# Patient Record
Sex: MALE | Race: WHITE | NOT HISPANIC OR LATINO | Employment: OTHER | URBAN - METROPOLITAN AREA
[De-identification: names, ages, dates, MRNs, and addresses within clinical notes are randomized per-mention and may not be internally consistent; named-entity substitution may affect disease eponyms.]

---

## 2023-09-19 LAB
ALANINE AMINOTRANSFERASE (SGPT) (U/L) IN SER/PLAS: 26 U/L (ref 10–52)
ALBUMIN (G/DL) IN SER/PLAS: 3.7 G/DL (ref 3.4–5)
ALKALINE PHOSPHATASE (U/L) IN SER/PLAS: 72 U/L (ref 33–136)
ANION GAP IN SER/PLAS: 16 MMOL/L (ref 10–20)
ASPARTATE AMINOTRANSFERASE (SGOT) (U/L) IN SER/PLAS: 23 U/L (ref 9–39)
BASOPHILS (10*3/UL) IN BLOOD BY AUTOMATED COUNT: 0.04 X10E9/L (ref 0–0.1)
BASOPHILS/100 LEUKOCYTES IN BLOOD BY AUTOMATED COUNT: 0.3 % (ref 0–2)
BILIRUBIN TOTAL (MG/DL) IN SER/PLAS: 0.5 MG/DL (ref 0–1.2)
CALCIUM (MG/DL) IN SER/PLAS: 8.9 MG/DL (ref 8.6–10.3)
CARBON DIOXIDE, TOTAL (MMOL/L) IN SER/PLAS: 26 MMOL/L (ref 21–32)
CHLORIDE (MMOL/L) IN SER/PLAS: 96 MMOL/L (ref 98–107)
CREATININE (MG/DL) IN SER/PLAS: 4.68 MG/DL (ref 0.5–1.3)
D-DIMER, QUANTITATIVE VTE EXCLUSION: 3640 NG/ML FEU
EOSINOPHILS (10*3/UL) IN BLOOD BY AUTOMATED COUNT: 0.04 X10E9/L (ref 0–0.4)
EOSINOPHILS/100 LEUKOCYTES IN BLOOD BY AUTOMATED COUNT: 0.3 % (ref 0–6)
ERYTHROCYTE DISTRIBUTION WIDTH (RATIO) BY AUTOMATED COUNT: 12.8 % (ref 11.5–14.5)
ERYTHROCYTE MEAN CORPUSCULAR HEMOGLOBIN CONCENTRATION (G/DL) BY AUTOMATED: 31.1 G/DL (ref 32–36)
ERYTHROCYTE MEAN CORPUSCULAR VOLUME (FL) BY AUTOMATED COUNT: 105 FL (ref 80–100)
ERYTHROCYTES (10*6/UL) IN BLOOD BY AUTOMATED COUNT: 2.61 X10E12/L (ref 4.5–5.9)
GFR MALE: 12 ML/MIN/1.73M2
GLUCOSE (MG/DL) IN SER/PLAS: 271 MG/DL (ref 74–99)
HEMATOCRIT (%) IN BLOOD BY AUTOMATED COUNT: 27.3 % (ref 41–52)
HEMOGLOBIN (G/DL) IN BLOOD: 8.5 G/DL (ref 13.5–17.5)
HEPARIN UNFRACTIONATED IN PPP BY CHROMOGENIC MET: 0.8 IU/ML
IMMATURE GRANULOCYTES/100 LEUKOCYTES IN BLOOD BY AUTOMATED COUNT: 0.7 % (ref 0–0.9)
INR IN PPP BY COAGULATION ASSAY: 1.2 (ref 0.9–1.1)
LEUKOCYTES (10*3/UL) IN BLOOD BY AUTOMATED COUNT: 11.4 X10E9/L (ref 4.4–11.3)
LYMPHOCYTES (10*3/UL) IN BLOOD BY AUTOMATED COUNT: 0.87 X10E9/L (ref 0.8–3)
LYMPHOCYTES/100 LEUKOCYTES IN BLOOD BY AUTOMATED COUNT: 7.6 % (ref 13–44)
MAGNESIUM (MG/DL) IN SER/PLAS: 1.72 MG/DL (ref 1.6–2.4)
MAGNESIUM (MG/DL) IN SER/PLAS: NORMAL
MONOCYTES (10*3/UL) IN BLOOD BY AUTOMATED COUNT: 0.87 X10E9/L (ref 0.05–0.8)
MONOCYTES/100 LEUKOCYTES IN BLOOD BY AUTOMATED COUNT: 7.6 % (ref 2–10)
NATRIURETIC PEPTIDE B (PG/ML) IN SER/PLAS: 103 PG/ML (ref 0–99)
NEUTROPHILS (10*3/UL) IN BLOOD BY AUTOMATED COUNT: 9.54 X10E9/L (ref 1.6–5.5)
NEUTROPHILS/100 LEUKOCYTES IN BLOOD BY AUTOMATED COUNT: 83.5 % (ref 40–80)
NRBC (PER 100 WBCS) BY AUTOMATED COUNT: 0 /100 WBC (ref 0–0)
PLATELETS (10*3/UL) IN BLOOD AUTOMATED COUNT: 239 X10E9/L (ref 150–450)
POTASSIUM (MMOL/L) IN SER/PLAS: 3.9 MMOL/L (ref 3.5–5.3)
PROTEIN TOTAL: 7.1 G/DL (ref 6.4–8.2)
PROTHROMBIN TIME (PT) IN PPP BY COAGULATION ASSAY: 13.1 SEC (ref 9.8–12.8)
SODIUM (MMOL/L) IN SER/PLAS: 134 MMOL/L (ref 136–145)
TROPONIN I, HIGH SENSITIVITY: 122 NG/L (ref 0–20)
TROPONIN I, HIGH SENSITIVITY: 1661 NG/L (ref 0–20)
TROPONIN I, HIGH SENSITIVITY: 608 NG/L (ref 0–20)
UREA NITROGEN (MG/DL) IN SER/PLAS: 64 MG/DL (ref 6–23)

## 2023-09-19 PROCEDURE — 99291 CRITICAL CARE FIRST HOUR: CPT

## 2023-09-19 PROCEDURE — 96375 TX/PRO/DX INJ NEW DRUG ADDON: CPT

## 2023-09-19 PROCEDURE — 83880 ASSAY OF NATRIURETIC PEPTIDE: CPT

## 2023-09-19 PROCEDURE — 85520 HEPARIN ASSAY: CPT

## 2023-09-19 PROCEDURE — 85379 FIBRIN DEGRADATION QUANT: CPT

## 2023-09-19 PROCEDURE — 80053 COMPREHEN METABOLIC PANEL: CPT

## 2023-09-19 PROCEDURE — 82465 ASSAY BLD/SERUM CHOLESTEROL: CPT

## 2023-09-19 PROCEDURE — 71275 CT ANGIOGRAPHY CHEST: CPT

## 2023-09-19 PROCEDURE — 9990 CHARGE CONVERSION: Mod: 91

## 2023-09-19 PROCEDURE — 85610 PROTHROMBIN TIME: CPT

## 2023-09-19 PROCEDURE — 83735 ASSAY OF MAGNESIUM: CPT

## 2023-09-19 PROCEDURE — 85025 COMPLETE CBC W/AUTO DIFF WBC: CPT

## 2023-09-19 PROCEDURE — 83718 ASSAY OF LIPOPROTEIN: CPT

## 2023-09-19 PROCEDURE — 93005 ELECTROCARDIOGRAM TRACING: CPT

## 2023-09-19 PROCEDURE — 96374 THER/PROPH/DIAG INJ IV PUSH: CPT

## 2023-09-19 PROCEDURE — 84484 ASSAY OF TROPONIN QUANT: CPT | Mod: 91

## 2023-09-19 PROCEDURE — 71046 X-RAY EXAM CHEST 2 VIEWS: CPT

## 2023-09-20 ENCOUNTER — HOSPITAL ENCOUNTER (INPATIENT)
Dept: DATA CONVERSION | Facility: HOSPITAL | Age: 83
LOS: 10 days | Discharge: ANOTHER HOSPITAL | DRG: 280 | End: 2023-09-30
Attending: STUDENT IN AN ORGANIZED HEALTH CARE EDUCATION/TRAINING PROGRAM | Admitting: STUDENT IN AN ORGANIZED HEALTH CARE EDUCATION/TRAINING PROGRAM
Payer: MEDICARE

## 2023-09-20 DIAGNOSIS — I31.39 OTHER PERICARDIAL EFFUSION (NONINFLAMMATORY) (HHS-HCC): ICD-10-CM

## 2023-09-20 LAB
ALBUMIN (G/DL) IN SER/PLAS: 3.3 G/DL (ref 3.4–5)
ANION GAP IN SER/PLAS: 18 MMOL/L (ref 10–20)
CALCIUM (MG/DL) IN SER/PLAS: 8.3 MG/DL (ref 8.6–10.3)
CARBON DIOXIDE, TOTAL (MMOL/L) IN SER/PLAS: 22 MMOL/L (ref 21–32)
CHLORIDE (MMOL/L) IN SER/PLAS: 97 MMOL/L (ref 98–107)
CHOLESTEROL (MG/DL) IN SER/PLAS: 89 MG/DL (ref 0–199)
CHOLESTEROL IN HDL (MG/DL) IN SER/PLAS: 31.8 MG/DL
CHOLESTEROL/HDL RATIO: 2.8
CREATININE (MG/DL) IN SER/PLAS: 4.67 MG/DL (ref 0.5–1.3)
ERYTHROCYTE DISTRIBUTION WIDTH (RATIO) BY AUTOMATED COUNT: 12.8 % (ref 11.5–14.5)
ERYTHROCYTE DISTRIBUTION WIDTH (RATIO) BY AUTOMATED COUNT: 12.9 % (ref 11.5–14.5)
ERYTHROCYTE MEAN CORPUSCULAR HEMOGLOBIN CONCENTRATION (G/DL) BY AUTOMATED: 30.9 G/DL (ref 32–36)
ERYTHROCYTE MEAN CORPUSCULAR HEMOGLOBIN CONCENTRATION (G/DL) BY AUTOMATED: 31.8 G/DL (ref 32–36)
ERYTHROCYTE MEAN CORPUSCULAR VOLUME (FL) BY AUTOMATED COUNT: 103 FL (ref 80–100)
ERYTHROCYTE MEAN CORPUSCULAR VOLUME (FL) BY AUTOMATED COUNT: 105 FL (ref 80–100)
ERYTHROCYTES (10*6/UL) IN BLOOD BY AUTOMATED COUNT: 2.31 X10E12/L (ref 4.5–5.9)
ERYTHROCYTES (10*6/UL) IN BLOOD BY AUTOMATED COUNT: 2.48 X10E12/L (ref 4.5–5.9)
ESTIMATED AVERAGE GLUCOSE FOR HBA1C: 151 MG/DL
GFR MALE: 12 ML/MIN/1.73M2
GLUCOSE (MG/DL) IN SER/PLAS: 236 MG/DL (ref 74–99)
HEMATOCRIT (%) IN BLOOD BY AUTOMATED COUNT: 24.2 % (ref 41–52)
HEMATOCRIT (%) IN BLOOD BY AUTOMATED COUNT: 25.6 % (ref 41–52)
HEMOGLOBIN (G/DL) IN BLOOD: 7.7 G/DL (ref 13.5–17.5)
HEMOGLOBIN (G/DL) IN BLOOD: 7.9 G/DL (ref 13.5–17.5)
HEMOGLOBIN A1C/HEMOGLOBIN TOTAL IN BLOOD: 6.9 %
HEPARIN UNFRACTIONATED IN PPP BY CHROMOGENIC MET: 0.3 IU/ML
HEPARIN UNFRACTIONATED IN PPP BY CHROMOGENIC MET: 0.3 IU/ML
LEUKOCYTES (10*3/UL) IN BLOOD BY AUTOMATED COUNT: 8.4 X10E9/L (ref 4.4–11.3)
LEUKOCYTES (10*3/UL) IN BLOOD BY AUTOMATED COUNT: 9.1 X10E9/L (ref 4.4–11.3)
MAGNESIUM (MG/DL) IN SER/PLAS: 1.82 MG/DL (ref 1.6–2.4)
NON-HDL CHOLESTEROL: 57 MG/DL
NRBC (PER 100 WBCS) BY AUTOMATED COUNT: 0 /100 WBC (ref 0–0)
NRBC (PER 100 WBCS) BY AUTOMATED COUNT: 0 /100 WBC (ref 0–0)
PHOSPHATE (MG/DL) IN SER/PLAS: 5.2 MG/DL (ref 2.5–4.9)
PLATELETS (10*3/UL) IN BLOOD AUTOMATED COUNT: 242 X10E9/L (ref 150–450)
PLATELETS (10*3/UL) IN BLOOD AUTOMATED COUNT: 248 X10E9/L (ref 150–450)
POCT GLUCOSE: 134 MG/DL (ref 74–99)
POCT GLUCOSE: 174 MG/DL (ref 74–99)
POCT GLUCOSE: 240 MG/DL (ref 74–99)
POCT GLUCOSE: 251 MG/DL (ref 74–99)
POCT GLUCOSE: 256 MG/DL (ref 74–99)
POTASSIUM (MMOL/L) IN SER/PLAS: 3.8 MMOL/L (ref 3.5–5.3)
SODIUM (MMOL/L) IN SER/PLAS: 133 MMOL/L (ref 136–145)
THYROTROPIN (MIU/L) IN SER/PLAS BY DETECTION LIMIT <= 0.05 MIU/L: 2.34 MIU/L (ref 0.44–3.98)
TROPONIN I, HIGH SENSITIVITY: 2226 NG/L (ref 0–20)
UREA NITROGEN (MG/DL) IN SER/PLAS: 68 MG/DL (ref 6–23)

## 2023-09-20 PROCEDURE — 84484 ASSAY OF TROPONIN QUANT: CPT | Mod: 91

## 2023-09-20 PROCEDURE — 83735 ASSAY OF MAGNESIUM: CPT

## 2023-09-20 PROCEDURE — 99291 CRITICAL CARE FIRST HOUR: CPT

## 2023-09-20 PROCEDURE — 96374 THER/PROPH/DIAG INJ IV PUSH: CPT

## 2023-09-20 PROCEDURE — 93005 ELECTROCARDIOGRAM TRACING: CPT

## 2023-09-20 PROCEDURE — C1894 INTRO/SHEATH, NON-LASER: HCPCS

## 2023-09-20 PROCEDURE — 96375 TX/PRO/DX INJ NEW DRUG ADDON: CPT

## 2023-09-20 PROCEDURE — 83036 HEMOGLOBIN GLYCOSYLATED A1C: CPT

## 2023-09-20 PROCEDURE — 82465 ASSAY BLD/SERUM CHOLESTEROL: CPT

## 2023-09-20 PROCEDURE — 85520 HEPARIN ASSAY: CPT

## 2023-09-20 PROCEDURE — 71046 X-RAY EXAM CHEST 2 VIEWS: CPT

## 2023-09-20 PROCEDURE — 84443 ASSAY THYROID STIM HORMONE: CPT

## 2023-09-20 PROCEDURE — 85025 COMPLETE CBC W/AUTO DIFF WBC: CPT

## 2023-09-20 PROCEDURE — 82947 ASSAY GLUCOSE BLOOD QUANT: CPT | Mod: 91

## 2023-09-20 PROCEDURE — B2111ZZ FLUOROSCOPY OF MULTIPLE CORONARY ARTERIES USING LOW OSMOLAR CONTRAST: ICD-10-PCS | Performed by: SPECIALIST

## 2023-09-20 PROCEDURE — 71275 CT ANGIOGRAPHY CHEST: CPT

## 2023-09-20 PROCEDURE — 80053 COMPREHEN METABOLIC PANEL: CPT

## 2023-09-20 PROCEDURE — 3E1M39Z IRRIGATION OF PERITONEAL CAVITY USING DIALYSATE, PERCUTANEOUS APPROACH: ICD-10-PCS | Performed by: INTERNAL MEDICINE

## 2023-09-20 PROCEDURE — 85379 FIBRIN DEGRADATION QUANT: CPT

## 2023-09-20 PROCEDURE — 4A023N7 MEASUREMENT OF CARDIAC SAMPLING AND PRESSURE, LEFT HEART, PERCUTANEOUS APPROACH: ICD-10-PCS | Performed by: SPECIALIST

## 2023-09-20 PROCEDURE — 83880 ASSAY OF NATRIURETIC PEPTIDE: CPT

## 2023-09-20 PROCEDURE — 83718 ASSAY OF LIPOPROTEIN: CPT

## 2023-09-20 PROCEDURE — 80069 RENAL FUNCTION PANEL: CPT

## 2023-09-20 PROCEDURE — 93306 TTE W/DOPPLER COMPLETE: CPT

## 2023-09-20 PROCEDURE — 93458 L HRT ARTERY/VENTRICLE ANGIO: CPT

## 2023-09-20 PROCEDURE — 85610 PROTHROMBIN TIME: CPT

## 2023-09-20 PROCEDURE — 9990 CHARGE CONVERSION: Mod: 91

## 2023-09-20 PROCEDURE — 85027 COMPLETE CBC AUTOMATED: CPT

## 2023-09-21 LAB
ALBUMIN (G/DL) IN SER/PLAS: 3.1 G/DL (ref 3.4–5)
ANION GAP IN SER/PLAS: 17 MMOL/L (ref 10–20)
CALCIUM (MG/DL) IN SER/PLAS: 8.3 MG/DL (ref 8.6–10.3)
CARBON DIOXIDE, TOTAL (MMOL/L) IN SER/PLAS: 23 MMOL/L (ref 21–32)
CHLORIDE (MMOL/L) IN SER/PLAS: 98 MMOL/L (ref 98–107)
CREATININE (MG/DL) IN SER/PLAS: 4.83 MG/DL (ref 0.5–1.3)
ERYTHROCYTE DISTRIBUTION WIDTH (RATIO) BY AUTOMATED COUNT: 13.1 % (ref 11.5–14.5)
ERYTHROCYTE DISTRIBUTION WIDTH (RATIO) BY AUTOMATED COUNT: 13.2 % (ref 11.5–14.5)
ERYTHROCYTE MEAN CORPUSCULAR HEMOGLOBIN CONCENTRATION (G/DL) BY AUTOMATED: 30.5 G/DL (ref 32–36)
ERYTHROCYTE MEAN CORPUSCULAR HEMOGLOBIN CONCENTRATION (G/DL) BY AUTOMATED: 30.9 G/DL (ref 32–36)
ERYTHROCYTE MEAN CORPUSCULAR VOLUME (FL) BY AUTOMATED COUNT: 105 FL (ref 80–100)
ERYTHROCYTE MEAN CORPUSCULAR VOLUME (FL) BY AUTOMATED COUNT: 107 FL (ref 80–100)
ERYTHROCYTES (10*6/UL) IN BLOOD BY AUTOMATED COUNT: 2.24 X10E12/L (ref 4.5–5.9)
ERYTHROCYTES (10*6/UL) IN BLOOD BY AUTOMATED COUNT: 2.32 X10E12/L (ref 4.5–5.9)
GFR MALE: 11 ML/MIN/1.73M2
GLUCOSE (MG/DL) IN SER/PLAS: 277 MG/DL (ref 74–99)
HEMATOCRIT (%) IN BLOOD BY AUTOMATED COUNT: 23.6 % (ref 41–52)
HEMATOCRIT (%) IN BLOOD BY AUTOMATED COUNT: 24.9 % (ref 41–52)
HEMOGLOBIN (G/DL) IN BLOOD: 7.2 G/DL (ref 13.5–17.5)
HEMOGLOBIN (G/DL) IN BLOOD: 7.7 G/DL (ref 13.5–17.5)
HEPARIN UNFRACTIONATED IN PPP BY CHROMOGENIC MET: 0.1 IU/ML
HEPARIN UNFRACTIONATED IN PPP BY CHROMOGENIC MET: 0.2 IU/ML
LEUKOCYTES (10*3/UL) IN BLOOD BY AUTOMATED COUNT: 8 X10E9/L (ref 4.4–11.3)
LEUKOCYTES (10*3/UL) IN BLOOD BY AUTOMATED COUNT: 9.1 X10E9/L (ref 4.4–11.3)
NRBC (PER 100 WBCS) BY AUTOMATED COUNT: 0 /100 WBC (ref 0–0)
NRBC (PER 100 WBCS) BY AUTOMATED COUNT: 0 /100 WBC (ref 0–0)
PHOSPHATE (MG/DL) IN SER/PLAS: 4.6 MG/DL (ref 2.5–4.9)
PLATELETS (10*3/UL) IN BLOOD AUTOMATED COUNT: 231 X10E9/L (ref 150–450)
PLATELETS (10*3/UL) IN BLOOD AUTOMATED COUNT: 232 X10E9/L (ref 150–450)
POCT GLUCOSE: 149 MG/DL (ref 74–99)
POCT GLUCOSE: 238 MG/DL (ref 74–99)
POCT GLUCOSE: 251 MG/DL (ref 74–99)
POTASSIUM (MMOL/L) IN SER/PLAS: 3.9 MMOL/L (ref 3.5–5.3)
SODIUM (MMOL/L) IN SER/PLAS: 134 MMOL/L (ref 136–145)
UREA NITROGEN (MG/DL) IN SER/PLAS: 65 MG/DL (ref 6–23)

## 2023-09-21 PROCEDURE — 84484 ASSAY OF TROPONIN QUANT: CPT

## 2023-09-21 PROCEDURE — 93308 TTE F-UP OR LMTD: CPT

## 2023-09-21 PROCEDURE — 83735 ASSAY OF MAGNESIUM: CPT

## 2023-09-21 PROCEDURE — 85027 COMPLETE CBC AUTOMATED: CPT | Mod: 91

## 2023-09-21 PROCEDURE — 93321 DOPPLER ECHO F-UP/LMTD STD: CPT

## 2023-09-21 PROCEDURE — 93325 DOPPLER ECHO COLOR FLOW MAPG: CPT

## 2023-09-21 PROCEDURE — 9990 CHARGE CONVERSION: Mod: 91

## 2023-09-21 PROCEDURE — 84443 ASSAY THYROID STIM HORMONE: CPT

## 2023-09-21 PROCEDURE — 36415 COLL VENOUS BLD VENIPUNCTURE: CPT

## 2023-09-21 PROCEDURE — 83036 HEMOGLOBIN GLYCOSYLATED A1C: CPT

## 2023-09-21 PROCEDURE — 82947 ASSAY GLUCOSE BLOOD QUANT: CPT | Mod: 91

## 2023-09-21 PROCEDURE — 80069 RENAL FUNCTION PANEL: CPT

## 2023-09-21 PROCEDURE — 85520 HEPARIN ASSAY: CPT | Mod: 91

## 2023-09-21 PROCEDURE — 93458 L HRT ARTERY/VENTRICLE ANGIO: CPT

## 2023-09-22 LAB
ALBUMIN (G/DL) IN SER/PLAS: 3.1 G/DL (ref 3.4–5)
ANION GAP IN SER/PLAS: 17 MMOL/L (ref 10–20)
CALCIUM (MG/DL) IN SER/PLAS: 8.2 MG/DL (ref 8.6–10.3)
CARBON DIOXIDE, TOTAL (MMOL/L) IN SER/PLAS: 23 MMOL/L (ref 21–32)
CHLORIDE (MMOL/L) IN SER/PLAS: 97 MMOL/L (ref 98–107)
CREATININE (MG/DL) IN SER/PLAS: 5.35 MG/DL (ref 0.5–1.3)
ERYTHROCYTE DISTRIBUTION WIDTH (RATIO) BY AUTOMATED COUNT: 13.1 % (ref 11.5–14.5)
ERYTHROCYTE MEAN CORPUSCULAR HEMOGLOBIN CONCENTRATION (G/DL) BY AUTOMATED: 32 G/DL (ref 32–36)
ERYTHROCYTE MEAN CORPUSCULAR VOLUME (FL) BY AUTOMATED COUNT: 103 FL (ref 80–100)
ERYTHROCYTES (10*6/UL) IN BLOOD BY AUTOMATED COUNT: 2.33 X10E12/L (ref 4.5–5.9)
GFR MALE: 10 ML/MIN/1.73M2
GLUCOSE (MG/DL) IN SER/PLAS: 320 MG/DL (ref 74–99)
HEMATOCRIT (%) IN BLOOD BY AUTOMATED COUNT: 24.1 % (ref 41–52)
HEMOGLOBIN (G/DL) IN BLOOD: 7.7 G/DL (ref 13.5–17.5)
HEPARIN UNFRACTIONATED IN PPP BY CHROMOGENIC MET: 0.3 IU/ML
HEPARIN UNFRACTIONATED IN PPP BY CHROMOGENIC MET: 0.4 IU/ML
LEUKOCYTES (10*3/UL) IN BLOOD BY AUTOMATED COUNT: 9.3 X10E9/L (ref 4.4–11.3)
NRBC (PER 100 WBCS) BY AUTOMATED COUNT: 0 /100 WBC (ref 0–0)
PHOSPHATE (MG/DL) IN SER/PLAS: 4.6 MG/DL (ref 2.5–4.9)
PLATELETS (10*3/UL) IN BLOOD AUTOMATED COUNT: 247 X10E9/L (ref 150–450)
POCT GLUCOSE: 174 MG/DL (ref 74–99)
POCT GLUCOSE: 268 MG/DL (ref 74–99)
POCT GLUCOSE: 268 MG/DL (ref 74–99)
POCT GLUCOSE: 289 MG/DL (ref 74–99)
POCT GLUCOSE: 313 MG/DL (ref 74–99)
POTASSIUM (MMOL/L) IN SER/PLAS: 3.9 MMOL/L (ref 3.5–5.3)
SODIUM (MMOL/L) IN SER/PLAS: 133 MMOL/L (ref 136–145)
UREA NITROGEN (MG/DL) IN SER/PLAS: 68 MG/DL (ref 6–23)

## 2023-09-22 PROCEDURE — 80069 RENAL FUNCTION PANEL: CPT

## 2023-09-22 PROCEDURE — 85027 COMPLETE CBC AUTOMATED: CPT

## 2023-09-22 PROCEDURE — 82947 ASSAY GLUCOSE BLOOD QUANT: CPT | Mod: 91

## 2023-09-22 PROCEDURE — 9990 CHARGE CONVERSION

## 2023-09-22 PROCEDURE — 85520 HEPARIN ASSAY: CPT | Mod: 91

## 2023-09-22 PROCEDURE — 36415 COLL VENOUS BLD VENIPUNCTURE: CPT

## 2023-09-22 PROCEDURE — 93005 ELECTROCARDIOGRAM TRACING: CPT

## 2023-09-23 LAB
ALBUMIN (G/DL) IN SER/PLAS: 3 G/DL (ref 3.4–5)
ANION GAP IN SER/PLAS: 17 MMOL/L (ref 10–20)
ATRIAL RATE: 101 BPM
ATRIAL RATE: 71 BPM
ATRIAL RATE: 71 BPM
ATRIAL RATE: 88 BPM
CALCIUM (MG/DL) IN SER/PLAS: 8.1 MG/DL (ref 8.6–10.3)
CARBON DIOXIDE, TOTAL (MMOL/L) IN SER/PLAS: 23 MMOL/L (ref 21–32)
CHLORIDE (MMOL/L) IN SER/PLAS: 97 MMOL/L (ref 98–107)
CREATININE (MG/DL) IN SER/PLAS: 5.27 MG/DL (ref 0.5–1.3)
GFR MALE: 10 ML/MIN/1.73M2
GLUCOSE (MG/DL) IN SER/PLAS: 263 MG/DL (ref 74–99)
HEPARIN UNFRACTIONATED IN PPP BY CHROMOGENIC MET: 0.3 IU/ML
P AXIS: 32 DEGREES
P AXIS: 33 DEGREES
P AXIS: 36 DEGREES
P OFFSET: 179 MS
P OFFSET: 186 MS
P OFFSET: 187 MS
P ONSET: 125 MS
P ONSET: 127 MS
P ONSET: 130 MS
PHOSPHATE (MG/DL) IN SER/PLAS: 4.6 MG/DL (ref 2.5–4.9)
POCT GLUCOSE: 164 MG/DL (ref 74–99)
POCT GLUCOSE: 208 MG/DL (ref 74–99)
POCT GLUCOSE: 266 MG/DL (ref 74–99)
POCT GLUCOSE: 267 MG/DL (ref 74–99)
POTASSIUM (MMOL/L) IN SER/PLAS: 3.8 MMOL/L (ref 3.5–5.3)
PR INTERVAL: 172 MS
PR INTERVAL: 174 MS
PR INTERVAL: 180 MS
Q ONSET: 214 MS
Q ONSET: 215 MS
Q ONSET: 216 MS
Q ONSET: 217 MS
QRS COUNT: 12 BEATS
QRS COUNT: 12 BEATS
QRS COUNT: 14 BEATS
QRS COUNT: 19 BEATS
QRS DURATION: 82 MS
QRS DURATION: 86 MS
QRS DURATION: 86 MS
QRS DURATION: 88 MS
QT INTERVAL: 306 MS
QT INTERVAL: 398 MS
QT INTERVAL: 420 MS
QT INTERVAL: 420 MS
QTC CALCULATION(BAZETT): 432 MS
QTC CALCULATION(BAZETT): 456 MS
QTC CALCULATION(BAZETT): 456 MS
QTC CALCULATION(BAZETT): 481 MS
QTC FREDERICIA: 385 MS
QTC FREDERICIA: 444 MS
QTC FREDERICIA: 444 MS
QTC FREDERICIA: 452 MS
R AXIS: -16 DEGREES
R AXIS: -17 DEGREES
R AXIS: -19 DEGREES
R AXIS: -25 DEGREES
SODIUM (MMOL/L) IN SER/PLAS: 133 MMOL/L (ref 136–145)
T AXIS: 104 DEGREES
T AXIS: 69 DEGREES
T AXIS: 78 DEGREES
T AXIS: 90 DEGREES
T OFFSET: 370 MS
T OFFSET: 413 MS
T OFFSET: 425 MS
T OFFSET: 426 MS
UREA NITROGEN (MG/DL) IN SER/PLAS: 65 MG/DL (ref 6–23)
VENTRICULAR RATE: 120 BPM
VENTRICULAR RATE: 71 BPM
VENTRICULAR RATE: 71 BPM
VENTRICULAR RATE: 88 BPM

## 2023-09-23 PROCEDURE — 82947 ASSAY GLUCOSE BLOOD QUANT: CPT | Mod: 91

## 2023-09-23 PROCEDURE — 85027 COMPLETE CBC AUTOMATED: CPT

## 2023-09-23 PROCEDURE — 80069 RENAL FUNCTION PANEL: CPT

## 2023-09-23 PROCEDURE — 93005 ELECTROCARDIOGRAM TRACING: CPT

## 2023-09-23 PROCEDURE — 36415 COLL VENOUS BLD VENIPUNCTURE: CPT | Mod: 91

## 2023-09-23 PROCEDURE — 85520 HEPARIN ASSAY: CPT | Mod: 91

## 2023-09-23 PROCEDURE — 9990 CHARGE CONVERSION

## 2023-09-24 LAB
ERYTHROCYTE DISTRIBUTION WIDTH (RATIO) BY AUTOMATED COUNT: 13.2 % (ref 11.5–14.5)
ERYTHROCYTE MEAN CORPUSCULAR HEMOGLOBIN CONCENTRATION (G/DL) BY AUTOMATED: 31.4 G/DL (ref 32–36)
ERYTHROCYTE MEAN CORPUSCULAR VOLUME (FL) BY AUTOMATED COUNT: 104 FL (ref 80–100)
ERYTHROCYTES (10*6/UL) IN BLOOD BY AUTOMATED COUNT: 2.35 X10E12/L (ref 4.5–5.9)
HEMATOCRIT (%) IN BLOOD BY AUTOMATED COUNT: 24.5 % (ref 41–52)
HEMOGLOBIN (G/DL) IN BLOOD: 7.7 G/DL (ref 13.5–17.5)
HEPARIN UNFRACTIONATED IN PPP BY CHROMOGENIC MET: 0.2 IU/ML
HEPARIN UNFRACTIONATED IN PPP BY CHROMOGENIC MET: 0.4 IU/ML
HEPARIN UNFRACTIONATED IN PPP BY CHROMOGENIC MET: 0.5 IU/ML
LEUKOCYTES (10*3/UL) IN BLOOD BY AUTOMATED COUNT: 10 X10E9/L (ref 4.4–11.3)
NRBC (PER 100 WBCS) BY AUTOMATED COUNT: 0.2 /100 WBC (ref 0–0)
PLATELETS (10*3/UL) IN BLOOD AUTOMATED COUNT: 278 X10E9/L (ref 150–450)
POCT GLUCOSE: 141 MG/DL (ref 74–99)
POCT GLUCOSE: 166 MG/DL (ref 74–99)
POCT GLUCOSE: 190 MG/DL (ref 74–99)
POCT GLUCOSE: 272 MG/DL (ref 74–99)

## 2023-09-24 PROCEDURE — 9990 CHARGE CONVERSION: Mod: 91

## 2023-09-24 PROCEDURE — 80069 RENAL FUNCTION PANEL: CPT

## 2023-09-24 PROCEDURE — 36415 COLL VENOUS BLD VENIPUNCTURE: CPT

## 2023-09-24 PROCEDURE — 85520 HEPARIN ASSAY: CPT

## 2023-09-24 PROCEDURE — 82947 ASSAY GLUCOSE BLOOD QUANT: CPT | Mod: 91

## 2023-09-24 PROCEDURE — 85027 COMPLETE CBC AUTOMATED: CPT

## 2023-09-25 LAB
ALBUMIN (G/DL) IN SER/PLAS: 2.9 G/DL (ref 3.4–5)
ANION GAP IN SER/PLAS: 17 MMOL/L (ref 10–20)
CALCIUM (MG/DL) IN SER/PLAS: 8.1 MG/DL (ref 8.6–10.3)
CARBON DIOXIDE, TOTAL (MMOL/L) IN SER/PLAS: 24 MMOL/L (ref 21–32)
CHLORIDE (MMOL/L) IN SER/PLAS: 99 MMOL/L (ref 98–107)
CREATININE (MG/DL) IN SER/PLAS: 4.58 MG/DL (ref 0.5–1.3)
ERYTHROCYTE DISTRIBUTION WIDTH (RATIO) BY AUTOMATED COUNT: 13.4 % (ref 11.5–14.5)
ERYTHROCYTE MEAN CORPUSCULAR HEMOGLOBIN CONCENTRATION (G/DL) BY AUTOMATED: 30.8 G/DL (ref 32–36)
ERYTHROCYTE MEAN CORPUSCULAR VOLUME (FL) BY AUTOMATED COUNT: 106 FL (ref 80–100)
ERYTHROCYTES (10*6/UL) IN BLOOD BY AUTOMATED COUNT: 2.35 X10E12/L (ref 4.5–5.9)
GFR MALE: 12 ML/MIN/1.73M2
GLUCOSE (MG/DL) IN SER/PLAS: 184 MG/DL (ref 74–99)
HEMATOCRIT (%) IN BLOOD BY AUTOMATED COUNT: 25 % (ref 41–52)
HEMOGLOBIN (G/DL) IN BLOOD: 7.7 G/DL (ref 13.5–17.5)
HEPARIN UNFRACTIONATED IN PPP BY CHROMOGENIC MET: 0.3 IU/ML
LEUKOCYTES (10*3/UL) IN BLOOD BY AUTOMATED COUNT: 9 X10E9/L (ref 4.4–11.3)
NRBC (PER 100 WBCS) BY AUTOMATED COUNT: 0.3 /100 WBC (ref 0–0)
PHOSPHATE (MG/DL) IN SER/PLAS: 4.5 MG/DL (ref 2.5–4.9)
PLATELETS (10*3/UL) IN BLOOD AUTOMATED COUNT: 306 X10E9/L (ref 150–450)
POCT GLUCOSE: 137 MG/DL (ref 74–99)
POCT GLUCOSE: 175 MG/DL (ref 74–99)
POCT GLUCOSE: 223 MG/DL (ref 74–99)
POCT GLUCOSE: 84 MG/DL (ref 74–99)
POTASSIUM (MMOL/L) IN SER/PLAS: 3.7 MMOL/L (ref 3.5–5.3)
SODIUM (MMOL/L) IN SER/PLAS: 136 MMOL/L (ref 136–145)
UREA NITROGEN (MG/DL) IN SER/PLAS: 58 MG/DL (ref 6–23)

## 2023-09-25 PROCEDURE — 80069 RENAL FUNCTION PANEL: CPT

## 2023-09-25 PROCEDURE — C1894 INTRO/SHEATH, NON-LASER: HCPCS

## 2023-09-25 PROCEDURE — 93308 TTE F-UP OR LMTD: CPT

## 2023-09-25 PROCEDURE — 85520 HEPARIN ASSAY: CPT | Mod: 91

## 2023-09-25 PROCEDURE — 93321 DOPPLER ECHO F-UP/LMTD STD: CPT

## 2023-09-25 PROCEDURE — 82947 ASSAY GLUCOSE BLOOD QUANT: CPT

## 2023-09-25 PROCEDURE — 93325 DOPPLER ECHO COLOR FLOW MAPG: CPT

## 2023-09-25 PROCEDURE — 85027 COMPLETE CBC AUTOMATED: CPT

## 2023-09-25 PROCEDURE — 9990 CHARGE CONVERSION: Mod: 91

## 2023-09-25 PROCEDURE — 36415 COLL VENOUS BLD VENIPUNCTURE: CPT

## 2023-09-26 LAB
ALBUMIN (G/DL) IN SER/PLAS: 3.1 G/DL (ref 3.4–5)
ANION GAP IN SER/PLAS: 15 MMOL/L (ref 10–20)
CALCIUM (MG/DL) IN SER/PLAS: 8.6 MG/DL (ref 8.6–10.3)
CARBON DIOXIDE, TOTAL (MMOL/L) IN SER/PLAS: 26 MMOL/L (ref 21–32)
CHLORIDE (MMOL/L) IN SER/PLAS: 98 MMOL/L (ref 98–107)
CREATININE (MG/DL) IN SER/PLAS: 4.34 MG/DL (ref 0.5–1.3)
ERYTHROCYTE DISTRIBUTION WIDTH (RATIO) BY AUTOMATED COUNT: 13.4 % (ref 11.5–14.5)
ERYTHROCYTE MEAN CORPUSCULAR HEMOGLOBIN CONCENTRATION (G/DL) BY AUTOMATED: 30 G/DL (ref 32–36)
ERYTHROCYTE MEAN CORPUSCULAR VOLUME (FL) BY AUTOMATED COUNT: 107 FL (ref 80–100)
ERYTHROCYTES (10*6/UL) IN BLOOD BY AUTOMATED COUNT: 2.52 X10E12/L (ref 4.5–5.9)
GFR MALE: 13 ML/MIN/1.73M2
GLUCOSE (MG/DL) IN SER/PLAS: 163 MG/DL (ref 74–99)
HEMATOCRIT (%) IN BLOOD BY AUTOMATED COUNT: 27 % (ref 41–52)
HEMOGLOBIN (G/DL) IN BLOOD: 8.1 G/DL (ref 13.5–17.5)
HEPARIN UNFRACTIONATED IN PPP BY CHROMOGENIC MET: 0.4 IU/ML
LEUKOCYTES (10*3/UL) IN BLOOD BY AUTOMATED COUNT: 11.5 X10E9/L (ref 4.4–11.3)
NRBC (PER 100 WBCS) BY AUTOMATED COUNT: 0 /100 WBC (ref 0–0)
PHOSPHATE (MG/DL) IN SER/PLAS: 4.6 MG/DL (ref 2.5–4.9)
PLATELETS (10*3/UL) IN BLOOD AUTOMATED COUNT: 324 X10E9/L (ref 150–450)
POCT GLUCOSE: 142 MG/DL (ref 74–99)
POCT GLUCOSE: 200 MG/DL (ref 74–99)
POCT GLUCOSE: 222 MG/DL (ref 74–99)
POCT GLUCOSE: 250 MG/DL (ref 74–99)
POTASSIUM (MMOL/L) IN SER/PLAS: 3.9 MMOL/L (ref 3.5–5.3)
SODIUM (MMOL/L) IN SER/PLAS: 135 MMOL/L (ref 136–145)
UREA NITROGEN (MG/DL) IN SER/PLAS: 52 MG/DL (ref 6–23)

## 2023-09-26 PROCEDURE — 80069 RENAL FUNCTION PANEL: CPT

## 2023-09-26 PROCEDURE — 85520 HEPARIN ASSAY: CPT

## 2023-09-26 PROCEDURE — 9990 CHARGE CONVERSION

## 2023-09-26 PROCEDURE — 82947 ASSAY GLUCOSE BLOOD QUANT: CPT | Mod: 91

## 2023-09-26 PROCEDURE — 85027 COMPLETE CBC AUTOMATED: CPT

## 2023-09-27 LAB
ALBUMIN (G/DL) IN SER/PLAS: 2.8 G/DL (ref 3.4–5)
ANION GAP IN SER/PLAS: 16 MMOL/L (ref 10–20)
CALCIUM (MG/DL) IN SER/PLAS: 8.2 MG/DL (ref 8.6–10.3)
CARBON DIOXIDE, TOTAL (MMOL/L) IN SER/PLAS: 24 MMOL/L (ref 21–32)
CHLORIDE (MMOL/L) IN SER/PLAS: 100 MMOL/L (ref 98–107)
CREATININE (MG/DL) IN SER/PLAS: 4.28 MG/DL (ref 0.5–1.3)
ERYTHROCYTE DISTRIBUTION WIDTH (RATIO) BY AUTOMATED COUNT: 13.8 % (ref 11.5–14.5)
ERYTHROCYTE MEAN CORPUSCULAR HEMOGLOBIN CONCENTRATION (G/DL) BY AUTOMATED: 29.6 G/DL (ref 32–36)
ERYTHROCYTE MEAN CORPUSCULAR VOLUME (FL) BY AUTOMATED COUNT: 108 FL (ref 80–100)
ERYTHROCYTES (10*6/UL) IN BLOOD BY AUTOMATED COUNT: 2.34 X10E12/L (ref 4.5–5.9)
GFR MALE: 13 ML/MIN/1.73M2
GLUCOSE (MG/DL) IN SER/PLAS: 170 MG/DL (ref 74–99)
HEMATOCRIT (%) IN BLOOD BY AUTOMATED COUNT: 25.3 % (ref 41–52)
HEMOGLOBIN (G/DL) IN BLOOD: 7.5 G/DL (ref 13.5–17.5)
HEPARIN UNFRACTIONATED IN PPP BY CHROMOGENIC MET: 0.4 IU/ML
LEUKOCYTES (10*3/UL) IN BLOOD BY AUTOMATED COUNT: 9.4 X10E9/L (ref 4.4–11.3)
MAGNESIUM (MG/DL) IN SER/PLAS: 1.58 MG/DL (ref 1.6–2.4)
NRBC (PER 100 WBCS) BY AUTOMATED COUNT: 0 /100 WBC (ref 0–0)
PHOSPHATE (MG/DL) IN SER/PLAS: 4.1 MG/DL (ref 2.5–4.9)
PLATELETS (10*3/UL) IN BLOOD AUTOMATED COUNT: 287 X10E9/L (ref 150–450)
POCT GLUCOSE: 134 MG/DL (ref 74–99)
POCT GLUCOSE: 134 MG/DL (ref 74–99)
POCT GLUCOSE: 207 MG/DL (ref 74–99)
POCT GLUCOSE: 224 MG/DL (ref 74–99)
POTASSIUM (MMOL/L) IN SER/PLAS: 3.8 MMOL/L (ref 3.5–5.3)
SODIUM (MMOL/L) IN SER/PLAS: 136 MMOL/L (ref 136–145)
UREA NITROGEN (MG/DL) IN SER/PLAS: 52 MG/DL (ref 6–23)

## 2023-09-27 PROCEDURE — 85027 COMPLETE CBC AUTOMATED: CPT

## 2023-09-27 PROCEDURE — 85520 HEPARIN ASSAY: CPT

## 2023-09-27 PROCEDURE — 80069 RENAL FUNCTION PANEL: CPT

## 2023-09-27 PROCEDURE — 9990 CHARGE CONVERSION

## 2023-09-27 PROCEDURE — 82947 ASSAY GLUCOSE BLOOD QUANT: CPT | Mod: 91

## 2023-09-27 PROCEDURE — 83735 ASSAY OF MAGNESIUM: CPT

## 2023-09-28 VITALS — HEIGHT: 69 IN | BODY MASS INDEX: 27.76 KG/M2 | WEIGHT: 187.39 LBS

## 2023-09-28 LAB
ANION GAP IN SER/PLAS: 13 MMOL/L (ref 10–20)
CALCIUM (MG/DL) IN SER/PLAS: 8.3 MG/DL (ref 8.6–10.3)
CARBON DIOXIDE, TOTAL (MMOL/L) IN SER/PLAS: 27 MMOL/L (ref 21–32)
CHLORIDE (MMOL/L) IN SER/PLAS: 100 MMOL/L (ref 98–107)
CREATININE (MG/DL) IN SER/PLAS: 4.1 MG/DL (ref 0.5–1.3)
ERYTHROCYTE DISTRIBUTION WIDTH (RATIO) BY AUTOMATED COUNT: 13.7 % (ref 11.5–14.5)
ERYTHROCYTE MEAN CORPUSCULAR HEMOGLOBIN CONCENTRATION (G/DL) BY AUTOMATED: 29.8 G/DL (ref 32–36)
ERYTHROCYTE MEAN CORPUSCULAR VOLUME (FL) BY AUTOMATED COUNT: 107 FL (ref 80–100)
ERYTHROCYTES (10*6/UL) IN BLOOD BY AUTOMATED COUNT: 2.31 X10E12/L (ref 4.5–5.9)
GFR MALE: 14 ML/MIN/1.73M2
GLUCOSE (MG/DL) IN SER/PLAS: 121 MG/DL (ref 74–99)
HEMATOCRIT (%) IN BLOOD BY AUTOMATED COUNT: 24.8 % (ref 41–52)
HEMOGLOBIN (G/DL) IN BLOOD: 7.4 G/DL (ref 13.5–17.5)
HEPARIN UNFRACTIONATED IN PPP BY CHROMOGENIC MET: 0.3 IU/ML
LEUKOCYTES (10*3/UL) IN BLOOD BY AUTOMATED COUNT: 9.5 X10E9/L (ref 4.4–11.3)
NRBC (PER 100 WBCS) BY AUTOMATED COUNT: 0 /100 WBC (ref 0–0)
PLATELETS (10*3/UL) IN BLOOD AUTOMATED COUNT: 295 X10E9/L (ref 150–450)
POCT GLUCOSE: 103 MG/DL (ref 74–99)
POCT GLUCOSE: 168 MG/DL (ref 74–99)
POCT GLUCOSE: 206 MG/DL (ref 74–99)
POCT GLUCOSE: 246 MG/DL (ref 74–99)
POTASSIUM (MMOL/L) IN SER/PLAS: 3.8 MMOL/L (ref 3.5–5.3)
SODIUM (MMOL/L) IN SER/PLAS: 136 MMOL/L (ref 136–145)
UREA NITROGEN (MG/DL) IN SER/PLAS: 50 MG/DL (ref 6–23)

## 2023-09-28 PROCEDURE — 85520 HEPARIN ASSAY: CPT

## 2023-09-28 PROCEDURE — 80069 RENAL FUNCTION PANEL: CPT

## 2023-09-28 PROCEDURE — 85027 COMPLETE CBC AUTOMATED: CPT

## 2023-09-28 PROCEDURE — 82947 ASSAY GLUCOSE BLOOD QUANT: CPT | Mod: 91

## 2023-09-28 PROCEDURE — 93005 ELECTROCARDIOGRAM TRACING: CPT

## 2023-09-28 PROCEDURE — 83735 ASSAY OF MAGNESIUM: CPT

## 2023-09-28 PROCEDURE — 80048 BASIC METABOLIC PNL TOTAL CA: CPT

## 2023-09-28 PROCEDURE — 9990 CHARGE CONVERSION

## 2023-09-28 RX ORDER — ATORVASTATIN CALCIUM 80 MG/1
80 TABLET, FILM COATED ORAL DAILY
Status: DISCONTINUED | OUTPATIENT
Start: 2023-09-30 | End: 2023-09-30 | Stop reason: HOSPADM

## 2023-09-28 RX ORDER — INSULIN LISPRO 100 [IU]/ML
0-12 INJECTION, SOLUTION INTRAVENOUS; SUBCUTANEOUS
Status: DISCONTINUED | OUTPATIENT
Start: 2023-09-30 | End: 2023-09-30 | Stop reason: HOSPADM

## 2023-09-28 RX ORDER — METOPROLOL TARTRATE 25 MG/1
25 TABLET, FILM COATED ORAL 2 TIMES DAILY
Status: DISCONTINUED | OUTPATIENT
Start: 2023-09-30 | End: 2023-09-30 | Stop reason: HOSPADM

## 2023-09-28 RX ORDER — DOCUSATE SODIUM 100 MG/1
100 CAPSULE, LIQUID FILLED ORAL 2 TIMES DAILY
Status: DISCONTINUED | OUTPATIENT
Start: 2023-09-30 | End: 2023-09-30 | Stop reason: HOSPADM

## 2023-09-28 RX ORDER — SENNOSIDES 8.8 MG/5ML
5 LIQUID ORAL NIGHTLY PRN
Status: DISCONTINUED | OUTPATIENT
Start: 2023-09-30 | End: 2023-09-30 | Stop reason: HOSPADM

## 2023-09-28 RX ORDER — GENTAMICIN SULFATE 1 MG/G
CREAM TOPICAL DAILY
Status: DISCONTINUED | OUTPATIENT
Start: 2023-09-30 | End: 2023-09-30 | Stop reason: HOSPADM

## 2023-09-28 RX ORDER — HEPARIN SODIUM 10000 [USP'U]/100ML
0-4000 INJECTION, SOLUTION INTRAVENOUS CONTINUOUS
Status: DISCONTINUED | OUTPATIENT
Start: 2023-09-30 | End: 2023-09-30 | Stop reason: HOSPADM

## 2023-09-28 RX ORDER — HEPARIN SODIUM 5000 [USP'U]/ML
2000-4000 INJECTION, SOLUTION INTRAVENOUS; SUBCUTANEOUS EVERY 4 HOURS PRN
Status: DISCONTINUED | OUTPATIENT
Start: 2023-09-30 | End: 2023-09-30 | Stop reason: HOSPADM

## 2023-09-28 RX ORDER — VITS A,C,E/LUTEIN/MINERALS 300MCG-200
400 TABLET ORAL DAILY
Status: DISCONTINUED | OUTPATIENT
Start: 2023-09-30 | End: 2023-09-30 | Stop reason: HOSPADM

## 2023-09-28 RX ORDER — TORSEMIDE 20 MG/1
50 TABLET ORAL DAILY
Status: DISCONTINUED | OUTPATIENT
Start: 2023-09-30 | End: 2023-09-30 | Stop reason: HOSPADM

## 2023-09-28 RX ORDER — ASPIRIN 81 MG/1
81 TABLET ORAL DAILY
Status: DISCONTINUED | OUTPATIENT
Start: 2023-09-30 | End: 2023-09-30 | Stop reason: HOSPADM

## 2023-09-28 RX ORDER — SODIUM BICARBONATE 650 MG/1
650 TABLET ORAL EVERY 12 HOURS
Status: DISCONTINUED | OUTPATIENT
Start: 2023-09-30 | End: 2023-09-30 | Stop reason: HOSPADM

## 2023-09-28 RX ORDER — ZINC OXIDE 20 G/100G
1 OINTMENT TOPICAL 3 TIMES DAILY
Status: DISCONTINUED | OUTPATIENT
Start: 2023-09-30 | End: 2023-09-30 | Stop reason: HOSPADM

## 2023-09-28 RX ORDER — FLUTICASONE PROPIONATE 50 MCG
1 SPRAY, SUSPENSION (ML) NASAL NIGHTLY
Status: DISCONTINUED | OUTPATIENT
Start: 2023-09-30 | End: 2023-09-30 | Stop reason: HOSPADM

## 2023-09-28 RX ORDER — DULOXETIN HYDROCHLORIDE 30 MG/1
30 CAPSULE, DELAYED RELEASE ORAL DAILY
Status: DISCONTINUED | OUTPATIENT
Start: 2023-09-30 | End: 2023-09-30 | Stop reason: HOSPADM

## 2023-09-28 RX ORDER — LEVOTHYROXINE SODIUM 25 UG/1
25 TABLET ORAL
Status: DISCONTINUED | OUTPATIENT
Start: 2023-09-30 | End: 2023-09-30 | Stop reason: HOSPADM

## 2023-09-28 RX ORDER — INSULIN GLARGINE 100 [IU]/ML
25 INJECTION, SOLUTION SUBCUTANEOUS EVERY 24 HOURS
Status: DISCONTINUED | OUTPATIENT
Start: 2023-09-30 | End: 2023-09-30 | Stop reason: HOSPADM

## 2023-09-28 RX ORDER — DEXTROSE 50 % IN WATER (D50W) INTRAVENOUS SYRINGE
25
Status: DISCONTINUED | OUTPATIENT
Start: 2023-09-30 | End: 2023-09-30 | Stop reason: HOSPADM

## 2023-09-28 RX ORDER — ACETAMINOPHEN 325 MG/1
975 TABLET ORAL EVERY 6 HOURS PRN
Status: DISCONTINUED | OUTPATIENT
Start: 2023-09-30 | End: 2023-09-30 | Stop reason: HOSPADM

## 2023-09-28 RX ORDER — AMLODIPINE BESYLATE 10 MG/1
10 TABLET ORAL DAILY
Status: DISCONTINUED | OUTPATIENT
Start: 2023-09-30 | End: 2023-09-30 | Stop reason: HOSPADM

## 2023-09-29 LAB
HEPARIN UNFRACTIONATED IN PPP BY CHROMOGENIC MET: 0.4 IU/ML
POCT GLUCOSE: 106 MG/DL (ref 74–99)
POCT GLUCOSE: 147 MG/DL (ref 74–99)
POCT GLUCOSE: 213 MG/DL (ref 74–99)
POCT GLUCOSE: 222 MG/DL (ref 74–99)
POCT GLUCOSE: 66 MG/DL (ref 74–99)

## 2023-09-29 PROCEDURE — 85027 COMPLETE CBC AUTOMATED: CPT

## 2023-09-29 PROCEDURE — 85520 HEPARIN ASSAY: CPT

## 2023-09-29 PROCEDURE — 82947 ASSAY GLUCOSE BLOOD QUANT: CPT | Mod: 91

## 2023-09-29 PROCEDURE — 80048 BASIC METABOLIC PNL TOTAL CA: CPT

## 2023-09-29 PROCEDURE — 9990 CHARGE CONVERSION

## 2023-09-29 RX ORDER — LEVOTHYROXINE SODIUM 50 UG/1
50 CAPSULE ORAL
COMMUNITY

## 2023-09-29 RX ORDER — DULOXETIN HYDROCHLORIDE 30 MG/1
30 CAPSULE, DELAYED RELEASE ORAL DAILY
COMMUNITY

## 2023-09-29 RX ORDER — HYDRALAZINE HYDROCHLORIDE 25 MG/1
25 TABLET, FILM COATED ORAL 3 TIMES DAILY
COMMUNITY
End: 2023-10-17 | Stop reason: HOSPADM

## 2023-09-29 RX ORDER — DULAGLUTIDE 1.5 MG/.5ML
1.5 INJECTION, SOLUTION SUBCUTANEOUS
Status: ON HOLD | COMMUNITY
End: 2023-10-17 | Stop reason: SDUPTHER

## 2023-09-29 RX ORDER — SODIUM BICARBONATE 650 MG/1
650 TABLET ORAL 2 TIMES DAILY
COMMUNITY

## 2023-09-29 RX ORDER — GLUCOSAMINE/CHONDR SU A SOD 750-600 MG
1 TABLET ORAL 2 TIMES DAILY
COMMUNITY
End: 2023-10-17 | Stop reason: HOSPADM

## 2023-09-29 RX ORDER — VIT B/MIN/SAW PALMETTO/PYGEUM 160-12.5MG
1 CAPSULE ORAL DAILY
COMMUNITY
End: 2023-10-17 | Stop reason: HOSPADM

## 2023-09-29 RX ORDER — CHOLECALCIFEROL (VITAMIN D3) 25 MCG
25 TABLET ORAL DAILY
COMMUNITY

## 2023-09-29 RX ORDER — ATORVASTATIN CALCIUM 10 MG/1
10 TABLET, FILM COATED ORAL DAILY
Status: ON HOLD | COMMUNITY
End: 2023-10-17 | Stop reason: SDUPTHER

## 2023-09-29 RX ORDER — VITAMIN B COMPLEX
1 CAPSULE ORAL DAILY
COMMUNITY
End: 2023-11-29

## 2023-09-29 RX ORDER — TORSEMIDE 100 MG/1
50 TABLET ORAL DAILY
COMMUNITY

## 2023-09-29 RX ORDER — AMOXICILLIN 250 MG
2 CAPSULE ORAL 2 TIMES DAILY
COMMUNITY
End: 2023-11-29

## 2023-09-29 RX ORDER — AMLODIPINE BESYLATE 10 MG/1
10 TABLET ORAL DAILY
COMMUNITY
End: 2023-10-17 | Stop reason: HOSPADM

## 2023-09-29 RX ORDER — MULTIVITAMIN
1 TABLET ORAL DAILY
COMMUNITY
End: 2023-11-29

## 2023-09-29 RX ORDER — ACETAMINOPHEN 500 MG
1000 TABLET ORAL EVERY 6 HOURS PRN
COMMUNITY
End: 2023-10-17 | Stop reason: HOSPADM

## 2023-09-29 RX ORDER — INSULIN DETEMIR 100 [IU]/ML
25 INJECTION, SOLUTION SUBCUTANEOUS DAILY
COMMUNITY

## 2023-09-30 ENCOUNTER — DOCUMENTATION (OUTPATIENT)
Dept: CARDIOLOGY | Facility: HOSPITAL | Age: 83
End: 2023-09-30

## 2023-09-30 ENCOUNTER — HOSPITAL ENCOUNTER (INPATIENT)
Facility: HOSPITAL | Age: 83
LOS: 17 days | Discharge: HOME | DRG: 235 | End: 2023-10-17
Attending: STUDENT IN AN ORGANIZED HEALTH CARE EDUCATION/TRAINING PROGRAM | Admitting: NURSE PRACTITIONER
Payer: MEDICARE

## 2023-09-30 DIAGNOSIS — Z01.818 ENCOUNTER FOR OTHER PREPROCEDURAL EXAMINATION: ICD-10-CM

## 2023-09-30 DIAGNOSIS — I21.4 NON-ST ELEVATION (NSTEMI) MYOCARDIAL INFARCTION (MULTI): ICD-10-CM

## 2023-09-30 DIAGNOSIS — I25.719 ATHEROSCLEROSIS OF AUTOLOGOUS VEIN CORONARY ARTERY BYPASS GRAFT(S) WITH UNSPECIFIED ANGINA PECTORIS (CMS-HCC): ICD-10-CM

## 2023-09-30 DIAGNOSIS — R09.89 OTHER SPECIFIED SYMPTOMS AND SIGNS INVOLVING THE CIRCULATORY AND RESPIRATORY SYSTEMS: ICD-10-CM

## 2023-09-30 DIAGNOSIS — I73.9 PERIPHERAL VASCULAR DISEASE, UNSPECIFIED (CMS-HCC): ICD-10-CM

## 2023-09-30 DIAGNOSIS — I25.10 2-VESSEL CORONARY ARTERY DISEASE: ICD-10-CM

## 2023-09-30 DIAGNOSIS — Z09 F/U FOR ACUTE CORONARY SYNDROME: ICD-10-CM

## 2023-09-30 DIAGNOSIS — Z95.1 STATUS POST AORTO-CORONARY ARTERY BYPASS GRAFT: Primary | ICD-10-CM

## 2023-09-30 DIAGNOSIS — I73.89 OTHER SPECIFIED PERIPHERAL VASCULAR DISEASES (CMS-HCC): ICD-10-CM

## 2023-09-30 DIAGNOSIS — D50.0 IRON DEFICIENCY ANEMIA DUE TO CHRONIC BLOOD LOSS: ICD-10-CM

## 2023-09-30 DIAGNOSIS — E11.59 TYPE 2 DIABETES MELLITUS WITH OTHER CIRCULATORY COMPLICATION, WITH LONG-TERM CURRENT USE OF INSULIN (MULTI): ICD-10-CM

## 2023-09-30 DIAGNOSIS — Z79.4 TYPE 2 DIABETES MELLITUS WITH OTHER CIRCULATORY COMPLICATION, WITH LONG-TERM CURRENT USE OF INSULIN (MULTI): ICD-10-CM

## 2023-09-30 DIAGNOSIS — T85.611S: ICD-10-CM

## 2023-09-30 DIAGNOSIS — I25.110 CORONARY ARTERY DISEASE INVOLVING NATIVE CORONARY ARTERY OF NATIVE HEART WITH UNSTABLE ANGINA PECTORIS (MULTI): ICD-10-CM

## 2023-09-30 DIAGNOSIS — R07.9 MODERATE CORONARY ARTERY RISK CHEST PAIN: ICD-10-CM

## 2023-09-30 LAB
EST. AVERAGE GLUCOSE BLD GHB EST-MCNC: 143 MG/DL
GLUCOSE BLD MANUAL STRIP-MCNC: 277 MG/DL (ref 74–99)
HBA1C MFR BLD: 6.6 %
UFH PPP CHRO-ACNC: 0.5 IU/ML
UFH PPP CHRO-ACNC: 0.9 IU/ML

## 2023-09-30 PROCEDURE — 83036 HEMOGLOBIN GLYCOSYLATED A1C: CPT | Performed by: NURSE PRACTITIONER

## 2023-09-30 PROCEDURE — 2500000004 HC RX 250 GENERAL PHARMACY W/ HCPCS (ALT 636 FOR OP/ED): Performed by: NURSE PRACTITIONER

## 2023-09-30 PROCEDURE — 2500000004 HC RX 250 GENERAL PHARMACY W/ HCPCS (ALT 636 FOR OP/ED): Performed by: STUDENT IN AN ORGANIZED HEALTH CARE EDUCATION/TRAINING PROGRAM

## 2023-09-30 PROCEDURE — 82947 ASSAY GLUCOSE BLOOD QUANT: CPT

## 2023-09-30 PROCEDURE — 36415 COLL VENOUS BLD VENIPUNCTURE: CPT | Performed by: NURSE PRACTITIONER

## 2023-09-30 PROCEDURE — 2500000001 HC RX 250 WO HCPCS SELF ADMINISTERED DRUGS (ALT 637 FOR MEDICARE OP): Performed by: NURSE PRACTITIONER

## 2023-09-30 PROCEDURE — 2500000002 HC RX 250 W HCPCS SELF ADMINISTERED DRUGS (ALT 637 FOR MEDICARE OP, ALT 636 FOR OP/ED): Performed by: NURSE PRACTITIONER

## 2023-09-30 PROCEDURE — 82947 ASSAY GLUCOSE BLOOD QUANT: CPT | Mod: 91

## 2023-09-30 PROCEDURE — 85520 HEPARIN ASSAY: CPT | Performed by: INTERNAL MEDICINE

## 2023-09-30 PROCEDURE — 85520 HEPARIN ASSAY: CPT

## 2023-09-30 PROCEDURE — 2500000004 HC RX 250 GENERAL PHARMACY W/ HCPCS (ALT 636 FOR OP/ED): Performed by: INTERNAL MEDICINE

## 2023-09-30 PROCEDURE — 1200000002 HC GENERAL ROOM WITH TELEMETRY DAILY

## 2023-09-30 PROCEDURE — 99254 IP/OBS CNSLTJ NEW/EST MOD 60: CPT | Performed by: NURSE PRACTITIONER

## 2023-09-30 PROCEDURE — 85520 HEPARIN ASSAY: CPT | Performed by: NURSE PRACTITIONER

## 2023-09-30 PROCEDURE — 9990 CHARGE CONVERSION

## 2023-09-30 PROCEDURE — 99223 1ST HOSP IP/OBS HIGH 75: CPT | Performed by: STUDENT IN AN ORGANIZED HEALTH CARE EDUCATION/TRAINING PROGRAM

## 2023-09-30 RX ORDER — CHOLECALCIFEROL (VITAMIN D3) 25 MCG
25 TABLET ORAL DAILY
Status: DISCONTINUED | OUTPATIENT
Start: 2023-09-30 | End: 2023-10-17 | Stop reason: HOSPADM

## 2023-09-30 RX ORDER — HEPARIN SODIUM 10000 [USP'U]/100ML
1500 INJECTION, SOLUTION INTRAVENOUS CONTINUOUS
Status: DISCONTINUED | OUTPATIENT
Start: 2023-09-30 | End: 2023-10-08

## 2023-09-30 RX ORDER — HEPARIN SODIUM 5000 [USP'U]/ML
2000-4000 INJECTION, SOLUTION INTRAVENOUS; SUBCUTANEOUS EVERY 4 HOURS PRN
Status: DISCONTINUED | OUTPATIENT
Start: 2023-09-30 | End: 2023-09-30

## 2023-09-30 RX ORDER — HEPARIN SODIUM 10000 [USP'U]/100ML
0-4000 INJECTION, SOLUTION INTRAVENOUS CONTINUOUS
Status: DISCONTINUED | OUTPATIENT
Start: 2023-09-30 | End: 2023-09-30

## 2023-09-30 RX ORDER — TORSEMIDE 100 MG/1
50 TABLET ORAL DAILY
Status: DISCONTINUED | OUTPATIENT
Start: 2023-10-01 | End: 2023-10-11

## 2023-09-30 RX ORDER — HEPARIN SODIUM 5000 [USP'U]/ML
3000-4000 INJECTION, SOLUTION INTRAVENOUS; SUBCUTANEOUS EVERY 4 HOURS PRN
Status: DISCONTINUED | OUTPATIENT
Start: 2023-09-30 | End: 2023-09-30

## 2023-09-30 RX ORDER — HEPARIN SODIUM 5000 [USP'U]/ML
2000-4000 INJECTION, SOLUTION INTRAVENOUS; SUBCUTANEOUS EVERY 4 HOURS PRN
Status: DISCONTINUED | OUTPATIENT
Start: 2023-09-30 | End: 2023-10-08

## 2023-09-30 RX ORDER — ACETAMINOPHEN 325 MG/1
973 TABLET ORAL EVERY 6 HOURS PRN
Status: DISCONTINUED | OUTPATIENT
Start: 2023-09-30 | End: 2023-10-05

## 2023-09-30 RX ORDER — AMLODIPINE BESYLATE 10 MG/1
10 TABLET ORAL DAILY
Status: DISCONTINUED | OUTPATIENT
Start: 2023-09-30 | End: 2023-10-11

## 2023-09-30 RX ORDER — ATORVASTATIN CALCIUM 80 MG/1
80 TABLET, FILM COATED ORAL DAILY
Status: DISCONTINUED | OUTPATIENT
Start: 2023-09-30 | End: 2023-10-11

## 2023-09-30 RX ORDER — LEVOTHYROXINE SODIUM 25 UG/1
TABLET ORAL
Status: DISCONTINUED
Start: 2023-09-30 | End: 2023-09-30 | Stop reason: HOSPADM

## 2023-09-30 RX ORDER — SODIUM BICARBONATE 650 MG/1
650 TABLET ORAL 2 TIMES DAILY
Status: DISCONTINUED | OUTPATIENT
Start: 2023-09-30 | End: 2023-10-05

## 2023-09-30 RX ORDER — DEXTROSE MONOHYDRATE 100 MG/ML
0.3 INJECTION, SOLUTION INTRAVENOUS ONCE AS NEEDED
Status: DISCONTINUED | OUTPATIENT
Start: 2023-09-30 | End: 2023-09-30

## 2023-09-30 RX ORDER — HEPARIN SODIUM 5000 [USP'U]/ML
4000 INJECTION, SOLUTION INTRAVENOUS; SUBCUTANEOUS ONCE
Status: DISCONTINUED | OUTPATIENT
Start: 2023-09-30 | End: 2023-09-30

## 2023-09-30 RX ORDER — DEXTROSE 50 % IN WATER (D50W) INTRAVENOUS SYRINGE
25
Status: DISCONTINUED | OUTPATIENT
Start: 2023-09-30 | End: 2023-09-30

## 2023-09-30 RX ORDER — SENNOSIDES 8.6 MG/1
2 TABLET ORAL 2 TIMES DAILY
Status: DISCONTINUED | OUTPATIENT
Start: 2023-09-30 | End: 2023-10-17 | Stop reason: HOSPADM

## 2023-09-30 RX ORDER — HYDRALAZINE HYDROCHLORIDE 25 MG/1
25 TABLET, FILM COATED ORAL 3 TIMES DAILY
Status: DISCONTINUED | OUTPATIENT
Start: 2023-09-30 | End: 2023-09-30

## 2023-09-30 RX ORDER — DEXTROSE 50 % IN WATER (D50W) INTRAVENOUS SYRINGE
25
Status: DISCONTINUED | OUTPATIENT
Start: 2023-09-30 | End: 2023-10-11

## 2023-09-30 RX ORDER — HEPARIN SODIUM 5000 [USP'U]/ML
INJECTION, SOLUTION INTRAVENOUS; SUBCUTANEOUS EVERY 4 HOURS PRN
Status: DISCONTINUED | OUTPATIENT
Start: 2023-09-30 | End: 2023-09-30

## 2023-09-30 RX ORDER — INSULIN LISPRO 100 [IU]/ML
0-10 INJECTION, SOLUTION INTRAVENOUS; SUBCUTANEOUS
Status: DISCONTINUED | OUTPATIENT
Start: 2023-10-01 | End: 2023-10-08

## 2023-09-30 RX ORDER — DULOXETIN HYDROCHLORIDE 30 MG/1
30 CAPSULE, DELAYED RELEASE ORAL DAILY
Status: DISCONTINUED | OUTPATIENT
Start: 2023-09-30 | End: 2023-10-17 | Stop reason: HOSPADM

## 2023-09-30 RX ORDER — DEXTROSE MONOHYDRATE 100 MG/ML
0.3 INJECTION, SOLUTION INTRAVENOUS ONCE AS NEEDED
Status: DISCONTINUED | OUTPATIENT
Start: 2023-09-30 | End: 2023-10-17 | Stop reason: HOSPADM

## 2023-09-30 RX ORDER — MULTIVIT-MIN/IRON FUM/FOLIC AC 7.5 MG-4
1 TABLET ORAL DAILY
Status: DISCONTINUED | OUTPATIENT
Start: 2023-09-30 | End: 2023-10-05

## 2023-09-30 RX ADMIN — ATORVASTATIN CALCIUM 80 MG: 80 TABLET ORAL at 21:55

## 2023-09-30 RX ADMIN — SODIUM CHLORIDE, SODIUM LACTATE, CALCIUM CHLORIDE, MAGNESIUM CHLORIDE AND DEXTROSE: 1.5; 538; 448; 18.3; 5.08 INJECTION, SOLUTION INTRAPERITONEAL at 13:45

## 2023-09-30 RX ADMIN — AMLODIPINE BESYLATE 10 MG: 2.5 TABLET ORAL at 12:30

## 2023-09-30 RX ADMIN — HEPARIN SODIUM 1500 UNITS/HR: 10000 INJECTION, SOLUTION INTRAVENOUS at 14:00

## 2023-09-30 RX ADMIN — Medication 25 MCG: at 12:30

## 2023-09-30 RX ADMIN — SODIUM BICARBONATE 650 MG: 650 TABLET ORAL at 21:55

## 2023-09-30 RX ADMIN — ACETAMINOPHEN 975 MG: 325 TABLET, FILM COATED ORAL at 12:56

## 2023-09-30 RX ADMIN — INSULIN DETEMIR 20 UNITS: 100 INJECTION, SOLUTION SUBCUTANEOUS at 21:55

## 2023-09-30 RX ADMIN — HYDRALAZINE HYDROCHLORIDE 25 MG: 25 TABLET ORAL at 15:18

## 2023-09-30 RX ADMIN — DULOXETINE HYDROCHLORIDE 30 MG: 30 CAPSULE, DELAYED RELEASE ORAL at 15:10

## 2023-09-30 RX ADMIN — SENNOSIDES 17.2 MG: 8.6 TABLET, FILM COATED ORAL at 21:55

## 2023-09-30 RX ADMIN — Medication 1 TABLET: at 21:55

## 2023-09-30 ASSESSMENT — PAIN SCALES - GENERAL: PAINLEVEL_OUTOF10: 3

## 2023-09-30 ASSESSMENT — PAIN - FUNCTIONAL ASSESSMENT: PAIN_FUNCTIONAL_ASSESSMENT: 0-10

## 2023-09-30 NOTE — PROGRESS NOTES
Service: Cardiology     Subjective Data:   MAGGIE KIRAN is a 83 year old Male who is Hospital Day # 8.     No acute events. Midsternal chest pain with coughing only. Walked a bit yesterday, unsteady and some shortness of breath. Denies palpitations, lightheadedness, fever, chills. Cough, postnasal drainage.    Objective Data:     Objective Information:      T   P  R  BP   MAP  SpO2   Value  36.6  68  20  107/60   79  96%  Date/Time 9/26 12:00 9/26 12:00 9/26 12:00 9/26 12:00 9/26 12:00 9/26 12:00  Range  (36.3C - 36.8C )  (68 - 99 )  (16 - 20 )  (93 - 149 )/ (47 - 83 )  (75 - 656 )  (92% - 97% )      Pain reported at 9/26 12:00: 0 = None    Physical Exam by System:    Constitutional: No acute distress, resting comfortably  in bed   Eyes: EOMI, conjunctiva clear   Head/Neck: Atraumatic, normocephalic, neck supple   Respiratory/Thorax: CTAB, no wheezes, rhonchi, rales   Cardiovascular: Regular rate and rhythm, no murmur,  gallop, rub, normal S1S2   Gastrointestinal: Abdomen soft, nondistended   Extremities: No cyanosis, no edema   Psychological: Appropriate mood and affect, A&Ox3   Skin: Warm, dry, no rash     Medication:    Medications:          Continuous Medications       --------------------------------    1. Heparin 25,000 units/ D5W 250 mL Infusion:  900  units/hr  IntraVenous  <Continuous>         Scheduled Medications       --------------------------------    1. amLODIPine (NORVASC):  10  mg  Oral  Daily    2. Aspirin Enteric Coated:  81  mg  Oral  Daily    3. Atorvastatin:  80  mg  Oral  Daily    4. Docusate:  100  mg  Oral  2 Times a Day    5. DULoxetine:  30  mg  Oral  Daily    6. Epoetin Chris (Non-ESRD) Injectable:  68634  unit(s)  SubCutaneous  Weekly    7. Gentamicin 0.1% Topical:  1  application(s)  Topical  Daily    8. Heparin (Repeat Bolus) Injectable:  4000  unit(s)  IntraVenous Push  Every 4 Hours    9. Insulin Glargine (Lantus) Injectable:  25  unit(s)  SubCutaneous  Every 24 Hours     10. Insulin Lispro Moderate Corrective Scale:  unit(s)  SubCutaneous  3 Times a Day Before Meals    11. Levothyroxine:  25  microgram(s)  Oral  Daily    12. Metoprolol Tartrate:  25  mg  Oral  2 Times a Day    13. Multivitamin Renal:  1  capsule(s)  Oral  Daily    14. Sodium Bicarbonate:  650  mg  Oral  Every 12 Hours    15. Torsemide:  50  mg  Oral  Daily         PRN Medications       --------------------------------    1. Acetaminophen:  975  mg  Oral  Every 6 Hours    2. Dextrose 50% in Water Injectable:  25  gram(s)  IntraVenous Push  Every 15 Minutes    3. Glucagon Injectable:  1  mg  IntraMuscular  Every 15 Minutes    4. Sennosides Oral Liquid:  5  mL  Oral  Daily    5. Sodium Chloride 0.9% Injectable Flush:  10  mL  IntraVenous Flush  Every 8 Hours and as Needed         Conditional Medication Orders       --------------------------------    1. Perflutren Lipid Microsphere (Activated) 1.3 mL / NaCL 0.9% T.V. 10 mL Injectable:  0.5  mL  IntraVenous Push  Once    2. Perflutren Lipid Microsphere (Activated) 1.3 mL / NaCL 0.9% T.V. 10 mL Injectable:  0.5  mL  IntraVenous Push  Once    3. Perflutren Lipid Microsphere (Activated) 1.3 mL / NaCL 0.9% T.V. 10 mL Injectable:  0.5  mL  IntraVenous Push  Once        Recent Lab Results:    Results:    CBC: 9/26/2023 04:48              \     Hgb     /                              \     8.1 L    /  WBC  ----------------  Plt               11.5 H    ----------------    324              /     Hct     \                              /     27.0 L    \            RBC: 2.52 L    MCV: 107 H          RFP: 9/26/2023 04:48  NA+        Cl-     BUN  /                         135 L   98    52 H  /  --------------------------------  Glucose                ---------------------------  163 H    K+     HCO3-   Creat \                         3.9    26    4.34 H \  Calcium : 8.6Anion Gap : 15          Albumin : 3.1 L    Phos : 4.6      Coagulation: 9/26/2023 04:48  PT  /                               /  -------<    INR          ----------<                PTT\                              \            Heparin Assay: 0.4           Radiology Results:    Results:        Conclusion:  CONCLUSIONS:  1. Left ventricular systolic function is low normal with a 50-55% estimated ejection fraction.  2. Spectral Doppler shows an impaired relaxation pattern of left ventricular diastolic filling.  3. Small to moderate pericardial effusion.  4. There is no evidence of cardiac tamponade.    QUANTITATIVE DATA SUMMARY:  2D MEASUREMENTS:  Normal Ranges:  IVSd:          1.24 cm   (0.6-1.1cm)  LVPWd:         1.15 cm   (0.6-1.1cm)  LVIDd:         4.30 cm   (3.9-5.9cm)  LVIDs:         2.82 cm  LV Mass Index: 92.4 g/m2  LV % FS        34.4 %    LV SYSTOLIC FUNCTION BY 2D PLANIMETRY (MOD):  Normal Ranges:  EF-A4C View: 54.1 % (>=55%)  EF-A2C View: 53.2 %  EF-Biplane:  49.1 %      [id number] Jose Coreas MD  Electronically signed on 9/25/2023 at 8:14:04 PM        *** Final ***     Echocardiogram [Sep 25 2023  8:14PM]        Assessment and Plan:   Code Status:  ·  Code Status Full Code     Assessment:    ASSESSMENT:     ·  NSTEMI HST 2,226 no chest pain since admission, on IV heparin  ·  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  Awaiting transfer to [facility] for rotablation/PCI to proximal and mid LAD, distal LCX; spoke with transfer center today  2.  Continue IV heparin  3.  Continue ASA 81mg daily, high intensity statin  4.  Continue metoprolol       Jose Coreas MD, [facility]  Cardiovascular Disease  [place] [facility], [facility].   [address] [place] [address], [address]  [place] [address]  Phone: [phone]  Fax: [phone]          Electronic Signatures:  Jose Coreas)  (Signed 26-Sep-2023 21:39)   Authored: Service, Subjective Data, Objective Data, Assessment  and Plan, Note Completion      Last Updated: 26-Sep-2023 21:39 by Jose Coreas)

## 2023-09-30 NOTE — Clinical Note
Angioplasty of the middle circumflex lesion. Inflation 1: Pressure = 4 josiah; Duration = 10 sec. Inflation 2: Pressure = 2 josiah; Duration = 5 sec.

## 2023-09-30 NOTE — PROGRESS NOTES
Service: Cardiology     Subjective Data:   MAGGIE KIRAN is a 83 year old Male who is Hospital Day # 5.    Objective Data:     Objective Information:      T   P  R  BP   MAP  SpO2   Value  35.8  78  25  104/62   84  94%  Date/Time 9/23 12:10 9/23 12:10 9/23 12:10 9/23 12:10  9/23 12:10 9/23 12:10  Range  (35.8C - 37.5C )  (72 - 82 )  (16 - 25 )  (104 - 122 )/ (56 - 68 )  (72 - 88 )  (92% - 98% )  Highest temp of 37.5 C was recorded at 9/22 0:40      Pain reported at 9/23 8:00: 0 = None    Medication:    Medications:      BIOLOGICALS:    1. Epoetin Chris (Non-ESRD) Injectable:  51778  unit(s)  SubCutaneous  Weekly      CARDIOVASCULAR AGENTS:    1. Metoprolol Tartrate:  25  mg  Oral  2 Times a Day    2. amLODIPine (NORVASC):  10  mg  Oral  Daily    3. Torsemide:  50  mg  Oral  Daily      CENTRAL NERVOUS SYSTEM AGENTS:    1. Acetaminophen:  975  mg  Oral  Every 6 Hours   PRN       2. Aspirin Enteric Coated:  81  mg  Oral  Daily      COAGULATION MODIFIERS:    1. Heparin (Repeat Bolus) Injectable:  4000  unit(s)  IntraVenous Push  Every 4 Hours    2. Heparin 25,000 units/ D5W 250 mL Infusion:  900  units/hr  IntraVenous  <Continuous>      GASTROINTESTINAL AGENTS:    1. Sodium Bicarbonate:  650  mg  Oral  Every 12 Hours    2. Docusate:  100  mg  Oral  2 Times a Day    3. Sennosides Oral Liquid:  5  mL  Oral  Daily   PRN         HORMONES/HORMONE MODIFIERS:    1. Levothyroxine:  25  microgram(s)  Oral  Daily      METABOLIC AGENTS:    1. Insulin Glargine (Lantus) Injectable:  20  unit(s)  SubCutaneous  Every 24 Hours    2. Insulin Lispro Moderate Corrective Scale:  unit(s)  SubCutaneous  3 Times a Day Before Meals    3. Atorvastatin:  80  mg  Oral  Daily    4. Dextrose 50% in Water Injectable:  25  gram(s)  IntraVenous Push  Every 15 Minutes   PRN       5. Glucagon Injectable:  1  mg  IntraMuscular  Every 15 Minutes   PRN         NUTRITIONAL PRODUCTS:    1. Sodium Chloride 0.9% Injectable Flush:  10  mL  IntraVenous  Flush  Every 8 Hours and as Needed   PRN       2. Multivitamin Renal:  1  capsule(s)  Oral  Daily      PSYCHOTHERAPEUTIC AGENTS:    1. DULoxetine:  30  mg  Oral  Daily      TOPICAL AGENTS:    1. Gentamicin 0.1% Topical:  1  application(s)  Topical  Daily          Conditional Medication Orders       --------------------------------    1. Perflutren Lipid Microsphere (Activated) 1.3 mL / NaCL 0.9% T.V. 10 mL Injectable:  0.5  mL  IntraVenous Push  Once    2. Perflutren Lipid Microsphere (Activated) 1.3 mL / NaCL 0.9% T.V. 10 mL Injectable:  0.5  mL  IntraVenous Push  Once      Recent Lab Results:    Results:        RFP: 9/23/2023 04:23  NA+        Cl-     BUN  /                         133 L   97 L    65 H /  --------------------------------  Glucose                ---------------------------  263 H    K+     HCO3-   Creat \                         3.8    23    5.27 H \  Calcium : 8.1 LAnion Gap : 17          Albumin : 3.0 L     Phos : 4.6      Coagulation: 9/23/2023 04:23  PT  /                              /  -------<    INR          ----------<                PTT\                              \            Heparin Assay: 0.3           Radiology Results:    Results:        Conclusion:  ** * Pediatric ECG analysis * **  Sinus bradycardia  Left axis deviation  Low voltage QRS  Borderline Prolonged QT  PEDIATRIC ANALYSIS - MANUAL COMPARISON REQUIRED  When compared with ECG of 19-SEP-2023 22:03,  PREVIOUS ECG IS PRESENT  Confirmed by Dilcia Santos ([id number]) on 9/23/2023 1:37:07 PM     Electrocardiogram 12 Lead [Sep 23 2023  1:37PM]      Assessment and Plan:   Code Status:  ·  Code Status Full Code     Assessment:    ·  NSTEMI HST 2,226 no chest pain since admission, on IV heparin  ·  CAD 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  Awaiting transfer to [facility]  2.  Echocardiogram and CBC Monday  3.  Another consideration would be to do PCI of the circumflex and defer revascularization of LAD to a later time  4.  Was discussed with the patient and his wife at the bedside      Electronic Signatures:  Dilcia Santos)  (Signed 23-Sep-2023 14:12)   Authored: Service, Subjective Data, Objective Data, Assessment  and Plan, Note Completion      Last Updated: 23-Sep-2023 14:12 by Dilcia Santos)

## 2023-09-30 NOTE — PROGRESS NOTES
Service: Cardiology     Subjective Data:   MAGGIE KIRAN is a 83 year old Male who is Hospital Day # 9.     No acute events. Still awaiting a bed at [place]. Still only has chest pain when he coughs; got nasal spray tonight. Denies shortness of breath, palpitations, lightheadedness, fever, chills, wheezes.    Objective Data:     Objective Information:      T   P  R  BP   MAP  SpO2   Value  36.3  70  16  110/58   74  94%  Date/Time 9/27 20:00 9/27 20:00 9/27 20:00 9/27 20:00  9/27 20:00 9/27 20:00  Range  (36C - 37.2C )  (68 - 99 )  (16 - 20 )  (93 - 149 )/ (47 - 83 )  (74 - 656 )  (93% - 97% )  Highest temp of 37.2 C was recorded at 9/27 1:00      Pain reported at 9/27 20:00: 0 = None    Physical Exam by System:    Constitutional: No acute distress, resting comfortably  in bed   Eyes: EOMI, conjunctiva clear   Head/Neck: Atraumatic, normocephalic, neck supple   Respiratory/Thorax: CTAB, no wheezes, rhonchi, rales   Cardiovascular: Regular rate and rhythm, no murmur,  gallop, rub, normal S1S2   Gastrointestinal: Abdomen soft, nondistended   Extremities: No cyanosis, no edema   Psychological: Appropriate mood and affect, A&Ox3   Skin: Warm, dry, no rash     Recent Lab Results:    Results:    CBC: 9/27/2023 05:51              \     Hgb     /                              \     7.5 L    /  WBC  ----------------  Plt               9.4       ----------------    287              /     Hct     \                              /     25.3 L    \            RBC: 2.34 L    MCV: 108 H          RFP: 9/27/2023 05:51  NA+        Cl-     BUN  /                         136    100    52 H /  --------------------------------  Glucose                ---------------------------  170 H    K+     HCO3-   Creat \                         3.8    24    4.28 H \  Calcium : 8.2 LAnion Gap : 16          Albumin : 2.8 L     Phos : 4.1      Coagulation: 9/27/2023 05:51  PT  /                              /  -------<    INR           ----------<                PTT\                              \            Heparin Assay: 0.4           Radiology Results:    Results:        Conclusion:  CONCLUSIONS:  1. Left ventricular systolic function is low normal with a 50-55% estimated ejection fraction.  2. Spectral Doppler shows an impaired relaxation pattern of left ventricular diastolic filling.  3. Small to moderate pericardial effusion.  4. There is no evidence of cardiac tamponade.    QUANTITATIVE DATA SUMMARY:  2D MEASUREMENTS:  Normal Ranges:  IVSd:          1.24 cm   (0.6-1.1cm)  LVPWd:         1.15 cm   (0.6-1.1cm)  LVIDd:         4.30 cm   (3.9-5.9cm)  LVIDs:         2.82 cm  LV Mass Index: 92.4 g/m2  LV % FS        34.4 %    LV SYSTOLIC FUNCTION BY 2D PLANIMETRY (MOD):  Normal Ranges:  EF-A4C View: 54.1 % (>=55%)  EF-A2C View: 53.2 %  EF-Biplane:  49.1 %      [id number] Jose Coreas MD  Electronically signed on 9/25/2023 at 8:14:04 PM        *** Final ***     Echocardiogram [Sep 25 2023  8:14PM]      Conclusion:  CONCLUSIONS:  1. Left ventricular systolic function is low normal.  2. Spectral Doppler shows an impaired relaxation pattern of left ventricular diastolic filling.  3. Small to moderate pericardial effusion.  4. Small to moderate pericardial effusion may have improved a little compared to prior echo of 9/20/2023.  5. No evidence of tamponade.    QUANTITATIVE DATA SUMMARY:  LV SYSTOLIC FUNCTION BY 2D PLANIMETRY (MOD):  Normal Ranges:  EF-A4C View: 50.9 % (>=55%)  EF-A2C View: 50.8 %  EF-Biplane:  52.1 %      [id number] Dilcia Santos MD  Electronically signed on 9/22/2023 at 3:47:46 PM        *** Final ***     Echocardiogram [Sep 22 2023  3:47PM]      Conclusion:  CONCLUSIONS:  1. Presented with non-STEMI A-fib RVR and moderate pericardial effusion.  2. Catheterization reveals moderately severe coronary calcification.  3. Left main is almost an existing.  4. 3 lesions in the LAD in the range of 75 to 80% in the proximal, early mid and late mid  segments.  5. Proximal OM1 90%.  6. Distal circumflex 95% right before the takeoff of the left PDA.  7. RCA is nondominant.  8. Anteroapical hypokinesis is mild to moderate.  9. Can be considered for PCI/possible rotablation after verifying stability of pericardial effusion.    ____________________________________________________________________________________  CPT Codes:  Left Heart Cath (visualization of coronaries) and LV-21173; Moderate Sedation Services initial 15 minutes patient >5 years-08316    ICD 10 Codes:  I21.4-Non ST elevation (NSTEMI) myocardial infarction    [id number] Ervin Pearl MD  Performing Physician  Electronically signed by [id number] Ervin Pearl MD on 9/20/2023 at 5:37:29 PM      cc Report to: ERVIN PEARL          *** Final ***     Cardiac Catheterization Lab Procedures [Sep 20 2023  5:37PM]      Conclusion:  CONCLUSIONS:  1. Left ventricular systolic function is normal.  2. Spectral Doppler shows an impaired relaxation pattern of left ventricular diastolic filling.  3. Moderate pericardial effusion.  4. There is no evidence of cardiac tamponade.    QUANTITATIVE DATA SUMMARY:  2D MEASUREMENTS:  Normal Ranges:  LAs:           2.90 cm   (2.7-4.0cm)  IVSd:          1.14 cm   (0.6-1.1cm)  LVPWd:         1.08 cm   (0.6-1.1cm)  LVIDd:         3.56 cm   (3.9-5.9cm)  LVIDs:         2.50 cm  LV Mass Index: 63.9 g/m2  LV % FS        29.8 %    LA VOLUME:  Normal Ranges:  LA Area A4C:     15.5 cm2  LA Area A2C:     12.0 cm2  LA Volume Index: 18.6 ml/m2  LA Vol A4C:      40.0 ml  LA Vol A2C:      28.0 ml  LA Vol BP:       36.0 ml    M-MODE MEASUREMENTS:  Normal Ranges:  Ao Root: 4.10 cm (2.0-3.7cm)  AoV Exc: 2.00 cm (1.5-2.5cm)    AORTA MEASUREMENTS:  Normal Ranges:  AoV Exc:   2.00 cm (1.5-2.5cm)  Asc Ao, d: 3.60 cm (2.1-3.4cm)    LV SYSTOLIC FUNCTION BY 2D PLANIMETRY (MOD):  Normal Ranges:  EF-A4C View: 59.1 % (>=55%)  EF-A2C View: 52.8 %  EF-Biplane:  55.9 %    LV DIASTOLIC FUNCTION:  Normal Ranges:  MV Peak  E:    0.53 m/s (0.7-1.2 m/s)  MV Peak A:    1.15 m/s (0.42-0.7 m/s)  E/A Ratio:    0.46     (1.0-2.2)  MV lateral e' 0.06 m/s  MV medial e'  0.06 m/s  E/e' Ratio:   8.30     (<8.0)    MITRAL VALVE:  Normal Ranges:  MV DT: 190 msec (150-240msec)    AORTIC VALVE:  Normal Ranges:  AoV Vmax:      1.22 m/s (<=1.7m/s)  AoV Peak P.0 mmHg (<20mmHg)  LVOT Max [place]:  0.80 m/s (<=1.1m/s)  LVOT Diameter: 2.00 cm  (1.8-2.4cm)  AoV Area,Vmax: 2.07 cm2 (2.5-4.5cm2)      RIGHT VENTRICLE:  RV Basal 3.10 cm  RV Mid   2.29 cm  RV Major 6.4 cm  TAPSE:   16.9 mm    TRICUSPID VALVE/RVSP:  Normal Ranges:  Peak TR Velocity: 2.28 m/s  RV Syst Pressure: 23.8 mmHg (< 30mmHg)      [id number] Dilcia Santos MD  Electronically signed on 2023 at 4:48:26 PM        *** Final ***     Echocardiogram [Sep 20 2023  4:48PM]        Assessment and Plan:   Code Status:  ·  Code Status Full Code     Assessment:    ASSESSMENT:     ·  NSTEMI HST 2,226 no chest pain since admission, on IV heparin  ·  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  Awaiting transfer to [facility] for rotablation/PCI  "to proximal and mid LAD, distal LCX; spoke with transfer center today and spoke with  NOM at [facility], \"top of list,\" hoping for tomorrow.   2.  Continue IV heparin  3.  Continue ASA 81mg daily, high intensity statin  4.  Continue metoprolol       Jose Coreas MD, [facility]  Cardiovascular Disease  [place] [facility], [facility].   [address] [place] [address], [address]  [place] [address]  Phone: [phone]  Fax: [phone]          Electronic Signatures:  Jose Coreas)  (Signed 27-Sep-2023 22:33)   Authored: Service, Subjective Data, Objective Data, Assessment  and Plan, Note Completion      Last Updated: 27-Sep-2023 22:33 by Jose Coreas)   "

## 2023-09-30 NOTE — Clinical Note
Vessel: circumflex. Balloon exchanged with CATHETER, BALLOON DILATION, EUPHORA SEMICOMPLIANT 2.50  X 10 MM X 142CM.

## 2023-09-30 NOTE — Clinical Note
Patient Clipped and Prepped: right groin and right radial. Prepped with ChloraPrep, a minimum of 3 minute dry time, longer if needed, no pooling noted, patient draped in sterile fashion.

## 2023-09-30 NOTE — PROGRESS NOTES
Service: Medicine     Subjective Data:   MAGGIE KIRAN is a 83 year old Male who is Hospital Day # 11.     Patient seen and examined at bedside! No new complaints this AM, patient and wife a little frustrated that transfer has not been initiated yet. Will hope to transfer today.    Objective Data:     Objective Information:      T   P  R  BP   MAP  SpO2   Value  36.3  76  16  132/70   82  95%  Date/Time 9/29 8:00 9/29 8:00 9/29 8:00 9/29 8:00  9/29 8:00 9/29 8:00  Range  (36.2C - 37.3C )  (60 - 78 )  (14 - 18 )  (100 - 132 )/ (54 - 73 )  (73 - 107 )  (93% - 95% )  Highest temp of 37.3 C was recorded at 9/29 0:00      Pain reported at 9/29 8:00: 0 = None    Physical Exam Narrative:  ·  Physical Exam:    General: well appearing, no acute distress  HEENT:  moist mucous membranes  CV: regular rate and rhythm, no murmurs, 2+ pulses in all extremities  RESP: CTA bilaterally, normal chest expansion, no resp distress  Abd: soft, nontender, nondistended  Neuro: alert and oriented x3, speech appropriate  Vascular: no peripheral edema appreciated  Skin: PD site intact  MSK: no joint swelling      Medication:    Medications:          Continuous Medications       --------------------------------    1. Heparin 25,000 units/ D5W 250 mL Infusion:  900  units/hr  IntraVenous  <Continuous>         Scheduled Medications       --------------------------------    1. amLODIPine (NORVASC):  10  mg  Oral  Daily    2. Aspirin Enteric Coated:  81  mg  Oral  Daily    3. Atorvastatin:  80  mg  Oral  Daily    4. Docusate:  100  mg  Oral  2 Times a Day    5. DULoxetine:  30  mg  Oral  Daily    6. Epoetin Chris (Non-ESRD) Injectable:  84564  unit(s)  SubCutaneous  Weekly    7. Fluticasone 50 microgram/ Nasal Inhalation:  1  spray(s)  Each Nostril  At Bedtime    8. Gentamicin 0.1% Topical:  1  application(s)  Topical  Daily    9. Heparin (Repeat Bolus) Injectable:  4000  unit(s)  IntraVenous Push  Every 4 Hours    10. Insulin Glargine  (Lantus) Injectable:  25  unit(s)  SubCutaneous  Every 24 Hours    11. Insulin Lispro Moderate Corrective Scale:  unit(s)  SubCutaneous  3 Times a Day Before Meals    12. Levothyroxine:  25  microgram(s)  Oral  Daily    13. Magnesium Oxide:  400  mg  Oral  Daily    14. Metoprolol Tartrate:  25  mg  Oral  2 Times a Day    15. Multivitamin Renal:  1  capsule(s)  Oral  Daily    16. Sodium Bicarbonate:  650  mg  Oral  Every 12 Hours    17. Torsemide:  50  mg  Oral  Daily    18. Zinc Oxide 20% Topical:  1  application(s)  Topical  3 Times a Day         PRN Medications       --------------------------------    1. Acetaminophen:  975  mg  Oral  Every 6 Hours    2. Dextrose 50% in Water Injectable:  25  gram(s)  IntraVenous Push  Every 15 Minutes    3. Glucagon Injectable:  1  mg  IntraMuscular  Every 15 Minutes    4. Sennosides Oral Liquid:  5  mL  Oral  Daily    5. Sodium Chloride 0.9% Injectable Flush:  10  mL  IntraVenous Flush  Every 8 Hours and as Needed        Recent Lab Results:    Results:    Coagulation: 9/29/2023 05:04  PT  /                              /  -------<    INR          ----------<                PTT\                              \            Heparin Assay: 0.4           Radiology Results:    Results:        Conclusion:  CONCLUSIONS:  1. Left ventricular systolic function is low normal with a 50-55% estimated ejection fraction.  2. Spectral Doppler shows an impaired relaxation pattern of left ventricular diastolic filling.  3. Small to moderate pericardial effusion.  4. There is no evidence of cardiac tamponade.    QUANTITATIVE DATA SUMMARY:  2D MEASUREMENTS:  Normal Ranges:  IVSd:          1.24 cm   (0.6-1.1cm)  LVPWd:         1.15 cm   (0.6-1.1cm)  LVIDd:         4.30 cm   (3.9-5.9cm)  LVIDs:         2.82 cm  LV Mass Index: 92.4 g/m2  LV % FS        34.4 %    LV SYSTOLIC FUNCTION BY 2D PLANIMETRY (MOD):  Normal Ranges:  EF-A4C View: 54.1 % (>=55%)  EF-A2C View: 53.2 %  EF-Biplane:  49.1 %      [id number]  Jose Coreas MD  Electronically signed on 9/25/2023 at 8:14:04 PM        *** Final ***     Echocardiogram [Sep 25 2023  8:14PM]      Conclusion:  ** * Pediatric ECG analysis * **  Sinus bradycardia  Left axis deviation  Low voltage QRS  Borderline Prolonged QT  PEDIATRIC ANALYSIS - MANUAL COMPARISON REQUIRED  When compared with ECG of 19-SEP-2023 22:03,  PREVIOUS ECG IS PRESENT  Confirmed by Dilcia Santos ([id number]) on 9/23/2023 1:37:07 PM     Electrocardiogram 12 Lead [Sep 23 2023  1:37PM]      Assessment and Plan:   Comorbidities:  ·  Comorbidity Other     Code Status:  ·  Code Status Full Code     Assessment:    84 y/o M with IDDM2, end-stage CKD with peritoneal dialysis admitted for chest pain. Initial troponin at 122, up trended to 608, then 1661.  EKG showed A-fib RVR (new onset) with ST segment elevations in leads V2, V3 with subsequent changes to T wave inversions in leads V2 and V3. CT angio chest remarkable for moderate-sized pericardial effusion    #NSTEMI  #New-Onset Afib w/ RVR  -Catheterization on 09/20 showed moderately severe calcification in the L main, LAD, proximal OM1, and distal LCx  -Continue heparin GGT for anticoagulation  -Lopressor 25mg BID for rate control of Afib  -Aspirin 81 mg daily, atorvastatin 80 mg daily  -Will require chronic anticoagulation for Afib, but given low Hgb and likely needing DAPT s/p PCI might defer anticoagulation for a few weeks post procedure  -Telemetry  -Repeat echo performed 9/25/23, EF 50-55%  -Pending transfer to  depending on bed availability for PCI    #End-Stage CKD  -Continue peritoneal dialysis per nephrology recommendations  -Continue to monitor renal function and electrolytes    #Insulin-dependent type 2 diabetes  -Patient takes Levemir 25 units at bedtime at home  -Increased basal lantus due to high nighttime sugars, insulin lispro added  -SSI    #Hypertension  #Hyperlipidemia  -Continue home hydralazine, torsemide, atorvastatin      #Hypothyroidism  -Continue home levothyroxine      Diet: Diabetic   DVT Prophylaxis: Therapeutic heparin ggt   Code: Full code       Dispo: Transfer to [facility] accepted, pending bed availability for PCI. Will call transfer  center today     Staffed with Alexander Jang MD  Internal Medicine, PGY-3              Plan of Care Reviewed With:  Plan of Care Reviewed With: patient     Attestation:   Note Completion:  I am a:  Resident/Fellow   Attending Attestation I saw and evaluated the patient.  I personally obtained the key and critical portions of the history and physical exam or was physically present for key and  critical portions performed by the resident/fellow. I reviewed the resident/fellow?s documentation and discussed the patient with the resident/fellow.  I agree with the resident/fellow?s medical decision making as documented in their note  with the exception/addition of the following:    I personally evaluated the patient on 29-Sep-2023   Comments/ Additional Findings    Patient seen, examined, and discussed with Dr Robbins. Mr Chowdary is an 82yo man with DMII, CAD, ESRD on PD, Afib admitted with chest pain and Afib  wiht RVR. Diagnostic cath with severe disease in proximal and mid LAD, distal circ preLPDA. Patient also with moderate pericardial effusion. Patient on hep gtt and awaits transfer to [place] for PCI/rotablation. Patient denies chest pain, main complaint  is difficulty sleeping d/t interruptions and PD. Discussed with transfer center at [place] - still awaiting bed, while patient is at top of the list, they are currently limited by staffing. Discussed with Dr Coreas, she is currently pursuing txfr to  [place]. Patient accepted by interventionalist Dr Ramsay at [facility]. Await transfer to either [facility] or [facility], whomever has bed availability. Cont current management in the meantim          Electronic Signatures:  Alexander Robbins (Resident))  (Signed 29-Sep-2023 11:53)   Authored: Service,  Subjective Data, Objective Data, Assessment  and Plan, Note Completion  Nohemi Neville)  (Signed 29-Sep-2023 14:44)   Authored: Note Completion   Co-Signer: Service, Subjective Data, Objective Data, Assessment and Plan, Note Completion      Last Updated: 29-Sep-2023 14:44 by Nohemi Neville)

## 2023-09-30 NOTE — PROGRESS NOTES
Service: Cardiology     Subjective Data:   MAGGIE KIRAN is a 83 year old Male who is Hospital Day # 4.    Objective Data:     Objective Information:      T   P  R  BP   MAP  SpO2   Value  36.4  82  18  111/57   77  94%  Date/Time 9/22 8:05 9/22 8:05 9/22 8:05 9/22 8:05  9/22 8:05 9/22 8:05  Range  (36.3C - 37.7C )  (75 - 87 )  (18 - 31 )  (95 - 120 )/ (57 - 68 )  (70 - 86 )  (92% - 98% )  Highest temp of 37.7 C was recorded at 9/21 7:53      Pain reported at 9/22 8:05: 0 = None    Recent Lab Results:    Results:    CBC: 9/22/2023 03:54              \     Hgb     /                              \     7.7 L    /  WBC  ----------------  Plt               9.3       ----------------    247              /     Hct     \                              /     24.1 L    \            RBC: 2.33 L    MCV: 103 H          RFP: 9/22/2023 03:54  NA+        Cl-     BUN  /                         133 L   97 L    68 H /  --------------------------------  Glucose                ---------------------------  320 H    K+     HCO3-   Creat \                         3.9    23    5.35 H \  Calcium : 8.2 LAnion Gap : 17          Albumin : 3.1 L     Phos : 4.6      Coagulation: 9/22/2023 03:54  PT  /                              /  -------<    INR          ----------<                PTT\                              \            Heparin Assay: 0.3           Assessment and Plan:   Code Status:  ·  Code Status Full Code     Assessment:    ·  NSTEMI HST 2,226 no chest pain since admission, on IV heparin  ·  CAD distal circumflex pre-LPDA 95% (likely the culprit), heavy calcification multiple severe lesions of the proximal and mid LAD, apparently the patient requested transfer to [facility] as all his primary and other specialists are at [facility]  ·  Moderate pericardial effusion by echo  ·  Paroxysmal A-fib RVR new diagnosis, spontaneously converted to normal sinus rhythm   ·  We will require chronic anticoagulation.  Currently on IV heparin we  will assess if hemoglobin stable I do not mind holding anticoagulation for a couple of weeks until we know how his hemoglobin is doing, the status of pericardial effusion and the fact  that he is likely to require dual antiplatelet therapy  ·  Hypertension, managed by nephrology service  ·  Hyperlipidemia  ·  DMThyroid disease  ·  ESRF-PD, 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  IV heparin  2.  Monitor hemoglobin  3.  Patient requested transfer to [facility].  Awaiting a bed.  4.  Okay to hold chronic anticoagulation for a couple of weeks as far as I am concerned, discussed with the patient and his wife      Electronic Signatures:  Dilcia Santos)  (Signed 22-Sep-2023 15:28)   Authored: Service, Assessment/Plan Review, Subjective  Data, Objective Data, Assessment and Plan, Note Completion      Last Updated: 22-Sep-2023 15:28 by Dilcia Satnos)

## 2023-09-30 NOTE — PROGRESS NOTES
Service: Medicine     Subjective Data:   MAGGIE KIRAN is a 83 year old Male who is Hospital Day # 10.     Patient seen and examined at bedside! No new complaints this AM, awaiting transfer to [facility].    Objective Data:     Objective Information:      T   P  R  BP   MAP  SpO2   Value  36.2  76  16  125/73   107  94%  Date/Time 9/28 8:00 9/28 8:00 9/28 8:00 9/28 8:00  9/28 8:00 9/28 8:00  Range  (36C - 37.2C )  (68 - 78 )  (16 - 18 )  (109 - 126 )/ (55 - 73 )  (74 - 107 )  (93% - 97% )  Highest temp of 37.2 C was recorded at 9/27 1:00      Pain reported at 9/28 8:00: 0 = None    Physical Exam Narrative:  ·  Physical Exam:    General: well appearing, no acute distress  HEENT:  moist mucous membranes  CV: regular rate and rhythm, no murmurs, 2+ pulses in all extremities  RESP: CTA bilaterally, normal chest expansion, no resp distress  Abd: soft, nontender, nondistended  Neuro: alert and oriented x3, speech appropriate  Vascular: no peripheral edema appreciated  Skin: PD site intact  MSK: no joint swelling      Medication:    Medications:          Continuous Medications       --------------------------------    1. Heparin 25,000 units/ D5W 250 mL Infusion:  900  units/hr  IntraVenous  <Continuous>         Scheduled Medications       --------------------------------    1. amLODIPine (NORVASC):  10  mg  Oral  Daily    2. Aspirin Enteric Coated:  81  mg  Oral  Daily    3. Atorvastatin:  80  mg  Oral  Daily    4. Docusate:  100  mg  Oral  2 Times a Day    5. DULoxetine:  30  mg  Oral  Daily    6. Epoetin Chris (Non-ESRD) Injectable:  04475  unit(s)  SubCutaneous  Weekly    7. Fluticasone 50 microgram/ Nasal Inhalation:  1  spray(s)  Each Nostril  At Bedtime    8. Gentamicin 0.1% Topical:  1  application(s)  Topical  Daily    9. Heparin (Repeat Bolus) Injectable:  4000  unit(s)  IntraVenous Push  Every 4 Hours    10. Insulin Glargine (Lantus) Injectable:  25  unit(s)  SubCutaneous  Every 24 Hours    11. Insulin Lispro  Moderate Corrective Scale:  unit(s)  SubCutaneous  3 Times a Day Before Meals    12. Levothyroxine:  25  microgram(s)  Oral  Daily    13. Magnesium Oxide:  400  mg  Oral  Daily    14. Metoprolol Tartrate:  25  mg  Oral  2 Times a Day    15. Multivitamin Renal:  1  capsule(s)  Oral  Daily    16. Sodium Bicarbonate:  650  mg  Oral  Every 12 Hours    17. Torsemide:  50  mg  Oral  Daily    18. Zinc Oxide 20% Topical:  1  application(s)  Topical  3 Times a Day         PRN Medications       --------------------------------    1. Acetaminophen:  975  mg  Oral  Every 6 Hours    2. Dextrose 50% in Water Injectable:  25  gram(s)  IntraVenous Push  Every 15 Minutes    3. Glucagon Injectable:  1  mg  IntraMuscular  Every 15 Minutes    4. Sennosides Oral Liquid:  5  mL  Oral  Daily    5. Sodium Chloride 0.9% Injectable Flush:  10  mL  IntraVenous Flush  Every 8 Hours and as Needed        Recent Lab Results:    Results:    CBC: 9/28/2023 06:19              \     Hgb     /                              \     7.4 L    /  WBC  ----------------  Plt               9.5       ----------------    295              /     Hct     \                              /     24.8 L    \            RBC: 2.31 L    MCV: 107 H          BMP: 9/28/2023 06:19  NA+        Cl-     BUN  /                         136    100    50 H /  --------------------------------  Glucose                ---------------------------  121 H    K+     HCO3-   Creat \                         3.8  27    4.10 H \  Calcium : 8.3 L    Anion Gap : 13      Coagulation: 9/28/2023 06:19  PT  /                              /  -------<    INR          ----------<                PTT\                              \            Heparin Assay: 0.3           Radiology Results:    Results:        Conclusion:  CONCLUSIONS:  1. Left ventricular systolic function is low normal with a 50-55% estimated ejection fraction.  2. Spectral Doppler shows an impaired relaxation pattern of left  ventricular diastolic filling.  3. Small to moderate pericardial effusion.  4. There is no evidence of cardiac tamponade.    QUANTITATIVE DATA SUMMARY:  2D MEASUREMENTS:  Normal Ranges:  IVSd:          1.24 cm   (0.6-1.1cm)  LVPWd:         1.15 cm   (0.6-1.1cm)  LVIDd:         4.30 cm   (3.9-5.9cm)  LVIDs:         2.82 cm  LV Mass Index: 92.4 g/m2  LV % FS        34.4 %    LV SYSTOLIC FUNCTION BY 2D PLANIMETRY (MOD):  Normal Ranges:  EF-A4C View: 54.1 % (>=55%)  EF-A2C View: 53.2 %  EF-Biplane:  49.1 %      [id number] Jose Coreas MD  Electronically signed on 9/25/2023 at 8:14:04 PM        *** Final ***     Echocardiogram [Sep 25 2023  8:14PM]      Conclusion:  ** * Pediatric ECG analysis * **  Sinus bradycardia  Left axis deviation  Low voltage QRS  Borderline Prolonged QT  PEDIATRIC ANALYSIS - MANUAL COMPARISON REQUIRED  When compared with ECG of 19-SEP-2023 22:03,  PREVIOUS ECG IS PRESENT  Confirmed by Dilcia Santos ([id number]) on 9/23/2023 1:37:07 PM     Electrocardiogram 12 Lead [Sep 23 2023  1:37PM]      Assessment and Plan:   Comorbidities:  ·  Comorbidity Other     Code Status:  ·  Code Status Full Code     Assessment:    82 y/o M with IDDM2, end-stage CKD with peritoneal dialysis admitted for chest pain. Initial troponin at 122, up trended to 608, then 1661.  EKG showed A-fib RVR (new onset) with ST segment elevations in leads V2, V3 with subsequent changes to T wave inversions in leads V2 and V3. CT angio chest remarkable for moderate-sized pericardial effusion    #NSTEMI  #New-Onset Afib w/ RVR  -Catheterization on 09/20 showed moderately severe calcification in the L main, LAD, proximal OM1, and distal LCx  -Continue heparin GGT for anticoagulation  -Lopressor 25mg BID for rate control of Afib  -Aspirin 81 mg daily, atorvastatin 80 mg daily  -Will require chronic anticoagulation for Afib, but given low Hgb and likely needing DAPT s/p PCI might defer anticoagulation for a few weeks post  procedure  -Telemetry  -Repeat echo performed 9/25/23, EF 50-55%  -Pending transfer to  depending on bed availability for PCI    #End-Stage CKD  -Continue peritoneal dialysis per nephrology recommendations  -Continue to monitor renal function and electrolytes    #Insulin-dependent type 2 diabetes  -Patient takes Levemir 25 units at bedtime at home  -Increased basal lantus due to high nighttime sugars, insulin lispro added  -SSI    #Hypertension  #Hyperlipidemia  -Continue home hydralazine, torsemide, atorvastatin     #Hypothyroidism  -Continue home levothyroxine      Diet: Diabetic   DVT Prophylaxis: Therapeutic heparin ggt   Code: Full code       Dispo: Transfer to [facility] accepted, pending bed availability for PCI. Will call transfer  center today     Staffed with Dr. Hoff,  Alexander Robbins MD  Internal Medicine, PGY-3              Plan of Care Reviewed With:  Plan of Care Reviewed With: patient     Attestation:   Note Completion:  I am a:  Resident/Fellow   Attending Attestation I saw and evaluated the patient.  I personally obtained the key and critical portions of the history and physical exam or was physically present for key and  critical portions performed by the resident/fellow. I reviewed the resident/fellow?s documentation and discussed the patient with the resident/fellow.  I agree with the resident/fellow?s medical decision making as documented in the note.     I personally evaluated the patient on 28-Sep-2023   Comments/ Additional Findings    patient seen and examined, no acute events over night. Still awaiting transfer to [facility]. I personally called transfer center and patient is  on top of the list and hoping to get a bed today. Appreciate Cardio Dr Ho help in coordination transfer also. He is still on heparin drip. He denies CP or SOB. He is receiving PD every night, nephrology is following. Discussed with patient, wife,  daughter, RN and resident physicians.           Electronic  Signatures:  Alexander Robbins (Resident))  (Signed 28-Sep-2023 11:48)   Authored: Service, Subjective Data, Objective Data, Assessment  and Plan, Note Completion  Marichuy Hoff ()  (Signed 28-Sep-2023 16:51)   Authored: Note Completion   Co-Signer: Service, Subjective Data, Objective Data, Assessment and Plan, Note Completion      Last Updated: 28-Sep-2023 16:51 by Marichuy Hoff ()

## 2023-09-30 NOTE — CONSULTS
Reason For Consult  CABG EVALUATION     History Of Present Illness  Salomon Chowdary is a 83 y.o. male. PMH of hypertension, hyperlipidemia, insulin-dependent DM II, ESRD on peritoneal dialysis, arthritis and hypothyroidism  who presented to the ED with chest pain. His symptoms began as left arm pain that radiated from the shoulder to the hand initially, which he rated as 10/10. He tried taking acetaminophen for the pain which did not help. He also tried heating pads, which  did not provide any comfort either. On the way to the hospital, he developed chest pain that he rated 5/10. He received aspirin 325 mg per EMS on the way. Nothing similar has happened before. He reported that the chest pain is not pleuritic in nature  nor does it get worse on exertion. He did not endorse any new symptoms of fever, chills, dizziness or abdominal pain. He has almost daily episodes of dry heaves/vomiting, as well as baseline SOB. His home medications include levothyroxine, atorvastatin,  and amlodipine.      ED Course:   - Vitals: T 36.5, , /71, RR 20, SpO2 99%  - EKG: initial EKG showed ST segment elevations in lead V2 & V3 which then converted to T wave inversions   - CT angio remarkable for moderate pericardial effusion   - Labs: mild leukocytosis 11.4, Hg 8.5 (8.9 in 10/2022), Hct 31.1, glucose 271, BUN 64, Cr 4.68 (4.01 in 10/2022), GFR 12, , PT 13.1   - Troponin 122, 608, 1661   - D-dimer 3640  - Interventions: heparin infusion      He developed Afib with RVR in the ED, for which he was given metoprolol 5 mg IV push once and converted back to normal rhythm. Dr. Santos was consulted (cardiology) from the ED and is recommended to start patient on therapeutic heparin and admit the patient  to the hospital for NSTEMI with cardiac catheterization planned for the morning.        Past Medical History  Hypertension, hyperlipidemia, insulin-dependent type 2 diabetes, CKD end-stage on peritoneal dialysis, arthritis,  hypothyroidism    Surgical History  He has a past surgical history that includes US guided percutaneous biopsy renal left (Left, 11/30/2022).     Social History  He has no history on file for tobacco use, alcohol use, and drug use.    Family History  father had a quadruple bypass      Allergies  Opioids - morphine analogues      Meds:   amLODIPine, 10 mg, Daily  atorvastatin, 80 mg, Daily  cholecalciferol, 25 mcg, Daily  DULoxetine, 30 mg, Daily  hydrALAZINE, 25 mg, TID  insulin detemir, 20 Units, q24h        acetaminophen, 975 mg, q6h PRN  dextrose, 0.3 g/kg/hr, Once PRN  dextrose, 25 g, q15 min PRN  glucagon, 1 mg, q15 min PRN    Review of Systems  Constitutional: NEGATIVE: Fever, Chills, Anorexia, Weight Loss, Malaise     Eyes: NEGATIVE: Blurry Vision, Drainage, Diploplia, Redness, Vision Loss/ Change     ENMT: NEGATIVE: Nasal Discharge, Nasal Congestion, Ear Pain, Mouth Pain, Throat Pain     Respiratory: NEGATIVE: Dry Cough, Productive Cough, Hemoptysis, Wheezing, Shortness of Breath     Cardiac: NEGATIVE: Chest Pain, Dyspnea on Exertion, Orthopnea, Palpitations, Syncope     Gastrointestinal: NEGATIVE: Nausea, Vomiting, Diarrhea, Constipation, Abdominal Pain     Genitourinary: NEGATIVE: Discharge, Dysuria, Flank Pain, Frequency, Hematuria     Musculoskeletal: NEGATIVE: Decreased ROM, Pain, Swelling, Stiffness, Weakness     Endocrine: NEGATIVE: Heat Intolerance, Cold Intolerance, Sweat, Polyuria, Thirst     Hematologic/Lymph: NEGATIVE: Anemia, Bruising, Easy Bleeding, Night Sweats, Petechiae     Allergic/Immunologic: NEGATIVE: Anaphylaxis, Itchy/ Teary Eyes, Itching, Sneezing, Swelling     Breast: NEGATIVE: Pain, Mass, Discharge, Nipple Itching, Gynecomastia        Physical Exam  Constitutional: Awake/alert, no distress, alert and cooperative   Eyes: clear sclera.    ENMT: mucous membranes moist, no apparent injury, no lesions seen   Head/Neck:  No JVD, trachea midline    Respiratory/Thorax: Patent airways,  coarse breath sounds rhonchi bases   Cardiovascular: Irregular rate, SR on telemetry, +systolic murmur,  2+ equal pulses of the extremities, normal S 1and S 2   Gastrointestinal: Non- distended, soft, non-tender, no rebound tenderness or guarding, no masses palpable, no organomegaly, +BS, no bruits, peritoneal catheter dressing dry and intact   Genitourinary: Voids independently   Musculoskeletal: ROM intact, no joint swelling, normal strength   Extremities: normal extremities, no cyanosis, +1-2 edema, contusions or wounds, no clubbing, no calf tenderness.   Neurological: alert and oriented x3, no gross focal deficits   Psychological: Appropriate mood and behavior   SKIN Dry , warm and intact      Last Recorded Vitals  Blood pressure 141/66, pulse 80, temperature 35.8 °C (96.5 °F), temperature source Temporal, resp. rate 18, SpO2 96 %.    Relevant Results     Results for orders placed or performed during the hospital encounter of 09/30/23 (from the past 24 hour(s))   Hemoglobin A1C   Result Value Ref Range    Hemoglobin A1C 6.6 (H) see below %    Estimated Average Glucose 143 Not Established mg/dL         Assessment/Plan   -Dr. Severino aware of the patient.   - Surgical intervention will be determine after evaluation of films and images.   - continue ASA/ statin/ BB  - Please chart the contraindication for BB if not given   - hold all antiplatelet medication except ASA  - Hold all P2Y12 inhibitors, oral anticoagulation, NSAIDs and alternative/herbal supplements.  - SGLT2 inhibitors (Farxiga, Jardiance) for at least 3 days prior to cardiac surgery to prevent euglycemic DKA  - Hold ace/ arb 72 hours prior to surgery and in the preop period  - Order in for all vascular studies/ PFTs/ UA & culture/ labs/ CXR  - Echocardiogram / SIMIN  - CT Chest if hx of radiation or heavy smoking. Evaluation of aorta   - Ensure the films are uploaded into [employer]  - Blood and scrubs will be ordered when surgical date determined    I spent  60 minutes in the professional and overall care of this patient.      Mell Jones, APRN-CNP

## 2023-09-30 NOTE — PROGRESS NOTES
Service: Nephrology     Subjective Data:   MAGGIE KIRAN is a 83 year old Male who is Hospital Day # 4.    Overnight Events: Patient had an uneventful night.   Additional Information:    await transfer to [facility]    Objective Data:     Objective Information:      T   P  R  BP   MAP  SpO2   Value  36.4  82  18  111/57   77  94%  Date/Time 9/22 8:05 9/22 8:05 9/22 8:05 9/22 8:05  9/22 8:05 9/22 8:05  Range  (36.3C - 37.7C )  (75 - 87 )  (18 - 31 )  (95 - 120 )/ (57 - 68 )  (70 - 86 )  (92% - 98% )  Highest temp of 37.7 C was recorded at 9/21 7:53      Pain reported at 9/22 8:05: 0 = None    Physical Exam by System:    Constitutional: NAD   Head/Neck: supple   Respiratory/Thorax: no wheeze   Cardiovascular: [id number]   Extremities: no c/c     Medication:    Medications:          Continuous Medications       --------------------------------    1. Heparin 25,000 units/ D5W 250 mL Infusion:  900  units/hr  IntraVenous  <Continuous>         Scheduled Medications       --------------------------------    1. amLODIPine (NORVASC):  10  mg  Oral  Daily    2. Aspirin Enteric Coated:  81  mg  Oral  Daily    3. Atorvastatin:  80  mg  Oral  Daily    4. DULoxetine:  30  mg  Oral  Daily    5. Epoetin Chris (Non-ESRD) Injectable:  26375  unit(s)  SubCutaneous  Weekly    6. Heparin (Repeat Bolus) Injectable:  4000  unit(s)  IntraVenous Push  Every 4 Hours    7. Insulin Lispro Moderate Corrective Scale:  unit(s)  SubCutaneous  3 Times a Day Before Meals    8. Levothyroxine:  25  microgram(s)  Oral  Daily    9. Metoprolol Tartrate:  25  mg  Oral  2 Times a Day    10. Multivitamin Renal:  1  capsule(s)  Oral  Daily    11. Sodium Bicarbonate:  650  mg  Oral  Every 12 Hours    12. Torsemide:  50  mg  Oral  Daily         PRN Medications       --------------------------------    1. Acetaminophen:  975  mg  Oral  Every 6 Hours    2. Dextrose 50% in Water Injectable:  25  gram(s)  IntraVenous Push  Every 15 Minutes    3. Glucagon Injectable:   1  mg  IntraMuscular  Every 15 Minutes    4. Sodium Chloride 0.9% Injectable Flush:  10  mL  IntraVenous Flush  Every 8 Hours and as Needed         Conditional Medication Orders       --------------------------------    1. Perflutren Lipid Microsphere (Activated) 1.3 mL / NaCL 0.9% T.V. 10 mL Injectable:  0.5  mL  IntraVenous Push  Once    2. Perflutren Lipid Microsphere (Activated) 1.3 mL / NaCL 0.9% T.V. 10 mL Injectable:  0.5  mL  IntraVenous Push  Once      Recent Lab Results:    Results:    CBC: 9/22/2023 03:54              \     Hgb     /                              \     7.7 L    /  WBC  ----------------  Plt               9.3       ----------------    247              /     Hct     \                              /     24.1 L    \            RBC: 2.33 L    MCV: 103 H          RFP: 9/22/2023 03:54  NA+        Cl-     BUN  /                         133 L   97 L    68 H /  --------------------------------  Glucose                ---------------------------  320 H    K+     HCO3-   Creat \                         3.9    23    5.35 H \  Calcium : 8.2 LAnion Gap : 17          Albumin : 3.1 L     Phos : 4.6      Coagulation: 9/22/2023 03:54  PT  /                              /  -------<    INR          ----------<                PTT\                              \            Heparin Assay: 0.3           Assessment and Plan:   Code Status:  ·  Code Status Full Code     Assessment:    Assessment  1. 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 NSTEMI s/p cardiac cath  3. anemia of ckd  4. HTN  5. hyperlipidemia    plan  PD as ordered        Electronic Signatures:  Ronnie Archuleta)  (Signed 22-Sep-2023 14:25)   Authored: Service, Subjective Data, Objective Data, Assessment  and Plan, Note Completion      Last Updated: 22-Sep-2023 14:25 by Ronnie Archuleta)

## 2023-09-30 NOTE — PROGRESS NOTES
Service: Medicine     Subjective Data:   MAGGIE KIRAN is a 83 year old Male who is Hospital Day # 8.     Patient seen and examined at bedside! Still awaiting transfer to [facility] for procedure. Sitting comfortably this AM, no current complaints.    Objective Data:     Objective Information:      T   P  R  BP   MAP  SpO2   Value  36.6  68  20  107/60   79  96%  Date/Time 9/26 12:00 9/26 12:00 9/26 12:00 9/26 12:00  9/26 12:00 9/26 12:00  Range  (36.3C - 36.8C )  (68 - 99 )  (16 - 20 )  (93 - 149 )/ (47 - 83 )  (75 - 656 )  (92% - 97% )      Pain reported at 9/26 12:00: 0 = None    Physical Exam by System     Constitutional: Well developed, awake/alert/oriented  x3, no distress, alert and cooperative   ENMT: mucous membranes moist, no apparent injury,  no lesions seen   Respiratory/Thorax: Patent airways, CTAB, normal  breath sounds with good chest expansion, thorax symmetric   Cardiovascular: Regular, rate and rhythm, no murmurs,  normal S 1and S 2   Gastrointestinal: Nondistended, soft, non-tender,  no rebound tenderness or guarding, no masses palpable, no organomegaly, +BS, no bruits   Extremities: normal extremities, no cyanosis edema,  contusions or wounds, no clubbing   Psychological: Appropriate mood and behavior     Medication     Medications:          Continuous Medications       --------------------------------    1. Heparin 25,000 units/ D5W 250 mL Infusion:  900  units/hr  IntraVenous  <Continuous>         Scheduled Medications       --------------------------------    1. amLODIPine (NORVASC):  10  mg  Oral  Daily    2. Aspirin Enteric Coated:  81  mg  Oral  Daily    3. Atorvastatin:  80  mg  Oral  Daily    4. Docusate:  100  mg  Oral  2 Times a Day    5. DULoxetine:  30  mg  Oral  Daily    6. Epoetin Chris (Non-ESRD) Injectable:  91588  unit(s)  SubCutaneous  Weekly    7. Gentamicin 0.1% Topical:  1  application(s)  Topical  Daily    8. Heparin (Repeat Bolus) Injectable:  4000  unit(s)  IntraVenous  Push  Every 4 Hours    9. Insulin Glargine (Lantus) Injectable:  25  unit(s)  SubCutaneous  Every 24 Hours    10. Insulin Lispro Moderate Corrective Scale:  unit(s)  SubCutaneous  3 Times a Day Before Meals    11. Levothyroxine:  25  microgram(s)  Oral  Daily    12. Metoprolol Tartrate:  25  mg  Oral  2 Times a Day    13. Multivitamin Renal:  1  capsule(s)  Oral  Daily    14. Sodium Bicarbonate:  650  mg  Oral  Every 12 Hours    15. Torsemide:  50  mg  Oral  Daily         PRN Medications       --------------------------------    1. Acetaminophen:  975  mg  Oral  Every 6 Hours    2. Dextrose 50% in Water Injectable:  25  gram(s)  IntraVenous Push  Every 15 Minutes    3. Glucagon Injectable:  1  mg  IntraMuscular  Every 15 Minutes    4. Sennosides Oral Liquid:  5  mL  Oral  Daily    5. Sodium Chloride 0.9% Injectable Flush:  10  mL  IntraVenous Flush  Every 8 Hours and as Needed         Conditional Medication Orders       --------------------------------    1. Perflutren Lipid Microsphere (Activated) 1.3 mL / NaCL 0.9% T.V. 10 mL Injectable:  0.5  mL  IntraVenous Push  Once    2. Perflutren Lipid Microsphere (Activated) 1.3 mL / NaCL 0.9% T.V. 10 mL Injectable:  0.5  mL  IntraVenous Push  Once    3. Perflutren Lipid Microsphere (Activated) 1.3 mL / NaCL 0.9% T.V. 10 mL Injectable:  0.5  mL  IntraVenous Push  Once      Recent Lab Results     Results:    CBC: 9/26/2023 04:48              \     Hgb     /                              \     8.1 L    /  WBC  ----------------  Plt               11.5 H    ----------------    324              /     Hct     \                              /     27.0 L    \            RBC: 2.52 L    MCV: 107 H          RFP: 9/26/2023 04:48  NA+        Cl-     BUN  /                         135 L   98    52 H  /  --------------------------------  Glucose                ---------------------------  163 H    K+     HCO3-   Creat \                         3.9    26    4.34 H \  Calcium :  8.6Anion Gap : 15          Albumin : 3.1 L    Phos : 4.6      Coagulation: 9/26/2023 04:48  PT  /                              /  -------<    INR          ----------<                PTT\                              \            Heparin Assay: 0.4           Radiology Results     Results:        Conclusion:  CONCLUSIONS:  1. Left ventricular systolic function is low normal with a 50-55% estimated ejection fraction.  2. Spectral Doppler shows an impaired relaxation pattern of left ventricular diastolic filling.  3. Small to moderate pericardial effusion.  4. There is no evidence of cardiac tamponade.    QUANTITATIVE DATA SUMMARY:  2D MEASUREMENTS:  Normal Ranges:  IVSd:          1.24 cm   (0.6-1.1cm)  LVPWd:         1.15 cm   (0.6-1.1cm)  LVIDd:         4.30 cm   (3.9-5.9cm)  LVIDs:         2.82 cm  LV Mass Index: 92.4 g/m2  LV % FS        34.4 %    LV SYSTOLIC FUNCTION BY 2D PLANIMETRY (MOD):  Normal Ranges:  EF-A4C View: 54.1 % (>=55%)  EF-A2C View: 53.2 %  EF-Biplane:  49.1 %      [id number] Jose Coreas MD  Electronically signed on 9/25/2023 at 8:14:04 PM        *** Final ***     Echocardiogram [Sep 25 2023  8:14PM]      Assessment and Plan:   Comorbidities   ·  Comorbidity Other     Code Status   ·  Code Status Full Code     Assessment:    84 y/o M with IDDM2, end-stage CKD with peritoneal dialysis admitted for chest pain. Initial troponin at 122, up trended to 608, then 1661.  EKG showed A-fib RVR (new onset) with ST segment elevations in leads V2, V3 with subsequent changes to T wave inversions in leads V2 and V3. CT angio chest remarkable for moderate-sized pericardial effusion      #NSTEMI  #New-Onset Afib w/ RVR  -Catheterization on 09/20 showed moderately severe calcification in the L main, LAD, proximal OM1, and distal LCx  -Continue heparin GGT for anticoagulation  -Lopressor 25mg BID for rate control of Afib  -Aspirin 81 mg daily, atorvastatin 80 mg daily  -Will require chronic anticoagulation for Afib,  but given low Hgb and likely needing DAPT s/p PCI might defer anticoagulation for a few weeks post procedure  -Telemetry  -Repeat echo performed 9/25/23, EF 50-55%  -Pending transfer to  depending on bed availability for PCI   #End-Stage CKD  -Continue peritoneal dialysis per nephrology recommendations  -Continue to monitor renal function and electrolytes  #Insulin-dependent type 2 diabetes  -Patient takes Levemir 25 units at bedtime at home  -Increased basal lantus due to high nighttime sugars, insulin lispro added  -SSI      #Hypertension  #Hyperlipidemia  -Continue home hydralazine, torsemide, atorvastatin       #Hypothyroidism  -Continue home levothyroxine        Diet: Diabetic   DVT Prophylaxis: Therapeutic heparin ggt   Code: Full code       Dispo: Transfer to [facility] accepted, pending bed availability       Staffed with Dr. Hoff,  Alexander Robbins MD  Internal Medicine, PGY-3          Attestation:   Note Completion   I am a:  Resident/Fellow   Attending Attestation I saw and evaluated the patient.  I personally obtained the key and critical portions of the history and physical exam or was physically present for key and  critical portions performed by the resident/fellow. I reviewed the resident/fellow?s documentation and discussed the patient with the resident/fellow.  I agree with the resident/fellow?s medical decision making as documented in the note.   I personally evaluated the patient on 26-Sep-2023   Comments/ Additional Findings    patient seen and examined, I agree with above  Mr Chowdary is doing okay, wife and daughter at bedside  he denies CP or SOB  waiting on bed/transfer to [facility] for cardiac procedure  he is on heparin drip  receiving PD at night for ESRD  I personally called transfer center, still waiting on a bed, supposedly 14 patients waiting on transfer to [facility] Dr Joss mcelroy also called and said he could be on any tele bed  Hoping for transfer within 24 hours to [facility]  Updated RN and  family      Electronic Signatures for Addendum Section:   Marichuy Hoff) (Signed Addendum 26-Sep-2023 15:33)   hgb 8.1  Echo repeat showed small to moderate pericardial effusion  updated patient/wife     Electronic Signatures:  Alexander Robbins (Resident))  (Signed 26-Sep-2023 13:57)   Authored: Service, Subjective Data, Objective Data, Assessment  and Plan, Note Completion  Marichuy Hoff ()  (Signed 26-Sep-2023 15:32)   Authored: Note Completion   Co-Signer: Service, Subjective Data, Objective Data, Assessment and Plan, Note Completion      Last Updated: 26-Sep-2023 15:33 by Marichuy Hoff ()

## 2023-09-30 NOTE — PROGRESS NOTES
Talked to pt spouse and confirmed the med list. Pt moved from [facility].   Yes for M2B on [facility]

## 2023-09-30 NOTE — PROGRESS NOTES
Service: Cardiology     Subjective Data:   MAGGEI KIRAN is a 83 year old Male who is Hospital Day # 3.    Objective Data:     Objective Information:      T   P  R  BP   MAP  SpO2   Value  37.7  87  22  113/65   85  92%  Date/Time 9/21 7:53 9/21 7:53 9/21 7:53 9/21 7:53  9/21 7:53 9/21 7:53  Range  (36.2C - 37.7C )  (78 - 94 )  (17 - 28 )  (103 - 152 )/ (55 - 97 )  (66 - 121 )  (92% - 98% )  Highest temp of 37.7 C was recorded at 9/21 7:53      Pain reported at 9/21 7:53: 0 = None    Medication:    Medications:      CARDIOVASCULAR AGENTS:    1. Metoprolol Tartrate:  25  mg  Oral  2 Times a Day    2. amLODIPine (NORVASC):  10  mg  Oral  Daily    3. Torsemide:  50  mg  Oral  Daily    4. hydrALAZINE (APRESOLINE):  25  mg  Oral  Every 8 Hours      CENTRAL NERVOUS SYSTEM AGENTS:    1. Acetaminophen:  975  mg  Oral  Every 6 Hours   PRN       2. Aspirin Enteric Coated:  81  mg  Oral  Daily      COAGULATION MODIFIERS:    1. Heparin (Repeat Bolus) Injectable:  4000  unit(s)  IntraVenous Push  Every 4 Hours    2. Heparin 25,000 units/ D5W 250 mL Infusion:  900  units/hr  IntraVenous  <Continuous>      GASTROINTESTINAL AGENTS:    1. Sodium Bicarbonate:  650  mg  Oral  Every 12 Hours      HORMONES/HORMONE MODIFIERS:    1. Levothyroxine:  25  microgram(s)  Oral  Daily      METABOLIC AGENTS:    1. Insulin Lispro Mild Corrective Scale:  unit(s)  SubCutaneous  3 Times a Day Before Meals    2. Atorvastatin:  80  mg  Oral  Daily    3. Dextrose 50% in Water Injectable:  25  gram(s)  IntraVenous Push  Every 15 Minutes   PRN       4. Glucagon Injectable:  1  mg  IntraMuscular  Every 15 Minutes   PRN         NUTRITIONAL PRODUCTS:    1. Sodium Chloride 0.9% Injectable Flush:  10  mL  IntraVenous Flush  Every 8 Hours and as Needed   PRN         PSYCHOTHERAPEUTIC AGENTS:    1. DULoxetine:  30  mg  Oral  Daily          Conditional Medication Orders       --------------------------------    1. Perflutren Lipid Microsphere  (Activated) 1.3 mL / NaCL 0.9% T.V. 10 mL Injectable:  0.5  mL  IntraVenous Push  Once      Recent Lab Results:    Results:    CBC: 9/21/2023 05:39              \     Hgb     /                              \     7.2 L    /  WBC  ----------------  Plt               8.0       ----------------    232              /     Hct     \                              /     23.6 L    \            RBC: 2.24 L    MCV: 105 H          RFP: 9/21/2023 05:39  NA+        Cl-     BUN  /                         134 L   98    65 H  /  --------------------------------  Glucose                ---------------------------  277 H    K+     HCO3-   Creat \                         3.9    23    4.83 H \  Calcium : 8.3 LAnion Gap : 17          Albumin : 3.1 L     Phos : 4.6      Coagulation: 9/20/2023 09:29  PT  /                              /  -------<    INR          ----------<                PTT\                              \            Heparin Assay: 0.3           Radiology Results:    Results:    Conclusion:  CONCLUSIONS:  1. Presented with non-STEMI A-fib RVR and moderate pericardial effusion.  2. Catheterization reveals moderately severe coronary calcification.  3. Left main is almost an existing.  4. 3 lesions in the LAD in the range of 75 to 80% in the proximal, early mid and late mid segments.  5. Proximal OM1 90%.  6. Distal circumflex 95% right before the takeoff of the left PDA.  7. RCA is nondominant.  8. Anteroapical hypokinesis is mild to moderate.  9. Can be considered for PCI/possible rotablation after verifying stability of pericardial effusion.    __[id number] Dilcia Santos MD  Performing Physician  Electronically signed by [id number] Dilcia Santos MD on 9/20/2023 at 5:37:29 PM  Cardiac Catheterization Lab Procedures [Sep 20 2023  5:37PM]      Conclusion:  CONCLUSIONS:  1. Left ventricular systolic function is normal.  2. Spectral Doppler shows an impaired relaxation pattern of left ventricular diastolic filling.  3. Moderate  pericardial effusion.  4. There is no evidence of cardiac tamponade.    [id number] Dilcia Santos MD Electronically signed on 9/20/2023 at 4:48:26 PM  *** Final *** Echocardiogram [Sep 20 2023  4:48PM]      Assessment and Plan:   Code Status:  ·  Code Status Full Code     Advance Care Planning:  Advance Care Planning: Patient didn't wish to or  was unable to provide advance care plan     Assessment:    ·  NSTEMI HST 2,226 no chest pain since admission, on IV heparin  ·  CAD with heavy calcification multiple severe lesions of the proximal and mid LAD, distal circumflex pre-LPDA 95%  ·  Moderate pericardial effusion by echo  ·  Paroxysmal A-fib RVR new diagnosis, spontaneously converted to normal sinus rhythm  ·  Hypertension  ·  Hyperlipidemia  ·  DMThyroid disease  ·  ESRF-PD, 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  Aspirin and IV heparin  2.  Hemoglobin tomorrow  3.  Limited echocardiogram tomorrow to assess progression of pericardial effusion.  4.  If above is a stable or no worse patient to be considered for PCI tomorrow, calcification may require rotablation ready set up  5.  Statin already started  6.  Chronic anticoagulation for A-fib in a patient who will be taking dual antiplatelet therapy for MI and possible PCI with a background of hemoglobin < 8 is an added challenge  7.  Discussed with the patient at length yesterday and with wife            Electronic Signatures:  Dilcia Santos)  (Signed 21-Sep-2023 19:50)   Authored: Service, Subjective Data, Objective Data, Assessment  and Plan, Note Completion      Last Updated: 21-Sep-2023 19:50 by Dilcia Santos)

## 2023-09-30 NOTE — PROGRESS NOTES
Service: Cardiology     Subjective Data:   MAGGIE KIRAN is a 83 year old Male who is Hospital Day # 7.     No acute events. [place] distance [place], was unsteady and short of breath. Denies chest pain or discomfort, palpitations, lightheadedness, fever, chills.    Objective Data:     Objective Information:      T   P  R  BP   MAP  SpO2   Value  36.6  68  20  93/48   75  97%  Date/Time 9/25 11:30 9/25 11:30 9/25 11:30 9/25 11:30 9/25 11:30 9/25 11:30  Range  (36C - 36.7C )  (62 - 80 )  (17 - 20 )  (93 - 150 )/ (48 - 76 )  (67 - 100 )  (92% - 97% )      Pain reported at 9/25 11:30: 0 = None    Physical Exam by System:    Constitutional: No acute distress, resting comfortably  in bed   Eyes: EOMI, conjunctiva clear   Head/Neck: Atraumatic, normocephalic, neck supple   Respiratory/Thorax: CTAB, no wheezes, rhonchi, rales   Cardiovascular: Regular rate and rhythm, no murmur,  gallop, rub, normal S1S2   Gastrointestinal: Abdomen soft, nondistended   Extremities: No cyanosis, no edema   Psychological: Appropriate mood and affect, A&Ox3   Skin: Warm, dry, no rash     Recent Lab Results:    Results:    CBC: 9/25/2023 04:05              \     Hgb     /                              \     7.7 L    /  WBC  ----------------  Plt               9.0       ----------------    306              /     Hct     \                              /     25.0 L    \            RBC: 2.35 L    MCV: 106 H          RFP: 9/25/2023 04:05  NA+        Cl-     BUN  /                         136    99    58 H /  --------------------------------  Glucose                ---------------------------  184 H    K+     HCO3-   Creat \                         3.7    24    4.58 H \  Calcium : 8.1 LAnion Gap : 17          Albumin : 2.9 L     Phos : 4.5      Coagulation: 9/25/2023 04:05  PT  /                              /  -------<    INR          ----------<                PTT\                              \             Heparin Assay: 0.3           Radiology Results:    Results:        Conclusion:  ** * Pediatric ECG analysis * **  Sinus bradycardia  Left axis deviation  Low voltage QRS  Borderline Prolonged QT  PEDIATRIC ANALYSIS - MANUAL COMPARISON REQUIRED  When compared with ECG of 19-SEP-2023 22:03,  PREVIOUS ECG IS PRESENT  Confirmed by Dilcia Santos ([id number]) on 9/23/2023 1:37:07 PM     Electrocardiogram 12 Lead [Sep 23 2023  1:37PM]        Assessment and Plan:   Code Status:  ·  Code Status Full Code     Assessment:    ASSESSMENT:     ·  NSTEMI HST 2,226 no chest pain since admission, on IV heparin  ·  CAD distal circumflex pre-LPDA 95% (likely the culprit), heavy calcification multiple severe lesions of the proximal and mid LAD, apparently the patient requested transfer to [facility] as all his primary and other specialists are at [facility]  ·  Small to moderate pericardial effusion by echo  ·  Paroxysmal A-fib RVR new diagnosis, spontaneously converted to normal sinus rhythm   ·  We will require chronic anticoagulation.  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  Awaiting transfer to [facility] for rotablation/PCI to proximal and mid LAD, distal LCX  2.  Echocardiogram today with stable pericardial effusion   3.  Continue IV heparin  4.  Continue ASA 81mg daily, high intensity statin  5.  Continue metoprolol       Jose Coreas MD, [facility]  Cardiovascular Disease  [facility]   [address] [address] [facility]  [address], [address]  [place] [address]  Phone:  [phone]  Fax: [phone]          Electronic Signatures:  Jose Coreas)  (Signed 25-Sep-2023 20:22)   Authored: Service, Subjective Data, Objective Data, Assessment  and Plan, Note Completion      Last Updated: 25-Sep-2023 20:22 by Jose Coreas)

## 2023-09-30 NOTE — PROGRESS NOTES
Service: Cardiology     Subjective Data:   MAGGIE KIRAN is a 83 year old Male who is Hospital Day # 11.     No acute events. Still awaiting transfer. Denies chest pain, shortness of breath. Denies fever, chills, cough. Denies lightheadedness, dizziness, palpitations. Hasn't been out of bed much.    Objective Data:     Objective Information:      T   P  R  BP   MAP  SpO2   Value  36.6  70  18  105/59   71  96%  Date/Time 9/29 11:30 9/29 12:30 9/29 11:30 9/29 11:30 9/29 11:30 9/29 11:30  Range  (36.2C - 37.3C )  (60 - 78 )  (14 - 18 )  (100 - 132 )/ (54 - 73 )  (71 - 107 )  (93% - 96% )  Highest temp of 37.3 C was recorded at 9/29 0:00      Pain reported at 9/29 11:30: 0 = None    Physical Exam by System:    Constitutional: No acute distress, resting comfortably  in bed   Eyes: EOMI, conjunctiva clear   Head/Neck: Atraumatic, normocephalic, neck supple   Respiratory/Thorax: CTAB, no wheezes, rhonchi, rales   Cardiovascular: Regular rate and rhythm, no murmur,  gallop, rub, normal S1S2   Gastrointestinal: Abdomen soft, nondistended   Extremities: No cyanosis, no edema   Psychological: Appropriate mood and affect, A&Ox3   Skin: Warm, dry, no rash     Medication:    Medications:          Continuous Medications       --------------------------------    1. Heparin 25,000 units/ D5W 250 mL Infusion:  900  units/hr  IntraVenous  <Continuous>         Scheduled Medications       --------------------------------    1. amLODIPine (NORVASC):  10  mg  Oral  Daily    2. Aspirin Enteric Coated:  81  mg  Oral  Daily    3. Atorvastatin:  80  mg  Oral  Daily    4. Docusate:  100  mg  Oral  2 Times a Day    5. DULoxetine:  30  mg  Oral  Daily    6. Epoetin Chris (Non-ESRD) Injectable:  22800  unit(s)  SubCutaneous  Weekly    7. Fluticasone 50 microgram/ Nasal Inhalation:  1  spray(s)  Each Nostril  At Bedtime    8. Gentamicin 0.1% Topical:  1  application(s)  Topical  Daily    9. Heparin (Repeat Bolus) Injectable:  4000   unit(s)  IntraVenous Push  Every 4 Hours    10. Insulin Glargine (Lantus) Injectable:  25  unit(s)  SubCutaneous  Every 24 Hours    11. Insulin Lispro Moderate Corrective Scale:  unit(s)  SubCutaneous  3 Times a Day Before Meals    12. Levothyroxine:  25  microgram(s)  Oral  Daily    13. Magnesium Oxide:  400  mg  Oral  Daily    14. Metoprolol Tartrate:  25  mg  Oral  2 Times a Day    15. Multivitamin Renal:  1  capsule(s)  Oral  Daily    16. Sodium Bicarbonate:  650  mg  Oral  Every 12 Hours    17. Torsemide:  50  mg  Oral  Daily    18. Zinc Oxide 20% Topical:  1  application(s)  Topical  3 Times a Day         PRN Medications       --------------------------------    1. Acetaminophen:  975  mg  Oral  Every 6 Hours    2. Dextrose 50% in Water Injectable:  25  gram(s)  IntraVenous Push  Every 15 Minutes    3. Glucagon Injectable:  1  mg  IntraMuscular  Every 15 Minutes    4. Sennosides Oral Liquid:  5  mL  Oral  Daily    5. Sodium Chloride 0.9% Injectable Flush:  10  mL  IntraVenous Flush  Every 8 Hours and as Needed        Recent Lab Results:    Results:    Coagulation: 9/29/2023 05:04  PT  /                              /  -------<    INR          ----------<                PTT\                              \            Heparin Assay: 0.4           Radiology Results:    Results:        Conclusion:  CONCLUSIONS:  1. Left ventricular systolic function is low normal with a 50-55% estimated ejection fraction.  2. Spectral Doppler shows an impaired relaxation pattern of left ventricular diastolic filling.  3. Small to moderate pericardial effusion.  4. There is no evidence of cardiac tamponade.    QUANTITATIVE DATA SUMMARY:  2D MEASUREMENTS:  Normal Ranges:  IVSd:          1.24 cm   (0.6-1.1cm)  LVPWd:         1.15 cm   (0.6-1.1cm)  LVIDd:         4.30 cm   (3.9-5.9cm)  LVIDs:         2.82 cm  LV Mass Index: 92.4 g/m2  LV % FS        34.4 %    LV SYSTOLIC FUNCTION BY 2D PLANIMETRY (MOD):  Normal Ranges:  EF-A4C View:  54.1 % (>=55%)  EF-A2C View: 53.2 %  EF-Biplane:  49.1 %      [id number] Jose Coreas MD  Electronically signed on 9/25/2023 at 8:14:04 PM        *** Final ***     Echocardiogram [Sep 25 2023  8:14PM]      Conclusion:  CONCLUSIONS:  1. Left ventricular systolic function is low normal.  2. Spectral Doppler shows an impaired relaxation pattern of left ventricular diastolic filling.  3. Small to moderate pericardial effusion.  4. Small to moderate pericardial effusion may have improved a little compared to prior echo of 9/20/2023.  5. No evidence of tamponade.    QUANTITATIVE DATA SUMMARY:  LV SYSTOLIC FUNCTION BY 2D PLANIMETRY (MOD):  Normal Ranges:  EF-A4C View: 50.9 % (>=55%)  EF-A2C View: 50.8 %  EF-Biplane:  52.1 %      [id number] Ervin Pearl MD  Electronically signed on 9/22/2023 at 3:47:46 PM        *** Final ***     Echocardiogram [Sep 22 2023  3:47PM]      Conclusion:  CONCLUSIONS:  1. Presented with non-STEMI A-fib RVR and moderate pericardial effusion.  2. Catheterization reveals moderately severe coronary calcification.  3. Left main is almost an existing.  4. 3 lesions in the LAD in the range of 75 to 80% in the proximal, early mid and late mid segments.  5. Proximal OM1 90%.  6. Distal circumflex 95% right before the takeoff of the left PDA.  7. RCA is nondominant.  8. Anteroapical hypokinesis is mild to moderate.  9. Can be considered for PCI/possible rotablation after verifying stability of pericardial effusion.    ____________________________________________________________________________________  CPT Codes:  Left Heart Cath (visualization of coronaries) and LV-42277; Moderate Sedation Services initial 15 minutes patient >5 years-[id number]    ICD 10 Codes:  I21.4-Non ST elevation (NSTEMI) myocardial infarction    [id number] Ervin Pearl MD  Performing Physician  Electronically signed by [id number] Ervin Pearl MD on 9/20/2023 at 5:37:29 PM      cc Report to: ERVIN PEARL          *** Final ***     Cardiac Catheterization  Lab Procedures [Sep 20 2023  5:37PM]      Conclusion:  CONCLUSIONS:  1. Left ventricular systolic function is normal.  2. Spectral Doppler shows an impaired relaxation pattern of left ventricular diastolic filling.  3. Moderate pericardial effusion.  4. There is no evidence of cardiac tamponade.    QUANTITATIVE DATA SUMMARY:  2D MEASUREMENTS:  Normal Ranges:  LAs:           2.90 cm   (2.7-4.0cm)  IVSd:          1.14 cm   (0.6-1.1cm)  LVPWd:         1.08 cm   (0.6-1.1cm)  LVIDd:         3.56 cm   (3.9-5.9cm)  LVIDs:         2.50 cm  LV Mass Index: 63.9 g/m2  LV % FS        29.8 %    LA VOLUME:  Normal Ranges:  LA Area A4C:     15.5 cm2  LA Area A2C:     12.0 cm2  LA Volume Index: 18.6 ml/m2  LA Vol A4C:      40.0 ml  LA Vol A2C:      28.0 ml  LA Vol BP:       36.0 ml    M-MODE MEASUREMENTS:  Normal Ranges:  Ao Root: 4.10 cm (2.0-3.7cm)  AoV Exc: 2.00 cm (1.5-2.5cm)    AORTA MEASUREMENTS:  Normal Ranges:  AoV Exc:   2.00 cm (1.5-2.5cm)  Asc Ao, d: 3.60 cm (2.1-3.4cm)    LV SYSTOLIC FUNCTION BY 2D PLANIMETRY (MOD):  Normal Ranges:  EF-A4C View: 59.1 % (>=55%)  EF-A2C View: 52.8 %  EF-Biplane:  55.9 %    LV DIASTOLIC FUNCTION:  Normal Ranges:  MV Peak E:    0.53 m/s (0.7-1.2 m/s)  MV Peak A:    1.15 m/s (0.42-0.7 m/s)  E/A Ratio:    0.46     (1.0-2.2)  MV lateral e' 0.06 m/s  MV medial e'  0.06 m/s  E/e' Ratio:   8.30     (<8.0)    MITRAL VALVE:  Normal Ranges:  MV DT: 190 msec (150-240msec)    AORTIC VALVE:  Normal Ranges:  AoV Vmax:      1.22 m/s (<=1.7m/s)  AoV Peak P.0 mmHg (<20mmHg)  LVOT Max Quincy:  0.80 m/s (<=1.1m/s)  LVOT Diameter: 2.00 cm  (1.8-2.4cm)  AoV Area,Vmax: 2.07 cm2 (2.5-4.5cm2)      RIGHT VENTRICLE:  RV Basal 3.10 cm  RV Mid   2.29 cm  RV Major 6.4 cm  TAPSE:   16.9 mm    TRICUSPID VALVE/RVSP:  Normal Ranges:  Peak TR Velocity: 2.28 m/s  RV Syst Pressure: 23.8 mmHg (< 30mmHg)      [id number] Dilcia Santos MD  Electronically signed on 2023 at 4:48:26 PM        *** Final ***     Echocardiogram [Sep  20 2023  4:48PM]      Assessment and Plan:   Code Status:  ·  Code Status Full Code     Assessment:    ASSESSMENT:     ·  NSTEMI HST 2,226 no chest pain since admission, on IV heparin  ·  CAD distal circumflex pre-LPDA 95% (likely the culprit), heavy calcification multiple severe lesions of the proximal and mid LAD, apparently the patient requested transfer to [facility] as all his primary and other specialists are at [facility]  ·  Small to moderate pericardial effusion by echo  ·  Paroxysmal A-fib RVR new diagnosis, spontaneously converted to normal sinus rhythm   ·  We will require chronic anticoagulation.  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  Spoke with patient and family at length; discussed case with Cardiology at [facility] this morning, accepted for transfer there as well. Still  no news from [facility]. Went through his case together in detail and offered reassurance, emotional support.   2.  Continue IV heparin  3.  Continue ASA 81mg daily, high intensity statin  4.  Continue metoprolol   5.  Encouraged OOB, up to chair as often as possible.       Jose Coreas MD, [facility]  Cardiovascular Disease  [facility]   [address] [place] [address] [address], [address]  [place] [address]  Phone: [phone]  Fax: [phone]        Electronic Signatures:  Jose Coreas)  (Signed 29-Sep-2023 17:43)   Authored: Service, Subjective Data, Objective Data, Assessment  and Plan, Note Completion      Last Updated: 29-Sep-2023 17:43 by Joss  Jose OLIVIA)

## 2023-09-30 NOTE — H&P
History of Present Illness:   HPI:    MAGGIE KIRAN is a 83 year old Male with PMH of hypertension, hyperlipidemia, insulin-dependent DM II, ESRD on peritoneal dialysis, arthritis and hypothyroidism  who presented to the ED with chest pain. His symptoms began as left arm pain that radiated from the shoulder to the hand initially, which he rated as 10/10. He tried taking acetaminophen for the pain which did not help. He also tried heating pads, which  did not provide any comfort either. On the way to the hospital, he developed chest pain that he rated 5/10. He received aspirin 325 mg per EMS on the way. Nothing similar has happened before. He reported that the chest pain is not pleuritic in nature  nor does it get worse on exertion. He did not endorse any new symptoms of fever, chills, dizziness or abdominal pain. He has almost daily episodes of dry heaves/vomiting, as well as baseline SOB. His home medications include levothyroxine, atorvastatin,  and amlodipine.     ED Course:   - Vitals: T 36.5, , /71, RR 20, SpO2 99%  - EKG: initial EKG showed ST segment elevations in lead V2 & V3 which then converted to T wave inversions   - CT angio remarkable for moderate pericardial effusion   - Labs: mild leukocytosis 11.4, Hg 8.5 (8.9 in 10/2022), Hct 31.1, glucose 271, BUN 64, Cr 4.68 (4.01 in 10/2022), GFR 12, , PT 13.1   - Troponin 122, 608, 1661   - D-dimer 3640  - Interventions: heparin infusion     He developed Afib with RVR in the ED, for which he was given metoprolol 5 mg IV push once and converted back to normal rhythm. Dr. Santos was consulted (cardiology) from the ED and is recommended to start patient on therapeutic heparin and admit the patient  to the hospital for NSTEMI with cardiac catheterization planned for the morning.    PMH: Hypertension, hyperlipidemia, insulin-dependent type 2 diabetes, CKD end-stage on peritoneal dialysis, arthritis, hypothyroidism  Surgical Hx:  noncontributory   Family Hx: father had a quadruple bypass   Social Hx: no smoking, or alcohol/drug use     CODE STATUS: Full    Social History:   Social History:  Smoking Status never smoker (1)   Alcohol Use denies(1)   Drug Use denies (1)              Allergies:  ·  opioid-like analgesics : Other    Medications Prior to Admission:   Admission Medication Reconciliation has not been completed for this patient.    Review of Systems:   Constitutional: NEGATIVE: Fever, Chills, Anorexia,  Weight Loss, Malaise     Eyes: NEGATIVE: Blurry Vision, Drainage, Diploplia,  Redness, Vision Loss/ Change     ENMT: NEGATIVE: Nasal Discharge, Nasal Congestion,  Ear Pain, Mouth Pain, Throat Pain     Respiratory: POSITIVE: Shortness of Breath; NEGATIVE:  Dry Cough, Productive Cough, Hemoptysis, Wheezing     Cardiac: POSITIVE: Chest Pain; NEGATIVE: Dyspnea  on Exertion, Orthopnea, Palpitations, Syncope     Gastrointestinal: POSITIVE: Nausea, Vomiting; NEGATIVE:  Diarrhea, Constipation, Abdominal Pain     Genitourinary: NEGATIVE: Discharge, Dysuria, Flank  Pain, Frequency, Hematuria     Musculoskeletal: NEGATIVE: Decreased ROM, Pain, Swelling,  Stiffness, Weakness     Neurological: NEGATIVE: Dizziness, Confusion, Headache,  Seizures, Syncope     Psychiatric: NEGATIVE: Mood Changes, Anxiety, Hallucinations,  Sleep Changes, Suicidal Ideas     Skin: NEGATIVE: Mass, Pain, Pruritus, Rash, Ulcer     Endocrine: NEGATIVE: Heat Intolerance, Cold Intolerance,  Sweat, Polyuria, Thirst     Hematologic/Lymph: NEGATIVE: Anemia, Bruising, Easy  Bleeding, Night Sweats, Petechiae     Allergic/Immunologic: NEGATIVE: Anaphylaxis, Itchy/  Teary Eyes, Itching, Sneezing, Swelling       Objective:     Objective Information:      T   P  R  BP   MAP  SpO2   Value  36.5  78  21  149/97   110  98%  Date/Time 9/19 16:28 9/20 0:03 9/20 0:03 9/20 0:03  9/20 0:03 9/20 0:03  Range  (36.5C - 36.5C )  (68 - 120 )  (18 - 24 )  (97 - 149 )/ (57 - 97 )  (79 - 110 )  (93%  - 99% )      Pain reported at 9/19 16:29: 4 = Moderate    Physical Exam by System:    Constitutional: Well developed, awake/alert/oriented  x3, no distress, alert and cooperative   Eyes: Clear sclera   ENMT: Mucous membranes moist   Respiratory/Thorax: Normal breath sounds with good  chest expansion, thorax symmetric   Cardiovascular: Tachycardic rate, no murmurs, normal  S1 and S2   Gastrointestinal: Nondistended, soft, non-tender,  no rebound tenderness or guarding, no masses palpable, no organomegaly, normal bowel sounds in all quadrants   Musculoskeletal: ROM intact, normal strength   Extremities: Normal extremities, no cyanosis edema,  contusions or wounds, no clubbing   Neurological: Alert and oriented x3, intact senses,  motor, response and reflexes, normal strength   Psychological: Appropriate mood and behavior   Skin: Warm and dry, no lesions, no rashes, peritoneal  catheter access on abdomen     Medications:    Medications:          Continuous Medications       --------------------------------    1. Heparin 25,000 units/ D5W 250 mL Infusion..:  1400  units/hr  IntraVenous  <Continuous>         Scheduled Medications       --------------------------------    1. Heparin (Repeat Bolus) Injectable:  6000  unit(s)  IntraVenous Push  Every 4 Hours         PRN Medications       --------------------------------  No PRN medications are active        Recent Lab Results:    Results:    CBC: 9/19/2023 16:51              \     Hgb     /                              \     8.5 L    /  WBC  ----------------  Plt               11.4 H    ----------------    239              /     Hct     \                              /     27.3 L    \            RBC: 2.61 L    MCV: 105 H    Neutrophil  %: 83.5      CMP: 9/19/2023 16:51  NA+        Cl-     BUN  /                         134 L   96 L    64 H /  --------------------------------  Glucose                ---------------------------  271 H    K+     HCO3-   Creat \                          3.9    26    4.68 H \           \  T Bili  /                    \  0.5  /  AST  x ---- x ALT        23 x ---- x 26         /  Alk P   \               /  72  \  Calcium : 8.9     Anion Gap : 16     Albumin : 3.7     T Protein : 7.1           Coagulation: 9/19/2023 16:45  PT  /                    13.1 H /  -------<    INR          ----------<      1.2 H  PTT\                              \            Heparin Assay: 0.8             I have reviewed these laboratory results:    Heparin assay, UFH  19-Sep-2023 22:48:00      Result Value    Heparin assay, UFH  0.8      Troponin I, High Sensitivity  Trending View      Result 19-Sep-2023 20:57:00  19-Sep-2023 18:15:00  19-Sep-2023 16:51:00    Troponin I, High Sensitivity 1661   H   608   H   122   HH    Lab Comment:     [facility] CALLED RB TO PLACIDO SCHNEIDER, 09/19/2023 18:11        Magnesium, Serum  19-Sep-2023 16:51:00      Result Value    Magnesium, Serum  1.72      Brain Natriuretic Peptide  19-Sep-2023 16:51:00      Result Value    Brain Natriuretic Peptide  103   H     PT + INR, Plasma  19-Sep-2023 16:45:00      Result Value    Prothrombin Time, Plasma  13.1   H   International Normalized Ratio, Plasma  1.2   H     D-Dimer, VTE Exclusion  19-Sep-2023 16:45:00      Result Value    D-Dimer, VTE Exclusion  3640   A       Radiology Results:    Results:        Impression:    1. There is no evidence of pulmonary embolus.     2. Moderate-sized pericardial effusion. Pericarditis not excluded.  Cardiac tamponade considered unlikely but not excluded. Small  bilateral pleural effusions noted.     3. Emphysematous changes are present. Mild subpleural fibrotic changes  also noted.           Signed by Jorje Sanon II, MD      CT Angio Chest for PE [Sep 19 2023  9:30PM]      Impression:    Increased interstitial markings in the left lower lobe and periphery  right upper lobe question underlying pneumonia and/or interstitial  lung disease.        Signed by Yossi  MD Madhav     Xray Chest 2 View PA + Lateral [Sep 19 2023  7:13PM]      Assessment and Plan:   Assessment:    Assessment:    This is an 83-year-old male with a past medical history of insulin-dependent diabetes, hypertension, hyperlipidemia, CKD end-stage on peritoneal dialysis who is presenting to the hospital with chest pain.  No prior history of MI, initial troponin 122,  up trended to 608, followed by 1661.  Heart rate 120 in the emergency department, EKG showed A-fib RVR (new onset) with ST segment elevations in leads V2, V3 with subsequent changes to T wave inversions in leads V2 and V3.  Cardiology consulted from the  ED, patient started on heparin GGT and plan for cardiac catheterization in the morning.  Patient had already received aspirin 324 mg in ambulance.  CT chest angio negative for PE, remarkable for moderate-sized pleural effusion.    NSTEMI  Moderate pericardial effusion  -ARMAND score 4, grade score 213  -Nonexertional chest pain, no prior history.  Several risk factors including history of diabetes, hypertension, hyperlipidemia  -Received 324 mg aspirin in ambulance  -EKG: ST segment elevations in V2, V3 with subsequent conversion to T wave inversions in these leads  -CT chest angio shows moderate-sized pericardial effusion  -Cardiology on consult, appreciate any further recommendations  -Continue heparin GGT  -Aspirin 81 mg daily, atorvastatin 80 mg daily  -Hemoglobin A1c, lipid panel pending  -We will continue monitor patient on telemetry  -N.p.o., plan for cardiac catheterization tomorrow morning  -Echocardiogram for pericardial effusion    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  -Receives peritoneal dialysis daily  -We will continue to monitor kidney function and electrolytes on RFP    Insulin-dependent type 2 diabetes  -Patient takes Levemir 25 units at bedtime  -We will keep patient on moderate insulin sliding scale, hold home Levemir given that he is n.p.o.  -We will continue to monitor  glucose every 4 hours and make adjustments as needed    Hypertension  Hyperlipidemia  -Continue home hydralazine, torsemide      Hypothyroidism  -Continue home levothyroxine      Diet: NPO   DVT Prophylaxis: Therapeutic heparin ggt   Consults: Cardio   Code: Full code     Dispo: anticipate 1-2 night stay, cardiology following and will likely cath in the morning.     SEUN CombsII     Resident addendum:    Patient seen and examined independently from medical student.  The note as shown above has been reviewed and edited to reflect my input.    Ken Boss,   Internal Medicine, PGY-3     Dayteam Addendum:    Patient was seen and examined this morning by the day shift team, he was lying comfortably in bed with his wife at bedside. He reported he is no longer experiencing any chest pain nor radiating arm pain. He continues to endorse SOB with exertion and some  lightheadedness. He denies palpitations and N/V overnight or this morning. Vitals show HR in 80s-90s, RR in 20s, and systolic BP of 140s-150s. Repeat troponin this morning was 2226. Lipid panel showed HDL 32 and was unremarkable otherwise. Tracking electrolytes,  today Na 133, K 3.8, bicarb 22, Ca 8.3, Phos 5.2. Patient will receive peritoneal dialysis today. Consulted nephrology to help with dialysis management. Patient continues to go in and out of rate-controlled Afib (CHADSVASC = 4) and he is asymptomatic  when this occurs.  Will continue telemetry and consider a DOAC once Heparin drip is discontinued. Plan is for heart catheterization today to evaluate for CAD and for possible intervention, also will get a SIMIN to evaluate heart/valvular function. Will  follow cardiology recs. Expect patient to be discharged tomorrow.         PE  General: Patient resting in bed comfortably, wife at bedside  Head: normocephalic, atraumatic  CV: grade 2 holosystolic murmur, RRR  Pulm: CTA b/l, no wheezes or crackles, symmetric chest expansion  GI: soft, nondistended,  nontender, active BS  Extremities: no clubbing, cyanosis, or edema  Skin: warm, dry, no rashes or lesions  Neuro: alert and awake, no dysarthria or facial droop  Psych: pleasant and cooperative      AMIRA Gallagher IV    Senior Addendum  I saw and evaluated patient independently of the intern & med student, oversaw all aspects of the work-up and discussed with attending physician. The above documentation reflects my edits and input.    Taj Prescott, DO  Internal Medicine, PGY-2      Attestation:   Note Completion:  I am a:  Resident/Fellow   Attending Attestation I saw and evaluated the patient.  I personally obtained the key and critical portions of the history and physical exam or was physically present for key and  critical portions performed by the resident/fellow. I reviewed the resident/fellow?s documentation and discussed the patient with the resident/fellow.  I agree with the resident/fellow?s medical decision making as documented in the note.   I personally evaluated the patient on 20-Sep-2023   Comments/ Additional Findings    Patient is an 83-year-old male with hypertension, type 2 diabetes, and end-stage renal disease on peritoneal dialysis who presented with chief complaint  of chest pain.  The pain started in his arm, radiated up into the shoulder and hand.  Tylenol did not help nor did heating pads.  He contacted EMS.  He was given 325 of aspirin by EMS.  Has never had this kind of chest pain before.  His ROS is otherwise  negative other than his daily dry heaving and exertional dyspnea.  On ED presentation, his initial EKG did show ST elevations in V2 and V3 that did not meet criteria for STEMI, these transition to T wave inversions.  His initial troponin was 122, and  has since up trended to 1600.  Cardiology was contacted, recommended therapeutic heparin drip and plan for cor angio this morning.  He was admitted to medicine for continued monitoring.    No acute events overnight.  This morning he has  no further chest pain.  The pain has not recurred since he is arrived in the hospital.  He understands the plan for catheterization.  On exam he is a well-developed elderly male in no acute distress.  Cardiac  exam with regular rhythm, normal S1 and S2, no murmurs, rubs, gallops.  Lungs are clear to auscultation bilaterally.  Abdomen with peritoneal dialysis catheter in place, nontender, nondistended.  Alert and oriented x3.    Labs without leukocytosis, RFP consistent with end-stage renal disease with mild hyponatremia, no acidosis or hyperkalemia.    Impression is of an 83-year-old multi morbid male with cardiac risk factors who presented with NSTEMI.  He is currently on aspirin and a heparin drip, with plan for Link angiography today.  Anticipate surface echocardiogram post catheterization, high  intensity statin, continue aspirin.  Nephrology has been consulted for his peritoneal dialysis.  Anticipate that if cor angio is unremarkable, echo without acute drop in EF, and no further chest pain patient could likely be discharged home this afternoon  versus tomorrow morning pending cardiology recs.  Rest of plan per resident documentation.        Electronic Signatures:  Becky Kelly (MED STUD)  (Signed 20-Sep-2023 00:43)   Authored: History of Present Illness, Social History,  Allergies, Medications Prior to Admission, Review of Systems, Objective, Assessment and Plan, Note Completion  Ken Boss (DO (Resident))  (Signed 20-Sep-2023 01:45)   Entered: History of Present Illness, Comorbidities, Assessment  and Plan, Note Completion   Authored: History of Present Illness, Comorbidities, Social History, Allergies, Medications Prior to Admission, Review of Systems, Objective,  Assessment and Plan, Note Completion  Taj Prescott (DO (Resident))  (Signed 20-Sep-2023 11:59)   Authored: Assessment and Plan  Daksha Salguero)  (Signed 20-Sep-2023 13:35)   Authored: Note Completion   Co-Signer: History of Present  "Illness, Comorbidities, Social History, Allergies, Medications Prior to Admission, Review of Systems, Objective,  Assessment and Plan, Note Completion      Last Updated: 20-Sep-2023 13:35 by Daksha Salguero)    References:  1.  Data Referenced From \"Risk Screen - Adult Emergency\" 19-Sep-2023 16:32   "

## 2023-09-30 NOTE — PROGRESS NOTES
Service: Nephrology     Subjective Data:   MAGGIE KIRAN is a 83 year old Male who is Hospital Day # 7.    Additional Information:    He awaits transfer to [place].  PD is functioning well    Objective Data:     Objective Information:      T   P  R  BP   MAP  SpO2   Value  36.6  68  20  93/48   75  97%  Date/Time 9/25 11:30 9/25 11:30 9/25 11:30 9/25 11:30  9/25 11:30 9/25 11:30  Range  (36C - 36.7C )  (62 - 80 )  (17 - 20 )  (93 - 150 )/ (48 - 76 )  (67 - 100 )  (92% - 97% )      Pain reported at 9/25 11:30: 0 = None    Physical Exam by System:    Constitutional: awake/alert/oriented x3, no distress,  alert and cooperative   ENMT: mucous membranes moist   Respiratory/Thorax: CTAB, normal breath sounds   Cardiovascular: Regular, rate and rhythm, no murmurs,  2+ equal pulses of the extremities, normal S 1and S 2   Extremities: normal extremities, no cyanosis edema,  contusions or wounds, no clubbing     Medication:    Medications:          Continuous Medications       --------------------------------    1. Heparin 25,000 units/ D5W 250 mL Infusion:  900  units/hr  IntraVenous  <Continuous>         Scheduled Medications       --------------------------------    1. amLODIPine (NORVASC):  10  mg  Oral  Daily    2. Aspirin Enteric Coated:  81  mg  Oral  Daily    3. Atorvastatin:  80  mg  Oral  Daily    4. Docusate:  100  mg  Oral  2 Times a Day    5. DULoxetine:  30  mg  Oral  Daily    6. Epoetin Chris (Non-ESRD) Injectable:  55431  unit(s)  SubCutaneous  Weekly    7. Gentamicin 0.1% Topical:  1  application(s)  Topical  Daily    8. Heparin (Repeat Bolus) Injectable:  4000  unit(s)  IntraVenous Push  Every 4 Hours    9. Insulin Glargine (Lantus) Injectable:  25  unit(s)  SubCutaneous  Every 24 Hours    10. Insulin Lispro Moderate Corrective Scale:  unit(s)  SubCutaneous  3 Times a Day Before Meals    11. Levothyroxine:  25  microgram(s)  Oral  Daily    12. Metoprolol Tartrate:  25  mg  Oral  2 Times a Day    13.  Multivitamin Renal:  1  capsule(s)  Oral  Daily    14. Sodium Bicarbonate:  650  mg  Oral  Every 12 Hours    15. Torsemide:  50  mg  Oral  Daily         PRN Medications       --------------------------------    1. Acetaminophen:  975  mg  Oral  Every 6 Hours    2. Dextrose 50% in Water Injectable:  25  gram(s)  IntraVenous Push  Every 15 Minutes    3. Glucagon Injectable:  1  mg  IntraMuscular  Every 15 Minutes    4. Sennosides Oral Liquid:  5  mL  Oral  Daily    5. Sodium Chloride 0.9% Injectable Flush:  10  mL  IntraVenous Flush  Every 8 Hours and as Needed         Conditional Medication Orders       --------------------------------    1. Perflutren Lipid Microsphere (Activated) 1.3 mL / NaCL 0.9% T.V. 10 mL Injectable:  0.5  mL  IntraVenous Push  Once    2. Perflutren Lipid Microsphere (Activated) 1.3 mL / NaCL 0.9% T.V. 10 mL Injectable:  0.5  mL  IntraVenous Push  Once    3. Perflutren Lipid Microsphere (Activated) 1.3 mL / NaCL 0.9% T.V. 10 mL Injectable:  0.5  mL  IntraVenous Push  Once      Recent Lab Results:    Results:    CBC: 9/25/2023 04:05              \     Hgb     /                              \     7.7 L    /  WBC  ----------------  Plt               9.0       ----------------    306              /     Hct     \                              /     25.0 L    \            RBC: 2.35 L    MCV: 106 H          RFP: 9/25/2023 04:05  NA+        Cl-     BUN  /                         136    99    58 H /  --------------------------------  Glucose                ---------------------------  184 H    K+     HCO3-   Creat \                         3.7    24    4.58 H \  Calcium : 8.1 LAnion Gap : 17          Albumin : 2.9 L     Phos : 4.5      Coagulation: 9/25/2023 04:05  PT  /                              /  -------<    INR          ----------<                PTT\                              \            Heparin Assay: 0.3           Assessment and Plan:   Code Status:  ·  Code Status Full Code      Assessment:    1. 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 NSTEMI s/p cardiac cath  3. anemia of ckd  4. HTN  5. hyperlipidemia  6. pericardial effusion  7. pafib    plan  PD as ordered.  No changes in his orders  daily colace  gent cream to PD exit site daily  replace K+ as needed  PO4 is at goal<5.5      Electronic Signatures:  Eddie Jason)  (Signed 25-Sep-2023 14:06)   Authored: Service, Subjective Data, Objective Data, Assessment  and Plan, Note Completion      Last Updated: 25-Sep-2023 14:06 by Eddie Jason)

## 2023-09-30 NOTE — PROGRESS NOTES
Service: Nephrology     Subjective Data:   MAGGIE KIRAN is a 83 year old Male who is Hospital Day # 11.    Overnight Events: Patient had an uneventful night.     Objective Data:     Objective Information:      T   P  R  BP   MAP  SpO2   Value  36.3  76  16  132/70   82  95%  Date/Time 9/29 8:00 9/29 8:00 9/29 8:00 9/29 8:00  9/29 8:00 9/29 8:00  Range  (36.2C - 37.3C )  (60 - 78 )  (14 - 18 )  (100 - 132 )/ (54 - 73 )  (73 - 107 )  (93% - 95% )  Highest temp of 37.3 C was recorded at 9/29 0:00      Pain reported at 9/29 8:00: 0 = None    Physical Exam by System:    Constitutional: awake/alert/oriented x3, no distress,  alert and cooperative   ENMT: mucous membranes moist   Respiratory/Thorax: CTAB, normal breath sounds   Cardiovascular: Regular, rate and rhythm, no murmurs,  2+ equal pulses of the extremities, normal S 1and S 2   Extremities: normal extremities, no cyanosis edema,  contusions or wounds, no clubbing     Medication:    Medications:          Continuous Medications       --------------------------------    1. Heparin 25,000 units/ D5W 250 mL Infusion:  900  units/hr  IntraVenous  <Continuous>         Scheduled Medications       --------------------------------    1. amLODIPine (NORVASC):  10  mg  Oral  Daily    2. Aspirin Enteric Coated:  81  mg  Oral  Daily    3. Atorvastatin:  80  mg  Oral  Daily    4. Docusate:  100  mg  Oral  2 Times a Day    5. DULoxetine:  30  mg  Oral  Daily    6. Epoetin Chris (Non-ESRD) Injectable:  57883  unit(s)  SubCutaneous  Weekly    7. Fluticasone 50 microgram/ Nasal Inhalation:  1  spray(s)  Each Nostril  At Bedtime    8. Gentamicin 0.1% Topical:  1  application(s)  Topical  Daily    9. Heparin (Repeat Bolus) Injectable:  4000  unit(s)  IntraVenous Push  Every 4 Hours    10. Insulin Glargine (Lantus) Injectable:  25  unit(s)  SubCutaneous  Every 24 Hours    11. Insulin Lispro Moderate Corrective Scale:  unit(s)  SubCutaneous  3 Times a Day Before Meals    12.  Levothyroxine:  25  microgram(s)  Oral  Daily    13. Magnesium Oxide:  400  mg  Oral  Daily    14. Metoprolol Tartrate:  25  mg  Oral  2 Times a Day    15. Multivitamin Renal:  1  capsule(s)  Oral  Daily    16. Sodium Bicarbonate:  650  mg  Oral  Every 12 Hours    17. Torsemide:  50  mg  Oral  Daily    18. Zinc Oxide 20% Topical:  1  application(s)  Topical  3 Times a Day         PRN Medications       --------------------------------    1. Acetaminophen:  975  mg  Oral  Every 6 Hours    2. Dextrose 50% in Water Injectable:  25  gram(s)  IntraVenous Push  Every 15 Minutes    3. Glucagon Injectable:  1  mg  IntraMuscular  Every 15 Minutes    4. Sennosides Oral Liquid:  5  mL  Oral  Daily    5. Sodium Chloride 0.9% Injectable Flush:  10  mL  IntraVenous Flush  Every 8 Hours and as Needed        Recent Lab Results:    Results:    Coagulation: 9/29/2023 05:04  PT  /                              /  -------<    INR          ----------<                PTT\                              \            Heparin Assay: 0.4             I have reviewed these laboratory results:    Complete Blood Count  28-Sep-2023 06:19:00      Result Value    White Blood Cell Count  9.5    Nucleated Erythrocyte Count  0.0    Red Blood Cell Count  2.31   L   HGB  7.4   L   HCT  24.8   L   MCV  107   H   MCHC  29.8   L   PLT  295    RDW-CV  13.7      Basic Metabolic Panel  28-Sep-2023 06:19:00      Result Value    Glucose, Serum  121   H   NA  136    K  3.8    CL  100    Bicarbonate, Serum  27    Anion Gap, Serum  13    BUN  50   H   CREAT  4.10   H   GFR Male  14   A   Calcium, Serum  8.3   L       Assessment and Plan:   Code Status:  ·  Code Status Full Code     Assessment:    1. 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 NSTEMI s/p cardiac cath  3. anemia of ckd  4. HTN  5. hyperlipidemia  6. pericardial effusion  7. pafib    plan  Cont his current PD orders  daily colace  gent cream to PD exit site daily  replace K+ as needed    He awaits transfer to [facility]      Electronic  Signatures:  Eddie Jason)  (Signed 29-Sep-2023 13:34)   Authored: Service, Subjective Data, Objective Data, Assessment  and Plan, Note Completion      Last Updated: 29-Sep-2023 13:34 by Eddie Jason)

## 2023-09-30 NOTE — PROGRESS NOTES
Service: Medicine     Subjective Data:   MAGGIE KIRAN is a 83 year old Male who is Hospital Day # 7.     Patient seen at bedside. No acute events overnight; patient resting comfortably and in no acute distress.    Overnight Events: Patient had an uneventful night.     Objective Data:     Objective Information:      T   P  R  BP   MAP  SpO2   Value  36.6  68  20  113/60   84  92%  Date/Time 9/25 7:30 9/25 7:30 9/25 7:30 9/25 7:30  9/25 7:30 9/25 7:30  Range  (36C - 36.7C )  (62 - 80 )  (17 - 20 )  (103 - 150 )/ (56 - 76 )  (67 - 100 )  (92% - 97% )      Pain reported at 9/25 8:00: 0 = None      T   P  R  BP   MAP  SpO2   Value  36.6  68  20  113/60   84  92%  Date/Time 9/25 7:30 9/25 7:30 9/25 7:30 9/25 7:30  9/25 7:30 9/25 7:30  Range  (36C - 36.7C )  (62 - 80 )  (17 - 20 )  (103 - 150 )/ (56 - 76 )  (67 - 100 )  (92% - 97% )    Physical Exam by System:    Constitutional: Well developed, awake/alert/oriented  x3, no distress, alert and cooperative   ENMT: mucous membranes moist, no apparent injury,  no lesions seen   Respiratory/Thorax: Patent airways, CTAB, normal  breath sounds with good chest expansion, thorax symmetric   Cardiovascular: Regular, rate and rhythm, no murmurs,  normal S 1and S 2   Gastrointestinal: Nondistended, soft, non-tender,  no rebound tenderness or guarding, no masses palpable, no organomegaly, +BS, no bruits   Extremities: normal extremities, no cyanosis edema,  contusions or wounds, no clubbing   Psychological: Appropriate mood and behavior     Medication:    Medications:          Continuous Medications       --------------------------------    1. Heparin 25,000 units/ D5W 250 mL Infusion:  900  units/hr  IntraVenous  <Continuous>         Scheduled Medications       --------------------------------    1. amLODIPine (NORVASC):  10  mg  Oral  Daily    2. Aspirin Enteric Coated:  81  mg  Oral  Daily    3. Atorvastatin:  80  mg  Oral  Daily    4. Docusate:  100  mg  Oral  2 Times a  Day    5. DULoxetine:  30  mg  Oral  Daily    6. Epoetin Chris (Non-ESRD) Injectable:  41228  unit(s)  SubCutaneous  Weekly    7. Gentamicin 0.1% Topical:  1  application(s)  Topical  Daily    8. Heparin (Repeat Bolus) Injectable:  4000  unit(s)  IntraVenous Push  Every 4 Hours    9. Insulin Glargine (Lantus) Injectable:  25  unit(s)  SubCutaneous  Every 24 Hours    10. Insulin Lispro Moderate Corrective Scale:  unit(s)  SubCutaneous  3 Times a Day Before Meals    11. Levothyroxine:  25  microgram(s)  Oral  Daily    12. Metoprolol Tartrate:  25  mg  Oral  2 Times a Day    13. Multivitamin Renal:  1  capsule(s)  Oral  Daily    14. Sodium Bicarbonate:  650  mg  Oral  Every 12 Hours    15. Torsemide:  50  mg  Oral  Daily         PRN Medications       --------------------------------    1. Acetaminophen:  975  mg  Oral  Every 6 Hours    2. Dextrose 50% in Water Injectable:  25  gram(s)  IntraVenous Push  Every 15 Minutes    3. Glucagon Injectable:  1  mg  IntraMuscular  Every 15 Minutes    4. Sennosides Oral Liquid:  5  mL  Oral  Daily    5. Sodium Chloride 0.9% Injectable Flush:  10  mL  IntraVenous Flush  Every 8 Hours and as Needed         Conditional Medication Orders       --------------------------------    1. Perflutren Lipid Microsphere (Activated) 1.3 mL / NaCL 0.9% T.V. 10 mL Injectable:  0.5  mL  IntraVenous Push  Once    2. Perflutren Lipid Microsphere (Activated) 1.3 mL / NaCL 0.9% T.V. 10 mL Injectable:  0.5  mL  IntraVenous Push  Once    3. Perflutren Lipid Microsphere (Activated) 1.3 mL / NaCL 0.9% T.V. 10 mL Injectable:  0.5  mL  IntraVenous Push  Once      Recent Lab Results:    Results:    CBC: 9/25/2023 04:05              \     Hgb     /                              \     7.7 L    /  WBC  ----------------  Plt               9.0       ----------------    306              /     Hct     \                              /     25.0 L    \            RBC: 2.35 L    MCV: 106 H          RFP: 9/25/2023  04:05  NA+        Cl-     BUN  /                         136    99    58 H /  --------------------------------  Glucose                ---------------------------  184 H    K+     HCO3-   Creat \                         3.7    24    4.58 H \  Calcium : 8.1 LAnion Gap : 17          Albumin : 2.9 L     Phos : 4.5      Coagulation: 9/25/2023 04:05  PT  /                              /  -------<    INR          ----------<                PTT\                              \            Heparin Assay: 0.3             I have reviewed these laboratory results: Glucose_POCT [Drawn 25-Sep-2023 07:46:00].      I have reviewed these laboratory results:    Glucose_POCT  25-Sep-2023 07:46:00      Result Value    Glucose-POCT  137   H       Assessment and Plan:   Comorbidities:  ·  Comorbidity Other     Code Status:  ·  Code Status Full Code     Assessment:    84 y/o M with IDDM2, end-stage CKD with peritoneal dialysis admitted for chest pain. Initial troponin at 122, up trended to 608, then 1661.  EKG showed A-fib RVR (new onset) with ST segment elevations in leads V2, V3 with subsequent changes to T wave inversions in leads V2 and V3. CT angio chest remarkable for moderate-sized pericardial effusion.      #NSTEMI  #New-Onset Afib w/ RVR  -Catheterization on 09/20 showed moderately severe calcification in the L main, LAD, proximal OM1, and distal LCx  -Continue heparin GGT for anticoagulation  -Lopressor 25mg BID for rate control of Afib  -Aspirin 81 mg daily, atorvastatin 80 mg daily  -Will require chronic anticoagulation for Afib, but given low Hgb and likely needing DAPT s/p PCI might defer anticoagulation for a few weeks post procedure  -As patient is still at [facility] as of 9/25, interventional cards may do PCI of LCx and delay revascularization of LAD until a later date  -Telemetry  -Repeat echo performed 9/25/23, results pending  -Pending transfer to  depending on bed availability for PCI   #End-Stage CKD  -Continue  peritoneal dialysis per nephrology recommendations  -Continue to monitor renal function and electrolytes  #Insulin-dependent type 2 diabetes  -Patient takes Levemir 25 units at bedtime at home  -Increased basal lantus due to high nighttime sugars, insulin lispro added  -SSI      #Hypertension  #Hyperlipidemia  -Continue home hydralazine, torsemide, atorvastatin       #Hypothyroidism  -Continue home levothyroxine        Diet: Diabetic   DVT Prophylaxis: Therapeutic heparin ggt   Code: Full code       Dispo: Transfer to [facility] accepted, pending bed availability       Bladimir Escamilla, MS3    Patient seen independent of medical student. The changes have been made directly to the progress note    Staffed with Dr. Alexander Robbins MD  Internal Medicine, PGY-3          Attestation:   Note Completion:  I am a:  Medical Student/Acting Intern   Medical Student Attestation I, or a resident under my supervision, was present with the medical student who participated in the documentation of this note.  I have personally seen and examined the patient and performed the medical decision-making components. I have reviewed the medical student documentation and/or resident documentation and verified the findings in the note as written with additions or exceptions  as stated in the body of this note.    I personally evaluated the patient on 25-Sep-2023   Comments/ Additional Findings    patient seen and examined, I agree with above  Awaiting transfer to [facility] for cardiac intervention  he is on heparin drip   denies CP or SOB  tolerating PD  nephro/cardio following  repeat Echo today to evaluate pericardial effusion  labs/vitals stable  discussed plan of care with patient/wife, will follow patient closely  plan to call transfer center again today to ask for update  I reached out to cardio dr ambrosio last night to also ask to expedite transfer, he was going to call nursing supervisor  will follow patient closely          Electronic  Signatures:  Alexander Robbins (Resident))  (Signed 25-Sep-2023 11:56)   Entered: Service, Assessment and Plan, Note Completion   Authored: Service, Subjective Data, Objective Data, Assessment and Plan, Note Completion  Marichuy Hoff ()  (Signed 25-Sep-2023 15:23)   Authored: Note Completion   Co-Signer: Service, Subjective Data, Objective Data, Assessment and Plan, Note Completion  Bladimir Escamilla (MED STUD)  (Signed 25-Sep-2023 11:36)   Authored: Subjective Data, Objective Data, Assessment  and Plan, Note Completion      Last Updated: 25-Sep-2023 15:23 by Marichuy Hoff ()

## 2023-09-30 NOTE — DISCHARGE SUMMARY
Send Summary:   Discharge Summary Providers:  Provider Role Provider Name   · Referring Daksha Salguero   · Attending Marichuy Hoff   · Consulting Ronnie Archuleta   · Consulting Dilcia Santos   · Primary Unknown, Pcp       Note Recipients: Unknown, Pcp, MD       Discharge:    Summary:   Admission Date: .19-Sep-2023 16:23:00   Discharge Date: 30 -Sep-2023   Attending Physician at Discharge: Marichuy Hoff   Admission Reason: Chest pain   Final Discharge Diagnoses: NSTEMI, CAD   Procedures: Heart Catheterization   Condition at Discharge: Fair   Disposition at Discharge: Other not defined elsewhere   Physical Exam:    General: Patient resting in bed, wife at bedside  Head: normocephalic, atraumatic  CV: RRR, normal S1 and S2  Pulm: CTA b/l, no wheezes or crackles, symmetric chest expansion  GI: soft, nondistended, nontender, active BS  Extremities: no clubbing, cyanosis, or edema  Skin: warm, dry, no rashes or lesions  Neuro: Alert and awake  Hospital Course:    Salomon Chowdary is an 83-year-old male with a past medical history of IDDM, HTN, HLD, ESRD on peritoneal dialysis who presented to the ED  on 9/19 with acute chest pain and L shoulder pain radiating down his arm and chronic SOB. Patient had no prior history of MI. On arrival HR was 120, /71, RR 20, O2 sat 99% on RA.  EKG showed A-fib RVR (new onset) with ST segment elevations in leads V2, V3 with subsequent changes to T wave inversions in leads V2 and V3. D dimer was 3640, Troponin trended 122>608>1661>2226. CTA chest showed a moderate pericardial effusion and was  negative for PE. Afib RVR responded to 5 mg IV Lopressor in the ED, patient remained rate controlled after this. Cardiology was consulted  and recommended starting therapeutic Heparin and planned for TTE and heart catheterization the following morning. TTE on 9/20 showed a moderately sized circumferential pericardial effusion without tamponade and a normal LVEF. Heart cath on 9/20  showed  moderately severe coronary calcification in the L main, LAD, proximal OM1, and distal circumflex. He was started on Lopressor 25mg BID  on 9/20 for NSTEMI and Afib. CHADSVASC is  4 with new onset Afib, however  DOAC held while patient still on Heparin. It was discussed with the patient that he would be transferred for cardiac intervention. Patient  and his wife prefer to go to [facility] as all of their prior care has been through [facility] prior to this admission.      To do:  - patient will likely need to be started on DOAC and DAPT after PCI, need to assess risk given his Hgb <8  - discontinue HCTZ indefinitely  - New meds: Lopressor 25 BID          Discharge Information:    and Continuing Care:   Lab Results - Pending:    None  Radiology Results - Pending: None   Discharge Instructions:    Transfer to [facility]  Discharge Medications: Heparin gtt  Lopressor 25mg BID     Attestation:   Note Completion:  I am a:  Resident/Fellow   Attending Attestation I saw and evaluated the patient.  I personally obtained the key and critical portions of the history and physical exam or was physically present for key and  critical portions performed by the resident/fellow. I reviewed the resident/fellow’s documentation and discussed the patient with the resident/fellow.  I agree with the resident/fellow’s medical decision making as documented in the note.     I personally evaluated the patient on 21-Sep-2023   Comments/ Additional Findings    Patient seen and examined, I agree with above documentation from resident physician regarding discharge summary.  Patient is an 83-year-old male  with history of ESRD on peritoneal dialysis, hypertension, diabetes who presented with acute chest pain and left shoulder pain, he was found to have an NSTEMI, new onset A-fib with RVR and moderate pericardial effusion.  Cardiology was consulted.  He  was started on a heparin drip.  He went for cardiac cath which showed severe coronary calcification  in the left main, LAD, OM and distal circumflex.  Plan is for repeat echocardiogram to monitor for the pericardial effusion an patient will need to go  back for cardiac cath for intervention and likely rotablation due to the severe calcification found.   Nephrology has been consulted for PD management. He is chest pain free this morning. Discussed plan with cardio, patient and wife. Patient needs to be transferred for rotablation since we cannot do the procedure here at [facility]. After extensive discussion  with patient and wife, they preferred transfer to [facility] since all of his doctors are there including his nephrologist. I called transfer center, patient admitted to hospitalist service with nephro and cardio on consult there. Discussed plan with patient,  wife, cardio, RN and . Anticipate transfer/ DC within next 24 hours. Plan to continue heparin drip, monitor hgb 7.2, repeat this afternoon.  will follow patient closely      Addendum:   Patient ultimately discharged 9/30 due to delay in bed availability. This writer (Nohemi Neville) attending of record at discharge. Please see progress notes for updates in clinical status.       Electronic Signatures:  Ayesha Britton (MED STUD)  (Signed 21-Sep-2023 14:29)   Authored: Send Summary, Summary Content, Ongoing Care  Marichuy Hoff (DO)  (Signed 21-Sep-2023 16:07)   Authored: Ongoing Care, Note Completion   Co-Signer: Send Summary, Summary Content, Ongoing Care, Note Completion  aTj Prescott (DO (Resident))  (Signed 21-Sep-2023 14:38)   Authored: Send Summary, Summary Content, Ongoing Care,  Note Completion      Last Updated: 21-Sep-2023 16:07 by Marichuy Hoff ()

## 2023-09-30 NOTE — PROGRESS NOTES
Service: Cardiology     Subjective Data:   MAGGIE KIRAN is a 83 year old Male who is Hospital Day # 11.     No acute events. Still awaiting transfer. Chest pain only with coughing. Denies shortness of breath, palpitations, lightheadedness, fever, chills.    Objective Data:     Objective Information:      T   P  R  BP   MAP  SpO2   Value  36.9  72  14  114/61   83  93%  Date/Time 9/29 5:00 9/29 5:00 9/29 5:00 9/29 5:00  9/29 5:00 9/29 5:00  Range  (36.2C - 37.3C )  (60 - 78 )  (14 - 18 )  (100 - 125 )/ (54 - 73 )  (73 - 107 )  (93% - 95% )  Highest temp of 37.3 C was recorded at 9/29 0:00      Pain reported at 9/29 5:00: 0 = None    Physical Exam by System:    Constitutional: No acute distress, resting comfortably  in bed   Eyes: EOMI, conjunctiva clear   Head/Neck: Atraumatic, normocephalic, neck supple   Respiratory/Thorax: CTAB, no wheezes, rhonchi, rales   Cardiovascular: Regular rate and rhythm, no murmur,  gallop, rub, normal S1S2   Gastrointestinal: Abdomen soft, nondistended   Extremities: No cyanosis, no edema   Psychological: Appropriate mood and affect, A&Ox3   Skin: Warm, dry, no rash     Recent Lab Results:    Results:    Coagulation: 9/29/2023 05:04  PT  /                              /  -------<    INR          ----------<                PTT\                              \            Heparin Assay: 0.4           Radiology Results:    Results:        Conclusion:  CONCLUSIONS:  1. Left ventricular systolic function is low normal with a 50-55% estimated ejection fraction.  2. Spectral Doppler shows an impaired relaxation pattern of left ventricular diastolic filling.  3. Small to moderate pericardial effusion.  4. There is no evidence of cardiac tamponade.    QUANTITATIVE DATA SUMMARY:  2D MEASUREMENTS:  Normal Ranges:  IVSd:          1.24 cm   (0.6-1.1cm)  LVPWd:         1.15 cm   (0.6-1.1cm)  LVIDd:         4.30 cm   (3.9-5.9cm)  LVIDs:         2.82 cm  LV Mass Index: 92.4 g/m2  LV % FS         34.4 %    LV SYSTOLIC FUNCTION BY 2D PLANIMETRY (MOD):  Normal Ranges:  EF-A4C View: 54.1 % (>=55%)  EF-A2C View: 53.2 %  EF-Biplane:  49.1 %      [id number] Jose Coreas MD  Electronically signed on 9/25/2023 at 8:14:04 PM        *** Final ***     Echocardiogram [Sep 25 2023  8:14PM]      Assessment and Plan:   Code Status:  ·  Code Status Full Code     Assessment:    ASSESSMENT:     ·  NSTEMI HST 2,226 no chest pain since admission, on IV heparin  ·  CAD distal circumflex pre-LPDA 95% (likely the culprit), heavy calcification multiple severe lesions of the proximal and mid LAD, apparently the patient requested transfer to [facility] as all his primary and other specialists are at [facility]  ·  Small to moderate pericardial effusion by echo  ·  Paroxysmal A-fib RVR new diagnosis, spontaneously converted to normal sinus rhythm   ·  We will require chronic anticoagulation.  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  Awaiting transfer to [facility] [place] for rotablation/PCI to proximal and mid LAD, distal LCX; if no transfer by end of today, open up to [facility]  or [facility].   2.  Continue IV heparin  3.  Continue ASA 81mg daily, high intensity statin  4.  Continue metoprolol       Jose Coreas MD, [facility]  Cardiovascular Disease  [facility]   [address] [place] [address]  [address]  [place] [address]  Phone: [phone]  Fax: [phone]        Electronic Signatures:  Jose Coreas)  (Signed 29-Sep-2023 08:44)   Authored: Service,  Subjective Data, Objective Data, Assessment  and Plan, Note Completion      Last Updated: 29-Sep-2023 08:44 by Jose Coreas)

## 2023-09-30 NOTE — PROGRESS NOTES
Service: Medicine     Subjective Data:   MAGGIE KIRAN is a 83 year old Male who is Hospital Day # 9.     This morning patient is laying in bed, had no medical complaints. Denied chest pain or arm pain.    Overnight Events: Patient had an uneventful night.     Objective Data:     Objective Information:      T   P  R  BP   MAP  SpO2   Value  36  74  18  120/61   95  94%  Date/Time 9/27 8:00 9/27 8:00 9/27 8:00 9/27 8:00  9/27 8:00 9/27 8:00  Range  (36C - 37.2C )  (68 - 99 )  (16 - 20 )  (93 - 149 )/ (47 - 83 )  (77 - 656 )  (93% - 96% )  Highest temp of 37.2 C was recorded at 9/27 1:00      Pain reported at 9/27 8:00: 2 = Mild    Physical Exam Narrative:  ·  Physical Exam:    General: well appearing, no acute distress  HEENT:  moist mucous membranes  CV: regular rate and rhythm, no murmurs, 2+ pulses in all extremities  RESP: CTA bilaterally, normal chest expansion, no resp distress  Abd: soft, nontender, nondistended  Neuro: alert and oriented x3, speech appropriate  Vascular: no peripheral edema appreciated  Skin: PD site intact  MSK: no joint swelling      Medication:    Medications:          Continuous Medications       --------------------------------    1. Heparin 25,000 units/ D5W 250 mL Infusion:  900  units/hr  IntraVenous  <Continuous>         Scheduled Medications       --------------------------------    1. amLODIPine (NORVASC):  10  mg  Oral  Daily    2. Aspirin Enteric Coated:  81  mg  Oral  Daily    3. Atorvastatin:  80  mg  Oral  Daily    4. Docusate:  100  mg  Oral  2 Times a Day    5. DULoxetine:  30  mg  Oral  Daily    6. Epoetin Chris (Non-ESRD) Injectable:  33784  unit(s)  SubCutaneous  Weekly    7. Fluticasone 50 microgram/ Nasal Inhalation:  1  spray(s)  Each Nostril  At Bedtime    8. Gentamicin 0.1% Topical:  1  application(s)  Topical  Daily    9. Heparin (Repeat Bolus) Injectable:  4000  unit(s)  IntraVenous Push  Every 4 Hours    10. Insulin Glargine (Lantus) Injectable:  25   unit(s)  SubCutaneous  Every 24 Hours    11. Insulin Lispro Moderate Corrective Scale:  unit(s)  SubCutaneous  3 Times a Day Before Meals    12. Levothyroxine:  25  microgram(s)  Oral  Daily    13. Metoprolol Tartrate:  25  mg  Oral  2 Times a Day    14. Multivitamin Renal:  1  capsule(s)  Oral  Daily    15. Sodium Bicarbonate:  650  mg  Oral  Every 12 Hours    16. Torsemide:  50  mg  Oral  Daily    17. Zinc Oxide 20% Topical:  1  application(s)  Topical  3 Times a Day         PRN Medications       --------------------------------    1. Acetaminophen:  975  mg  Oral  Every 6 Hours    2. Dextrose 50% in Water Injectable:  25  gram(s)  IntraVenous Push  Every 15 Minutes    3. Glucagon Injectable:  1  mg  IntraMuscular  Every 15 Minutes    4. Sennosides Oral Liquid:  5  mL  Oral  Daily    5. Sodium Chloride 0.9% Injectable Flush:  10  mL  IntraVenous Flush  Every 8 Hours and as Needed         Conditional Medication Orders       --------------------------------    1. Perflutren Lipid Microsphere (Activated) 1.3 mL / NaCL 0.9% T.V. 10 mL Injectable:  0.5  mL  IntraVenous Push  Once    2. Perflutren Lipid Microsphere (Activated) 1.3 mL / NaCL 0.9% T.V. 10 mL Injectable:  0.5  mL  IntraVenous Push  Once    3. Perflutren Lipid Microsphere (Activated) 1.3 mL / NaCL 0.9% T.V. 10 mL Injectable:  0.5  mL  IntraVenous Push  Once      Recent Lab Results:    Results:    CBC: 9/27/2023 05:51              \     Hgb     /                              \     7.5 L    /  WBC  ----------------  Plt               9.4       ----------------    287              /     Hct     \                              /     25.3 L    \            RBC: 2.34 L    MCV: 108 H          RFP: 9/27/2023 05:51  NA+        Cl-     BUN  /                         136    100    52 H /  --------------------------------  Glucose                ---------------------------  170 H    K+     HCO3-   Creat \                         3.8    24    4.28 H \  Calcium :  8.2 LAnion Gap : 16          Albumin : 2.8 L     Phos : 4.1      Coagulation: 9/27/2023 05:51  PT  /                              /  -------<    INR          ----------<                PTT\                              \            Heparin Assay: 0.4           Radiology Results:    Results:        Conclusion:  CONCLUSIONS:  1. Left ventricular systolic function is low normal with a 50-55% estimated ejection fraction.  2. Spectral Doppler shows an impaired relaxation pattern of left ventricular diastolic filling.  3. Small to moderate pericardial effusion.  4. There is no evidence of cardiac tamponade.    QUANTITATIVE DATA SUMMARY:  2D MEASUREMENTS:  Normal Ranges:  IVSd:          1.24 cm   (0.6-1.1cm)  LVPWd:         1.15 cm   (0.6-1.1cm)  LVIDd:         4.30 cm   (3.9-5.9cm)  LVIDs:         2.82 cm  LV Mass Index: 92.4 g/m2  LV % FS        34.4 %    LV SYSTOLIC FUNCTION BY 2D PLANIMETRY (MOD):  Normal Ranges:  EF-A4C View: 54.1 % (>=55%)  EF-A2C View: 53.2 %  EF-Biplane:  49.1 %      [id number] Jose Coreas MD  Electronically signed on 9/25/2023 at 8:14:04 PM        *** Final ***     Echocardiogram [Sep 25 2023  8:14PM]      Conclusion:  ** * Pediatric ECG analysis * **  Sinus bradycardia  Left axis deviation  Low voltage QRS  Borderline Prolonged QT  PEDIATRIC ANALYSIS - MANUAL COMPARISON REQUIRED  When compared with ECG of 19-SEP-2023 22:03,  PREVIOUS ECG IS PRESENT  Confirmed by Dilcia Santos ([id number]) on 9/23/2023 1:37:07 PM     Electrocardiogram 12 Lead [Sep 23 2023  1:37PM]      Assessment and Plan:   Comorbidities:  ·  Comorbidity Other     Code Status:  ·  Code Status Full Code     Assessment:    84 y/o M with IDDM2, end-stage CKD with peritoneal dialysis admitted for chest pain. Initial troponin at 122, up trended to 608, then 1661.  EKG showed A-fib RVR (new onset) with ST segment elevations in leads V2, V3 with subsequent changes to T wave inversions in leads V2 and V3. CT angio chest remarkable for  moderate-sized pericardial effusion    #NSTEMI  #New-Onset Afib w/ RVR  -Catheterization on 09/20 showed moderately severe calcification in the L main, LAD, proximal OM1, and distal LCx  -Continue heparin GGT for anticoagulation  -Lopressor 25mg BID for rate control of Afib  -Aspirin 81 mg daily, atorvastatin 80 mg daily  -Will require chronic anticoagulation for Afib, but given low Hgb and likely needing DAPT s/p PCI might defer anticoagulation for a few weeks post procedure  -Telemetry  -Repeat echo performed 9/25/23, EF 50-55%  -Pending transfer to  depending on bed availability for PCI    #End-Stage CKD  -Continue peritoneal dialysis per nephrology recommendations  -Continue to monitor renal function and electrolytes    #Insulin-dependent type 2 diabetes  -Patient takes Levemir 25 units at bedtime at home  -Increased basal lantus due to high nighttime sugars, insulin lispro added  -SSI    #Hypertension  #Hyperlipidemia  -Continue home hydralazine, torsemide, atorvastatin     #Hypothyroidism  -Continue home levothyroxine      Diet: Diabetic   DVT Prophylaxis: Therapeutic heparin ggt   Code: Full code       Dispo: Transfer to F accepted, pending bed availability for PCI. Will call transfer  center today     Doug Churchill DO  Internal Medicine, PGY-3  Case and plan discussed with attending             Plan of Care Reviewed With:  Plan of Care Reviewed With: patient     Attestation:   Note Completion:  I am a:  Resident/Fellow   Attending Attestation I saw and evaluated the patient.  I personally obtained the key and critical portions of the history and physical exam or was physically present for key and  critical portions performed by the resident/fellow. I reviewed the resident/fellow?s documentation and discussed the patient with the resident/fellow.  I agree with the resident/fellow?s medical decision making as documented in the note.     I personally evaluated the patient on 27-Sep-2023   Comments/  Additional Findings    patient seen and examined, no acute events over night. wife at bedside. Still awaiting bed at [facility]. I reached out to cardio Dr Coreas to  try and expedite process if possible. Patient is requesting rectal cream for irritation. He denies CP or SOB. Discussed with wife and patient, will continue to reach out to [facility] to try and expedite transfer process.          Electronic Signatures:  Marichuy Hoff ()  (Signed 27-Sep-2023 17:04)   Authored: Note Completion  Zhao Camacho ( (Resident))  (Signed 27-Sep-2023 13:55)   Authored: Service, Subjective Data, Objective Data, Assessment  and Plan      Last Updated: 27-Sep-2023 17:04 by Marichuy Hoff ()

## 2023-09-30 NOTE — H&P
History Of Present Illness:    Salomon Chowdary is a 83 y.o. male presenting with 2 vessel disease from OSH.  He transferred to day from [facility] ICU for consideration of CABG vs PCI/rotablation for NSTEMI after C showed 2 vessel disease.   He presented on 9/20/2023 with chest pain thar began as left arm pain that radiated from the shoulder to shoulder to the hand initially which he rated a s10/10.  He tried taking Tylenol for the pain which did not help.  He also tried heating pad which did not improve either then started having chest pain.  He received asa 325.  In ED CT angio was unremarkable for moderate pericardial effusion.  Troponin 122>608>1661.  He was started on heparin gtt.  He also developed new a fib with RVR and was given metoprolol 5 mg IV then he converted to NSR.      Last Recorded Vitals:  Vitals:    09/30/23 0915   BP: 125/68   Pulse: 76   Resp: 18   Temp: 36.3 °C (97.3 °F)   SpO2: 92%       Last Labs:  CBC - 9/28/2023:  6:19 AM  9.5 7.4 295    24.8      CMP - 9/28/2023:  6:19 AM  8.3 7.1 23 --- 0.5   4.1 2.8 26 72      PTT - No results in last year.  1.2   13.1 _     Troponin I   Date/Time Value Ref Range Status   09/20/2023 02:18 AM 2,226 (H) 0 - 20 ng/L Final     Comment:     .  Less than 99th percentile of normal range cutoff-  Female and children under 18 years old <14 ng/L; Male <21 ng/L: Negative  Repeat testing should be performed if clinically indicated.   .  Female and children under 18 years old 14-50 ng/L; Male 21-50 ng/L:  Consistent with possible cardiac damage and possible increased clinical   risk. Serial measurements may help to assess extent of myocardial damage.   .  >50 ng/L: Consistent with cardiac damage, increased clinical risk and  myocardial infarction. Serial measurements may help assess extent of   myocardial damage.   .   NOTE: Children less than 1 year old may have higher baseline troponin   levels and results should be interpreted in conjunction with the  overall   clinical context.   .  NOTE: Troponin I testing is performed using a different   testing methodology at [facility] than at other   system hospitals. Direct result comparisons should only   be made within the same method.  This is a critical result.    Per Laboratory policy, critical results for this test   only qualify to the call list once per 24 hours.      09/19/2023 08:57 PM 1,661 (H) 0 - 20 ng/L Final     Comment:     .  Less than 99th percentile of normal range cutoff-  Female and children under 18 years old <14 ng/L; Male <21 ng/L: Negative  Repeat testing should be performed if clinically indicated.   .  Female and children under 18 years old 14-50 ng/L; Male 21-50 ng/L:  Consistent with possible cardiac damage and possible increased clinical   risk. Serial measurements may help to assess extent of myocardial damage.   .  >50 ng/L: Consistent with cardiac damage, increased clinical risk and  myocardial infarction. Serial measurements may help assess extent of   myocardial damage.   .   NOTE: Children less than 1 year old may have higher baseline troponin   levels and results should be interpreted in conjunction with the overall   clinical context.   .  NOTE: Troponin I testing is performed using a different   testing methodology at [facility] than at other   [facility]. Direct result comparisons should only   be made within the same method.  This is a critical result.    Per Laboratory policy, critical results for this test   only qualify to the call list once per 24 hours.      09/19/2023 06:15  (H) 0 - 20 ng/L Final     Comment:     .  Less than 99th percentile of normal range cutoff-  Female and children under 18 years old <14 ng/L; Male <21 ng/L: Negative  Repeat testing should be performed if clinically indicated.   .  Female and children under 18 years old 14-50 ng/L; Male 21-50 ng/L:  Consistent with possible cardiac damage and possible increased  clinical   risk. Serial measurements may help to assess extent of myocardial damage.   .  >50 ng/L: Consistent with cardiac damage, increased clinical risk and  myocardial infarction. Serial measurements may help assess extent of   myocardial damage.   .   NOTE: Children less than 1 year old may have higher baseline troponin   levels and results should be interpreted in conjunction with the overall   clinical context.   .  NOTE: Troponin I testing is performed using a different   testing methodology at [facility] than at other   system Hospitals in [place]. Direct result comparisons should only   be made within the same method.  This is a critical result.    Per Laboratory policy, critical results for this test   only qualify to the call list once per 24 hours.        BNP   Date/Time Value Ref Range Status   09/19/2023 04:51  (H) 0 - 99 pg/mL Final     Comment:     .  <100 pg/mL - Heart failure unlikely  100-299 pg/mL - Intermediate probability of acute heart  .               failure exacerbation. Correlate with clinical  .               context and patient history.    >=300 pg/mL - Heart Failure likely. Correlate with clinical  .               context and patient history.  BNP testing is performed using different testing   methodology at [facility] than at other   [facility] hospitals. Direct result comparisons should   only be made within the same method.       Hemoglobin A1C   Date/Time Value Ref Range Status   09/20/2023 02:18 AM 6.9 (A) % Final     Comment:          Diagnosis of Diabetes-Adults   Non-Diabetic: < or = 5.6%   Increased risk for developing diabetes: 5.7-6.4%   Diagnostic of diabetes: > or = 6.5%  .       Monitoring of Diabetes                Age (y)     Therapeutic Goal (%)   Adults:          >18           <7.0   Pediatrics:    13-18           <7.5                   7-12           <8.0                   0- 6            7.5-8.5   American Diabetes Association. Diabetes Care  33(S1), Jan 2010.     02/10/2023 11:46 AM 6.9 (H) <5.7 % Final     Comment:     Normal less than 5.7%  Prediabetes 5.7% to 6.4%  Diabetes 6.5% or higher      --HgbA1C levels may not be accurate in patients who have renal disease, received recent blood transfusions, are anemic, or who have dyshemoglobinemia.   09/28/2022 08:51 AM 7.6 (A) % Final     Comment:     Normal less than 5.7%  Prediabetes 5.7% to 6.4%  Diabetes 6.5% or higher      --HgbA1C levels may not be accurate in patients who have  renal disease, received recent blood transfusions, are anemic,  or who have dyshemoglobinemia.   12/29/2021 08:52 AM 6.8 (A) % Final     Comment:     Normal less than 5.7%  Prediabetes 5.7% to 6.4%  Diabetes 6.5% or higher      --HgbA1C levels may not be accurate in patients who have  renal disease, received recent blood transfusions, are anemic,  or who have dyshemoglobinemia.      Last I/O:  No intake/output data recorded.        Past Medical History:  Type 2 IDDM  ERSD on PD  Chronic pain  Hypothyroid  Neuropathy  HTN  Basal cell skin (removed)      Past Surgical History:  PD catheter placement  Spinal surgury - 7 fused vertebrae  Carpal tunnel surgery      Social History:  No smoking, no alcohol, no drug use    Family History:  Father with 4 vessel bypass, black lung  Mother with dementia  Brothers x2  with diabetes     Allergies:  Opioids-  severe vomiting      ROS:  Constitutional: neg for fever, weight loss,  Eyes: denies visual issues  ENMT: denies ISSUES  RESP:  No current SOB, + on OSH admit  Cardiac: + chest pain on admit to OSH,  no RAHMAN  GI: + N/V on admit, denies diarrhea, constipation  : denies issues  MSK: + arthritis, denies decreased ROM, stiffness swelling  Neuro: denies seizures, dizziness, headache  Psych: no current issues  Skin: no masses, pain ,  itching  Endocrine: denies heat/cold sensitivity, + diabetes  Heme: denies anemia, bruising, night sweats      Physical Exam:  Neuro:  pt is A&O x3, speech  clear  Skin:  pale color, warm and dry  HEENT: sclera non icteric  ENT: tongue moist, no oral lesions  Lungs: clear to aursiultation, respirations even and unlabored  Cardiac: RRR S1S2   Abd: soft, NT, ND + BS.  PD catheter intact with dressing over site  Ext: warm extremities with 2+ palpable radial and pedal pulses    TTE (9/25)  LV systolic function is low normal with 50-55% est EF  DD  Small to mod pericardial effusion  There is no evidence of cardiac tamponade    9/20 [facility]  LAD: 3 lesions in the range of 75-80% in the prox, early mid and late mid segments  Lcx: ON1 90%, distal cx 95% right before takeoff of left PDA       Assessment/Plan     Salomon Chowdary is an 82 yo male with a PMHx of insulin dep (type2) diabetes, ESRD on PD, chronic pain, HLD, HTN, hypothyroid and arthritis who initially presented to OSH with chest pain and was found to have 2 vessel disease and was transferred to [facility] for CABG vs PCI evaluation.      CAD/ 2 vessel disease  NSTEMI  - admitted with CP  - troponin 122>608>1661  - [facility] on 9/20 showing disease  LAD of 75-80% in the proximal, early mid and late mid segments.   Lcx: distal cx 95% right before takeoff of left PDA  - consult IC for evaluation for PCI  - cot heparin gtt  - cont asa, bb, statin  - cont cardiac monitor    A fib with RVR  - episode at OSH  - converted to NSR  with metoprolol  - cont cardiac monitor  -cont heparin gtt    HTN:  - cont to monitor    ESRD:  - pt on nightly PD  - nephology consulted  - cont daily electrolytes    T2IDDM  - pt on levemir at home  - cont to monitor    DVT Ppx:  Pt is on hepatin gtt    Full code            Peripheral IV 20 G Left Antecubital (Active)   Site Assessment Clean;Dry 09/30/23 0900   Dressing Type Transparent 09/30/23 0900   Line Status Flushed 09/30/23 0900   Dressing Status Clean;Dry 09/30/23 0900   Number of days:        Code Status  Full code  I spent 45 minutes in the professional and overall care of this patient.        Clarissa KUMAR  Ko, APRN-CNP

## 2023-09-30 NOTE — PROGRESS NOTES
Service: Nephrology     Subjective Data:   MAGGIE KIRAN is a 83 year old Male who is Hospital Day # 5.    Overnight Events: Patient had an uneventful night.     Objective Data:     Objective Information:      T   P  R  BP   MAP  SpO2   Value  36.2  78  18  117/68   88  92%  Date/Time 9/23 8:00 9/23 8:00 9/23 8:00 9/23 8:00  9/23 8:00 9/23 8:00  Range  (36C - 37.5C )  (72 - 82 )  (16 - 25 )  (108 - 122 )/ (56 - 68 )  (72 - 88 )  (92% - 98% )  Highest temp of 37.5 C was recorded at 9/22 0:40      Pain reported at 9/23 3:34: 0 = None    Physical Exam by System:    Constitutional: NAD   Head/Neck: supple   Respiratory/Thorax: no wheeze   Cardiovascular: [id number]   Gastrointestinal: soft, NT   Extremities: no c/c     Medication:    Medications:          Continuous Medications       --------------------------------    1. Heparin 25,000 units/ D5W 250 mL Infusion:  900  units/hr  IntraVenous  <Continuous>         Scheduled Medications       --------------------------------    1. amLODIPine (NORVASC):  10  mg  Oral  Daily    2. Aspirin Enteric Coated:  81  mg  Oral  Daily    3. Atorvastatin:  80  mg  Oral  Daily    4. Docusate:  100  mg  Oral  2 Times a Day    5. DULoxetine:  30  mg  Oral  Daily    6. Epoetin Chris (Non-ESRD) Injectable:  25205  unit(s)  SubCutaneous  Weekly    7. Gentamicin 0.1% Topical:  1  application(s)  Topical  Daily    8. Heparin (Repeat Bolus) Injectable:  4000  unit(s)  IntraVenous Push  Every 4 Hours    9. Insulin Glargine (Lantus) Injectable:  20  unit(s)  SubCutaneous  Every 24 Hours    10. Insulin Lispro Moderate Corrective Scale:  unit(s)  SubCutaneous  3 Times a Day Before Meals    11. Levothyroxine:  25  microgram(s)  Oral  Daily    12. Metoprolol Tartrate:  25  mg  Oral  2 Times a Day    13. Multivitamin Renal:  1  capsule(s)  Oral  Daily    14. Sodium Bicarbonate:  650  mg  Oral  Every 12 Hours    15. Torsemide:  50  mg  Oral  Daily         PRN Medications        --------------------------------    1. Acetaminophen:  975  mg  Oral  Every 6 Hours    2. Dextrose 50% in Water Injectable:  25  gram(s)  IntraVenous Push  Every 15 Minutes    3. Glucagon Injectable:  1  mg  IntraMuscular  Every 15 Minutes    4. Sodium Chloride 0.9% Injectable Flush:  10  mL  IntraVenous Flush  Every 8 Hours and as Needed         Conditional Medication Orders       --------------------------------    1. Perflutren Lipid Microsphere (Activated) 1.3 mL / NaCL 0.9% T.V. 10 mL Injectable:  0.5  mL  IntraVenous Push  Once    2. Perflutren Lipid Microsphere (Activated) 1.3 mL / NaCL 0.9% T.V. 10 mL Injectable:  0.5  mL  IntraVenous Push  Once      Recent Lab Results:    Results:        RFP: 9/23/2023 04:23  NA+        Cl-     BUN  /                         133 L   97 L    65 H /  --------------------------------  Glucose                ---------------------------  263 H    K+     HCO3-   Creat \                         3.8    23    5.27 H \  Calcium : 8.1 LAnion Gap : 17          Albumin : 3.0 L     Phos : 4.6      Coagulation: 9/23/2023 04:23  PT  /                              /  -------<    INR          ----------<                PTT\                              \            Heparin Assay: 0.3           Assessment and Plan:   Code Status:  ·  Code Status Full Code     Assessment:    1. 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 NSTEMI s/p cardiac cath  3. anemia of ckd  4. HTN  5. hyperlipidemia  6. pericardial effusion  7. pafib    plan  PD as ordered  daily colace  gent cream to PD exit site daily  replace K+ as needed      Electronic Signatures:  Ronnie Archuleta)  (Signed 23-Sep-2023 10:33)   Authored: Service, Subjective Data, Objective Data, Assessment  and Plan, Note Completion      Last Updated: 23-Sep-2023 10:33 by Ronnie Archuleta)

## 2023-09-30 NOTE — LETTER
October 10, 2023     Patient: Salomon Chowdary   YOB: 1940   Date of Visit: 9/30/2023       To Whom It May Concern:    Salomon Chowdary was seen in my clinic on 9/30/2023 at . Please excuse Salomon for his absence from work on this day to make the appointment.    If you have any questions or concerns, please don't hesitate to call.         Sincerely,         No name on file        CC:   No Recipients

## 2023-09-30 NOTE — PROGRESS NOTES
Service: Medicine     Subjective Data:   MAGGIE KIRAN is a 83 year old Male who is Hospital Day # 5.     No acute events overnight. He has no new complaints this AM. He is still being transferred to [facility] for definitive care.    Objective Data:     Objective Information:      T   P  R  BP   MAP  SpO2   Value  36.2  78  18  117/68   88  92%  Date/Time 9/23 8:00 9/23 8:00 9/23 8:00 9/23 8:00  9/23 8:00 9/23 8:00  Range  (36C - 37.5C )  (72 - 82 )  (16 - 25 )  (108 - 122 )/ (56 - 68 )  (72 - 88 )  (92% - 98% )  Highest temp of 37.5 C was recorded at 9/22 0:40      Pain reported at 9/23 8:00: 0 = None    Physical Exam by System:    Constitutional: Well developed, awake/alert/oriented  x3, no distress, alert and cooperative   Eyes: Clear sclera   ENMT: Mucous membranes moist   Respiratory/Thorax: Normal breath sounds with good  chest expansion, thorax symmetric   Cardiovascular: Tachycardic rate, no murmurs, normal  S1 and S2   Gastrointestinal: Nondistended, soft, non-tender,  no rebound tenderness or guarding, no masses palpable, no organomegaly, normal bowel sounds in all quadrants   Musculoskeletal: ROM intact, normal strength   Extremities: Normal extremities, no cyanosis edema,  contusions or wounds, no clubbing   Neurological: Alert and oriented x3, intact senses,  motor, response and reflexes, normal strength   Psychological: Appropriate mood and behavior   Skin: Warm and dry, no lesions, no rashes, peritoneal  catheter access on abdomen     Recent Lab Results:    Results:        RFP: 9/23/2023 04:23  NA+        Cl-     BUN  /                         133 L   97 L    65 H /  --------------------------------  Glucose                ---------------------------  263 H    K+     HCO3-   Creat \                         3.8    23    5.27 H \  Calcium : 8.1 LAnion Gap : 17          Albumin : 3.0 L     Phos : 4.6      Coagulation: 9/23/2023 04:23  PT  /                              /  -------<    INR           ----------<                PTT\                              \            Heparin Assay: 0.3           Radiology Results:    Results:        Impression:    1. There is no evidence of pulmonary embolus.     2. Moderate-sized pericardial effusion. Pericarditis not excluded.  Cardiac tamponade considered unlikely but not excluded. Small  bilateral pleural effusions noted.     3. Emphysematous changes are present. Mild subpleural fibrotic changes  also noted.           Signed by Jorje Sanon II, MD      CT Angio Chest for PE [Sep 19 2023  9:30PM]      Impression:    Increased interstitial markings in the left lower lobe and periphery  right upper lobe question underlying pneumonia and/or interstitial  lung disease.        Signed by Yossi Corey MD     Xray Chest 2 View PA + Lateral [Sep 19 2023  7:13PM]      Assessment and Plan:   Comorbidities:  ·  Comorbidity Other     Code Status:  ·  Code Status Full Code     Assessment:    Assessment:    This is an 83-year-old male with a past medical history of insulin-dependent diabetes, hypertension, hyperlipidemia, CKD end-stage on peritoneal dialysis who is presenting to the hospital with chest pain.  No prior history of MI, initial troponin 122,  up trended to 608, followed by 1661.  Heart rate 120 in the emergency department, EKG showed A-fib RVR (new onset) with ST segment elevations in leads V2, V3 with subsequent changes to T wave inversions in leads V2 and V3.  Cardiology consulted from the  ED, patient started on heparin GGT and plan for cardiac catheterization in the morning.  Patient had already received aspirin 324 mg in ambulance.  CT chest angio negative for PE, remarkable for moderate-sized pleural effusion.    NSTEMI  Moderate pericardial effusion  New Onset Paroxysmal Afib  -ARMAND score 4, grade score 213  -Nonexertional chest pain, no prior history.  Several risk factors including history of diabetes, hypertension, hyperlipidemia  -Received 324 mg  aspirin in ambulance  -EKG: ST segment elevations in V2, V3 with subsequent conversion to T wave inversions in these leads  -CT chest angio shows moderate-sized pericardial effusion  -CHADSVASC Score 4  -Echocardiogram for pericardial effusion  -Cardiology consulted, recommends:  -Catheterization on 09/20 showed moderately severe calcification in the L main, LAD, proximal OM1, and distal LCx  -Will require PCI or rotablation, however not done at this facility and will require transfer; Will be transferred to [facility] for continuity of care  -Limited echo to assess progression of effusion; 09/21 - no interval changes noted  -Will require chronic anticoagulation for Afib, but given low Hgb and likely needing DAPT s/p PCI might defer anticoagulation for a few weeks post procedure  -Continue heparin GGT for anticoagulation for now  -Lopressor 25mg BID for rate control of Afib  -Aspirin 81 mg daily, atorvastatin 80 mg daily  -We will continue to monitor patient on telemetry      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  -Continue daily PD per nephrology recommendations  -We will continue to monitor kidney function and electrolytes on RFP    Insulin-dependent type 2 diabetes  -Patient takes Levemir 25 units at bedtime at home  -Increased basal lantus due to high nighttime sugars, will consider adding mealtime lispro  -SSI  -Accuchecks achs  -Hypoglycemia protocol    Hypertension  Hyperlipidemia  -Continue home hydralazine, torsemide      Hypothyroidism  -Continue home levothyroxine      Diet: Diabetic   DVT Prophylaxis: Therapeutic heparin ggt   Consults: Cardio   Code: Full code     Dispo: Transfer to [facility] accepted, pending bed availability     Case seen and discussed with attending  Taj Prescott DO  Internal Medicine, PGY-2        Attestation:   Note Completion:  I am a:  Resident/Fellow   Attending Attestation I saw and evaluated the patient.  I personally obtained the key and critical portions of the history and physical exam  or was physically present for key and  critical portions performed by the resident/fellow. I reviewed the resident/fellow?s documentation and discussed the patient with the resident/fellow.  I agree with the resident/fellow?s medical decision making as documented in the note.     I personally evaluated the patient on 23-Sep-2023   Comments/ Additional Findings    Patient seen and examined, I agree with above documentation from resident physician. Mr. Chowdary is doing well today. His wife and daughter are at  bedside. No acute events overnight.  Awaiting transfer to [facility].  Nursing staff did reach out to transfer center this morning.  We are still awaiting a bed.  Patient is on a heparin drip. He is requesting a stool softener. He denies chest pain  or shortness of breath. We will continue supportive care, follow patient closely and await transfer to [facility] for further cardiac intervention.          Electronic Signatures:  Marichuy Hoff ()  (Signed 23-Sep-2023 13:29)   Authored: Note Completion   Co-Signer: Service, Subjective Data, Objective Data, Assessment and Plan, Note Completion  Taj Prescott ( (Resident))  (Signed 23-Sep-2023 11:45)   Authored: Service, Subjective Data, Objective Data, Assessment  and Plan, Note Completion      Last Updated: 23-Sep-2023 13:29 by Marichuy Hoff ()

## 2023-09-30 NOTE — SIGNIFICANT EVENT
"Patient started on peritoneal dialysis per orders. Patient states exit site care and dressing change already performed by spouse. Patient disconnect instructions left on cycler, disconnect caps on cycler. Floor nurse educated on disconnect, may disconnect when screen reads \"End of Treatment\" or leave for PD nurse to disconnect tomorrow. Page with issues   "

## 2023-09-30 NOTE — Clinical Note
Angioplasty of the middle circumflex lesion. Inflation 1: Pressure = 18 josiah; Duration = 15 sec. Inflation 2: Pressure = 18 josiah; Duration = 20 sec. Called and spoke with pt, confirmed arrival time 6am. Denies questions.

## 2023-09-30 NOTE — CONSULTS
NEPHROLOGY NEW CONSULT NOTE   Salomon Chowdary   83 y.o.    @WT@  N/Room: 98091203/5060/5060-A    Reason for consult: ESRD on PD  Requesting physician: Dr. Allen    HPI:  Salomon Chowdary is a 83 y.o. male   - With past medical Hx of CAD, HTN and ESRD on PD since 03/23  - Primary nephrologist Dr. Khan   - Admitted for evaluation of multivessel cardiac disease by cardiac surgery. Had STEMI on 09/19 s/p PCI, however, needs further eval due to multiple vessels involved.   - Nephrology was consulted for ESRD management     In The ER: /66   Pulse 80   Temp 35.8 °C (96.5 °F) (Temporal)   Resp 18   SpO2 96%      No past medical history on file.   Past Surgical History:   Procedure Laterality Date    US GUIDED PERCUTANEOUS BIOPSY RENAL LEFT Left 11/30/2022    US GUIDED PERCUTANEOUS BIOPSY RENAL LEFT 11/30/2022      No family history on file.  Social History     Socioeconomic History    Marital status:      Spouse name: Not on file    Number of children: Not on file    Years of education: Not on file    Highest education level: Not on file   Occupational History    Not on file   Tobacco Use    Smoking status: Not on file    Smokeless tobacco: Not on file   Substance and Sexual Activity    Alcohol use: Not on file    Drug use: Not on file    Sexual activity: Not on file   Other Topics Concern    Not on file   Social History Narrative    Not on file     Social Determinants of Health     Financial Resource Strain: Not on file   Food Insecurity: Not on file   Transportation Needs: Not on file   Physical Activity: Not on file   Stress: Not on file   Social Connections: Not on file   Intimate Partner Violence: Not on file   Housing Stability: Not on file     Allergies   Allergen Reactions    Opioids - Morphine Analogues Unknown          Meds:   amLODIPine, 10 mg, Daily  atorvastatin, 80 mg, Daily  cholecalciferol, 25 mcg, Daily  DULoxetine, 30 mg, Daily  hydrALAZINE, 25 mg, TID  insulin detemir, 20 Units,  q24h         acetaminophen, 975 mg, q6h PRN  dextrose, 0.3 g/kg/hr, Once PRN  dextrose, 25 g, q15 min PRN  glucagon, 1 mg, q15 min PRN      Vitals:    09/30/23 1158   BP: 141/66   Pulse: 80   Resp: 18   Temp: 35.8 °C (96.5 °F)   SpO2: 96%        No intake/output data recorded.       General appearance: Awake and alert, oriented, . No distress  HEENT: supple,  oral mucosa, no mouth ulcers  Neck: no lymphadenopathy, no JVP  Skin: no apparent rash  Heart: heart sounds 1 & 2 present and normal, no murmurs heard or friction rub  Lungs: Adequate air entry, breath sounds no.  no wheezing/crackles  Abdomen: soft, non tender, no masses palpated, no flank tenderness  Extremities: No  edema, no joint swelling,  : no Ewing  Neuro: No FND, cranial nerves 2-12 grossly intact,  no asterixis   ACCESS: PD catheter c/d/i    Blood Labs:  Results for orders placed or performed during the hospital encounter of 09/20/23 (from the past 24 hour(s))   POCT GLUCOSE   Result Value Ref Range    POCT Glucose 213 (H) 74 - 99 mg/dL   POCT GLUCOSE   Result Value Ref Range    POCT Glucose 222 (H) 74 - 99 mg/dL   Heparin Assay   Result Value Ref Range    Heparin Unfractionated 0.5 See Comment Below for Therapeutic Ranges IU/mL          ASSESSMENT:  ESRD on PD since 03/23  Admitted for multivessel cardiac disease pending eval by CT surgery     RECOMMENDATIONS:  - plan for PD as per submitted orders - total volume 4800 ml, each fill - 2400 ml, 2 fills, 8 hour tx, no last bag  - bowel regimen  - regular diet   - continue home torsemide   - will follow for dialysis needs       Su Vega DO  Nephrology Fellow   Daytime / Weekend Renal Pager [phone]  After 7 pm Emergencies [phone] Pager [phone]

## 2023-09-30 NOTE — Clinical Note
Angioplasty of the middle circumflex lesion. Inflation 1: Pressure = 8 josiah; Duration = 10 sec. Inflation 2: Pressure = 6 josiah; Duration = 8 sec. Inflation 3: Pressure = 4 josiah; Duration = 8 sec.

## 2023-09-30 NOTE — PROGRESS NOTES
Service: Medicine     Subjective Data:   MAGGIE KIRAN is a 83 year old Male who is Hospital Day # 4.     Patient seen and examined at bedside. No acute events overnight. He had a session of PD yesterday. He is scheduled to be transferred to [facility] for interventional cath.    Objective Data:     Objective Information:      T   P  R  BP   MAP  SpO2   Value  36.4  82  18  111/57   77  94%  Date/Time 9/22 8:05 9/22 8:05 9/22 8:05 9/22 8:05  9/22 8:05 9/22 8:05  Range  (36.3C - 37.7C )  (75 - 87 )  (18 - 31 )  (95 - 120 )/ (57 - 68 )  (70 - 86 )  (92% - 98% )  Highest temp of 37.7 C was recorded at 9/21 7:53      Pain reported at 9/22 8:05: 0 = None    Physical Exam by System:    Constitutional: Well developed, awake/alert/oriented  x3, no distress, alert and cooperative   Eyes: Clear sclera   ENMT: Mucous membranes moist   Respiratory/Thorax: Normal breath sounds with good  chest expansion, thorax symmetric   Cardiovascular: Tachycardic rate, no murmurs, normal  S1 and S2   Gastrointestinal: Nondistended, soft, non-tender,  no rebound tenderness or guarding, no masses palpable, no organomegaly, normal bowel sounds in all quadrants   Musculoskeletal: ROM intact, normal strength   Extremities: Normal extremities, no cyanosis edema,  contusions or wounds, no clubbing   Neurological: Alert and oriented x3, intact senses,  motor, response and reflexes, normal strength   Psychological: Appropriate mood and behavior   Skin: Warm and dry, no lesions, no rashes, peritoneal  catheter access on abdomen     Medication:    Medications:          Continuous Medications       --------------------------------    1. Heparin 25,000 units/ D5W 250 mL Infusion:  900  units/hr  IntraVenous  <Continuous>         Scheduled Medications       --------------------------------    1. amLODIPine (NORVASC):  10  mg  Oral  Daily    2. Aspirin Enteric Coated:  81  mg  Oral  Daily    3. Atorvastatin:  80  mg  Oral  Daily    4. DULoxetine:  30  mg   Oral  Daily    5. Epoetin Chris (Non-ESRD) Injectable:  08597  unit(s)  SubCutaneous  Weekly    6. Heparin (Repeat Bolus) Injectable:  4000  unit(s)  IntraVenous Push  Every 4 Hours    7. Insulin Lispro Moderate Corrective Scale:  unit(s)  SubCutaneous  3 Times a Day Before Meals    8. Levothyroxine:  25  microgram(s)  Oral  Daily    9. Metoprolol Tartrate:  25  mg  Oral  2 Times a Day    10. Multivitamin Renal:  1  capsule(s)  Oral  Daily    11. Sodium Bicarbonate:  650  mg  Oral  Every 12 Hours    12. Torsemide:  50  mg  Oral  Daily         PRN Medications       --------------------------------    1. Acetaminophen:  975  mg  Oral  Every 6 Hours    2. Dextrose 50% in Water Injectable:  25  gram(s)  IntraVenous Push  Every 15 Minutes    3. Glucagon Injectable:  1  mg  IntraMuscular  Every 15 Minutes    4. Sodium Chloride 0.9% Injectable Flush:  10  mL  IntraVenous Flush  Every 8 Hours and as Needed         Conditional Medication Orders       --------------------------------    1. Perflutren Lipid Microsphere (Activated) 1.3 mL / NaCL 0.9% T.V. 10 mL Injectable:  0.5  mL  IntraVenous Push  Once    2. Perflutren Lipid Microsphere (Activated) 1.3 mL / NaCL 0.9% T.V. 10 mL Injectable:  0.5  mL  IntraVenous Push  Once      Recent Lab Results:    Results:    CBC: 9/22/2023 03:54              \     Hgb     /                              \     7.7 L    /  WBC  ----------------  Plt               9.3       ----------------    247              /     Hct     \                              /     24.1 L    \            RBC: 2.33 L    MCV: 103 H          RFP: 9/22/2023 03:54  NA+        Cl-     BUN  /                         133 L   97 L    68 H /  --------------------------------  Glucose                ---------------------------  320 H    K+     HCO3-   Creat \                         3.9    23    5.35 H \  Calcium : 8.2 LAnion Gap : 17          Albumin : 3.1 L     Phos : 4.6      Coagulation: 9/22/2023 03:54  PT  /                               /  -------<    INR          ----------<                PTT\                              \            Heparin Assay: 0.3           Radiology Results:    Results:        Impression:    1. There is no evidence of pulmonary embolus.     2. Moderate-sized pericardial effusion. Pericarditis not excluded.  Cardiac tamponade considered unlikely but not excluded. Small  bilateral pleural effusions noted.     3. Emphysematous changes are present. Mild subpleural fibrotic changes  also noted.           Signed by Jorje Sanon II, MD      CT Angio Chest for PE [Sep 19 2023  9:30PM]      Impression:    Increased interstitial markings in the left lower lobe and periphery  right upper lobe question underlying pneumonia and/or interstitial  lung disease.        Signed by Yossi Corey MD     Xray Chest 2 View PA + Lateral [Sep 19 2023  7:13PM]      Assessment and Plan:   Comorbidities:  ·  Comorbidity Other     Code Status:  ·  Code Status Full Code     Assessment:    Assessment:    This is an 83-year-old male with a past medical history of insulin-dependent diabetes, hypertension, hyperlipidemia, CKD end-stage on peritoneal dialysis who is presenting to the hospital with chest pain.  No prior history of MI, initial troponin 122,  up trended to 608, followed by 1661.  Heart rate 120 in the emergency department, EKG showed A-fib RVR (new onset) with ST segment elevations in leads V2, V3 with subsequent changes to T wave inversions in leads V2 and V3.  Cardiology consulted from the  ED, patient started on heparin GGT and plan for cardiac catheterization in the morning.  Patient had already received aspirin 324 mg in ambulance.  CT chest angio negative for PE, remarkable for moderate-sized pleural effusion.    NSTEMI  Moderate pericardial effusion  New Onset Paroxysmal Afib  -ARMAND score 4, grade score 213  -Nonexertional chest pain, no prior history.  Several risk factors including history of diabetes,  hypertension, hyperlipidemia  -Received 324 mg aspirin in ambulance  -EKG: ST segment elevations in V2, V3 with subsequent conversion to T wave inversions in these leads  -CT chest angio shows moderate-sized pericardial effusion  -CHADSVASC Score 4  -Echocardiogram for pericardial effusion  -Cardiology consulted, recommends:  -Catheterization on 09/20 showed moderately severe calcification in the L main, LAD, proximal OM1, and distal LCx  -Will require PCI or rotablation, however not done at this facility and will require transfer; Will be transferred to [facility] for continuity of care  -Limited echo to assess progression of effusion; 09/21 - no interval changes noted  -Will require chronic anticoagulation for Afib, but given low Hgb and likely needing DAPT s/p PCI might defer anticoagulation for a few weeks post procedure  -Continue heparin GGT for anticoagulation for now  -Lopressor 25mg BID for rate control of Afib  -Aspirin 81 mg daily, atorvastatin 80 mg daily  -We will continue monitor patient on telemetry      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  -Continue daily PD per nephrology recommendations  -We will continue to monitor kidney function and electrolytes on RFP    Insulin-dependent type 2 diabetes  -Patient takes Levemir 25 units at bedtime    -Continue lantus, add SSI  -Accuchecks achs  -Hypoglycemia protocol    Hypertension  Hyperlipidemia  -Continue home hydralazine, torsemide      Hypothyroidism  -Continue home levothyroxine      Diet: Diabetic   DVT Prophylaxis: Therapeutic heparin ggt   Consults: Cardio   Code: Full code     Dispo: Transfer to [facility] accepted, pending bed availability     Case seen and discussed with attending  Taj Prescott, DO  Internal Medicine, PGY-2        Attestation:   Note Completion:  I am a:  Resident/Fellow   Attending Attestation I saw and evaluated the patient.  I personally obtained the key and critical portions of the history and physical exam or was physically present for key  and  critical portions performed by the resident/fellow. I reviewed the resident/fellow?s documentation and discussed the patient with the resident/fellow.  I agree with the resident/fellow?s medical decision making as documented in the note.     I personally evaluated the patient on 22-Sep-2023   Comments/ Additional Findings    Patient seen and examined, no acute events overnight. I agree with above documentation from resident physician.  Awaiting transfer to [facility]  for further cardiac intervention. I called the transfer center, anticipate transfer soon, awaiting a bed to be available. His labs and vitals are stable.  He is tolerating peritoneal dialysis at night. Hgb is 7.7.  He denies chest pain or shortness of  breath. His wife is at bedside. Discussed plan of care, appreciate cardio and nephro input, awaiting transfer to [facility], hopefully tonight.          Electronic Signatures:  Marichuy Hoff ()  (Signed 23-Sep-2023 11:12)   Authored: Note Completion   Co-Signer: Service, Subjective Data, Objective Data, Assessment and Plan, Note Completion  Taj Prescott ( (Resident))  (Signed 22-Sep-2023 16:20)   Authored: Service, Subjective Data, Objective Data, Assessment  and Plan, Note Completion      Last Updated: 23-Sep-2023 11:12 by Marichuy Hoff ()

## 2023-09-30 NOTE — Clinical Note
Addended by: NEVIN GARCIA on: 12/17/2018 02:39 PM     Modules accepted: Orders     Vessel: circumflex (mid). Balloon inserted.

## 2023-09-30 NOTE — PROGRESS NOTES
Service: Medicine     Subjective Data:   MAGGIE KIRAN is a 83 year old Male who is Hospital Day # 6.     Patient seen and examined at bedside. No acute events overnight. His transfer to [facility] is still pending bed availability. He continues daily PD.    Objective Data:     Objective Information:      T   P  R  BP   MAP  SpO2   Value  36.3  80  18  132/65   100  94%  Date/Time 9/24 8:23 9/24 8:23 9/24 8:23 9/24 8:23  9/24 8:23 9/24 8:23  Range  (35.8C - 36.8C )  (68 - 80 )  (12 - 25 )  (103 - 132 )/ (57 - 68 )  (71 - 100 )  (82% - 98% )      Pain reported at 9/24 8:23: 6 = Moderate    Physical Exam by System:    Constitutional: Well developed, awake/alert/oriented  x3, no distress, alert and cooperative   Eyes: Clear sclera   ENMT: Mucous membranes moist   Respiratory/Thorax: Normal breath sounds with good  chest expansion, thorax symmetric   Cardiovascular: Tachycardic rate, no murmurs, normal  S1 and S2   Gastrointestinal: Nondistended, soft, non-tender,  no rebound tenderness or guarding, no masses palpable, no organomegaly, normal bowel sounds in all quadrants   Musculoskeletal: ROM intact, normal strength   Extremities: Normal extremities, no cyanosis edema,  contusions or wounds, no clubbing   Neurological: Alert and oriented x3, intact senses,  motor, response and reflexes, normal strength   Psychological: Appropriate mood and behavior   Skin: Warm and dry, no lesions, no rashes, peritoneal  catheter access on abdomen     Recent Lab Results:    Results:    Coagulation: 9/24/2023 06:26  PT  /                              /  -------<    INR          ----------<                PTT\                              \            Heparin Assay: 0.2           Radiology Results:    Results:        Impression:    1. There is no evidence of pulmonary embolus.     2. Moderate-sized pericardial effusion. Pericarditis not excluded.  Cardiac tamponade considered unlikely but not excluded. Small  bilateral pleural  effusions noted.     3. Emphysematous changes are present. Mild subpleural fibrotic changes  also noted.           Signed by Jorje Sanon II, MD      CT Angio Chest for PE [Sep 19 2023  9:30PM]      Impression:    Increased interstitial markings in the left lower lobe and periphery  right upper lobe question underlying pneumonia and/or interstitial  lung disease.        Signed by Yossi Corey MD     Xray Chest 2 View PA + Lateral [Sep 19 2023  7:13PM]      Assessment and Plan:   Comorbidities:  ·  Comorbidity Other     Code Status:  ·  Code Status Full Code     Assessment:    Assessment:    This is an 83-year-old male with a past medical history of insulin-dependent diabetes, hypertension, hyperlipidemia, CKD end-stage on peritoneal dialysis who is presenting to the hospital with chest pain.  No prior history of MI, initial troponin 122,  up trended to 608, followed by 1661.  Heart rate 120 in the emergency department, EKG showed A-fib RVR (new onset) with ST segment elevations in leads V2, V3 with subsequent changes to T wave inversions in leads V2 and V3.  Cardiology consulted from the  ED, patient started on heparin GGT and plan for cardiac catheterization in the morning.  Patient had already received aspirin 324 mg in ambulance.  CT chest angio negative for PE, remarkable for moderate-sized pleural effusion.    NSTEMI  Moderate pericardial effusion  New Onset Paroxysmal Afib  -ARMAND score 4, grade score 213  -Nonexertional chest pain, no prior history.  Several risk factors including history of diabetes, hypertension, hyperlipidemia  -Received 324 mg aspirin in ambulance  -EKG: ST segment elevations in V2, V3 with subsequent conversion to T wave inversions in these leads  -CT chest angio shows moderate-sized pericardial effusion  -CHADSVASC Score 4  -Echocardiogram for pericardial effusion  -Cardiology consulted, recommends:  -Catheterization on 09/20 showed moderately severe calcification in the L main,  LAD, proximal OM1, and distal LCx  -Will require PCI or rotablation, however not done at this facility and will require transfer; Will be transferred to [facility] for continuity of care  -Limited echo to assess progression of effusion; 09/21 - no interval changes noted  -Will require chronic anticoagulation for Afib, but given low Hgb and likely needing DAPT s/p PCI might defer anticoagulation for a few weeks post procedure  -Update: Given patient still at [facility], Interventional cards considering PCI of LCx and deferring revascularization of the LAD at a future date  -Continue heparin GGT for anticoagulation for now  -Lopressor 25mg BID for rate control of Afib  -Aspirin 81 mg daily, atorvastatin 80 mg daily  -We will continue to monitor patient on telemetry  -Repeat CBC and Echo on Monday      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  -Continue daily PD per nephrology recommendations  -We will continue to monitor kidney function and electrolytes on RFP    Insulin-dependent type 2 diabetes  -Patient takes Levemir 25 units at bedtime at home  -Increased basal lantus due to high nighttime sugars, will consider adding mealtime lispro  -SSI  -Accuchecks achs  -Hypoglycemia protocol    Hypertension    Hyperlipidemia  -Continue home hydralazine, torsemide      Hypothyroidism  -Continue home levothyroxine      Diet: Diabetic   DVT Prophylaxis: Therapeutic heparin ggt   Consults: Cardio   Code: Full code     Dispo: Transfer to [facility] accepted, pending bed availability     Case seen and discussed with attending  Taj Prescott DO  Internal Medicine, PGY-2        Attestation:   Note Completion:  I am a:  Resident/Fellow   Attending Attestation I saw and evaluated the patient.  I personally obtained the key and critical portions of the history and physical exam or was physically present for key and  critical portions performed by the resident/fellow. I reviewed the resident/fellow?s documentation and discussed the patient with the  resident/fellow.  I agree with the resident/fellow?s medical decision making as documented in the note.     I personally evaluated the patient on 24-Sep-2023   Comments/ Additional Findings    Patient seen and examined, no acute events overnight. Mr. Chowdary is doing well. We are still awaiting a bed at [facility].  He denies any chest  pain or shortness of breath. He is on a heparin drip. Nephrology is following for his peritoneal dialysis and cardiology Dr. Santos is following regarding his NSTEMI and pericardial effusion. Plan for repeat echocardiogram tomorrow, patient will be n.p.o.  after midnight and hopefully will be going back to the Cath Lab tomorrow, I hope that he can be transferred to [facility] this afternoon/evening for further care and management.  Discussed with patient's wife at bedside regarding plan.          Electronic Signatures:  Marichuy Hoff ()  (Signed 24-Sep-2023 14:22)   Authored: Note Completion   Co-Signer: Service, Subjective Data, Objective Data, Assessment and Plan, Note Completion  Taj Prescott ( (Resident))  (Signed 24-Sep-2023 11:39)   Authored: Service, Subjective Data, Objective Data, Assessment  and Plan, Note Completion      Last Updated: 24-Sep-2023 14:22 by Marichuy Hoff ()

## 2023-10-01 ENCOUNTER — APPOINTMENT (OUTPATIENT)
Dept: RADIOLOGY | Facility: HOSPITAL | Age: 83
End: 2023-10-01
Payer: MEDICARE

## 2023-10-01 LAB
ALBUMIN SERPL BCP-MCNC: 2.7 G/DL (ref 3.4–5)
ANION GAP SERPL CALC-SCNC: 17 MMOL/L (ref 10–20)
APPEARANCE UR: CLEAR
APTT PPP: >200 SECONDS (ref 27–38)
BACTERIA #/AREA URNS AUTO: ABNORMAL /HPF
BILIRUB UR STRIP.AUTO-MCNC: NEGATIVE MG/DL
BUN SERPL-MCNC: 48 MG/DL (ref 6–23)
CALCIUM SERPL-MCNC: 8.5 MG/DL (ref 8.6–10.6)
CHLORIDE SERPL-SCNC: 101 MMOL/L (ref 98–107)
CO2 SERPL-SCNC: 24 MMOL/L (ref 21–32)
COLOR UR: YELLOW
CREAT SERPL-MCNC: 3.96 MG/DL (ref 0.5–1.3)
ERYTHROCYTE [DISTWIDTH] IN BLOOD BY AUTOMATED COUNT: 13.6 % (ref 11.5–14.5)
GFR SERPL CREATININE-BSD FRML MDRD: 14 ML/MIN/1.73M*2
GLUCOSE BLD MANUAL STRIP-MCNC: 102 MG/DL (ref 74–99)
GLUCOSE BLD MANUAL STRIP-MCNC: 113 MG/DL (ref 74–99)
GLUCOSE BLD MANUAL STRIP-MCNC: 147 MG/DL (ref 74–99)
GLUCOSE BLD MANUAL STRIP-MCNC: 96 MG/DL (ref 74–99)
GLUCOSE SERPL-MCNC: 106 MG/DL (ref 74–99)
GLUCOSE UR STRIP.AUTO-MCNC: ABNORMAL MG/DL
HCT VFR BLD AUTO: 24.4 % (ref 41–52)
HGB BLD-MCNC: 7.8 G/DL (ref 13.5–17.5)
KETONES UR STRIP.AUTO-MCNC: NEGATIVE MG/DL
LEUKOCYTE ESTERASE UR QL STRIP.AUTO: NEGATIVE
MCH RBC QN AUTO: 33.2 PG (ref 26–34)
MCHC RBC AUTO-ENTMCNC: 32 G/DL (ref 32–36)
MCV RBC AUTO: 104 FL (ref 80–100)
NITRITE UR QL STRIP.AUTO: NEGATIVE
NRBC BLD-RTO: 0.3 /100 WBCS (ref 0–0)
PH UR STRIP.AUTO: 8 [PH]
PHOSPHATE SERPL-MCNC: 3.6 MG/DL (ref 2.5–4.9)
PLATELET # BLD AUTO: 305 X10*3/UL (ref 150–450)
PLATELET # BLD AUTO: 314 X10*3/UL (ref 150–450)
PLATELET # BLD AUTO: 329 X10*3/UL (ref 150–450)
PMV BLD AUTO: 10 FL (ref 7.5–11.5)
POTASSIUM SERPL-SCNC: 4.5 MMOL/L (ref 3.5–5.3)
PROT UR STRIP.AUTO-MCNC: ABNORMAL MG/DL
RBC # BLD AUTO: 2.35 X10*6/UL (ref 4.5–5.9)
RBC # UR STRIP.AUTO: ABNORMAL /UL
RBC #/AREA URNS AUTO: ABNORMAL /HPF
SARS-COV-2 RNA RESP QL NAA+PROBE: NOT DETECTED
SODIUM SERPL-SCNC: 137 MMOL/L (ref 136–145)
SP GR UR STRIP.AUTO: 1.01
UFH PPP CHRO-ACNC: 0.6 IU/ML
UFH PPP CHRO-ACNC: 0.6 IU/ML
UROBILINOGEN UR STRIP.AUTO-MCNC: <2 MG/DL
WBC # BLD AUTO: 9 X10*3/UL (ref 4.4–11.3)
WBC #/AREA URNS AUTO: ABNORMAL /HPF

## 2023-10-01 PROCEDURE — 93010 ELECTROCARDIOGRAM REPORT: CPT | Performed by: INTERNAL MEDICINE

## 2023-10-01 PROCEDURE — 71046 X-RAY EXAM CHEST 2 VIEWS: CPT

## 2023-10-01 PROCEDURE — 71046 X-RAY EXAM CHEST 2 VIEWS: CPT | Performed by: RADIOLOGY

## 2023-10-01 PROCEDURE — 99233 SBSQ HOSP IP/OBS HIGH 50: CPT | Performed by: STUDENT IN AN ORGANIZED HEALTH CARE EDUCATION/TRAINING PROGRAM

## 2023-10-01 PROCEDURE — 81001 URINALYSIS AUTO W/SCOPE: CPT | Performed by: NURSE PRACTITIONER

## 2023-10-01 PROCEDURE — 71250 CT THORAX DX C-: CPT | Performed by: RADIOLOGY

## 2023-10-01 PROCEDURE — 1200000002 HC GENERAL ROOM WITH TELEMETRY DAILY

## 2023-10-01 PROCEDURE — 85730 THROMBOPLASTIN TIME PARTIAL: CPT | Performed by: NURSE PRACTITIONER

## 2023-10-01 PROCEDURE — 36415 COLL VENOUS BLD VENIPUNCTURE: CPT | Performed by: NURSE PRACTITIONER

## 2023-10-01 PROCEDURE — 85049 AUTOMATED PLATELET COUNT: CPT | Performed by: NURSE PRACTITIONER

## 2023-10-01 PROCEDURE — 87081 CULTURE SCREEN ONLY: CPT | Performed by: NURSE PRACTITIONER

## 2023-10-01 PROCEDURE — 2500000001 HC RX 250 WO HCPCS SELF ADMINISTERED DRUGS (ALT 637 FOR MEDICARE OP): Performed by: NURSE PRACTITIONER

## 2023-10-01 PROCEDURE — 85520 HEPARIN ASSAY: CPT | Performed by: NURSE PRACTITIONER

## 2023-10-01 PROCEDURE — G1004 CDSM NDSC: HCPCS

## 2023-10-01 PROCEDURE — 85027 COMPLETE CBC AUTOMATED: CPT | Performed by: NURSE PRACTITIONER

## 2023-10-01 PROCEDURE — 2500000004 HC RX 250 GENERAL PHARMACY W/ HCPCS (ALT 636 FOR OP/ED): Performed by: NURSE PRACTITIONER

## 2023-10-01 PROCEDURE — 87635 SARS-COV-2 COVID-19 AMP PRB: CPT | Performed by: NURSE PRACTITIONER

## 2023-10-01 PROCEDURE — 82947 ASSAY GLUCOSE BLOOD QUANT: CPT

## 2023-10-01 PROCEDURE — 80069 RENAL FUNCTION PANEL: CPT | Performed by: STUDENT IN AN ORGANIZED HEALTH CARE EDUCATION/TRAINING PROGRAM

## 2023-10-01 RX ORDER — CHLORHEXIDINE GLUCONATE ORAL RINSE 1.2 MG/ML
15 SOLUTION DENTAL 2 TIMES DAILY
Status: CANCELLED | OUTPATIENT
Start: 2023-10-01 | End: 2023-10-02

## 2023-10-01 RX ORDER — NAPROXEN SODIUM 220 MG/1
81 TABLET, FILM COATED ORAL DAILY
Status: DISCONTINUED | OUTPATIENT
Start: 2023-10-01 | End: 2023-10-11

## 2023-10-01 RX ADMIN — TORSEMIDE 50 MG: 20 TABLET ORAL at 09:41

## 2023-10-01 RX ADMIN — AMLODIPINE BESYLATE 10 MG: 2.5 TABLET ORAL at 09:41

## 2023-10-01 RX ADMIN — ASPIRIN 81 MG CHEWABLE TABLET 81 MG: 81 TABLET CHEWABLE at 14:11

## 2023-10-01 RX ADMIN — SODIUM BICARBONATE 650 MG: 650 TABLET ORAL at 21:02

## 2023-10-01 RX ADMIN — ACETAMINOPHEN 975 MG: 325 TABLET, FILM COATED ORAL at 17:49

## 2023-10-01 RX ADMIN — SODIUM BICARBONATE 650 MG: 650 TABLET ORAL at 09:41

## 2023-10-01 RX ADMIN — SENNOSIDES 17.2 MG: 8.6 TABLET, FILM COATED ORAL at 09:41

## 2023-10-01 RX ADMIN — HEPARIN SODIUM 1500 UNITS/HR: 10000 INJECTION, SOLUTION INTRAVENOUS at 22:52

## 2023-10-01 RX ADMIN — DULOXETINE HYDROCHLORIDE 30 MG: 30 CAPSULE, DELAYED RELEASE ORAL at 09:45

## 2023-10-01 RX ADMIN — Medication 1 TABLET: at 09:41

## 2023-10-01 RX ADMIN — HEPARIN SODIUM 1300 UNITS/HR: 10000 INJECTION, SOLUTION INTRAVENOUS at 06:24

## 2023-10-01 RX ADMIN — Medication 25 MCG: at 09:41

## 2023-10-01 RX ADMIN — ATORVASTATIN CALCIUM 80 MG: 80 TABLET ORAL at 21:02

## 2023-10-01 ASSESSMENT — COGNITIVE AND FUNCTIONAL STATUS - GENERAL
DRESSING REGULAR UPPER BODY CLOTHING: A LITTLE
MOBILITY SCORE: 19
MOVING TO AND FROM BED TO CHAIR: A LITTLE
HELP NEEDED FOR BATHING: A LITTLE
STANDING UP FROM CHAIR USING ARMS: A LITTLE
CLIMB 3 TO 5 STEPS WITH RAILING: A LITTLE
TURNING FROM BACK TO SIDE WHILE IN FLAT BAD: A LITTLE
DAILY ACTIVITIY SCORE: 21
WALKING IN HOSPITAL ROOM: A LITTLE
DRESSING REGULAR LOWER BODY CLOTHING: A LITTLE

## 2023-10-01 ASSESSMENT — PAIN SCALES - GENERAL: PAINLEVEL_OUTOF10: 3

## 2023-10-01 NOTE — PROGRESS NOTES
Salomon Chowdary is a 83 y.o. male on day 1 of admission presenting with 2-vessel coronary artery disease.    Last Recorded Vitals  Blood pressure 126/66, pulse 77, temperature 36.4 °C (97.6 °F), temperature source Temporal, resp. rate 18, weight 82.2 kg (181 lb 3.5 oz), SpO2 97 %.  Intake/Output last 3 Shifts:  I/O last 3 completed shifts:  In: 1100 (13.4 mL/kg) [P.O.:1100]  Out: 850 (10.3 mL/kg) [Urine:850 (0.3 mL/kg/hr)]  Weight: 82.2 kg       Assessment/Plan   Principal Problem:    2-vessel coronary artery disease    -Dr. Severino reviewed cath films and assessed patient,  best approach may be to PCI circ given large caliber of vessel and bypass LAD with LIMA via MidCAB.  - Interventional cardiology consulted for possible staged PCI   - CT chest shows : Multiple foci of intraperitoneal free air may correlate to history of peritoneal dialysis though recommend correlation with clinical findings. D/W primary team to be evaluated/worked up.    Lucita Lehman, ANNIE-CNS

## 2023-10-01 NOTE — PROGRESS NOTES
Subjective Data  No complaints    Overnight Events:    Uneventful night     Objective Data:  Last Recorded Vitals:  Vitals:    09/30/23 2351 10/01/23 0420 10/01/23 0857 10/01/23 1102   BP: 126/69 128/64 132/75 138/68   BP Location:  Left arm Left arm Left arm   Patient Position:  Lying Sitting Sitting   Pulse: 78 79 90 80   Resp: 18 18 18 18   Temp: 36.7 °C (98.1 °F) 36.8 °C (98.3 °F) 36.5 °C (97.7 °F) 36.7 °C (98.1 °F)   TempSrc: Temporal Temporal Temporal Temporal   SpO2: 93% 93% 96% 94%   Weight:  82.2 kg (181 lb 3.5 oz)         Last Labs:  CBC - 10/1/2023:  9:28 AM  9.0 7.8 305    24.4      CMP - 10/1/2023:  9:28 AM  8.5 7.1 23 --- 0.5   3.6 2.7 26 72      PTT - 10/1/2023:  5:22 AM  1.2   13.1 >200       RFP:    TROPHS   Date/Time Value Ref Range Status   09/20/2023 02:18 AM 2,226 0 - 20 ng/L Final     Comment:     .  Less than 99th percentile of normal range cutoff-  Female and children under 18 years old <14 ng/L; Male <21 ng/L: Negative  Repeat testing should be performed if clinically indicated.   .  Female and children under 18 years old 14-50 ng/L; Male 21-50 ng/L:  Consistent with possible cardiac damage and possible increased clinical   risk. Serial measurements may help to assess extent of myocardial damage.   .  >50 ng/L: Consistent with cardiac damage, increased clinical risk and  myocardial infarction. Serial measurements may help assess extent of   myocardial damage.   .   NOTE: Children less than 1 year old may have higher baseline troponin   levels and results should be interpreted in conjunction with the overall   clinical context.   .  NOTE: Troponin I testing is performed using a different   testing methodology at [facility] than at other   [facility] hospitals. Direct result comparisons should only   be made within the same method.  This is a critical result.    Per Laboratory policy, critical results for this test   only qualify to the call list once per 24 hours.      09/19/2023  08:57 PM 1,661 0 - 20 ng/L Final     Comment:     .  Less than 99th percentile of normal range cutoff-  Female and children under 18 years old <14 ng/L; Male <21 ng/L: Negative  Repeat testing should be performed if clinically indicated.   .  Female and children under 18 years old 14-50 ng/L; Male 21-50 ng/L:  Consistent with possible cardiac damage and possible increased clinical   risk. Serial measurements may help to assess extent of myocardial damage.   .  >50 ng/L: Consistent with cardiac damage, increased clinical risk and  myocardial infarction. Serial measurements may help assess extent of   myocardial damage.   .   NOTE: Children less than 1 year old may have higher baseline troponin   levels and results should be interpreted in conjunction with the overall   clinical context.   .  NOTE: Troponin I testing is performed using a different   testing methodology at [facility] than at other   [facility]. Direct result comparisons should only   be made within the same method.  This is a critical result.    Per Laboratory policy, critical results for this test   only qualify to the call list once per 24 hours.      09/19/2023 06:15  0 - 20 ng/L Final     Comment:     .  Less than 99th percentile of normal range cutoff-  Female and children under 18 years old <14 ng/L; Male <21 ng/L: Negative  Repeat testing should be performed if clinically indicated.   .  Female and children under 18 years old 14-50 ng/L; Male 21-50 ng/L:  Consistent with possible cardiac damage and possible increased clinical   risk. Serial measurements may help to assess extent of myocardial damage.   .  >50 ng/L: Consistent with cardiac damage, increased clinical risk and  myocardial infarction. Serial measurements may help assess extent of   myocardial damage.   .   NOTE: Children less than 1 year old may have higher baseline troponin   levels and results should be interpreted in conjunction with the overall    clinical context.   .  NOTE: Troponin I testing is performed using a different   testing methodology at [facility] than at other   system hospitals. Direct result comparisons should only   be made within the same method.  This is a critical result.    Per Laboratory policy, critical results for this test   only qualify to the call list once per 24 hours.        BNP   Date/Time Value Ref Range Status   09/19/2023 04:51  0 - 99 pg/mL Final     Comment:     .  <100 pg/mL - Heart failure unlikely  100-299 pg/mL - Intermediate probability of acute heart  .               failure exacerbation. Correlate with clinical  .               context and patient history.    >=300 pg/mL - Heart Failure likely. Correlate with clinical  .               context and patient history.  BNP testing is performed using different testing   methodology at [facility] than at other   [facility] hospitals. Direct result comparisons should   only be made within the same method.       HGBA1C   Date/Time Value Ref Range Status   09/30/2023 01:44 PM 6.6 see below % Final   09/20/2023 02:18 AM 6.9 % Final     Comment:          Diagnosis of Diabetes-Adults   Non-Diabetic: < or = 5.6%   Increased risk for developing diabetes: 5.7-6.4%   Diagnostic of diabetes: > or = 6.5%  .       Monitoring of Diabetes                Age (y)     Therapeutic Goal (%)   Adults:          >18           <7.0   Pediatrics:    13-18           <7.5                   7-12           <8.0                   0- 6            7.5-8.5   American Diabetes Association. Diabetes Care 33(S1), Jan 2010.     02/10/2023 11:46 AM 6.9 <5.7 % Final     Comment:     Normal less than 5.7%  Prediabetes 5.7% to 6.4%  Diabetes 6.5% or higher      --HgbA1C levels may not be accurate in patients who have renal disease, received recent blood transfusions, are anemic, or who have dyshemoglobinemia.   09/28/2022 08:51 AM 7.6 % Final     Comment:     Normal less than  5.7%  Prediabetes 5.7% to 6.4%  Diabetes 6.5% or higher      --HgbA1C levels may not be accurate in patients who have  renal disease, received recent blood transfusions, are anemic,  or who have dyshemoglobinemia.   12/29/2021 08:52 AM 6.8 % Final     Comment:     Normal less than 5.7%  Prediabetes 5.7% to 6.4%  Diabetes 6.5% or higher      --HgbA1C levels may not be accurate in patients who have  renal disease, received recent blood transfusions, are anemic,  or who have dyshemoglobinemia.      Last I/O:  I/O last 3 completed shifts:  In: 1100 (13.4 mL/kg) [P.O.:1100]  Out: 850 (10.3 mL/kg) [Urine:850 (0.3 mL/kg/hr)]  Weight: 82.2 kg         Inpatient Medications:  Scheduled medications   Medication Dose Route Frequency    amLODIPine  10 mg oral Daily    aspirin  81 mg oral Daily    atorvastatin  80 mg oral Daily    cholecalciferol  25 mcg oral Daily    dextrose 1.5% - LOW calcium 2.5mEq/L (Dianeal, Delflex-LC) in 2,400 mL peritoneal dialysate   intraperitoneal Once    DULoxetine  30 mg oral Daily    insulin detemir  20 Units subcutaneous q24h    insulin lispro  0-10 Units subcutaneous TID with meals    multivitamin with minerals  1 tablet oral Daily    sennosides  2 tablet oral BID    sodium bicarbonate  650 mg oral BID    torsemide  50 mg oral Daily     PRN medications   Medication    acetaminophen    dextrose    dextrose    glucagon    heparin     Continuous Medications   Medication Dose Last Rate    heparin  1,500 Units/hr 1,300 Units/hr (10/01/23 0624)       Physical Exam:  Gen: no apparent distress  Skin: warm and dry  ENMT: oral membranes moist  Lungs: respirations even and unlabored, CTA per auscultation  Heart: RRR S1S2 no murmur or gallop  Abd: soft, NT, ND + BS  Ext: warm with palpable radial and DP pulses  Neuro: A & O x3, speech clear, LE bilaterally weak     Assessment/Plan     Salomon Chowdary is an 82 yo male with a PMHx of insulin dep (type2) diabetes, ESRD on PD, chronic pain, HLD, HTN, hypothyroid  and arthritis who initially presented to OSH with chest pain and was found to have 2 vessel disease and was transferred to [facility] for CABG vs PCI evaluation.       CAD/ 2 vessel disease  NSTEMI  - admitted with CP  - troponin 122>608>1661  - [facility] on 9/20 showing disease  LAD of 75-80% in the proximal, early mid and late mid segments.   Lcx: distal cx 95% right before takeoff of left PDA  - consult IC for evaluation for PCI  - cot heparin gtt  - cont asa, bb, statin  - cont cardiac monitor     A fib with RVR  - episode at OSH  - converted to NSR  with metoprolol  - cont cardiac monitor  -cont heparin gtt     HTN  - cont to monitor     ESRD  - pt on nightly PD  - nephology consulted  - cont daily electrolytes     T2IDDM  - pt on levemir at home  - cont to monitor    Anemia  - check iron studies  - pt previously getting epo with HD    Free intraperitoneal air seen on CT chest  - abd soft without tenderness  - likely from PD catheter  -cont to monitor closely     DVT Ppx:  Pt is on heparin gtt     Full code    Pt seen and examined and plan of care discussed with Dr. Allen  Peripheral IV 20 G Left Antecubital (Active)   Site Assessment Clean;Dry;Intact 10/01/23 0941   Dressing Type Transparent 10/01/23 0941   Line Status Flushed 10/01/23 0941   Dressing Status Clean;Dry 10/01/23 0941   Number of days:        Code Status:  Full Code        ANNIE Escobar-CNP

## 2023-10-01 NOTE — CONSULTS
Consults  History Of Present Illness:    Salomon Chowdary is a 83 y.o. male [place] [facility] [place]iv[place] artery [place]. Patient has past medical history of hypertension, hyperlipidemia, ESRD on peritoneal dialysis, T2DM, hypothyroidism and arthritis. He had presented to [facility] with new onset chest pain which started as a left shoulder/arm pain and progressed to chest pain. His troponin was elevated and peaked around 2200.   He had a coronary angiogram which showed severe multivessel disease including diffuse and heavily calcified proximal/mid LAD extending into distal LAD with most severe disease ~80% in the mid LAD area and severe disease of dominant, large sized circumflex in the distal portion at proximal left PDA.  At baseline, he mentions he is fairly active and goes for grocery shopping and performs his ADLs without symptoms.        Last Recorded Vitals:  Vitals:    09/30/23 2351 10/01/23 0420 10/01/23 0857 10/01/23 1102   BP: 126/69 128/64 132/75 138/68   BP Location:  Left arm Left arm Left arm   Patient Position:  Lying Sitting Sitting   Pulse: 78 79 90 80   Resp: 18 18 18 18   Temp: 36.7 °C (98.1 °F) 36.8 °C (98.3 °F) 36.5 °C (97.7 °F) 36.7 °C (98.1 °F)   TempSrc: Temporal Temporal Temporal Temporal   SpO2: 93% 93% 96% 94%   Weight:  82.2 kg (181 lb 3.5 oz)           Last Labs:  CBC - 10/1/2023:  9:28 AM  9.0 7.8 305    24.4      CMP - 10/1/2023:  9:28 AM  8.5 7.1 23 --- 0.5   3.6 2.7 26 72      PTT - 10/1/2023:  5:22 AM  1.2   13.1 >200     Troponin I   Date/Time Value Ref Range Status   09/20/2023 02:18 AM 2,226 (H) 0 - 20 ng/L Final     Comment:     .  Less than 99th percentile of normal range cutoff-  Female and children under 18 years old <14 ng/L; Male <21 ng/L: Negative  Repeat testing should be performed if clinically indicated.   .  Female and children under 18 years old 14-50 ng/L; Male 21-50 ng/L:  Consistent with  possible cardiac damage and possible increased clinical   risk. Serial measurements may help to assess extent of myocardial damage.   .  >50 ng/L: Consistent with cardiac damage, increased clinical risk and  myocardial infarction. Serial measurements may help assess extent of   myocardial damage.   .   NOTE: Children less than 1 year old may have higher baseline troponin   levels and results should be interpreted in conjunction with the overall   clinical context.   .  NOTE: Troponin I testing is performed using a different   testing methodology at [facility] than at other   system hospitals. Direct result comparisons should only   be made within the same method.  This is a critical result.    Per Laboratory policy, critical results for this test   only qualify to the call list once per 24 hours.      09/19/2023 08:57 PM 1,661 (H) 0 - 20 ng/L Final     Comment:     .  Less than 99th percentile of normal range cutoff-  Female and children under 18 years old <14 ng/L; Male <21 ng/L: Negative  Repeat testing should be performed if clinically indicated.   .  Female and children under 18 years old 14-50 ng/L; Male 21-50 ng/L:  Consistent with possible cardiac damage and possible increased clinical   risk. Serial measurements may help to assess extent of myocardial damage.   .  >50 ng/L: Consistent with cardiac damage, increased clinical risk and  myocardial infarction. Serial measurements may help assess extent of   myocardial damage.   .   NOTE: Children less than 1 year old may have higher baseline troponin   levels and results should be interpreted in conjunction with the overall   clinical context.   .  NOTE: Troponin I testing is performed using a different   testing methodology at [facility] than at other   [facility] hospitals. Direct result comparisons should only   be made within the same method.  This is a critical result.    Per Laboratory policy, critical results for this test   only  qualify to the call list once per 24 hours.      09/19/2023 06:15  (H) 0 - 20 ng/L Final     Comment:     .  Less than 99th percentile of normal range cutoff-  Female and children under 18 years old <14 ng/L; Male <21 ng/L: Negative  Repeat testing should be performed if clinically indicated.   .  Female and children under 18 years old 14-50 ng/L; Male 21-50 ng/L:  Consistent with possible cardiac damage and possible increased clinical   risk. Serial measurements may help to assess extent of myocardial damage.   .  >50 ng/L: Consistent with cardiac damage, increased clinical risk and  myocardial infarction. Serial measurements may help assess extent of   myocardial damage.   .   NOTE: Children less than 1 year old may have higher baseline troponin   levels and results should be interpreted in conjunction with the overall   clinical context.   .  NOTE: Troponin I testing is performed using a different   testing methodology at [facility] than at other   [facility]. Direct result comparisons should only   be made within the same method.  This is a critical result.    Per Laboratory policy, critical results for this test   only qualify to the call list once per 24 hours.        BNP   Date/Time Value Ref Range Status   09/19/2023 04:51  (H) 0 - 99 pg/mL Final     Comment:     .  <100 pg/mL - Heart failure unlikely  100-299 pg/mL - Intermediate probability of acute heart  .               failure exacerbation. Correlate with clinical  .               context and patient history.    >=300 pg/mL - Heart Failure likely. Correlate with clinical  .               context and patient history.  BNP testing is performed using different testing   methodology at [facility] than at other   [facility]. Direct result comparisons should   only be made within the same method.       Hemoglobin A1C   Date/Time Value Ref Range Status   09/30/2023 01:44 PM 6.6 (H) see below % Final    09/20/2023 02:18 AM 6.9 (A) % Final     Comment:          Diagnosis of Diabetes-Adults   Non-Diabetic: < or = 5.6%   Increased risk for developing diabetes: 5.7-6.4%   Diagnostic of diabetes: > or = 6.5%  .       Monitoring of Diabetes                Age (y)     Therapeutic Goal (%)   Adults:          >18           <7.0   Pediatrics:    13-18           <7.5                   7-12           <8.0                   0- 6            7.5-8.5   American Diabetes Association. Diabetes Care 33(S1), Jan 2010.     02/10/2023 11:46 AM 6.9 (H) <5.7 % Final     Comment:     Normal less than 5.7%  Prediabetes 5.7% to 6.4%  Diabetes 6.5% or higher      --HgbA1C levels may not be accurate in patients who have renal disease, received recent blood transfusions, are anemic, or who have dyshemoglobinemia.   09/28/2022 08:51 AM 7.6 (A) % Final     Comment:     Normal less than 5.7%  Prediabetes 5.7% to 6.4%  Diabetes 6.5% or higher      --HgbA1C levels may not be accurate in patients who have  renal disease, received recent blood transfusions, are anemic,  or who have dyshemoglobinemia.   12/29/2021 08:52 AM 6.8 (A) % Final     Comment:     Normal less than 5.7%  Prediabetes 5.7% to 6.4%  Diabetes 6.5% or higher      --HgbA1C levels may not be accurate in patients who have  renal disease, received recent blood transfusions, are anemic,  or who have dyshemoglobinemia.      Last I/O:  I/O last 3 completed shifts:  In: 1100 (13.4 mL/kg) [P.O.:1100]  Out: 850 (10.3 mL/kg) [Urine:850 (0.3 mL/kg/hr)]  Weight: 82.2 kg     Past Cardiology Tests (Last 3 Years):  EKG:  No results found for this or any previous visit from the past 1095 days.    TTE (9/25)  LV systolic function is low normal with 50-55% est EF  DD  Small to mod pericardial effusion  There is no evidence of cardiac tamponade     9/20 [facility]  LAD: 3 lesions in the range of 75-80% in the prox, early mid and late mid segments  Lcx: ON1 90%, distal cx 95% right before takeoff of left  PDA    Stress Test:  No results found for this or any previous visit from the past 1095 days.    Cardiac Imaging:  No results found for this or any previous visit from the past 1095 days.             Review of Systems  Constitutional: NEGATIVE: Fever, Chills, Anorexia, Weight Loss, Malaise      Eyes: NEGATIVE: Blurry Vision, Drainage, Diploplia, Redness, Vision Loss/ Change      ENMT: NEGATIVE: Nasal Discharge, Nasal Congestion, Ear Pain, Mouth Pain, Throat Pain      Respiratory: NEGATIVE: Dry Cough, Productive Cough, Hemoptysis, Wheezing, Shortness of Breath      Cardiac:  Chest Pain + , Dyspnea on Exertion, No Orthopnea, No Palpitations, No Syncope      Gastrointestinal: NEGATIVE: Nausea, Vomiting, Diarrhea, Constipation, Abdominal Pain      Genitourinary: NEGATIVE: Discharge, Dysuria, Flank Pain, Frequency, Hematuria      Musculoskeletal: NEGATIVE: Decreased ROM, Pain, Swelling, Stiffness, Weakness      Endocrine: NEGATIVE: Heat Intolerance, Cold Intolerance, Sweat, Polyuria, Thirst      Hematologic/Lymph: NEGATIVE: Anemia, Bruising, Easy Bleeding, Night Sweats, Petechiae      Allergic/Immunologic: NEGATIVE: Anaphylaxis, Itchy/ Teary Eyes, Itching, Sneezing, Swelling      Breast: NEGATIVE: Pain, Mass, Discharge, Nipple Itching, Gynecomastia     Past Medical History:  T2DM  Htn  HLD  ESRD on PD  Hypothyroidism  CAD    Past Surgical History:  He has a past surgical history that includes US guided percutaneous biopsy renal left (Left, 11/30/2022); Tonsillectomy (1945); Carpal tunnel release (2018); and Spinal fusion (09/2015).      Social History:  He reports that he has never smoked. He has never used smokeless tobacco. He reports that he does not drink alcohol. No history on file for drug use.    Family History:  Family History   Problem Relation Name Age of Onset   • Cancer Brother     • Diabetes type I Brother     CABG in Father(age in 60s) and brother. PCI for mother(age in 90s)     Allergies:  Opioids -  morphine analogues    Inpatient Medications:  Scheduled medications   Medication Dose Route Frequency   • amLODIPine  10 mg oral Daily   • aspirin  81 mg oral Daily   • atorvastatin  80 mg oral Daily   • cholecalciferol  25 mcg oral Daily   • dextrose 1.5% - LOW calcium 2.5mEq/L (Dianeal, Delflex-LC) in 2,400 mL peritoneal dialysate   intraperitoneal Once   • DULoxetine  30 mg oral Daily   • insulin detemir  20 Units subcutaneous q24h   • insulin lispro  0-10 Units subcutaneous TID with meals   • multivitamin with minerals  1 tablet oral Daily   • sennosides  2 tablet oral BID   • sodium bicarbonate  650 mg oral BID   • torsemide  50 mg oral Daily     PRN medications   Medication   • acetaminophen   • dextrose   • dextrose   • glucagon   • heparin     Continuous Medications   Medication Dose Last Rate   • heparin  1,500 Units/hr 1,300 Units/hr (10/01/23 0624)     Outpatient Medications:  Current Outpatient Medications   Medication Instructions   • acetaminophen (TYLENOL) 1,000 mg, oral, Every 6 hours PRN   • amLODIPine (NORVASC) 10 mg, oral, Daily   • atorvastatin (LIPITOR) 10 mg, oral, Daily   • b complex vitamins capsule 1 capsule, oral, Daily   • cholecalciferol (VITAMIN D-3) 25 mcg, oral, Daily   • DULoxetine (CYMBALTA) 30 mg, oral, Daily, Do not crush or chew.   • glucosamine HCl 1,500 mg tablet 1 tablet, oral, 2 times daily   • hydrALAZINE (APRESOLINE) 25 mg, oral, 3 times daily, Pt reported Not taking anymore   • Levemir U-100 Insulin 25 Units, subcutaneous, Daily, Take as directed per insulin instructions.   • levothyroxine (SYNTHROID, LEVOXYL) 25 mcg, oral, Daily before breakfast   • loratadine-pseudoephedrine (Claritin-D 24-hour)  mg 24 hr tablet 1 tablet, oral, Daily PRN, Do not crush, chew, or split.   • multivitamin tablet 1 tablet, oral, Daily   • sennosides-docusate sodium (Diana-Colace) 8.6-50 mg tablet 2 tablets, oral, 2 times daily, Now not taking    • sodium bicarbonate 650 mg, oral, 2 times  daily   • torsemide (DEMADEX) 50 mg, oral, Daily   • Trulicity 1.5 mg, subcutaneous, Weekly, Tuesdays   • vit B-min-saw palmetto-[place] (Urinozinc Prostate Classic) 320-25 mg capsule 1 tablet, oral, Daily       Physical Exam:  GEN      No acute distress, dry mucous membranes   HENT     NCAT,   Eyes      PERRL. EOMI. Anicteric  NECK     Supple, No JVD, No thyromegaly  CHEST   No resp distress, normal diaphragmatic movement.  HEART   RRR, S1S2+, No M/R/G  ABD       Soft, NT, ND.  EXT       No clubbing, cyanosis or edema.  NEURO  Alert, oriented, and appropriately interactive. Moving all extremities  Skin       Warm and dry, No rashes     Assessment/Plan   83 y.o. male with past medical history of hypertension, hyperlipidemia, ESRD on peritoneal dialysis, T2DM; Presenting with NSTEMI and multivessel CAD including heavily calcified proximal/mid LAD extending into distal LAD with most severe disease ~80% in the mid LAD area and severe disease of dominant, large sized circumflex in the distal portion at proximal left PDA, medium siuzed OM disease.  At baseline, he mentions he is fairly active and goes for grocery shopping and performs his ADLs without symptoms.    Given multivessel disease, we recommend CT surgery evaluation given fair distal targets in LAD and Circumflex/OM vessels . Syntax score was 30 and Syntax II score reveals PCI vs CABG 4 yr mortality of 37.8% versus 23.8%. If patient is not a surgical candidate, we can consider a heart team approach.   Continue with conservative therapy for now. ASA. Blood pressure control. Continue statin.   On heparin for atrial fibrillation.               Peripheral IV 20 G Left Antecubital (Active)   Site Assessment Clean;Dry;Intact 10/01/23 0941   Dressing Type Transparent 10/01/23 0941   Line Status Flushed 10/01/23 0941   Dressing Status Clean;Dry 10/01/23 0941   Number of days:        Code Status:  Full Code    I spent 45 minutes in the professional and overall care of  this patient.        Abner Pereyra MD

## 2023-10-02 ENCOUNTER — APPOINTMENT (OUTPATIENT)
Dept: VASCULAR MEDICINE | Facility: HOSPITAL | Age: 83
DRG: 235 | End: 2023-10-02
Payer: MEDICARE

## 2023-10-02 ENCOUNTER — APPOINTMENT (OUTPATIENT)
Dept: CARDIOLOGY | Facility: HOSPITAL | Age: 83
DRG: 235 | End: 2023-10-02
Payer: MEDICARE

## 2023-10-02 LAB
ALBUMIN SERPL BCP-MCNC: 2.7 G/DL (ref 3.4–5)
ALP SERPL-CCNC: 70 U/L (ref 33–136)
ALT SERPL W P-5'-P-CCNC: 19 U/L (ref 10–52)
AST SERPL W P-5'-P-CCNC: 16 U/L (ref 9–39)
BILIRUB DIRECT SERPL-MCNC: 0.1 MG/DL (ref 0–0.3)
BILIRUB SERPL-MCNC: 0.4 MG/DL (ref 0–1.2)
CARDIAC TROPONIN I PNL SERPL HS: 24 NG/L (ref 0–53)
CHOLEST SERPL-MCNC: 78 MG/DL (ref 0–199)
CHOLESTEROL/HDL RATIO: 2.2
EJECTION FRACTION APICAL 4 CHAMBER: 60.4
ERYTHROCYTE [DISTWIDTH] IN BLOOD BY AUTOMATED COUNT: 14 % (ref 11.5–14.5)
GLUCOSE BLD MANUAL STRIP-MCNC: 122 MG/DL (ref 74–99)
GLUCOSE BLD MANUAL STRIP-MCNC: 201 MG/DL (ref 74–99)
GLUCOSE BLD MANUAL STRIP-MCNC: 235 MG/DL (ref 74–99)
GLUCOSE BLD MANUAL STRIP-MCNC: 255 MG/DL (ref 74–99)
GLUCOSE BLD MANUAL STRIP-MCNC: 65 MG/DL (ref 74–99)
GLUCOSE BLD MANUAL STRIP-MCNC: 70 MG/DL (ref 74–99)
HCT VFR BLD AUTO: 27.5 % (ref 41–52)
HDLC SERPL-MCNC: 35.8 MG/DL
HGB BLD-MCNC: 8.4 G/DL (ref 13.5–17.5)
LDLC SERPL CALC-MCNC: 23 MG/DL (ref 140–190)
MCH RBC QN AUTO: 33.1 PG (ref 26–34)
MCHC RBC AUTO-ENTMCNC: 30.5 G/DL (ref 32–36)
MCV RBC AUTO: 108 FL (ref 80–100)
NON HDL CHOLESTEROL: 42 MG/DL (ref 0–149)
NRBC BLD-RTO: 0.3 /100 WBCS (ref 0–0)
PLATELET # BLD AUTO: 330 X10*3/UL (ref 150–450)
PMV BLD AUTO: 9.7 FL (ref 7.5–11.5)
PROT SERPL-MCNC: 5.1 G/DL (ref 6.4–8.2)
RBC # BLD AUTO: 2.54 X10*6/UL (ref 4.5–5.9)
T4 FREE SERPL-MCNC: 1.12 NG/DL (ref 0.78–1.48)
TRIGL SERPL-MCNC: 94 MG/DL (ref 0–149)
TSH SERPL-ACNC: 6.26 MIU/L (ref 0.44–3.98)
UFH PPP CHRO-ACNC: 0.3 IU/ML
VLDL: 19 MG/DL (ref 0–40)
WBC # BLD AUTO: 8.8 X10*3/UL (ref 4.4–11.3)

## 2023-10-02 PROCEDURE — 85027 COMPLETE CBC AUTOMATED: CPT | Performed by: NURSE PRACTITIONER

## 2023-10-02 PROCEDURE — 93970 EXTREMITY STUDY: CPT

## 2023-10-02 PROCEDURE — 84484 ASSAY OF TROPONIN QUANT: CPT | Performed by: NURSE PRACTITIONER

## 2023-10-02 PROCEDURE — 2500000001 HC RX 250 WO HCPCS SELF ADMINISTERED DRUGS (ALT 637 FOR MEDICARE OP): Performed by: NURSE PRACTITIONER

## 2023-10-02 PROCEDURE — 99233 SBSQ HOSP IP/OBS HIGH 50: CPT | Performed by: STUDENT IN AN ORGANIZED HEALTH CARE EDUCATION/TRAINING PROGRAM

## 2023-10-02 PROCEDURE — 84075 ASSAY ALKALINE PHOSPHATASE: CPT | Performed by: NURSE PRACTITIONER

## 2023-10-02 PROCEDURE — 83718 ASSAY OF LIPOPROTEIN: CPT | Performed by: NURSE PRACTITIONER

## 2023-10-02 PROCEDURE — 93880 EXTRACRANIAL BILAT STUDY: CPT

## 2023-10-02 PROCEDURE — 93306 TTE W/DOPPLER COMPLETE: CPT | Performed by: INTERNAL MEDICINE

## 2023-10-02 PROCEDURE — 93970 EXTREMITY STUDY: CPT | Performed by: SURGERY

## 2023-10-02 PROCEDURE — 84443 ASSAY THYROID STIM HORMONE: CPT | Performed by: NURSE PRACTITIONER

## 2023-10-02 PROCEDURE — 2500000004 HC RX 250 GENERAL PHARMACY W/ HCPCS (ALT 636 FOR OP/ED)

## 2023-10-02 PROCEDURE — 90947 DIALYSIS REPEATED EVAL: CPT

## 2023-10-02 PROCEDURE — 36415 COLL VENOUS BLD VENIPUNCTURE: CPT | Performed by: NURSE PRACTITIONER

## 2023-10-02 PROCEDURE — 96372 THER/PROPH/DIAG INJ SC/IM: CPT | Performed by: NURSE PRACTITIONER

## 2023-10-02 PROCEDURE — 1200000002 HC GENERAL ROOM WITH TELEMETRY DAILY

## 2023-10-02 PROCEDURE — 80061 LIPID PANEL: CPT | Performed by: NURSE PRACTITIONER

## 2023-10-02 PROCEDURE — 85520 HEPARIN ASSAY: CPT | Performed by: INTERNAL MEDICINE

## 2023-10-02 PROCEDURE — 2500000002 HC RX 250 W HCPCS SELF ADMINISTERED DRUGS (ALT 637 FOR MEDICARE OP, ALT 636 FOR OP/ED): Performed by: NURSE PRACTITIONER

## 2023-10-02 PROCEDURE — 93306 TTE W/DOPPLER COMPLETE: CPT

## 2023-10-02 PROCEDURE — 82947 ASSAY GLUCOSE BLOOD QUANT: CPT

## 2023-10-02 PROCEDURE — 93880 EXTRACRANIAL BILAT STUDY: CPT | Performed by: SURGERY

## 2023-10-02 PROCEDURE — 2500000004 HC RX 250 GENERAL PHARMACY W/ HCPCS (ALT 636 FOR OP/ED): Performed by: NURSE PRACTITIONER

## 2023-10-02 PROCEDURE — 36415 COLL VENOUS BLD VENIPUNCTURE: CPT | Performed by: INTERNAL MEDICINE

## 2023-10-02 PROCEDURE — 84439 ASSAY OF FREE THYROXINE: CPT | Performed by: NURSE PRACTITIONER

## 2023-10-02 RX ORDER — GENTAMICIN SULFATE 1 MG/G
CREAM TOPICAL DAILY
Status: DISPENSED | OUTPATIENT
Start: 2023-10-02 | End: 2023-10-09

## 2023-10-02 RX ORDER — LEVOTHYROXINE SODIUM 25 UG/1
25 TABLET ORAL
Status: DISCONTINUED | OUTPATIENT
Start: 2023-10-03 | End: 2023-10-17 | Stop reason: HOSPADM

## 2023-10-02 RX ADMIN — TORSEMIDE 50 MG: 20 TABLET ORAL at 08:07

## 2023-10-02 RX ADMIN — SODIUM CHLORIDE, SODIUM LACTATE, CALCIUM CHLORIDE, MAGNESIUM CHLORIDE AND DEXTROSE: 1.5; 538; 448; 18.3; 5.08 INJECTION, SOLUTION INTRAPERITONEAL at 14:00

## 2023-10-02 RX ADMIN — AMLODIPINE BESYLATE 10 MG: 2.5 TABLET ORAL at 08:08

## 2023-10-02 RX ADMIN — INSULIN LISPRO 4 UNITS: 100 INJECTION, SOLUTION INTRAVENOUS; SUBCUTANEOUS at 17:30

## 2023-10-02 RX ADMIN — ASPIRIN 81 MG CHEWABLE TABLET 81 MG: 81 TABLET CHEWABLE at 08:08

## 2023-10-02 RX ADMIN — INSULIN DETEMIR 20 UNITS: 100 INJECTION, SOLUTION SUBCUTANEOUS at 21:00

## 2023-10-02 RX ADMIN — SODIUM BICARBONATE 650 MG: 650 TABLET ORAL at 08:08

## 2023-10-02 RX ADMIN — Medication 1 TABLET: at 08:08

## 2023-10-02 RX ADMIN — ATORVASTATIN CALCIUM 80 MG: 80 TABLET ORAL at 21:00

## 2023-10-02 RX ADMIN — SODIUM BICARBONATE 650 MG: 650 TABLET ORAL at 21:00

## 2023-10-02 RX ADMIN — HEPARIN SODIUM 1300 UNITS/HR: 10000 INJECTION, SOLUTION INTRAVENOUS at 17:06

## 2023-10-02 NOTE — CONSULTS
Wound Care Consult     Visit Date: 10/2/2023      Patient Name: Salomon Chowdary         MRN: [id number]           YOB: 1940     Reason for Consult: sacral wound        Wound History: pt and wife state that wound first developed at [facility], endorses ~40lb weight loss     Wound Assessment:  Wound 09/30/23 Pressure Injury Sacrum (Active)   Site Assessment Fragile;Reserve 10/02/23 1605   Diana-Wound Assessment Fragile;Denuded 10/02/23 1605   Non-staged Wound Description Partial thickness 10/02/23 1605   Pressure Injury Stage 2 10/02/23 1605   Wound Length (cm) 0.3 cm 10/02/23 1605   Wound Width (cm) 0.3 cm 10/02/23 1605   Wound Surface Area (cm^2) 0.09 cm^2 10/02/23 1605   Wound Depth (cm) 0.05 cm 10/02/23 1605   Wound Volume (cm^3) 0.0045 cm^3 10/02/23 1605   Drainage Amount None 10/02/23 1605   Dressing Foam 10/02/23 1605   Dressing Status Clean;Dry 10/02/23 1605     Wound Team Summary Assessment: Continent, A&O pt with supportive spouse at bedside. Pt stood to reveal tiny, partial thickness wound at midline sacrum/coccyx. See photo in EMR. No odor or drainage noted. Loose, periwound skin with significant loss of muscle. Site cleaned, prepped and redressed with Mepilex foam for protection. D/w pt and wife the etiology of this skin breakdown and the importance of offloading. Encouraged pt to walk in hallway, sit EOB and turn side-to-side while in bed. Both expressed understanding.      Wound Team Plan: Offload sacrum/coccyx whenever possible (see recs above). Keep sacrum dressed with Mepilex foam to protect from friction. Change PRN when soiled but at least weekly. Optimize nutrition.      Denise Christiansen RN, CWON  10/2/2023  4:06 PM

## 2023-10-02 NOTE — CONSULTS
"Nutrition Assessment Note  Assessment    Assessment:  Reason for Assessment  Reason for Assessment: Provider consult order (Patient will likely have CABG vs PCI procedure, alb 2.8, on PD. Nutritional/diet rec)    History Of Present Illness:    Salomon Chowdary is a 83 y.o. male presenting with as a transfer from [facility] for evaluation and management of multivessel coronary artery disease. Patient has past medical history of hypertension, hyperlipidemia, ESRD on peritoneal dialysis, T2DM, hypothyroidism and arthritis.     Past Surgical History:  He has a past surgical history that includes US guided percutaneous biopsy renal left (Left, 11/30/2022); Tonsillectomy (1945); Carpal tunnel release (2018); and Spinal fusion (09/2015).    History:  Food and Nutrient History  Energy Intake: Good > 75 % (Decline in intake over past year; improved appetite at this time however reduced intake due to multiple diet restrictions)  Food and Nutrient History: Usually eats 3 meals per day, variety of foods and drinks 1 premier protein shake (ursula).  Does not follow specific diet for PD as labs have been acceptable.  Watches added sugar intake in regards to diabetes and has adequate blood sugar control  Vitamin/Herbal Supplement Use: home vitamin/mineral supplements reviewed                             Anthropometrics:  Height: 175.2 cm (5' 8.98\")  Weight: 82.2 kg (181 lb 3.5 oz)  BMI (Calculated): 26.78    Weight Change: 2.37    Weight Change  Weight History / % Weight Change: 20% over 1 year  Significant Weight Loss: Yes  Interpretation of Weight Loss: 20% in 1 year         IBW/kg (Dietitian Calculated): 73 kg  Percent of IBW: 113 %                           Amputation Calculations:       BMI/Z-Score:  Facility age limit for growth %moses is 20 years.    Energy Needs:  Calculated Energy Needs Using Equations  Height: 175.2 cm (5' 8.98\")  Temp: 36.8 °C (98.3 °F)    Estimated Energy Needs  Total Energy Estimated Needs " (kCal): 2200 kCal (4730-8659)    Estimated Protein Needs  Total Protein Estimated Needs (g): 100 g  Total Protein Estimated Needs (g/kg): 1.3 g/kg              Nutrition Focused Physical Findings:  Subcutaneous Fat Loss  Orbital Fat Pads: Well nourshed (slightly bulging fat pads)  Buccal Fat Pads: Well nourished (full, rounded cheeks)  Triceps: Mild-moderate (less than ample fat tissue)    Muscle Wasting  Temporalis: Well nourished (well-defined muscle)  Pectoralis (Clavicular Region): Mild-Moderate (some protrusion of clavicle)  Deltoid/Trapezius: Mild-Moderate (slight protrusion of acromion process)  Interosseous: Mild-Moderate (slightly depressed area between thumb and forefinger)  Trapezius/Infraspinatus/Supraspinatus (Scapular Region): Mild-Moderate (slight protrusion of scapula)  Quadriceps: Mild-moderate (mild depression on inner and outer thigh)    Edema  Edema: +1 trace  Edema Location: generalized         Physical Findings (Nutrition Deficiency/Toxicity)  Hair: Negative  Eyes: Negative  Mouth: Negative  Nails: Negative  Skin: Positive (stage 2 sacrum)    Diagnosis   Diagnosis:  Malnutrition Diagnosis  Patient has Malnutrition Diagnosis: Yes  Diagnosis Status: New  Malnutrition Diagnosis: Moderate malnutrition related to chronic disease or condition  As Evidenced by: weight loss and decline in physical function  Additional Assessment Information: Liberalize diet to promote adequate intake.  Labs acceptable for Regular diet.                                                          Interventions/Recommendations   Interventions/Recommendations:  Nutrition Prescription  Individualized Nutrition Prescription Provided for : diet    Food and/or Nutrient Delivery Interventions  Interventions: Medical food supplement (Glucerna BID)                                                                   Additional Interventions: 1) Renal diet (60k+ 2-3gNa 1g phos) modified to regular 2) Encouraged meal intake and high  protien sources to promote weight maintenance, healing/recovery              Education Documentation  No documentation found.        Monitoring and Evaluation   Monitoring/Evaluation:  Food and Nutrient Related History                                          Anthropometrics: Body Composition/Growth/Weight History                      Biochemical Data, Medical Tests and Procedures                           Nutrition Focused Physical Findings                                     Follow Up  Time Spent (min): 60 minutes  Last Date of Nutrition Visit: 10/02/23  Nutrition Follow-Up Needed?: Dietitian to reassess per policy

## 2023-10-02 NOTE — PROGRESS NOTES
Subjective Data:  Patient has neck brace on for neck support due to history of spinal stenosis. Wife at bedside. Does not like to walk long distances due to occasional dry heaves, nausea and vomiting. He was able to walk some yesterday and had no issues. Would like wound on buttocks addressed. Weight trending down. 82.2-->80.3kg     Overnight Events:    None      Objective Data:  Last Recorded Vitals:  Vitals:    10/01/23 1935 10/01/23 2314 10/02/23 0535 10/02/23 1314   BP: 122/70 136/67 148/75 130/63   BP Location: Left arm Left arm  Right arm   Patient Position:  Lying Lying Lying   Pulse: 81 80 85 96   Resp: 20 20 20 18   Temp: 36.8 °C (98.2 °F) 36.7 °C (98 °F) 36.8 °C (98.2 °F) 36.7 °C (98.1 °F)   TempSrc: Temporal Temporal Temporal Temporal   SpO2: 94% 97% 93% 96%   Weight:   80.3 kg (177 lb 0.5 oz)        Last Labs:  CBC - 10/2/2023:  8:00 AM  8.8 8.4 330    27.5      CMP - 10/1/2023:  9:28 AM  8.5 5.1 16 --- 0.4   3.6 2.7 19 70      PTT - 10/1/2023:  5:22 AM  1.2   13.1 >200     TROPHS   Date/Time Value Ref Range Status   10/02/2023 08:00 AM 24 0 - 53 ng/L Final     Comment:     result   09/20/2023 02:18 AM 2,226 0 - 20 ng/L Final     Comment:     .  Less than 99th percentile of normal range cutoff-  Female and children under 18 years old <14 ng/L; Male <21 ng/L: Negative  Repeat testing should be performed if clinically indicated.   .  Female and children under 18 years old 14-50 ng/L; Male 21-50 ng/L:  Consistent with possible cardiac damage and possible increased clinical   risk. Serial measurements may help to assess extent of myocardial damage.   .  >50 ng/L: Consistent with cardiac damage, increased clinical risk and  myocardial infarction. Serial measurements may help assess extent of   myocardial damage.   .   NOTE: Children less than 1 year old may have higher baseline troponin   levels and results should be interpreted in conjunction with the overall   clinical context.   .  NOTE: Troponin I testing  is performed using a different   testing methodology at [facility] than at other   system [facility]. Direct result comparisons should only   be made within the same method.  This is a critical result.    Per Laboratory policy, critical results for this test   only qualify to the call list once per 24 hours.      09/19/2023 08:57 PM 1,661 0 - 20 ng/L Final     Comment:     .  Less than 99th percentile of normal range cutoff-  Female and children under 18 years old <14 ng/L; Male <21 ng/L: Negative  Repeat testing should be performed if clinically indicated.   .  Female and children under 18 years old 14-50 ng/L; Male 21-50 ng/L:  Consistent with possible cardiac damage and possible increased clinical   risk. Serial measurements may help to assess extent of myocardial damage.   .  >50 ng/L: Consistent with cardiac damage, increased clinical risk and  myocardial infarction. Serial measurements may help assess extent of   myocardial damage.   .   NOTE: Children less than 1 year old may have higher baseline troponin   levels and results should be interpreted in conjunction with the overall   clinical context.   .  NOTE: Troponin I testing is performed using a different   testing methodology at [facility] than at other   [facility]. Direct result comparisons should only   be made within the same method.  This is a critical result.    Per Laboratory policy, critical results for this test   only qualify to the call list once per 24 hours.      09/19/2023 06:15  0 - 20 ng/L Final     Comment:     .  Less than 99th percentile of normal range cutoff-  Female and children under 18 years old <14 ng/L; Male <21 ng/L: Negative  Repeat testing should be performed if clinically indicated.   .  Female and children under 18 years old 14-50 ng/L; Male 21-50 ng/L:  Consistent with possible cardiac damage and possible increased clinical   risk. Serial measurements may help to assess extent of  myocardial damage.   .  >50 ng/L: Consistent with cardiac damage, increased clinical risk and  myocardial infarction. Serial measurements may help assess extent of   myocardial damage.   .   NOTE: Children less than 1 year old may have higher baseline troponin   levels and results should be interpreted in conjunction with the overall   clinical context.   .  NOTE: Troponin I testing is performed using a different   testing methodology at [facility] than at other   system hospitals. Direct result comparisons should only   be made within the same method.  This is a critical result.    Per Laboratory policy, critical results for this test   only qualify to the call list once per 24 hours.        BNP   Date/Time Value Ref Range Status   09/19/2023 04:51  0 - 99 pg/mL Final     Comment:     .  <100 pg/mL - Heart failure unlikely  100-299 pg/mL - Intermediate probability of acute heart  .               failure exacerbation. Correlate with clinical  .               context and patient history.    >=300 pg/mL - Heart Failure likely. Correlate with clinical  .               context and patient history.  BNP testing is performed using different testing   methodology at [facility] than at other   [facility] hospitals. Direct result comparisons should   only be made within the same method.       HGBA1C   Date/Time Value Ref Range Status   09/30/2023 01:44 PM 6.6 see below % Final   09/20/2023 02:18 AM 6.9 % Final     Comment:          Diagnosis of Diabetes-Adults   Non-Diabetic: < or = 5.6%   Increased risk for developing diabetes: 5.7-6.4%   Diagnostic of diabetes: > or = 6.5%  .       Monitoring of Diabetes                Age (y)     Therapeutic Goal (%)   Adults:          >18           <7.0   Pediatrics:    13-18           <7.5                   7-12           <8.0                   0- 6            7.5-8.5   American Diabetes Association. Diabetes Care 33(S1), Jan 2010.     02/10/2023 11:46 AM  "6.9 <5.7 % Final     Comment:     Normal less than 5.7%  Prediabetes 5.7% to 6.4%  Diabetes 6.5% or higher      --HgbA1C levels may not be accurate in patients who have renal disease, received recent blood transfusions, are anemic, or who have dyshemoglobinemia.   09/28/2022 08:51 AM 7.6 % Final     Comment:     Normal less than 5.7%  Prediabetes 5.7% to 6.4%  Diabetes 6.5% or higher      --HgbA1C levels may not be accurate in patients who have  renal disease, received recent blood transfusions, are anemic,  or who have dyshemoglobinemia.   12/29/2021 08:52 AM 6.8 % Final     Comment:     Normal less than 5.7%  Prediabetes 5.7% to 6.4%  Diabetes 6.5% or higher      --HgbA1C levels may not be accurate in patients who have  renal disease, received recent blood transfusions, are anemic,  or who have dyshemoglobinemia.     LDLCALC   Date/Time Value Ref Range Status   10/02/2023 07:59 AM 23 140 - 190 mg/dL Final     Comment:                                 Near   Borderline      AGE      Desirable  Optimal    High     High     Very High     0-19 Y     0 - 109     ---    110-129   >/= 130     ----    20-24 Y     0 - 119     ---    120-159   >/= 160     ----      >24 Y     0 -  99   100-129  130-159   160-189     >/=190       VLDL   Date/Time Value Ref Range Status   10/02/2023 07:59 AM 19 0 - 40 mg/dL Final      Last I/O:  I/O last 3 completed shifts:  In: 240 (3 mL/kg) [P.O.:240]  Out: 2451 (30.5 mL/kg) [Urine:2450 (0.8 mL/kg/hr); Stool:1]  Weight: 80.3 kg     Past Cardiology Tests (Last 3 Years):  EKG:  No results found for this or any previous visit from the past 1095 days.    Echo:  Transthoracic Echo (TTE) Complete 10/2/2023    Ejection Fractions:  No results found for: \"EF\"  Cath:  No results found for this or any previous visit from the past 1095 days.    Stress Test:  No results found for this or any previous visit from the past 1095 days.    Cardiac Imaging:  No results found for this or any previous visit from " the past 1095 days.      Inpatient Medications:  Scheduled medications   Medication Dose Route Frequency    amLODIPine  10 mg oral Daily    aspirin  81 mg oral Daily    atorvastatin  80 mg oral Daily    cholecalciferol  25 mcg oral Daily    dextrose 1.5% - LOW calcium 2.5mEq/L (Dianeal, Delflex-LC) in 3,000 mL peritoneal dialysate   intraperitoneal Once    Followed by    dextrose 1.5% - LOW calcium 2.5mEq/L (Dianeal, Delflex-LC) in 3,000 mL peritoneal dialysate   intraperitoneal Once    DULoxetine  30 mg oral Daily    gentamicin   Topical Daily    insulin detemir  20 Units subcutaneous q24h    insulin lispro  0-10 Units subcutaneous TID with meals    multivitamin with minerals  1 tablet oral Daily    sennosides  2 tablet oral BID    sodium bicarbonate  650 mg oral BID    torsemide  50 mg oral Daily     PRN medications   Medication    acetaminophen    dextrose    dextrose    glucagon    heparin     Continuous Medications   Medication Dose Last Rate    heparin  1,500 Units/hr 1,500 Units/hr (10/01/23 8464)       Physical Exam:  Gen: no apparent distress  Skin: warm and dry, dressing on buttocks  ENMT: oral membranes moist  Lungs: respirations even and unlabored, CTA per auscultation  Heart: RRR S1S2 no murmur or gallop  Abd: soft, NT, ND + BS  Ext: warm with palpable radial and DP pulses  Neuro: A & O x3, speech clear,         Assessment/Plan   Salomon Chowdary is an 84 yo male with a PMHx of insulin dep (type2) diabetes, ESRD on PD, chronic pain, HLD, HTN, hypothyroid and arthritis who initially presented to OSH with chest pain and was found to have 2 vessel disease and was transferred to [facility] for CABG vs PCI evaluation.       CAD/ 2 vessel disease  NSTEMI  - admitted with CP, symptomatic with SOB and dry heaves with exertion at times recently  - troponin 122>608>1661 om 9/20, troponin today 24  - Echo 9/20 EF 50-55%, impaired relaxation of LV diastolic filling, small to moderate pericardial effusion  - [facility] on 9/20  showing disease  LAD of 75-80% in the proximal, early mid and late mid segments.   Lcx: distal cx 95% right before takeoff of left PDA  - consult IC for evaluation for PCI-to be presented at [facility] meeting tomorrow evening  - consult CTS for CABG eval-to be presented at [facility] meeting tomorrow evening  - cot heparin gtt  - cont asa, bb, statin  - cont cardiac monitor      New onset A fib with RVR  - episode at OSH  - converted to NSR  with metoprolol 5mg IV X1  - cont cardiac monitor  - cont heparin gtt  - TSH 6.26, free T3 1.12, restart home levothyroxine 25mg and endocrinology consult      HTN  - -148  - continue home amlodipine 10mg daily, tosemide 50mg daily      ESRD  - primary nephrologist Dr. Khan  - pt on nightly PD since 3/2023  - nephology consulted: continue torsemide 50mg daily, continue nightly PD  - cont daily electrolytes     T2IDDM  - 9/30 Hgb A1C 6.6  - pt on levemir 25u daily and trulicity 1.5mg/0.5ml once weekly on Tuesdays at home  - inpatient on detemir 20u daily and lispro SS  - cont to monitor     Anemia of chronic disease   - no need to check iron studies since patient has known ESRD  - pt previously getting epo with PD, renal will order  - Hgb 8.4 (7.8)     Free intraperitoneal air seen on CT chest  - abd soft without tenderness  - likely from PD catheter  -cont to monitor closely    Wound on buttocks  - wife changed dressing 9/30, patient complains of pain to site  - consult wound care RN    Spinal Stenosis  - wears neck brace for neck support  - baseline can do ADL's, walking long distances needs scooter due to chronic pain issues, neuropathy in feet, exertional SOB   - Declined PT/OT for now, also has exertional SOB and nausea with activity therefore recommend limited activity until evaluated by CTS     DVT Ppx:  Pt is on heparin gtt     Full code     Pt seen and examined and plan of care discussed with Dr. Allen  Peripheral IV 20 G Left Antecubital (Active)   Site Assessment  Clean;Dry;Intact 10/02/23 0318   Dressing Status Clean;Dry 10/02/23 0318   Number of days:        Code Status:  Full Code    I spent 45 minutes in the professional and overall care of this patient.        ANNIE Aguilar-CNP

## 2023-10-03 LAB
ABO GROUP (TYPE) IN BLOOD: NORMAL
ALBUMIN (G/DL) IN SER/PLAS: NORMAL
ALBUMIN SERPL BCP-MCNC: 3.1 G/DL (ref 3.4–5)
ANION GAP IN SER/PLAS: NORMAL
ANION GAP SERPL CALC-SCNC: 13 MMOL/L (ref 10–20)
ANTIBODY SCREEN: NORMAL
BUN SERPL-MCNC: 39 MG/DL (ref 6–23)
CALCIUM (MG/DL) IN SER/PLAS: NORMAL
CALCIUM SERPL-MCNC: 9.3 MG/DL (ref 8.6–10.6)
CARBON DIOXIDE, TOTAL (MMOL/L) IN SER/PLAS: NORMAL
CHLORIDE (MMOL/L) IN SER/PLAS: NORMAL
CHLORIDE SERPL-SCNC: 102 MMOL/L (ref 98–107)
CO2 SERPL-SCNC: 28 MMOL/L (ref 21–32)
CREAT SERPL-MCNC: 3.81 MG/DL (ref 0.5–1.3)
CREATININE (MG/DL) IN SER/PLAS: NORMAL
ERYTHROCYTE [DISTWIDTH] IN BLOOD BY AUTOMATED COUNT: 14.1 % (ref 11.5–14.5)
GFR FEMALE: NORMAL
GFR MALE: NORMAL
GFR SERPL CREATININE-BSD FRML MDRD: 15 ML/MIN/1.73M*2
GLOMERULAR FILTRATION RATE-AFRICAN AMERICAN: NORMAL ML/MIN/1.73M2
GLOMERULAR FILTRATION RATE-NON AFRICAN AMERICAN: NORMAL ML/MIN/1.73M2
GLUCOSE (MG/DL) IN SER/PLAS: NORMAL
GLUCOSE BLD MANUAL STRIP-MCNC: 128 MG/DL (ref 74–99)
GLUCOSE BLD MANUAL STRIP-MCNC: 134 MG/DL (ref 74–99)
GLUCOSE BLD MANUAL STRIP-MCNC: 172 MG/DL (ref 74–99)
GLUCOSE BLD MANUAL STRIP-MCNC: 225 MG/DL (ref 74–99)
GLUCOSE BLD MANUAL STRIP-MCNC: 232 MG/DL (ref 74–99)
GLUCOSE BLD MANUAL STRIP-MCNC: 48 MG/DL (ref 74–99)
GLUCOSE SERPL-MCNC: 87 MG/DL (ref 74–99)
HCT VFR BLD AUTO: 30.4 % (ref 41–52)
HGB BLD-MCNC: 9.1 G/DL (ref 13.5–17.5)
MCH RBC QN AUTO: 33.1 PG (ref 26–34)
MCHC RBC AUTO-ENTMCNC: 29.9 G/DL (ref 32–36)
MCV RBC AUTO: 111 FL (ref 80–100)
NRBC BLD-RTO: 0 /100 WBCS (ref 0–0)
PHOSPHATE (MG/DL) IN SER/PLAS: NORMAL
PHOSPHATE SERPL-MCNC: 3.7 MG/DL (ref 2.5–4.9)
PLATELET # BLD AUTO: 346 X10*3/UL (ref 150–450)
PLATELET # BLD AUTO: 346 X10*3/UL (ref 150–450)
PMV BLD AUTO: 9.6 FL (ref 7.5–11.5)
POC FINGERSTICK BLOOD GLUCOSE: 205 MG/DL (ref 70–100)
POTASSIUM (MMOL/L) IN SER/PLAS: NORMAL
POTASSIUM SERPL-SCNC: 4.4 MMOL/L (ref 3.5–5.3)
RBC # BLD AUTO: 2.75 X10*6/UL (ref 4.5–5.9)
RH FACTOR (ANTIGEN D): NORMAL
SODIUM (MMOL/L) IN SER/PLAS: NORMAL
SODIUM SERPL-SCNC: 139 MMOL/L (ref 136–145)
STAPHYLOCOCCUS SPEC CULT: NORMAL
UREA NITROGEN (MG/DL) IN SER/PLAS: NORMAL
WBC # BLD AUTO: 9.2 X10*3/UL (ref 4.4–11.3)

## 2023-10-03 PROCEDURE — 36415 COLL VENOUS BLD VENIPUNCTURE: CPT | Performed by: NURSE PRACTITIONER

## 2023-10-03 PROCEDURE — 90945 DIALYSIS ONE EVALUATION: CPT

## 2023-10-03 PROCEDURE — 82947 ASSAY GLUCOSE BLOOD QUANT: CPT

## 2023-10-03 PROCEDURE — 1200000002 HC GENERAL ROOM WITH TELEMETRY DAILY

## 2023-10-03 PROCEDURE — 2500000001 HC RX 250 WO HCPCS SELF ADMINISTERED DRUGS (ALT 637 FOR MEDICARE OP): Performed by: NURSE PRACTITIONER

## 2023-10-03 PROCEDURE — 99254 IP/OBS CNSLTJ NEW/EST MOD 60: CPT | Performed by: STUDENT IN AN ORGANIZED HEALTH CARE EDUCATION/TRAINING PROGRAM

## 2023-10-03 PROCEDURE — 90945 DIALYSIS ONE EVALUATION: CPT | Performed by: INTERNAL MEDICINE

## 2023-10-03 PROCEDURE — 86901 BLOOD TYPING SEROLOGIC RH(D): CPT | Performed by: STUDENT IN AN ORGANIZED HEALTH CARE EDUCATION/TRAINING PROGRAM

## 2023-10-03 PROCEDURE — 2500000005 HC RX 250 GENERAL PHARMACY W/O HCPCS: Performed by: NURSE PRACTITIONER

## 2023-10-03 PROCEDURE — 2500000002 HC RX 250 W HCPCS SELF ADMINISTERED DRUGS (ALT 637 FOR MEDICARE OP, ALT 636 FOR OP/ED): Performed by: NURSE PRACTITIONER

## 2023-10-03 PROCEDURE — 96372 THER/PROPH/DIAG INJ SC/IM: CPT | Performed by: NURSE PRACTITIONER

## 2023-10-03 PROCEDURE — 86920 COMPATIBILITY TEST SPIN: CPT

## 2023-10-03 PROCEDURE — 85027 COMPLETE CBC AUTOMATED: CPT | Performed by: NURSE PRACTITIONER

## 2023-10-03 PROCEDURE — 85049 AUTOMATED PLATELET COUNT: CPT | Performed by: NURSE PRACTITIONER

## 2023-10-03 PROCEDURE — 2500000001 HC RX 250 WO HCPCS SELF ADMINISTERED DRUGS (ALT 637 FOR MEDICARE OP)

## 2023-10-03 PROCEDURE — 80069 RENAL FUNCTION PANEL: CPT | Performed by: NURSE PRACTITIONER

## 2023-10-03 PROCEDURE — 2500000004 HC RX 250 GENERAL PHARMACY W/ HCPCS (ALT 636 FOR OP/ED): Performed by: NURSE PRACTITIONER

## 2023-10-03 PROCEDURE — 99233 SBSQ HOSP IP/OBS HIGH 50: CPT | Performed by: STUDENT IN AN ORGANIZED HEALTH CARE EDUCATION/TRAINING PROGRAM

## 2023-10-03 RX ADMIN — GENTAMICIN SULFATE: 1 CREAM TOPICAL at 09:08

## 2023-10-03 RX ADMIN — DEXTROSE MONOHYDRATE 25 G: 500 INJECTION PARENTERAL at 05:56

## 2023-10-03 RX ADMIN — Medication 25 MCG: at 09:09

## 2023-10-03 RX ADMIN — DULOXETINE HYDROCHLORIDE 30 MG: 30 CAPSULE, DELAYED RELEASE ORAL at 09:09

## 2023-10-03 RX ADMIN — SODIUM BICARBONATE 650 MG: 650 TABLET ORAL at 20:52

## 2023-10-03 RX ADMIN — Medication 1 TABLET: at 09:00

## 2023-10-03 RX ADMIN — TORSEMIDE 50 MG: 20 TABLET ORAL at 09:09

## 2023-10-03 RX ADMIN — INSULIN LISPRO 4 UNITS: 100 INJECTION, SOLUTION INTRAVENOUS; SUBCUTANEOUS at 12:40

## 2023-10-03 RX ADMIN — LEVOTHYROXINE SODIUM 25 MCG: 25 TABLET ORAL at 06:38

## 2023-10-03 RX ADMIN — ATORVASTATIN CALCIUM 80 MG: 80 TABLET ORAL at 20:52

## 2023-10-03 RX ADMIN — SODIUM BICARBONATE 650 MG: 650 TABLET ORAL at 09:10

## 2023-10-03 RX ADMIN — ACETAMINOPHEN 975 MG: 325 TABLET, FILM COATED ORAL at 11:37

## 2023-10-03 RX ADMIN — SENNOSIDES 17.2 MG: 8.6 TABLET, FILM COATED ORAL at 20:52

## 2023-10-03 RX ADMIN — HEPARIN SODIUM 1500 UNITS/HR: 10000 INJECTION, SOLUTION INTRAVENOUS at 11:26

## 2023-10-03 RX ADMIN — ASPIRIN 81 MG CHEWABLE TABLET 81 MG: 81 TABLET CHEWABLE at 09:09

## 2023-10-03 RX ADMIN — SENNOSIDES 17.2 MG: 8.6 TABLET, FILM COATED ORAL at 09:09

## 2023-10-03 RX ADMIN — AMLODIPINE BESYLATE 10 MG: 2.5 TABLET ORAL at 09:08

## 2023-10-03 RX ADMIN — INSULIN DETEMIR 15 UNITS: 100 INJECTION, SOLUTION SUBCUTANEOUS at 20:53

## 2023-10-03 RX ADMIN — INSULIN LISPRO 4 UNITS: 100 INJECTION, SOLUTION INTRAVENOUS; SUBCUTANEOUS at 09:10

## 2023-10-03 ASSESSMENT — PAIN SCALES - GENERAL: PAINLEVEL_OUTOF10: 3

## 2023-10-03 NOTE — HOSPITAL COURSE
MAGGIE KIRAN is a 83 year old Male with PMH of hypertension, hyperlipidemia, insulin-dependent DM II, ESRD on peritoneal dialysis, arthritis and hypothyroidism  who presented to  [facility] ED 9/19/23 with chest pain. His symptoms began as left arm pain that radiated from the shoulder to the hand initially, which he rated as 10/10. He tried taking acetaminophen for the pain which did not help. He also tried heating pads, which  did not provide any comfort either. On the way to the hospital, he developed chest pain that he rated 5/10. He received aspirin 325 mg per EMS on the way. Nothing similar has happened before. He reported that the chest pain is not pleuritic in nature  nor does it get worse on exertion. He did not endorse any new symptoms of fever, chills, dizziness or abdominal pain. He has almost daily episodes of dry heaves/vomiting, as well as baseline SOB. His home medications include levothyroxine, atorvastatin,  and amlodipine.      ED Course:   - Vitals: T 36.5, , /71, RR 20, SpO2 99%  - EKG: initial EKG showed ST segment elevations in lead V2 & V3 which then converted to T wave inversions   - CT angio remarkable for moderate pericardial effusion   - Labs: mild leukocytosis 11.4, Hg 8.5 (8.9 in 10/2022), Hct 31.1, glucose 271, BUN 64, Cr 4.68 (4.01 in 10/2022), GFR 12, , PT 13.1   - Troponin 122, 608, 1661   - D-dimer 3640  - Interventions: heparin infusion      He developed Afib with RVR in the ED, for which he was given metoprolol 5 mg IV push once and converted back to normal rhythm.  Echo 9/20 showed EF 50-55%, impaired relaxation of LV diastolic filling and small to moderate pericardial effusion. He transferred 9/30 from [facility] ICU for consideration of CABG vs PCI/rotablation for NSTEMI after [facility] 9/20/23 showed 2 vessel disease.     Underwent Mid CAB LIMA to LAD with plan for Cx PCI post.  Attempted PCI 10/16 but will require complex intervention.  Therefore plan  was to dc patient and follow up with Dr Hudson    Floor course:   10/2 Attempted to start low-dose BB pre-surgery, but jasiel down to 40's (asymptomatic), therefore was held. Both IC and CTS consulted. Patient was presented at [facility] meeting 10/3/23. Decision was made to pursue a MID-CABG with Dr. Eileen Neal on 10/10.     10/3 developed episode of hypoglycemia (glucose of 40). Detemir dose decreased to 12u daily. Endocrinology also recommended repeat TFT in one week for TSH of 6.26, Free T4 of 1.12. Patient to continue levothyroxine 25mg daily.  Repeat TSH showed 5.76, with T4 of 1.09.     Sacral wound, wound care RN visited with patient, recs appreciated/ordered.  Discharged with additional mepilex, to apply weekly.    Nephrology consulted for PD management. Weekly IV darbepoietin added for anemia, stopped sodium bicarb. Hgb of 7.9, s/p 1UPRBC on 10/5 (for optimization prior to surgery) with appropriate response.  Follow up for outpatient administration of darbedpoietin by nephrology    PT/OT rec RN/PT/OT    Discharge weight: 80.7 kg    Post op course significant for attempted PCI to Lcx.  He had diarrhea at discharge with wife and patient report as normal for him.  Instructions were given on medications, wound care and signs and symptoms to report - loss of appetite, malaise, edema or shortness of breath or chest pain.    After all labs and VS were reviewed the decision was made that the patient was medically stable for discharge.  The patient was discharged in satisfactory condition.    More than 40 minutes were spent in coordinating patient discharge.

## 2023-10-03 NOTE — CARE PLAN
Will continue with PD tonight as per submitted orders     8 hours   2400 fill volume   No last fill   Dextrose 1.5 %     Gentamicin cream ordered for exit site     Ko Soto MD   Nephrology fellow PGY4

## 2023-10-03 NOTE — PROGRESS NOTES
Coronary angiography was reviewed by interventional cardiology and cardiac surgery teams at the high risk coronary intervention meeting on  10/3/2023  . Decision was made to pursue  MID-CAB as best revascularization strategy for this patient.

## 2023-10-03 NOTE — PROGRESS NOTES
Subjective Data:  Patient did not feel well after low blood sugar, requesting to sleep it off. No other complaints.     Overnight Events:    Low blood sugar of 40, symptomatic. Required D50. Repeat 172.      Objective Data:  Last Recorded Vitals:  Vitals:    10/02/23 1633 10/02/23 1958 10/03/23 0721 10/03/23 1136   BP: 134/70 133/67 128/68 119/56   BP Location:  Left arm Right arm    Patient Position:  Lying     Pulse: 70 85 79 84   Resp: 18 17 16 16   Temp: 36.8 °C (98.3 °F) 36.8 °C (98.2 °F) 36.8 °C (98.3 °F) 36.8 °C (98.3 °F)   TempSrc:  Tympanic     SpO2: 97% 94%     Weight:       Height:           Last Labs:  CBC - 10/3/2023: 10:23 AM  9.2 9.1 346; 346    30.4      CMP - 10/3/2023: 10:23 AM  9.3 5.1 16 --- 0.4   3.7 3.1 19 70      PTT - 10/1/2023:  5:22 AM  1.2   13.1 >200     TROPHS   Date/Time Value Ref Range Status   10/02/2023 08:00 AM 24 0 - 53 ng/L Final     Comment:     result   09/20/2023 02:18 AM 2,226 0 - 20 ng/L Final     Comment:     .  Less than 99th percentile of normal range cutoff-  Female and children under 18 years old <14 ng/L; Male <21 ng/L: Negative  Repeat testing should be performed if clinically indicated.   .  Female and children under 18 years old 14-50 ng/L; Male 21-50 ng/L:  Consistent with possible cardiac damage and possible increased clinical   risk. Serial measurements may help to assess extent of myocardial damage.   .  >50 ng/L: Consistent with cardiac damage, increased clinical risk and  myocardial infarction. Serial measurements may help assess extent of   myocardial damage.   .   NOTE: Children less than 1 year old may have higher baseline troponin   levels and results should be interpreted in conjunction with the overall   clinical context.   .  NOTE: Troponin I testing is performed using a different   testing methodology at [facility] than at other   [facility] hospitals. Direct result comparisons should only   be made within the same method.  This is a critical  result.    Per Laboratory policy, critical results for this test   only qualify to the call list once per 24 hours.      09/19/2023 08:57 PM 1,661 0 - 20 ng/L Final     Comment:     .  Less than 99th percentile of normal range cutoff-  Female and children under 18 years old <14 ng/L; Male <21 ng/L: Negative  Repeat testing should be performed if clinically indicated.   .  Female and children under 18 years old 14-50 ng/L; Male 21-50 ng/L:  Consistent with possible cardiac damage and possible increased clinical   risk. Serial measurements may help to assess extent of myocardial damage.   .  >50 ng/L: Consistent with cardiac damage, increased clinical risk and  myocardial infarction. Serial measurements may help assess extent of   myocardial damage.   .   NOTE: Children less than 1 year old may have higher baseline troponin   levels and results should be interpreted in conjunction with the overall   clinical context.   .  NOTE: Troponin I testing is performed using a different   testing methodology at [facility] than at other   [facility]. Direct result comparisons should only   be made within the same method.  This is a critical result.    Per Laboratory policy, critical results for this test   only qualify to the call list once per 24 hours.      09/19/2023 06:15  0 - 20 ng/L Final     Comment:     .  Less than 99th percentile of normal range cutoff-  Female and children under 18 years old <14 ng/L; Male <21 ng/L: Negative  Repeat testing should be performed if clinically indicated.   .  Female and children under 18 years old 14-50 ng/L; Male 21-50 ng/L:  Consistent with possible cardiac damage and possible increased clinical   risk. Serial measurements may help to assess extent of myocardial damage.   .  >50 ng/L: Consistent with cardiac damage, increased clinical risk and  myocardial infarction. Serial measurements may help assess extent of   myocardial damage.   .   NOTE: Children less  than 1 year old may have higher baseline troponin   levels and results should be interpreted in conjunction with the overall   clinical context.   .  NOTE: Troponin I testing is performed using a different   testing methodology at [facility] than at other   system hospitals. Direct result comparisons should only   be made within the same method.  This is a critical result.    Per Laboratory policy, critical results for this test   only qualify to the call list once per 24 hours.        BNP   Date/Time Value Ref Range Status   09/19/2023 04:51  0 - 99 pg/mL Final     Comment:     .  <100 pg/mL - Heart failure unlikely  100-299 pg/mL - Intermediate probability of acute heart  .               failure exacerbation. Correlate with clinical  .               context and patient history.    >=300 pg/mL - Heart Failure likely. Correlate with clinical  .               context and patient history.  BNP testing is performed using different testing   methodology at [facility] than at other   [facility] hospitals. Direct result comparisons should   only be made within the same method.       HGBA1C   Date/Time Value Ref Range Status   09/30/2023 01:44 PM 6.6 see below % Final   09/20/2023 02:18 AM 6.9 % Final     Comment:          Diagnosis of Diabetes-Adults   Non-Diabetic: < or = 5.6%   Increased risk for developing diabetes: 5.7-6.4%   Diagnostic of diabetes: > or = 6.5%  .       Monitoring of Diabetes                Age (y)     Therapeutic Goal (%)   Adults:          >18           <7.0   Pediatrics:    13-18           <7.5                   7-12           <8.0                   0- 6            7.5-8.5   American Diabetes Association. Diabetes Care 33(S1), Jan 2010.     02/10/2023 11:46 AM 6.9 <5.7 % Final     Comment:     Normal less than 5.7%  Prediabetes 5.7% to 6.4%  Diabetes 6.5% or higher      --HgbA1C levels may not be accurate in patients who have renal disease, received recent blood  "transfusions, are anemic, or who have dyshemoglobinemia.   09/28/2022 08:51 AM 7.6 % Final     Comment:     Normal less than 5.7%  Prediabetes 5.7% to 6.4%  Diabetes 6.5% or higher      --HgbA1C levels may not be accurate in patients who have  renal disease, received recent blood transfusions, are anemic,  or who have dyshemoglobinemia.   12/29/2021 08:52 AM 6.8 % Final     Comment:     Normal less than 5.7%  Prediabetes 5.7% to 6.4%  Diabetes 6.5% or higher      --HgbA1C levels may not be accurate in patients who have  renal disease, received recent blood transfusions, are anemic,  or who have dyshemoglobinemia.     LDLCALC   Date/Time Value Ref Range Status   10/02/2023 07:59 AM 23 140 - 190 mg/dL Final     Comment:                                 Near   Borderline      AGE      Desirable  Optimal    High     High     Very High     0-19 Y     0 - 109     ---    110-129   >/= 130     ----    20-24 Y     0 - 119     ---    120-159   >/= 160     ----      >24 Y     0 -  99   100-129  130-159   160-189     >/=190       VLDL   Date/Time Value Ref Range Status   10/02/2023 07:59 AM 19 0 - 40 mg/dL Final      Last I/O:  I/O last 3 completed shifts:  In: 240 (2.9 mL/kg) [P.O.:240]  Out: 3676 (44.7 mL/kg) [Urine:3675 (1.2 mL/kg/hr); Stool:1]  Weight: 82.2 kg     Past Cardiology Tests (Last 3 Years):  EKG:  No results found for this or any previous visit from the past 1095 days.    Echo:  Transthoracic Echo (TTE) Complete 10/2/2023    Ejection Fractions:  No results found for: \"EF\"  Cath:  No results found for this or any previous visit from the past 1095 days.    Stress Test:  No results found for this or any previous visit from the past 1095 days.    Cardiac Imaging:  No results found for this or any previous visit from the past 1095 days.      Inpatient Medications:  Scheduled medications   Medication Dose Route Frequency    amLODIPine  10 mg oral Daily    aspirin  81 mg oral Daily    atorvastatin  80 mg oral Daily    " cholecalciferol  25 mcg oral Daily    DULoxetine  30 mg oral Daily    gentamicin   Topical Daily    insulin detemir  20 Units subcutaneous q24h    insulin lispro  0-10 Units subcutaneous TID with meals    levothyroxine  25 mcg oral Daily    multivitamin with minerals  1 tablet oral Daily    sennosides  2 tablet oral BID    sodium bicarbonate  650 mg oral BID    torsemide  50 mg oral Daily     PRN medications   Medication    acetaminophen    dextrose    dextrose    glucagon    heparin     Continuous Medications   Medication Dose Last Rate    heparin  1,500 Units/hr 1,500 Units/hr (10/03/23 1126)       Physical Exam:  Gen: no apparent distress  Skin: warm and dry, dressing on buttocks  ENMT: oral membranes moist  Lungs: respirations even and unlabored, CTA per auscultation  Heart: RRR S1S2 I/IV systolic murmur right sternal border, no  gallop  Abd: soft, NT, ND + BS  Ext: warm with palpable radial and DP pulses  Neuro: A & O x3, speech clear         Assessment/Plan   Salomon Chowdary is an 82 yo male with a PMHx of insulin dep (type2) diabetes, ESRD on PD, chronic pain, HLD, HTN, hypothyroid and arthritis who initially presented to OSH with chest pain and was found to have 2 vessel disease and was transferred to [facility] for CABG vs PCI evaluation.       CAD/ 2 vessel disease  NSTEMI  - admitted with CP, symptomatic with SOB and dry heaves with exertion at times recently  - troponin 122>608>1661 om 9/20, troponin 10/2: 24  - Echo 9/20 EF 50-55%, impaired relaxation of LV diastolic filling, small to moderate pericardial effusion  - LHC on 9/20 showing disease  LAD of 75-80% in the proximal, early mid and late mid segments.   Lcx: distal cx 95% right before takeoff of left PDA  - consult IC for evaluation for PCI-to be presented at [facility] meeting tonight   - consult CTS for CABG eval-to be presented at [facility] meeting tonight   - cot heparin gtt  - cont asa, bb, statin  - cont cardiac monitor      New onset A fib with RVR  -  episode at OSH  - converted to NSR  with metoprolol 5mg IV X1  - cont cardiac monitor  - cont heparin gtt  - 10/2 TSH 6.26, free T3 1.12, restart home levothyroxine 25mg and endocrinology consult      HTN  - -148  - continue home amlodipine 10mg daily, tosemide 50mg daily      ESRD  - primary nephrologist Dr. Khan  - pt on nightly PD since 3/2023  - nephology consulted: continue torsemide 50mg daily, continue nightly PD  - cont daily electrolytes     T2IDDM  - 9/30 Hgb A1C 6.6  - pt on levemir 25u daily and trulicity 1.5mg/0.5ml once weekly on Tuesdays at home  - inpatient on detemir 20u daily and lispro SS  - hypogylcemia 40 overnight. Endocrine consult. Hold detemir, await recs     Anemia of chronic disease   - no need to check iron studies since patient has known ESRD  - pt previously getting epo with PD, renal will order  - Hgb 8.4 (7.8)     Free intraperitoneal air seen on CT chest  - abd soft without tenderness  - likely from PD catheter  -cont to monitor closely    Wound on buttocks  - wife changed dressing 9/30, patient complains of pain to site  - 10/2 consult wound care RN appreciate recs    Spinal Stenosis  - wears neck brace for neck support  - baseline can do ADL's, walking long distances needs scooter due to chronic pain issues, neuropathy in feet, exertional SOB   - Declined PT/OT for now, also has exertional SOB and nausea with activity therefore recommend limited activity until evaluated by CTS     DVT Ppx:  Pt is on heparin gtt     Full code     Pt seen and examined and plan of care discussed with Dr. Allen  Peripheral IV 20 G Left Antecubital (Active)   Site Assessment Clean;Dry;Intact 10/02/23 0318   Dressing Status Clean;Dry 10/02/23 0318   Number of days:        Code Status:  Full Code    I spent 45 minutes in the professional and overall care of this patient.        Lona Willard, APRN-CNP

## 2023-10-03 NOTE — PROGRESS NOTES
Salomon Chowdary   83 y.o.  M   MRN/Room: [id number]    Subjective:   Stable   Tolerating PD overnight   No CP, says he has chronic SOB  No nausea, vomiting, fever    Objective:     Meds:   amLODIPine, 10 mg, Daily  aspirin, 81 mg, Daily  atorvastatin, 80 mg, Daily  cholecalciferol, 25 mcg, Daily  DULoxetine, 30 mg, Daily  gentamicin, , Daily  insulin detemir, 10 Units, q24h  insulin lispro, 0-10 Units, TID with meals  levothyroxine, 25 mcg, Daily  multivitamin with minerals, 1 tablet, Daily  sennosides, 2 tablet, BID  sodium bicarbonate, 650 mg, BID  torsemide, 50 mg, Daily      heparin, Last Rate: 1,500 Units/hr (10/03/23 1126)      acetaminophen, 975 mg, q6h PRN  dextrose, 0.3 g/kg/hr, Once PRN  dextrose, 25 g, q15 min PRN  glucagon, 1 mg, q15 min PRN  heparin, 2,000-4,000 Units, q4h PRN        Vitals:    10/03/23 1136   BP: 119/56   Pulse: 84   Resp: 16   Temp: 36.8 °C (98.3 °F)   SpO2:           Intake/Output Summary (Last 24 hours) at 10/3/2023 1409  Last data filed at 10/3/2023 1030  Gross per 24 hour   Intake 240 ml   Output 2300 ml   Net -2060 ml       General appearance: no distress  Eyes: non-icteric  Skin: no apparent rash  Heart: S1 S2 regular  Lungs: CTA bilat No wheezing/crackles  Abdomen: soft, nt/nd  Extremities: No edema bilat  Neuro: No FND,asterixis    Blood Labs:  Results for orders placed or performed during the hospital encounter of 09/30/23 (from the past 24 hour(s))   POCT GLUCOSE   Result Value Ref Range    POCT Glucose 235 (H) 74 - 99 mg/dL   POCT GLUCOSE   Result Value Ref Range    POCT Glucose 201 (H) 74 - 99 mg/dL   POCT GLUCOSE   Result Value Ref Range    POCT Glucose 48 (L) 74 - 99 mg/dL   POCT GLUCOSE   Result Value Ref Range    POCT Glucose 172 (H) 74 - 99 mg/dL   POCT GLUCOSE   Result Value Ref Range    POCT Glucose 128 (H) 74 - 99 mg/dL   Platelet count - HIT surveillance   Result Value Ref Range    Platelets 346 150 - 450 x10*3/uL   CBC   Result Value Ref Range    WBC 9.2  4.4 - 11.3 x10*3/uL    nRBC 0.0 0.0 - 0.0 /100 WBCs    RBC 2.75 (L) 4.50 - 5.90 x10*6/uL    Hemoglobin 9.1 (L) 13.5 - 17.5 g/dL    Hematocrit 30.4 (L) 41.0 - 52.0 %     (H) 80 - 100 fL    MCH 33.1 26.0 - 34.0 pg    MCHC 29.9 (L) 32.0 - 36.0 g/dL    RDW 14.1 11.5 - 14.5 %    Platelets 346 150 - 450 x10*3/uL    MPV 9.6 7.5 - 11.5 fL   Renal Function Panel   Result Value Ref Range    Glucose 87 74 - 99 mg/dL    Sodium 139 136 - 145 mmol/L    Potassium 4.4 3.5 - 5.3 mmol/L    Chloride 102 98 - 107 mmol/L    Bicarbonate 28 21 - 32 mmol/L    Anion Gap 13 10 - 20 mmol/L    Urea Nitrogen 39 (H) 6 - 23 mg/dL    Creatinine 3.81 (H) 0.50 - 1.30 mg/dL    eGFR 15 (L) >60 mL/min/1.73m*2    Calcium 9.3 8.6 - 10.6 mg/dL    Phosphorus 3.7 2.5 - 4.9 mg/dL    Albumin 3.1 (L) 3.4 - 5.0 g/dL   POCT GLUCOSE   Result Value Ref Range    POCT Glucose 225 (H) 74 - 99 mg/dL              ASSESSMENT:  Salomon Chowdary is a  83 y.o.  Year old , with PMHx of CAD, HTN, ESRD on PD since March 2023, who is currently admitted under the cardiology service for CABG evaluation. Nephrology following to facilitate PD while inpatient.     RECOMMENDATIONS:  Patient is euvolemic, hemodynamics stable   Metabolic parameters acceptable   Plan to continue PD as per submitted orders- Total fill volume 5000c, 8 hours, 2400 fill volume, 1.5% bags     DW Dr Rony Soto MD  Nephrology Fellow PGY 4  Available via Epic chat

## 2023-10-03 NOTE — CONSULTS
"Inpatient consult to Endocrinology  Consult performed by: Ivette Marie MD  Consult ordered by: Antonina Allen MD PhD      Reason For Consult  Abnormal TFT and Diabetes management    History Of Present Illness  Salomon Chowdary is a 83 y.o. male with IDDM, HTN, HLD, ESRD on PD,  CAD, A-Fib and hypothyroidism presented to an OSH on 9/20 with chest pain. He was found to have NSTEMI with LHC showing 2 vessel disease in the LAD and left circumflex. He was transferred to [facility] for evaluation of revascularization plan. Endocrinology is consulted iso abnormal TFT with TSH of 6.26 and FT4 1.12 as well as diabetes management.    Patient reports that he used to follow-up with an endocrinologist with [facility] in [place] who used to manage both his hypothyroidism and his diabetes.     Regarding his hypothyroidism he reports that he has been on 25 mcg of levothyroxine for the past 5 years now.  He recalls that his provider told him that he did not have an \"significant\" but had him on this low dose. TSH on 9/20 was 2.34. He reports compliance with his medication.     Of note, patient received his levothyroxine while he was at [facility] but was held for the first 2 days during his hospitalization here at [facility].  He is denying any symptoms of fatigue currently but reports that he had fatigue prior to hospital admission, is reporting weight loss over the past year, typically has 1 bowel movement a day, denies any muscle cramps, but does endorse cold intolerance.    Regarding his diabetes:  Diabetes duration: Since 1995, has been on insulin since 2012   Home blood glucose checks: Daily  Trends: 80s to 130s  Hypoglycemia (y/n, threshold and symptoms): Yes, rarely, BG in the 60s, shakes/sweating/halos in his vision  Home diabetes regimen: Detemir 18 units bedtime and Trulicity 1.5 mg q. Weekly (has not received it for the past 2 weeks)  Home diet (how many meals a day): 2-3 meals a day  Outpatient physician managing " diabetes: Endocrinologist with [facility].  Not seen in the past 3 months would like to establish with a new endocrinologist.  Currently follows with PCP.  Macrovascular complications: CVD [no diagnosed CAD to major vessel disease]  CVA [ denies]     PVD [denies]  Microvascular complications: Retinopathy [ denies]  Nephropathy [ESRD on PD]   Neuropathy [endorses]  Last A1c: 6.6 9/2023    Inpatient diabetes regimen: 20 units of detemir and a low dose SS  Inpatient diet: Regular diet      Per chart review:  Past Medical History:  Type 2 IDDM  ERSD on PD  Chronic pain from the senior  Hypothyroid  Neuropathy  HTN  Basal cell skin (removed)     Past Surgical History:  PD catheter placement  Spinal surgury - 7 fused vertebrae  Carpal tunnel surgery      Social History:  No smoking, no alcohol, no drug use     Family History:  Father with 4 vessel bypass, black lung  Mother with dementia  Brothers x2  with diabetes     Allergies  Opioids - morphine analogues    Medications  No current facility-administered medications on file prior to encounter.     Current Outpatient Medications on File Prior to Encounter   Medication Sig Dispense Refill    acetaminophen (Tylenol) 500 mg tablet Take 2 tablets (1,000 mg) by mouth every 6 hours if needed for mild pain (1 - 3).      amLODIPine (Norvasc) 10 mg tablet Take 1 tablet (10 mg) by mouth once daily.      atorvastatin (Lipitor) 10 mg tablet Take 1 tablet (10 mg) by mouth once daily.      b complex vitamins capsule Take 1 capsule by mouth once daily.      cholecalciferol (Vitamin D-3) 25 MCG (1000 UT) tablet Take 1 tablet (25 mcg) by mouth once daily.      dulaglutide (Trulicity) 1.5 mg/0.5 mL pen injector injection Inject 1.5 mg under the skin 1 (one) time per week. Tuesdays      DULoxetine (Cymbalta) 30 mg DR capsule Take 1 capsule (30 mg) by mouth once daily. Do not crush or chew.      glucosamine HCl 1,500 mg tablet Take 1 tablet by mouth 2 times a day.      hydrALAZINE  "(Apresoline) 25 mg tablet Take 1 tablet (25 mg) by mouth 3 times a day. Pt reported Not taking anymore      insulin detemir (Levemir U-100 Insulin) 100 unit/mL injection Inject 25 Units under the skin once daily. Take as directed per insulin instructions.      levothyroxine (Synthroid, Levoxyl) 25 mcg tablet Take 1 tablet (25 mcg) by mouth once daily in the morning. Take before meals.      loratadine-pseudoephedrine (Claritin-D 24-hour)  mg 24 hr tablet Take 1 tablet by mouth once daily as needed for allergies. Do not crush, chew, or split.      multivitamin tablet Take 1 tablet by mouth once daily.      sennosides-docusate sodium (Diana-Colace) 8.6-50 mg tablet Take 2 tablets by mouth 2 times a day. Now not taking      sodium bicarbonate 650 mg tablet Take 1 tablet (650 mg) by mouth 2 times a day.      torsemide (Demadex) 100 mg tablet Take 0.5 tablets (50 mg) by mouth once daily.      vit B-min-saw palmetto-[place] (Urinozinc Prostate Classic) 320-25 mg capsule Take 1 tablet by mouth once daily.          Physical Exam     ROS, PMH, FH/SH, surgical history and allergies have been reviewed.    Constitutional: NAD, well groomed. AOx3. Cooperative  Skin/Hair: Warm, dry skin.  HEENT: EOMI, Anicteric scleras, No lid lag or lid retraction  Neck: Soft, supple.   Cardiovascular: RRR, systolic murmur was appreciated   Respiratory: no accessory muscle use.  Abdomen: Soft, nontender to palpation,PD drain in place.  Extremities: No peripheral edema.  Neuro: Moving all extremities spontaneously. CN's grossly intact.   DTRs: no delay in relaxation phase.    Last Recorded Vitals  Blood pressure 133/67, pulse 85, temperature 36.8 °C (98.2 °F), temperature source Tympanic, resp. rate 17, height 1.752 m (5' 8.98\"), weight 82.2 kg (181 lb 3.5 oz), SpO2 94 %.    Relevant Results  Lab Results   Component Value Date    TSH 6.26 (H) 10/02/2023    TSH 2.34 09/20/2023    FREET4 1.12 10/02/2023      Lab Results   Component Value Date    " HGBA1C 6.6 (H) 09/30/2023    HGBA1C 6.9 (A) 09/20/2023    HGBA1C 6.9 (H) 02/10/2023    HGBA1C 7.6 (A) 09/28/2022    HGBA1C 6.8 (A) 12/29/2021         Assessment/Plan   Salomon Chowdary is a 83 y.o. male with IDDM, HTN, HLD, ESRD on PD,  CAD, A-Fib and hypothyroidism presented to an OSH ON 9/20 with chest pain. He was found to have NSTEMI with LHC showing 2 vessel disease in the LAD and left circumflex. He was transferred to [facility] for evaluation of revascularization plan. Endocrinology is consulted iso abnormal TFT with TSH of 6.26 and FT4 1.12.     #Hypothyroidism   Patient is on 25 mcg of LT4. TSH was 2.34 on the day of presentation to the OSH. Presented with NSTEMI as above with Trop peaked to >2000 was initially managed in the ICU. No FT4 was obtained on 9/20. Now TSH is elevated with FT4 1.12 (WNL). This is likely to nonthyroidal illness (previously known as euthyroid sick syndrome) iso acute illness as above   He appears clinically euthyroid.     Recommendations  -Continue current dose of LT4 25 mcg   -Repeat TFT in 1 week 10/10    #DM2   Background Hx:  Diabetes duration: Since 1995, has been on insulin since 2012   Home blood glucose checks: Daily  Trends: 80s to 130s  Hypoglycemia (y/n, threshold and symptoms): Yes, rarely, BG in the 60s, shakes/sweating/halos in his vision  Home diabetes regimen: Detemir 18 units bedtime and Trulicity 1.5 mg q. Weekly (has not received it for the past 2 weeks)  Home diet (how many meals a day): 2-3 meals a day  Outpatient physician managing diabetes: Endocrinologist with [facility].  Not seen in the past 3 months would like to establish with a new endocrinologist.  Currently follows with PCP.  Macrovascular complications: CVD [no diagnosed CAD to major vessel disease]  CVA [ denies]     PVD [denies]  Microvascular complications: Retinopathy [ denies]  Nephropathy [ESRD on PD]   Neuropathy [endorses]  Last A1c: 6.6 9/2023    Inpatient diabetes regimen: 20 units of detemir  and a low dose SS  Inpatient diet: Regular diet      Recommendations:  -Goal -180 while inpatient  -Mild SSI insulin AC   -Decrease detemir to 15 units   -Accuchecks ACHS  -Endocrinology team will continue to follow.    Plan was communicated to the primary team     Patient was seen, examined and plan was discussed with Dr. Antonio Marie MD

## 2023-10-04 LAB
ALBUMIN SERPL BCP-MCNC: 2.8 G/DL (ref 3.4–5)
ALBUMIN SERPL BCP-MCNC: 2.9 G/DL (ref 3.4–5)
ANION GAP SERPL CALC-SCNC: 16 MMOL/L (ref 10–20)
ANION GAP SERPL CALC-SCNC: 16 MMOL/L (ref 10–20)
BASE EXCESS BLDA CALC-SCNC: 1.5 MMOL/L (ref -2–3)
BODY TEMPERATURE: 37 DEGREES CELSIUS
BUN SERPL-MCNC: 38 MG/DL (ref 6–23)
BUN SERPL-MCNC: 46 MG/DL (ref 6–23)
CALCIUM SERPL-MCNC: 8.7 MG/DL (ref 8.6–10.6)
CALCIUM SERPL-MCNC: 8.9 MG/DL (ref 8.6–10.6)
CHLORIDE SERPL-SCNC: 101 MMOL/L (ref 98–107)
CHLORIDE SERPL-SCNC: 101 MMOL/L (ref 98–107)
CO2 SERPL-SCNC: 25 MMOL/L (ref 21–32)
CO2 SERPL-SCNC: 26 MMOL/L (ref 21–32)
COHGB MFR BLDA: 1.4 %
CREAT SERPL-MCNC: 4.1 MG/DL (ref 0.5–1.3)
CREAT SERPL-MCNC: 4.48 MG/DL (ref 0.5–1.3)
DO-HGB MFR BLDA: 1.3 % (ref 0–5)
GFR SERPL CREATININE-BSD FRML MDRD: 12 ML/MIN/1.73M*2
GFR SERPL CREATININE-BSD FRML MDRD: 14 ML/MIN/1.73M*2
GLUCOSE BLD MANUAL STRIP-MCNC: 106 MG/DL (ref 74–99)
GLUCOSE BLD MANUAL STRIP-MCNC: 147 MG/DL (ref 74–99)
GLUCOSE BLD MANUAL STRIP-MCNC: 160 MG/DL (ref 74–99)
GLUCOSE BLD MANUAL STRIP-MCNC: 65 MG/DL (ref 74–99)
GLUCOSE SERPL-MCNC: 220 MG/DL (ref 74–99)
GLUCOSE SERPL-MCNC: 85 MG/DL (ref 74–99)
HCO3 BLDA-SCNC: 25.9 MMOL/L (ref 22–26)
HEPARIN UNFRACTIONATED IN PPP BY CHROMOGENIC MET: NORMAL
HGB BLDA-MCNC: 9.9 G/DL (ref 13.5–17.5)
METHGB MFR BLDA: 0.9 % (ref 0–1.5)
OXYHGB MFR BLDA: 96.4 % (ref 94–98)
PCO2 BLDA: 39 MM HG (ref 38–42)
PH BLDA: 7.43 PH (ref 7.38–7.42)
PHOSPHATE SERPL-MCNC: 3.3 MG/DL (ref 2.5–4.9)
PHOSPHATE SERPL-MCNC: 3.5 MG/DL (ref 2.5–4.9)
PO2 BLDA: 104 MM HG (ref 85–95)
POTASSIUM SERPL-SCNC: 4.8 MMOL/L (ref 3.5–5.3)
POTASSIUM SERPL-SCNC: 5.1 MMOL/L (ref 3.5–5.3)
SAO2 % BLDA: 99 % (ref 94–100)
SODIUM SERPL-SCNC: 137 MMOL/L (ref 136–145)
SODIUM SERPL-SCNC: 138 MMOL/L (ref 136–145)
UFH PPP CHRO-ACNC: 0.4 IU/ML

## 2023-10-04 PROCEDURE — 85520 HEPARIN ASSAY: CPT | Performed by: NURSE PRACTITIONER

## 2023-10-04 PROCEDURE — 2500000001 HC RX 250 WO HCPCS SELF ADMINISTERED DRUGS (ALT 637 FOR MEDICARE OP): Performed by: NURSE PRACTITIONER

## 2023-10-04 PROCEDURE — 2500000002 HC RX 250 W HCPCS SELF ADMINISTERED DRUGS (ALT 637 FOR MEDICARE OP, ALT 636 FOR OP/ED)

## 2023-10-04 PROCEDURE — 1200000002 HC GENERAL ROOM WITH TELEMETRY DAILY

## 2023-10-04 PROCEDURE — 90947 DIALYSIS REPEATED EVAL: CPT

## 2023-10-04 PROCEDURE — 99233 SBSQ HOSP IP/OBS HIGH 50: CPT | Performed by: STUDENT IN AN ORGANIZED HEALTH CARE EDUCATION/TRAINING PROGRAM

## 2023-10-04 PROCEDURE — 82947 ASSAY GLUCOSE BLOOD QUANT: CPT

## 2023-10-04 PROCEDURE — 80069 RENAL FUNCTION PANEL: CPT

## 2023-10-04 PROCEDURE — 36415 COLL VENOUS BLD VENIPUNCTURE: CPT | Performed by: NURSE PRACTITIONER

## 2023-10-04 PROCEDURE — 85018 HEMOGLOBIN: CPT

## 2023-10-04 PROCEDURE — 2500000004 HC RX 250 GENERAL PHARMACY W/ HCPCS (ALT 636 FOR OP/ED): Performed by: NURSE PRACTITIONER

## 2023-10-04 PROCEDURE — 80069 RENAL FUNCTION PANEL: CPT | Performed by: NURSE PRACTITIONER

## 2023-10-04 PROCEDURE — 94010 BREATHING CAPACITY TEST: CPT

## 2023-10-04 PROCEDURE — 99232 SBSQ HOSP IP/OBS MODERATE 35: CPT | Performed by: STUDENT IN AN ORGANIZED HEALTH CARE EDUCATION/TRAINING PROGRAM

## 2023-10-04 PROCEDURE — 90945 DIALYSIS ONE EVALUATION: CPT | Performed by: INTERNAL MEDICINE

## 2023-10-04 PROCEDURE — 2500000004 HC RX 250 GENERAL PHARMACY W/ HCPCS (ALT 636 FOR OP/ED)

## 2023-10-04 PROCEDURE — 2500000002 HC RX 250 W HCPCS SELF ADMINISTERED DRUGS (ALT 637 FOR MEDICARE OP, ALT 636 FOR OP/ED): Performed by: NURSE PRACTITIONER

## 2023-10-04 PROCEDURE — 96372 THER/PROPH/DIAG INJ SC/IM: CPT | Performed by: NURSE PRACTITIONER

## 2023-10-04 PROCEDURE — 96372 THER/PROPH/DIAG INJ SC/IM: CPT

## 2023-10-04 RX ORDER — INSULIN LISPRO 100 [IU]/ML
3 INJECTION, SOLUTION INTRAVENOUS; SUBCUTANEOUS
Status: DISCONTINUED | OUTPATIENT
Start: 2023-10-04 | End: 2023-10-07

## 2023-10-04 RX ADMIN — INSULIN LISPRO 3 UNITS: 100 INJECTION, SOLUTION INTRAVENOUS; SUBCUTANEOUS at 17:08

## 2023-10-04 RX ADMIN — SODIUM CHLORIDE, SODIUM LACTATE, CALCIUM CHLORIDE, MAGNESIUM CHLORIDE AND DEXTROSE: 1.5; 538; 448; 18.3; 5.08 INJECTION, SOLUTION INTRAPERITONEAL at 17:00

## 2023-10-04 RX ADMIN — Medication 25 MCG: at 09:51

## 2023-10-04 RX ADMIN — HEPARIN SODIUM 1300 UNITS/HR: 10000 INJECTION, SOLUTION INTRAVENOUS at 04:28

## 2023-10-04 RX ADMIN — DULOXETINE HYDROCHLORIDE 30 MG: 30 CAPSULE, DELAYED RELEASE ORAL at 09:50

## 2023-10-04 RX ADMIN — ASPIRIN 81 MG CHEWABLE TABLET 81 MG: 81 TABLET CHEWABLE at 09:50

## 2023-10-04 RX ADMIN — GENTAMICIN SULFATE: 1 CREAM TOPICAL at 16:43

## 2023-10-04 RX ADMIN — SODIUM CHLORIDE, SODIUM LACTATE, CALCIUM CHLORIDE, MAGNESIUM CHLORIDE AND DEXTROSE: 1.5; 538; 448; 18.3; 5.08 INJECTION, SOLUTION INTRAPERITONEAL at 14:15

## 2023-10-04 RX ADMIN — TORSEMIDE 50 MG: 20 TABLET ORAL at 09:50

## 2023-10-04 RX ADMIN — SODIUM BICARBONATE 650 MG: 650 TABLET ORAL at 21:23

## 2023-10-04 RX ADMIN — ACETAMINOPHEN 975 MG: 325 TABLET, FILM COATED ORAL at 06:29

## 2023-10-04 RX ADMIN — ATORVASTATIN CALCIUM 80 MG: 80 TABLET ORAL at 21:24

## 2023-10-04 RX ADMIN — INSULIN DETEMIR 12 UNITS: 100 INJECTION, SOLUTION SUBCUTANEOUS at 21:25

## 2023-10-04 RX ADMIN — AMLODIPINE BESYLATE 10 MG: 2.5 TABLET ORAL at 09:51

## 2023-10-04 RX ADMIN — ACETAMINOPHEN 975 MG: 325 TABLET, FILM COATED ORAL at 21:23

## 2023-10-04 RX ADMIN — GENTAMICIN SULFATE: 1 CREAM TOPICAL at 09:00

## 2023-10-04 RX ADMIN — Medication 1 TABLET: at 09:50

## 2023-10-04 RX ADMIN — LEVOTHYROXINE SODIUM 25 MCG: 25 TABLET ORAL at 05:44

## 2023-10-04 RX ADMIN — INSULIN LISPRO 2 UNITS: 100 INJECTION, SOLUTION INTRAVENOUS; SUBCUTANEOUS at 13:25

## 2023-10-04 RX ADMIN — SODIUM BICARBONATE 650 MG: 650 TABLET ORAL at 09:50

## 2023-10-04 ASSESSMENT — PAIN SCALES - GENERAL
PAINLEVEL_OUTOF10: 3
PAINLEVEL_OUTOF10: 0 - NO PAIN

## 2023-10-04 ASSESSMENT — PAIN - FUNCTIONAL ASSESSMENT: PAIN_FUNCTIONAL_ASSESSMENT: 0-10

## 2023-10-04 NOTE — PROGRESS NOTES
PD nurse at bedside set up cycler and dressing change complete.  RN given written, verbal and visual instruction in how to connect /disconnect pt from PD catheter.  Pager [phone]

## 2023-10-04 NOTE — PROGRESS NOTES
Subjective Data:  Patient reports feeling well this morning, without CP or SOB. Endorses walking with RN yesterday and was very successful walking labs around the floor without limitation.    - CHIP meeting decision to pursue midCAB pending Dr. Sultana Neal's evaluation today  - encouraged continued ambulation     Overnight Events:    Low blood sugar of 65 @0627, asymptomatic. No intervention warranted. Repeat @0702 106.     Objective Data:  Last Recorded Vitals:  Vitals:    10/04/23 0057 10/04/23 0424 10/04/23 0759 10/04/23 1109   BP: 142/63 122/68 118/62 131/66   BP Location: Left arm Right arm     Patient Position: Lying Lying     Pulse: 60 80 79 83   Resp: 18 18 18 18   Temp: 36.6 °C (97.8 °F) 36.3 °C (97.4 °F) 36.6 °C (97.8 °F) 36.4 °C (97.5 °F)   TempSrc: Temporal Temporal Temporal Temporal   SpO2: 94% 95% 96% 95%   Weight:   79.9 kg (176 lb 2.4 oz)    Height:           Last Labs:  CBC - 10/3/2023: 10:23 AM  9.2 9.1 346; 346    30.4      CMP - 10/4/2023:  7:57 AM  8.7 5.1 16 --- 0.4   3.5 2.8 19 70      PTT - 10/1/2023:  5:22 AM  1.2   13.1 >200        Last I/O:  I/O last 3 completed shifts:  In: 240 (2.9 mL/kg) [P.O.:240]  Out: 2625 (31.9 mL/kg) [Urine:2625 (0.9 mL/kg/hr)]  Weight: 82.2 kg       Echo:  Transthoracic Echo (TTE) Complete 10/2/2023        Inpatient Medications:  Scheduled medications   Medication Dose Route Frequency    amLODIPine  10 mg oral Daily    aspirin  81 mg oral Daily    atorvastatin  80 mg oral Daily    cholecalciferol  25 mcg oral Daily    DULoxetine  30 mg oral Daily    gentamicin   Topical Daily    insulin detemir  15 Units subcutaneous q24h    insulin lispro  0-10 Units subcutaneous TID with meals    levothyroxine  25 mcg oral Daily    multivitamin with minerals  1 tablet oral Daily    sennosides  2 tablet oral BID    sodium bicarbonate  650 mg oral BID    torsemide  50 mg oral Daily     PRN medications   Medication    acetaminophen    dextrose 10 % in water (D10W)    dextrose     glucagon    heparin     Continuous Medications   Medication Dose Last Rate    dextrose 1.5% - LOW calcium 2.5mEq/L (Dianeal, Delflex-LC) in 2,500 mL peritoneal dialysate        dextrose 1.5% - LOW calcium 2.5mEq/L (Dianeal, Delflex-LC) in 2,500 mL peritoneal dialysate        heparin  1,500 Units/hr 1,300 Units/hr (10/04/23 0428)       Physical Exam:  Gen: no apparent distress  Skin: warm and dry, dressing on buttocks  ENMT: oral membranes moist  Lungs: respirations even and unlabored, CTA per auscultation  Heart: RRR S1S2 I/IV systolic murmur right sternal border, no  gallop  Abd: soft, NT, ND + BS  Ext: warm with palpable radial and DP pulses  Neuro: A & O x3, speech clear         Assessment/Plan   Salomon Chowdary is an 82 yo male with a PMHx of insulin dep (type2) diabetes, ESRD on PD, chronic pain, HLD, HTN, hypothyroid and arthritis who initially presented to OSH with chest pain and was found to have 2 vessel disease and was transferred to [facility] for CABG vs PCI evaluation.       CAD/ 2 vessel disease  NSTEMI  - admitted with CP, symptomatic with SOB and dry heaves with exertion at times recently  - troponin 122>608>1661 om 9/20, troponin 10/2: 24  - Echo 9/20 EF 50-55%, impaired relaxation of LV diastolic filling, small to moderate pericardial effusion  - LHC on 9/20 showing disease  LAD of 75-80% in the proximal, early mid and late mid segments.   Lcx: distal cx 95% right before takeoff of left PDA  - consult IC for evaluation for PCI & consult CTS for CABG eval  - CHIP meeting decision to pursue midCAB pending Dr. Sultana Neal's evaluation today  - cot heparin gtt  - cont asa, bb, statin  - cont cardiac monitor    New onset A fib with RVR  - episode at OSH  - converted to NSR  with metoprolol 5mg IV X1  - cont cardiac monitor  - cont heparin gtt     HTN  - -130s  - continue home amlodipine 10mg daily, tosemide 50mg daily      ESRD  - primary nephrologist Dr. Khan  - pt on nightly PD since 3/2023  -  nephology consulted: continue torsemide 50mg daily, continue nightly PD  - cont daily electrolytes     T2IDDM  - 9/30 Hgb A1C 6.6  - pt on levemir 25u daily and trulicity 1.5mg/0.5ml once weekly on Tuesdays at home  - inpatient on detemir 20u daily and lispro SS  - episode of nocturnal hypogylcemia 40  - endocrine consulted: Goal -180 while inpatient, mild SSI insulin AC, decrease detemir to 15 units, accuchecks ACHS    Hypothyroidism  - 10/2 TSH 6.26, free T3 1.12  - endocrine consulted: c/w home LT4 25mcg  - repeat TFT in 1 week (10/10)     Anemia of chronic disease   - no need to check iron studies since patient has known ESRD  - pt previously getting epo with PD, renal will order  - Hgb 9.1 (8.4, 7.8)     Free intraperitoneal air seen on CT chest  - abd soft without tenderness  - likely from PD catheter  - cont to monitor closely    Wound on buttocks  - wife changed dressing 9/30, patient complains of pain to site  - 10/2 consult wound care RN   - wound care recs ordered    Spinal Stenosis  - wears neck brace for neck support  - baseline can do ADL's, walking long distances needs scooter due to chronic pain issues, neuropathy in feet, exertional SOB   - Declined PT/OT for now, also has exertional SOB and nausea with activity therefore recommend limited activity until evaluated by CTS     DVT Ppx:  Pt is on heparin gtt     Full code     Pt seen and examined and plan of care discussed with Dr. Allen      Code Status:  Full Code    I spent 45 minutes in the professional and overall care of this patient.          Romario Monterroso PA-C

## 2023-10-04 NOTE — PROGRESS NOTES
Salomon Chowdary   83 y.o.  M   MRN/Room: [id number]    Subjective:   Stable   Tolerating PD overnight   Plan for coronary bypass surgery     Objective:     Meds:   amLODIPine, 10 mg, Daily  aspirin, 81 mg, Daily  atorvastatin, 80 mg, Daily  cholecalciferol, 25 mcg, Daily  DULoxetine, 30 mg, Daily  gentamicin, , Daily  insulin detemir, 15 Units, q24h  insulin lispro, 0-10 Units, TID with meals  levothyroxine, 25 mcg, Daily  multivitamin with minerals, 1 tablet, Daily  sennosides, 2 tablet, BID  sodium bicarbonate, 650 mg, BID  torsemide, 50 mg, Daily      dextrose 1.5% - LOW calcium 2.5mEq/L (Dianeal, Delflex-LC) in 3,000 mL peritoneal dialysate  dextrose 1.5% - LOW calcium 2.5mEq/L (Dianeal, Delflex-LC) in 3,000 mL peritoneal dialysate  heparin, Last Rate: 1,300 Units/hr (10/04/23 0428)      acetaminophen, 975 mg, q6h PRN  dextrose 10 % in water (D10W), 0.3 g/kg/hr, Once PRN  dextrose, 25 g, q15 min PRN  glucagon, 1 mg, q15 min PRN  heparin, 2,000-4,000 Units, q4h PRN        Vitals:    10/04/23 1556   BP: 112/71   Pulse: 72   Resp: 16   Temp: 36.2 °C (97.1 °F)   SpO2: 96%          Intake/Output Summary (Last 24 hours) at 10/4/2023 1601  Last data filed at 10/4/2023 0424  Gross per 24 hour   Intake --   Output 875 ml   Net -875 ml         General appearance: no distress  Eyes: non-icteric  Skin: no apparent rash  Heart: S1 S2 regular  Lungs: CTA bilat No wheezing/crackles  Abdomen: soft, nt/nd  Extremities: No edema bilat  Neuro: No FND,asterixis    Blood Labs:  Results for orders placed or performed during the hospital encounter of 09/30/23 (from the past 24 hour(s))   POCT GLUCOSE   Result Value Ref Range    POCT Glucose 134 (H) 74 - 99 mg/dL   POCT GLUCOSE   Result Value Ref Range    POCT Glucose 232 (H) 74 - 99 mg/dL   POCT GLUCOSE   Result Value Ref Range    POCT Glucose 65 (L) 74 - 99 mg/dL   POCT GLUCOSE   Result Value Ref Range    POCT Glucose 106 (H) 74 - 99 mg/dL   Renal Function Panel   Result  Value Ref Range    Glucose 85 74 - 99 mg/dL    Sodium 138 136 - 145 mmol/L    Potassium 4.8 3.5 - 5.3 mmol/L    Chloride 101 98 - 107 mmol/L    Bicarbonate 26 21 - 32 mmol/L    Anion Gap 16 10 - 20 mmol/L    Urea Nitrogen 38 (H) 6 - 23 mg/dL    Creatinine 4.10 (H) 0.50 - 1.30 mg/dL    eGFR 14 (L) >60 mL/min/1.73m*2    Calcium 8.7 8.6 - 10.6 mg/dL    Phosphorus 3.5 2.5 - 4.9 mg/dL    Albumin 2.8 (L) 3.4 - 5.0 g/dL   Heparin Assay, UFH   Result Value Ref Range    Heparin Unfractionated 0.4 See Comment Below for Therapeutic Ranges IU/mL   POCT GLUCOSE   Result Value Ref Range    POCT Glucose 160 (H) 74 - 99 mg/dL   Blood Gas Arterial, COOX Unsolicited   Result Value Ref Range    POCT pH, Arterial 7.43 (H) 7.38 - 7.42 pH    POCT pCO2, Arterial 39 38 - 42 mm Hg    POCT pO2, Arterial 104 (H) 85 - 95 mm Hg    POCT SO2, Arterial 99 94 - 100 %    POCT Oxy Hemoglobin, Arterial 96.4 94.0 - 98.0 %    POCT Base Excess, Arterial 1.5 -2.0 - 3.0 mmol/L    POCT HCO3 Calculated, Arterial 25.9 22.0 - 26.0 mmol/L    POCT Hemoglobin, Arterial 9.9 (L) 13.5 - 17.5 g/dL    POCT Carboxyhemoglobin, Arterial 1.4 %    POCT Methemoglobin, Arterial 0.9 0.0 - 1.5 %    POCT Deoxy Hemoglobin, Arterial 1.3 0.0 - 5.0 %    Patient Temperature 37.0 degrees Celsius              ASSESSMENT:  Salomon Chowdary is a  83 y.o.  Year old , with PMHx of CAD, HTN, ESRD on PD since March 2023, who is currently admitted under the cardiology service for CABG evaluation. Nephrology following to facilitate PD while inpatient.     RECOMMENDATIONS:  Patient is euvolemic, hemodynamics stable   Metabolic parameters acceptable   Plan to continue PD as per submitted orders- Total fill volume 5000c, 8 hours, 2400 fill volume, 1.5% bags   Gentamicin cream to exit site      DW Dr Rony Soto MD  Nephrology Fellow PGY 4  Available via Epic chat

## 2023-10-04 NOTE — PROGRESS NOTES
"Salomon Chowdary is a 83 y.o. male on day 4 of admission presenting with Salomon Chowdary is a 83 y.o. male with IDDM, HTN, HLD, ESRD on PD,  CAD, A-Fib and hypothyroidism presented to an OSH on 9/20 with chest pain. He was found to have NSTEMI with LHC showing 2 vessel disease in the LAD and left circumflex. He was transferred to [facility] for evaluation of revascularization plan. Endocrinology is consulted iso abnormal TFT with TSH of 6.26 and FT4 1.12 as well as diabetes management.    Subjective   Patient again with low BG in the a.m. 65.  Reports no symptoms at the time.  Denying any acute complaints.  Per patient, final surgical plans are still pending.      Objective   PE:  Constitutional: NAD, well groomed. AOx3. Cooperative  Skin/Hair: Warm, dry skin.  HEENT: EOMI, Anicteric scleras   Neck: Soft, supple   Cardiovascular: normal HR  Respiratory: no increased wob,  or accessory muscle use.  Abdomen: soft  , nondistended   Psych : appropriate affect       Last Recorded Vitals  Blood pressure 118/62, pulse 79, temperature 36.6 °C (97.8 °F), temperature source Temporal, resp. rate 18, height 1.752 m (5' 8.98\"), weight 79.9 kg (176 lb 2.4 oz), SpO2 96 %.  Intake/Output last 3 Shifts:  I/O last 3 completed shifts:  In: 240 (2.9 mL/kg) [P.O.:240]  Out: 2625 (31.9 mL/kg) [Urine:2625 (0.9 mL/kg/hr)]  Weight: 82.2 kg     Relevant Results    Lab Results   Component Value Date    TSH 6.26 (H) 10/02/2023    TSH 2.34 09/20/2023    FREET4 1.12 10/02/2023       Results from last 7 days   Lab Units 10/04/23  0702 10/04/23  0627 10/03/23  2042 10/03/23  1605 10/03/23  1126 10/03/23  1023 10/03/23  0555 10/03/23  0354 10/01/23  1246 10/01/23  0928 09/28/23  0736 09/28/23  0619   POCT GLUCOSE mg/dL 106* 65* 232* 134* 225*  --    < >  --    < >  --    < >  --    POC FINGERSTICK BLOOD GLUCOSE ME   --   --   --   --   --   --    < >  --   --   --   --   --    GLUCOSE mg/dL  --   --   --   --   --  87  --  CANCELED  --  106*  --  121*    < " > = values in this interval not displayed.   Scheduled medications  amLODIPine, 10 mg, oral, Daily  aspirin, 81 mg, oral, Daily  atorvastatin, 80 mg, oral, Daily  cholecalciferol, 25 mcg, oral, Daily  DULoxetine, 30 mg, oral, Daily  gentamicin, , Topical, Daily  insulin detemir, 15 Units, subcutaneous, q24h  insulin lispro, 0-10 Units, subcutaneous, TID with meals  levothyroxine, 25 mcg, oral, Daily  multivitamin with minerals, 1 tablet, oral, Daily  sennosides, 2 tablet, oral, BID  sodium bicarbonate, 650 mg, oral, BID  torsemide, 50 mg, oral, Daily      Continuous medications  dextrose 1.5% - LOW calcium 2.5mEq/L (Dianeal, Delflex-LC) in 2,500 mL peritoneal dialysate,   dextrose 1.5% - LOW calcium 2.5mEq/L (Dianeal, Delflex-LC) in 2,500 mL peritoneal dialysate,   heparin, 1,500 Units/hr, Last Rate: 1,300 Units/hr (10/04/23 0428)      PRN medications  PRN medications: acetaminophen, dextrose 10 % in water (D10W), dextrose, glucagon, heparin        Assessment/Plan   Salomon Chowdary is a 83 y.o. male with IDDM, HTN, HLD, ESRD on PD,  CAD, A-Fib and hypothyroidism presented to an OSH ON 9/20 with chest pain. He was found to have NSTEMI with LHC showing 2 vessel disease in the LAD and left circumflex. He was transferred to [facility] for evaluation of revascularization plan. Endocrinology is consulted iso abnormal TFT with TSH of 6.26 and FT4 1.12.      #Hypothyroidism   Patient is on 25 mcg of LT4. TSH was 2.34 on the day of presentation to the OSH. Presented with NSTEMI as above with Trop peaked to >2000 was initially managed in the ICU. No FT4 was obtained on 9/20. Now TSH is elevated with FT4 1.12 (WNL). This is likely to nonthyroidal illness (previously known as euthyroid sick syndrome) iso acute illness as above   He appears clinically euthyroid.      Recommendations  -Continue current dose of LT4 25 mcg   -Repeat TFT in 1 week on 10/10     #DM2   Background Hx:  Diabetes duration: Since 1995, has been on insulin since  2012           Home blood glucose checks: Daily  Trends: 80s to 130s  Hypoglycemia (y/n, threshold and symptoms): Yes, rarely, BG in the 60s, shakes/sweating/halos in his vision  Home diabetes regimen: Detemir 18 units bedtime and Trulicity 1.5 mg q. Weekly (has not received it for the past 2 weeks)  Home diet (how many meals a day): 2-3 meals a day  Outpatient physician managing diabetes: Endocrinologist with [facility].  Not seen in the past 3 months would like to establish with a new endocrinologist.  Currently follows with PCP.  Macrovascular complications: CVD [no diagnosed CAD to major vessel disease]     CVA [ denies]     PVD [denies]  Microvascular complications: Retinopathy [ denies]   Nephropathy [ESRD on PD]   Neuropathy [endorses]  Last A1c: 6.6 9/2023                  Inpatient diabetes regimen: 20 units of detemir and a low dose SS  Inpatient diet: Regular diet         Recommendations:  -Goal -180 while inpatient  -Mild SSI insulin AC   -Decrease detemir to 12units   -Add lispro 3 units TIDAC. Hold if patient is NPO  -Once patient is discharged please ensure that home Trulicity get refilled   -Accuchecks ACHS  -Endocrinology team will continue to follow.     Plan was communicated to the primary team      Discussed with Dr. Anotnio Marie MD   Endocrinology, PGY 4

## 2023-10-04 NOTE — CONSULTS
This is to link consult order (order entered twice, original one which the note is linked to was canceled by primary team). Please refer to consult note from 10/3.

## 2023-10-05 LAB
ABO GROUP (TYPE) IN BLOOD: NORMAL
ALBUMIN SERPL BCP-MCNC: 2.7 G/DL (ref 3.4–5)
ALBUMIN SERPL BCP-MCNC: 2.9 G/DL (ref 3.4–5)
ANION GAP SERPL CALC-SCNC: 15 MMOL/L (ref 10–20)
ANION GAP SERPL CALC-SCNC: 16 MMOL/L (ref 10–20)
ATRIAL RATE: 76 BPM
BUN SERPL-MCNC: 43 MG/DL (ref 6–23)
BUN SERPL-MCNC: 44 MG/DL (ref 6–23)
CALCIUM SERPL-MCNC: 8.5 MG/DL (ref 8.6–10.6)
CALCIUM SERPL-MCNC: 8.7 MG/DL (ref 8.6–10.6)
CHLORIDE SERPL-SCNC: 101 MMOL/L (ref 98–107)
CHLORIDE SERPL-SCNC: 99 MMOL/L (ref 98–107)
CO2 SERPL-SCNC: 27 MMOL/L (ref 21–32)
CO2 SERPL-SCNC: 28 MMOL/L (ref 21–32)
CREAT SERPL-MCNC: 4.02 MG/DL (ref 0.5–1.3)
CREAT SERPL-MCNC: 4.2 MG/DL (ref 0.5–1.3)
ERYTHROCYTE [DISTWIDTH] IN BLOOD BY AUTOMATED COUNT: 14.1 % (ref 11.5–14.5)
ERYTHROCYTE [DISTWIDTH] IN BLOOD BY AUTOMATED COUNT: 16.4 % (ref 11.5–14.5)
GFR SERPL CREATININE-BSD FRML MDRD: 13 ML/MIN/1.73M*2
GFR SERPL CREATININE-BSD FRML MDRD: 14 ML/MIN/1.73M*2
GLUCOSE BLD MANUAL STRIP-MCNC: 119 MG/DL (ref 74–99)
GLUCOSE BLD MANUAL STRIP-MCNC: 157 MG/DL (ref 74–99)
GLUCOSE BLD MANUAL STRIP-MCNC: 195 MG/DL (ref 74–99)
GLUCOSE SERPL-MCNC: 107 MG/DL (ref 74–99)
GLUCOSE SERPL-MCNC: 178 MG/DL (ref 74–99)
HCT VFR BLD AUTO: 26.7 % (ref 41–52)
HCT VFR BLD AUTO: 28.7 % (ref 41–52)
HGB BLD-MCNC: 7.9 G/DL (ref 13.5–17.5)
HGB BLD-MCNC: 8.8 G/DL (ref 13.5–17.5)
MCH RBC QN AUTO: 32.2 PG (ref 26–34)
MCH RBC QN AUTO: 32.7 PG (ref 26–34)
MCHC RBC AUTO-ENTMCNC: 29.6 G/DL (ref 32–36)
MCHC RBC AUTO-ENTMCNC: 30.7 G/DL (ref 32–36)
MCV RBC AUTO: 107 FL (ref 80–100)
MCV RBC AUTO: 109 FL (ref 80–100)
NRBC BLD-RTO: 0 /100 WBCS (ref 0–0)
NRBC BLD-RTO: 0 /100 WBCS (ref 0–0)
P AXIS: 16 DEGREES
P OFFSET: 185 MS
P ONSET: 127 MS
PHOSPHATE SERPL-MCNC: 3.8 MG/DL (ref 2.5–4.9)
PHOSPHATE SERPL-MCNC: 3.8 MG/DL (ref 2.5–4.9)
PLATELET # BLD AUTO: 300 X10*3/UL (ref 150–450)
PLATELET # BLD AUTO: 305 X10*3/UL (ref 150–450)
PLATELET # BLD AUTO: 305 X10*3/UL (ref 150–450)
PMV BLD AUTO: 10.7 FL (ref 7.5–11.5)
PMV BLD AUTO: 9.7 FL (ref 7.5–11.5)
POTASSIUM SERPL-SCNC: 4.7 MMOL/L (ref 3.5–5.3)
POTASSIUM SERPL-SCNC: 5.5 MMOL/L (ref 3.5–5.3)
PR INTERVAL: 172 MS
Q ONSET: 213 MS
QRS COUNT: 13 BEATS
QRS DURATION: 88 MS
QT INTERVAL: 350 MS
QTC CALCULATION(BAZETT): 393 MS
QTC FREDERICIA: 378 MS
R AXIS: -19 DEGREES
RBC # BLD AUTO: 2.45 X10*6/UL (ref 4.5–5.9)
RBC # BLD AUTO: 2.69 X10*6/UL (ref 4.5–5.9)
RH FACTOR (ANTIGEN D): NORMAL
SODIUM SERPL-SCNC: 136 MMOL/L (ref 136–145)
SODIUM SERPL-SCNC: 139 MMOL/L (ref 136–145)
T AXIS: 82 DEGREES
T OFFSET: 388 MS
UFH PPP CHRO-ACNC: 0.4 IU/ML
VENTRICULAR RATE: 76 BPM
WBC # BLD AUTO: 7.5 X10*3/UL (ref 4.4–11.3)
WBC # BLD AUTO: 9.2 X10*3/UL (ref 4.4–11.3)

## 2023-10-05 PROCEDURE — 90947 DIALYSIS REPEATED EVAL: CPT

## 2023-10-05 PROCEDURE — 85049 AUTOMATED PLATELET COUNT: CPT | Performed by: NURSE PRACTITIONER

## 2023-10-05 PROCEDURE — 80069 RENAL FUNCTION PANEL: CPT | Performed by: NURSE PRACTITIONER

## 2023-10-05 PROCEDURE — 36415 COLL VENOUS BLD VENIPUNCTURE: CPT | Performed by: STUDENT IN AN ORGANIZED HEALTH CARE EDUCATION/TRAINING PROGRAM

## 2023-10-05 PROCEDURE — 2500000001 HC RX 250 WO HCPCS SELF ADMINISTERED DRUGS (ALT 637 FOR MEDICARE OP): Performed by: NURSE PRACTITIONER

## 2023-10-05 PROCEDURE — 85027 COMPLETE CBC AUTOMATED: CPT | Performed by: NURSE PRACTITIONER

## 2023-10-05 PROCEDURE — 96372 THER/PROPH/DIAG INJ SC/IM: CPT | Performed by: NURSE PRACTITIONER

## 2023-10-05 PROCEDURE — 36430 TRANSFUSION BLD/BLD COMPNT: CPT

## 2023-10-05 PROCEDURE — P9016 RBC LEUKOCYTES REDUCED: HCPCS

## 2023-10-05 PROCEDURE — 99233 SBSQ HOSP IP/OBS HIGH 50: CPT | Performed by: STUDENT IN AN ORGANIZED HEALTH CARE EDUCATION/TRAINING PROGRAM

## 2023-10-05 PROCEDURE — 96372 THER/PROPH/DIAG INJ SC/IM: CPT

## 2023-10-05 PROCEDURE — 1200000002 HC GENERAL ROOM WITH TELEMETRY DAILY

## 2023-10-05 PROCEDURE — 85027 COMPLETE CBC AUTOMATED: CPT | Performed by: STUDENT IN AN ORGANIZED HEALTH CARE EDUCATION/TRAINING PROGRAM

## 2023-10-05 PROCEDURE — 2500000004 HC RX 250 GENERAL PHARMACY W/ HCPCS (ALT 636 FOR OP/ED): Mod: JZ | Performed by: NURSE PRACTITIONER

## 2023-10-05 PROCEDURE — 80069 RENAL FUNCTION PANEL: CPT | Performed by: STUDENT IN AN ORGANIZED HEALTH CARE EDUCATION/TRAINING PROGRAM

## 2023-10-05 PROCEDURE — 2500000004 HC RX 250 GENERAL PHARMACY W/ HCPCS (ALT 636 FOR OP/ED): Performed by: NURSE PRACTITIONER

## 2023-10-05 PROCEDURE — 85520 HEPARIN ASSAY: CPT | Performed by: STUDENT IN AN ORGANIZED HEALTH CARE EDUCATION/TRAINING PROGRAM

## 2023-10-05 PROCEDURE — 2500000002 HC RX 250 W HCPCS SELF ADMINISTERED DRUGS (ALT 637 FOR MEDICARE OP, ALT 636 FOR OP/ED): Performed by: NURSE PRACTITIONER

## 2023-10-05 PROCEDURE — 2500000002 HC RX 250 W HCPCS SELF ADMINISTERED DRUGS (ALT 637 FOR MEDICARE OP, ALT 636 FOR OP/ED)

## 2023-10-05 PROCEDURE — 99233 SBSQ HOSP IP/OBS HIGH 50: CPT | Performed by: INTERNAL MEDICINE

## 2023-10-05 PROCEDURE — 2500000004 HC RX 250 GENERAL PHARMACY W/ HCPCS (ALT 636 FOR OP/ED): Performed by: INTERNAL MEDICINE

## 2023-10-05 PROCEDURE — 82947 ASSAY GLUCOSE BLOOD QUANT: CPT

## 2023-10-05 RX ORDER — METOPROLOL TARTRATE 25 MG/1
12.5 TABLET, FILM COATED ORAL 2 TIMES DAILY
Status: DISCONTINUED | OUTPATIENT
Start: 2023-10-05 | End: 2023-10-06

## 2023-10-05 RX ORDER — ACETAMINOPHEN 325 MG/1
650 TABLET ORAL EVERY 6 HOURS PRN
Status: DISCONTINUED | OUTPATIENT
Start: 2023-10-05 | End: 2023-10-13

## 2023-10-05 RX ADMIN — NEPHROCAP 1 CAPSULE: 1 CAP ORAL at 13:32

## 2023-10-05 RX ADMIN — DARBEPOETIN ALFA 40 MCG: 40 INJECTION, SOLUTION INTRAVENOUS; SUBCUTANEOUS at 16:00

## 2023-10-05 RX ADMIN — METOPROLOL TARTRATE 12.5 MG: 25 TABLET, FILM COATED ORAL at 13:32

## 2023-10-05 RX ADMIN — SODIUM BICARBONATE 650 MG: 650 TABLET ORAL at 08:50

## 2023-10-05 RX ADMIN — ASPIRIN 81 MG CHEWABLE TABLET 81 MG: 81 TABLET CHEWABLE at 08:50

## 2023-10-05 RX ADMIN — INSULIN LISPRO 3 UNITS: 100 INJECTION, SOLUTION INTRAVENOUS; SUBCUTANEOUS at 17:00

## 2023-10-05 RX ADMIN — Medication 1 TABLET: at 08:49

## 2023-10-05 RX ADMIN — TORSEMIDE 50 MG: 20 TABLET ORAL at 08:50

## 2023-10-05 RX ADMIN — INSULIN LISPRO 3 UNITS: 100 INJECTION, SOLUTION INTRAVENOUS; SUBCUTANEOUS at 13:32

## 2023-10-05 RX ADMIN — Medication 25 MCG: at 08:49

## 2023-10-05 RX ADMIN — DULOXETINE HYDROCHLORIDE 30 MG: 30 CAPSULE, DELAYED RELEASE ORAL at 08:49

## 2023-10-05 RX ADMIN — ATORVASTATIN CALCIUM 80 MG: 80 TABLET ORAL at 20:47

## 2023-10-05 RX ADMIN — INSULIN LISPRO 2 UNITS: 100 INJECTION, SOLUTION INTRAVENOUS; SUBCUTANEOUS at 13:33

## 2023-10-05 RX ADMIN — ACETAMINOPHEN 650 MG: 325 TABLET, FILM COATED ORAL at 20:46

## 2023-10-05 RX ADMIN — LEVOTHYROXINE SODIUM 25 MCG: 25 TABLET ORAL at 06:02

## 2023-10-05 RX ADMIN — INSULIN DETEMIR 12 UNITS: 100 INJECTION, SOLUTION SUBCUTANEOUS at 21:28

## 2023-10-05 RX ADMIN — GENTAMICIN SULFATE: 1 CREAM TOPICAL at 09:00

## 2023-10-05 RX ADMIN — SODIUM CHLORIDE, SODIUM LACTATE, CALCIUM CHLORIDE, MAGNESIUM CHLORIDE AND DEXTROSE: 1.5; 538; 448; 18.3; 5.08 INJECTION, SOLUTION INTRAPERITONEAL at 16:20

## 2023-10-05 RX ADMIN — AMLODIPINE BESYLATE 10 MG: 2.5 TABLET ORAL at 08:49

## 2023-10-05 RX ADMIN — ACETAMINOPHEN 975 MG: 325 TABLET, FILM COATED ORAL at 06:01

## 2023-10-05 RX ADMIN — INSULIN LISPRO 3 UNITS: 100 INJECTION, SOLUTION INTRAVENOUS; SUBCUTANEOUS at 08:50

## 2023-10-05 RX ADMIN — HEPARIN SODIUM 1300 UNITS/HR: 10000 INJECTION, SOLUTION INTRAVENOUS at 14:35

## 2023-10-05 ASSESSMENT — PAIN SCALES - GENERAL
PAINLEVEL_OUTOF10: 3
PAINLEVEL_OUTOF10: 3
PAINLEVEL_OUTOF10: 0 - NO PAIN
PAINLEVEL_OUTOF10: 3
PAINLEVEL_OUTOF10: 0 - NO PAIN
PAINLEVEL_OUTOF10: 3

## 2023-10-05 ASSESSMENT — PAIN - FUNCTIONAL ASSESSMENT
PAIN_FUNCTIONAL_ASSESSMENT: 0-10
PAIN_FUNCTIONAL_ASSESSMENT: 0-10

## 2023-10-05 NOTE — PROGRESS NOTES
Subjective   Patient ID: Salomon Chowdary is a 83 y.o. male.     Subjective:   Feels OK, no c/o CP/SOB/F/C/Abd pain  Up in chair - no alarmes from PH : I-franci 0 , UF 43 ml  He is nonoliguric     [unfilled]   Scheduled medications  amLODIPine, 10 mg, oral, Daily  aspirin, 81 mg, oral, Daily  atorvastatin, 80 mg, oral, Daily  cholecalciferol, 25 mcg, oral, Daily  DULoxetine, 30 mg, oral, Daily  gentamicin, , Topical, Daily  insulin detemir, 12 Units, subcutaneous, q24h  insulin lispro, 0-10 Units, subcutaneous, TID with meals  insulin lispro, 3 Units, subcutaneous, TID with meals  levothyroxine, 25 mcg, oral, Daily  multivitamin with minerals, 1 tablet, oral, Daily  sennosides, 2 tablet, oral, BID  sodium bicarbonate, 650 mg, oral, BID  torsemide, 50 mg, oral, Daily      Continuous medications  dextrose 1.5% - LOW calcium 2.5mEq/L (Dianeal, Delflex-LC) in 3,000 mL peritoneal dialysate,   dextrose 1.5% - LOW calcium 2.5mEq/L (Dianeal, Delflex-LC) in 3,000 mL peritoneal dialysate,   heparin, 1,500 Units/hr, Last Rate: 1,300 Units/hr (10/04/23 0428)      PRN medications  PRN medications: acetaminophen, dextrose 10 % in water (D10W), dextrose, glucagon, heparin     Heart Rate:  [71-86]   Temp:  [36.1 °C (97 °F)-36.8 °C (98.2 °F)]   Resp:  [16-18]   BP: (102-129)/(58-71)   Weight:  [80.7 kg (177 lb 14.6 oz)]   SpO2:  [92 %-97 %]    Weight: 82.2 kg (181 lb 3.5 oz)   Gen: alert, NAD  HEENT: NC/AT  Neck: supple, no JVD   Pulm: clear ant b/l   CVS: RRR, no rub  Abd: S/NT/ND, PD cath with clear exit site   LE: no edema , no cyanosis   Neuro: no asterixis         Results from last 7 days   Lab Units 10/05/23  0722 10/05/23  0721   SODIUM mmol/L 139  --    POTASSIUM mmol/L 4.7  --    PHOSPHORUS mg/dL 3.8  --    CALCIUM mg/dL 8.5*  --    CO2 mmol/L 27  --    HEMOGLOBIN g/dL  --  7.9*        [unfilled]     Results from last 72 hours   Lab Units 10/05/23  0722 10/05/23  0721   CO2 mmol/L 27  --    BUN mg/dL 43*  --    ALBUMIN g/dL 2.7*   --    GLUCOSE mg/dL 107*  --    WBC AUTO x10*3/uL  --  7.5        A/P  Salomon Chowdary is a  83 y.o.  Year old , with PMHx of CAD, HTN, ESRD on PD since March 2023, who is currently admitted under the cardiology service for CABG evaluation. Nephrology following to facilitate PD while inpatient.     Patient is euvolemic, hemodynamics stable   Metabolic parameters acceptable   Plan to continue PD as per submitted orders- Total fill volume 5000c, 8 hours, 2400 fill volume, 1.5% bags      MBD - Phosphorus binder: not needed  Anemia of CKD: please add darbepoietin IV 40 mcg  weekly   PD Access: no issues ; clear exit site , c/w gent cream topical care   BP: acceptable during treatment   Low sodium diet; add protein boost supplement if not already done to improve nutritional status    Add Daily renal MVI   Stop sodium bicarbonate po as his HCO2 is 27 and he is treated with PD   C/w same PD tonight

## 2023-10-05 NOTE — NURSING NOTE
PD nurse at bedside set up cycler and dressing change complete. Dialysis initiated. RN given written, verbal and visual instruction in how to connect /disconnect pt from PD catheter.  Pager [phone]

## 2023-10-05 NOTE — PROGRESS NOTES
Subjective Data:  Patient states he was walking laps yesterday in the hallways without issues. Denies CP/SOB at rest.    - give 1uPRBC for hgb 7.9  - planning for mid-cabg sometime next week, Dr. Eileen Neal to meet with patient and wife tomorrow 830AM  - start low-dose BB  - per renal: give darbepoietin 40mcg once weekly (OK to give today with PRBC), stop sodium bicarb, start renal MVI, boost TID    Overnight Events:    No overnight events.     Objective Data:  Last Recorded Vitals:  Vitals:    10/05/23 0450 10/05/23 0857 10/05/23 1014 10/05/23 1100   BP: 102/58 117/71  112/57   BP Location:       Patient Position: Lying      Pulse: 71 75  79   Resp: 18 18  18   Temp: 36.7 °C (98.1 °F) 36.6 °C (97.9 °F)  35.9 °C (96.6 °F)   TempSrc:  Temporal  Temporal   SpO2: 95% 95%  100%   Weight:   80.7 kg (177 lb 14.6 oz)    Height:           Last Labs:  CBC - 10/5/2023:  7:21 AM  7.5 7.9 305; 305    26.7      CMP - 10/5/2023:  7:22 AM  8.5 5.1 16 --- 0.4   3.8 2.7 19 70      PTT - 10/1/2023:  5:22 AM  1.2   13.1 >200        Last I/O:  I/O last 3 completed shifts:  In: 440 (5.5 mL/kg) [P.O.:440]  Out: 2200 (27.5 mL/kg) [Urine:2200 (0.8 mL/kg/hr)]  Weight: 79.9 kg       Echo:  Transthoracic Echo (TTE) Complete 10/2/2023        Inpatient Medications:  Scheduled medications   Medication Dose Route Frequency    amLODIPine  10 mg oral Daily    aspirin  81 mg oral Daily    atorvastatin  80 mg oral Daily    cholecalciferol  25 mcg oral Daily    DULoxetine  30 mg oral Daily    gentamicin   Topical Daily    insulin detemir  12 Units subcutaneous q24h    insulin lispro  0-10 Units subcutaneous TID with meals    insulin lispro  3 Units subcutaneous TID with meals    levothyroxine  25 mcg oral Daily    multivitamin with minerals  1 tablet oral Daily    sennosides  2 tablet oral BID    sodium bicarbonate  650 mg oral BID    torsemide  50 mg oral Daily     PRN medications   Medication    acetaminophen    dextrose 10 % in water (D10W)     dextrose    glucagon    heparin     Continuous Medications   Medication Dose Last Rate    dextrose 1.5% - LOW calcium 2.5mEq/L (Dianeal, Delflex-LC) in 3,000 mL peritoneal dialysate        dextrose 1.5% - LOW calcium 2.5mEq/L (Dianeal, Delflex-LC) in 3,000 mL peritoneal dialysate        heparin  1,500 Units/hr 1,300 Units/hr (10/04/23 0428)       Physical Exam:  Gen: no apparent distress  Skin: warm and dry, dressing on buttocks  ENMT: oral membranes moist  Lungs: respirations even and unlabored, CTA per auscultation  Heart: RRR S1S2 I/IV systolic murmur right sternal border, no  gallop  Abd: soft, NT, ND + BS  Ext: warm with palpable radial and DP pulses  Neuro: A & O x3, speech clear         Assessment/Plan   Salomon Chowdary is an 84 yo male with a PMHx of insulin dep (type2) diabetes, ESRD on PD, chronic pain, HLD, HTN, hypothyroidism and arthritis who initially presented to OSH with chest pain and was found to have 2 vessel disease and was transferred to [facility] for CABG vs PCI evaluation.       CAD/ 2 vessel disease  NSTEMI  - admitted with CP, symptomatic with SOB and dry heaves with exertion at times recently  - troponin 122>608>1661 om 9/20, troponin 10/2: 24  - Echo 9/20 EF 50-55%, impaired relaxation of LV diastolic filling, small to moderate pericardial effusion  - repeat TTE 10/2: EF 55-60%, no WMA's, small pericardial effusion   - LHC on 9/20 showing disease  LAD of 75-80% in the proximal, early mid and late mid segments.   Lcx: distal cx 95% right before takeoff of left PDA  - consult IC for evaluation for PCI & consult CTS for CABG eval  - presented at [facility] meeting 10/3  - pre-op studies completed: vascular (vein mapping & carotids), CT Chest, CXR, PFT's (pre-hill report in front of patient's chart; FEV1/FVC 87)  - COVID negative on 10/1  - Planning for mid-CABG sometime next week, Dr. Eileen Neal to see patient and wife tomorrow at 830AM  - start metop tart 12.5mg BID  - cot heparin gtt, asa,  statin    Anemia of chronic disease   - no need to check iron studies since patient has known ESRD  - pt previously getting epo with PD  - Hgb: 7.9 ( 9.1,8.4, 7.8)  - no overt signs of bleeding, denies increased fatigue  - give 1UPRBC  - per renal, start darbepoietin IV 40mcg weekly; OK to start today, even with PRBC    ESRD  - primary nephrologist Dr. Khan  - pt on nightly PD since 3/2023  - nephology following: add darbepoietin (as above), add protein supplement (adding BOOST TID), daily renal MVI, stop sodium bicarb (27 today), cont. Nightly PD   - Cr today: 4.20    New onset A fib with RVR, improving  - episode at OSH  - converted to NSR  with metoprolol 5mg IV X1  - currently NSR on tele with HR in the 70's  - start metop tart 12.5mg BID  - cont heparin gtt     HTN, controlled  - SBP past 24hrs: 110-120's  - continue home amlodipine 10mg daily, tosemide 50mg daily      T2IDDM  - 9/30 Hgb A1C: 6.6%  - pt on levemir 25u daily and trulicity 1.5mg/0.5ml once weekly on Tuesdays at home  - inpatient on detemir 12u daily and lispro SS  - 10/4 episode of nocturnal hypogylcemia 40 prompting endo consult--->detemir decreased from 20U>12U  - no hypoglycemia overnight  - goal -180's, cont. Daily detemir 12 units, Mild SSI, ACHS accuchecks, hypoglycemia protocol     Hypothyroidism  - 10/2 TSH 6.26, free T3 1.12  - endocrine following: cont. home LT4 25mcg  - repeat TFT in 1 week (10/10)     Free intraperitoneal air seen on CT chest  - abd soft without tenderness  - likely from PD catheter  - cont to monitor closely    Wound on buttocks  - wife changed dressing 9/30, patient complains of pain to site  - 10/2 consult wound care RN   - wound care recs appreciated/ordered    Spinal Stenosis  - wears neck brace for neck support  - baseline can do ADL's, walking long distances needs scooter due to chronic pain issues, neuropathy in feet, exertional SOB   - Declined PT/OT for now, also has exertional SOB and nausea with  activity therefore recommend limited activity until evaluated by CTS       DVT Ppx: heparin gtt  DISPO: pending revascularization plan (sometime next week with Dr. Eileen Neal)     Pt seen and examined and plan of care discussed with Dr. Allen      Code Status:  Full Code    I spent 45 minutes in the professional and overall care of this patient.    Martha Schumacher, APRN-CNP

## 2023-10-06 ENCOUNTER — PREP FOR PROCEDURE (OUTPATIENT)
Dept: CARDIOLOGY | Facility: HOSPITAL | Age: 83
End: 2023-10-06
Payer: MEDICARE

## 2023-10-06 DIAGNOSIS — I25.110 CORONARY ARTERY DISEASE INVOLVING NATIVE CORONARY ARTERY OF NATIVE HEART WITH UNSTABLE ANGINA PECTORIS (MULTI): Primary | ICD-10-CM

## 2023-10-06 LAB
ABO GROUP (TYPE) IN BLOOD: NORMAL
ALBUMIN SERPL BCP-MCNC: 2.9 G/DL (ref 3.4–5)
ANION GAP SERPL CALC-SCNC: 16 MMOL/L (ref 10–20)
ANTIBODY SCREEN: NORMAL
BASOPHILS # BLD AUTO: 0.07 X10*3/UL (ref 0–0.1)
BASOPHILS NFR BLD AUTO: 0.7 %
BUN SERPL-MCNC: 48 MG/DL (ref 6–23)
CALCIUM SERPL-MCNC: 8.5 MG/DL (ref 8.6–10.6)
CHLORIDE SERPL-SCNC: 102 MMOL/L (ref 98–107)
CO2 SERPL-SCNC: 25 MMOL/L (ref 21–32)
CREAT SERPL-MCNC: 4.59 MG/DL (ref 0.5–1.3)
EOSINOPHIL # BLD AUTO: 0.31 X10*3/UL (ref 0–0.4)
EOSINOPHIL NFR BLD AUTO: 3.3 %
ERYTHROCYTE [DISTWIDTH] IN BLOOD BY AUTOMATED COUNT: 16.1 % (ref 11.5–14.5)
ERYTHROCYTE [DISTWIDTH] IN BLOOD BY AUTOMATED COUNT: 16.1 % (ref 11.5–14.5)
GFR SERPL CREATININE-BSD FRML MDRD: 12 ML/MIN/1.73M*2
GLUCOSE BLD MANUAL STRIP-MCNC: 141 MG/DL (ref 74–99)
GLUCOSE BLD MANUAL STRIP-MCNC: 162 MG/DL (ref 74–99)
GLUCOSE BLD MANUAL STRIP-MCNC: 211 MG/DL (ref 74–99)
GLUCOSE BLD MANUAL STRIP-MCNC: 261 MG/DL (ref 74–99)
GLUCOSE SERPL-MCNC: 126 MG/DL (ref 74–99)
HCT VFR BLD AUTO: 29.2 % (ref 41–52)
HCT VFR BLD AUTO: 32.1 % (ref 41–52)
HGB BLD-MCNC: 8.8 G/DL (ref 13.5–17.5)
HGB BLD-MCNC: 9.5 G/DL (ref 13.5–17.5)
IMM GRANULOCYTES # BLD AUTO: 0.12 X10*3/UL (ref 0–0.5)
IMM GRANULOCYTES NFR BLD AUTO: 1.3 % (ref 0–0.9)
LYMPHOCYTES # BLD AUTO: 2.37 X10*3/UL (ref 0.8–3)
LYMPHOCYTES NFR BLD AUTO: 25 %
MCH RBC QN AUTO: 31.5 PG (ref 26–34)
MCH RBC QN AUTO: 31.5 PG (ref 26–34)
MCHC RBC AUTO-ENTMCNC: 29.6 G/DL (ref 32–36)
MCHC RBC AUTO-ENTMCNC: 30.1 G/DL (ref 32–36)
MCV RBC AUTO: 105 FL (ref 80–100)
MCV RBC AUTO: 106 FL (ref 80–100)
MONOCYTES # BLD AUTO: 0.88 X10*3/UL (ref 0.05–0.8)
MONOCYTES NFR BLD AUTO: 9.3 %
NEUTROPHILS # BLD AUTO: 5.74 X10*3/UL (ref 1.6–5.5)
NEUTROPHILS NFR BLD AUTO: 60.4 %
NRBC BLD-RTO: 0 /100 WBCS (ref 0–0)
NRBC BLD-RTO: 0 /100 WBCS (ref 0–0)
PHOSPHATE SERPL-MCNC: 3.5 MG/DL (ref 2.5–4.9)
PLATELET # BLD AUTO: 292 X10*3/UL (ref 150–450)
PLATELET # BLD AUTO: 318 X10*3/UL (ref 150–450)
PMV BLD AUTO: 9.5 FL (ref 7.5–11.5)
PMV BLD AUTO: 9.7 FL (ref 7.5–11.5)
POTASSIUM SERPL-SCNC: 5.2 MMOL/L (ref 3.5–5.3)
RBC # BLD AUTO: 2.79 X10*6/UL (ref 4.5–5.9)
RBC # BLD AUTO: 3.02 X10*6/UL (ref 4.5–5.9)
RH FACTOR (ANTIGEN D): NORMAL
SODIUM SERPL-SCNC: 138 MMOL/L (ref 136–145)
UFH PPP CHRO-ACNC: 0.4 IU/ML
WBC # BLD AUTO: 8.3 X10*3/UL (ref 4.4–11.3)
WBC # BLD AUTO: 9.5 X10*3/UL (ref 4.4–11.3)

## 2023-10-06 PROCEDURE — 2500000002 HC RX 250 W HCPCS SELF ADMINISTERED DRUGS (ALT 637 FOR MEDICARE OP, ALT 636 FOR OP/ED): Performed by: NURSE PRACTITIONER

## 2023-10-06 PROCEDURE — 82374 ASSAY BLOOD CARBON DIOXIDE: CPT | Performed by: STUDENT IN AN ORGANIZED HEALTH CARE EDUCATION/TRAINING PROGRAM

## 2023-10-06 PROCEDURE — 2500000002 HC RX 250 W HCPCS SELF ADMINISTERED DRUGS (ALT 637 FOR MEDICARE OP, ALT 636 FOR OP/ED)

## 2023-10-06 PROCEDURE — 2500000001 HC RX 250 WO HCPCS SELF ADMINISTERED DRUGS (ALT 637 FOR MEDICARE OP): Performed by: NURSE PRACTITIONER

## 2023-10-06 PROCEDURE — 96372 THER/PROPH/DIAG INJ SC/IM: CPT | Performed by: NURSE PRACTITIONER

## 2023-10-06 PROCEDURE — 90947 DIALYSIS REPEATED EVAL: CPT

## 2023-10-06 PROCEDURE — 36415 COLL VENOUS BLD VENIPUNCTURE: CPT | Performed by: STUDENT IN AN ORGANIZED HEALTH CARE EDUCATION/TRAINING PROGRAM

## 2023-10-06 PROCEDURE — 96372 THER/PROPH/DIAG INJ SC/IM: CPT

## 2023-10-06 PROCEDURE — 86900 BLOOD TYPING SEROLOGIC ABO: CPT | Performed by: THORACIC SURGERY (CARDIOTHORACIC VASCULAR SURGERY)

## 2023-10-06 PROCEDURE — 2500000004 HC RX 250 GENERAL PHARMACY W/ HCPCS (ALT 636 FOR OP/ED)

## 2023-10-06 PROCEDURE — 82947 ASSAY GLUCOSE BLOOD QUANT: CPT

## 2023-10-06 PROCEDURE — 85025 COMPLETE CBC W/AUTO DIFF WBC: CPT | Performed by: THORACIC SURGERY (CARDIOTHORACIC VASCULAR SURGERY)

## 2023-10-06 PROCEDURE — 90945 DIALYSIS ONE EVALUATION: CPT | Performed by: INTERNAL MEDICINE

## 2023-10-06 PROCEDURE — 99232 SBSQ HOSP IP/OBS MODERATE 35: CPT | Performed by: STUDENT IN AN ORGANIZED HEALTH CARE EDUCATION/TRAINING PROGRAM

## 2023-10-06 PROCEDURE — 85027 COMPLETE CBC AUTOMATED: CPT | Performed by: STUDENT IN AN ORGANIZED HEALTH CARE EDUCATION/TRAINING PROGRAM

## 2023-10-06 PROCEDURE — 1200000002 HC GENERAL ROOM WITH TELEMETRY DAILY

## 2023-10-06 PROCEDURE — 85520 HEPARIN ASSAY: CPT | Performed by: STUDENT IN AN ORGANIZED HEALTH CARE EDUCATION/TRAINING PROGRAM

## 2023-10-06 PROCEDURE — 36415 COLL VENOUS BLD VENIPUNCTURE: CPT | Performed by: THORACIC SURGERY (CARDIOTHORACIC VASCULAR SURGERY)

## 2023-10-06 PROCEDURE — 2500000004 HC RX 250 GENERAL PHARMACY W/ HCPCS (ALT 636 FOR OP/ED): Performed by: NURSE PRACTITIONER

## 2023-10-06 RX ORDER — MUPIROCIN 20 MG/G
0.5 OINTMENT TOPICAL 2 TIMES DAILY
Status: CANCELLED | OUTPATIENT
Start: 2023-10-06 | End: 2023-10-11

## 2023-10-06 RX ORDER — CHLORHEXIDINE GLUCONATE ORAL RINSE 1.2 MG/ML
15 SOLUTION DENTAL 2 TIMES DAILY
Status: CANCELLED | OUTPATIENT
Start: 2023-10-06 | End: 2023-10-07

## 2023-10-06 RX ADMIN — SODIUM CHLORIDE, SODIUM LACTATE, CALCIUM CHLORIDE, MAGNESIUM CHLORIDE AND DEXTROSE: 1.5; 538; 448; 18.3; 5.08 INJECTION, SOLUTION INTRAPERITONEAL at 17:28

## 2023-10-06 RX ADMIN — NEPHROCAP 1 CAPSULE: 1 CAP ORAL at 08:41

## 2023-10-06 RX ADMIN — TORSEMIDE 50 MG: 20 TABLET ORAL at 08:41

## 2023-10-06 RX ADMIN — INSULIN LISPRO 3 UNITS: 100 INJECTION, SOLUTION INTRAVENOUS; SUBCUTANEOUS at 08:43

## 2023-10-06 RX ADMIN — INSULIN LISPRO 3 UNITS: 100 INJECTION, SOLUTION INTRAVENOUS; SUBCUTANEOUS at 12:19

## 2023-10-06 RX ADMIN — ASPIRIN 81 MG CHEWABLE TABLET 81 MG: 81 TABLET CHEWABLE at 08:41

## 2023-10-06 RX ADMIN — INSULIN LISPRO 2 UNITS: 100 INJECTION, SOLUTION INTRAVENOUS; SUBCUTANEOUS at 12:23

## 2023-10-06 RX ADMIN — INSULIN DETEMIR 12 UNITS: 100 INJECTION, SOLUTION SUBCUTANEOUS at 20:06

## 2023-10-06 RX ADMIN — INSULIN LISPRO 6 UNITS: 100 INJECTION, SOLUTION INTRAVENOUS; SUBCUTANEOUS at 17:00

## 2023-10-06 RX ADMIN — HEPARIN SODIUM 1500 UNITS/HR: 10000 INJECTION, SOLUTION INTRAVENOUS at 09:56

## 2023-10-06 RX ADMIN — ATORVASTATIN CALCIUM 80 MG: 80 TABLET ORAL at 20:06

## 2023-10-06 RX ADMIN — INSULIN LISPRO 3 UNITS: 100 INJECTION, SOLUTION INTRAVENOUS; SUBCUTANEOUS at 16:59

## 2023-10-06 RX ADMIN — AMLODIPINE BESYLATE 10 MG: 2.5 TABLET ORAL at 08:43

## 2023-10-06 RX ADMIN — LEVOTHYROXINE SODIUM 25 MCG: 25 TABLET ORAL at 05:39

## 2023-10-06 RX ADMIN — DULOXETINE HYDROCHLORIDE 30 MG: 30 CAPSULE, DELAYED RELEASE ORAL at 08:42

## 2023-10-06 RX ADMIN — Medication 25 MCG: at 08:42

## 2023-10-06 RX ADMIN — SODIUM CHLORIDE, SODIUM LACTATE, CALCIUM CHLORIDE, MAGNESIUM CHLORIDE AND DEXTROSE: 1.5; 538; 448; 18.3; 5.08 INJECTION, SOLUTION INTRAPERITONEAL at 17:29

## 2023-10-06 ASSESSMENT — COGNITIVE AND FUNCTIONAL STATUS - GENERAL
WALKING IN HOSPITAL ROOM: A LITTLE
HELP NEEDED FOR BATHING: A LITTLE
MOBILITY SCORE: 19
CLIMB 3 TO 5 STEPS WITH RAILING: A LITTLE
MOBILITY SCORE: 19
TOILETING: A LITTLE
HELP NEEDED FOR BATHING: A LITTLE
DAILY ACTIVITIY SCORE: 20
DRESSING REGULAR UPPER BODY CLOTHING: A LITTLE
DRESSING REGULAR UPPER BODY CLOTHING: A LITTLE
TOILETING: A LITTLE
DRESSING REGULAR LOWER BODY CLOTHING: A LITTLE
DAILY ACTIVITIY SCORE: 20
MOVING TO AND FROM BED TO CHAIR: A LITTLE
TURNING FROM BACK TO SIDE WHILE IN FLAT BAD: A LITTLE
DRESSING REGULAR LOWER BODY CLOTHING: A LITTLE
STANDING UP FROM CHAIR USING ARMS: A LITTLE
STANDING UP FROM CHAIR USING ARMS: A LITTLE
WALKING IN HOSPITAL ROOM: A LITTLE
TURNING FROM BACK TO SIDE WHILE IN FLAT BAD: A LITTLE
CLIMB 3 TO 5 STEPS WITH RAILING: A LITTLE
MOVING TO AND FROM BED TO CHAIR: A LITTLE

## 2023-10-06 ASSESSMENT — ENCOUNTER SYMPTOMS
PSYCHIATRIC NEGATIVE: 1
CONSTITUTIONAL NEGATIVE: 1
RESPIRATORY NEGATIVE: 1
NEUROLOGICAL NEGATIVE: 1

## 2023-10-06 ASSESSMENT — PAIN SCALES - GENERAL
PAINLEVEL_OUTOF10: 0 - NO PAIN
PAINLEVEL_OUTOF10: 0 - NO PAIN

## 2023-10-06 NOTE — PROGRESS NOTES
Subjective   Patient ID: Salomon Chowdary is a 83 y.o. male.        HPI  Seen and examined during hemodialysis. Labs and events reviewed.   Awaiting minim invasive CABG  on Wed      Subjective:   Feels OK, no c/o CP/SOB/F/C/Abd pain  No c/o related to PD   He self disconnected from [facility] this morning ; no UF recorded ; I drain 0; he is non-oliguric     Patient Active Problem List   Diagnosis    2-vessel coronary artery disease    Coronary artery disease involving native coronary artery of native heart with unstable angina pectoris (CMS/[facility])       [unfilled]   Scheduled medications  amLODIPine, 10 mg, oral, Daily  aspirin, 81 mg, oral, Daily  atorvastatin, 80 mg, oral, Daily  B complex-vitamin C-folic acid, 1 capsule, oral, Daily  cholecalciferol, 25 mcg, oral, Daily  darbepoetin alyssa, 40 mcg, intravenous, Weekly  DULoxetine, 30 mg, oral, Daily  gentamicin, , Topical, Daily  insulin detemir, 12 Units, subcutaneous, q24h  insulin lispro, 0-10 Units, subcutaneous, TID with meals  insulin lispro, 3 Units, subcutaneous, TID with meals  levothyroxine, 25 mcg, oral, Daily  sennosides, 2 tablet, oral, BID  torsemide, 50 mg, oral, Daily      Continuous medications  dextrose 1.5% - LOW calcium 2.5mEq/L (Dianeal, Delflex-LC) in 3,000 mL peritoneal dialysate,   dextrose 1.5% - LOW calcium 2.5mEq/L (Dianeal, Delflex-LC) in 3,000 mL peritoneal dialysate,   heparin, 1,500 Units/hr, Last Rate: 1,500 Units/hr (10/06/23 0956)      PRN medications  PRN medications: acetaminophen, dextrose 10 % in water (D10W), dextrose, glucagon, heparin     Heart Rate:  [65-81]   Temp:  [36.4 °C (97.5 °F)-37.2 °C (98.9 °F)]   Resp:  [18-20]   BP: (105-132)/(59-70)   Weight:  [80.1 kg (176 lb 9.4 oz)]   SpO2:  [91 %-96 %]    Weight: 82.2 kg (181 lb 3.5 oz)   Gen: alert, NAD  HEENT: NC/AT  Neck: supple, no JVD   Pulm: clear ant b/l   CVS: RRR, no rub  Abd: S/NT/ND  LE: no edema , no cyanosis   Neuro: no asterixis        Results from last 7 days   Lab  Units 10/06/23  1055 10/06/23  0704   SODIUM mmol/L  --  138   POTASSIUM mmol/L  --  5.2   PHOSPHORUS mg/dL  --  3.5   CALCIUM mg/dL  --  8.5*   CO2 mmol/L  --  25   HEMOGLOBIN g/dL 9.5* 8.8*        [unfilled]     Results from last 72 hours   Lab Units 10/06/23  1055 10/06/23  0704   ALBUMIN g/dL  --  2.9*   GLUCOSE mg/dL  --  126*   HEMOGLOBIN g/dL 9.5* 8.8*   WBC AUTO x10*3/uL 9.5 8.3          Results from last 72 hours   Lab Units 10/06/23  0704   SODIUM mmol/L 138   POTASSIUM mmol/L 5.2   CO2 mmol/L 25   BUN mg/dL 48*   CREATININE mg/dL 4.59*   PHOSPHORUS mg/dL 3.5   CALCIUM mg/dL 8.5*        A/P  ESRD-PD admitted with CAD awaiting miniCABG next week     Plan PD per submitted orders  C/w exit site care and BM regimen - had BM every day   MBD - Phosphorus binder: not needed   Anemia of CKD: s/p PRBC Tx; continue Aranesp weekly   PD Access: no issues   BP: acceptable  Low salt Diet   Daily renal MVI

## 2023-10-06 NOTE — PROGRESS NOTES
Subjective Data:  HR went down to 40's with low dose BB, hgb incremented appropriately to 8.8 s/p 1UPRBC. Tolerated PD overnight well.    - tentative midCABG Weds. 10/11 per dr. Eileen Neal    Overnight Events:    No overnight events.     Objective Data:  Last Recorded Vitals:  Vitals:    10/05/23 2351 10/06/23 0607 10/06/23 0741 10/06/23 1144   BP: 129/68 121/70 123/66 121/67   BP Location:  Right arm     Patient Position:  Lying     Pulse: 81 74 78 74   Resp: 20 20 18 18   Temp: 37.1 °C (98.8 °F) 36.9 °C (98.4 °F) 37.2 °C (98.9 °F) 36.6 °C (97.8 °F)   TempSrc: Temporal Temporal Temporal Temporal   SpO2: 93% 93% 95% 95%   Weight:  80.1 kg (176 lb 9.4 oz)     Height:           Last Labs:  CBC - 10/6/2023:  7:04 AM  8.3 8.8 292    29.2      CMP - 10/6/2023:  7:04 AM  8.5 5.1 16 --- 0.4   3.5 2.9 19 70      PTT - 10/1/2023:  5:22 AM  1.2   13.1 >200        Last I/O:  I/O last 3 completed shifts:  In: 2867.5 (35.8 mL/kg) [P.O.:240; I.V.:1757.5 (21.9 mL/kg); Blood:870]  Out: 3010 (37.6 mL/kg) [Urine:2966 (1 mL/kg/hr); Other:43; Stool:1]  Weight: 80.1 kg       Echo:  Transthoracic Echo (TTE) Complete 10/2/2023        Inpatient Medications:  Scheduled medications   Medication Dose Route Frequency    amLODIPine  10 mg oral Daily    aspirin  81 mg oral Daily    atorvastatin  80 mg oral Daily    B complex-vitamin C-folic acid  1 capsule oral Daily    cholecalciferol  25 mcg oral Daily    darbepoetin alyssa  40 mcg intravenous Weekly    DULoxetine  30 mg oral Daily    gentamicin   Topical Daily    insulin detemir  12 Units subcutaneous q24h    insulin lispro  0-10 Units subcutaneous TID with meals    insulin lispro  3 Units subcutaneous TID with meals    levothyroxine  25 mcg oral Daily    sennosides  2 tablet oral BID    torsemide  50 mg oral Daily     PRN medications   Medication    acetaminophen    dextrose 10 % in water (D10W)    dextrose    glucagon    heparin     Continuous Medications   Medication Dose Last Rate     dextrose 1.5% - LOW calcium 2.5mEq/L (Dianeal, Delflex-LC) in 3,000 mL peritoneal dialysate        dextrose 1.5% - LOW calcium 2.5mEq/L (Dianeal, Delflex-LC) in 3,000 mL peritoneal dialysate        heparin  1,500 Units/hr 1,500 Units/hr (10/06/23 0956)       Physical Exam:  Gen: no apparent distress  Skin: warm and dry, dressing on buttocks  ENMT: oral membranes moist  Lungs: respirations even and unlabored, CTA per auscultation  Heart: RRR S1S2 I/IV systolic murmur right sternal border, no  gallop  Abd: soft, NT, ND + BS  Ext: warm with palpable radial and DP pulses  Neuro: A & O x3, speech clear         Assessment/Plan   Salomon Chowdary is an 84 yo male with a PMHx of insulin dep (type2) diabetes, ESRD on PD, chronic pain, HLD, HTN, hypothyroidism and arthritis who initially presented to OSH with chest pain and was found to have 2 vessel disease and was transferred to [facility] for CABG vs PCI evaluation.       CAD/ 2 vessel disease  NSTEMI  - admitted with CP, symptomatic with SOB and dry heaves with exertion at times recently  - troponin 122>608>1661 om 9/20, troponin 10/2: 24  - Echo 9/20 EF 50-55%, impaired relaxation of LV diastolic filling, small to moderate pericardial effusion  - repeat TTE 10/2: EF 55-60%, no WMA's, small pericardial effusion   - LHC on 9/20 showing disease  LAD of 75-80% in the proximal, early mid and late mid segments.   Lcx: distal cx 95% right before takeoff of left PDA  - consult IC for evaluation for PCI & consult CTS for CABG eval  - presented at [facility] meeting 10/3  - pre-op studies completed: vascular (vein mapping & carotids), CT Chest, CXR, PFT's (pre-hill report in front of patient's chart; FEV1/FVC 87)  - COVID negative on 10/1, repeat COVID and T&S ordered for 10/10  - mid-CABG tentatively Wednesday 10/11 per Dr. Eileen Neal (met & spoke with patient and wife today)  - HR dropped into the 40's on metop tart 12.5mg BID (asymptomatic, cont. Holding)  - cot heparin gtt, asa,  statin    Anemia of chronic disease, stable  - no need to check iron studies since patient has known ESRD  - pt previously getting epo with PD  - Hgb: 8.8 ( 7.9,9.1,8.4, 7.8)  - s/p 1UPRBC on 10/5 for optimization prior to surgery  - no overt signs of bleeding, denies increased fatigue  - cont. darbepoietin IV 40mcg weekly per renal (every Friday)    ESRD  - primary nephrologist Dr. Khan  - pt on nightly PD since 3/2023  - nephology following: darbepoietin (as above), cont. protein supplement (adding BOOST TID), daily renal MVI, stopped sodium bicarb on 10/5,cont. Nightly PD   - Cr today: 4.59    New onset A fib with RVR, improving  - episode at OSH  - converted to NSR  with metoprolol 5mg IV X1  - currently NSR on tele with HR in the 70's  - hold metop tart (as above)  - cont heparin gtt     HTN, controlled  - SBP past 24hrs: 110-120's  - continue home amlodipine 10mg daily, torsemide 50mg daily      T2IDDM  - 9/30 Hgb A1C: 6.6%  - pt on levemir 25u daily and trulicity 1.5mg/0.5ml once weekly on Tuesdays at home  - inpatient on detemir 12u daily and lispro SS  - 10/4 episode of nocturnal hypogylcemia 40 prompting endo consult--->detemir decreased from 20U>12U  - no hypoglycemia since adjustment was made  - goal -180's, cont. Daily detemir 12 units, Mild SSI, ACHS accuchecks, hypoglycemia protocol     Hypothyroidism  - 10/2 TSH 6.26, free T3 1.12  - endocrine following: cont. home LT4 25mcg  - repeat TFT in 1 week (10/10)     Free intraperitoneal air seen on CT chest  - abd soft without tenderness  - likely from PD catheter  - cont to monitor closely    Wound on buttocks  - wife changed dressing 9/30, patient complains of pain to site  - 10/2 consult wound care RN   - wound care recs appreciated/ordered    Spinal Stenosis  - wears neck brace for neck support  - baseline can do ADL's, walking long distances needs scooter due to chronic pain issues, neuropathy in feet, exertional SOB   - Declined PT/OT for  now, also has exertional SOB and nausea with activity therefore recommend limited activity until evaluated by CTS       DVT Ppx: heparin gtt  DISPO: tentative MIDCABG Wednesday 10/11 with Dr. Eileen Neal     Pt seen and examined and plan of care discussed with Dr. Allen      Code Status:  Full Code    I spent 45 minutes in the professional and overall care of this patient.    Martha Schumacher, APRN-CNP

## 2023-10-06 NOTE — H&P
History Of Present Illness  Salomon Chowdary is a 83 y.o. male presenting with unstable angina and advanced coronary disease.  We discussed this patient with high risk coronary meeting and after discussion we agreed that the best way to treat the patient was with a mini invasive CABG..     Past Medical History  He has a past medical history of Chronic kidney disease, Diabetes mellitus (CMS/HCC), and Hypertension.    Surgical History  He has a past surgical history that includes US guided percutaneous biopsy renal left (Left, 11/30/2022); Tonsillectomy (1945); Carpal tunnel release (2018); and Spinal fusion (09/2015).     Social History  He reports that he has never smoked. He has never used smokeless tobacco. He reports that he does not drink alcohol. No history on file for drug use.    Family History  Family History   Problem Relation Name Age of Onset    Cancer Brother      Diabetes type I Brother          Allergies  Opioids - morphine analogues    Review of Systems   Constitutional: Negative.    Respiratory: Negative.     Cardiovascular:  Positive for chest pain.   Skin: Negative.    Neurological: Negative.    Psychiatric/Behavioral: Negative.          Physical Exam  Constitutional:       Appearance: Normal appearance. He is normal weight.   Cardiovascular:      Rate and Rhythm: Normal rate and regular rhythm.   Pulmonary:      Effort: Pulmonary effort is normal.      Breath sounds: Normal breath sounds.   Neurological:      General: No focal deficit present.      Mental Status: He is oriented to person, place, and time.   Psychiatric:         Mood and Affect: Mood normal.         Behavior: Behavior normal.         Thought Content: Thought content normal.          Last Recorded Vitals  There were no vitals taken for this visit.    Relevant Results        Severe coronary disease especially on the proximal LAD that was quite calcified.     Assessment/Plan       We will perform mini invasive CABG on October 11.        I spent 30 minutes in the professional and overall care of this patient.      Jorje Neal MD

## 2023-10-06 NOTE — PROGRESS NOTES
Peritoneal Dialysis- This nurse Started PD treatment, Exit site care completed per protocol. This nurse demonstrated to Floor nurse Lela how to disconnect pt from PD cycler. Caps and written instructions left on cycler cart.   Please page [phone] with any concerns

## 2023-10-07 LAB
ALBUMIN SERPL BCP-MCNC: 2.9 G/DL (ref 3.4–5)
ALBUMIN SERPL BCP-MCNC: 3 G/DL (ref 3.4–5)
ANION GAP SERPL CALC-SCNC: 14 MMOL/L (ref 10–20)
ANION GAP SERPL CALC-SCNC: 16 MMOL/L (ref 10–20)
BUN SERPL-MCNC: 52 MG/DL (ref 6–23)
BUN SERPL-MCNC: 60 MG/DL (ref 6–23)
CALCIUM SERPL-MCNC: 8.2 MG/DL (ref 8.6–10.6)
CALCIUM SERPL-MCNC: 8.8 MG/DL (ref 8.6–10.6)
CHLORIDE SERPL-SCNC: 102 MMOL/L (ref 98–107)
CHLORIDE SERPL-SCNC: 103 MMOL/L (ref 98–107)
CO2 SERPL-SCNC: 25 MMOL/L (ref 21–32)
CO2 SERPL-SCNC: 26 MMOL/L (ref 21–32)
CREAT SERPL-MCNC: 4.46 MG/DL (ref 0.5–1.3)
CREAT SERPL-MCNC: 4.93 MG/DL (ref 0.5–1.3)
ERYTHROCYTE [DISTWIDTH] IN BLOOD BY AUTOMATED COUNT: 15.3 % (ref 11.5–14.5)
ERYTHROCYTE [DISTWIDTH] IN BLOOD BY AUTOMATED COUNT: 15.4 % (ref 11.5–14.5)
GFR SERPL CREATININE-BSD FRML MDRD: 11 ML/MIN/1.73M*2
GFR SERPL CREATININE-BSD FRML MDRD: 12 ML/MIN/1.73M*2
GLUCOSE BLD MANUAL STRIP-MCNC: 118 MG/DL (ref 74–99)
GLUCOSE BLD MANUAL STRIP-MCNC: 132 MG/DL (ref 74–99)
GLUCOSE BLD MANUAL STRIP-MCNC: 258 MG/DL (ref 74–99)
GLUCOSE BLD MANUAL STRIP-MCNC: 279 MG/DL (ref 74–99)
GLUCOSE SERPL-MCNC: 235 MG/DL (ref 74–99)
GLUCOSE SERPL-MCNC: 95 MG/DL (ref 74–99)
HCT VFR BLD AUTO: 29.8 % (ref 41–52)
HCT VFR BLD AUTO: 30.6 % (ref 41–52)
HGB BLD-MCNC: 8.9 G/DL (ref 13.5–17.5)
HGB BLD-MCNC: 9.3 G/DL (ref 13.5–17.5)
MAGNESIUM SERPL-MCNC: 1.77 MG/DL (ref 1.6–2.4)
MAGNESIUM SERPL-MCNC: 1.81 MG/DL (ref 1.6–2.4)
MCH RBC QN AUTO: 31.8 PG (ref 26–34)
MCH RBC QN AUTO: 32.2 PG (ref 26–34)
MCHC RBC AUTO-ENTMCNC: 29.9 G/DL (ref 32–36)
MCHC RBC AUTO-ENTMCNC: 30.4 G/DL (ref 32–36)
MCV RBC AUTO: 105 FL (ref 80–100)
MCV RBC AUTO: 108 FL (ref 80–100)
NRBC BLD-RTO: 0 /100 WBCS (ref 0–0)
NRBC BLD-RTO: 0 /100 WBCS (ref 0–0)
PHOSPHATE SERPL-MCNC: 2.6 MG/DL (ref 2.5–4.9)
PHOSPHATE SERPL-MCNC: 3.4 MG/DL (ref 2.5–4.9)
PLATELET # BLD AUTO: 267 X10*3/UL (ref 150–450)
PLATELET # BLD AUTO: 304 X10*3/UL (ref 150–450)
PLATELET # BLD AUTO: 304 X10*3/UL (ref 150–450)
PMV BLD AUTO: 9.9 FL (ref 7.5–11.5)
PMV BLD AUTO: 9.9 FL (ref 7.5–11.5)
POTASSIUM SERPL-SCNC: 5 MMOL/L (ref 3.5–5.3)
POTASSIUM SERPL-SCNC: 5 MMOL/L (ref 3.5–5.3)
RBC # BLD AUTO: 2.76 X10*6/UL (ref 4.5–5.9)
RBC # BLD AUTO: 2.92 X10*6/UL (ref 4.5–5.9)
SODIUM SERPL-SCNC: 136 MMOL/L (ref 136–145)
SODIUM SERPL-SCNC: 140 MMOL/L (ref 136–145)
UFH PPP CHRO-ACNC: 0.3 IU/ML
UFH PPP CHRO-ACNC: 0.3 IU/ML
UFH PPP CHRO-ACNC: 0.7 IU/ML
WBC # BLD AUTO: 8.2 X10*3/UL (ref 4.4–11.3)
WBC # BLD AUTO: 8.5 X10*3/UL (ref 4.4–11.3)

## 2023-10-07 PROCEDURE — 85520 HEPARIN ASSAY: CPT

## 2023-10-07 PROCEDURE — 83735 ASSAY OF MAGNESIUM: CPT | Performed by: NURSE PRACTITIONER

## 2023-10-07 PROCEDURE — 85049 AUTOMATED PLATELET COUNT: CPT | Performed by: NURSE PRACTITIONER

## 2023-10-07 PROCEDURE — 99233 SBSQ HOSP IP/OBS HIGH 50: CPT | Performed by: INTERNAL MEDICINE

## 2023-10-07 PROCEDURE — 90947 DIALYSIS REPEATED EVAL: CPT

## 2023-10-07 PROCEDURE — 2500000002 HC RX 250 W HCPCS SELF ADMINISTERED DRUGS (ALT 637 FOR MEDICARE OP, ALT 636 FOR OP/ED)

## 2023-10-07 PROCEDURE — 85520 HEPARIN ASSAY: CPT | Performed by: INTERNAL MEDICINE

## 2023-10-07 PROCEDURE — 2500000001 HC RX 250 WO HCPCS SELF ADMINISTERED DRUGS (ALT 637 FOR MEDICARE OP): Performed by: NURSE PRACTITIONER

## 2023-10-07 PROCEDURE — 2500000004 HC RX 250 GENERAL PHARMACY W/ HCPCS (ALT 636 FOR OP/ED): Performed by: NURSE PRACTITIONER

## 2023-10-07 PROCEDURE — 36415 COLL VENOUS BLD VENIPUNCTURE: CPT | Performed by: NURSE PRACTITIONER

## 2023-10-07 PROCEDURE — 80069 RENAL FUNCTION PANEL: CPT | Performed by: NURSE PRACTITIONER

## 2023-10-07 PROCEDURE — 85027 COMPLETE CBC AUTOMATED: CPT | Performed by: NURSE PRACTITIONER

## 2023-10-07 PROCEDURE — 2500000001 HC RX 250 WO HCPCS SELF ADMINISTERED DRUGS (ALT 637 FOR MEDICARE OP)

## 2023-10-07 PROCEDURE — 2500000004 HC RX 250 GENERAL PHARMACY W/ HCPCS (ALT 636 FOR OP/ED)

## 2023-10-07 PROCEDURE — 96372 THER/PROPH/DIAG INJ SC/IM: CPT

## 2023-10-07 PROCEDURE — 2500000002 HC RX 250 W HCPCS SELF ADMINISTERED DRUGS (ALT 637 FOR MEDICARE OP, ALT 636 FOR OP/ED): Performed by: NURSE PRACTITIONER

## 2023-10-07 PROCEDURE — 36415 COLL VENOUS BLD VENIPUNCTURE: CPT | Performed by: INTERNAL MEDICINE

## 2023-10-07 PROCEDURE — 82947 ASSAY GLUCOSE BLOOD QUANT: CPT

## 2023-10-07 PROCEDURE — 1200000002 HC GENERAL ROOM WITH TELEMETRY DAILY

## 2023-10-07 PROCEDURE — 96372 THER/PROPH/DIAG INJ SC/IM: CPT | Performed by: NURSE PRACTITIONER

## 2023-10-07 RX ORDER — INSULIN LISPRO 100 [IU]/ML
5 INJECTION, SOLUTION INTRAVENOUS; SUBCUTANEOUS
Status: DISCONTINUED | OUTPATIENT
Start: 2023-10-07 | End: 2023-10-11

## 2023-10-07 RX ORDER — INSULIN LISPRO 100 [IU]/ML
5 INJECTION, SOLUTION INTRAVENOUS; SUBCUTANEOUS
Status: DISCONTINUED | OUTPATIENT
Start: 2023-10-08 | End: 2023-10-11

## 2023-10-07 RX ORDER — INSULIN LISPRO 100 [IU]/ML
3 INJECTION, SOLUTION INTRAVENOUS; SUBCUTANEOUS
Status: DISCONTINUED | OUTPATIENT
Start: 2023-10-08 | End: 2023-10-11

## 2023-10-07 RX ADMIN — LEVOTHYROXINE SODIUM 25 MCG: 25 TABLET ORAL at 05:49

## 2023-10-07 RX ADMIN — AMLODIPINE BESYLATE 10 MG: 2.5 TABLET ORAL at 08:28

## 2023-10-07 RX ADMIN — NEPHROCAP 1 CAPSULE: 1 CAP ORAL at 08:28

## 2023-10-07 RX ADMIN — SODIUM CHLORIDE, SODIUM LACTATE, CALCIUM CHLORIDE, MAGNESIUM CHLORIDE AND DEXTROSE: 1.5; 538; 448; 18.3; 5.08 INJECTION, SOLUTION INTRAPERITONEAL at 18:43

## 2023-10-07 RX ADMIN — ASPIRIN 81 MG CHEWABLE TABLET 81 MG: 81 TABLET CHEWABLE at 08:28

## 2023-10-07 RX ADMIN — INSULIN LISPRO 3 UNITS: 100 INJECTION, SOLUTION INTRAVENOUS; SUBCUTANEOUS at 12:32

## 2023-10-07 RX ADMIN — INSULIN DETEMIR 12 UNITS: 100 INJECTION, SOLUTION SUBCUTANEOUS at 21:43

## 2023-10-07 RX ADMIN — ACETAMINOPHEN 650 MG: 325 TABLET, FILM COATED ORAL at 21:49

## 2023-10-07 RX ADMIN — INSULIN LISPRO 6 UNITS: 100 INJECTION, SOLUTION INTRAVENOUS; SUBCUTANEOUS at 18:13

## 2023-10-07 RX ADMIN — GENTAMICIN SULFATE: 1 CREAM TOPICAL at 16:00

## 2023-10-07 RX ADMIN — SENNOSIDES 17.2 MG: 8.6 TABLET, FILM COATED ORAL at 08:28

## 2023-10-07 RX ADMIN — TORSEMIDE 50 MG: 20 TABLET ORAL at 08:28

## 2023-10-07 RX ADMIN — ATORVASTATIN CALCIUM 80 MG: 80 TABLET ORAL at 21:42

## 2023-10-07 RX ADMIN — HEPARIN SODIUM 1300 UNITS/HR: 10000 INJECTION, SOLUTION INTRAVENOUS at 06:01

## 2023-10-07 RX ADMIN — Medication 25 MCG: at 08:28

## 2023-10-07 RX ADMIN — DULOXETINE HYDROCHLORIDE 30 MG: 30 CAPSULE, DELAYED RELEASE ORAL at 11:16

## 2023-10-07 RX ADMIN — INSULIN LISPRO 3 UNITS: 100 INJECTION, SOLUTION INTRAVENOUS; SUBCUTANEOUS at 08:40

## 2023-10-07 RX ADMIN — INSULIN LISPRO 5 UNITS: 100 INJECTION, SOLUTION INTRAVENOUS; SUBCUTANEOUS at 17:00

## 2023-10-07 ASSESSMENT — COGNITIVE AND FUNCTIONAL STATUS - GENERAL
STANDING UP FROM CHAIR USING ARMS: A LITTLE
TOILETING: A LITTLE
MOBILITY SCORE: 19
DAILY ACTIVITIY SCORE: 20
CLIMB 3 TO 5 STEPS WITH RAILING: A LITTLE
WALKING IN HOSPITAL ROOM: A LITTLE
HELP NEEDED FOR BATHING: A LITTLE
DRESSING REGULAR UPPER BODY CLOTHING: A LITTLE
TURNING FROM BACK TO SIDE WHILE IN FLAT BAD: A LITTLE
MOVING TO AND FROM BED TO CHAIR: A LITTLE
DRESSING REGULAR LOWER BODY CLOTHING: A LITTLE

## 2023-10-07 ASSESSMENT — PAIN SCALES - GENERAL
PAINLEVEL_OUTOF10: 6
PAINLEVEL_OUTOF10: 3
PAINLEVEL_OUTOF10: 0 - NO PAIN

## 2023-10-07 ASSESSMENT — PAIN - FUNCTIONAL ASSESSMENT: PAIN_FUNCTIONAL_ASSESSMENT: 0-10

## 2023-10-07 NOTE — CARE PLAN
Patient safe and stable. Therapeutic on heparin. Insulin standing dose increased. Plan for CABG Wednesday.

## 2023-10-07 NOTE — PROGRESS NOTES
Peritoneal Dialysis- This nurse Started PD treatment, Exit site care completed per protocol. This nurse demonstrated to Floor nurse Mara how to disconnect pt from PD cycler. Caps and written instructions left on cycler cart.   Please page [phone] with any concerns

## 2023-10-07 NOTE — PROGRESS NOTES
Subjective Data:  Patient reports feeling well today, anxiously awaiting midCABG Wednesday.     - HR remaining stable 70-80s since holding BB  - pending midCABG Wednesday 10/11 with Dr. Eileen Neal    Overnight Events:    No overnight events.     Objective Data:  Last Recorded Vitals:  Vitals:    10/06/23 2043 10/06/23 2300 10/07/23 0530 10/07/23 0748   BP: 129/75 102/59 120/71 125/71   BP Location: Right arm Left arm Right arm    Patient Position: Sitting Lying Lying    Pulse: 81 90 85 84   Resp: 20 20 20 20   Temp: 36.7 °C (98.1 °F) 36.3 °C (97.4 °F) 36.8 °C (98.2 °F) 36.9 °C (98.4 °F)   TempSrc: Temporal Temporal Temporal Temporal   SpO2: 93% 92% 94% 95%   Weight:   79.8 kg (175 lb 14.8 oz)    Height:           Last Labs:  CBC - 10/7/2023:  6:04 AM  8.2 9.3 304; 304    30.6      CMP - 10/7/2023:  6:04 AM  8.8 5.1 16 --- 0.4   3.4 2.9 19 70      PTT - 10/1/2023:  5:22 AM  1.2   13.1 >200        Last I/O:  I/O last 3 completed shifts:  In: 3567.5 (44.7 mL/kg) [P.O.:940; I.V.:1757.5 (22 mL/kg); Blood:870]  Out: 3447 (43.2 mL/kg) [Urine:3365 (1.2 mL/kg/hr); Other:82]  Weight: 79.8 kg       Echo:  Transthoracic Echo (TTE) Complete 10/2/2023        Inpatient Medications:  Scheduled medications   Medication Dose Route Frequency    amLODIPine  10 mg oral Daily    aspirin  81 mg oral Daily    atorvastatin  80 mg oral Daily    B complex-vitamin C-folic acid  1 capsule oral Daily    cholecalciferol  25 mcg oral Daily    darbepoetin alyssa  40 mcg intravenous Weekly    DULoxetine  30 mg oral Daily    gentamicin   Topical Daily    insulin detemir  12 Units subcutaneous q24h    insulin lispro  0-10 Units subcutaneous TID with meals    insulin lispro  3 Units subcutaneous TID with meals    levothyroxine  25 mcg oral Daily    sennosides  2 tablet oral BID    torsemide  50 mg oral Daily     PRN medications   Medication    acetaminophen    dextrose 10 % in water (D10W)    dextrose    glucagon    heparin     Continuous Medications    Medication Dose Last Rate    dextrose 1.5% - LOW calcium 2.5mEq/L (Dianeal, Delflex-LC) in 3,000 mL peritoneal dialysate        heparin  1,500 Units/hr 1,100 Units/hr (10/07/23 0900)       Physical Exam:  Gen: no apparent distress  Skin: warm and dry, dressing on buttocks  ENMT: oral membranes moist  Lungs: respirations even and unlabored, CTA per auscultation  Heart: RRR S1S2 I/IV systolic murmur right sternal border, no  gallop  Abd: soft, NT, ND + BS  Ext: warm with palpable radial and DP pulses  Neuro: A & O x3, speech clear         Assessment/Plan   Salomon Chowdary is an 82 yo male with a PMHx of insulin dep (type2) diabetes, ESRD on PD, chronic pain, HLD, HTN, hypothyroidism and arthritis who initially presented to OSH with chest pain and was found to have 2 vessel disease and was transferred to [facility] for CABG vs PCI evaluation.       CAD/ 2 vessel disease  NSTEMI  - admitted with CP, symptomatic with SOB and dry heaves with exertion at times recently  - troponin 122>608>1661 om 9/20, troponin 10/2: 24  - Echo 9/20 EF 50-55%, impaired relaxation of LV diastolic filling, small to moderate pericardial effusion  - repeat TTE 10/2: EF 55-60%, no WMA's, small pericardial effusion   - LHC on 9/20 showing disease  LAD of 75-80% in the proximal, early mid and late mid segments.   Lcx: distal cx 95% right before takeoff of left PDA  - consult IC for evaluation for PCI & consult CTS for CABG eval  - presented at [facility] meeting 10/3  - pre-op studies completed: vascular (vein mapping & carotids), CT Chest, CXR, PFT's (pre-hill report in front of patient's chart; FEV1/FVC 87)  - COVID negative on 10/1, repeat COVID and T&S ordered for 10/10  - mid-CABG tentatively Wednesday 10/11 per Dr. Eileen Neal   - 10/6 HR dropped into the 40's on metop tart 12.5mg BID  - HR now stable 70-80s since holding BB, continue holding   - cot heparin gtt, asa, statin    Anemia of chronic disease, stable  - no need to check iron studies since  patient has known ESRD  - pt previously getting epo with PD  - Hgb: 9.3 (8.8, 7.9, 9.1, 8.4, 7.8)  - s/p 1UPRBC on 10/5 for optimization prior to surgery  - no overt signs of bleeding, denies increased fatigue  - cont. darbepoietin IV 40mcg weekly per renal (every Friday)    ESRD  - primary nephrologist Dr. Khan  - pt on nightly PD since 3/2023  - nephology following: darbepoietin (as above), cont. protein supplement (adding BOOST TID), daily renal MVI, stopped sodium bicarb on 10/5,cont. Nightly PD   - Cr today: 4.46    New onset A fib with RVR, improving  - episode at OSH  - converted to NSR  with metoprolol 5mg IV X1  - currently NSR on tele with HR in the 70's  - hold metop tart (as above)  - cont heparin gtt     HTN, controlled  - SBP past 24hrs: 100-120's  - continue home amlodipine 10mg daily, torsemide 50mg daily      T2IDDM  - 9/30 Hgb A1C: 6.6%  - pt on levemir 25u daily and trulicity 1.5mg/0.5ml once weekly on Tuesdays at home  - inpatient on detemir 12u daily and lispro SS  - 10/4 episode of nocturnal hypogylcemia 40 prompting endo consult--->detemir decreased from 20U>12U  - no hypoglycemia since adjustment was made  - goal -180's, cont. Daily detemir 12 units, Mild SSI, ACHS accuchecks, hypoglycemia protocol     Hypothyroidism  - 10/2 TSH 6.26, free T3 1.12  - endocrine following: cont. home LT4 25mcg  - repeat TFT in 1 week (10/10)     Free intraperitoneal air seen on CT chest  - abd soft without tenderness  - likely from PD catheter  - cont to monitor closely    Wound on buttocks  - wife changed dressing 9/30, patient complains of pain to site  - 10/2 consult wound care RN   - wound care recs appreciated/ordered    Spinal Stenosis  - wears neck brace for neck support  - baseline can do ADL's, walking long distances needs scooter due to chronic pain issues, neuropathy in feet, exertional SOB   - Declined PT/OT for now, also has exertional SOB and nausea with activity therefore recommend  limited activity until evaluated by CTS       DVT Ppx: heparin gtt  DISPO: tentative MIDCABG Wednesday 10/11 with Dr. Eileen Neal     Pt seen and examined and plan of care discussed with Dr. Bearden      Code Status:  Full Code    I spent 45 minutes in the professional and overall care of this patient.    Romario Monterroso PA-C    I personally saw and physically examined the patient on 10/8 with  IZABELLA. I agree with the housestaff's  assessment and plan of care.    Yola Bearden MD  HF/Transplant Cardiology  Pager [phone]  Cell [phone]

## 2023-10-08 LAB
GLUCOSE BLD MANUAL STRIP-MCNC: 121 MG/DL (ref 74–99)
GLUCOSE BLD MANUAL STRIP-MCNC: 131 MG/DL (ref 74–99)
GLUCOSE BLD MANUAL STRIP-MCNC: 167 MG/DL (ref 74–99)
GLUCOSE BLD MANUAL STRIP-MCNC: 76 MG/DL (ref 74–99)
GLUCOSE BLD MANUAL STRIP-MCNC: 93 MG/DL (ref 74–99)

## 2023-10-08 PROCEDURE — 2500000001 HC RX 250 WO HCPCS SELF ADMINISTERED DRUGS (ALT 637 FOR MEDICARE OP): Performed by: NURSE PRACTITIONER

## 2023-10-08 PROCEDURE — 1200000002 HC GENERAL ROOM WITH TELEMETRY DAILY

## 2023-10-08 PROCEDURE — 2500000004 HC RX 250 GENERAL PHARMACY W/ HCPCS (ALT 636 FOR OP/ED)

## 2023-10-08 PROCEDURE — 96372 THER/PROPH/DIAG INJ SC/IM: CPT

## 2023-10-08 PROCEDURE — 2500000004 HC RX 250 GENERAL PHARMACY W/ HCPCS (ALT 636 FOR OP/ED): Performed by: NURSE PRACTITIONER

## 2023-10-08 PROCEDURE — 2500000002 HC RX 250 W HCPCS SELF ADMINISTERED DRUGS (ALT 637 FOR MEDICARE OP, ALT 636 FOR OP/ED)

## 2023-10-08 PROCEDURE — 99232 SBSQ HOSP IP/OBS MODERATE 35: CPT | Performed by: STUDENT IN AN ORGANIZED HEALTH CARE EDUCATION/TRAINING PROGRAM

## 2023-10-08 PROCEDURE — 90947 DIALYSIS REPEATED EVAL: CPT

## 2023-10-08 PROCEDURE — 82947 ASSAY GLUCOSE BLOOD QUANT: CPT

## 2023-10-08 PROCEDURE — 99233 SBSQ HOSP IP/OBS HIGH 50: CPT | Performed by: INTERNAL MEDICINE

## 2023-10-08 RX ORDER — MAGNESIUM SULFATE 1 G/100ML
1 INJECTION INTRAVENOUS ONCE
Status: COMPLETED | OUTPATIENT
Start: 2023-10-08 | End: 2023-10-08

## 2023-10-08 RX ORDER — INSULIN LISPRO 100 [IU]/ML
0-5 INJECTION, SOLUTION INTRAVENOUS; SUBCUTANEOUS
Status: DISCONTINUED | OUTPATIENT
Start: 2023-10-08 | End: 2023-10-10

## 2023-10-08 RX ORDER — HEPARIN SODIUM 5000 [USP'U]/ML
5000 INJECTION, SOLUTION INTRAVENOUS; SUBCUTANEOUS EVERY 8 HOURS
Status: DISCONTINUED | OUTPATIENT
Start: 2023-10-08 | End: 2023-10-09

## 2023-10-08 RX ORDER — FLUTICASONE PROPIONATE 50 MCG
2 SPRAY, SUSPENSION (ML) NASAL DAILY
Status: DISCONTINUED | OUTPATIENT
Start: 2023-10-08 | End: 2023-10-17 | Stop reason: HOSPADM

## 2023-10-08 RX ADMIN — HEPARIN SODIUM 5000 UNITS: 5000 INJECTION INTRAVENOUS; SUBCUTANEOUS at 21:05

## 2023-10-08 RX ADMIN — LEVOTHYROXINE SODIUM 25 MCG: 25 TABLET ORAL at 06:52

## 2023-10-08 RX ADMIN — AMLODIPINE BESYLATE 10 MG: 2.5 TABLET ORAL at 08:52

## 2023-10-08 RX ADMIN — INSULIN LISPRO 3 UNITS: 100 INJECTION, SOLUTION INTRAVENOUS; SUBCUTANEOUS at 08:56

## 2023-10-08 RX ADMIN — HEPARIN SODIUM 1100 UNITS/HR: 10000 INJECTION, SOLUTION INTRAVENOUS at 03:01

## 2023-10-08 RX ADMIN — SENNOSIDES 17.2 MG: 8.6 TABLET, FILM COATED ORAL at 21:05

## 2023-10-08 RX ADMIN — NEPHROCAP 1 CAPSULE: 1 CAP ORAL at 08:52

## 2023-10-08 RX ADMIN — DULOXETINE HYDROCHLORIDE 30 MG: 30 CAPSULE, DELAYED RELEASE ORAL at 08:53

## 2023-10-08 RX ADMIN — SODIUM CHLORIDE, SODIUM LACTATE, CALCIUM CHLORIDE, MAGNESIUM CHLORIDE AND DEXTROSE: 1.5; 538; 448; 18.3; 5.08 INJECTION, SOLUTION INTRAPERITONEAL at 19:25

## 2023-10-08 RX ADMIN — MAGNESIUM SULFATE HEPTAHYDRATE 1 G: 1 INJECTION, SOLUTION INTRAVENOUS at 09:44

## 2023-10-08 RX ADMIN — INSULIN DETEMIR 11 UNITS: 100 INJECTION, SOLUTION SUBCUTANEOUS at 21:40

## 2023-10-08 RX ADMIN — ATORVASTATIN CALCIUM 80 MG: 80 TABLET ORAL at 21:05

## 2023-10-08 RX ADMIN — INSULIN LISPRO 5 UNITS: 100 INJECTION, SOLUTION INTRAVENOUS; SUBCUTANEOUS at 12:24

## 2023-10-08 RX ADMIN — ASPIRIN 81 MG CHEWABLE TABLET 81 MG: 81 TABLET CHEWABLE at 08:52

## 2023-10-08 RX ADMIN — HEPARIN SODIUM 5000 UNITS: 5000 INJECTION INTRAVENOUS; SUBCUTANEOUS at 14:11

## 2023-10-08 RX ADMIN — INSULIN LISPRO 5 UNITS: 100 INJECTION, SOLUTION INTRAVENOUS; SUBCUTANEOUS at 19:17

## 2023-10-08 RX ADMIN — TORSEMIDE 50 MG: 20 TABLET ORAL at 08:52

## 2023-10-08 RX ADMIN — Medication 25 MCG: at 08:53

## 2023-10-08 RX ADMIN — GENTAMICIN SULFATE: 1 CREAM TOPICAL at 20:00

## 2023-10-08 ASSESSMENT — COGNITIVE AND FUNCTIONAL STATUS - GENERAL
WALKING IN HOSPITAL ROOM: A LITTLE
HELP NEEDED FOR BATHING: A LITTLE
DRESSING REGULAR LOWER BODY CLOTHING: A LITTLE
DAILY ACTIVITIY SCORE: 20
TOILETING: A LITTLE
TOILETING: A LITTLE
DAILY ACTIVITIY SCORE: 20
DRESSING REGULAR LOWER BODY CLOTHING: A LITTLE
MOVING TO AND FROM BED TO CHAIR: A LITTLE
DRESSING REGULAR UPPER BODY CLOTHING: A LITTLE
STANDING UP FROM CHAIR USING ARMS: A LITTLE
MOBILITY SCORE: 20
CLIMB 3 TO 5 STEPS WITH RAILING: A LITTLE
STANDING UP FROM CHAIR USING ARMS: A LITTLE
MOBILITY SCORE: 20
WALKING IN HOSPITAL ROOM: A LITTLE
DRESSING REGULAR UPPER BODY CLOTHING: A LITTLE
MOVING TO AND FROM BED TO CHAIR: A LITTLE
HELP NEEDED FOR BATHING: A LITTLE
CLIMB 3 TO 5 STEPS WITH RAILING: A LITTLE

## 2023-10-08 ASSESSMENT — PAIN - FUNCTIONAL ASSESSMENT: PAIN_FUNCTIONAL_ASSESSMENT: 0-10

## 2023-10-08 ASSESSMENT — PAIN SCALES - GENERAL: PAINLEVEL_OUTOF10: 0 - NO PAIN

## 2023-10-08 NOTE — PROGRESS NOTES
Salomon Chowdary is a 83 y.o. male on day 10 of with IDDM, HTN, HLD, ESRD on PD,  CAD, A-Fib and hypothyroidism presented to an OSH on 9/20 with chest pain. He was found to have NSTEMI with LHC showing 2 vessel disease in the LAD and left circumflex. He was transferred to [facility] for evaluation of revascularization plan. Endocrinology is consulted iso abnormal TFT with TSH of 6.26 and FT4 1.12 as well as diabetes management.        Subjective   Patient was seen this AM. Mini CAB on Wednesday.      Objective   PE:  Constitutional: NAD, well groomed. AOx3. Cooperative  Skin/Hair: Warm, dry skin.  HEENT: EOMI, Anicteric scleras   Cardiovascular: normal HR  Respiratory: no increased wob,  or accessory muscle use.  Psych : appropriate affect        FREET4 1.12 10/02/2023                               Assessment/Plan   Salomon Chowdary is a 83 y.o. male with IDDM, HTN, HLD, ESRD on PD,  CAD, A-Fib and hypothyroidism presented to an OSH ON 9/20 with chest pain. He was found to have NSTEMI with LHC showing 2 vessel disease in the LAD and left circumflex. He was transferred to [facility] for evaluation of revascularization plan. Endocrinology is consulted iso abnormal TFT     10/2: TSH 6.26 and FT4 1.12.     #Hypothyroidism   # Non-thyroidal Illness   Patient is on 25 mcg of LT4. TSH was 2.34 on the day of presentation to the OSH. Presented with NSTEMI as above with Trop peaked to >2000 was initially managed in the ICU. No FT4 was obtained on 9/20.   Recommendations  -Continue current dose of LT4 25 mcg      #DM2   Background Hx:  Diabetes duration: Since 1995, has been on insulin since 2012     Home diet (how many meals a day): 2-3 meals a day  Outpatient physician managing diabetes: Endocrinologist with [facility].  Not seen in the past 3 months would like to establish with a new endocrinologist.  Currently follows with PCP.  Macrovascular complications: CVD [no diagnosed CAD to major vessel disease]     CVA [ denies]     PVD  [denies]  Microvascular complications: Retinopathy [ denies]   Nephropathy [ESRD on PD]   Neuropathy [endorses]  Last A1c: 6.6 9/2023                  Plan:10/8/23:   - Please decrease Detemir to 11 units at bedtime   - Adjustments to Lispro >>>3-5-5  - ISS Lispro of 1 unit for every 50 more than 150     Mid CAB with Dr. Eileen Neal on Wednesday (10/11/23)    Plan communicated to primary team  Patient is discussed, seen, and examined by Dr. Antonio Jon Pager- [phone]

## 2023-10-08 NOTE — PROGRESS NOTES
Subjective Data:  Patient reports feeling well today, anxiously awaiting midCABG Wednesday.   Requesting Flonase.     - HR remaining stable 70-80s since holding BB  - pending midCABG Wednesday 10/11 with Dr. Eileen Neal  - remains chest pain free, heparin gtt discontinued per request of Dr. Bearden  - subcutaneous heparin ordered    Overnight Events:    No overnight events.     Objective Data:  Last Recorded Vitals:  Vitals:    10/08/23 0354 10/08/23 0615 10/08/23 0809 10/08/23 1155   BP: 116/65  136/70 109/62   BP Location:       Patient Position:       Pulse: 75  88 74   Resp: 18  18 18   Temp: 36.3 °C (97.3 °F)  36.2 °C (97.2 °F) 36 °C (96.8 °F)   TempSrc: Temporal  Temporal Temporal   SpO2: 94%  96% 97%   Weight:  78.8 kg (173 lb 11.6 oz)     Height:           Last Labs:  CBC - 10/7/2023: 10:01 PM  8.5 8.9 267    29.8      CMP - 10/7/2023: 10:01 PM  8.2 5.1 16 --- 0.4   2.6 3.0 19 70      PTT - 10/1/2023:  5:22 AM  1.2   13.1 >200        Last I/O:  I/O last 3 completed shifts:  In: 1428.2 (18.1 mL/kg) [P.O.:1180; I.V.:248.2 (3.1 mL/kg)]  Out: 2342 (29.7 mL/kg) [Urine:2225 (0.8 mL/kg/hr); Other:117]  Weight: 78.8 kg       Echo:  Transthoracic Echo (TTE) Complete 10/2/2023        Inpatient Medications:  Scheduled medications   Medication Dose Route Frequency    amLODIPine  10 mg oral Daily    aspirin  81 mg oral Daily    atorvastatin  80 mg oral Daily    B complex-vitamin C-folic acid  1 capsule oral Daily    cholecalciferol  25 mcg oral Daily    darbepoetin alyssa  40 mcg intravenous Weekly    DULoxetine  30 mg oral Daily    gentamicin   Topical Daily    heparin (porcine)  5,000 Units subcutaneous q8h    insulin detemir  12 Units subcutaneous q24h    insulin lispro  0-10 Units subcutaneous TID with meals    insulin lispro  3 Units subcutaneous Daily with breakfast    insulin lispro  5 Units subcutaneous Daily with lunch    insulin lispro  5 Units subcutaneous Daily with evening meal    levothyroxine  25 mcg oral  Daily    sennosides  2 tablet oral BID    torsemide  50 mg oral Daily     PRN medications   Medication    acetaminophen    dextrose 10 % in water (D10W)    dextrose    glucagon     Continuous Medications   Medication Dose Last Rate    dextrose 1.5% - LOW calcium 2.5mEq/L (Dianeal, Delflex-LC) in 3,000 mL peritoneal dialysate           Physical Exam:  Gen: no apparent distress  Skin: warm and dry, dressing on buttocks  ENMT: oral membranes moist  Lungs: respirations even and unlabored, CTA per auscultation  Heart: RRR S1S2 I/IV systolic murmur right sternal border, no  gallop  Abd: soft, NT, ND + BS  Ext: warm with palpable radial and DP pulses  Neuro: A & O x3, speech clear         Assessment/Plan   Salomon Chowdary is an 84 yo male with a PMHx of insulin dep (type2) diabetes, ESRD on PD, chronic pain, HLD, HTN, hypothyroidism and arthritis who initially presented to OSH with chest pain and was found to have 2 vessel disease and was transferred to [facility] for CABG vs PCI evaluation.       CAD/ 2 vessel disease  NSTEMI  - admitted with CP, symptomatic with SOB and dry heaves with exertion at times recently  - troponin 122>608>1661 om 9/20, troponin 10/2: 24  - Echo 9/20 EF 50-55%, impaired relaxation of LV diastolic filling, small to moderate pericardial effusion  - repeat TTE 10/2: EF 55-60%, no WMA's, small pericardial effusion   - LHC on 9/20 showing disease  LAD of 75-80% in the proximal, early mid and late mid segments.   Lcx: distal cx 95% right before takeoff of left PDA  - consult IC for evaluation for PCI & consult CTS for CABG eval  - presented at [facility] meeting 10/3  - pre-op studies completed: vascular (vein mapping & carotids), CT Chest, CXR, PFT's (pre-hill report in front of patient's chart; FEV1/FVC 87)  - COVID negative on 10/1, repeat COVID and T&S ordered for 10/10  - mid-CABG tentatively Wednesday 10/11 per Dr. Eileen Neal   - 10/6 HR dropped into the 40's on metop tart 12.5mg BID  - HR now stable 70-80s  since holding BB, continue holding   - cont ASA & statin  - remains chest pain free, heparin gtt discontinued per request of Dr. Bearden  - subcutaneous heparin ordered    Anemia of chronic disease, stable  - no need to check iron studies since patient has known ESRD  - pt previously getting epo with PD  - Hgb: 8.9 (9.3, 8.8, 7.9, 9.1, 8.4, 7.8)  - s/p 1UPRBC on 10/5 for optimization prior to surgery  - no overt signs of bleeding, denies increased fatigue  - cont. darbepoietin IV 40mcg weekly per renal (every Friday)    ESRD  - primary nephrologist Dr. Khan  - pt on nightly PD since 3/2023  - nephology following: darbepoietin (as above), cont. protein supplement (adding BOOST TID), daily renal MVI, stopped sodium bicarb on 10/5,cont. Nightly PD   - Cr today: 4.93    New onset A fib with RVR, improving  - episode at OSH  - converted to NSR  with metoprolol 5mg IV X1  - currently NSR on tele with HR in the 70's, no further episodes of A fib  - hold metop tart (as above)  - AC as above     HTN, controlled  - SBP past 24hrs: 100-120's  - continue home amlodipine 10mg daily, torsemide 50mg daily      T2IDDM  - 9/30 Hgb A1C: 6.6%  - pt on levemir 25u daily and trulicity 1.5mg/0.5ml once weekly on Tuesdays at home  - inpatient on detemir 12u daily and lispro SS  - 10/4 episode of nocturnal hypogylcemia 40 prompting endo consult--->detemir decreased from 20U>12U  - no hypoglycemia since adjustment was made  - goal -180's, cont. Daily detemir 12 units, lispro 3U at breakfast, lispro 5U at lunch & dinner + Mild SSI, ACHS accuchecks, hypoglycemia protocol     Hypothyroidism  - 10/2 TSH 6.26, free T3 1.12  - endocrine following: cont. home LT4 25mcg  - repeat TFT in 1 week (10/10, ordered)     Free intraperitoneal air seen on CT chest  - abd soft without tenderness  - likely from PD catheter  - cont to monitor closely    Wound on buttocks  - wife changed dressing 9/30, patient complains of pain to site  - 10/2  consult wound care RN   - wound care recs appreciated/ordered    Spinal Stenosis  - wears neck brace for neck support  - baseline can do ADL's, walking long distances needs scooter due to chronic pain issues, neuropathy in feet, exertional SOB   - Declined PT/OT for now, also has exertional SOB and nausea with activity therefore recommend limited activity until evaluated by CTS       DVT Ppx: heparin gtt  DISPO: tentative MIDCABG Wednesday 10/11 with Dr. Eileen Neal     Pt seen and examined and plan of care discussed with Dr. Bearden      Code Status:  Full Code    I spent 30 minutes in the professional and overall care of this patient.    Romario Monterroso PA-C    I personally saw and physically examined the patient on 10/8 with  IZABELLA. I agree with the housestaff's  assessment and plan of care.    Yola Bearden MD  HF/Transplant Cardiology  Pager [phone]  Cell [phone]

## 2023-10-08 NOTE — CARE PLAN
Problem: Pain  Goal: My pain/discomfort is manageable  Outcome: Progressing     Problem: Safety  Goal: Patient will be injury free during hospitalization  Outcome: Progressing  Goal: I will remain free of falls  Outcome: Progressing     Problem: Daily Care  Goal: Daily care needs are met  Outcome: Progressing     Problem: Psychosocial Needs  Goal: Demonstrates ability to cope with hospitalization/illness  Outcome: Progressing  Goal: Collaborate with me, my family, and caregiver to identify my specific goals  Outcome: Progressing     Problem: Discharge Barriers  Goal: My discharge needs are met  Outcome: Progressing     Problem: Fall/Injury  Goal: Not fall by end of shift  Outcome: Progressing  Goal: Be free from injury by end of the shift  Outcome: Progressing  Goal: Verbalize understanding of personal risk factors for fall in the hospital  Outcome: Progressing  Goal: Verbalize understanding of risk factor reduction measures to prevent injury from fall in the home  Outcome: Progressing     Problem: Pain  Goal: Takes deep breaths with improved pain control throughout the shift  Outcome: Progressing  Goal: Turns in bed with improved pain control throughout the shift  Outcome: Progressing  Goal: Walks with improved pain control throughout the shift  Outcome: Progressing  Goal: Performs ADL's with improved pain control throughout shift  Outcome: Progressing   The patient's goals for the shift include patien will use the call light through out the shift.    The clinical goals for the shift include Patient will be free of chest pain through shift.    Pt remained safe and free from injury through the shift. Patient heparin gtt was discontinued and subcutaneous anticoagulation initiated in its place.

## 2023-10-08 NOTE — CARE PLAN
The patient's goals for the shift include      The clinical goals for the shift include      Over the shift, the patient did not make progress toward the following goals. Barriers to progression include. Recommendations to address these barriers include .

## 2023-10-08 NOTE — CARE PLAN
The patient's goals for the shift include patien will use the call light through out the shift.    The clinical goals for the shift include patient will have his peritoneal dialysis as it is sceduled.    Over the shift, the patient did not make progress toward the following goals. Barriers to progression include . Recommendations to address these barriers include .      Problem: Fall/Injury  Goal: Not fall by end of shift  10/8/2023 0731 by Diana Delacruz RN  Outcome: Met  10/8/2023 0045 by Diana Delacruz RN  Outcome: Progressing  Goal: Be free from injury by end of the shift  10/8/2023 0731 by Diana Delacruz RN  Outcome: Met  10/8/2023 0045 by Diana Delacruz RN  Outcome: Progressing  Goal: Verbalize understanding of personal risk factors for fall in the hospital  10/8/2023 0731 by Diana Delacruz RN  Outcome: Met  10/8/2023 0045 by Diana Delacruz RN  Outcome: Progressing  Goal: Verbalize understanding of risk factor reduction measures to prevent injury from fall in the home  10/8/2023 0731 by Diana Delacruz RN  Outcome: Met  10/8/2023 0045 by Diana Delacruz RN  Outcome: Progressing  Goal: Use assistive devices by end of the shift  10/8/2023 0731 by Diana Delacruz RN  Outcome: Met  10/8/2023 0045 by Diana Delacruz RN  Outcome: Progressing  Goal: Pace activities to prevent fatigue by end of the shift  10/8/2023 0731 by Diana Delacruz RN  Outcome: Met  10/8/2023 0045 by Diana Delacruz RN  Outcome: Progressing     Problem: Pain  Goal: My pain/discomfort is manageable  10/8/2023 0731 by Diana Delacruz RN  Outcome: Met  10/8/2023 0045 by Diana Delacruz RN  Outcome: Progressing     Problem: Safety  Goal: Patient will be injury free during hospitalization  10/8/2023 0731 by Diana Delacruz RN  Outcome: Met  10/8/2023 0045 by Diana Delacruz RN  Outcome: Progressing  Goal: I will remain free of falls  10/8/2023 0731 by Diana Delacruz RN  Outcome: Met  10/8/2023 0045 by Diana Delacruz RN  Outcome: Progressing     Problem: Daily Care  Goal: Daily  care needs are met  10/8/2023 0731 by Diana Delacruz RN  Outcome: Met  10/8/2023 0045 by Diana Delacruz RN  Outcome: Progressing     Problem: Psychosocial Needs  Goal: Demonstrates ability to cope with hospitalization/illness  10/8/2023 0731 by Diana Delacruz RN  Outcome: Met  10/8/2023 0045 by Diana Delacruz RN  Outcome: Progressing  Goal: Collaborate with me, my family, and caregiver to identify my specific goals  10/8/2023 0731 by Diana Delacruz RN  Outcome: Met  10/8/2023 0045 by Diana Delacruz RN  Outcome: Progressing     Problem: Discharge Barriers  Goal: My discharge needs are met  10/8/2023 0731 by Diana Delacruz RN  Outcome: Met  10/8/2023 0045 by Diana Delacruz RN  Outcome: Progressing

## 2023-10-08 NOTE — PROGRESS NOTES
eritoneal Dialysis- This nurse Started PD treatment, Exit site care completed per protocol. This nurse demonstrated to Floor nurse how to disconnect pt from PD cycler. Caps and written instructions left on cycler cart.   Please page [phone] with any concerns

## 2023-10-09 LAB
ABO GROUP (TYPE) IN BLOOD: NORMAL
ALBUMIN SERPL BCP-MCNC: 3.1 G/DL (ref 3.4–5)
ALBUMIN SERPL BCP-MCNC: 3.3 G/DL (ref 3.4–5)
ANION GAP SERPL CALC-SCNC: 16 MMOL/L (ref 10–20)
ANION GAP SERPL CALC-SCNC: 18 MMOL/L (ref 10–20)
ANTIBODY SCREEN: NORMAL
APTT PPP: 65 SECONDS (ref 27–38)
BUN SERPL-MCNC: 55 MG/DL (ref 6–23)
BUN SERPL-MCNC: 56 MG/DL (ref 6–23)
CALCIUM SERPL-MCNC: 8.6 MG/DL (ref 8.6–10.6)
CALCIUM SERPL-MCNC: 9.2 MG/DL (ref 8.6–10.6)
CHLORIDE SERPL-SCNC: 98 MMOL/L (ref 98–107)
CHLORIDE SERPL-SCNC: 99 MMOL/L (ref 98–107)
CO2 SERPL-SCNC: 23 MMOL/L (ref 21–32)
CO2 SERPL-SCNC: 25 MMOL/L (ref 21–32)
CREAT SERPL-MCNC: 3.97 MG/DL (ref 0.5–1.3)
CREAT SERPL-MCNC: 4.06 MG/DL (ref 0.5–1.3)
ERYTHROCYTE [DISTWIDTH] IN BLOOD BY AUTOMATED COUNT: 14.7 % (ref 11.5–14.5)
GFR SERPL CREATININE-BSD FRML MDRD: 14 ML/MIN/1.73M*2
GFR SERPL CREATININE-BSD FRML MDRD: 14 ML/MIN/1.73M*2
GLUCOSE BLD MANUAL STRIP-MCNC: 104 MG/DL (ref 74–99)
GLUCOSE BLD MANUAL STRIP-MCNC: 118 MG/DL (ref 74–99)
GLUCOSE BLD MANUAL STRIP-MCNC: 180 MG/DL (ref 74–99)
GLUCOSE BLD MANUAL STRIP-MCNC: 226 MG/DL (ref 74–99)
GLUCOSE SERPL-MCNC: 133 MG/DL (ref 74–99)
GLUCOSE SERPL-MCNC: 287 MG/DL (ref 74–99)
HCT VFR BLD AUTO: 30.8 % (ref 41–52)
HGB BLD-MCNC: 9.3 G/DL (ref 13.5–17.5)
MAGNESIUM SERPL-MCNC: 1.91 MG/DL (ref 1.6–2.4)
MAGNESIUM SERPL-MCNC: 1.95 MG/DL (ref 1.6–2.4)
MCH RBC QN AUTO: 32.4 PG (ref 26–34)
MCHC RBC AUTO-ENTMCNC: 30.2 G/DL (ref 32–36)
MCV RBC AUTO: 107 FL (ref 80–100)
NRBC BLD-RTO: 0 /100 WBCS (ref 0–0)
PHOSPHATE SERPL-MCNC: 3.1 MG/DL (ref 2.5–4.9)
PHOSPHATE SERPL-MCNC: 3.3 MG/DL (ref 2.5–4.9)
PLATELET # BLD AUTO: 272 X10*3/UL (ref 150–450)
PLATELET # BLD AUTO: 301 X10*3/UL (ref 150–450)
PMV BLD AUTO: 9.8 FL (ref 7.5–11.5)
POTASSIUM SERPL-SCNC: 4.8 MMOL/L (ref 3.5–5.3)
POTASSIUM SERPL-SCNC: 4.8 MMOL/L (ref 3.5–5.3)
RBC # BLD AUTO: 2.87 X10*6/UL (ref 4.5–5.9)
RH FACTOR (ANTIGEN D): NORMAL
SODIUM SERPL-SCNC: 134 MMOL/L (ref 136–145)
SODIUM SERPL-SCNC: 135 MMOL/L (ref 136–145)
UFH PPP CHRO-ACNC: 0.4 IU/ML
UFH PPP CHRO-ACNC: 0.4 IU/ML
WBC # BLD AUTO: 9.7 X10*3/UL (ref 4.4–11.3)

## 2023-10-09 PROCEDURE — 96372 THER/PROPH/DIAG INJ SC/IM: CPT

## 2023-10-09 PROCEDURE — 86901 BLOOD TYPING SEROLOGIC RH(D): CPT

## 2023-10-09 PROCEDURE — 2500000001 HC RX 250 WO HCPCS SELF ADMINISTERED DRUGS (ALT 637 FOR MEDICARE OP)

## 2023-10-09 PROCEDURE — 85730 THROMBOPLASTIN TIME PARTIAL: CPT

## 2023-10-09 PROCEDURE — 90947 DIALYSIS REPEATED EVAL: CPT

## 2023-10-09 PROCEDURE — 1200000002 HC GENERAL ROOM WITH TELEMETRY DAILY

## 2023-10-09 PROCEDURE — 82947 ASSAY GLUCOSE BLOOD QUANT: CPT

## 2023-10-09 PROCEDURE — 2500000002 HC RX 250 W HCPCS SELF ADMINISTERED DRUGS (ALT 637 FOR MEDICARE OP, ALT 636 FOR OP/ED)

## 2023-10-09 PROCEDURE — 83735 ASSAY OF MAGNESIUM: CPT | Performed by: NURSE PRACTITIONER

## 2023-10-09 PROCEDURE — 36415 COLL VENOUS BLD VENIPUNCTURE: CPT | Performed by: NURSE PRACTITIONER

## 2023-10-09 PROCEDURE — 85049 AUTOMATED PLATELET COUNT: CPT | Performed by: NURSE PRACTITIONER

## 2023-10-09 PROCEDURE — 2500000004 HC RX 250 GENERAL PHARMACY W/ HCPCS (ALT 636 FOR OP/ED)

## 2023-10-09 PROCEDURE — 2500000001 HC RX 250 WO HCPCS SELF ADMINISTERED DRUGS (ALT 637 FOR MEDICARE OP): Performed by: NURSE PRACTITIONER

## 2023-10-09 PROCEDURE — 86850 RBC ANTIBODY SCREEN: CPT

## 2023-10-09 PROCEDURE — 90945 DIALYSIS ONE EVALUATION: CPT | Performed by: INTERNAL MEDICINE

## 2023-10-09 PROCEDURE — 99232 SBSQ HOSP IP/OBS MODERATE 35: CPT | Performed by: INTERNAL MEDICINE

## 2023-10-09 PROCEDURE — 85520 HEPARIN ASSAY: CPT

## 2023-10-09 PROCEDURE — 84443 ASSAY THYROID STIM HORMONE: CPT | Performed by: NURSE PRACTITIONER

## 2023-10-09 PROCEDURE — 84439 ASSAY OF FREE THYROXINE: CPT | Performed by: NURSE PRACTITIONER

## 2023-10-09 PROCEDURE — 85027 COMPLETE CBC AUTOMATED: CPT | Performed by: NURSE PRACTITIONER

## 2023-10-09 PROCEDURE — 36415 COLL VENOUS BLD VENIPUNCTURE: CPT

## 2023-10-09 PROCEDURE — 80069 RENAL FUNCTION PANEL: CPT | Performed by: NURSE PRACTITIONER

## 2023-10-09 PROCEDURE — 86900 BLOOD TYPING SEROLOGIC ABO: CPT

## 2023-10-09 PROCEDURE — 86920 COMPATIBILITY TEST SPIN: CPT

## 2023-10-09 RX ORDER — HEPARIN SODIUM 10000 [USP'U]/100ML
0-4000 INJECTION, SOLUTION INTRAVENOUS CONTINUOUS
Status: DISCONTINUED | OUTPATIENT
Start: 2023-10-09 | End: 2023-10-11

## 2023-10-09 RX ORDER — GENTAMICIN SULFATE 1 MG/G
CREAM TOPICAL DAILY
Status: DISPENSED | OUTPATIENT
Start: 2023-10-09 | End: 2023-10-16

## 2023-10-09 RX ORDER — HEPARIN SODIUM 5000 [USP'U]/ML
4000 INJECTION, SOLUTION INTRAVENOUS; SUBCUTANEOUS ONCE
Status: DISCONTINUED | OUTPATIENT
Start: 2023-10-09 | End: 2023-10-09

## 2023-10-09 RX ORDER — HEPARIN SODIUM 5000 [USP'U]/ML
2000-4000 INJECTION, SOLUTION INTRAVENOUS; SUBCUTANEOUS EVERY 4 HOURS PRN
Status: DISCONTINUED | OUTPATIENT
Start: 2023-10-09 | End: 2023-10-11

## 2023-10-09 RX ORDER — MUPIROCIN 20 MG/G
0.5 OINTMENT TOPICAL 2 TIMES DAILY
Status: DISCONTINUED | OUTPATIENT
Start: 2023-10-09 | End: 2023-10-11 | Stop reason: HOSPADM

## 2023-10-09 RX ADMIN — SODIUM CHLORIDE, SODIUM LACTATE, CALCIUM CHLORIDE, MAGNESIUM CHLORIDE AND DEXTROSE: 1.5; 538; 448; 18.3; 5.08 INJECTION, SOLUTION INTRAPERITONEAL at 16:45

## 2023-10-09 RX ADMIN — INSULIN LISPRO 5 UNITS: 100 INJECTION, SOLUTION INTRAVENOUS; SUBCUTANEOUS at 12:51

## 2023-10-09 RX ADMIN — NEPHROCAP 1 CAPSULE: 1 CAP ORAL at 10:04

## 2023-10-09 RX ADMIN — HEPARIN SODIUM 1200 UNITS/HR: 10000 INJECTION, SOLUTION INTRAVENOUS at 11:44

## 2023-10-09 RX ADMIN — MUPIROCIN 0.5 APPLICATION: 20 OINTMENT TOPICAL at 13:18

## 2023-10-09 RX ADMIN — Medication 25 MCG: at 10:04

## 2023-10-09 RX ADMIN — MUPIROCIN 0.5 APPLICATION: 20 OINTMENT TOPICAL at 21:13

## 2023-10-09 RX ADMIN — INSULIN LISPRO 3 UNITS: 100 INJECTION, SOLUTION INTRAVENOUS; SUBCUTANEOUS at 10:02

## 2023-10-09 RX ADMIN — LEVOTHYROXINE SODIUM 25 MCG: 25 TABLET ORAL at 05:53

## 2023-10-09 RX ADMIN — HEPARIN SODIUM 5000 UNITS: 5000 INJECTION INTRAVENOUS; SUBCUTANEOUS at 05:53

## 2023-10-09 RX ADMIN — GENTAMICIN SULFATE: 1 CREAM TOPICAL at 16:00

## 2023-10-09 RX ADMIN — ACETAMINOPHEN 650 MG: 325 TABLET, FILM COATED ORAL at 21:09

## 2023-10-09 RX ADMIN — INSULIN LISPRO 5 UNITS: 100 INJECTION, SOLUTION INTRAVENOUS; SUBCUTANEOUS at 17:33

## 2023-10-09 RX ADMIN — INSULIN LISPRO 1 UNITS: 100 INJECTION, SOLUTION INTRAVENOUS; SUBCUTANEOUS at 12:52

## 2023-10-09 RX ADMIN — ASPIRIN 81 MG CHEWABLE TABLET 81 MG: 81 TABLET CHEWABLE at 10:04

## 2023-10-09 RX ADMIN — AMLODIPINE BESYLATE 10 MG: 2.5 TABLET ORAL at 10:04

## 2023-10-09 RX ADMIN — INSULIN DETEMIR 11 UNITS: 100 INJECTION, SOLUTION SUBCUTANEOUS at 21:09

## 2023-10-09 RX ADMIN — DULOXETINE HYDROCHLORIDE 30 MG: 30 CAPSULE, DELAYED RELEASE ORAL at 10:03

## 2023-10-09 RX ADMIN — TORSEMIDE 50 MG: 20 TABLET ORAL at 10:04

## 2023-10-09 RX ADMIN — SENNOSIDES 17.2 MG: 8.6 TABLET, FILM COATED ORAL at 12:55

## 2023-10-09 RX ADMIN — FLUTICASONE PROPIONATE 2 SPRAY: 50 SPRAY, METERED NASAL at 10:04

## 2023-10-09 RX ADMIN — ATORVASTATIN CALCIUM 80 MG: 80 TABLET ORAL at 21:09

## 2023-10-09 ASSESSMENT — COGNITIVE AND FUNCTIONAL STATUS - GENERAL
MOBILITY SCORE: 20
DRESSING REGULAR LOWER BODY CLOTHING: A LITTLE
TOILETING: A LITTLE
MOBILITY SCORE: 20
WALKING IN HOSPITAL ROOM: A LITTLE
HELP NEEDED FOR BATHING: A LITTLE
DAILY ACTIVITIY SCORE: 20
MOVING TO AND FROM BED TO CHAIR: A LITTLE
STANDING UP FROM CHAIR USING ARMS: A LITTLE
CLIMB 3 TO 5 STEPS WITH RAILING: A LITTLE
STANDING UP FROM CHAIR USING ARMS: A LITTLE
CLIMB 3 TO 5 STEPS WITH RAILING: A LITTLE
DRESSING REGULAR UPPER BODY CLOTHING: A LITTLE
TOILETING: A LITTLE
WALKING IN HOSPITAL ROOM: A LITTLE
DRESSING REGULAR UPPER BODY CLOTHING: A LITTLE
HELP NEEDED FOR BATHING: A LITTLE
MOVING TO AND FROM BED TO CHAIR: A LITTLE
DAILY ACTIVITIY SCORE: 20
DRESSING REGULAR LOWER BODY CLOTHING: A LITTLE

## 2023-10-09 ASSESSMENT — PAIN - FUNCTIONAL ASSESSMENT
PAIN_FUNCTIONAL_ASSESSMENT: 0-10

## 2023-10-09 ASSESSMENT — PAIN SCALES - GENERAL
PAINLEVEL_OUTOF10: 3
PAINLEVEL_OUTOF10: 0 - NO PAIN

## 2023-10-09 NOTE — PROGRESS NOTES
Subjective Data:  Patient reports feeling well today and denies chest pain, anxiously awaiting midCABG Wednesday.     - pending labs (has not been done by phlebotomy)  - heparin gtt restarted as patient had episode of afib at OSH  - planning for MID- CABG on Wednesday     Overnight Events:    No overnight events.     Objective Data:  Last Recorded Vitals:  Vitals:    10/09/23 0700 10/09/23 0721 10/09/23 1231 10/09/23 1511   BP:  115/70 124/69 112/64   Pulse:  79 77 74   Resp:  18 18 18   Temp:  36.5 °C (97.7 °F) 36.7 °C (98 °F) 36.8 °C (98.3 °F)   TempSrc:  Temporal Temporal Temporal   SpO2:  95% 95% 98%   Weight: 80.1 kg (176 lb 9.4 oz)      Height:           Last Labs:  CBC - 10/7/2023: 10:01 PM  8.5 8.9 272    29.8      CMP - 10/7/2023: 10:01 PM  8.2 5.1 16 --- 0.4   2.6 3.0 19 70      PTT - 10/1/2023:  5:22 AM  1.2   13.1 >200        Last I/O:  I/O last 3 completed shifts:  In: 48705.7 (161.1 mL/kg) [P.O.:840; I.V.:199.2 (2.5 mL/kg); Blood:81555.5]  Out: 2683 (34 mL/kg) [Urine:2600 (0.9 mL/kg/hr); Other:83]  Weight: 78.8 kg       Echo:  Transthoracic Echo (TTE) Complete 10/2/2023     1. Left ventricular systolic function is normal with a 55-60% estimated ejection fraction.   2. There is a small pericardial effusion.      Inpatient Medications:  Scheduled medications   Medication Dose Route Frequency    amLODIPine  10 mg oral Daily    aspirin  81 mg oral Daily    atorvastatin  80 mg oral Daily    B complex-vitamin C-folic acid  1 capsule oral Daily    cholecalciferol  25 mcg oral Daily    darbepoetin alyssa  40 mcg intravenous Weekly    dextrose 1.5% - LOW calcium 2.5mEq/L (Dianeal, Delflex-LC) in 3,000 mL peritoneal dialysate   intraperitoneal Once    Followed by    dextrose 1.5% - LOW calcium 2.5mEq/L (Dianeal, Delflex-LC) in 3,000 mL peritoneal dialysate   intraperitoneal Once    DULoxetine  30 mg oral Daily    fluticasone  2 spray Each Nostril Daily    gentamicin   Topical Daily    insulin detemir  11 Units  subcutaneous q24h    insulin lispro  0-5 Units subcutaneous TID with meals    insulin lispro  3 Units subcutaneous Daily with breakfast    insulin lispro  5 Units subcutaneous Daily with lunch    insulin lispro  5 Units subcutaneous Daily with evening meal    levothyroxine  25 mcg oral Daily    mupirocin  0.5 Application Topical BID    sennosides  2 tablet oral BID    torsemide  50 mg oral Daily     PRN medications   Medication    acetaminophen    dextrose 10 % in water (D10W)    dextrose    glucagon    heparin     Continuous Medications   Medication Dose Last Rate    dextrose 1.5% - LOW calcium 2.5mEq/L (Dianeal, Delflex-LC) in 3,000 mL peritoneal dialysate        heparin  0-4,000 Units/hr 1,200 Units/hr (10/09/23 1144)       Physical Exam:  Gen: no apparent distress  Skin: warm and dry, no rashes  ENMT: oral membranes moist  Lungs: respirations even and unlabored, CTA anteriorly, posterior rales present  Heart: RRR S1S2 I/IV systolic murmur right sternal border, no  gallop  Abd: soft, NT, ND + BS; + burping and indigestion  Ext: warm with palpable radial and DP pulses  Neuro: A & O x3, speech clear         Assessment/Plan   Salomon Chowdary is an 82 yo male with a PMHx of insulin dep (type2) diabetes, ESRD on PD, chronic pain, HLD, HTN, hypothyroidism and arthritis who initially presented to OSH with chest pain and was found to have 2 vessel disease and was transferred to [facility] for CABG vs PCI evaluation.       CAD/ 2 vessel disease  NSTEMI  - admitted with CP, symptomatic with SOB and dry heaves with exertion at times recently  - troponin 122>608>1661 om 9/20, troponin 10/2: 24  - Echo 9/20 EF 50-55%, impaired relaxation of LV diastolic filling, small to moderate pericardial effusion  - repeat TTE 10/2: EF 55-60%, no WMA's, small pericardial effusion   - LHC on 9/20 showing disease  LAD of 75-80% in the proximal, early mid and late mid segments.   Lcx: distal cx 95% right before takeoff of left PDA  - consult IC  for evaluation for PCI & consult CTS for CABG eval--> presented at [facility] meeting 10/3  - decision made for mid-CABG tentatively Wednesday 10/11 per Dr. Eileen Neal   - pre-op studies completed: vascular (vein mapping & carotids), CT Chest, CXR, PFT's (pre-hill report in front of patient's chart; FEV1/FVC 87)  - COVID negative on 10/1, repeat COVID and T&S ordered for 10/10  - 10/6 HR dropped into the 40's on metop tart 12.5mg BID  - HR now stable 70-80s since holding BB, continue holding   - cont ASA & statin  - remains chest pain free  - restarted heparin gtt today as patient had episode of new onset atrial fib at OSH    Anemia of chronic disease, stable  - no need to check iron studies since patient has known ESRD  - pt previously getting epo with PD  - Hgb: pending (8.9, 9.3, 8.8, 7.9, 9.1, 8.4, 7.8)  - s/p 1UPRBC on 10/5 for optimization prior to surgery  - no overt signs of bleeding, denies increased fatigue  - cont. darbepoietin IV 40mcg weekly per renal (every Friday)    ESRD  - primary nephrologist Dr. Khan  - pt on nightly PD since 3/2023  - nephology following: darbepoietin (as above), cont. protein supplement (adding BOOST TID), daily renal MVI, stopped sodium bicarb on 10/5,cont. Nightly PD   - Cr today: pending (4.93)    New onset A fib with RVR, improving  - episode at OSH  - converted to NSR  with metoprolol 5mg IV X1  - currently NSR on tele with HR in the 70's, no further episodes of A fib  - hold metop tart (as above)  - continue heparin gtt      HTN, controlled  - SBP past 24hrs: 110- 120s  - continue home amlodipine 10mg daily, torsemide 50mg daily      T2IDDM  - 9/30 Hgb A1C: 6.6%  - pt on levemir 25u daily and trulicity 1.5mg/0.5ml once weekly on Tuesdays at home  - 10/4 episode of nocturnal hypogylcemia 40 prompting endo consult--->detemir decreased from 20U>11U  - no hypoglycemia since adjustment was made  - goal -180's, cont. Daily detemir 11 units, lispro 3U at breakfast, lispro 5U at  lunch & dinner + Mild SSI, ACHS accuchecks, hypoglycemia protocol     Hypothyroidism  - 10/2 TSH 6.26, free T3 1.12  - endocrine following: cont. home LT4 25mcg  - repeat TFT in 1 week (10/10, ordered)     Free intraperitoneal air seen on CT chest  - abd soft without tenderness  - likely from PD catheter  - cont to monitor closely    Wound on buttocks  - wife changed dressing 9/30, patient complains of pain to site  - 10/2 consult wound care RN   - wound care recs appreciated/ordered    Spinal Stenosis  - wears neck brace for neck support  - baseline can do ADL's, walking long distances needs scooter due to chronic pain issues, neuropathy in feet, exertional SOB   - Declined PT/OT for now, also has exertional SOB and nausea with activity therefore recommend limited activity until evaluated by CTS     DVT Ppx: heparin gtt  DISPO: tentative MIDCABG Wednesday 10/11 with Dr. Elieen Neal     Pt seen and examined and plan of care discussed with Dr. Montemayor      Code Status:  Full Code    Maddi Butts, APRN-CNP

## 2023-10-09 NOTE — PROGRESS NOTES
Salomon Chowdary   83 y.o.  M   MRN/Room: [id number]    Subjective:   Patient seen and examined.   Doing well, denies any abdominal pain, nausea, vomiting, chills, or pain with inflow or outflow of PD  Last night: UF 99ml, idrain 2ml   No chest pain, SOB or leg swelling     Objective:     Meds:   amLODIPine, 10 mg, Daily  aspirin, 81 mg, Daily  atorvastatin, 80 mg, Daily  B complex-vitamin C-folic acid, 1 capsule, Daily  cholecalciferol, 25 mcg, Daily  darbepoetin alyssa, 40 mcg, Weekly  DULoxetine, 30 mg, Daily  fluticasone, 2 spray, Daily  gentamicin, , Daily  gentamicin, , Daily  insulin detemir, 11 Units, q24h  insulin lispro, 0-5 Units, TID with meals  insulin lispro, 3 Units, Daily with breakfast  insulin lispro, 5 Units, Daily with lunch  insulin lispro, 5 Units, Daily with evening meal  levothyroxine, 25 mcg, Daily  mupirocin, 0.5 Application, BID  sennosides, 2 tablet, BID  torsemide, 50 mg, Daily      dextrose 1.5% - LOW calcium 2.5mEq/L (Dianeal, Delflex-LC) in 3,000 mL peritoneal dialysate  heparin, Last Rate: 1,200 Units/hr (10/09/23 1144)      acetaminophen, 650 mg, q6h PRN  dextrose 10 % in water (D10W), 0.3 g/kg/hr, Once PRN  dextrose, 25 g, q15 min PRN  glucagon, 1 mg, q15 min PRN  heparin, 2,000-4,000 Units, q4h PRN        Vitals:    10/09/23 0721   BP: 115/70   Pulse: 79   Resp: 18   Temp: 36.5 °C (97.7 °F)   SpO2: 95%          Intake/Output Summary (Last 24 hours) at 10/9/2023 1218  Last data filed at 10/9/2023 1000  Gross per 24 hour   Intake 20936.5 ml   Output 2008 ml   Net 9864.5 ml         General appearance: no distress  Eyes: non-icteric  Skin: no apparent rash  Heart: S1 S2 regular  Lungs: CTA bilat No wheezing/crackles  Abdomen: soft, nt/nd  Extremities: No edema bilat  Neuro: No FND,asterixis    Blood Labs:  Results for orders placed or performed during the hospital encounter of 09/30/23 (from the past 24 hour(s))   POCT GLUCOSE   Result Value Ref Range    POCT Glucose 93 74 - 99  mg/dL   POCT GLUCOSE   Result Value Ref Range    POCT Glucose 167 (H) 74 - 99 mg/dL   POCT GLUCOSE   Result Value Ref Range    POCT Glucose 104 (H) 74 - 99 mg/dL   Platelet count - HIT surveillance   Result Value Ref Range    Platelets 272 150 - 450 x10*3/uL              ASSESSMENT:  Salomon Chowdary is a  83 y.o.  Year old , with PMHx of CAD, HTN, ESRD on PD since March 2023, who is currently admitted under the cardiology service for CABG evaluation. Nephrology following to facilitate PD while inpatient.     RECOMMENDATIONS:  Patient is euvolemic, hemodynamics stable   No labs from 10/08 or today, please check labs  Plan to continue PD as per submitted orders- Total fill volume 5000c, 8 hours, 2400 fill volume, 1.5% Dextrose/Low calcium bags   Gentamicin cream to exit site    Ko Soto MD  Nephrology Fellow PGY 4  Available via Epic chat

## 2023-10-09 NOTE — PROGRESS NOTES
PD initiated at 1640. Bedside nurse educated on connection/disconnection technique for emergency and End of Treatment disconnect, GPS information provided, training kit left at bedside. Bedside RN Romario agreed to give education to night nurse.

## 2023-10-09 NOTE — CARE PLAN
The patient's goals for the shift include patien will use the call light through out the shift.    The clinical goals for the shift include Patient will be free of chest pain through shift.    Over the shift, the patient did not make progress toward the following goals. Barriers to progression include . Recommendations to address these barriers include .      Problem: Fall/Injury  Goal: Not fall by end of shift  Outcome: Progressing  Goal: Be free from injury by end of the shift  Outcome: Progressing  Goal: Verbalize understanding of personal risk factors for fall in the hospital  Outcome: Progressing  Goal: Verbalize understanding of risk factor reduction measures to prevent injury from fall in the home  Outcome: Progressing

## 2023-10-10 ENCOUNTER — ANESTHESIA EVENT (OUTPATIENT)
Dept: OPERATING ROOM | Facility: HOSPITAL | Age: 83
DRG: 235 | End: 2023-10-10
Payer: MEDICARE

## 2023-10-10 ENCOUNTER — DOCUMENTATION (OUTPATIENT)
Dept: DIALYSIS | Facility: HOSPITAL | Age: 83
End: 2023-10-10

## 2023-10-10 LAB
GLUCOSE BLD MANUAL STRIP-MCNC: 199 MG/DL (ref 74–99)
GLUCOSE BLD MANUAL STRIP-MCNC: 235 MG/DL (ref 74–99)
GLUCOSE BLD MANUAL STRIP-MCNC: 295 MG/DL (ref 74–99)
GLUCOSE BLD MANUAL STRIP-MCNC: 89 MG/DL (ref 74–99)
SARS-COV-2 RNA RESP QL NAA+PROBE: NOT DETECTED
T4 FREE SERPL-MCNC: 1.09 NG/DL (ref 0.78–1.48)
TSH SERPL-ACNC: 5.76 MIU/L (ref 0.44–3.98)
UFH PPP CHRO-ACNC: 0.4 IU/ML

## 2023-10-10 PROCEDURE — 2500000001 HC RX 250 WO HCPCS SELF ADMINISTERED DRUGS (ALT 637 FOR MEDICARE OP): Performed by: NURSE PRACTITIONER

## 2023-10-10 PROCEDURE — 2500000004 HC RX 250 GENERAL PHARMACY W/ HCPCS (ALT 636 FOR OP/ED)

## 2023-10-10 PROCEDURE — 90945 DIALYSIS ONE EVALUATION: CPT | Performed by: INTERNAL MEDICINE

## 2023-10-10 PROCEDURE — 87635 SARS-COV-2 COVID-19 AMP PRB: CPT | Performed by: NURSE PRACTITIONER

## 2023-10-10 PROCEDURE — 96372 THER/PROPH/DIAG INJ SC/IM: CPT

## 2023-10-10 PROCEDURE — 85520 HEPARIN ASSAY: CPT

## 2023-10-10 PROCEDURE — 36415 COLL VENOUS BLD VENIPUNCTURE: CPT

## 2023-10-10 PROCEDURE — 2500000002 HC RX 250 W HCPCS SELF ADMINISTERED DRUGS (ALT 637 FOR MEDICARE OP, ALT 636 FOR OP/ED)

## 2023-10-10 PROCEDURE — 82947 ASSAY GLUCOSE BLOOD QUANT: CPT

## 2023-10-10 PROCEDURE — 99232 SBSQ HOSP IP/OBS MODERATE 35: CPT | Performed by: STUDENT IN AN ORGANIZED HEALTH CARE EDUCATION/TRAINING PROGRAM

## 2023-10-10 PROCEDURE — 1200000002 HC GENERAL ROOM WITH TELEMETRY DAILY

## 2023-10-10 PROCEDURE — 99232 SBSQ HOSP IP/OBS MODERATE 35: CPT | Performed by: INTERNAL MEDICINE

## 2023-10-10 RX ORDER — INSULIN LISPRO 100 [IU]/ML
0-5 INJECTION, SOLUTION INTRAVENOUS; SUBCUTANEOUS EVERY 4 HOURS
Status: DISCONTINUED | OUTPATIENT
Start: 2023-10-10 | End: 2023-10-11

## 2023-10-10 RX ADMIN — INSULIN LISPRO 5 UNITS: 100 INJECTION, SOLUTION INTRAVENOUS; SUBCUTANEOUS at 12:34

## 2023-10-10 RX ADMIN — INSULIN LISPRO 1 UNITS: 100 INJECTION, SOLUTION INTRAVENOUS; SUBCUTANEOUS at 17:59

## 2023-10-10 RX ADMIN — LEVOTHYROXINE SODIUM 25 MCG: 25 TABLET ORAL at 06:12

## 2023-10-10 RX ADMIN — FLUTICASONE PROPIONATE 2 SPRAY: 50 SPRAY, METERED NASAL at 09:06

## 2023-10-10 RX ADMIN — Medication 25 MCG: at 09:05

## 2023-10-10 RX ADMIN — MUPIROCIN 0.5 APPLICATION: 20 OINTMENT TOPICAL at 09:06

## 2023-10-10 RX ADMIN — INSULIN DETEMIR 5 UNITS: 100 INJECTION, SOLUTION SUBCUTANEOUS at 21:00

## 2023-10-10 RX ADMIN — INSULIN LISPRO 3 UNITS: 100 INJECTION, SOLUTION INTRAVENOUS; SUBCUTANEOUS at 08:55

## 2023-10-10 RX ADMIN — HEPARIN SODIUM 12 UNITS/HR: 10000 INJECTION, SOLUTION INTRAVENOUS at 08:55

## 2023-10-10 RX ADMIN — TORSEMIDE 50 MG: 20 TABLET ORAL at 09:05

## 2023-10-10 RX ADMIN — SENNOSIDES 17.2 MG: 8.6 TABLET, FILM COATED ORAL at 09:05

## 2023-10-10 RX ADMIN — AMLODIPINE BESYLATE 10 MG: 2.5 TABLET ORAL at 09:05

## 2023-10-10 RX ADMIN — MUPIROCIN 0.5 APPLICATION: 20 OINTMENT TOPICAL at 21:00

## 2023-10-10 RX ADMIN — INSULIN LISPRO 3 UNITS: 100 INJECTION, SOLUTION INTRAVENOUS; SUBCUTANEOUS at 20:00

## 2023-10-10 RX ADMIN — INSULIN LISPRO 2 UNITS: 100 INJECTION, SOLUTION INTRAVENOUS; SUBCUTANEOUS at 12:34

## 2023-10-10 RX ADMIN — SENNOSIDES 17.2 MG: 8.6 TABLET, FILM COATED ORAL at 21:07

## 2023-10-10 RX ADMIN — ASPIRIN 81 MG CHEWABLE TABLET 81 MG: 81 TABLET CHEWABLE at 09:05

## 2023-10-10 RX ADMIN — ATORVASTATIN CALCIUM 80 MG: 80 TABLET ORAL at 21:07

## 2023-10-10 RX ADMIN — NEPHROCAP 1 CAPSULE: 1 CAP ORAL at 09:05

## 2023-10-10 RX ADMIN — DULOXETINE HYDROCHLORIDE 30 MG: 30 CAPSULE, DELAYED RELEASE ORAL at 09:06

## 2023-10-10 ASSESSMENT — PAIN - FUNCTIONAL ASSESSMENT: PAIN_FUNCTIONAL_ASSESSMENT: 0-10

## 2023-10-10 ASSESSMENT — COGNITIVE AND FUNCTIONAL STATUS - GENERAL
DRESSING REGULAR UPPER BODY CLOTHING: A LITTLE
CLIMB 3 TO 5 STEPS WITH RAILING: A LITTLE
WALKING IN HOSPITAL ROOM: A LITTLE
TOILETING: A LITTLE
DAILY ACTIVITIY SCORE: 21
HELP NEEDED FOR BATHING: A LITTLE
MOBILITY SCORE: 22

## 2023-10-10 ASSESSMENT — PAIN SCALES - GENERAL: PAINLEVEL_OUTOF10: 0 - NO PAIN

## 2023-10-10 ASSESSMENT — ACTIVITIES OF DAILY LIVING (ADL): LACK_OF_TRANSPORTATION: NO

## 2023-10-10 NOTE — PROGRESS NOTES
"Salomon Chowdary is a 83 y.o. male on day 10 of with IDDM, HTN, HLD, ESRD on PD,  CAD, A-Fib and hypothyroidism presented to an OSH on 9/20 with chest pain. He was found to have NSTEMI with LHC showing 2 vessel disease in the LAD and left circumflex. He was transferred to [facility] for evaluation of revascularization plan. Endocrinology is consulted iso abnormal TFT with TSH of 6.26 and FT4 1.12 as well as diabetes management.    Subjective   Patient was seen this AM.  Will be going for his mini CABG tomorrow and will be n.p.o. after midnight.  Discussed decreasing his detemir dose and continuing his levothyroxine dose.    Objective   PE:  Constitutional: NAD, well groomed. AOx3. Cooperative  Skin/Hair: Warm, dry skin.  HEENT: EOMI, Anicteric scleras   Cardiovascular: normal HR  Respiratory: no increased wob,  or accessory muscle use.  Psych : appropriate affect       Last Recorded Vitals  Blood pressure 120/61, pulse 86, temperature 36.4 °C (97.6 °F), temperature source Temporal, resp. rate 18, height 1.752 m (5' 8.98\"), weight 78.7 kg (173 lb 8 oz), SpO2 96 %.  Intake/Output last 3 Shifts:  I/O last 3 completed shifts:  In: 85557.5 (152.4 mL/kg) [P.O.:240; I.V.:98 (1.2 mL/kg); Blood:25599.5]  Out: 3203 (40.7 mL/kg) [Urine:3120 (1.1 mL/kg/hr); Other:83]  Weight: 78.7 kg     Relevant Results    Lab Results   Component Value Date    TSH 5.76 (H) 10/09/2023    TSH 6.26 (H) 10/02/2023    TSH 2.34 09/20/2023    FREET4 1.09 10/09/2023    FREET4 1.12 10/02/2023       Results from last 7 days   Lab Units 10/10/23  1229 10/10/23  0832 10/09/23  2117 10/09/23  1937 10/09/23  1746 10/09/23  1709 10/09/23  1234 10/08/23  0633 10/07/23  2201 10/07/23  1157 10/07/23  0604 10/06/23  1145 10/06/23  0704   POCT GLUCOSE mg/dL 235* 89  --  226*  --  118* 180*   < >  --    < >  --    < >  --    GLUCOSE mg/dL  --   --  287*  --  133*  --   --   --  235*  --  95  --  126*    < > = values in this interval not displayed.   Scheduled " medications  amLODIPine, 10 mg, oral, Daily  aspirin, 81 mg, oral, Daily  atorvastatin, 80 mg, oral, Daily  B complex-vitamin C-folic acid, 1 capsule, oral, Daily  cholecalciferol, 25 mcg, oral, Daily  darbepoetin alyssa, 40 mcg, intravenous, Weekly  dextrose 1.5% - LOW calcium 2.5mEq/L (Dianeal, Delflex-LC) in 3,000 mL peritoneal dialysate, , intraperitoneal, Once   Followed by  dextrose 1.5% - LOW calcium 2.5mEq/L (Dianeal, Delflex-LC) in 3,000 mL peritoneal dialysate, , intraperitoneal, Once  DULoxetine, 30 mg, oral, Daily  fluticasone, 2 spray, Each Nostril, Daily  gentamicin, , Topical, Daily  insulin detemir, 11 Units, subcutaneous, q24h  insulin lispro, 0-5 Units, subcutaneous, TID with meals  insulin lispro, 3 Units, subcutaneous, Daily with breakfast  insulin lispro, 5 Units, subcutaneous, Daily with lunch  insulin lispro, 5 Units, subcutaneous, Daily with evening meal  levothyroxine, 25 mcg, oral, Daily  mupirocin, 0.5 Application, Topical, BID  sennosides, 2 tablet, oral, BID  torsemide, 50 mg, oral, Daily      Continuous medications  dextrose 1.5% - LOW calcium 2.5mEq/L (Dianeal, Delflex-LC) in 3,000 mL peritoneal dialysate,   heparin, 0-4,000 Units/hr, Last Rate: 12 Units/hr (10/10/23 0855)      PRN medications  PRN medications: acetaminophen, dextrose 10 % in water (D10W), dextrose, glucagon, heparin        Assessment/Plan   Salomon Chowdary is a 83 y.o. male with IDDM, HTN, HLD, ESRD on PD,  CAD, A-Fib and hypothyroidism presented to an OSH ON 9/20 with chest pain. He was found to have NSTEMI with LHC showing 2 vessel disease in the LAD and left circumflex. He was transferred to [facility] for evaluation of revascularization plan. Endocrinology is consulted iso abnormal TFT    10/2: TSH 6.26 and FT4 1.12.   10/10: TSH 5.76 and FT4 1.09     #Hypothyroidism   Patient is on 25 mcg of LT4. TSH was 2.34 on the day of presentation to the OSH. Presented with NSTEMI as above with Trop peaked to >2000 was initially  managed in the ICU. No FT4 was obtained on 9/20. Now TSH is elevated with FT4 1.12 (WNL). This is likely to nonthyroidal illness (previously known as euthyroid sick syndrome) iso acute illness as above   He appears clinically euthyroid.      Recommendations  -Continue current dose of LT4 25 mcg   -Check TFT in 1 week 10/17     #DM2   Background Hx:  Diabetes duration: Since 1995, has been on insulin since 2012           Home blood glucose checks: Daily  Trends: 80s to 130s  Hypoglycemia (y/n, threshold and symptoms): Yes, rarely, BG in the 60s, shakes/sweating/halos in his vision  Home diabetes regimen: Detemir 18 units bedtime and Trulicity 1.5 mg q. Weekly (has not received it for the past 2 weeks)  Home diet (how many meals a day): 2-3 meals a day  Outpatient physician managing diabetes: Endocrinologist with [facility].  Not seen in the past 3 months would like to establish with a new endocrinologist.  Currently follows with PCP.  Macrovascular complications: CVD [no diagnosed CAD to major vessel disease]     CVA [ denies]     PVD [denies]  Microvascular complications: Retinopathy [ denies]   Nephropathy [ESRD on PD]   Neuropathy [endorses]  Last A1c: 6.6 9/2023                  Inpatient diabetes regimen: 20 units of detemir and a low dose SS  Inpatient diet: Regular diet         Recommendations:  -Goal -180 while inpatient  -#1 SSI insulin Q4H while NPO   -Decrease detemir to 5 units   -Hold mealtime lispro  -Once patient is discharged please ensure that home Trulicity get refilled   -Accuchecks ACHS  -Endocrinology team will continue to follow.     Plan was communicated to the primary team      Discussed with Dr. Bishop Marie MD   Endocrinology, PGY 4

## 2023-10-10 NOTE — CARE PLAN
The patient's goals for the shift include patien will use the call light through out the shift.    The clinical goals for the shift include patient peritonial site will be free of infectionbefore discharge.    Over the shift, the patient made progress for goals of care. Pt safe, stable and free of falls.

## 2023-10-10 NOTE — PROGRESS NOTES
Salomon Chowdary   83 y.o.  M   MRN/Room: [id number]    Subjective:   No issues with PD overnight     Objective:     Meds:   amLODIPine, 10 mg, Daily  aspirin, 81 mg, Daily  atorvastatin, 80 mg, Daily  B complex-vitamin C-folic acid, 1 capsule, Daily  cholecalciferol, 25 mcg, Daily  darbepoetin alyssa, 40 mcg, Weekly  DULoxetine, 30 mg, Daily  fluticasone, 2 spray, Daily  gentamicin, , Daily  insulin detemir, 11 Units, q24h  insulin lispro, 0-5 Units, TID with meals  insulin lispro, 3 Units, Daily with breakfast  insulin lispro, 5 Units, Daily with lunch  insulin lispro, 5 Units, Daily with evening meal  levothyroxine, 25 mcg, Daily  mupirocin, 0.5 Application, BID  sennosides, 2 tablet, BID  torsemide, 50 mg, Daily      dextrose 1.5% - LOW calcium 2.5mEq/L (Dianeal, Delflex-LC) in 3,000 mL peritoneal dialysate  heparin, Last Rate: 12 Units/hr (10/10/23 0855)      acetaminophen, 650 mg, q6h PRN  dextrose 10 % in water (D10W), 0.3 g/kg/hr, Once PRN  dextrose, 25 g, q15 min PRN  glucagon, 1 mg, q15 min PRN  heparin, 2,000-4,000 Units, q4h PRN        Vitals:    10/10/23 1128   BP: 120/61   Pulse: 86   Resp: 18   Temp: 36.4 °C (97.6 °F)   SpO2: 96%          Intake/Output Summary (Last 24 hours) at 10/10/2023 1321  Last data filed at 10/10/2023 1128  Gross per 24 hour   Intake 578 ml   Output 1195 ml   Net -617 ml         General appearance: no distress  Eyes: non-icteric  Skin: no apparent rash  Heart: S1 S2 regular  Lungs: CTA bilat No wheezing/crackles  Abdomen: soft, nt/nd  Extremities: No edema bilat  Neuro: No FND,asterixis    Blood Labs:  Results for orders placed or performed during the hospital encounter of 09/30/23 (from the past 24 hour(s))   aPTT - baseline   Result Value Ref Range    aPTT 65 (H) 27 - 38 seconds   Heparin Assay, UFH   Result Value Ref Range    Heparin Unfractionated 0.4 See Comment Below for Therapeutic Ranges IU/mL   POCT GLUCOSE   Result Value Ref Range    POCT Glucose 118 (H) 74 - 99  mg/dL   Magnesium   Result Value Ref Range    Magnesium 1.95 1.60 - 2.40 mg/dL   Renal Function Panel   Result Value Ref Range    Glucose 133 (H) 74 - 99 mg/dL    Sodium 134 (L) 136 - 145 mmol/L    Potassium 4.8 3.5 - 5.3 mmol/L    Chloride 98 98 - 107 mmol/L    Bicarbonate 25 21 - 32 mmol/L    Anion Gap 16 10 - 20 mmol/L    Urea Nitrogen 55 (H) 6 - 23 mg/dL    Creatinine 4.06 (H) 0.50 - 1.30 mg/dL    eGFR 14 (L) >60 mL/min/1.73m*2    Calcium 9.2 8.6 - 10.6 mg/dL    Phosphorus 3.3 2.5 - 4.9 mg/dL    Albumin 3.3 (L) 3.4 - 5.0 g/dL   CBC   Result Value Ref Range    WBC 9.7 4.4 - 11.3 x10*3/uL    nRBC 0.0 0.0 - 0.0 /100 WBCs    RBC 2.87 (L) 4.50 - 5.90 x10*6/uL    Hemoglobin 9.3 (L) 13.5 - 17.5 g/dL    Hematocrit 30.8 (L) 41.0 - 52.0 %     (H) 80 - 100 fL    MCH 32.4 26.0 - 34.0 pg    MCHC 30.2 (L) 32.0 - 36.0 g/dL    RDW 14.7 (H) 11.5 - 14.5 %    Platelets 301 150 - 450 x10*3/uL    MPV 9.8 7.5 - 11.5 fL   Type and screen   Result Value Ref Range    ABO TYPE O     Rh TYPE NEG     ANTIBODY SCREEN NEG    TSH with reflex to Free T4 if abnormal   Result Value Ref Range    Thyroid Stimulating Hormone 5.76 (H) 0.44 - 3.98 mIU/L   Thyroxine, Free   Result Value Ref Range    Thyroxine, Free 1.09 0.78 - 1.48 ng/dL   POCT GLUCOSE   Result Value Ref Range    POCT Glucose 226 (H) 74 - 99 mg/dL   Magnesium   Result Value Ref Range    Magnesium 1.91 1.60 - 2.40 mg/dL   Renal Function Panel   Result Value Ref Range    Glucose 287 (H) 74 - 99 mg/dL    Sodium 135 (L) 136 - 145 mmol/L    Potassium 4.8 3.5 - 5.3 mmol/L    Chloride 99 98 - 107 mmol/L    Bicarbonate 23 21 - 32 mmol/L    Anion Gap 18 10 - 20 mmol/L    Urea Nitrogen 56 (H) 6 - 23 mg/dL    Creatinine 3.97 (H) 0.50 - 1.30 mg/dL    eGFR 14 (L) >60 mL/min/1.73m*2    Calcium 8.6 8.6 - 10.6 mg/dL    Phosphorus 3.1 2.5 - 4.9 mg/dL    Albumin 3.1 (L) 3.4 - 5.0 g/dL   Heparin Assay, UFH   Result Value Ref Range    Heparin Unfractionated 0.4 See Comment Below for Therapeutic  Ranges IU/mL   Heparin Assay, UFH   Result Value Ref Range    Heparin Unfractionated 0.4 See Comment Below for Therapeutic Ranges IU/mL   POCT GLUCOSE   Result Value Ref Range    POCT Glucose 89 74 - 99 mg/dL   Sars-CoV-2 PCR, Screen Asymptomatic   Result Value Ref Range    Coronavirus 2019, PCR Not Detected Not Detected   POCT GLUCOSE   Result Value Ref Range    POCT Glucose 235 (H) 74 - 99 mg/dL              ASSESSMENT:  Salomon Chowdary is a  83 y.o.  Year old , with PMHx of CAD, HTN, ESRD on PD since March 2023, who is currently admitted under the cardiology service for CABG evaluation. Nephrology following to facilitate PD while inpatient.     RECOMMENDATIONS:  Patient is euvolemic, hemodynamics stable   Metabolic parameters acceptable- K 4.8 last evening   Plan to continue PD as per submitted orders- Total fill volume 5000c, 8 hours, 2400 fill volume, 1.5% Dextrose/Low calcium bags   Gentamicin cream to exit site    Ko Soto MD  Nephrology Fellow PGY 4  Available via Epic chat

## 2023-10-10 NOTE — PROGRESS NOTES
Transitional Care Coordination Progress Note:  Patient discussed during interdisciplinary rounds.   Team members present: Ric Boggs RN TCC, HVI, Nurse Manager  Plan per Medical/Surgical team: CT surgery recs, mid-CABG 10/11, Endocrine consult, peritoneal dialysis, heparin gtt, monitor Hgb and HR  Payer: Traditional Medicare/[facility]  Status: inpatient  Discharge disposition: Home no needs at this time  Potential Barriers: None  ADOD: Weekend vs early next week pending outcome of OR  Patient and wife state if any needs are required on discharge they will be able to use services in their Independent Living facility. Wife states they can call a RN to come and assist patient as well as therapy classes if needed.   Ric Boggs RN Transitional Care Coordinator [phone]

## 2023-10-10 NOTE — PROGRESS NOTES
10/10/23 @ 1730  Connected pt to PD cycler without problems.  Pt denies abdominal pain.  Pt. Scheduled for heart surgery in AM.  I spoke to floor nurse Romario, pt. Is able to disconnect himself at end of treatment.  Exit site perfect.  Page # [phone] if staf needs PD nurse on call.  Lela Childs RN    10/11/23 @ 1830 Connected pt to PD cycler as ordered without problems.  Nurse Mimi taking care of pt in CTICU educated on how to disconnect when therapy ends or in an emergency.  Page # [id number] nurse on call if you need help with dialysis. Wife at bedside.  Exit site dressing changed, site is perfect, Gent cream used as ordered.  Pt. was extubated and stable at this time, alert and oriented, pain under control.  Lela Childs RN

## 2023-10-11 ENCOUNTER — ANESTHESIA (OUTPATIENT)
Dept: OPERATING ROOM | Facility: HOSPITAL | Age: 83
DRG: 235 | End: 2023-10-11
Payer: MEDICARE

## 2023-10-11 ENCOUNTER — APPOINTMENT (OUTPATIENT)
Dept: OPERATING ROOM | Facility: HOSPITAL | Age: 83
DRG: 235 | End: 2023-10-11
Payer: MEDICARE

## 2023-10-11 ENCOUNTER — APPOINTMENT (OUTPATIENT)
Dept: RADIOLOGY | Facility: HOSPITAL | Age: 83
DRG: 235 | End: 2023-10-11
Payer: MEDICARE

## 2023-10-11 LAB
ALBUMIN SERPL BCP-MCNC: 3.1 G/DL (ref 3.4–5)
ANION GAP BLDA CALCULATED.4IONS-SCNC: 10 MMO/L (ref 10–25)
ANION GAP BLDA CALCULATED.4IONS-SCNC: 12 MMO/L (ref 10–25)
ANION GAP BLDA CALCULATED.4IONS-SCNC: 12 MMO/L (ref 10–25)
ANION GAP BLDA CALCULATED.4IONS-SCNC: 8 MMO/L (ref 10–25)
ANION GAP BLDA CALCULATED.4IONS-SCNC: 9 MMO/L (ref 10–25)
ANION GAP SERPL CALC-SCNC: 18 MMOL/L (ref 10–20)
APTT PPP: 27 SECONDS (ref 27–38)
BASE EXCESS BLDA CALC-SCNC: 0.7 MMOL/L (ref -2–3)
BASE EXCESS BLDA CALC-SCNC: 1.2 MMOL/L (ref -2–3)
BASE EXCESS BLDA CALC-SCNC: 1.2 MMOL/L (ref -2–3)
BASE EXCESS BLDA CALC-SCNC: 1.5 MMOL/L (ref -2–3)
BASE EXCESS BLDA CALC-SCNC: 1.6 MMOL/L (ref -2–3)
BASE EXCESS BLDA CALC-SCNC: 2.2 MMOL/L (ref -2–3)
BASE EXCESS BLDA CALC-SCNC: 3 MMOL/L (ref -2–3)
BODY TEMPERATURE: 37 DEGREES CELSIUS
BUN SERPL-MCNC: 67 MG/DL (ref 6–23)
CA-I BLD-SCNC: 1.11 MMOL/L (ref 1.1–1.33)
CA-I BLDA-SCNC: 1.14 MMOL/L (ref 1.1–1.33)
CA-I BLDA-SCNC: 1.14 MMOL/L (ref 1.1–1.33)
CA-I BLDA-SCNC: 1.15 MMOL/L (ref 1.1–1.33)
CA-I BLDA-SCNC: 1.17 MMOL/L (ref 1.1–1.33)
CA-I BLDA-SCNC: 1.18 MMOL/L (ref 1.1–1.33)
CA-I BLDA-SCNC: 1.18 MMOL/L (ref 1.1–1.33)
CA-I BLDA-SCNC: 1.19 MMOL/L (ref 1.1–1.33)
CALCIUM SERPL-MCNC: 8.3 MG/DL (ref 8.6–10.6)
CFT BLD TEG: 1.1 MIN (ref 0.8–2.1)
CFT BLD TEG: 1.5 MIN (ref 0.8–2.1)
CHLORIDE BLDA-SCNC: 101 MMOL/L (ref 98–107)
CHLORIDE BLDA-SCNC: 101 MMOL/L (ref 98–107)
CHLORIDE BLDA-SCNC: 102 MMOL/L (ref 98–107)
CHLORIDE BLDA-SCNC: 103 MMOL/L (ref 98–107)
CHLORIDE SERPL-SCNC: 101 MMOL/L (ref 98–107)
CLOT ANGLE BLD TEG: 75 DEG (ref 63–78)
CLOT ANGLE BLD TEG: 77 DEG (ref 63–78)
CLOT INIT BLD TEG: 6.6 MIN (ref 4.6–9.1)
CLOT INIT BLD TEG: 8.2 MIN (ref 4.6–9.1)
CLOT INIT P HPASE BLD TEG: 6.4 MIN (ref 4.3–8.3)
CLOT INIT P HPASE BLD TEG: 6.5 MIN (ref 4.3–8.3)
CO2 SERPL-SCNC: 25 MMOL/L (ref 21–32)
COHGB MFR BLDA: 0 %
COHGB MFR BLDA: 1 %
COHGB MFR BLDA: 1.3 %
COHGB MFR BLDA: 1.4 %
COHGB MFR BLDA: 1.4 %
CREAT SERPL-MCNC: 4.25 MG/DL (ref 0.5–1.3)
DO-HGB MFR BLDA: 0.5 % (ref 0–5)
DO-HGB MFR BLDA: 1.1 % (ref 0–5)
DO-HGB MFR BLDA: 1.9 % (ref 0–5)
DO-HGB MFR BLDA: 2.4 % (ref 0–5)
DO-HGB MFR BLDA: 4.1 % (ref 0–5)
ERYTHROCYTE [DISTWIDTH] IN BLOOD BY AUTOMATED COUNT: 14.8 % (ref 11.5–14.5)
FIBRINOGEN BLD CALC-MCNC: 807 MG/DL (ref 278–581)
FIBRINOGEN BLD CALC-MCNC: 929 MG/DL (ref 278–581)
FIBRINOGEN PPP-MCNC: 559 MG/DL (ref 200–400)
GFR SERPL CREATININE-BSD FRML MDRD: 13 ML/MIN/1.73M*2
GLUCOSE BLD MANUAL STRIP-MCNC: 109 MG/DL (ref 74–99)
GLUCOSE BLD MANUAL STRIP-MCNC: 126 MG/DL (ref 74–99)
GLUCOSE BLD MANUAL STRIP-MCNC: 176 MG/DL (ref 74–99)
GLUCOSE BLD MANUAL STRIP-MCNC: 275 MG/DL (ref 74–99)
GLUCOSE BLDA-MCNC: 138 MG/DL (ref 74–99)
GLUCOSE BLDA-MCNC: 141 MG/DL (ref 74–99)
GLUCOSE BLDA-MCNC: 143 MG/DL (ref 74–99)
GLUCOSE BLDA-MCNC: 146 MG/DL (ref 74–99)
GLUCOSE BLDA-MCNC: 154 MG/DL (ref 74–99)
GLUCOSE BLDA-MCNC: 164 MG/DL (ref 74–99)
GLUCOSE BLDA-MCNC: 182 MG/DL (ref 74–99)
GLUCOSE SERPL-MCNC: 122 MG/DL (ref 74–99)
HCO3 BLDA-SCNC: 26 MMOL/L (ref 22–26)
HCO3 BLDA-SCNC: 26.5 MMOL/L (ref 22–26)
HCO3 BLDA-SCNC: 26.6 MMOL/L (ref 22–26)
HCO3 BLDA-SCNC: 27 MMOL/L (ref 22–26)
HCO3 BLDA-SCNC: 27.2 MMOL/L (ref 22–26)
HCT VFR BLD AUTO: 27.8 % (ref 41–52)
HCT VFR BLD EST: 25 % (ref 41–52)
HCT VFR BLD EST: 26 % (ref 41–52)
HCT VFR BLD EST: 26 % (ref 41–52)
HCT VFR BLD EST: 27 % (ref 41–52)
HCT VFR BLD EST: 27 % (ref 41–52)
HCT VFR BLD EST: 28 % (ref 41–52)
HCT VFR BLD EST: 28 % (ref 41–52)
HGB BLD-MCNC: 8.4 G/DL (ref 13.5–17.5)
HGB BLDA-MCNC: 8.3 G/DL (ref 13.5–17.5)
HGB BLDA-MCNC: 8.3 G/DL (ref 13.5–17.5)
HGB BLDA-MCNC: 8.6 G/DL (ref 13.5–17.5)
HGB BLDA-MCNC: 8.6 G/DL (ref 13.5–17.5)
HGB BLDA-MCNC: 8.8 G/DL (ref 13.5–17.5)
HGB BLDA-MCNC: 8.9 G/DL (ref 13.5–17.5)
HGB BLDA-MCNC: 8.9 G/DL (ref 13.5–17.5)
HGB BLDA-MCNC: 9 G/DL (ref 13.5–17.5)
HGB BLDA-MCNC: 9.3 G/DL (ref 13.5–17.5)
HGB BLDA-MCNC: 9.3 G/DL (ref 13.5–17.5)
HGB BLDA-MCNC: 9.4 G/DL (ref 13.5–17.5)
HGB BLDA-MCNC: 9.4 G/DL (ref 13.5–17.5)
INHALED O2 CONCENTRATION: 100 %
INHALED O2 CONCENTRATION: 21 %
INHALED O2 CONCENTRATION: 50 %
INR PPP: 1.1 (ref 0.9–1.1)
LACTATE BLDA-SCNC: 0.9 MMOL/L (ref 0.4–2)
LACTATE BLDA-SCNC: 0.9 MMOL/L (ref 0.4–2)
LACTATE BLDA-SCNC: 1 MMOL/L (ref 0.4–2)
LACTATE BLDA-SCNC: 1.3 MMOL/L (ref 0.4–2)
LACTATE BLDA-SCNC: 1.4 MMOL/L (ref 0.4–2)
LACTATE BLDA-SCNC: 1.5 MMOL/L (ref 0.4–2)
LACTATE BLDA-SCNC: 1.6 MMOL/L (ref 0.4–2)
MAGNESIUM SERPL-MCNC: 2.04 MG/DL (ref 1.6–2.4)
MCF BLD TEG: 44 MM (ref 15–32)
MCF BLD TEG: 51 MM (ref 15–32)
MCF BLD TEG: 70 MM (ref 52–69)
MCF BLD TEG: 70 MM (ref 52–69)
MCF BLD TEG: 71 MM (ref 52–70)
MCF BLD TEG: 72 MM (ref 52–70)
MCH RBC QN AUTO: 32.6 PG (ref 26–34)
MCHC RBC AUTO-ENTMCNC: 30.2 G/DL (ref 32–36)
MCV RBC AUTO: 108 FL (ref 80–100)
METHGB MFR BLDA: 0.4 % (ref 0–1.5)
METHGB MFR BLDA: 0.4 % (ref 0–1.5)
METHGB MFR BLDA: 0.6 % (ref 0–1.5)
METHGB MFR BLDA: 0.7 % (ref 0–1.5)
METHGB MFR BLDA: 1.1 % (ref 0–1.5)
NRBC BLD-RTO: 0 /100 WBCS (ref 0–0)
OXYHGB MFR BLDA: 93.8 % (ref 94–98)
OXYHGB MFR BLDA: 93.8 % (ref 94–98)
OXYHGB MFR BLDA: 96.1 % (ref 94–98)
OXYHGB MFR BLDA: 96.1 % (ref 94–98)
OXYHGB MFR BLDA: 96.3 % (ref 94–98)
OXYHGB MFR BLDA: 96.8 % (ref 94–98)
OXYHGB MFR BLDA: 96.8 % (ref 94–98)
OXYHGB MFR BLDA: 97.2 % (ref 94–98)
OXYHGB MFR BLDA: 97.2 % (ref 94–98)
OXYHGB MFR BLDA: 97.3 % (ref 94–98)
OXYHGB MFR BLDA: 97.8 % (ref 94–98)
OXYHGB MFR BLDA: 97.8 % (ref 94–98)
PCO2 BLDA: 38 MM HG (ref 38–42)
PCO2 BLDA: 39 MM HG (ref 38–42)
PCO2 BLDA: 43 MM HG (ref 38–42)
PCO2 BLDA: 44 MM HG (ref 38–42)
PCO2 BLDA: 45 MM HG (ref 38–42)
PCO2 BLDA: 45 MM HG (ref 38–42)
PCO2 BLDA: 47 MM HG (ref 38–42)
PH BLDA: 7.37 PH (ref 7.38–7.42)
PH BLDA: 7.38 PH (ref 7.38–7.42)
PH BLDA: 7.4 PH (ref 7.38–7.42)
PH BLDA: 7.44 PH (ref 7.38–7.42)
PH BLDA: 7.46 PH (ref 7.38–7.42)
PHOSPHATE SERPL-MCNC: 2.7 MG/DL (ref 2.5–4.9)
PLATELET # BLD AUTO: 291 X10*3/UL (ref 150–450)
PMV BLD AUTO: 9.7 FL (ref 7.5–11.5)
PO2 BLDA: 112 MM HG (ref 85–95)
PO2 BLDA: 121 MM HG (ref 85–95)
PO2 BLDA: 168 MM HG (ref 85–95)
PO2 BLDA: 210 MM HG (ref 85–95)
PO2 BLDA: 363 MM HG (ref 85–95)
PO2 BLDA: 71 MM HG (ref 85–95)
PO2 BLDA: 80 MM HG (ref 85–95)
POTASSIUM BLDA-SCNC: 4.8 MMOL/L (ref 3.5–5.3)
POTASSIUM BLDA-SCNC: 4.8 MMOL/L (ref 3.5–5.3)
POTASSIUM BLDA-SCNC: 5 MMOL/L (ref 3.5–5.3)
POTASSIUM BLDA-SCNC: 5 MMOL/L (ref 3.5–5.3)
POTASSIUM BLDA-SCNC: 5.2 MMOL/L (ref 3.5–5.3)
POTASSIUM BLDA-SCNC: 5.2 MMOL/L (ref 3.5–5.3)
POTASSIUM BLDA-SCNC: 5.3 MMOL/L (ref 3.5–5.3)
POTASSIUM SERPL-SCNC: 4.9 MMOL/L (ref 3.5–5.3)
PROTHROMBIN TIME: 12.1 SECONDS (ref 9.8–12.8)
RBC # BLD AUTO: 2.58 X10*6/UL (ref 4.5–5.9)
SAO2 % BLDA: 100 % (ref 94–100)
SAO2 % BLDA: 100 % (ref 94–100)
SAO2 % BLDA: 96 % (ref 94–100)
SAO2 % BLDA: 98 % (ref 94–100)
SAO2 % BLDA: 98 % (ref 94–100)
SAO2 % BLDA: 99 % (ref 94–100)
SAO2 % BLDA: 99 % (ref 94–100)
SODIUM BLDA-SCNC: 132 MMOL/L (ref 136–145)
SODIUM BLDA-SCNC: 133 MMOL/L (ref 136–145)
SODIUM BLDA-SCNC: 133 MMOL/L (ref 136–145)
SODIUM BLDA-SCNC: 134 MMOL/L (ref 136–145)
SODIUM SERPL-SCNC: 139 MMOL/L (ref 136–145)
TEST COMMENT: ABNORMAL
TEST COMMENT: ABNORMAL
WBC # BLD AUTO: 13.1 X10*3/UL (ref 4.4–11.3)

## 2023-10-11 PROCEDURE — 82330 ASSAY OF CALCIUM: CPT | Performed by: NURSE PRACTITIONER

## 2023-10-11 PROCEDURE — 2780000003 HC OR 278 NO HCPCS: Performed by: THORACIC SURGERY (CARDIOTHORACIC VASCULAR SURGERY)

## 2023-10-11 PROCEDURE — 3600000018 HC OR TIME - INITIAL BASE CHARGE - PROCEDURE LEVEL SIX: Performed by: THORACIC SURGERY (CARDIOTHORACIC VASCULAR SURGERY)

## 2023-10-11 PROCEDURE — 80069 RENAL FUNCTION PANEL: CPT

## 2023-10-11 PROCEDURE — 2500000002 HC RX 250 W HCPCS SELF ADMINISTERED DRUGS (ALT 637 FOR MEDICARE OP, ALT 636 FOR OP/ED)

## 2023-10-11 PROCEDURE — 85018 HEMOGLOBIN: CPT

## 2023-10-11 PROCEDURE — 3700000002 HC GENERAL ANESTHESIA TIME - EACH INCREMENTAL 1 MINUTE: Performed by: THORACIC SURGERY (CARDIOTHORACIC VASCULAR SURGERY)

## 2023-10-11 PROCEDURE — 83050 HGB METHEMOGLOBIN QUAN: CPT

## 2023-10-11 PROCEDURE — 96373 THER/PROPH/DIAG INJ IA: CPT | Performed by: THORACIC SURGERY (CARDIOTHORACIC VASCULAR SURGERY)

## 2023-10-11 PROCEDURE — 85384 FIBRINOGEN ACTIVITY: CPT | Performed by: NURSE PRACTITIONER

## 2023-10-11 PROCEDURE — 85576 BLOOD PLATELET AGGREGATION: CPT

## 2023-10-11 PROCEDURE — 36620 INSERTION CATHETER ARTERY: CPT | Performed by: ANESTHESIOLOGY

## 2023-10-11 PROCEDURE — A33533 PR CABG, ARTERIAL, SINGLE: Performed by: ANESTHESIOLOGIST ASSISTANT

## 2023-10-11 PROCEDURE — 2500000005 HC RX 250 GENERAL PHARMACY W/O HCPCS

## 2023-10-11 PROCEDURE — 82435 ASSAY OF BLOOD CHLORIDE: CPT

## 2023-10-11 PROCEDURE — 3600000012 HC PERFUSION TIME - EACH INCREMENTAL 1 MINUTE: Performed by: THORACIC SURGERY (CARDIOTHORACIC VASCULAR SURGERY)

## 2023-10-11 PROCEDURE — 83605 ASSAY OF LACTIC ACID: CPT

## 2023-10-11 PROCEDURE — 99100 ANES PT EXTEME AGE<1 YR&>70: CPT | Performed by: ANESTHESIOLOGY

## 2023-10-11 PROCEDURE — 3700000001 HC GENERAL ANESTHESIA TIME - INITIAL BASE CHARGE: Performed by: THORACIC SURGERY (CARDIOTHORACIC VASCULAR SURGERY)

## 2023-10-11 PROCEDURE — 85610 PROTHROMBIN TIME: CPT | Performed by: NURSE PRACTITIONER

## 2023-10-11 PROCEDURE — 83735 ASSAY OF MAGNESIUM: CPT | Performed by: NURSE PRACTITIONER

## 2023-10-11 PROCEDURE — 99291 CRITICAL CARE FIRST HOUR: CPT | Performed by: ANESTHESIOLOGY

## 2023-10-11 PROCEDURE — 71045 X-RAY EXAM CHEST 1 VIEW: CPT

## 2023-10-11 PROCEDURE — P9045 ALBUMIN (HUMAN), 5%, 250 ML: HCPCS | Mod: JZ | Performed by: ANESTHESIOLOGIST ASSISTANT

## 2023-10-11 PROCEDURE — 94002 VENT MGMT INPAT INIT DAY: CPT

## 2023-10-11 PROCEDURE — 85347 COAGULATION TIME ACTIVATED: CPT

## 2023-10-11 PROCEDURE — 2500000004 HC RX 250 GENERAL PHARMACY W/ HCPCS (ALT 636 FOR OP/ED): Performed by: NURSE PRACTITIONER

## 2023-10-11 PROCEDURE — 3600000017 HC OR TIME - EACH INCREMENTAL 1 MINUTE - PROCEDURE LEVEL SIX: Performed by: THORACIC SURGERY (CARDIOTHORACIC VASCULAR SURGERY)

## 2023-10-11 PROCEDURE — 2500000004 HC RX 250 GENERAL PHARMACY W/ HCPCS (ALT 636 FOR OP/ED): Performed by: ANESTHESIOLOGIST ASSISTANT

## 2023-10-11 PROCEDURE — 76937 US GUIDE VASCULAR ACCESS: CPT | Performed by: ANESTHESIOLOGY

## 2023-10-11 PROCEDURE — 3600000011 HC PERFUSION TIME - INITIAL BASE CHARGE: Performed by: THORACIC SURGERY (CARDIOTHORACIC VASCULAR SURGERY)

## 2023-10-11 PROCEDURE — 85027 COMPLETE CBC AUTOMATED: CPT | Performed by: NURSE PRACTITIONER

## 2023-10-11 PROCEDURE — 96372 THER/PROPH/DIAG INJ SC/IM: CPT | Performed by: NURSE PRACTITIONER

## 2023-10-11 PROCEDURE — A33533 PR CABG, ARTERIAL, SINGLE: Performed by: ANESTHESIOLOGY

## 2023-10-11 PROCEDURE — 37799 UNLISTED PX VASCULAR SURGERY: CPT | Performed by: NURSE PRACTITIONER

## 2023-10-11 PROCEDURE — 2500000002 HC RX 250 W HCPCS SELF ADMINISTERED DRUGS (ALT 637 FOR MEDICARE OP, ALT 636 FOR OP/ED): Performed by: NURSE PRACTITIONER

## 2023-10-11 PROCEDURE — 82435 ASSAY OF BLOOD CHLORIDE: CPT | Performed by: NURSE PRACTITIONER

## 2023-10-11 PROCEDURE — 02100Z9 BYPASS CORONARY ARTERY, ONE ARTERY FROM LEFT INTERNAL MAMMARY, OPEN APPROACH: ICD-10-PCS | Performed by: NURSE PRACTITIONER

## 2023-10-11 PROCEDURE — 2500000005 HC RX 250 GENERAL PHARMACY W/O HCPCS: Performed by: ANESTHESIOLOGIST ASSISTANT

## 2023-10-11 PROCEDURE — 33533 CABG ARTERIAL SINGLE: CPT | Performed by: THORACIC SURGERY (CARDIOTHORACIC VASCULAR SURGERY)

## 2023-10-11 PROCEDURE — 36556 INSERT NON-TUNNEL CV CATH: CPT | Performed by: ANESTHESIOLOGY

## 2023-10-11 PROCEDURE — 96372 THER/PROPH/DIAG INJ SC/IM: CPT

## 2023-10-11 PROCEDURE — 5A1221Z PERFORMANCE OF CARDIAC OUTPUT, CONTINUOUS: ICD-10-PCS | Performed by: NURSE PRACTITIONER

## 2023-10-11 PROCEDURE — 2500000002 HC RX 250 W HCPCS SELF ADMINISTERED DRUGS (ALT 637 FOR MEDICARE OP, ALT 636 FOR OP/ED): Performed by: ANESTHESIOLOGIST ASSISTANT

## 2023-10-11 PROCEDURE — A4217 STERILE WATER/SALINE, 500 ML: HCPCS | Performed by: THORACIC SURGERY (CARDIOTHORACIC VASCULAR SURGERY)

## 2023-10-11 PROCEDURE — 2500000001 HC RX 250 WO HCPCS SELF ADMINISTERED DRUGS (ALT 637 FOR MEDICARE OP): Performed by: NURSE PRACTITIONER

## 2023-10-11 PROCEDURE — 2720000007 HC OR 272 NO HCPCS: Performed by: THORACIC SURGERY (CARDIOTHORACIC VASCULAR SURGERY)

## 2023-10-11 PROCEDURE — 2500000005 HC RX 250 GENERAL PHARMACY W/O HCPCS: Performed by: NURSE PRACTITIONER

## 2023-10-11 PROCEDURE — 2500000004 HC RX 250 GENERAL PHARMACY W/ HCPCS (ALT 636 FOR OP/ED)

## 2023-10-11 PROCEDURE — 82947 ASSAY GLUCOSE BLOOD QUANT: CPT

## 2023-10-11 PROCEDURE — 2500000004 HC RX 250 GENERAL PHARMACY W/ HCPCS (ALT 636 FOR OP/ED): Performed by: THORACIC SURGERY (CARDIOTHORACIC VASCULAR SURGERY)

## 2023-10-11 PROCEDURE — 96372 THER/PROPH/DIAG INJ SC/IM: CPT | Performed by: ANESTHESIOLOGIST ASSISTANT

## 2023-10-11 PROCEDURE — 2020000001 HC ICU ROOM DAILY

## 2023-10-11 PROCEDURE — 82330 ASSAY OF CALCIUM: CPT

## 2023-10-11 PROCEDURE — 2580000001 HC RX 258 IV SOLUTIONS: Performed by: NURSE PRACTITIONER

## 2023-10-11 PROCEDURE — 71045 X-RAY EXAM CHEST 1 VIEW: CPT | Performed by: RADIOLOGY

## 2023-10-11 DEVICE — SHUNT, INTRACORONARY, 1.75 MM, CLEARVIEW: Type: IMPLANTABLE DEVICE | Site: CHEST  WALL | Status: FUNCTIONAL

## 2023-10-11 DEVICE — LEAD, PACING, MYOCARDIAL, BIPOLAR, TEMPORARY: Type: IMPLANTABLE DEVICE | Site: CHEST  WALL | Status: FUNCTIONAL

## 2023-10-11 DEVICE — STABILIZER TSMICS1 OCTOPUS NUVO 26L
Type: IMPLANTABLE DEVICE | Site: CHEST  WALL | Status: FUNCTIONAL
Brand: OCTOPUS®

## 2023-10-11 DEVICE — DEVICE KIT, COR-KNOT MICRO: Type: IMPLANTABLE DEVICE | Site: CHEST  WALL | Status: FUNCTIONAL

## 2023-10-11 RX ORDER — ROCURONIUM BROMIDE 10 MG/ML
INJECTION, SOLUTION INTRAVENOUS AS NEEDED
Status: DISCONTINUED | OUTPATIENT
Start: 2023-10-11 | End: 2023-10-11

## 2023-10-11 RX ORDER — SODIUM CHLORIDE, SODIUM LACTATE, POTASSIUM CHLORIDE, CALCIUM CHLORIDE 600; 310; 30; 20 MG/100ML; MG/100ML; MG/100ML; MG/100ML
5 INJECTION, SOLUTION INTRAVENOUS CONTINUOUS
Status: DISCONTINUED | OUTPATIENT
Start: 2023-10-11 | End: 2023-10-12

## 2023-10-11 RX ORDER — HYDROMORPHONE HYDROCHLORIDE 1 MG/ML
0.2 INJECTION, SOLUTION INTRAMUSCULAR; INTRAVENOUS; SUBCUTANEOUS
Status: DISCONTINUED | OUTPATIENT
Start: 2023-10-11 | End: 2023-10-13

## 2023-10-11 RX ORDER — NOREPINEPHRINE BITARTRATE/D5W 8 MG/250ML
.01-.1 PLASTIC BAG, INJECTION (ML) INTRAVENOUS CONTINUOUS
Status: DISCONTINUED | OUTPATIENT
Start: 2023-10-11 | End: 2023-10-12

## 2023-10-11 RX ORDER — ACETAMINOPHEN 650 MG/1
650 SUPPOSITORY RECTAL EVERY 4 HOURS
Status: DISCONTINUED | OUTPATIENT
Start: 2023-10-11 | End: 2023-10-13

## 2023-10-11 RX ORDER — SODIUM CHLORIDE, SODIUM GLUCONATE, SODIUM ACETATE, POTASSIUM CHLORIDE AND MAGNESIUM CHLORIDE 30; 37; 368; 526; 502 MG/100ML; MG/100ML; MG/100ML; MG/100ML; MG/100ML
INJECTION, SOLUTION INTRAVENOUS CONTINUOUS PRN
Status: DISCONTINUED | OUTPATIENT
Start: 2023-10-11 | End: 2023-10-11

## 2023-10-11 RX ORDER — FLUCONAZOLE 2 MG/ML
200 INJECTION, SOLUTION INTRAVENOUS
Status: DISCONTINUED | OUTPATIENT
Start: 2023-10-11 | End: 2023-10-12

## 2023-10-11 RX ORDER — CEFAZOLIN 1 G/1
INJECTION, POWDER, FOR SOLUTION INTRAVENOUS AS NEEDED
Status: DISCONTINUED | OUTPATIENT
Start: 2023-10-11 | End: 2023-10-11

## 2023-10-11 RX ORDER — GLYCOPYRROLATE 0.2 MG/ML
0.3 INJECTION INTRAMUSCULAR; INTRAVENOUS ONCE
Status: COMPLETED | OUTPATIENT
Start: 2023-10-11 | End: 2023-10-11

## 2023-10-11 RX ORDER — NOREPINEPHRINE BITARTRATE 0.03 MG/ML
INJECTION, SOLUTION INTRAVENOUS CONTINUOUS PRN
Status: DISCONTINUED | OUTPATIENT
Start: 2023-10-11 | End: 2023-10-11

## 2023-10-11 RX ORDER — POLYETHYLENE GLYCOL 3350 17 G/17G
17 POWDER, FOR SOLUTION ORAL DAILY
Status: DISCONTINUED | OUTPATIENT
Start: 2023-10-11 | End: 2023-10-13

## 2023-10-11 RX ORDER — PROPOFOL 10 MG/ML
0-50 INJECTION, EMULSION INTRAVENOUS CONTINUOUS
Status: DISCONTINUED | OUTPATIENT
Start: 2023-10-11 | End: 2023-10-12

## 2023-10-11 RX ORDER — PANTOPRAZOLE SODIUM 40 MG/1
40 TABLET, DELAYED RELEASE ORAL
Status: DISCONTINUED | OUTPATIENT
Start: 2023-10-12 | End: 2023-10-12

## 2023-10-11 RX ORDER — NEOSTIGMINE METHYLSULFATE 1 MG/ML
INJECTION, SOLUTION INTRAVENOUS
Status: COMPLETED
Start: 2023-10-11 | End: 2023-10-11

## 2023-10-11 RX ORDER — ESMOLOL HYDROCHLORIDE 10 MG/ML
INJECTION INTRAVENOUS AS NEEDED
Status: DISCONTINUED | OUTPATIENT
Start: 2023-10-11 | End: 2023-10-11

## 2023-10-11 RX ORDER — CEFAZOLIN SODIUM 1 G/50ML
1 SOLUTION INTRAVENOUS EVERY 24 HOURS
Status: DISCONTINUED | OUTPATIENT
Start: 2023-10-11 | End: 2023-10-13

## 2023-10-11 RX ORDER — EPINEPHRINE HCL IN DEXTROSE 5% 4MG/250ML
.01-1 PLASTIC BAG, INJECTION (ML) INTRAVENOUS CONTINUOUS
Status: DISCONTINUED | OUTPATIENT
Start: 2023-10-11 | End: 2023-10-12

## 2023-10-11 RX ORDER — ETOMIDATE 2 MG/ML
INJECTION INTRAVENOUS AS NEEDED
Status: DISCONTINUED | OUTPATIENT
Start: 2023-10-11 | End: 2023-10-11

## 2023-10-11 RX ORDER — DEXTROSE MONOHYDRATE 100 MG/ML
0.3 INJECTION, SOLUTION INTRAVENOUS ONCE AS NEEDED
Status: DISCONTINUED | OUTPATIENT
Start: 2023-10-11 | End: 2023-10-11

## 2023-10-11 RX ORDER — FENTANYL CITRATE 50 UG/ML
INJECTION, SOLUTION INTRAMUSCULAR; INTRAVENOUS AS NEEDED
Status: DISCONTINUED | OUTPATIENT
Start: 2023-10-11 | End: 2023-10-11

## 2023-10-11 RX ORDER — GLYCOPYRROLATE 0.2 MG/ML
INJECTION INTRAMUSCULAR; INTRAVENOUS
Status: COMPLETED
Start: 2023-10-11 | End: 2023-10-11

## 2023-10-11 RX ORDER — HEPARIN SODIUM 1000 [USP'U]/ML
INJECTION, SOLUTION INTRAVENOUS; SUBCUTANEOUS AS NEEDED
Status: DISCONTINUED | OUTPATIENT
Start: 2023-10-11 | End: 2023-10-11

## 2023-10-11 RX ORDER — PANTOPRAZOLE SODIUM 40 MG/10ML
40 INJECTION, POWDER, LYOPHILIZED, FOR SOLUTION INTRAVENOUS
Status: DISCONTINUED | OUTPATIENT
Start: 2023-10-12 | End: 2023-10-12

## 2023-10-11 RX ORDER — DEXTROSE 50 % IN WATER (D50W) INTRAVENOUS SYRINGE
25
Status: DISCONTINUED | OUTPATIENT
Start: 2023-10-11 | End: 2023-10-17 | Stop reason: HOSPADM

## 2023-10-11 RX ORDER — PAPAVERINE HYDROCHLORIDE 30 MG/ML
INJECTION INTRAMUSCULAR; INTRAVENOUS AS NEEDED
Status: DISCONTINUED | OUTPATIENT
Start: 2023-10-11 | End: 2023-10-11 | Stop reason: HOSPADM

## 2023-10-11 RX ORDER — NAPROXEN SODIUM 220 MG/1
81 TABLET, FILM COATED ORAL DAILY
Status: DISCONTINUED | OUTPATIENT
Start: 2023-10-12 | End: 2023-10-16

## 2023-10-11 RX ORDER — LIDOCAINE HCL/PF 100 MG/5ML
SYRINGE (ML) INTRAVENOUS AS NEEDED
Status: DISCONTINUED | OUTPATIENT
Start: 2023-10-11 | End: 2023-10-11

## 2023-10-11 RX ORDER — SODIUM CHLORIDE, SODIUM LACTATE, POTASSIUM CHLORIDE, CALCIUM CHLORIDE 600; 310; 30; 20 MG/100ML; MG/100ML; MG/100ML; MG/100ML
30 INJECTION, SOLUTION INTRAVENOUS CONTINUOUS
Status: DISCONTINUED | OUTPATIENT
Start: 2023-10-11 | End: 2023-10-12

## 2023-10-11 RX ORDER — INSULIN LISPRO 100 [IU]/ML
0-15 INJECTION, SOLUTION INTRAVENOUS; SUBCUTANEOUS EVERY 4 HOURS
Status: DISCONTINUED | OUTPATIENT
Start: 2023-10-11 | End: 2023-10-12

## 2023-10-11 RX ORDER — NEOSTIGMINE METHYLSULFATE 0.5 MG/ML
2 INJECTION, SOLUTION INTRAVENOUS ONCE
Status: DISCONTINUED | OUTPATIENT
Start: 2023-10-11 | End: 2023-10-13

## 2023-10-11 RX ORDER — NALOXONE HYDROCHLORIDE 0.4 MG/ML
0.2 INJECTION, SOLUTION INTRAMUSCULAR; INTRAVENOUS; SUBCUTANEOUS EVERY 5 MIN PRN
Status: DISCONTINUED | OUTPATIENT
Start: 2023-10-11 | End: 2023-10-13

## 2023-10-11 RX ORDER — PROPOFOL 10 MG/ML
INJECTION, EMULSION INTRAVENOUS CONTINUOUS PRN
Status: DISCONTINUED | OUTPATIENT
Start: 2023-10-11 | End: 2023-10-11

## 2023-10-11 RX ORDER — NAPROXEN SODIUM 220 MG/1
81 TABLET, FILM COATED ORAL ONCE
Status: COMPLETED | OUTPATIENT
Start: 2023-10-11 | End: 2023-10-11

## 2023-10-11 RX ORDER — PROTAMINE SULFATE 10 MG/ML
INJECTION, SOLUTION INTRAVENOUS AS NEEDED
Status: DISCONTINUED | OUTPATIENT
Start: 2023-10-11 | End: 2023-10-11

## 2023-10-11 RX ORDER — SODIUM CHLORIDE 0.9 G/100ML
IRRIGANT IRRIGATION AS NEEDED
Status: DISCONTINUED | OUTPATIENT
Start: 2023-10-11 | End: 2023-10-11 | Stop reason: HOSPADM

## 2023-10-11 RX ORDER — ALBUMIN HUMAN 50 G/1000ML
SOLUTION INTRAVENOUS AS NEEDED
Status: DISCONTINUED | OUTPATIENT
Start: 2023-10-11 | End: 2023-10-11

## 2023-10-11 RX ORDER — BUPIVACAINE HYDROCHLORIDE 2.5 MG/ML
INJECTION, SOLUTION INFILTRATION; PERINEURAL AS NEEDED
Status: DISCONTINUED | OUTPATIENT
Start: 2023-10-11 | End: 2023-10-11 | Stop reason: HOSPADM

## 2023-10-11 RX ORDER — ASPIRIN 300 MG/1
150 SUPPOSITORY RECTAL ONCE
Status: COMPLETED | OUTPATIENT
Start: 2023-10-11 | End: 2023-10-11

## 2023-10-11 RX ORDER — PHENYLEPHRINE HCL IN 0.9% NACL 0.4MG/10ML
SYRINGE (ML) INTRAVENOUS AS NEEDED
Status: DISCONTINUED | OUTPATIENT
Start: 2023-10-11 | End: 2023-10-11

## 2023-10-11 RX ORDER — CEFAZOLIN SODIUM 2 G/100ML
2 INJECTION, SOLUTION INTRAVENOUS EVERY 12 HOURS
Status: DISCONTINUED | OUTPATIENT
Start: 2023-10-11 | End: 2023-10-11

## 2023-10-11 RX ORDER — ACETAMINOPHEN 325 MG/1
650 TABLET ORAL EVERY 4 HOURS
Status: DISCONTINUED | OUTPATIENT
Start: 2023-10-11 | End: 2023-10-13

## 2023-10-11 RX ORDER — GLYCOPYRROLATE 0.2 MG/ML
INJECTION INTRAMUSCULAR; INTRAVENOUS AS NEEDED
Status: DISCONTINUED | OUTPATIENT
Start: 2023-10-11 | End: 2023-10-11

## 2023-10-11 RX ORDER — MIDAZOLAM HYDROCHLORIDE 1 MG/ML
INJECTION, SOLUTION INTRAMUSCULAR; INTRAVENOUS CONTINUOUS PRN
Status: DISCONTINUED | OUTPATIENT
Start: 2023-10-11 | End: 2023-10-11

## 2023-10-11 RX ORDER — ATORVASTATIN CALCIUM 80 MG/1
80 TABLET, FILM COATED ORAL NIGHTLY
Status: DISCONTINUED | OUTPATIENT
Start: 2023-10-11 | End: 2023-10-17 | Stop reason: HOSPADM

## 2023-10-11 RX ADMIN — FLUCONAZOLE 200 MG: 2 INJECTION, SOLUTION INTRAVENOUS at 21:34

## 2023-10-11 RX ADMIN — SODIUM CHLORIDE, POTASSIUM CHLORIDE, SODIUM LACTATE AND CALCIUM CHLORIDE 5 ML/HR: 600; 310; 30; 20 INJECTION, SOLUTION INTRAVENOUS at 12:45

## 2023-10-11 RX ADMIN — SODIUM CHLORIDE, POTASSIUM CHLORIDE, SODIUM LACTATE AND CALCIUM CHLORIDE 500 ML: 600; 310; 30; 20 INJECTION, SOLUTION INTRAVENOUS at 14:00

## 2023-10-11 RX ADMIN — GLYCOPYRROLATE 0.2 MG: 0.2 INJECTION, SOLUTION INTRAMUSCULAR; INTRAVENOUS at 09:15

## 2023-10-11 RX ADMIN — GLYCOPYRROLATE 0.3 MG: 0.2 INJECTION INTRAMUSCULAR; INTRAVENOUS at 16:40

## 2023-10-11 RX ADMIN — FENTANYL CITRATE 150 MCG: 50 INJECTION, SOLUTION INTRAMUSCULAR; INTRAVENOUS at 09:55

## 2023-10-11 RX ADMIN — Medication 16 MCG: at 09:18

## 2023-10-11 RX ADMIN — HEPARIN SODIUM 1200 UNITS/HR: 10000 INJECTION, SOLUTION INTRAVENOUS at 04:10

## 2023-10-11 RX ADMIN — HEPARIN SODIUM 10000 UNITS: 1000 INJECTION, SOLUTION INTRAVENOUS; SUBCUTANEOUS at 10:46

## 2023-10-11 RX ADMIN — NOREPINEPHRINE BITARTRATE 0.04 MCG/KG/MIN: 0.03 INJECTION, SOLUTION INTRAVENOUS at 10:48

## 2023-10-11 RX ADMIN — PROPOFOL 30 MCG/KG/MIN: 10 INJECTION, EMULSION INTRAVENOUS at 16:01

## 2023-10-11 RX ADMIN — HEPARIN SODIUM 10000 UNITS: 1000 INJECTION, SOLUTION INTRAVENOUS; SUBCUTANEOUS at 10:39

## 2023-10-11 RX ADMIN — Medication 8 MCG: at 09:59

## 2023-10-11 RX ADMIN — SODIUM CHLORIDE, POTASSIUM CHLORIDE, SODIUM LACTATE AND CALCIUM CHLORIDE 30 ML/HR: 600; 310; 30; 20 INJECTION, SOLUTION INTRAVENOUS at 12:45

## 2023-10-11 RX ADMIN — FENTANYL CITRATE 100 MCG: 50 INJECTION, SOLUTION INTRAMUSCULAR; INTRAVENOUS at 10:41

## 2023-10-11 RX ADMIN — SODIUM CHLORIDE, SODIUM GLUCONATE, SODIUM ACETATE, POTASSIUM CHLORIDE AND MAGNESIUM CHLORIDE: 30; 37; 368; 526; 502 INJECTION, SOLUTION INTRAVENOUS at 09:40

## 2023-10-11 RX ADMIN — SENNOSIDES 17.2 MG: 8.6 TABLET, FILM COATED ORAL at 21:51

## 2023-10-11 RX ADMIN — ASPIRIN 81 MG CHEWABLE TABLET 81 MG: 81 TABLET CHEWABLE at 14:50

## 2023-10-11 RX ADMIN — ROCURONIUM BROMIDE 20 MG: 10 INJECTION, SOLUTION INTRAVENOUS at 10:44

## 2023-10-11 RX ADMIN — ETOMIDATE 16 MG: 20 INJECTION, SOLUTION INTRAVENOUS at 08:52

## 2023-10-11 RX ADMIN — Medication 4 MCG: at 11:14

## 2023-10-11 RX ADMIN — ESMOLOL HYDROCHLORIDE 50 MG: 10 INJECTION, SOLUTION INTRAVENOUS at 08:52

## 2023-10-11 RX ADMIN — Medication 0.02 MCG/KG/MIN: at 12:55

## 2023-10-11 RX ADMIN — INSULIN HUMAN 3 UNITS: 100 INJECTION, SOLUTION PARENTERAL at 11:29

## 2023-10-11 RX ADMIN — INSULIN LISPRO 3 UNITS: 100 INJECTION, SOLUTION INTRAVENOUS; SUBCUTANEOUS at 00:48

## 2023-10-11 RX ADMIN — Medication 16 MCG: at 10:49

## 2023-10-11 RX ADMIN — LIDOCAINE HYDROCHLORIDE 100 MG: 20 INJECTION INTRAVENOUS at 08:52

## 2023-10-11 RX ADMIN — HYDROMORPHONE HYDROCHLORIDE 0.2 MG: 1 INJECTION, SOLUTION INTRAMUSCULAR; INTRAVENOUS; SUBCUTANEOUS at 17:28

## 2023-10-11 RX ADMIN — ROCURONIUM BROMIDE 20 MG: 10 INJECTION, SOLUTION INTRAVENOUS at 11:38

## 2023-10-11 RX ADMIN — Medication 160 MCG: at 09:15

## 2023-10-11 RX ADMIN — PROPOFOL 30 MCG/KG/MIN: 10 INJECTION, EMULSION INTRAVENOUS at 12:45

## 2023-10-11 RX ADMIN — Medication 8 MCG: at 12:13

## 2023-10-11 RX ADMIN — NEOSTIGMINE METHYLSULFATE 10 MG: 1 INJECTION INTRAVENOUS at 16:30

## 2023-10-11 RX ADMIN — PROPOFOL 30 MCG/KG/MIN: 10 INJECTION, EMULSION INTRAVENOUS at 11:55

## 2023-10-11 RX ADMIN — ROCURONIUM BROMIDE 20 MG: 10 INJECTION, SOLUTION INTRAVENOUS at 09:44

## 2023-10-11 RX ADMIN — ROCURONIUM BROMIDE 80 MG: 10 INJECTION, SOLUTION INTRAVENOUS at 08:52

## 2023-10-11 RX ADMIN — Medication 8 MCG: at 11:33

## 2023-10-11 RX ADMIN — ALBUMIN HUMAN 250 ML: 0.05 INJECTION, SOLUTION INTRAVENOUS at 10:34

## 2023-10-11 RX ADMIN — ACETAMINOPHEN 650 MG: 325 TABLET ORAL at 21:51

## 2023-10-11 RX ADMIN — Medication 16 MCG: at 10:48

## 2023-10-11 RX ADMIN — GLYCOPYRROLATE 0.3 MG: 0.2 INJECTION, SOLUTION INTRAMUSCULAR; INTRAVENOUS at 16:40

## 2023-10-11 RX ADMIN — ACETAMINOPHEN 650 MG: 325 TABLET ORAL at 14:49

## 2023-10-11 RX ADMIN — SODIUM CHLORIDE, SODIUM GLUCONATE, SODIUM ACETATE, POTASSIUM CHLORIDE AND MAGNESIUM CHLORIDE: 526; 502; 368; 37; 30 INJECTION, SOLUTION INTRAVENOUS at 08:41

## 2023-10-11 RX ADMIN — Medication 8 MCG: at 09:48

## 2023-10-11 RX ADMIN — Medication 16 MCG: at 09:30

## 2023-10-11 RX ADMIN — Medication 4 MCG: at 11:00

## 2023-10-11 RX ADMIN — SODIUM CHLORIDE, SODIUM GLUCONATE, SODIUM ACETATE, POTASSIUM CHLORIDE AND MAGNESIUM CHLORIDE: 30; 37; 368; 526; 502 INJECTION, SOLUTION INTRAVENOUS at 09:41

## 2023-10-11 RX ADMIN — INSULIN LISPRO 1 UNITS: 100 INJECTION, SOLUTION INTRAVENOUS; SUBCUTANEOUS at 06:01

## 2023-10-11 RX ADMIN — INSULIN HUMAN 3 UNITS/HR: 1 INJECTION, SOLUTION INTRAVENOUS at 12:45

## 2023-10-11 RX ADMIN — INSULIN LISPRO 5 UNITS: 100 INJECTION, SOLUTION INTRAVENOUS; SUBCUTANEOUS at 19:00

## 2023-10-11 RX ADMIN — SODIUM CHLORIDE, POTASSIUM CHLORIDE, SODIUM LACTATE AND CALCIUM CHLORIDE 500 ML: 600; 310; 30; 20 INJECTION, SOLUTION INTRAVENOUS at 13:15

## 2023-10-11 RX ADMIN — NOREPINEPHRINE BITARTRATE 0.02 MCG/KG/MIN: 8 INJECTION, SOLUTION INTRAVENOUS at 12:50

## 2023-10-11 RX ADMIN — LEVOTHYROXINE SODIUM 25 MCG: 25 TABLET ORAL at 05:58

## 2023-10-11 RX ADMIN — ATORVASTATIN CALCIUM 80 MG: 80 TABLET, FILM COATED ORAL at 21:50

## 2023-10-11 RX ADMIN — HYDROMORPHONE HYDROCHLORIDE 0.2 MG: 1 INJECTION, SOLUTION INTRAMUSCULAR; INTRAVENOUS; SUBCUTANEOUS at 21:51

## 2023-10-11 RX ADMIN — Medication 160 MCG: at 09:06

## 2023-10-11 RX ADMIN — Medication 80 MCG: at 08:58

## 2023-10-11 RX ADMIN — FENTANYL CITRATE 150 MCG: 50 INJECTION, SOLUTION INTRAMUSCULAR; INTRAVENOUS at 10:11

## 2023-10-11 RX ADMIN — CEFAZOLIN 2 G: 330 INJECTION, POWDER, FOR SOLUTION INTRAMUSCULAR; INTRAVENOUS at 09:30

## 2023-10-11 RX ADMIN — PROTAMINE SULFATE 140 MG: 10 INJECTION, SOLUTION INTRAVENOUS at 11:12

## 2023-10-11 RX ADMIN — FENTANYL CITRATE 250 MCG: 50 INJECTION, SOLUTION INTRAMUSCULAR; INTRAVENOUS at 08:52

## 2023-10-11 SDOH — HEALTH STABILITY: MENTAL HEALTH: CURRENT SMOKER: 0

## 2023-10-11 ASSESSMENT — PAIN SCALES - GENERAL
PAINLEVEL_OUTOF10: 0 - NO PAIN
PAINLEVEL_OUTOF10: 2

## 2023-10-11 ASSESSMENT — PAIN - FUNCTIONAL ASSESSMENT
PAIN_FUNCTIONAL_ASSESSMENT: 0-10
PAIN_FUNCTIONAL_ASSESSMENT: 0-10

## 2023-10-11 NOTE — PROGRESS NOTES
Today, at LOS -11 presented to CTICU s/p Mid CAB surgery on 10/11 with Jorje Neal. ESRD on PD since March 2023. Care Transitions DC Planning Assessment completed prior day.     Carmelita Acuna, NELLYW

## 2023-10-11 NOTE — ANESTHESIA PROCEDURE NOTES
Central Venous Line:    Date/Time: 10/11/2023 9:20 AM    A central venous line was placed in the OR for the following indication(s): central venous access and CVP monitoring.  Staffing  Performed: YUDELKA   Authorized by: Jacy Ayala MD    Performed by: YUDELKA Degroot    Sterility preparation included the following: provider hand hygiene performed prior to central venous catheter insertion, all 5 sterile barriers used (gloves, gown, cap, mask, large sterile drape) during central venous catheter insertion, antiseptic used during central venous catheter insertion and skin prep agent completely dried prior to procedure.  The patient was placed in Trendelenburg position.    Right internal jugular vein was prepped.    The site was prepped with Chlorhexidine.  Size: 9 Fr   Length: 20  Catheter type: introducer   Number of Lumens: single lumen      During the procedure, the following specific steps were taken: target vein identified, needle advanced into vein and blood aspirated and guidewire advanced into vein.  Seldinger technique used.  Procedure performed using ultrasound guidance.  Sterile gel and probe cover used in ultrasound-guided central venous catheter insertion.    Intravenous verification was obtained by ultrasound, venous blood return and manometry.      Post insertion care included: all ports aspirated, all ports flushed easily, guidewire removed intact, Biopatch applied, line sutured in place and dressing applied.    During the procedure the patient experienced: patient tolerated procedure well with no complications.

## 2023-10-11 NOTE — ANESTHESIA PREPROCEDURE EVALUATION
Patient: Salomon Chowdary    Procedure Information       Anesthesia Start Date/Time: 10/11/23 0810    Procedures:       left thoracotomy midcab (Left)      Creation Bypass Graft Off-Pump Coronary Artery    Location: [facility] OR 21 / [facility]    Surgeons: Jorje Neal MD            Relevant Problems   Cardiovascular   (+) 2-vessel coronary artery disease   (+) Coronary artery disease involving native coronary artery of native heart with unstable angina pectoris (CMS/HCC)       Clinical information reviewed:   Tobacco  Allergies  Meds  Problems  Med Hx  Surg Hx   Fam Hx  Soc   Hx        NPO Detail:  NPO/Void Status  Date of Last Liquid: 10/10/23  Time of Last Liquid: 2300         Physical Exam    Airway  Mallampati: II  Neck ROM: limited     Cardiovascular - normal exam     Dental    Pulmonary - normal exam     Abdominal - normal exam             Anesthesia Plan    ASA 4     general     The patient is not a current smoker.    Anesthetic plan and risks discussed with patient.  Use of blood products discussed with patient who.    Plan discussed with [facility].

## 2023-10-11 NOTE — ANESTHESIA PROCEDURE NOTES
Arterial Line:    Date/Time: 10/11/2023 8:40 AM    Staffing  Performed: YUDELKA   Authorized by: Jacy Ayala MD    Performed by: YUDELKA Degroot    An arterial line was placed. Procedure performed using ultrasound guidance.in the OR for the following indication(s): continuous blood pressure monitoring and blood sampling needed.    A 20 gauge (size), 1 and 3/4 inch (length), Angiocath (type) catheter was placed into the Left brachial artery, secured by Tegaderm,   Seldinger technique used.  Events:  patient tolerated procedure well with no complications.

## 2023-10-11 NOTE — H&P
CTICU History & Physical    Subjective   HPI:  Salomon Chowdary is a 83 y.o. male on day 10 of with IDDM, HTN, HLD, ESRD on PD,  CAD, A-Fib and hypothyroidism presented to an OSH on 9/20 with chest pain. He was found to have NSTEMI with LHC showing 2 vessel disease in the LAD and left circumflex. He was transferred to [facility] for evaluation of revascularization plan s/p mid CABG with Dr. Neal on 10/11    Cardiac Testing:     TTE:  Transthoracic Echo (TTE) Complete    Result Date: 10/2/2023   [facility], [address] [place] [address]                Tel [phone] and Fax [phone] TRANSTHORACIC ECHOCARDIOGRAM REPORT  Patient Name:      SALOMON CHOWDARY       Reading Physician:    [id number] Omi Hurst MD Study Date:        10/2/2023            Ordering Provider:    [id number] NAIN MORE MRN/PID:           [id number]             Fellow: Accession#:        [id number]         Nurse: Date of Birth/Age: 1940 / 83 years Sonographer:          Trena MUIR Gender:            M                    Additional Staff: Height:            172.00               Admit Date:           9/18/2023 Weight:            80.29                Admission Status:     Inpatient -                                                               Routine BSA:               1.93 m2              Encounter#:           [id number]                                         Department Location:  [facility] Non                                                               Invasive Blood Pressure: 148 /75 mmHg Study Type:    TRANSTHORACIC ECHO (TTE) COMPLETE Diagnosis/ICD: 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 Previous effusion. Study Detail: The following Echo studies were performed: 2D, M-Mode, Doppler and                color flow. Technically challenging study due to patient lying in               supine position and Neck brace. Unable to obtain suprasternal               notch view.  PHYSICIAN INTERPRETATION: Left Ventricle: The left ventricular systolic function is normal, with an estimated ejection fraction of 55-60%. There are no regional wall motion abnormalities. The left ventricular cavity size is normal. The left ventricular septal wall thickness is mildly increased. There is left ventricular concentric remodeling. Spectral Doppler shows a normal pattern of left ventricular diastolic filling. Left Atrium: The left atrium is normal in size. Right Ventricle: The right ventricle is normal in size. There is normal right ventricular global systolic function. Right Atrium: The right atrium is normal in size. Aortic Valve: The aortic valve is trileaflet. There is minimal aortic valve cusp calcification. There is no evidence of aortic valve regurgitation. The peak instantaneous gradient of the aortic valve is 5.7 mmHg. Mitral Valve: The mitral valve is normal in structure. There is trace to mild mitral valve regurgitation. Tricuspid Valve: The tricuspid valve is structurally normal. There is trace to mild tricuspid regurgitation. The right ventricular systolic pressure is unable to be estimated. Pulmonic Valve: The pulmonic valve is structurally normal. There is trace pulmonic valve regurgitation. Pericardium: There is a small pericardial effusion. Aorta: The aortic root is normal. The Ao Sinus is 3.50 cm. The Asc Ao is 3.70 cm. There is mild dilatation of the ascending aorta. Systemic Veins: The inferior vena cava appears to be of normal size. In comparison to the previous echocardiogram(s): Compared with study from 9/25/2023, the pericardial effusion size has decreased (was mild to moderate).  CONCLUSIONS:  1. Left ventricular systolic function is normal with a 55-60% estimated ejection fraction.  2. There is a small  pericardial effusion. QUANTITATIVE DATA SUMMARY: 2D MEASUREMENTS:                           Normal Ranges: Ao Root d:     3.50 cm    (2.0-3.7cm) LAs:           3.10 cm    (2.7-4.0cm) IVSd:          1.30 cm    (0.6-1.1cm) LVPWd:         1.00 cm    (0.6-1.1cm) LVIDd:         4.70 cm    (3.9-5.9cm) LVIDs:         3.30 cm LV Mass Index: 103.2 g/m2 LV % FS        29.8 % LA VOLUME:                               Normal Ranges: LA Vol A4C:        33.0 ml    (22+/-6mL/m2) LA Vol A2C:        40.1 ml LA Vol BP:         38.4 ml LA Vol Index A4C:  17.1ml/m2 LA Vol Index A2C:  20.7 ml/m2 LA Vol Index BP:   19.8 ml/m2 LA Area A4C:       13.8 cm2 LA Area A2C:       14.4 cm2 LA Major Axis A4C: 4.9 cm LA Major Axis A2C: 4.4 cm LA Volume Index:   19.8 ml/m2 RA VOLUME BY A/L METHOD:                               Normal Ranges: RA Vol A4C:        19.3 ml    (8.3-19.5ml) RA Vol Index A4C:  10.0 ml/m2 RA Area A4C:       9.3 cm2 RA Major Axis A4C: 3.8 cm AORTA MEASUREMENTS:                      Normal Ranges: Ao Sinus, d: 3.50 cm (2.1-3.5cm) Asc Ao, d:   3.70 cm (2.1-3.4cm) LV SYSTOLIC FUNCTION BY 2D PLANIMETRY (MOD):                     Normal Ranges: EF-A4C View: 60.4 % (>=55%) EF-A2C View: 51.9 % EF-Biplane:  57.2 % LV DIASTOLIC FUNCTION:                               Normal Ranges: MV Peak E:        0.68 m/s    (0.7-1.2 m/s) MV Peak A:        0.94 m/s    (0.42-0.7 m/s) E/A Ratio:        0.73        (1.0-2.2) MV e'             0.06 m/s    (>8.0) MV lateral e'     0.06 m/s MV medial e'      0.06 m/s E/e' Ratio:       12.42       (<8.0) PulmV Sys Quincy:    42.60 cm/s PulmV Solis Quincy:   38.00 cm/s PulmV S/D Quincy:    1.00 PulmV A Revs Quincy: 30.30 cm/s PulmV A Revs Dur: 103.00 msec MITRAL VALVE:                 Normal Ranges: MV DT: 186 msec (150-240msec) AORTIC VALVE:                         Normal Ranges: AoV Vmax:      1.19 m/s (<=1.7m/s) AoV Peak P.7 mmHg (<20mmHg) LVOT Max Quincy:  0.94 m/s (<=1.1m/s) LVOT VTI:      18.00 cm LVOT  Diameter: 2.00 cm  (1.8-2.4cm) AoV Area,Vmax: 2.48 cm2 (2.5-4.5cm2)  RIGHT VENTRICLE: RV Basal 3.40 cm RV Mid   1.80 cm RV Major 5.2 cm TAPSE:   15.9 mm RV s'    0.16 m/s TRICUSPID VALVE/RVSP:                           Normal Ranges: Est. RA Pressure: 3 mmHg IVC Diam:         1.56 cm PULMONIC VALVE:                         Normal Ranges: PV Accel Time: 114 msec (>120ms) PV Max Quincy:    1.0 m/s  (0.6-0.9m/s) PV Max PG:     3.7 mmHg Pulmonary Veins: PulmV A Revs Dur: 103.00 msec PulmV A Revs Quincy: 30.30 cm/s PulmV Solis Quincy:   38.00 cm/s PulmV S/D Quincy:    1.00 PulmV Sys Quincy:    42.60 cm/s  [id number] Omi Hurst MD Electronically signed on 10/2/2023 at 10:35:18 AM  ** Final **           Procedure/Surgeon: Dr. Eileen Rose  Out of OR Time (document on ventilator card): 1237     OR Course/Issues: NA     CPB time: NA  Cross clamp time: NA  Circ arrest time: NA  Echo Pre/Post: both normal   Chest Tubes/Drains: L plural    Temporary wires location/setting: NA      Fluids  Crystalloid: 1700  Colloid: 250  Cellsaver: NA   Products: na  EBL: 150   UOP: 275     Anesthesia  Intubation: EASY MAC 4 GRADE 1  Intravenous Access: R 16g  L 20g   AICD: NA  PPM: NA  Regional anesthesia: NA  Opioid dose/last administration: 650mcg fentanyl total @1041  NMB dose/last administration: 140mg rocuronium total @ 1138  TOF/ reversal given: NA  Antibiotic time: NA  Temperature on admission to ICU: 35.4    Past Medical History:   Diagnosis Date    Chronic kidney disease     on PD    Diabetes mellitus (CMS/HCC)     Hypertension      Past Surgical History:   Procedure Laterality Date    CARPAL TUNNEL RELEASE  2018    SPINAL FUSION  09/2015    7 fused vertebrae    TONSILLECTOMY  1945    US GUIDED PERCUTANEOUS BIOPSY RENAL LEFT Left 11/30/2022    US GUIDED PERCUTANEOUS BIOPSY RENAL LEFT 11/30/2022     Medications Prior to Admission   Medication Sig Dispense Refill Last Dose    acetaminophen (Tylenol) 500 mg tablet Take 2 tablets (1,000 mg) by mouth every 6 hours  if needed for mild pain (1 - 3).       amLODIPine (Norvasc) 10 mg tablet Take 1 tablet (10 mg) by mouth once daily.       atorvastatin (Lipitor) 10 mg tablet Take 1 tablet (10 mg) by mouth once daily.       b complex vitamins capsule Take 1 capsule by mouth once daily.       cholecalciferol (Vitamin D-3) 25 MCG (1000 UT) tablet Take 1 tablet (25 mcg) by mouth once daily.       dulaglutide (Trulicity) 1.5 mg/0.5 mL pen injector injection Inject 1.5 mg under the skin 1 (one) time per week. Tuesdays       DULoxetine (Cymbalta) 30 mg DR capsule Take 1 capsule (30 mg) by mouth once daily. Do not crush or chew.       glucosamine HCl 1,500 mg tablet Take 1 tablet by mouth 2 times a day.       hydrALAZINE (Apresoline) 25 mg tablet Take 1 tablet (25 mg) by mouth 3 times a day. Pt reported Not taking anymore       insulin detemir (Levemir U-100 Insulin) 100 unit/mL injection Inject 25 Units under the skin once daily. Take as directed per insulin instructions.       levothyroxine (Synthroid, Levoxyl) 25 mcg tablet Take 1 tablet (25 mcg) by mouth once daily in the morning. Take before meals.       loratadine-pseudoephedrine (Claritin-D 24-hour)  mg 24 hr tablet Take 1 tablet by mouth once daily as needed for allergies. Do not crush, chew, or split.       multivitamin tablet Take 1 tablet by mouth once daily.       sennosides-docusate sodium (Diana-Colace) 8.6-50 mg tablet Take 2 tablets by mouth 2 times a day. Now not taking       sodium bicarbonate 650 mg tablet Take 1 tablet (650 mg) by mouth 2 times a day.       torsemide (Demadex) 100 mg tablet Take 0.5 tablets (50 mg) by mouth once daily.       vit B-min-saw palmetto-[place] (Urinozinc Prostate Classic) 320-25 mg capsule Take 1 tablet by mouth once daily.   9/29/2023     Opioids - morphine analogues  Social History     Tobacco Use    Smoking status: Never    Smokeless tobacco: Never   Substance Use Topics    Alcohol use: Never     Family History   Problem Relation Name  Age of Onset    Cancer Brother      Diabetes type I Brother           Scheduled Medications:   [MAR Hold] amLODIPine, 10 mg, oral, Daily  [MAR Hold] aspirin, 81 mg, oral, Daily  [MAR Hold] atorvastatin, 80 mg, oral, Daily  [MAR Hold] B complex-vitamin C-folic acid, 1 capsule, oral, Daily  [MAR Hold] cholecalciferol, 25 mcg, oral, Daily  [MAR Hold] darbepoetin alyssa, 40 mcg, intravenous, Weekly  [MAR Hold] dextrose 1.5% - LOW calcium 2.5mEq/L (Dianeal, Delflex-LC) in 3,000 mL peritoneal dialysate, , intraperitoneal, Once   Followed by  [MAR Hold] dextrose 1.5% - LOW calcium 2.5mEq/L (Dianeal, Delflex-LC) in 3,000 mL peritoneal dialysate, , intraperitoneal, Once  [MAR Hold] DULoxetine, 30 mg, oral, Daily  [MAR Hold] fluticasone, 2 spray, Each Nostril, Daily  gentamicin, , Topical, Daily  [MAR Hold] insulin detemir, 5 Units, subcutaneous, q24h  [MAR Hold] insulin lispro, 0-5 Units, subcutaneous, q4h  [Held by provider] insulin lispro, 3 Units, subcutaneous, Daily with breakfast  [Held by provider] insulin lispro, 5 Units, subcutaneous, Daily with lunch  [Held by provider] insulin lispro, 5 Units, subcutaneous, Daily with evening meal  [MAR Hold] levothyroxine, 25 mcg, oral, Daily  [MAR Hold] sennosides, 2 tablet, oral, BID  torsemide, 50 mg, oral, Daily         Continuous Medications:   dextrose 1.5% - LOW calcium 2.5mEq/L (Dianeal, Delflex-LC) in 3,000 mL peritoneal dialysate,   heparin, 0-4,000 Units/hr, Last Rate: 1,200 Units/hr (10/11/23 0410)         PRN Medications:   PRN medications: [MAR Hold] acetaminophen, [MAR Hold] dextrose 10 % in water (D10W), [MAR Hold] dextrose, [MAR Hold] glucagon, [MAR Hold] heparin    Objective   Vitals:  Most Recent:  Vitals:    10/11/23 1245   BP: 132/52   Pulse: 68   Resp: 16   Temp: 35.9 °C (96.6 °F)   SpO2: 100%       24hr Min/Max:  Temp  Min: 35.9 °C (96.6 °F)  Max: 36.8 °C (98.3 °F)  Pulse  Min: 68  Max: 87  BP  Min: 116/73  Max: 132/52  Resp  Min: 16  Max: 18  SpO2  Min: 93 %   Max: 100 %    I/O:  I/O last 2 completed shifts:  In: 480 (6.1 mL/kg) [P.O.:480]  Out: 1300 (16.5 mL/kg) [Urine:1300 (0.7 mL/kg/hr)]  Weight: 79 kg     Hemodynamic parameters for last 24 hours:         Vent settings:  Vent Mode: Volume control/assist control  FiO2 (%):  [50 %] 50 %  S RR:  [16] 16  S VT:  [570 mL] 570 mL  PEEP/CPAP (cm H2O):  [0 cm H20-8 cm H20] 8 cm H20  MAP (cm H2O):  [12] 12    LDA:  CVC 10/11/23 Single lumen Right Internal jugular (Active)   Placement Date/Time: 10/11/23 (c) 0920   Hand Hygiene Performed Prior to CVC Insertion: Yes  Site Prep: Chlorhexidine   Site Prep Agent has Completely Dried Before Insertion: Yes  All 5 Sterile Barriers Used (Gloves, Gown, Cap, Mask, Large Sterile Dalila...   Number of days: 0       Arterial Line 10/11/23 Left Brachial (Active)   Placement Date/Time: 10/11/23 (c) 0840   Size: 20 G  Orientation: Left  Location: Brachial  Securement Method: Transparent dressing  Patient Tolerance: Tolerated well   Number of days: 0       ETT  39 Fr (Active)   Placement Date/Time: 10/11/23 0858   Technique: Direct laryngoscopy  ETT Type: ETT - double lumen left  Double Lumen Tube Size: 39 Fr  Cuffed: Yes  Laryngoscope: Marciano  Blade Size: 4  Location: Oral  Grade View: Full view of the glottis  Airway In...   Number of days: 0       Urethral Catheter Temperature probe;Non-latex 14 Fr. (Active)   Placement Date/Time: 10/11/23 0909   Placed by: Fernandezte  Hand Hygiene Completed: Yes  Catheter Type: Temperature probe;Non-latex  Tube Size (Fr.): 14 Fr.  Urine Returned: Yes   Number of days: 0       Chest Tube 1 Left Pleural 28 Fr (Active)   Placement Date/Time: 10/11/23 1133   Placed by: HALEIGH Espana  Tube Number: 1  Chest Tube Orientation: Left  Chest Tube Location: Pleural  Chest Tube Drain Tube Size (Fr): 28 Fr  Chest Tube Drainage System: Suction  Number of Sutures Placed: 1   Number of days: 0         Physical Exam:   Physical Exam  Constitutional:       Comments: Intubated, sedated     Eyes:      Pupils: Pupils are equal, round, and reactive to light.   Cardiovascular:      Rate and Rhythm: Normal rate and regular rhythm.      Pulses: Normal pulses.      Heart sounds: Normal heart sounds.   Pulmonary:      Breath sounds: Normal breath sounds.      Comments: Vc-ac  Abdominal:      General: There is no distension.      Palpations: Abdomen is soft.   Genitourinary:     Comments: Bloody leakage urethra   Musculoskeletal:      Comments: Intubated, sedated   Neurological:      Comments: Intubated, sedated     Psychiatric:      Comments: Intubated, sedated               Lab/Radiology/Diagnostic Review:  Results for orders placed or performed during the hospital encounter of 09/30/23 (from the past 24 hour(s))   POCT GLUCOSE   Result Value Ref Range    POCT Glucose 199 (H) 74 - 99 mg/dL   POCT GLUCOSE   Result Value Ref Range    POCT Glucose 295 (H) 74 - 99 mg/dL   POCT GLUCOSE   Result Value Ref Range    POCT Glucose 275 (H) 74 - 99 mg/dL   POCT GLUCOSE   Result Value Ref Range    POCT Glucose 176 (H) 74 - 99 mg/dL   Prepare RBC: 4 Units   Result Value Ref Range    PRODUCT CODE E3179A43     Unit Number [id number]     Unit ABO O     Unit RH NEG     XM INTEP COMP     Dispense Status XM     Blood Expiration Date October 19, 2023 23:59 EDT     PRODUCT BLOOD TYPE 9500     UNIT VOLUME 350     PRODUCT CODE D7036R08     Unit Number [id number]     Unit ABO O     Unit RH NEG     XM INTEP COMP     Dispense Status XM     Blood Expiration Date October 19, 2023 23:59 EDT     PRODUCT BLOOD TYPE 9500     UNIT VOLUME 350     PRODUCT CODE G8808R04     Unit Number [id number]     Unit ABO O     Unit RH NEG     XM INTEP COMP     Dispense Status XM     Blood Expiration Date October 21, 2023 23:59 EDT     PRODUCT BLOOD TYPE 9500     UNIT VOLUME 400     PRODUCT CODE M7092P93     Unit Number [id number]     Unit ABO O     Unit RH NEG     XM INTEP COMP     Dispense Status XM     Blood Expiration Date October  21, 2023 23:59 EDT     PRODUCT BLOOD TYPE 9500     UNIT VOLUME 400    Blood Gas Arterial Full Panel Unsolicited   Result Value Ref Range    POCT pH, Arterial 7.46 (H) 7.38 - 7.42 pH    POCT pCO2, Arterial 38 38 - 42 mm Hg    POCT pO2, Arterial 80 (L) 85 - 95 mm Hg    POCT SO2, Arterial 98 94 - 100 %    POCT Oxy Hemoglobin, Arterial 96.1 94.0 - 98.0 %    POCT Hematocrit Calculated, Arterial 28.0 (L) 41.0 - 52.0 %    POCT Sodium, Arterial 133 (L) 136 - 145 mmol/L    POCT Potassium, Arterial 4.8 3.5 - 5.3 mmol/L    POCT Chloride, Arterial 102 98 - 107 mmol/L    POCT Ionized Calcium, Arterial 1.19 1.10 - 1.33 mmol/L    POCT Glucose, Arterial 141 (H) 74 - 99 mg/dL    POCT Lactate, Arterial 1.3 0.4 - 2.0 mmol/L    POCT Base Excess, Arterial 3.0 -2.0 - 3.0 mmol/L    POCT HCO3 Calculated, Arterial 27.0 (H) 22.0 - 26.0 mmol/L    POCT Hemoglobin, Arterial 9.4 (L) 13.5 - 17.5 g/dL    POCT Anion Gap, Arterial 9 (L) 10 - 25 mmo/L    Patient Temperature 37.0 degrees Celsius    FiO2 21 %   Coox Panel, Arterial Unsolicited   Result Value Ref Range    POCT Hemoglobin, Arterial 9.4 (L) 13.5 - 17.5 g/dL    POCT Oxy Hemoglobin, Arterial 96.1 94.0 - 98.0 %    POCT Carboxyhemoglobin, Arterial 1.4 %    POCT Methemoglobin, Arterial 0.6 0.0 - 1.5 %    POCT Deoxy Hemoglobin, Arterial 1.9 0.0 - 5.0 %   TEG Clot Global Profile Unsolicited   Result Value Ref Range    R (Reaction Time) K 8.2 4.6 - 9.1 min    K (Clot Kinetics) 1.5 0.8 - 2.1 min    ANGLE 75.0 63.0 - 78.0 deg    MA (Max Amplitude) K 70.0 (H) 52.0 - 69.0 mm    R (Reaction Time) KH 6.4 4.3 - 8.3 min    MA (Max Amplitude) RT 72.0 (H) 52.0 - 70.0 mm    MA ( Prince Amplitude) FF 51.0 (H) 15.0 - 32.0 mm    FLEV 929 (H) 278 - 581 mg/dL    Test Comment baseline    Blood Gas Arterial Full Panel Unsolicited   Result Value Ref Range    POCT pH, Arterial 7.37 (L) 7.38 - 7.42 pH    POCT pCO2, Arterial 47 (H) 38 - 42 mm Hg    POCT pO2, Arterial 121 (H) 85 - 95 mm Hg    POCT SO2, Arterial 99 94 -  100 %    POCT Oxy Hemoglobin, Arterial 96.8 94.0 - 98.0 %    POCT Hematocrit Calculated, Arterial 28.0 (L) 41.0 - 52.0 %    POCT Sodium, Arterial 132 (L) 136 - 145 mmol/L    POCT Potassium, Arterial 5.0 3.5 - 5.3 mmol/L    POCT Chloride, Arterial 102 98 - 107 mmol/L    POCT Ionized Calcium, Arterial 1.17 1.10 - 1.33 mmol/L    POCT Glucose, Arterial 138 (H) 74 - 99 mg/dL    POCT Lactate, Arterial 1.6 0.4 - 2.0 mmol/L    POCT Base Excess, Arterial 1.5 -2.0 - 3.0 mmol/L    POCT HCO3 Calculated, Arterial 27.2 (H) 22.0 - 26.0 mmol/L    POCT Hemoglobin, Arterial 9.3 (L) 13.5 - 17.5 g/dL    POCT Anion Gap, Arterial 8 (L) 10 - 25 mmo/L    Patient Temperature 37.0 degrees Celsius    FiO2 100 %   Coox Panel, Arterial Unsolicited   Result Value Ref Range    POCT Hemoglobin, Arterial 9.3 (L) 13.5 - 17.5 g/dL    POCT Oxy Hemoglobin, Arterial 96.8 94.0 - 98.0 %    POCT Carboxyhemoglobin, Arterial 1.0 %    POCT Methemoglobin, Arterial 1.1 0.0 - 1.5 %    POCT Deoxy Hemoglobin, Arterial 1.1 0.0 - 5.0 %   Blood Gas Arterial Full Panel Unsolicited   Result Value Ref Range    POCT pH, Arterial 7.38 7.38 - 7.42 pH    POCT pCO2, Arterial 44 (H) 38 - 42 mm Hg    POCT pO2, Arterial 112 (H) 85 - 95 mm Hg    POCT SO2, Arterial 100 94 - 100 %    POCT Oxy Hemoglobin, Arterial 97.8 94.0 - 98.0 %    POCT Hematocrit Calculated, Arterial 26.0 (L) 41.0 - 52.0 %    POCT Sodium, Arterial 134 (L) 136 - 145 mmol/L    POCT Potassium, Arterial 5.2 3.5 - 5.3 mmol/L    POCT Chloride, Arterial 101 98 - 107 mmol/L    POCT Ionized Calcium, Arterial 1.14 1.10 - 1.33 mmol/L    POCT Glucose, Arterial 154 (H) 74 - 99 mg/dL    POCT Lactate, Arterial 1.4 0.4 - 2.0 mmol/L    POCT Base Excess, Arterial 0.7 -2.0 - 3.0 mmol/L    POCT HCO3 Calculated, Arterial 26.0 22.0 - 26.0 mmol/L    POCT Hemoglobin, Arterial 8.6 (L) 13.5 - 17.5 g/dL    POCT Anion Gap, Arterial 12 10 - 25 mmo/L    Patient Temperature 37.0 degrees Celsius    FiO2 100 %   Coox Panel, Arterial  Unsolicited   Result Value Ref Range    POCT Hemoglobin, Arterial 8.6 (L) 13.5 - 17.5 g/dL    POCT Oxy Hemoglobin, Arterial 97.8 94.0 - 98.0 %    POCT Carboxyhemoglobin, Arterial 1.3 %    POCT Methemoglobin, Arterial 0.4 0.0 - 1.5 %    POCT Deoxy Hemoglobin, Arterial 0.5 0.0 - 5.0 %   Blood Gas Arterial Full Panel Unsolicited   Result Value Ref Range    POCT pH, Arterial 7.38 7.38 - 7.42 pH    POCT pCO2, Arterial 45 (H) 38 - 42 mm Hg    POCT pO2, Arterial 71 (L) 85 - 95 mm Hg    POCT SO2, Arterial 96 94 - 100 %    POCT Oxy Hemoglobin, Arterial 93.8 (L) 94.0 - 98.0 %    POCT Hematocrit Calculated, Arterial 27.0 (L) 41.0 - 52.0 %    POCT Sodium, Arterial 134 (L) 136 - 145 mmol/L    POCT Potassium, Arterial 5.3 3.5 - 5.3 mmol/L    POCT Chloride, Arterial 101 98 - 107 mmol/L    POCT Ionized Calcium, Arterial 1.15 1.10 - 1.33 mmol/L    POCT Glucose, Arterial 182 (H) 74 - 99 mg/dL    POCT Lactate, Arterial 0.9 0.4 - 2.0 mmol/L    POCT Base Excess, Arterial 1.2 -2.0 - 3.0 mmol/L    POCT HCO3 Calculated, Arterial 26.6 (H) 22.0 - 26.0 mmol/L    POCT Hemoglobin, Arterial 8.9 (L) 13.5 - 17.5 g/dL    POCT Anion Gap, Arterial 12 10 - 25 mmo/L    Patient Temperature 37.0 degrees Celsius    FiO2 100 %   Coox Panel, Arterial Unsolicited   Result Value Ref Range    POCT Hemoglobin, Arterial 8.9 (L) 13.5 - 17.5 g/dL    POCT Oxy Hemoglobin, Arterial 93.8 (L) 94.0 - 98.0 %    POCT Carboxyhemoglobin, Arterial 1.4 %    POCT Methemoglobin, Arterial 0.7 0.0 - 1.5 %    POCT Deoxy Hemoglobin, Arterial 4.1 0.0 - 5.0 %   TEG Clot Global Profile Unsolicited   Result Value Ref Range    R (Reaction Time) K 6.6 4.6 - 9.1 min    K (Clot Kinetics) 1.1 0.8 - 2.1 min    ANGLE 77.0 63.0 - 78.0 deg    MA (Max Amplitude) K 70.0 (H) 52.0 - 69.0 mm    R (Reaction Time) KH 6.5 4.3 - 8.3 min    MA (Max Amplitude) RT 71.0 (H) 52.0 - 70.0 mm    MA ( Prince Amplitude) FF 44.0 (H) 15.0 - 32.0 mm    FLEV 807 (H) 278 - 581 mg/dL    Test Comment post protamine     Blood Gas Arterial Full Panel Unsolicited   Result Value Ref Range    POCT pH, Arterial 7.40 7.38 - 7.42 pH    POCT pCO2, Arterial 43 (H) 38 - 42 mm Hg    POCT pO2, Arterial 363 (H) 85 - 95 mm Hg    POCT SO2, Arterial 98 94 - 100 %    POCT Oxy Hemoglobin, Arterial 97.2 94.0 - 98.0 %    POCT Hematocrit Calculated, Arterial 25.0 (L) 41.0 - 52.0 %    POCT Sodium, Arterial 134 (L) 136 - 145 mmol/L    POCT Potassium, Arterial 4.8 3.5 - 5.3 mmol/L    POCT Chloride, Arterial 102 98 - 107 mmol/L    POCT Ionized Calcium, Arterial 1.14 1.10 - 1.33 mmol/L    POCT Glucose, Arterial 164 (H) 74 - 99 mg/dL    POCT Lactate, Arterial 1.0 0.4 - 2.0 mmol/L    POCT Base Excess, Arterial 1.6 -2.0 - 3.0 mmol/L    POCT HCO3 Calculated, Arterial 26.6 (H) 22.0 - 26.0 mmol/L    POCT Hemoglobin, Arterial 8.3 (L) 13.5 - 17.5 g/dL    POCT Anion Gap, Arterial 10 10 - 25 mmo/L    Patient Temperature 37.0 degrees Celsius    FiO2 100 %   Coox Panel, Arterial Unsolicited   Result Value Ref Range    POCT Hemoglobin, Arterial 8.3 (L) 13.5 - 17.5 g/dL    POCT Oxy Hemoglobin, Arterial 97.2 94.0 - 98.0 %    POCT Carboxyhemoglobin, Arterial 0.0 %    POCT Methemoglobin, Arterial 0.4 0.0 - 1.5 %    POCT Deoxy Hemoglobin, Arterial 2.4 0.0 - 5.0 %     Electrocardiogram 12 Lead    Result Date: 10/5/2023  Normal sinus rhythm Cannot rule out Anterior infarct , age undetermined Abnormal ECG When compared with ECG of 20-SEP-2023 17:24, PREVIOUS ECG IS PRESENT Confirmed by Dilcia Santos ([id number]) on 10/5/2023 9:28:23 PM    XR chest 2 views    Addendum Date: 10/3/2023    Interpreted By:  Juvenal Antonio, ADDENDUM: Juvenal Antonio discussed the significance and urgency of this critical finding via encrypted message with Loulou Thayer on 10/01/2023 at 4:48 p.mKelsie THAYER on 10/3/2023 at 3:48 pm.  (**-RCF-**) Findings: See findings.     Signed by: Juvenal Antonio 10/3/2023 3:49 PM   -------- ORIGINAL REPORT -------- Dictation workstation:   [id number]    Result Date:  10/3/2023  Interpreted By:  Juvenal Antonio, STUDY: XR CHEST 2 VIEWS;  10/1/2023 1:41 pm   INDICATION: Signs/Symptoms:preop cardiac surgery.   COMPARISON: Exam dated 09/19/2023   ACCESSION NUMBER(S): [id number]   ORDERING CLINICIAN: NAIN MORE   FINDINGS: PA and lateral radiographs of the chest were provided.  Additional PA dual energy images were also provided.   Partially visualized lower cervical spine hardware.   CARDIOMEDIASTINAL SILHOUETTE: Cardiomediastinal silhouette is normal in size and configuration.   LUNGS: Stable prominent interstitial markings which likely correlate to areas of known emphysema and chronic lung disease as seen on prior CT. No new areas of focal consolidation. Stable blunting of the left costophrenic angle, likely due to scarring. No obvious pleural effusion. No pneumothorax.   ABDOMEN: Curvilinear lucency projecting under the right hemidiaphragm is consistent with intraperitoneal free air.   BONES: No acute osseous changes.       1.  Chronic lung findings without radiographic evidence of acute cardiopulmonary process. 2. Curvilinear lucency below the right hemidiaphragm, consistent with intraperitoneal free air. This is most likely due to the patient's peritoneal dialysis. Correlate with clinical findings of abdominal pain or concern for perforated viscus. 3. Juvenal Antonio discussed the significance and urgency of this critical finding encrypted message with  NAIN MORE on 10/1/2023 at 4:48 pm.  (**-RCF-**) Findings:  Curvilinear lucency below the right hemidiaphragm, consistent with intraperitoneal free air. This is most likely due to the patient's peritoneal dialysis. Correlate with clinical findings of abdominal pain or concern for perforated viscus.           MACRO: None   Signed by: Juvenal Antonio 10/1/2023 4:48 PM Dictation workstation:   [id number]    Vascular US carotid artery duplex bilateral    Result Date: 10/2/2023            [address]  [place] [id number]   Tel [phone] and Fax [phone]  Vascular Lab Report Carotid Artery Duplex Ultrasound  Patient Name:     MAGGIE KIRAN      Reading           [id number] Kris Carroll                                       Physician:        MD Study Date:       10/2/2023           Ordering          [id number] NAIN MORE                                       Provider: MRN/PID:          [id number]            Technologist:     Sheila Adnerson [facility] Accession#:       [id number]        Technologist 2: Date of           1940 / 83      Encounter#:       [id number] Birth/Age:        years Gender:           M Admission Status: Inpatient           Location          [facility]                                       Performed:  Diagnosis/ICD: Encounter for other preprocedural examination-Z01.818; Other                specified peripheral vascular diseases-I73.89  CONCLUSIONS: Right Carotid: Findings are consistent with less than 50% stenosis of the right proximal internal carotid artery. Right external carotid artery appears patent with no evidence of stenosis. No evidence of hemodynamically significant stenosis of the right common carotid artery. The right vertebral artery is patent with antegrade flow. No evidence of hemodynamically significant stenosis in the right subclavian artery. Left Carotid: Findings are consistent with less than 50% stenosis of the left proximal internal carotid artery. Left external carotid artery appears patent with no evidence of stenosis. No evidence of hemodynamically significant stenosis of the left common carotid artery. The left vertebral artery is patent with antegrade flow. No evidence of hemodynamically significant stenosis in the left subclavian artery.  Imaging & Doppler Findings: Right Plaque Morph: The proximal right internal carotid artery demonstrates calcified and heterogenous plaque. The proximal right external carotid artery demonstrates heterogenous and  calcified plaque. The right carotid bulb demonstrates calcified and heterogenous plaque. Left Plaque Morph: The proximal left internal carotid artery demonstrates heterogenous and calcified plaque. The left carotid bulb demonstrates calcified and heterogenous plaque.   Right                        Left   PSV      EDV                PSV      EDV 73 cm/s            CCA P    68 cm/s 66 cm/s            CCA D    74 cm/s 72 cm/s  9 cm/s    ICA P    104 cm/s 11 cm/s 85 cm/s  13 cm/s   ICA M    91 cm/s  16 cm/s 79 cm/s  21 cm/s   ICA D    70 cm/s  13 cm/s 96 cm/s             ECA     93 cm/s 42 cm/s  13 cm/s Vertebral  75 cm/s  12 cm/s 134 cm/s         Subclavian 84 cm/s               Right Left ICA/CCA Ratio  1.1  1.4   [id number] Kris Carroll MD Electronically signed by [id number] Kris Carroll MD on 10/2/2023 at 8:31:29 PM  ** Final **     Vascular US lower extremity vein mapping bilateral    Result Date: 10/2/2023            [place] [address]   Tel [phone] and Fax [phone]  Vascular Lab Report Pre-op Vein Mapping Lower  Patient Name:     MAGGEI KIRAN      Reading           [id number] Kris Carroll                                       Physician:        MD Study Date:       10/2/2023           Ordering          [id number] NAIN MORE                                       Provider: MRN/PID:          [id number]            Technologist:     Sheila Anderson T Accession#:       [id number]        Technologist 2: Date of           1940 / 83      Encounter#:       [id number] Birth/Age:        years Gender:           M Admission Status: Inpatient           Location          [facility]                                       Performed:  Diagnosis/ICD: Encounter for other preprocedural examination-Z01.818; Peripheral                vascular disease, unspecified-I73.9; Other specified symptoms and                signs involving the circulatory and respiratory  systems-R09.89  CONCLUSIONS: Right Lower Vein Mapping: Right lower extremity vein: The right great saphenous vein in the thigh appears widely patent with no evidence of thrombosis or fibrosis. Left Lower Vein Mapping: Left lower extremity vein: The left great saphenous vein appears widely patent with no evidence of thrombosis or fibrosis.  Imaging & Doppler Findings:  Right          Compress Thrombus  Diam SFJ              Yes      None   6.6 mm Prox Thigh GSV   Yes      None   2.1 mm Mid Thigh GSV    Yes      None   2.6 mm Knee GSV         Yes      None   2.3 mm Prox Calf GSV    Yes    Fibrotic 1.3 mm Mid Calf GSV     Yes    Fibrotic 1.2 mm Dist Calf GSV    Yes    Fibrotic 1.7 mm  Left           Compress Thrombus  Diam SFJ              Yes      None   4.9 mm Prox Thigh GSV   Yes      None   3.3 mm Mid Thigh GSV    Yes      None   2.9 mm Knee GSV         Yes      None   2.4 mm Prox Calf GSV    Yes      None   2.0 mm Mid Calf GSV     Yes      None   1.6 mm Dist Calf GSV    Yes      None   2.0 mm SSV Prox         Yes SSV Mid          Yes SSV Distal       Yes  [id number] Kris Carroll MD Electronically signed by [id number] Kris Carroll MD on 10/2/2023 at 8:25:54 PM  ** Final **     Transthoracic Echo (TTE) Complete    Result Date: 10/2/2023   [facility], [address] [place] [address]                Tel [phone] and Fax [phone] TRANSTHORACIC ECHOCARDIOGRAM REPORT  Patient Name:      MAGGIE Mcmahan Physician:    [id number] Omi Hurst MD Study Date:        10/2/2023            Ordering Provider:    [id number] NAIN MORE MRN/PID:           [id number]             Fellow: Accession#:        [id number]         Nurse: Date of Birth/Age: 1940 / 83 years Sonographer:          Trena MUIR Gender:            M                    Additional Staff: Height:            172.00               Admit Date:           9/18/2023  Weight:            80.29                Admission Status:     Inpatient -                                                               Routine BSA:               1.93 m2              Encounter#:           [id number]                                         Department Location:  [facility] Non                                                               Invasive Blood Pressure: 148 /75 mmHg Study Type:    TRANSTHORACIC ECHO (TTE) COMPLETE Diagnosis/ICD: 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 Previous effusion. Study Detail: The following Echo studies were performed: 2D, M-Mode, Doppler and               color flow. Technically challenging study due to patient lying in               supine position and Neck brace. Unable to obtain suprasternal               notch view.  PHYSICIAN INTERPRETATION: Left Ventricle: The left ventricular systolic function is normal, with an estimated ejection fraction of 55-60%. There are no regional wall motion abnormalities. The left ventricular cavity size is normal. The left ventricular septal wall thickness is mildly increased. There is left ventricular concentric remodeling. Spectral Doppler shows a normal pattern of left ventricular diastolic filling. Left Atrium: The left atrium is normal in size. Right Ventricle: The right ventricle is normal in size. There is normal right ventricular global systolic function. Right Atrium: The right atrium is normal in size. Aortic Valve: The aortic valve is trileaflet. There is minimal aortic valve cusp calcification. There is no evidence of aortic valve regurgitation. The peak instantaneous gradient of the aortic valve is 5.7 mmHg. Mitral Valve: The mitral valve is normal in structure. There is trace to mild mitral valve regurgitation. Tricuspid Valve: The tricuspid valve is  structurally normal. There is trace to mild tricuspid regurgitation. The right ventricular systolic pressure is unable to be estimated. Pulmonic Valve: The pulmonic valve is structurally normal. There is trace pulmonic valve regurgitation. Pericardium: There is a small pericardial effusion. Aorta: The aortic root is normal. The Ao Sinus is 3.50 cm. The Asc Ao is 3.70 cm. There is mild dilatation of the ascending aorta. Systemic Veins: The inferior vena cava appears to be of normal size. In comparison to the previous echocardiogram(s): Compared with study from 9/25/2023, the pericardial effusion size has decreased (was mild to moderate).  CONCLUSIONS:  1. Left ventricular systolic function is normal with a 55-60% estimated ejection fraction.  2. There is a small pericardial effusion. QUANTITATIVE DATA SUMMARY: 2D MEASUREMENTS:                           Normal Ranges: Ao Root d:     3.50 cm    (2.0-3.7cm) LAs:           3.10 cm    (2.7-4.0cm) IVSd:          1.30 cm    (0.6-1.1cm) LVPWd:         1.00 cm    (0.6-1.1cm) LVIDd:         4.70 cm    (3.9-5.9cm) LVIDs:         3.30 cm LV Mass Index: 103.2 g/m2 LV % FS        29.8 % LA VOLUME:                               Normal Ranges: LA Vol A4C:        33.0 ml    (22+/-6mL/m2) LA Vol A2C:        40.1 ml LA Vol BP:         38.4 ml LA Vol Index A4C:  17.1ml/m2 LA Vol Index A2C:  20.7 ml/m2 LA Vol Index BP:   19.8 ml/m2 LA Area A4C:       13.8 cm2 LA Area A2C:       14.4 cm2 LA Major Axis A4C: 4.9 cm LA Major Axis A2C: 4.4 cm LA Volume Index:   19.8 ml/m2 RA VOLUME BY A/L METHOD:                               Normal Ranges: RA Vol A4C:        19.3 ml    (8.3-19.5ml) RA Vol Index A4C:  10.0 ml/m2 RA Area A4C:       9.3 cm2 RA Major Axis A4C: 3.8 cm AORTA MEASUREMENTS:                      Normal Ranges: Ao Sinus, d: 3.50 cm (2.1-3.5cm) Asc Ao, d:   3.70 cm (2.1-3.4cm) LV SYSTOLIC FUNCTION BY 2D PLANIMETRY (MOD):                     Normal Ranges: EF-A4C View: 60.4 % (>=55%)  EF-A2C View: 51.9 % EF-Biplane:  57.2 % LV DIASTOLIC FUNCTION:                               Normal Ranges: MV Peak E:        0.68 m/s    (0.7-1.2 m/s) MV Peak A:        0.94 m/s    (0.42-0.7 m/s) E/A Ratio:        0.73        (1.0-2.2) MV e'             0.06 m/s    (>8.0) MV lateral e'     0.06 m/s MV medial e'      0.06 m/s E/e' Ratio:       12.42       (<8.0) PulmV Sys Quincy:    42.60 cm/s PulmV Solis Quincy:   38.00 cm/s PulmV S/D Quincy:    1.00 PulmV A Revs Quincy: 30.30 cm/s PulmV A Revs Dur: 103.00 msec MITRAL VALVE:                 Normal Ranges: MV DT: 186 msec (150-240msec) AORTIC VALVE:                         Normal Ranges: AoV Vmax:      1.19 m/s (<=1.7m/s) AoV Peak P.7 mmHg (<20mmHg) LVOT Max Quincy:  0.94 m/s (<=1.1m/s) LVOT VTI:      18.00 cm LVOT Diameter: 2.00 cm  (1.8-2.4cm) AoV Area,Vmax: 2.48 cm2 (2.5-4.5cm2)  RIGHT VENTRICLE: RV Basal 3.40 cm RV Mid   1.80 cm RV Major 5.2 cm TAPSE:   15.9 mm RV s'    0.16 m/s TRICUSPID VALVE/RVSP:                           Normal Ranges: Est. RA Pressure: 3 mmHg IVC Diam:         1.56 cm PULMONIC VALVE:                         Normal Ranges: PV Accel Time: 114 msec (>120ms) PV Max Quincy:    1.0 m/s  (0.6-0.9m/s) PV Max PG:     3.7 mmHg Pulmonary Veins: PulmV A Revs Dur: 103.00 msec PulmV A Revs Quincy: 30.30 cm/s PulmV Solis Quincy:   38.00 cm/s PulmV S/D Quincy:    1.00 PulmV Sys Quincy:    42.60 cm/s  [id number] Omi Hurst MD Electronically signed on 10/2/2023 at 10:35:18 AM  ** Final **     CT chest wo IV contrast    Result Date: 10/1/2023  Interpreted By:  Juvenal Antonio, STUDY: CT CHEST WO IV CONTRAST;  10/1/2023 3:53 pm   INDICATION: Signs/Symptoms:pre op cadaic surgery.   COMPARISON: CT dated 2023   ACCESSION NUMBER(S): [id number]   ORDERING CLINICIAN: NAIN MORE   TECHNIQUE: Helical data acquisition of the chest was obtained  without IV contrast material.  Images were reformatted in axial, coronal, and sagittal planes.   FINDINGS: Lower cervical spine fusion hardware is  partially visualized.   LUNGS AND AIRWAYS: The trachea and central airways are patent. No endobronchial lesion. Diffuse bronchiectasis throughout the lungs.   Lower lobe predominant subpleural reticulation and cystic change with findings of right-greater-than-left lower lung honeycombing. There are superimposed areas of basilar consolidation with small bilateral pleural effusions. 0.5 cm solid nodule in the left upper lobe on series 207, image 51. Oval-shaped nodule in the anterior left upper lobe on image 126 has an average size of 0.7 cm.   MEDIASTINUM AND NATALIE, LOWER NECK AND AXILLA: The visualized thyroid gland is within normal limits.   Prominent though not pathologically enlarged mediastinal lymph nodes, likely reactive.   Esophagus appears within normal limits as seen.   HEART AND VESSELS: The thoracic aorta is of normal course and caliber mild vascular calcifications. The arch vessel branching pattern is conventional.   Dilated main pulmonary artery measuring 3.4 cm.   Severe coronary artery calcifications are seen. The study is not optimized for evaluation of coronary arteries.   The cardiac chambers are not enlarged.   Small pericardial effusion.   UPPER ABDOMEN: Multiple foci of intraperitoneal free air.   CHEST WALL AND OSSEOUS STRUCTURES: There are no suspicious osseous lesions. Multilevel degenerative changes are present. Diffuse idiopathic skeletal hyperostosis.       1.  Normal course and caliber of the thoracic aorta with mild vascular calcifications. Arch vessel branching pattern is conventional. 2. Severe coronary artery calcifications. 3. Dilated main pulmonary artery which can be seen with pulmonary hypertension. 4. Lower lobe predominant subpleural reticulation and fibrotic changes as described and consistent with underlying interstitial lung disease. Findings are not significantly changed from recent exam. Recommend pulmonary medicine consultation if not already obtained. 5. Small bilateral  pleural effusions with bibasilar consolidations, likely atelectasis. Superimposed infection or aspiration are not excluded. 6. Small pericardial effusion. Correlate with echocardiography. 7. Multiple foci of intraperitoneal free air may correlate to history of peritoneal dialysis though recommend correlation with clinical findings. This finding was communicated by Dr. Antonio to the ordering provider Loulou Thayer via encrypted message at 1746 on 10/01/2023 with acknowledgement. 8.  Incidental Finding:  Multiple solid non-calcified pulmonary nodules measuring up to 6-8 mm.  (**-YCF-**)   Instructions:  Consider follow up non contrast chest CT at 3-6 months, then consider CT chest at 18-24 months. (Dayne Arnold et al., Guidelines for management of incidental pulmonary nodules detected on CT images: From the Fleischner Society 2017, Radiology. 2017 Jul;284 (1):228-243.) [id number]   MACRO: None   Signed by: Juvenal Antonio 10/1/2023 5:09 PM Dictation workstation:   [id number]    Echocardiogram    Result Date: 9/25/2023  [facility] [address] [place] [address] Tel [phone] Fax [phone] TRANSTHORACIC ECHOCARDIOGRAM REPORT Patient Name:     MAGGIE Mcmahan Physician:  [id number] Jose Coreas MD Study Date:       9/25/2023          Referring           CHAD NIÑO Physician: MRN/PID:          [id number]           PCP: Accession/Order#: [id number]          Department          [facility] Location: YOB: 1940          Fellow: Gender:           M                  Nurse: Admit Date:       9/19/2023          Sonographer:        Sol METZ Admission Status: Inpatient -        Additional Staff: Routine Height:           175.00 cm          CC Report to: Weight:           83.00 kg           Study Type:         Echocardiogram BSA:              1.99 m2 Blood Pressure: 113 /60 mmHg Diagnosis/ICD: R07.89-Other chest pain; R06.00-Dyspnea, unspecified Indication:     chest pain, dyspnea Procedure/CPT: Echo Complete w Full Doppler-25639 Patient History: Diabetes:          Yes Pertinent History: HTN and A-Fib. pericardial effusion. Study Detail: The following Echo studies were performed: 2D and Doppler. PHYSICIAN INTERPRETATION: Left Ventricle: Left ventricular systolic function is low normal, with an estimated ejection fraction of 50-55%. The left ventricular cavity size is decreased. Spectral Doppler shows an impaired relaxation pattern of left ventricular diastolic filling. Left Atrium: The left atrium was not assessed. Right Ventricle: The right ventricle was not assessed. Right ventricular systolic function not assessed. Right Atrium: The right atrium was not assessed. Aortic Valve: The aortic valve was not assessed. Aortic valve regurgitation was not assessed. Mitral Valve: The mitral valve is mildly thickened. Mitral valve regurgitation was not assessed. Tricuspid Valve: The tricuspid valve is structurally normal. Tricuspid regurgitation was not assessed. Pulmonic Valve: The pulmonic valve was not assessed. The pulmonic valve regurgitation was not assessed. Pericardium: There is a small to moderate pericardial effusion. The effusion is circumferential. There is no evidence of cardiac tamponade. Aorta: The aortic root was not assessed. In comparison to the previous echocardiogram(s): Prior examinations are available and were reviewed for comparison purposes. Compared to previous study from 9/21/23, no significant change. CONCLUSIONS: 1. Left ventricular systolic function is low normal with a 50-55% estimated ejection fraction. 2. Spectral Doppler shows an impaired relaxation pattern of left ventricular diastolic filling. 3. Small to moderate pericardial effusion. 4. There is no evidence of cardiac tamponade. QUANTITATIVE DATA SUMMARY: 2D MEASUREMENTS: Normal Ranges: IVSd:          1.24 cm   (0.6-1.1cm) LVPWd:         1.15 cm   (0.6-1.1cm) LVIDd:         4.30 cm   (3.9-5.9cm)  LVIDs:         2.82 cm LV Mass Index: 92.4 g/m2 LV % FS        34.4 % LV SYSTOLIC FUNCTION BY 2D PLANIMETRY (MOD): Normal Ranges: EF-A4C View: 54.1 % (>=55%) EF-A2C View: 53.2 % EF-Biplane:  49.1 % [id number] Jose Coreas MD Electronically signed on 9/25/2023 at 8:14:04 PM  Final      [facility]-Cardiac Catheterization Lab Procedures IMPORT    Result Date: 9/23/2023  Images were obtained outside of [facility]    Electrocardiogram 12 Lead    Result Date: 9/23/2023  ** * Pediatric ECG analysis * ** Sinus bradycardia Left axis deviation Low voltage QRS Borderline Prolonged QT PEDIATRIC ANALYSIS - MANUAL COMPARISON REQUIRED When compared with ECG of 19-SEP-2023 22:03, PREVIOUS ECG IS PRESENT Confirmed by Dilcia Santos ([id number]) on 9/23/2023 1:37:07 PM    Electrocardiogram 12 Lead    Result Date: 9/23/2023  Normal sinus rhythm Anterolateral infarct (cited on or before 19-SEP-2023) Abnormal ECG When compared with ECG of 19-SEP-2023 21:26, (unconfirmed) Serial changes of evolving Anterior infarct Confirmed by Jessica Juarez ([id number]) on 9/23/2023 1:02:43 PM    Electrocardiogram 12 Lead    Result Date: 9/23/2023  Normal sinus rhythm Anterolateral infarct (cited on or before 19-SEP-2023) Abnormal ECG When compared with ECG of 19-SEP-2023 16:33, (unconfirmed) Sinus rhythm has replaced Atrial fibrillation Vent. rate has decreased BY  49 BPM Serial changes of evolving Anterior infarct Present Serial changes of evolving Anterolateral infarct Present Confirmed by Jessica Juarez ([id number]) on 9/23/2023 1:02:23 PM    Electrocardiogram 12 Lead    Result Date: 9/23/2023  Atrial fibrillation with rapid ventricular response Possible Anterolateral infarct , age undetermined ST ELEVATION, RULE OUT ANTERIOR INJURY OR ACUTE INFARCT Abnormal ECG No previous ECGs available Confirmed by Jessica Juarez ([id number]) on 9/23/2023 12:53:40 PM    Echocardiogram    Result Date: 9/22/2023  [facility] [address] [facility], [facility]  [address] Tel [phone] Fax [phone] TRANSTHORACIC ECHOCARDIOGRAM REPORT Patient Name:     MAGGIE KIRAN      Reading Physician:   [id number] Ervin Pearl MD Study Date:       9/21/2023           Referring Physician: ERVIN PEARL MRN/PID:          [id number]            PCP: Accession/Order#: [id number]           Department Location: [facility] Echo Lab YOB: 1940           Fellow: Gender:           M                   Nurse: Admit Date:       9/20/2023           Sonographer:         Sol METZ Admission Status: Inpatient - Routine Additional Staff: Height:           175.00 cm           CC Report to: Weight:           78.00 kg            Study Type:          Echocardiogram BSA:              1.94 m2 Blood Pressure: 113 /65 mmHg Diagnosis/ICD: I31.39-Other pericardial effusion (noninflammatory) Indication:    pericardial effusion Procedure/CPT: Echo Limited-27219 Patient History: Pertinent History: CAD and HTN. cardiac stent. Study Detail: The following Echo studies were performed: 2D, M-Mode, Doppler and color flow. PHYSICIAN INTERPRETATION: Left Ventricle: Left ventricular systolic function is low normal. Wall motion is abnormal. The left ventricular cavity size is upper limits of normal. There is mild concentric left ventricular hypertrophy. Spectral Doppler shows an impaired relaxation pattern of left ventricular diastolic filling. Left Atrium: The left atrium was not assessed. Right Ventricle: The right ventricle was not assessed. Right ventricular systolic function not assessed. Right Atrium: The right atrium was not assessed. Aortic Valve: The aortic valve was not assessed. Aortic valve regurgitation was not assessed. Mitral Valve: The mitral valve was not assessed. Mitral valve regurgitation was not assessed. Tricuspid Valve: The tricuspid valve was not assessed. Tricuspid regurgitation was not assessed. Pulmonic Valve: The pulmonic valve was not assessed. The pulmonic valve regurgitation was not  assessed. Pericardium: There is a small to moderate pericardial effusion. Aorta: The aortic root was not assessed. CONCLUSIONS: 1. Left ventricular systolic function is low normal. 2. Spectral Doppler shows an impaired relaxation pattern of left ventricular diastolic filling. 3. Small to moderate pericardial effusion. 4. Small to moderate pericardial effusion may have improved a little compared to prior echo of 9/20/2023. 5. No evidence of tamponade. QUANTITATIVE DATA SUMMARY: LV SYSTOLIC FUNCTION BY 2D PLANIMETRY (MOD): Normal Ranges: EF-A4C View: 50.9 % (>=55%) EF-A2C View: 50.8 % EF-Biplane:  52.1 % [id number] Ervin Pearl MD Electronically signed on 9/22/2023 at 3:47:46 PM  Final      Adult Cath    Result Date: 9/20/2023  [facility] Cath Lab [address] [place] [address] Cardiovascular Catheterization Report Patient Name:     MAGGIE KIRAN Performing Physician: Nasir Pearl MD Study Date:       9/20/2023      Verifying Physician:  Nasir Pearl MD MRN/PID:          [id number]       Cardiologist: Accession/Order#: [id number]      Referring Physician:  ERVIN PEARL YOB: 1940      Referring Physician: Gender:           M              Referring Physician: Study: Left Heart Catheterization Indications: MAGGIE KIRAN is a 83 year old male who presents with coronary artery disease, dyslipidemia, end stage renal disease on dialysis, metabolic syndrome and a chest pain assessment of typical angina. NSTE - ACS. Stress test performed: No. CTA performed: No. Agatston accessed: No. LVEF Assessed: Yes. Cardiac arrest: No. Frailty status of patient entering lab: 6 = Moderately frail. Procedure Description: After infiltration with 2% Lidocaine, the right radial artery was cannulated with a modified Seldinger technique. Subsequently a 6 Indonesian sheath was placed in the right radial artery. After completion of the procedure, the arterial sheath was pulled and a TR Band Radial  Compression Device was utilized to obtain patent hemostasis. Coronary Angiography: The coronary circulation is left dominant. Left Main Coronary Artery: The left main coronary artery is a normal caliber vessel. The left main arises normally from the left coronary sinus of Valsalva and bifurcates into the LAD and circumflex coronary arteries. The left main coronary artery showed no significant disease or stenosis greater than 30%. Left Anterior Descending Coronary Artery Distribution: The left anterior descending coronary artery is a normal caliber vessel. The LAD arises normally from the left main coronary artery. Circumflex Coronary Artery Distribution: The circumflex coronary artery is a normal caliber vessel. The circumflex arises normally from the left main coronary artery and terminates in the AV groove. The circumflex revealed Proximal lesion 40%, distal lesion 95% right before the takeoff of the left PDA. OM1 shows 90% proximal lesion. Right Coronary Artery Distribution: The right coronary artery is a normal caliber vessel. The RCA arises normally from the right sinus of Valsalva. The RCA showed no significant disease or stenosis greater than 30%. Left Ventriculography: There is mild to moderate hypokinesis of the anterior and apical LV regional wall segments. Hemo Personnel: +---------------------+-------------+ Name                 Duty          +---------------------+-------------+ Dilcia Santos MD         PROC MD 1 +---------------------+-------------+ Erasmo Suarez RT    PROC SCRUB 1 +---------------------+-------------+ Elli Haywood RN        PROC CIRC 1 +---------------------+-------------+ Krys Kline RNPROC RECORD 1 +---------------------+-------------+ Sedation Time: +------------------------+----------------------------------------+ Sedation Start/End TimesTime                                     +------------------------+----------------------------------------+ Drugs                    Versed 1 mg IV per physician for sedatio +------------------------+----------------------------------------+ Start                   9/20/2023 16:51:16                       +------------------------+----------------------------------------+ Equipment Used: +--------------------+---------------------------------------------------------+            Date/Time                                              Description +--------------------+---------------------------------------------------------+ 9/20/2023 4:45:23 PM - Manifold Custom Kit - Qty: 1 Each Part #:                                                                     [id number] +--------------------+---------------------------------------------------------+ 9/20/2023 4:45:23 PM  - MBrace Wrist Support, with Hook - Qty: 1                                                      Each Part #: [id number] +--------------------+---------------------------------------------------------+ 9/20/2023 4:45:23 PM      - 21Ga x 4.0cm Merit Advance Thin Wall                                         Needle - Qty: 1 Each Part #: [id number] +--------------------+---------------------------------------------------------+ 9/20/2023 4:45:23 PM  - TR Band Closure device - Qty: 1 Each Part #:                                                                           123 +--------------------+---------------------------------------------------------+ 9/20/2023 4:45:23 PM     Mer[employer] Medical InQwire [id number] -                                               Qty: 1 Each Part #: [id number] +--------------------+---------------------------------------------------------+ 9/20/2023 4:45:23 PM  GE Omnipaque 350 100 ml - Qty: 1 Each Part #:                                                                  [id number] +--------------------+---------------------------------------------------------+ 9/20/2023 4:45:28 PM - 5Fr x 100cm  Optitorque TIG 4.0 /1 SH                                                 - Qty: 1 Each Part #:  +--------------------+---------------------------------------------------------+ 9/20/2023 4:45:28 PM - 5F Angled Pigtail - Qty: 1 Each Part                                                                        #: 110 +--------------------+---------------------------------------------------------+ 9/20/2023 4:45:28 PM      - 6Fr x 10cm Glidesheath Radial Access                                          Sheath - Qty: 1 Each Part #:  +--------------------+---------------------------------------------------------+ Fluoroscopy Time: +--------------------------+--------+ X-Ray Summary Fluoro Time:2.10 min +--------------------------+--------+ +----------+--------+ Contrast: Dose:    +----------+--------+ Omnipaque:80.00 ml +----------+--------+ Hemodynamic Pressures: +----+-------------------+---------+------------+-------------+------+---------+ Site     Date Time       Phase    Systolic    Diastolic    ED  Mean mmHg                          Name       mmHg        mmHg      mmHg           +----+-------------------+---------+------------+-------------+------+---------+   AO  9/20/2023 5:01:18 AIR REST          90           46             64                      PM                                                  +----+-------------------+---------+------------+-------------+------+---------+   AO  9/20/2023 5:01:44 AIR REST          94           48             67                      PM                                                  +----+-------------------+---------+------------+-------------+------+---------+   LV  9/20/2023 5:06:12 AIR REST         109            0    15                               PM                                                   +----+-------------------+---------+------------+-------------+------+---------+   LV  9/20/2023 5:06:20 AIR REST         107            6    16                               PM                                                  +----+-------------------+---------+------------+-------------+------+---------+  LVp  9/20/2023 5:06:37 AIR REST         106            1                                     PM                                                  +----+-------------------+---------+------------+-------------+------+---------+  AOp  9/20/2023 5:06:44 AIR REST         107           46             74                      PM                                                  +----+-------------------+---------+------------+-------------+------+---------+ Oxygen Saturation %: +-----------+------------+ Sample SiteHB (g/100ml) +-----------+------------+     SYS ART         7.9 +-----------+------------+     SYS MATA         7.9 +-----------+------------+     PUL ART         7.9 +-----------+------------+     PUL MATA         7.9 +-----------+------------+ Cardiac Cath Transition of Care Summary: Post Procedure Diagnosis: Double vessel disease. Blood Loss:               Estimated blood loss during the procedure was 0 mls. Specimens Removed:        Number of specimen(s) removed: none. ____________________________________________________________________________________ CONCLUSIONS: 1. Presented with non-STEMI A-fib RVR and moderate pericardial effusion. 2. Catheterization reveals moderately severe coronary calcification. 3. Left main is almost an existing. 4. 3 lesions in the LAD in the range of 75 to 80% in the proximal, early mid and late mid segments. 5. Proximal OM1 90%. 6. Distal circumflex 95% right before the takeoff of the left PDA. 7. RCA is nondominant. 8. Anteroapical hypokinesis is mild to moderate. 9. Can be considered for PCI/possible rotablation  after verifying stability of pericardial effusion. ____________________________________________________________________________________ CPT Codes: Left Heart Cath (visualization of coronaries) and [id number]; Moderate Sedation Services initial 15 minutes patient >5 years-[id number] ICD 10 Codes: I21.4-Non ST elevation (NSTEMI) myocardial infarction [id number] Ervin Pearl MD Performing Physician Electronically signed by [id number] Ervin Pearl MD on 9/20/2023 at 5:37:29 PM cc Report to: ERVIN PEARL  Final      Echocardiogram    Result Date: 9/20/2023  [facility] [address] [facility] [address] Tel [phone] Fax [phone] TRANSTHORACIC ECHOCARDIOGRAM REPORT Patient Name:     MAGGIE Mcmahan Physician:   [id number] Ervin Pearl MD Study Date:       9/20/2023           Referring Physician: NINFA SKINNER MRN/PID:          [id number]            PCP: Accession/Order#: [id number]           Department Location: [facility] [place] [facility] YOB: 1940           Fellow: Gender:           M                   Nurse: Admit Date:       9/19/2023           Sonographer:         Shayy Springer RDCS Admission Status: Inpatient -         Additional Staff: Priority discharge Height:           175.00 cm           CC Report to: Weight:           78.00 kg            Study Type:          Echocardiogram BSA:              1.94 m2 Blood Pressure: 141 /88 mmHg Diagnosis/ICD: I21.4-Non ST elevation (NSTEMI) myocardial infarction Indication:    Acute MI; Pericardial effusion Procedure/CPT: Echo Complete w Full Doppler-[id number] Study Detail: The following Echo studies were performed: 2D, M-Mode, Doppler and color flow. Technically challenging study due to patient lying in supine position. PHYSICIAN INTERPRETATION: Left Ventricle: Left ventricular systolic function is normal. Wall motion is abnormal. The left ventricular cavity size is normal. Spectral Doppler shows an impaired relaxation pattern of left ventricular diastolic filling. Left  Atrium: The left atrium is upper limits of normal in size. Right Ventricle: The right ventricle is normal in size. There is normal right ventricular global systolic function. Right Atrium: The right atrium is upper limits of normal in size. Aortic Valve: The aortic valve appears structurally normal. There is mild aortic valve cusp calcification. There is trivial aortic valve regurgitation. The peak instantaneous gradient of the aortic valve is 6.0 mmHg. Mitral Valve: The mitral valve is normal in structure. There is mild mitral annular calcification. There is trace mitral valve regurgitation. Tricuspid Valve: The tricuspid valve is structurally normal. There is trace to mild tricuspid regurgitation. Pulmonic Valve: The pulmonic valve was not assessed. The pulmonic valve regurgitation was not assessed. Pericardium: There is a moderately sized pericardial effusion. The effusion is circumferential. There is no evidence of cardiac tamponade. Pleural: There is a small pleural effusion. Aorta: The aortic root is normal. CONCLUSIONS: 1. Left ventricular systolic function is normal. 2. Spectral Doppler shows an impaired relaxation pattern of left ventricular diastolic filling. 3. Moderate pericardial effusion. 4. There is no evidence of cardiac tamponade. QUANTITATIVE DATA SUMMARY: 2D MEASUREMENTS: Normal Ranges: LAs:           2.90 cm   (2.7-4.0cm) IVSd:          1.14 cm   (0.6-1.1cm) LVPWd:         1.08 cm   (0.6-1.1cm) LVIDd:         3.56 cm   (3.9-5.9cm) LVIDs:         2.50 cm LV Mass Index: 63.9 g/m2 LV % FS        29.8 % LA VOLUME: Normal Ranges: LA Area A4C:     15.5 cm2 LA Area A2C:     12.0 cm2 LA Volume Index: 18.6 ml/m2 LA Vol A4C:      40.0 ml LA Vol A2C:      28.0 ml LA Vol BP:       36.0 ml M-MODE MEASUREMENTS: Normal Ranges: Ao Root: 4.10 cm (2.0-3.7cm) AoV Exc: 2.00 cm (1.5-2.5cm) AORTA MEASUREMENTS: Normal Ranges: AoV Exc:   2.00 cm (1.5-2.5cm) Asc Ao, d: 3.60 cm (2.1-3.4cm) LV SYSTOLIC FUNCTION BY 2D  PLANIMETRY (MOD): Normal Ranges: EF-A4C View: 59.1 % (>=55%) EF-A2C View: 52.8 % EF-Biplane:  55.9 % LV DIASTOLIC FUNCTION: Normal Ranges: MV Peak E:    0.53 m/s (0.7-1.2 m/s) MV Peak A:    1.15 m/s (0.42-0.7 m/s) E/A Ratio:    0.46     (1.0-2.2) MV lateral e' 0.06 m/s MV medial e'  0.06 m/s E/e' Ratio:   8.30     (<8.0) MITRAL VALVE: Normal Ranges: MV DT: 190 msec (150-240msec) AORTIC VALVE: Normal Ranges: AoV Vmax:      1.22 m/s (<=1.7m/s) AoV Peak P.0 mmHg (<20mmHg) LVOT Max Quincy:  0.80 m/s (<=1.1m/s) LVOT Diameter: 2.00 cm  (1.8-2.4cm) AoV Area,Vmax: 2.07 cm2 (2.5-4.5cm2) RIGHT VENTRICLE: RV Basal 3.10 cm RV Mid   2.29 cm RV Major 6.4 cm TAPSE:   16.9 mm TRICUSPID VALVE/RVSP: Normal Ranges: Peak TR Velocity: 2.28 m/s RV Syst Pressure: 23.8 mmHg (< 30mmHg) [id number] Dilcia Santos MD Electronically signed on 2023 at 4:48:26 PM  Final      CT angio chest for pulmonary embolism    Result Date: 2023  Interpreted By:  YESICA TABOR MD, PHD STUDY: CT Angiogram of the Chest; 2023 at 8:51 PM  INDICATION: Chest pain.  Patient is on dialysis.  COMPARISON: None available.  ACCESSION NUMBER(S): [id number]  ORDERING CLINICIAN: JUANCHO WRIGHT DO  TECHNIQUE:  CTA of the chest was performed with intravenous contrast. Images are reviewed and processed at a workstation according to the CT angiogram protocol with 3-D and/or MIP post processing imaging generated.  Omnipaque 350 70 mL was administered intravenously.  Automated mA/kV exposure control was utilized and patient examination was performed in strict accordance with principles of ALARA.  FINDINGS: Pulmonary arteries are adequately opacified without acute or chronic filling defects.  The thoracic aorta is normal in course and caliber without dissection or aneurysm.  Heart is mildly enlarged.  Moderate-sized pericardial effusion is present.  Maximum thickness approximately 1.8 cm.  Pericarditis not excluded.  Cardiac tamponade considered unlikely but not  excluded. Thoracic lymph nodes are not enlarged.  Small bilateral pleural effusions are present. Mild subpleural fibrotic changes also noted.   There is no pleural thickening or pneumothorax.  The airways are patent.  Emphysematous changes are present.  Lungs are clear without consolidation, interstitial disease, or suspicious nodules.  Upper abdomen demonstrates no acute pathology.  There are no acute fractures.  No suspicious bony lesions. Postoperative changes in the cervical spine are present. Senescent changes of the thoracic spine are present.      1. There is no evidence of pulmonary embolus.  2. Moderate-sized pericardial effusion. Pericarditis not excluded. Cardiac tamponade considered unlikely but not excluded. Small bilateral pleural effusions noted.  3. Emphysematous changes are present. Mild subpleural fibrotic changes also noted.    Signed by Jorje Sanon II, MD    XR chest 2 view    Result Date: 9/19/2023  Interpreted By:  OLIVERIO COREY MD STUDY: Chest Radiographs;  9/19/2023 at 5:35 PM  INDICATION: Chest pain.  COMPARISON: XR chest 9/19/2023 (17:07:59).  ACCESSION NUMBER(S): [id number]  ORDERING CLINICIAN: JUANCHO WRIGHT DO  TECHNIQUE:  Frontal and lateral chest.  FINDINGS:  CARDIOMEDIASTINAL SILHOUETTE: Cardiomediastinal silhouette is normal in size and configuration.  LUNGS: Mild increased interstitial markings in the left lower lobe and periphery right upper lobe question underlying pneumonia and/or interstitial lung disease.  This is similar to prior.  ABDOMEN: No remarkable upper abdominal findings.  BONES: No acute osseous changes.      Increased interstitial markings in the left lower lobe and periphery right upper lobe question underlying pneumonia and/or interstitial lung disease.   Signed by Oliverio Corey MD    OCT MACULA CIRRUS OU (BOTH EYES)    Result Date: 9/12/2023  Date of Procedure 9/12/2023. Interpretation Right Eye Findings include Intraretinal fluid. Left Eye Normal  without fluid. Interval Change Right Eye Initial. Left Eye Initial.       Assessment/Plan     Assessment:   Salomon Chowdary is a 83 y.o. male with NSTMI,IDDM, HTN, HLD, ESRD on PD,  CAD, A-Fib and hypothyroidism presented to CTICU s/p mid cab with Dr. Neal, surgery was uncomplicated, arrived to the ICU intubated,sedated.   Plan:  NEURO:  no PMH… Patient is intubated and sedated on propofol infusion. Acute post operative pain.   -->  - Serial neuro and pain assessments   - Continue propofol until NMB reversal, then daily sedation vacation at minimum   - NMB reversal when normothermic and hemodynamically stable.    - Scheduled Tylenol   - PRN oxycodone  - PRN dilaudid for pain   - PT Consult, OOB to chair as tolerated, chair position if not tolerated   - CAM ICU score qshift  - Sleep/wake cycle hygiene       CV:  Patient has a history of HTN,HLD, CAD, NSTEMI Is now status post mid CAB Pre/Post EF: both normal Arrived to CTICU off of Levo  .-->  - Maintain goal MAP 70-90  - Mixed venous and CI Q4H  - Volume resuscitate as clinically indicated  - If CABG is 2/2 STEMI/unstable angina, will receive Plavix POD2  - Start statin  - Hold home Amlodipine, Hydralazine     PULM:  No history of pulmonary disease.  Currently intubated on ventilator. Chest tubes L pleural.-->  - F/u post op CXR  - Once reversed, wean ventilator settings towards CPAP & extubation   - Wean FiO2 maintaining SpO2 >92%.   - IS q1h and OOB to chair when extubated  - Chest tubes to wall suction.    GI:  no PMH .  OG in place.-->  - Continue PPI until extubated  - NPO, will perform bedside swallow eval post extubation   - Colace/senna BID and miralax BID     :  Hx of ESRD on PD since March 2023 , baseline creatinine 3.97.  post-op. Wright in place bloody leakage from te urethra ,making adequate UOP. -->  - Continue wright catheter for strict I/Os.  - Goal UOP 0.5ml/kg/hr  - RFP as clinically indicated  - Replete electrolytes per CTICU protocol  - follow  upwith nephrology     ENDO:  PMH of T2DM , hypothyroidism ,A1c: 6.6-->  - Maintain BG <180, insulin per CTICU protocol  - on insulin drip 3u/hr  -Continue current dose of LT4 25 mcg   -Check TFT in 1 week 10/17     HEME:  Acute blood loss anemia and thrombocytopenia.-->    - Monitor drain output volume and characteristics  - CBC, coags, and fibrinogen post op and as clinically indicated  - Start ASA 6hrs post-op for CABG  - If CABG is 2/2 STEMI/unstable angina, will receive Plavix POD2  - SQH tomorrow   - SCDs for DVT prophylaxis.  - Last type and screen: 10/9     ID:  Afebrile, no current indications of infection. MRSA negative.-->  - Trend temp q4h  - Periop cefazolin x 48hrs     Skin:  No active skin issues.  - preventative Mepilex dressings in place on sacrum and heels  - change preventative Mepilex weekly or more frequently as indicated (when moist/soiled)   - every shift skin assessment per nursing and weekly ICU skin rounds  - moisture barrier to be applied with francis care  - active skin problems addressed with nursing on daily rounds     Proph:  SCDs  PPI     G:  Line  Right IJ MAC w Minimac placed 10/11  Left brachial a-line placed 10/11  PD catheter in the abdomen    F: Family: will update at bedside postoperatively.     A,B,C,D,E,F,G: reviewed     Dispo: CTICU care for now.    CTICU TEAM PHONE [phone]

## 2023-10-11 NOTE — ANESTHESIA PROCEDURE NOTES
Airway  Date/Time: 10/11/2023 8:58 AM  Urgency: elective    Airway not difficult    Staffing  Performed: attending   Authorized by: Jacy Ayala MD    Performed by: YUDELKA Degroot  Patient location during procedure: OR    Indications and Patient Condition  Indications for airway management: anesthesia  Spontaneous Ventilation: absent  Sedation level: deep  Preoxygenated: yes  Patient position: sniffing  MILS maintained throughout  Mask difficulty assessment: 1 - vent by mask  No planned trial extubation    Final Airway Details  Final airway type: endotracheal airway      Successful airway: ETT - double lumen left  Cuffed: yes   Successful intubation technique: direct laryngoscopy  Blade: Marciano  Blade size: #4  ETT DL size (fr): 39  Cormack-Lehane Classification: grade I - full view of glottis  Placement verified by: bronchoscopy and capnometry   Measured from: lips  ETT to lips (cm): 31  Number of attempts at approach: 1  Number of other approaches attempted: 0

## 2023-10-11 NOTE — ANESTHESIA PROCEDURE NOTES
Peripheral IV  Date/Time: 10/11/2023 9:02 AM      Placement  Needle size: 16 G  Laterality: right  Location: arm  Site prep: alcohol  Technique: anatomical landmarks  Attempts: 1

## 2023-10-11 NOTE — BRIEF OP NOTE
Date: 10/11/2023  OR Location: [facility] OR    Name: Salomon Chowdary, : 1940, Age: 83 y.o., MRN: [id number], Sex: male    Diagnosis  Pre-op Diagnosis     * Coronary artery disease involving native coronary artery of native heart with unstable angina pectoris (CMS/HCC) [I25.110] Post-op Diagnosis     * Coronary artery disease involving native coronary artery of native heart with unstable angina pectoris (CMS/HCC) [I25.110]     Procedures  MINI Left anterior thoracotomy   MID CAB (LIMA-LAD)   Off Pump    Chest Tubes/Drains: 1 left pleural 28 fr Amarjit drain        Pacing Wires: None     Incision Close: [place]    Cardio Pulmonary Bypass Time: N/A  Cross-clamp Time: N/A  Circulatory Arrest Time:  N/A    Surgeons      * Jorje Neal - Primary    Resident/Fellow/Other Assistant:  Surgical Assistant: Kellie Espana PA-C; Genevieve Kuhn RN    Procedure Summary  Anesthesia: * No anesthesia type entered *  ASA: IV  Anesthesia Staff: Anesthesiologist: Jacy Ayala MD  CRNA: ANNIE Ma-CRNA  C-AA: YUDELKA Degroot  Perfusionist: Henri Nolasco  Estimated Blood Loss: 150 mL  Intra-op Medications:   Medication Name Total Dose   sodium chloride 0.9 % irrigation solution 3,000 mL   papaverine injection 60 mg   BUPivacaine HCl (Marcaine) 0.25 % (2.5 mg/mL) injection 17 mL   acetaminophen (Tylenol) tablet 650 mg Cannot be calculated   amLODIPine (Norvasc) tablet 10 mg Cannot be calculated   aspirin chewable tablet 81 mg Cannot be calculated   B complex-vitamin C-folic acid (Nephrocaps) capsule 1 capsule Cannot be calculated   cholecalciferol (Vitamin D-3) tablet 25 mcg Cannot be calculated   darbepoetin alyssa (Aranesp) injection 40 mcg Cannot be calculated   dextrose 10 % infusion Cannot be calculated   dextrose 50 % injection 25 g Cannot be calculated   DULoxetine (Cymbalta) DR capsule 30 mg Cannot be calculated   EPINEPHrine (Adrenalin) 4 mg in dextrose 5% 250 mL (16 mcg/mL) infusion (premix) 0.13 mg    fluticasone (Flonase) nasal spray 2 spray Cannot be calculated   glucagon (Glucagen) injection 1 mg Cannot be calculated   insulin detemir (Levemir) injection 5 Units Cannot be calculated   insulin lispro (HumaLOG) injection 0-5 Units Cannot be calculated   levothyroxine (Synthroid, Levoxyl) tablet 25 mcg Cannot be calculated   sennosides (Senokot) tablet 17.2 mg Cannot be calculated   atorvastatin (Lipitor) tablet 80 mg Cannot be calculated   heparin (porcine) injection 2,000-4,000 Units Cannot be calculated   insulin lispro (HumaLOG) injection 3 Units Cannot be calculated   insulin lispro (HumaLOG) injection 5 Units Cannot be calculated   insulin lispro (HumaLOG) injection 5 Units Cannot be calculated              Anesthesia Record               Intraprocedure I/O Totals          Intake    electrolyte-A (Plasmalyte-A) 1500.00 mL    plasma-lyte-A IV solution 200.00 mL    Norepinephrine Drip 0.00 mL    The total shown is the total volume documented since Anesthesia Start was filed.    Propofol Drip 0.00 mL    The total shown is the total volume documented since Anesthesia Start was filed.    Esmolol Drip 0.00 mL    The total shown is the total volume documented since Anesthesia Start was filed.    Total Intake 1700 mL       Output    Urine 275 mL    Est. Blood Loss 150 mL    Total Output 425 mL       Net    Net Volume 1275 mL          Specimen: No specimens collected     Staff:   Circulator: Crystal Mortimer, RN; Eliana Shoemaker RN  Relief Circulator: Meg Vallejo RN  Relief Scrub: Meg Vallejo RN  Scrub Person: Amelia Rey          Findings: CAD    Complications:  None; patient tolerated the procedure well.     Disposition: ICU - intubated and hemodynamically stable.  Condition: stable  Specimens Collected: No specimens collected  Attending Attestation: I was present and scrubbed for the key portions of the procedure.    Jorje Neal  Phone Number: [phone]

## 2023-10-11 NOTE — PROGRESS NOTES
Salomon Chowdary   83 y.o.  M   MRN/Room: [id number]/10/10-A    Subjective:   Had MID-CAB today  Seen and examined in the CT-ICU post operatively   Intubated, sedated  Weaning off pressor support     Objective:     Meds:   acetaminophen, 650 mg, q4h   Or  acetaminophen, 650 mg, q4h  [START ON 10/12/2023] aspirin, 81 mg, Daily  atorvastatin, 80 mg, Nightly  B complex-vitamin C-folic acid, 1 capsule, Daily  ceFAZolin, 2 g, q12h  cholecalciferol, 25 mcg, Daily  darbepoetin alyssa, 40 mcg, Weekly  dextrose 1.5% - LOW calcium 2.5mEq/L (Dianeal, Delflex-LC) in 3,000 mL peritoneal dialysate, , Once   Followed by  dextrose 1.5% - LOW calcium 2.5mEq/L (Dianeal, Delflex-LC) in 3,000 mL peritoneal dialysate, , Once  DULoxetine, 30 mg, Daily  fluticasone, 2 spray, Daily  gentamicin, , Daily  glycopyrrolate, ,   glycopyrrolate, 0.3 mg, Once   And  neostigmine, 2 mg, Once  insulin lispro, 0-15 Units, q4h  insulin regular, 0-10 Units, Once  levothyroxine, 25 mcg, Daily  neostigmine, ,   [START ON 10/12/2023] pantoprazole, 40 mg, Daily before breakfast   Or  [START ON 10/12/2023] pantoprazole, 40 mg, Daily before breakfast  polyethylene glycol, 17 g, Daily  psyllium, 1 packet, Daily  sennosides, 2 tablet, BID      dextrose 1.5% - LOW calcium 2.5mEq/L (Dianeal, Delflex-LC) in 3,000 mL peritoneal dialysate  dextrose 1.5% - LOW calcium 2.5mEq/L (Dianeal, Delflex-LC) in 3,000 mL peritoneal dialysate   Followed by  [START ON 10/18/2023] dextrose 1.5% - LOW calcium 2.5mEq/L (Dianeal, Delflex-LC) in 3,000 mL peritoneal dialysate  EPINEPHrine, Last Rate: Stopped (10/11/23 1300)  lactated Ringer's, Last Rate: 30 mL/hr (10/11/23 1512)  lactated Ringer's, Last Rate: 5 mL/hr (10/11/23 1512)  norepinephrine, Last Rate: 0.02 mcg/kg/min (10/11/23 1512)  propofol, Last Rate: 30 mcg/kg/min (10/11/23 1512)      acetaminophen, 650 mg, q6h PRN  dextrose 10 % in water (D10W), 0.3 g/kg/hr, Once PRN  dextrose, 25 g, q15 min PRN  glucagon, 1 mg, q15 min  PRN  glycopyrrolate, ,   HYDROmorphone, 0.2 mg, q15 min PRN  insulin regular, 2-6 Units, PRN  naloxone, 0.2 mg, q5 min PRN  neostigmine, ,   oxygen, , Continuous PRN - O2/gases  oxygen, , Continuous PRN - O2/gases        Vitals:    10/11/23 1515   BP:    Pulse: 62   Resp: 16   Temp:    SpO2: 100%          Intake/Output Summary (Last 24 hours) at 10/11/2023 1548  Last data filed at 10/11/2023 1515  Gross per 24 hour   Intake 2839.02 ml   Output 1070 ml   Net 1769.02 ml       General appearance: Intubated and sedated, chest tube in place   Eyes: non-icteric  Skin: no apparent rash  Heart: S1 S2 regular  Lungs: CTA bilat No wheezing/crackles  Abdomen: soft, non-distended  Extremities: No edema bilat  Neuro: Unable to assess    Blood Labs:  Results for orders placed or performed during the hospital encounter of 09/30/23 (from the past 24 hour(s))   POCT GLUCOSE   Result Value Ref Range    POCT Glucose 199 (H) 74 - 99 mg/dL   POCT GLUCOSE   Result Value Ref Range    POCT Glucose 295 (H) 74 - 99 mg/dL   POCT GLUCOSE   Result Value Ref Range    POCT Glucose 275 (H) 74 - 99 mg/dL   POCT GLUCOSE   Result Value Ref Range    POCT Glucose 176 (H) 74 - 99 mg/dL   Prepare RBC: 4 Units   Result Value Ref Range    PRODUCT CODE D8364L36     Unit Number [id number]     Unit ABO O     Unit RH NEG     XM INTEP COMP     Dispense Status XM     Blood Expiration Date October 19, 2023 23:59 EDT     PRODUCT BLOOD TYPE 9500     UNIT VOLUME 350     PRODUCT CODE D8046A20     Unit Number [id number]     Unit ABO O     Unit RH NEG     XM INTEP COMP     Dispense Status XM     Blood Expiration Date October 19, 2023 23:59 EDT     PRODUCT BLOOD TYPE 9500     UNIT VOLUME 350     PRODUCT CODE U7686M86     Unit Number [id number]     Unit ABO O     Unit RH NEG     XM INTEP COMP     Dispense Status XM     Blood Expiration Date October 21, 2023 23:59 EDT     PRODUCT BLOOD TYPE 9500     UNIT VOLUME 400     PRODUCT CODE M1862X25     Unit Number  [id number]     Unit ABO O     Unit RH NEG     XM INTEP COMP     Dispense Status XM     Blood Expiration Date October 21, 2023 23:59 EDT     PRODUCT BLOOD TYPE 9500     UNIT VOLUME 400    Blood Gas Arterial Full Panel Unsolicited   Result Value Ref Range    POCT pH, Arterial 7.46 (H) 7.38 - 7.42 pH    POCT pCO2, Arterial 38 38 - 42 mm Hg    POCT pO2, Arterial 80 (L) 85 - 95 mm Hg    POCT SO2, Arterial 98 94 - 100 %    POCT Oxy Hemoglobin, Arterial 96.1 94.0 - 98.0 %    POCT Hematocrit Calculated, Arterial 28.0 (L) 41.0 - 52.0 %    POCT Sodium, Arterial 133 (L) 136 - 145 mmol/L    POCT Potassium, Arterial 4.8 3.5 - 5.3 mmol/L    POCT Chloride, Arterial 102 98 - 107 mmol/L    POCT Ionized Calcium, Arterial 1.19 1.10 - 1.33 mmol/L    POCT Glucose, Arterial 141 (H) 74 - 99 mg/dL    POCT Lactate, Arterial 1.3 0.4 - 2.0 mmol/L    POCT Base Excess, Arterial 3.0 -2.0 - 3.0 mmol/L    POCT HCO3 Calculated, Arterial 27.0 (H) 22.0 - 26.0 mmol/L    POCT Hemoglobin, Arterial 9.4 (L) 13.5 - 17.5 g/dL    POCT Anion Gap, Arterial 9 (L) 10 - 25 mmo/L    Patient Temperature 37.0 degrees Celsius    FiO2 21 %   Coox Panel, Arterial Unsolicited   Result Value Ref Range    POCT Hemoglobin, Arterial 9.4 (L) 13.5 - 17.5 g/dL    POCT Oxy Hemoglobin, Arterial 96.1 94.0 - 98.0 %    POCT Carboxyhemoglobin, Arterial 1.4 %    POCT Methemoglobin, Arterial 0.6 0.0 - 1.5 %    POCT Deoxy Hemoglobin, Arterial 1.9 0.0 - 5.0 %   TEG Clot Global Profile Unsolicited   Result Value Ref Range    R (Reaction Time) K 8.2 4.6 - 9.1 min    K (Clot Kinetics) 1.5 0.8 - 2.1 min    ANGLE 75.0 63.0 - 78.0 deg    MA (Max Amplitude) K 70.0 (H) 52.0 - 69.0 mm    R (Reaction Time) KH 6.4 4.3 - 8.3 min    MA (Max Amplitude) RT 72.0 (H) 52.0 - 70.0 mm    MA ( Prince Amplitude) FF 51.0 (H) 15.0 - 32.0 mm    FLEV 929 (H) 278 - 581 mg/dL    Test Comment baseline    Blood Gas Arterial Full Panel Unsolicited   Result Value Ref Range    POCT pH, Arterial 7.37 (L) 7.38 - 7.42  pH    POCT pCO2, Arterial 47 (H) 38 - 42 mm Hg    POCT pO2, Arterial 121 (H) 85 - 95 mm Hg    POCT SO2, Arterial 99 94 - 100 %    POCT Oxy Hemoglobin, Arterial 96.8 94.0 - 98.0 %    POCT Hematocrit Calculated, Arterial 28.0 (L) 41.0 - 52.0 %    POCT Sodium, Arterial 132 (L) 136 - 145 mmol/L    POCT Potassium, Arterial 5.0 3.5 - 5.3 mmol/L    POCT Chloride, Arterial 102 98 - 107 mmol/L    POCT Ionized Calcium, Arterial 1.17 1.10 - 1.33 mmol/L    POCT Glucose, Arterial 138 (H) 74 - 99 mg/dL    POCT Lactate, Arterial 1.6 0.4 - 2.0 mmol/L    POCT Base Excess, Arterial 1.5 -2.0 - 3.0 mmol/L    POCT HCO3 Calculated, Arterial 27.2 (H) 22.0 - 26.0 mmol/L    POCT Hemoglobin, Arterial 9.3 (L) 13.5 - 17.5 g/dL    POCT Anion Gap, Arterial 8 (L) 10 - 25 mmo/L    Patient Temperature 37.0 degrees Celsius    FiO2 100 %   Coox Panel, Arterial Unsolicited   Result Value Ref Range    POCT Hemoglobin, Arterial 9.3 (L) 13.5 - 17.5 g/dL    POCT Oxy Hemoglobin, Arterial 96.8 94.0 - 98.0 %    POCT Carboxyhemoglobin, Arterial 1.0 %    POCT Methemoglobin, Arterial 1.1 0.0 - 1.5 %    POCT Deoxy Hemoglobin, Arterial 1.1 0.0 - 5.0 %   Blood Gas Arterial Full Panel Unsolicited   Result Value Ref Range    POCT pH, Arterial 7.38 7.38 - 7.42 pH    POCT pCO2, Arterial 44 (H) 38 - 42 mm Hg    POCT pO2, Arterial 112 (H) 85 - 95 mm Hg    POCT SO2, Arterial 100 94 - 100 %    POCT Oxy Hemoglobin, Arterial 97.8 94.0 - 98.0 %    POCT Hematocrit Calculated, Arterial 26.0 (L) 41.0 - 52.0 %    POCT Sodium, Arterial 134 (L) 136 - 145 mmol/L    POCT Potassium, Arterial 5.2 3.5 - 5.3 mmol/L    POCT Chloride, Arterial 101 98 - 107 mmol/L    POCT Ionized Calcium, Arterial 1.14 1.10 - 1.33 mmol/L    POCT Glucose, Arterial 154 (H) 74 - 99 mg/dL    POCT Lactate, Arterial 1.4 0.4 - 2.0 mmol/L    POCT Base Excess, Arterial 0.7 -2.0 - 3.0 mmol/L    POCT HCO3 Calculated, Arterial 26.0 22.0 - 26.0 mmol/L    POCT Hemoglobin, Arterial 8.6 (L) 13.5 - 17.5 g/dL    POCT Anion  Gap, Arterial 12 10 - 25 mmo/L    Patient Temperature 37.0 degrees Celsius    FiO2 100 %   Coox Panel, Arterial Unsolicited   Result Value Ref Range    POCT Hemoglobin, Arterial 8.6 (L) 13.5 - 17.5 g/dL    POCT Oxy Hemoglobin, Arterial 97.8 94.0 - 98.0 %    POCT Carboxyhemoglobin, Arterial 1.3 %    POCT Methemoglobin, Arterial 0.4 0.0 - 1.5 %    POCT Deoxy Hemoglobin, Arterial 0.5 0.0 - 5.0 %   Blood Gas Arterial Full Panel Unsolicited   Result Value Ref Range    POCT pH, Arterial 7.38 7.38 - 7.42 pH    POCT pCO2, Arterial 45 (H) 38 - 42 mm Hg    POCT pO2, Arterial 71 (L) 85 - 95 mm Hg    POCT SO2, Arterial 96 94 - 100 %    POCT Oxy Hemoglobin, Arterial 93.8 (L) 94.0 - 98.0 %    POCT Hematocrit Calculated, Arterial 27.0 (L) 41.0 - 52.0 %    POCT Sodium, Arterial 134 (L) 136 - 145 mmol/L    POCT Potassium, Arterial 5.3 3.5 - 5.3 mmol/L    POCT Chloride, Arterial 101 98 - 107 mmol/L    POCT Ionized Calcium, Arterial 1.15 1.10 - 1.33 mmol/L    POCT Glucose, Arterial 182 (H) 74 - 99 mg/dL    POCT Lactate, Arterial 0.9 0.4 - 2.0 mmol/L    POCT Base Excess, Arterial 1.2 -2.0 - 3.0 mmol/L    POCT HCO3 Calculated, Arterial 26.6 (H) 22.0 - 26.0 mmol/L    POCT Hemoglobin, Arterial 8.9 (L) 13.5 - 17.5 g/dL    POCT Anion Gap, Arterial 12 10 - 25 mmo/L    Patient Temperature 37.0 degrees Celsius    FiO2 100 %   Coox Panel, Arterial Unsolicited   Result Value Ref Range    POCT Hemoglobin, Arterial 8.9 (L) 13.5 - 17.5 g/dL    POCT Oxy Hemoglobin, Arterial 93.8 (L) 94.0 - 98.0 %    POCT Carboxyhemoglobin, Arterial 1.4 %    POCT Methemoglobin, Arterial 0.7 0.0 - 1.5 %    POCT Deoxy Hemoglobin, Arterial 4.1 0.0 - 5.0 %   TEG Clot Global Profile Unsolicited   Result Value Ref Range    R (Reaction Time) K 6.6 4.6 - 9.1 min    K (Clot Kinetics) 1.1 0.8 - 2.1 min    ANGLE 77.0 63.0 - 78.0 deg    MA (Max Amplitude) K 70.0 (H) 52.0 - 69.0 mm    R (Reaction Time) KH 6.5 4.3 - 8.3 min    MA (Max Amplitude) RT 71.0 (H) 52.0 - 70.0 mm    MA (  Prince Amplitude) FF 44.0 (H) 15.0 - 32.0 mm    FLEV 807 (H) 278 - 581 mg/dL    Test Comment post protamine    Blood Gas Arterial Full Panel Unsolicited   Result Value Ref Range    POCT pH, Arterial 7.40 7.38 - 7.42 pH    POCT pCO2, Arterial 43 (H) 38 - 42 mm Hg    POCT pO2, Arterial 363 (H) 85 - 95 mm Hg    POCT SO2, Arterial 98 94 - 100 %    POCT Oxy Hemoglobin, Arterial 97.2 94.0 - 98.0 %    POCT Hematocrit Calculated, Arterial 25.0 (L) 41.0 - 52.0 %    POCT Sodium, Arterial 134 (L) 136 - 145 mmol/L    POCT Potassium, Arterial 4.8 3.5 - 5.3 mmol/L    POCT Chloride, Arterial 102 98 - 107 mmol/L    POCT Ionized Calcium, Arterial 1.14 1.10 - 1.33 mmol/L    POCT Glucose, Arterial 164 (H) 74 - 99 mg/dL    POCT Lactate, Arterial 1.0 0.4 - 2.0 mmol/L    POCT Base Excess, Arterial 1.6 -2.0 - 3.0 mmol/L    POCT HCO3 Calculated, Arterial 26.6 (H) 22.0 - 26.0 mmol/L    POCT Hemoglobin, Arterial 8.3 (L) 13.5 - 17.5 g/dL    POCT Anion Gap, Arterial 10 10 - 25 mmo/L    Patient Temperature 37.0 degrees Celsius    FiO2 100 %   Coox Panel, Arterial Unsolicited   Result Value Ref Range    POCT Hemoglobin, Arterial 8.3 (L) 13.5 - 17.5 g/dL    POCT Oxy Hemoglobin, Arterial 97.2 94.0 - 98.0 %    POCT Carboxyhemoglobin, Arterial 0.0 %    POCT Methemoglobin, Arterial 0.4 0.0 - 1.5 %    POCT Deoxy Hemoglobin, Arterial 2.4 0.0 - 5.0 %   Calcium, Ionized   Result Value Ref Range    POCT Calcium, Ionized 1.11 1.1 - 1.33 mmol/L   Coagulation Screen   Result Value Ref Range    Protime 12.1 9.8 - 12.8 seconds    INR 1.1 0.9 - 1.1    aPTT 27 27 - 38 seconds   Fibrinogen   Result Value Ref Range    Fibrinogen 559 (H) 200 - 400 mg/dL   POCT GLUCOSE   Result Value Ref Range    POCT Glucose 126 (H) 74 - 99 mg/dL   Blood Gas Arterial Full Panel Unsolicited   Result Value Ref Range    POCT pH, Arterial 7.44 (H) 7.38 - 7.42 pH    POCT pCO2, Arterial 39 38 - 42 mm Hg    POCT pO2, Arterial 210 (H) 85 - 95 mm Hg    POCT SO2, Arterial 99 94 - 100 %    POCT  Oxy Hemoglobin, Arterial 96.3 94.0 - 98.0 %    POCT Hematocrit Calculated, Arterial 27.0 (L) 41.0 - 52.0 %    POCT Sodium, Arterial 134 (L) 136 - 145 mmol/L    POCT Potassium, Arterial 5.0 3.5 - 5.3 mmol/L    POCT Chloride, Arterial 103 98 - 107 mmol/L    POCT Ionized Calcium, Arterial 1.18 1.10 - 1.33 mmol/L    POCT Glucose, Arterial 146 (H) 74 - 99 mg/dL    POCT Lactate, Arterial 1.5 0.4 - 2.0 mmol/L    POCT Base Excess, Arterial 2.2 -2.0 - 3.0 mmol/L    POCT HCO3 Calculated, Arterial 26.5 (H) 22.0 - 26.0 mmol/L    POCT Hemoglobin, Arterial 9.0 (L) 13.5 - 17.5 g/dL    POCT Anion Gap, Arterial 10 10 - 25 mmo/L    Patient Temperature 37.0 degrees Celsius    FiO2 50 %   POCT GLUCOSE   Result Value Ref Range    POCT Glucose 109 (H) 74 - 99 mg/dL      ASSESSMENT:  Salomon Chowdary is a  83 y.o. M, with PMHx of CAD, HTN, IDDM, ESRD on PD since March 2023, who is currently admitted to the CT-ICU s/p Mid CAB surgery on 10/11. Nephrology following to facilitate PD while inpatient.     RECOMMENDATIONS:  -Discussed with primary team, no concern for peritoneal or diaphragmatic involvement in the OR   -Will plan to resume PD tonight at home prescription, Total fill volume 5000cc, 8 hrs duration, 2400 fill volume, 1.5% dextrose   -Continue Gentamicin to exit site   -Will follow     [facility] attending Dr Any Soto MD  Nephrology Fellow PGY 4  Available via Epic chat

## 2023-10-11 NOTE — ANESTHESIA POSTPROCEDURE EVALUATION
Patient: Salomon Chowdary    Procedure Summary       Date: 10/11/23 Room / Location: [facility] OR 21 / Virtual [facility] OR    Anesthesia Start: 0810 Anesthesia Stop: 1256    Procedures:       left thoracotomy midcab (Left)      Creation Bypass Graft Off-Pump Coronary Artery Diagnosis:       Coronary artery disease involving native coronary artery of native heart with unstable angina pectoris (CMS/HCC)      (Coronary artery disease involving native coronary artery of native heart with unstable angina pectoris (CMS/HCC) [I25.110])    Surgeons: Jorje Neal MD Responsible Provider: Jacy Ayala MD    Anesthesia Type: general ASA Status: 4            Anesthesia Type: general    Vitals Value Taken Time   /52 10/11/23 1245   Temp 35.9 °C (96.6 °F) 10/11/23 1245   Pulse 59 10/11/23 1255   Resp 16 10/11/23 1255   SpO2 100 % 10/11/23 1255   Vitals shown include unvalidated device data.    Anesthesia Post Evaluation    Patient location during evaluation: ICU  Patient participation: complete - patient cannot participate  Pain management: adequate  Cardiovascular status: acceptable  Respiratory status: ETT  Hydration status: acceptable        No notable events documented.

## 2023-10-11 NOTE — OP NOTE
left thoracotomy midcab (L), Creation Bypass Graft Off-Pump Coronary Artery Operative Note     Date: 10/11/2023  OR Location: [facility] OR    Name: Salomon Chowdary, : 1940, Age: 83 y.o., MRN: [id number], Sex: male    Diagnosis  Pre-op Diagnosis     * Coronary artery disease involving native coronary artery of native heart with unstable angina pectoris (CMS/HCC) [I25.110] Post-op Diagnosis     * Coronary artery disease involving native coronary artery of native heart with unstable angina pectoris (CMS/HCC) [I25.110]     Procedures  Creation Bypass Graft Off-Pump Coronary Artery    left thoracotomy midcab  [id number] - [place] CABG W/ARTERIAL GRAFT SINGLE ARTERIAL GRAFT      Surgeons      * Jorje Neal - Primary    Resident/Fellow/Other Assistant:  Surgical Assistant: Kellie Espana PA-C; Genevieve Kuhn RN    Procedure Summary  Anesthesia: * No anesthesia type entered *  ASA: IV  Anesthesia Staff: Anesthesiologist: Jacy Ayala MD  CRNA: ANNIE Ma-CRNA  C-AA: YUDELKA Degroot  Perfusionist: Henri Nolasco  Estimated Blood Loss: 250mL  Intra-op Medications:   Medication Name Total Dose   sodium chloride 0.9 % irrigation solution 3,000 mL   papaverine injection 60 mg   BUPivacaine HCl (Marcaine) 0.25 % (2.5 mg/mL) injection 17 mL   acetaminophen (Tylenol) tablet 650 mg Cannot be calculated   amLODIPine (Norvasc) tablet 10 mg Cannot be calculated   aspirin chewable tablet 81 mg Cannot be calculated   B complex-vitamin C-folic acid (Nephrocaps) capsule 1 capsule Cannot be calculated   cholecalciferol (Vitamin D-3) tablet 25 mcg Cannot be calculated   darbepoetin alyssa (Aranesp) injection 40 mcg Cannot be calculated   dextrose 10 % infusion Cannot be calculated   dextrose 50 % injection 25 g Cannot be calculated   DULoxetine (Cymbalta) DR capsule 30 mg Cannot be calculated   fluticasone (Flonase) nasal spray 2 spray Cannot be calculated   glucagon (Glucagen) injection 1 mg Cannot be calculated    insulin detemir (Levemir) injection 5 Units Cannot be calculated   insulin lispro (HumaLOG) injection 0-5 Units Cannot be calculated   insulin lispro (HumaLOG) injection 5 Units Cannot be calculated   levothyroxine (Synthroid, Levoxyl) tablet 25 mcg Cannot be calculated   sennosides (Senokot) tablet 17.2 mg Cannot be calculated   atorvastatin (Lipitor) tablet 80 mg Cannot be calculated   heparin (porcine) injection 2,000-4,000 Units Cannot be calculated              Anesthesia Record               Intraprocedure I/O Totals          Intake    electrolyte-A (Plasmalyte-A) 1500.00 mL    plasma-lyte-A IV solution 200.00 mL    Norepinephrine Drip 0.00 mL    The total shown is the total volume documented since Anesthesia Start was filed.    Propofol Drip 0.00 mL    The total shown is the total volume documented since Anesthesia Start was filed.    Esmolol Drip 0.00 mL    The total shown is the total volume documented since Anesthesia Start was filed.    Total Intake 1700 mL       Output    Urine 275 mL    Est. Blood Loss 150 mL    Total Output 425 mL       Net    Net Volume 1275 mL          Specimen: No specimens collected     Staff:   Circulator: Crystal Mortimer, RN; Eliana Shoemaker RN  Relief Circulator: Meg Vallejo RN  Relief Scrub: Meg Vallejo RN  Scrub Person: Amelia Rey         Drains and/or Catheters:   Chest Tube 1 Left Pleural 28 Fr (Active)   Output (mL) 45 mL 10/11/23 1300       Urethral Catheter Temperature probe;Non-latex 14 Fr. (Active)   Output (mL) 30 mL 10/11/23 1245       Tourniquet Times:         Implants:  Implants       Type Name Action Serial No.      Cardiac Pacemaker LEAD, PACING, MYOCARDIAL, BIPOLAR, TEMPORARY - [id number] Implanted      Heart Valve SYSTEM, OCTOPUS NUVO TISSUE STABILIZER - [id number] Implanted      Heart Valve DEVICE KIT, COR-KNOT MICRO - [id number] - [id number] Implanted [id number]     Heart Valve SHUNT, INTRACORONARY, 1.75 MM, CLEARVIEW - [id number] Implanted                Findings:  1.There were  pericardial adhesions probably related to his CRF.    2. The heart was not well visualized but on SIMIN demonstrated normal ventricular function.    4.The patient had severe diffuse coronary artery disease, however we were able to find locations on the lad that was suitable for grafting.  5.  The conduits were suitable for grafting.  6.  The flow of the graft after the protamine were acceptable. 35 ml PI 2.5    Indications: Salomon Chowdary is an 83 y.o. male who is having surgery for Coronary artery disease involving native coronary artery of native heart with unstable angina pectoris (CMS/[facility]) [I25.110]. .  We extensively discussed the patient in on our high risk clinic meeting and we decided that the best approach for this patient with multivessel coronary disease, unstable angina and diffuse LAD disease was a MIDCAB and later on if needed stent of the circumflex.  Cussed with the patient the risk of the procedure and the benefit in terms long-term prognosis and symptom relief and she agreed to proceed.  The patient class I indication for CABG.  The patient was seen in the preoperative area. The risks, benefits, complications, treatment options, non-operative alternatives, expected recovery and outcomes were discussed with the patient. The possibilities of reaction to medication, pulmonary aspiration, injury to surrounding structures, bleeding, recurrent infection, the need for additional procedures, failure to diagnose a condition, and creating a complication requiring transfusion or operation were discussed with the patient. The patient concurred with the proposed plan, giving informed consent.  The site of surgery was properly noted/marked if necessary per policy. The patient has been actively warmed in preoperative area. Preoperative antibiotics  Venous thrombosis prophylaxis     Procedure Details:   The patient was taken to the operating room by anesthesia, placed supine on the  operating table, intubated  with a double lumen tube and placed under general anesthesia.  A central line and parrish were placed. SIMIN was performed which confirmed the preoperative findings of reduced EF without significant valvular disease.  The patient was positioned with a bump under the left side and was prepped and draped in the usual sterile fashion.  A time out was performed. The patient was placed on single right lung ventilation. A 5cm anterior thoracotomy was made in the 4th intercostal space.  The thoratrack retractor was placed and the mammary was harvested as a small pedicle.  Heparin was administered and the mammary was divided distally.  The long thoratrack blade was exchanged for a standard blade, a wound protector was placed and the retractor placed back in the 4th intercostal space.  The pericardium was opened and the LAD identified.  The stabilizer device was inserted from a sub-xyphoid position and placed around the LAD. The mammary was prepared.  The LAD was opened and a 1.75 mm shunt was placed.  The LIMA was anastomosed using a running 7-0 prolene suture. We used micro Kor nott to tied down the 7-0 Prolene. The bulldog was removed from the mammary and flows were assess with the flow probe.  There was good flow with a low PI.  The stabilizer was removed and the flows were confirmed and noted to still be appropriate.  Protamine was administered.  Hemostasis was meticulously secured. The protamine was given an chest tube was placed through the track where the stabilizer device had been inserted.  The ribs were reapproximated with #5 vicryl sutures.  The fascia was closed with running 0-vicryl suture.  The subcutaneous tissue was closed in layers with the skin closed with a running 4-0 vicryl suture.  A dry sterile dressing was applied.  All instrument, needle and sponge counts were correct at the end of the case.  The patient was extubated and transferred to the ICU in stable condition.     Complications:  None; patient tolerated the procedure well.    Disposition: ICU - intubated and hemodynamically stable.  Condition: stable         Additional Details:     Attending Attestation: I was present and scrubbed for the entire procedure.    Jorje Neal  Phone Number: [phone]

## 2023-10-12 ENCOUNTER — APPOINTMENT (OUTPATIENT)
Dept: RADIOLOGY | Facility: HOSPITAL | Age: 83
DRG: 235 | End: 2023-10-12
Payer: MEDICARE

## 2023-10-12 LAB
ABO GROUP (TYPE) IN BLOOD: NORMAL
ACT BLD: 222 SEC (ref 96–152)
ACT BLD: 352 SEC (ref 96–152)
ACT BLD: 394 SEC (ref 96–152)
ACT BLD: 536 SEC (ref 96–152)
ACT BLD: 98 SEC (ref 96–152)
ALBUMIN SERPL BCP-MCNC: 3.2 G/DL (ref 3.4–5)
ANION GAP BLDA CALCULATED.4IONS-SCNC: 11 MMO/L (ref 10–25)
ANION GAP BLDA CALCULATED.4IONS-SCNC: 8 MMO/L (ref 10–25)
ANION GAP SERPL CALC-SCNC: 19 MMOL/L (ref 10–20)
ANTIBODY SCREEN: NORMAL
APTT PPP: 32 SECONDS (ref 27–38)
BASE EXCESS BLDA CALC-SCNC: 0.3 MMOL/L (ref -2–3)
BASE EXCESS BLDA CALC-SCNC: 1 MMOL/L (ref -2–3)
BLOOD EXPIRATION DATE: NORMAL
BODY TEMPERATURE: 37 DEGREES CELSIUS
BODY TEMPERATURE: 37 DEGREES CELSIUS
BUN SERPL-MCNC: 62 MG/DL (ref 6–23)
CA-I BLD-SCNC: 1.12 MMOL/L (ref 1.1–1.33)
CA-I BLDA-SCNC: 1.16 MMOL/L (ref 1.1–1.33)
CA-I BLDA-SCNC: 1.22 MMOL/L (ref 1.1–1.33)
CALCIUM SERPL-MCNC: 9.2 MG/DL (ref 8.6–10.6)
CHLORIDE BLDA-SCNC: 101 MMOL/L (ref 98–107)
CHLORIDE BLDA-SCNC: 104 MMOL/L (ref 98–107)
CHLORIDE SERPL-SCNC: 99 MMOL/L (ref 98–107)
CO2 SERPL-SCNC: 24 MMOL/L (ref 21–32)
CREAT SERPL-MCNC: 4.04 MG/DL (ref 0.5–1.3)
DISPENSE STATUS: NORMAL
ERYTHROCYTE [DISTWIDTH] IN BLOOD BY AUTOMATED COUNT: 15 % (ref 11.5–14.5)
GFR SERPL CREATININE-BSD FRML MDRD: 14 ML/MIN/1.73M*2
GLUCOSE BLD MANUAL STRIP-MCNC: 212 MG/DL (ref 74–99)
GLUCOSE BLD MANUAL STRIP-MCNC: 216 MG/DL (ref 74–99)
GLUCOSE BLD MANUAL STRIP-MCNC: 274 MG/DL (ref 74–99)
GLUCOSE BLD MANUAL STRIP-MCNC: 318 MG/DL (ref 74–99)
GLUCOSE BLDA-MCNC: 151 MG/DL (ref 74–99)
GLUCOSE BLDA-MCNC: 166 MG/DL (ref 74–99)
GLUCOSE SERPL-MCNC: 150 MG/DL (ref 74–99)
HCO3 BLDA-SCNC: 25.4 MMOL/L (ref 22–26)
HCO3 BLDA-SCNC: 26 MMOL/L (ref 22–26)
HCT VFR BLD AUTO: 30.6 % (ref 41–52)
HCT VFR BLD EST: 26 % (ref 41–52)
HCT VFR BLD EST: 29 % (ref 41–52)
HGB BLD-MCNC: 8.9 G/DL (ref 13.5–17.5)
HGB BLDA-MCNC: 8.8 G/DL (ref 13.5–17.5)
HGB BLDA-MCNC: 9.5 G/DL (ref 13.5–17.5)
INHALED O2 CONCENTRATION: 28 %
INR PPP: 1 (ref 0.9–1.1)
LACTATE BLDA-SCNC: 0.6 MMOL/L (ref 0.4–2)
LACTATE BLDA-SCNC: 1.6 MMOL/L (ref 0.4–2)
MAGNESIUM SERPL-MCNC: 2.02 MG/DL (ref 1.6–2.4)
MCH RBC QN AUTO: 31.3 PG (ref 26–34)
MCHC RBC AUTO-ENTMCNC: 29.1 G/DL (ref 32–36)
MCV RBC AUTO: 108 FL (ref 80–100)
NRBC BLD-RTO: 0 /100 WBCS (ref 0–0)
OXYHGB MFR BLDA: 94.5 % (ref 94–98)
OXYHGB MFR BLDA: 97.2 % (ref 94–98)
PCO2 BLDA: 42 MM HG (ref 38–42)
PCO2 BLDA: 42 MM HG (ref 38–42)
PH BLDA: 7.39 PH (ref 7.38–7.42)
PH BLDA: 7.4 PH (ref 7.38–7.42)
PHOSPHATE SERPL-MCNC: 4.5 MG/DL (ref 2.5–4.9)
PLATELET # BLD AUTO: 274 X10*3/UL (ref 150–450)
PMV BLD AUTO: 10.2 FL (ref 7.5–11.5)
PO2 BLDA: 195 MM HG (ref 85–95)
PO2 BLDA: 80 MM HG (ref 85–95)
POTASSIUM BLDA-SCNC: 4.9 MMOL/L (ref 3.5–5.3)
POTASSIUM BLDA-SCNC: 5.2 MMOL/L (ref 3.5–5.3)
POTASSIUM SERPL-SCNC: 4.9 MMOL/L (ref 3.5–5.3)
PRODUCT BLOOD TYPE: 9500
PRODUCT CODE: NORMAL
PROTHROMBIN TIME: 11.2 SECONDS (ref 9.8–12.8)
RBC # BLD AUTO: 2.84 X10*6/UL (ref 4.5–5.9)
RH FACTOR (ANTIGEN D): NORMAL
SAO2 % BLDA: 100 % (ref 94–100)
SAO2 % BLDA: 97 % (ref 94–100)
SODIUM BLDA-SCNC: 132 MMOL/L (ref 136–145)
SODIUM BLDA-SCNC: 133 MMOL/L (ref 136–145)
SODIUM SERPL-SCNC: 137 MMOL/L (ref 136–145)
UNIT ABO: NORMAL
UNIT NUMBER: NORMAL
UNIT RH: NORMAL
UNIT VOLUME: 350
UNIT VOLUME: 350
UNIT VOLUME: 400
UNIT VOLUME: 400
WBC # BLD AUTO: 10.7 X10*3/UL (ref 4.4–11.3)
XM INTEP: NORMAL

## 2023-10-12 PROCEDURE — 2500000001 HC RX 250 WO HCPCS SELF ADMINISTERED DRUGS (ALT 637 FOR MEDICARE OP): Performed by: NURSE PRACTITIONER

## 2023-10-12 PROCEDURE — 2500000002 HC RX 250 W HCPCS SELF ADMINISTERED DRUGS (ALT 637 FOR MEDICARE OP, ALT 636 FOR OP/ED): Performed by: NURSE PRACTITIONER

## 2023-10-12 PROCEDURE — 2500000001 HC RX 250 WO HCPCS SELF ADMINISTERED DRUGS (ALT 637 FOR MEDICARE OP)

## 2023-10-12 PROCEDURE — 82330 ASSAY OF CALCIUM: CPT | Performed by: NURSE PRACTITIONER

## 2023-10-12 PROCEDURE — 97162 PT EVAL MOD COMPLEX 30 MIN: CPT | Mod: GP | Performed by: STUDENT IN AN ORGANIZED HEALTH CARE EDUCATION/TRAINING PROGRAM

## 2023-10-12 PROCEDURE — 85730 THROMBOPLASTIN TIME PARTIAL: CPT | Performed by: NURSE PRACTITIONER

## 2023-10-12 PROCEDURE — 90947 DIALYSIS REPEATED EVAL: CPT

## 2023-10-12 PROCEDURE — 99232 SBSQ HOSP IP/OBS MODERATE 35: CPT | Performed by: STUDENT IN AN ORGANIZED HEALTH CARE EDUCATION/TRAINING PROGRAM

## 2023-10-12 PROCEDURE — 84132 ASSAY OF SERUM POTASSIUM: CPT

## 2023-10-12 PROCEDURE — 96372 THER/PROPH/DIAG INJ SC/IM: CPT | Performed by: NURSE PRACTITIONER

## 2023-10-12 PROCEDURE — 2500000004 HC RX 250 GENERAL PHARMACY W/ HCPCS (ALT 636 FOR OP/ED): Performed by: NURSE PRACTITIONER

## 2023-10-12 PROCEDURE — 82435 ASSAY OF BLOOD CHLORIDE: CPT | Performed by: NURSE PRACTITIONER

## 2023-10-12 PROCEDURE — 71045 X-RAY EXAM CHEST 1 VIEW: CPT | Performed by: RADIOLOGY

## 2023-10-12 PROCEDURE — 37799 UNLISTED PX VASCULAR SURGERY: CPT | Performed by: NURSE PRACTITIONER

## 2023-10-12 PROCEDURE — 82947 ASSAY GLUCOSE BLOOD QUANT: CPT

## 2023-10-12 PROCEDURE — 85027 COMPLETE CBC AUTOMATED: CPT | Performed by: STUDENT IN AN ORGANIZED HEALTH CARE EDUCATION/TRAINING PROGRAM

## 2023-10-12 PROCEDURE — 84132 ASSAY OF SERUM POTASSIUM: CPT | Performed by: NURSE PRACTITIONER

## 2023-10-12 PROCEDURE — 83735 ASSAY OF MAGNESIUM: CPT | Performed by: NURSE PRACTITIONER

## 2023-10-12 PROCEDURE — 37799 UNLISTED PX VASCULAR SURGERY: CPT | Performed by: STUDENT IN AN ORGANIZED HEALTH CARE EDUCATION/TRAINING PROGRAM

## 2023-10-12 PROCEDURE — 2500000004 HC RX 250 GENERAL PHARMACY W/ HCPCS (ALT 636 FOR OP/ED)

## 2023-10-12 PROCEDURE — 71045 X-RAY EXAM CHEST 1 VIEW: CPT

## 2023-10-12 PROCEDURE — 82947 ASSAY GLUCOSE BLOOD QUANT: CPT | Performed by: NURSE PRACTITIONER

## 2023-10-12 PROCEDURE — 1100000001 HC PRIVATE ROOM DAILY

## 2023-10-12 PROCEDURE — 86901 BLOOD TYPING SEROLOGIC RH(D): CPT | Performed by: NURSE PRACTITIONER

## 2023-10-12 PROCEDURE — 99232 SBSQ HOSP IP/OBS MODERATE 35: CPT | Performed by: NURSE PRACTITIONER

## 2023-10-12 PROCEDURE — 99232 SBSQ HOSP IP/OBS MODERATE 35: CPT

## 2023-10-12 RX ORDER — METOPROLOL TARTRATE 25 MG/1
25 TABLET, FILM COATED ORAL 2 TIMES DAILY
Status: DISCONTINUED | OUTPATIENT
Start: 2023-10-12 | End: 2023-10-17 | Stop reason: HOSPADM

## 2023-10-12 RX ORDER — INSULIN LISPRO 100 [IU]/ML
0-5 INJECTION, SOLUTION INTRAVENOUS; SUBCUTANEOUS
Status: DISCONTINUED | OUTPATIENT
Start: 2023-10-12 | End: 2023-10-15

## 2023-10-12 RX ORDER — TORSEMIDE 100 MG/1
50 TABLET ORAL DAILY
Status: DISCONTINUED | OUTPATIENT
Start: 2023-10-12 | End: 2023-10-12

## 2023-10-12 RX ORDER — NYSTATIN 100000 [USP'U]/ML
5 SUSPENSION ORAL 3 TIMES DAILY
Status: DISCONTINUED | OUTPATIENT
Start: 2023-10-12 | End: 2023-10-17 | Stop reason: HOSPADM

## 2023-10-12 RX ORDER — CLOPIDOGREL BISULFATE 75 MG/1
75 TABLET ORAL DAILY
Status: DISCONTINUED | OUTPATIENT
Start: 2023-10-12 | End: 2023-10-12

## 2023-10-12 RX ADMIN — ACETAMINOPHEN 650 MG: 325 TABLET, FILM COATED ORAL at 08:01

## 2023-10-12 RX ADMIN — ACETAMINOPHEN 650 MG: 325 TABLET ORAL at 13:15

## 2023-10-12 RX ADMIN — METOPROLOL TARTRATE 25 MG: 25 TABLET, FILM COATED ORAL at 20:35

## 2023-10-12 RX ADMIN — PANTOPRAZOLE SODIUM 40 MG: 40 TABLET, DELAYED RELEASE ORAL at 08:06

## 2023-10-12 RX ADMIN — GENTAMICIN SULFATE: 1 CREAM TOPICAL at 16:53

## 2023-10-12 RX ADMIN — ACETAMINOPHEN 650 MG: 325 TABLET ORAL at 20:35

## 2023-10-12 RX ADMIN — ATORVASTATIN CALCIUM 80 MG: 80 TABLET, FILM COATED ORAL at 20:35

## 2023-10-12 RX ADMIN — PSYLLIUM HUSK 1 PACKET: 3.4 POWDER ORAL at 09:00

## 2023-10-12 RX ADMIN — ASPIRIN 81 MG CHEWABLE TABLET 81 MG: 81 TABLET CHEWABLE at 08:16

## 2023-10-12 RX ADMIN — INSULIN LISPRO 10 UNITS: 100 INJECTION, SOLUTION INTRAVENOUS; SUBCUTANEOUS at 08:00

## 2023-10-12 RX ADMIN — SENNOSIDES 17.2 MG: 8.6 TABLET, FILM COATED ORAL at 20:35

## 2023-10-12 RX ADMIN — INSULIN LISPRO 3 UNITS: 100 INJECTION, SOLUTION INTRAVENOUS; SUBCUTANEOUS at 12:00

## 2023-10-12 RX ADMIN — HEPARIN SODIUM: 1000 INJECTION INTRAVENOUS; SUBCUTANEOUS at 16:54

## 2023-10-12 RX ADMIN — HYDROMORPHONE HYDROCHLORIDE 0.2 MG: 1 INJECTION, SOLUTION INTRAMUSCULAR; INTRAVENOUS; SUBCUTANEOUS at 20:35

## 2023-10-12 RX ADMIN — METOPROLOL TARTRATE 25 MG: 25 TABLET, FILM COATED ORAL at 09:30

## 2023-10-12 RX ADMIN — CEFAZOLIN SODIUM 1 G: 1 INJECTION, SOLUTION INTRAVENOUS at 08:05

## 2023-10-12 RX ADMIN — ACETAMINOPHEN 650 MG: 325 TABLET ORAL at 17:15

## 2023-10-12 RX ADMIN — DULOXETINE HYDROCHLORIDE 30 MG: 30 CAPSULE, DELAYED RELEASE ORAL at 08:06

## 2023-10-12 RX ADMIN — FLUTICASONE PROPIONATE 2 SPRAY: 50 SPRAY, METERED NASAL at 08:16

## 2023-10-12 RX ADMIN — HEPARIN SODIUM: 1000 INJECTION INTRAVENOUS; SUBCUTANEOUS at 16:46

## 2023-10-12 RX ADMIN — SENNOSIDES 17.2 MG: 8.6 TABLET, FILM COATED ORAL at 08:16

## 2023-10-12 RX ADMIN — LEVOTHYROXINE SODIUM 25 MCG: 25 TABLET ORAL at 06:49

## 2023-10-12 RX ADMIN — INSULIN LISPRO 4 UNITS: 100 INJECTION, SOLUTION INTRAVENOUS; SUBCUTANEOUS at 17:00

## 2023-10-12 RX ADMIN — NEPHROCAP 1 CAPSULE: 1 CAP ORAL at 08:06

## 2023-10-12 RX ADMIN — Medication 25 MCG: at 08:05

## 2023-10-12 RX ADMIN — POLYETHYLENE GLYCOL 3350 17 G: 17 POWDER, FOR SOLUTION ORAL at 08:05

## 2023-10-12 RX ADMIN — INSULIN DETEMIR 5 UNITS: 100 INJECTION, SOLUTION SUBCUTANEOUS at 20:35

## 2023-10-12 ASSESSMENT — PAIN - FUNCTIONAL ASSESSMENT
PAIN_FUNCTIONAL_ASSESSMENT: 0-10

## 2023-10-12 ASSESSMENT — COGNITIVE AND FUNCTIONAL STATUS - GENERAL
MOBILITY SCORE: 13
MOVING FROM LYING ON BACK TO SITTING ON SIDE OF FLAT BED WITH BEDRAILS: A LOT
TURNING FROM BACK TO SIDE WHILE IN FLAT BAD: A LOT
STANDING UP FROM CHAIR USING ARMS: A LITTLE
WALKING IN HOSPITAL ROOM: A LOT
CLIMB 3 TO 5 STEPS WITH RAILING: TOTAL
MOVING TO AND FROM BED TO CHAIR: A LITTLE

## 2023-10-12 ASSESSMENT — PAIN SCALES - GENERAL
PAINLEVEL_OUTOF10: 0 - NO PAIN
PAINLEVEL_OUTOF10: 0 - NO PAIN
PAINLEVEL_OUTOF10: 3
PAINLEVEL_OUTOF10: 0 - NO PAIN
PAINLEVEL_OUTOF10: 7
PAINLEVEL_OUTOF10: 0 - NO PAIN

## 2023-10-12 NOTE — PROGRESS NOTES
Met with patient and daughter (Angelia) for discharge planning of C loc. Patient states that the [facility] in [place] where he and wife (Yoly) reside has their own C agency (name unk). Outbound call to [facility] [phone] requesting name of agency & where to send clinicals/hco. Awaiting call back.   Carmelita Acuna, NELLYW

## 2023-10-12 NOTE — PROGRESS NOTES
Salomon Chowdary   83 y.o.  M   MRN/Room: [id number]/10/10-A    Subjective:   Seen and examined in the CT-ICU today  Extubated  Doing well, no pressors   Tolerated PD overnight       Objective:     Meds:   acetaminophen, 650 mg, q4h   Or  acetaminophen, 650 mg, q4h  aspirin, 81 mg, Daily  atorvastatin, 80 mg, Nightly  B complex-vitamin C-folic acid, 1 capsule, Daily  ceFAZolin, 1 g, q24h  cholecalciferol, 25 mcg, Daily  darbepoetin alyssa, 40 mcg, Weekly  dextrose 1.5% - LOW calcium 2.5mEq/L (Dianeal, Delflex-LC) in 3,000 mL peritoneal dialysate, , Once   Followed by  dextrose 1.5% - LOW calcium 2.5mEq/L (Dianeal, Delflex-LC) in 3,000 mL peritoneal dialysate, , Once  DULoxetine, 30 mg, Daily  fluconazole, 200 mg, q48h  fluticasone, 2 spray, Daily  gentamicin, , Daily  insulin lispro, 0-15 Units, q4h  levothyroxine, 25 mcg, Daily  metoprolol tartrate, 25 mg, BID  neostigmine, 2 mg, Once  pantoprazole, 40 mg, Daily before breakfast   Or  pantoprazole, 40 mg, Daily before breakfast  polyethylene glycol, 17 g, Daily  psyllium, 1 packet, Daily  sennosides, 2 tablet, BID      heparin 500 Units/L in dextrose 1.5% - LOW calcium 2.5mEq/L (Dianeal, Delflex-LC) 3,000 mL peritoneal dialysate   Followed by  [START ON 10/18/2023] heparin 500 Units/L in dextrose 1.5% - LOW calcium 2.5mEq/L (Dianeal, Delflex-LC) 3,000 mL peritoneal dialysate  lactated Ringer's, Last Rate: 30 mL/hr (10/11/23 2000)  lactated Ringer's, Last Rate: 5 mL/hr (10/11/23 2000)      acetaminophen, 650 mg, q6h PRN  dextrose 10 % in water (D10W), 0.3 g/kg/hr, Once PRN  dextrose, 25 g, q15 min PRN  glucagon, 1 mg, q15 min PRN  HYDROmorphone, 0.2 mg, q15 min PRN  naloxone, 0.2 mg, q5 min PRN  oxygen, , Continuous PRN - O2/gases  oxygen, , Continuous PRN - O2/gases        Vitals:    10/12/23 1000   BP:    Pulse: 91   Resp: 22   Temp:    SpO2: 98%          Intake/Output Summary (Last 24 hours) at 10/12/2023 1103  Last data filed at 10/12/2023 1000  Gross per 24 hour    Intake 3796.69 ml   Output 1325 ml   Net 2471.69 ml       General appearance: Sitting out of bed, no distress   Eyes: non-icteric  Skin: no apparent rash  Heart: S1 S2 regular  Lungs: CTA bilat No wheezing/crackles  Abdomen: soft, non-distended  Extremities: No edema bilat  Neuro: A & O x 3     Blood Labs:  Results for orders placed or performed during the hospital encounter of 09/30/23 (from the past 24 hour(s))   Blood Gas Arterial Full Panel Unsolicited   Result Value Ref Range    POCT pH, Arterial 7.38 7.38 - 7.42 pH    POCT pCO2, Arterial 45 (H) 38 - 42 mm Hg    POCT pO2, Arterial 71 (L) 85 - 95 mm Hg    POCT SO2, Arterial 96 94 - 100 %    POCT Oxy Hemoglobin, Arterial 93.8 (L) 94.0 - 98.0 %    POCT Hematocrit Calculated, Arterial 27.0 (L) 41.0 - 52.0 %    POCT Sodium, Arterial 134 (L) 136 - 145 mmol/L    POCT Potassium, Arterial 5.3 3.5 - 5.3 mmol/L    POCT Chloride, Arterial 101 98 - 107 mmol/L    POCT Ionized Calcium, Arterial 1.15 1.10 - 1.33 mmol/L    POCT Glucose, Arterial 182 (H) 74 - 99 mg/dL    POCT Lactate, Arterial 0.9 0.4 - 2.0 mmol/L    POCT Base Excess, Arterial 1.2 -2.0 - 3.0 mmol/L    POCT HCO3 Calculated, Arterial 26.6 (H) 22.0 - 26.0 mmol/L    POCT Hemoglobin, Arterial 8.9 (L) 13.5 - 17.5 g/dL    POCT Anion Gap, Arterial 12 10 - 25 mmo/L    Patient Temperature 37.0 degrees Celsius    FiO2 100 %   Coox Panel, Arterial Unsolicited   Result Value Ref Range    POCT Hemoglobin, Arterial 8.9 (L) 13.5 - 17.5 g/dL    POCT Oxy Hemoglobin, Arterial 93.8 (L) 94.0 - 98.0 %    POCT Carboxyhemoglobin, Arterial 1.4 %    POCT Methemoglobin, Arterial 0.7 0.0 - 1.5 %    POCT Deoxy Hemoglobin, Arterial 4.1 0.0 - 5.0 %   ACTIVATED CLOTTING TIME HIGH   Result Value Ref Range    POCT Activated Clotting Time High Range 98 96 - 152 sec   TEG Clot Global Profile Unsolicited   Result Value Ref Range    R (Reaction Time) K 6.6 4.6 - 9.1 min    K (Clot Kinetics) 1.1 0.8 - 2.1 min    ANGLE 77.0 63.0 - 78.0 deg    MA  (Max Amplitude) K 70.0 (H) 52.0 - 69.0 mm    R (Reaction Time) KH 6.5 4.3 - 8.3 min    MA (Max Amplitude) RT 71.0 (H) 52.0 - 70.0 mm    MA ( Prince Amplitude) FF 44.0 (H) 15.0 - 32.0 mm    FLEV 807 (H) 278 - 581 mg/dL    Test Comment post protamine    Blood Gas Arterial Full Panel Unsolicited   Result Value Ref Range    POCT pH, Arterial 7.40 7.38 - 7.42 pH    POCT pCO2, Arterial 43 (H) 38 - 42 mm Hg    POCT pO2, Arterial 363 (H) 85 - 95 mm Hg    POCT SO2, Arterial 98 94 - 100 %    POCT Oxy Hemoglobin, Arterial 97.2 94.0 - 98.0 %    POCT Hematocrit Calculated, Arterial 25.0 (L) 41.0 - 52.0 %    POCT Sodium, Arterial 134 (L) 136 - 145 mmol/L    POCT Potassium, Arterial 4.8 3.5 - 5.3 mmol/L    POCT Chloride, Arterial 102 98 - 107 mmol/L    POCT Ionized Calcium, Arterial 1.14 1.10 - 1.33 mmol/L    POCT Glucose, Arterial 164 (H) 74 - 99 mg/dL    POCT Lactate, Arterial 1.0 0.4 - 2.0 mmol/L    POCT Base Excess, Arterial 1.6 -2.0 - 3.0 mmol/L    POCT HCO3 Calculated, Arterial 26.6 (H) 22.0 - 26.0 mmol/L    POCT Hemoglobin, Arterial 8.3 (L) 13.5 - 17.5 g/dL    POCT Anion Gap, Arterial 10 10 - 25 mmo/L    Patient Temperature 37.0 degrees Celsius    FiO2 100 %   Coox Panel, Arterial Unsolicited   Result Value Ref Range    POCT Hemoglobin, Arterial 8.3 (L) 13.5 - 17.5 g/dL    POCT Oxy Hemoglobin, Arterial 97.2 94.0 - 98.0 %    POCT Carboxyhemoglobin, Arterial 0.0 %    POCT Methemoglobin, Arterial 0.4 0.0 - 1.5 %    POCT Deoxy Hemoglobin, Arterial 2.4 0.0 - 5.0 %   Calcium, Ionized   Result Value Ref Range    POCT Calcium, Ionized 1.11 1.1 - 1.33 mmol/L   Magnesium   Result Value Ref Range    Magnesium 2.04 1.60 - 2.40 mg/dL   Coagulation Screen   Result Value Ref Range    Protime 12.1 9.8 - 12.8 seconds    INR 1.1 0.9 - 1.1    aPTT 27 27 - 38 seconds   Fibrinogen   Result Value Ref Range    Fibrinogen 559 (H) 200 - 400 mg/dL   CBC   Result Value Ref Range    WBC 13.1 (H) 4.4 - 11.3 x10*3/uL    nRBC 0.0 0.0 - 0.0 /100 WBCs    RBC  2.58 (L) 4.50 - 5.90 x10*6/uL    Hemoglobin 8.4 (L) 13.5 - 17.5 g/dL    Hematocrit 27.8 (L) 41.0 - 52.0 %     (H) 80 - 100 fL    MCH 32.6 26.0 - 34.0 pg    MCHC 30.2 (L) 32.0 - 36.0 g/dL    RDW 14.8 (H) 11.5 - 14.5 %    Platelets 291 150 - 450 x10*3/uL    MPV 9.7 7.5 - 11.5 fL   Renal Function Panel   Result Value Ref Range    Glucose 122 (H) 74 - 99 mg/dL    Sodium 139 136 - 145 mmol/L    Potassium 4.9 3.5 - 5.3 mmol/L    Chloride 101 98 - 107 mmol/L    Bicarbonate 25 21 - 32 mmol/L    Anion Gap 18 10 - 20 mmol/L    Urea Nitrogen 67 (H) 6 - 23 mg/dL    Creatinine 4.25 (H) 0.50 - 1.30 mg/dL    eGFR 13 (L) >60 mL/min/1.73m*2    Calcium 8.3 (L) 8.6 - 10.6 mg/dL    Phosphorus 2.7 2.5 - 4.9 mg/dL    Albumin 3.1 (L) 3.4 - 5.0 g/dL   POCT GLUCOSE   Result Value Ref Range    POCT Glucose 126 (H) 74 - 99 mg/dL   Blood Gas Arterial Full Panel Unsolicited   Result Value Ref Range    POCT pH, Arterial 7.44 (H) 7.38 - 7.42 pH    POCT pCO2, Arterial 39 38 - 42 mm Hg    POCT pO2, Arterial 210 (H) 85 - 95 mm Hg    POCT SO2, Arterial 99 94 - 100 %    POCT Oxy Hemoglobin, Arterial 96.3 94.0 - 98.0 %    POCT Hematocrit Calculated, Arterial 27.0 (L) 41.0 - 52.0 %    POCT Sodium, Arterial 134 (L) 136 - 145 mmol/L    POCT Potassium, Arterial 5.0 3.5 - 5.3 mmol/L    POCT Chloride, Arterial 103 98 - 107 mmol/L    POCT Ionized Calcium, Arterial 1.18 1.10 - 1.33 mmol/L    POCT Glucose, Arterial 146 (H) 74 - 99 mg/dL    POCT Lactate, Arterial 1.5 0.4 - 2.0 mmol/L    POCT Base Excess, Arterial 2.2 -2.0 - 3.0 mmol/L    POCT HCO3 Calculated, Arterial 26.5 (H) 22.0 - 26.0 mmol/L    POCT Hemoglobin, Arterial 9.0 (L) 13.5 - 17.5 g/dL    POCT Anion Gap, Arterial 10 10 - 25 mmo/L    Patient Temperature 37.0 degrees Celsius    FiO2 50 %   POCT GLUCOSE   Result Value Ref Range    POCT Glucose 109 (H) 74 - 99 mg/dL   Blood Gas Arterial Full Panel Unsolicited   Result Value Ref Range    POCT pH, Arterial 7.38 7.38 - 7.42 pH    POCT pCO2, Arterial  45 (H) 38 - 42 mm Hg    POCT pO2, Arterial 168 (H) 85 - 95 mm Hg    POCT SO2, Arterial 100 94 - 100 %    POCT Oxy Hemoglobin, Arterial 97.3 94.0 - 98.0 %    POCT Hematocrit Calculated, Arterial 26.0 (L) 41.0 - 52.0 %    POCT Sodium, Arterial 133 (L) 136 - 145 mmol/L    POCT Potassium, Arterial 5.2 3.5 - 5.3 mmol/L    POCT Chloride, Arterial 102 98 - 107 mmol/L    POCT Ionized Calcium, Arterial 1.18 1.10 - 1.33 mmol/L    POCT Glucose, Arterial 143 (H) 74 - 99 mg/dL    POCT Lactate, Arterial 0.9 0.4 - 2.0 mmol/L    POCT Base Excess, Arterial 1.2 -2.0 - 3.0 mmol/L    POCT HCO3 Calculated, Arterial 26.6 (H) 22.0 - 26.0 mmol/L    POCT Hemoglobin, Arterial 8.8 (L) 13.5 - 17.5 g/dL    POCT Anion Gap, Arterial 10 10 - 25 mmo/L    Patient Temperature 37.0 degrees Celsius   Blood Gas Arterial Full Panel   Result Value Ref Range    POCT pH, Arterial 7.39 7.38 - 7.42 pH    POCT pCO2, Arterial 42 38 - 42 mm Hg    POCT pO2, Arterial 195 (H) 85 - 95 mm Hg    POCT SO2, Arterial 100 94 - 100 %    POCT Oxy Hemoglobin, Arterial 97.2 94.0 - 98.0 %    POCT Hematocrit Calculated, Arterial 26.0 (L) 41.0 - 52.0 %    POCT Sodium, Arterial 132 (L) 136 - 145 mmol/L    POCT Potassium, Arterial 5.2 3.5 - 5.3 mmol/L    POCT Chloride, Arterial 104 98 - 107 mmol/L    POCT Ionized Calcium, Arterial 1.16 1.10 - 1.33 mmol/L    POCT Glucose, Arterial 166 (H) 74 - 99 mg/dL    POCT Lactate, Arterial 0.6 0.4 - 2.0 mmol/L    POCT Base Excess, Arterial 0.3 -2.0 - 3.0 mmol/L    POCT HCO3 Calculated, Arterial 25.4 22.0 - 26.0 mmol/L    POCT Hemoglobin, Arterial 8.8 (L) 13.5 - 17.5 g/dL    POCT Anion Gap, Arterial 8 (L) 10 - 25 mmo/L    Patient Temperature 37.0 degrees Celsius    FiO2 28 %   POCT GLUCOSE   Result Value Ref Range    POCT Glucose 216 (H) 74 - 99 mg/dL   Calcium, Ionized   Result Value Ref Range    POCT Calcium, Ionized 1.12 1.1 - 1.33 mmol/L   Magnesium   Result Value Ref Range    Magnesium 2.02 1.60 - 2.40 mg/dL   Renal Function Panel   Result  Value Ref Range    Glucose 150 (H) 74 - 99 mg/dL    Sodium 137 136 - 145 mmol/L    Potassium 4.9 3.5 - 5.3 mmol/L    Chloride 99 98 - 107 mmol/L    Bicarbonate 24 21 - 32 mmol/L    Anion Gap 19 10 - 20 mmol/L    Urea Nitrogen 62 (H) 6 - 23 mg/dL    Creatinine 4.04 (H) 0.50 - 1.30 mg/dL    eGFR 14 (L) >60 mL/min/1.73m*2    Calcium 9.2 8.6 - 10.6 mg/dL    Phosphorus 4.5 2.5 - 4.9 mg/dL    Albumin 3.2 (L) 3.4 - 5.0 g/dL   Coagulation Screen   Result Value Ref Range    Protime 11.2 9.8 - 12.8 seconds    INR 1.0 0.9 - 1.1    aPTT 32 27 - 38 seconds   Blood Gas Arterial Full Panel Unsolicited   Result Value Ref Range    POCT pH, Arterial 7.40 7.38 - 7.42 pH    POCT pCO2, Arterial 42 38 - 42 mm Hg    POCT pO2, Arterial 80 (L) 85 - 95 mm Hg    POCT SO2, Arterial 97 94 - 100 %    POCT Oxy Hemoglobin, Arterial 94.5 94.0 - 98.0 %    POCT Hematocrit Calculated, Arterial 29.0 (L) 41.0 - 52.0 %    POCT Sodium, Arterial 133 (L) 136 - 145 mmol/L    POCT Potassium, Arterial 4.9 3.5 - 5.3 mmol/L    POCT Chloride, Arterial 101 98 - 107 mmol/L    POCT Ionized Calcium, Arterial 1.22 1.10 - 1.33 mmol/L    POCT Glucose, Arterial 151 (H) 74 - 99 mg/dL    POCT Lactate, Arterial 1.6 0.4 - 2.0 mmol/L    POCT Base Excess, Arterial 1.0 -2.0 - 3.0 mmol/L    POCT HCO3 Calculated, Arterial 26.0 22.0 - 26.0 mmol/L    POCT Hemoglobin, Arterial 9.5 (L) 13.5 - 17.5 g/dL    POCT Anion Gap, Arterial 11 10 - 25 mmo/L    Patient Temperature 37.0 degrees Celsius   CBC   Result Value Ref Range    WBC 10.7 4.4 - 11.3 x10*3/uL    nRBC 0.0 0.0 - 0.0 /100 WBCs    RBC 2.84 (L) 4.50 - 5.90 x10*6/uL    Hemoglobin 8.9 (L) 13.5 - 17.5 g/dL    Hematocrit 30.6 (L) 41.0 - 52.0 %     (H) 80 - 100 fL    MCH 31.3 26.0 - 34.0 pg    MCHC 29.1 (L) 32.0 - 36.0 g/dL    RDW 15.0 (H) 11.5 - 14.5 %    Platelets 274 150 - 450 x10*3/uL    MPV 10.2 7.5 - 11.5 fL   Lavender Top   Result Value Ref Range    Extra Tube Hold for add-ons.    POCT GLUCOSE   Result Value Ref Range     POCT Glucose 212 (H) 74 - 99 mg/dL      ASSESSMENT:  Salomon Chowdary is a  83 y.o. M, with PMHx of CAD, HTN, IDDM, ESRD on PD since March 2023, who is currently admitted to the CT-ICU s/p [facility] surgery on 10/11. Nephrology following to facilitate PD while inpatient.     RECOMMENDATIONS:  -PD tonight at home prescription, Total fill volume 5000cc, 8 hrs duration, 2400 fill volume, 1.5% dextrose   -Due to fibrin in bags overnight, will add Heparin to bags for tonight and re-assess tomorrow    -Now that patient is extubated, can discontinue Fluconazole and start Nystatin swish and swallow for fungal prophylaxis (as long as patient remains on antibiotics)    -Continue Gentamicin to exit site     DW attending Dr Any Soto MD  Nephrology Fellow PGY 4  Available via Epic chat

## 2023-10-12 NOTE — CONSULTS
Service:   Service: Cardiology     Consult:  Consult requested by (Attending Name): Daksha Salguero   Reason: NSTEMI vs demand from PE     History of Present Illness:   HPI:    MAGGIE KIRAN is a 83 year old Male with left arm pain of moderate intensity went to the shoulder and later today chest.  No palpitation dizziness or syncope no  emesis or diaphoresis.    In ED his vitals were stable but was found to be in A-fib RVR, he was initially found to have high D-dimer and later cardiac markers were rising CT did not reveal PE but pericardial effusion    Review Family/Social History and ROS:   Social History:    Smoking Status: never smoker  (1)   Alcohol Use: denies (1)   Drug Use: denies  (1)            Allergies:  ·  opioid-like analgesics : Other    Objective:     Objective Information:        T   P  R  BP   MAP  SpO2   Value  37.1  94  28  141/80   108  96%  Date/Time 9/20 4:13 9/20 4:13 9/20 4:13 9/20 4:13  9/20 4:13 9/20 4:13  Range  (36.5C - 37.1C )  (68 - 120 )  (18 - 28 )  (97 - 152 )/ (57 - 97 )  (79 - 121 )  (93% - 99% )  Highest temp of 37.1 C was recorded at 9/20 4:13    Medications:    Medications:      CARDIOVASCULAR AGENTS:    1. amLODIPine (NORVASC):  10  mg  Oral  Daily    2. Torsemide:  50  mg  Oral  Daily    3. hydrALAZINE (APRESOLINE):  25  mg  Oral  Every 8 Hours      CENTRAL NERVOUS SYSTEM AGENTS:    1. Acetaminophen:  975  mg  Oral  Every 6 Hours   PRN       2. Aspirin Enteric Coated:  81  mg  Oral  Daily      COAGULATION MODIFIERS:    1. Heparin (Repeat Bolus) Injectable:  4000  unit(s)  IntraVenous Push  Every 4 Hours    2. Heparin 25,000 units/ D5W 250 mL Infusion:  900  units/hr  IntraVenous  <Continuous>      GASTROINTESTINAL AGENTS:    1. Sodium Bicarbonate:  650  mg  Oral  Every 12 Hours      HORMONES/HORMONE MODIFIERS:    1. Levothyroxine:  25  microgram(s)  Oral  Daily      METABOLIC AGENTS:    1. Insulin Lispro Moderate Corrective Scale:  unit(s)  SubCutaneous  Every 4  Hours    2. Atorvastatin:  80  mg  Oral  Daily    3. Dextrose 50% in Water Injectable:  25  gram(s)  IntraVenous Push  Every 15 Minutes   PRN       4. Glucagon Injectable:  1  mg  IntraMuscular  Every 15 Minutes   PRN         NUTRITIONAL PRODUCTS:    1. Sodium Chloride 0.9% Injectable Flush:  10  mL  IntraVenous Flush  Every 8 Hours and as Needed   PRN         PSYCHOTHERAPEUTIC AGENTS:    1. DULoxetine:  30  mg  Oral  Daily          Conditional Medication Orders       --------------------------------    1. Perflutren Lipid Microsphere (Activated) 1.3 mL / NaCL 0.9% T.V. 10 mL Injectable:  0.5  mL  IntraVenous Push  Once      Recent Lab Results:    Results:        I have reviewed these laboratory results:    Complete Blood Count  20-Sep-2023 04:56:00      Result Value    White Blood Cell Count  8.4    Nucleated Erythrocyte Count  0.0    Red Blood Cell Count  2.31   L   HGB  7.7   L   HCT  24.2   L   MCV  105   H   MCHC  31.8   L   PLT  248    RDW-CV  12.9      Renal Function Panel  20-Sep-2023 04:56:00      Result Value    Glucose, Serum  236   H   NA  133   L   K  3.8    CL  97   L   Bicarbonate, Serum  22    Anion Gap, Serum  18    BUN  68   H   CREAT  4.67   H   GFR Male  12   A   Calcium, Serum  8.3   L   Phosphorus, Serum  5.2   H   ALB  3.3   L     Magnesium, Serum  Trending View      Result 20-Sep-2023 04:56:00  19-Sep-2023 16:51:00    Magnesium, Serum 1.82   1.72        Troponin I, High Sensitivity  Trending View      Result 20-Sep-2023 02:18:00  19-Sep-2023 20:57:00  19-Sep-2023 18:15:00  19-Sep-2023 16:51:00    Troponin I, High Sensitivity 2226   H   1661   H   608   H   122   HH    Lab Comment:       TRP CALLED RB TO PLACIDO SCHNEIDER, 09/19/2023 18:11        Complete Blood Count + Differential  19-Sep-2023 16:51:00      Result Value    White Blood Cell Count  11.4   H   Nucleated Erythrocyte Count  0.0    Red Blood Cell Count  2.61   L   HGB  8.5   L   HCT  27.3   L   MCV  105   H   MCHC  31.1   L   PLT   239    RDW-CV  12.8    Neutrophil %  83.5    Immature Granulocytes %  0.7    Lymphocyte %  7.6    Monocyte %  7.6    Eosinophil %  0.3    Basophil %  0.3    Neutrophil Count  9.54   H   Lymphocyte Count  0.87    Monocyte Count  0.87   H   Eosinophil Count  0.04    Basophil Count  0.04      Comprehensive Metabolic Panel  19-Sep-2023 16:51:00      Result Value    Glucose, Serum  271   H   NA  134   L   K  3.9    CL  96   L   Bicarbonate, Serum  26    Anion Gap, Serum  16    BUN  64   H   CREAT  4.68   H   GFR Male  12   A   Calcium, Serum  8.9    ALB  3.7    ALKP  72    T Pro  7.1    T Bili  0.5    Alanine Aminotransferase, Serum  26    Aspartate Transaminase, Serum  23      Lipid Panel, Non-Fasting  19-Sep-2023 16:51:00      Result Value    Cholesterol, Serum  89 .    AGE      DESIRABLE   BORDERLINE HIGH   HIGH   0-19 Y     0 - 169       170 - 199     >/= 200  20-24 Y     0 - 189       190 - 224     >/= 225        >24 Y     0 - 199       200 - 239     >/= 240 **All ranges are based on fasting sampl    HDL Cholesterol, Serum  31.8 .    AGE      VERY LOW   LOW     NORMAL    HIGH     0-19 Y       < 35   < 40     40-45     ----  20-24 Y       ----   < 40       >45     ----    >24  Y       ----   < 40     40-60      >60.   A   Cholesterol/HDL Ratio  2.8 REF VALUESDESIRABLE  < 3.4HIGH RISK  > 5.0    Non-HDL Cholesterol (non-fasting panel)  58 AGE      DESIRABLE   BORDERLINE HIGH   HIGH     VERY HIGH   0-19 Y     0 - 119       120 - 144     >/= 145    >/= 160  20-24 Y     0 - 149       150 - 189      >/= 190      ----       >24 Y    30 MG/DL ABOVE LDL CHOLESTEROL GOAL.      Brain Natriuretic Peptide  19-Sep-2023 16:51:00      Result Value    Brain Natriuretic Peptide  103   H     D-Dimer, VTE Exclusion  19-Sep-2023 16:45:00      Result Value    D-Dimer, VTE Exclusion  3640   A       Radiology Results:    Results:        Impression:    1. There is no evidence of pulmonary embolus.     2. Moderate-sized pericardial effusion.  "Pericarditis not excluded.  Cardiac tamponade considered unlikely but not excluded. Small  bilateral pleural effusions noted.     3. Emphysematous changes are present. Mild subpleural fibrotic changes  also noted.           Signed by Jorje Sanon II, MD      CT Angio Chest for PE [Sep 19 2023  9:30PM]        Assessment:    ·  Acute chest pain and NSTEMI  HST 2226  ·  Moderate pericardial effusion by echo  ·  A-fib RVR new diagnosis  ·  Hypertension  ·  Hyperlipidemia  ·  DMThyroid disease  ·  ESRF-PD, macrocytic anemia less than 8  ·  Card meds PTA: Norvasc 10 daily, hydralazine 25 3 times daily, Demadex 50 daily    1.  Echocardiogram to better understand pericardial effusion and assess EF  2.  Anticoagulation now IV and will decide on long-term  3.  Beta-blockers  4.  TSH  5.  Already on statin  6.  Patient will be considered for heart catheterization after the above  7.        Consult Status:  Consult Status    (select all that apply): initial  consult complete, will follow   Consult Order ID: [id number]       Electronic Signatures:  Dilcia Santos)  (Signed 20-Sep-2023 17:49)   Authored: Service, History of Present Illness, Review  Family/Social History and ROS, Allergies, Objective, Assessment/Recommendations, Note Completion      Last Updated: 20-Sep-2023 17:49 by Dilcia Santos)    References:  1.  Data Referenced From \"Patient Profile - Adult v2\" 20-Sep-2023 02:12   "

## 2023-10-12 NOTE — PROGRESS NOTES
"Salomon Chowdary is a 83 y.o. male on day 12 of with IDDM, HTN, HLD, ESRD on PD,  CAD, A-Fib and hypothyroidism presented to an OSH on 9/20 with chest pain. He was found to have NSTEMI with LHC showing 2 vessel disease in the LAD and left circumflex. He was transferred to [facility] for evaluation of revascularization plan. Endocrinology is consulted iso abnormal TFT with TSH of 6.26 and FT4 1.12 as well as diabetes management.    Subjective   Patient was seen in the ICU this a.m. he is status post mini CABG.  Reports that he is feeling fatigued and does not have much of an appetite.  He stated that his nurse this a.m. was going to give him 20 units of lispro on a sliding scale which he declined and he was subsequently given 10 units for coverage.    Objective   PE:  Constitutional: NAD, well groomed. AOx3. Cooperative  Skin/Hair: Warm, dry skin.  HEENT: EOMI, Anicteric scleras   Cardiovascular: normal HR  Respiratory: no increased wob,  or accessory muscle use.  Psych : appropriate affect       Last Recorded Vitals  Blood pressure 140/59, pulse 83, temperature 37.1 °C (98.8 °F), temperature source Temporal, resp. rate 26, height 1.752 m (5' 8.98\"), weight 79 kg (174 lb 2.6 oz), SpO2 100 %.  Intake/Output last 3 Shifts:  I/O last 3 completed shifts:  In: 3776.7 (47.8 mL/kg) [P.O.:300; I.V.:776.7 (9.8 mL/kg); IV Piggyback:2700]  Out: 1815 (23 mL/kg) [Urine:1455 (0.5 mL/kg/hr); Blood:150; Chest Tube:210]  Weight: 79 kg     Relevant Results    Lab Results   Component Value Date    TSH 5.76 (H) 10/09/2023    TSH 6.26 (H) 10/02/2023    TSH 2.34 09/20/2023    FREET4 1.09 10/09/2023    FREET4 1.12 10/02/2023       Results from last 7 days   Lab Units 10/12/23  1226 10/12/23  0828 10/12/23  0306 10/12/23  0027 10/11/23  1445 10/11/23  1310 10/11/23  1309 10/10/23  0832 10/09/23  2117 10/09/23  1937 10/09/23  1746 10/08/23  0633 10/07/23  2201   POCT GLUCOSE mg/dL 274* 212*  --  216* 109* 126*  --    < >  --    < >  --    < >  --  "   GLUCOSE mg/dL  --   --  150*  --   --   --  122*  --  287*  --  133*  --  235*    < > = values in this interval not displayed.   Scheduled medications  acetaminophen, 650 mg, oral, q4h   Or  acetaminophen, 650 mg, rectal, q4h  aspirin, 81 mg, oral, Daily  atorvastatin, 80 mg, oral, Nightly  B complex-vitamin C-folic acid, 1 capsule, oral, Daily  ceFAZolin, 1 g, intravenous, q24h  cholecalciferol, 25 mcg, oral, Daily  darbepoetin alyssa, 40 mcg, intravenous, Weekly  dextrose 1.5% - LOW calcium 2.5mEq/L (Dianeal, Delflex-LC) in 3,000 mL peritoneal dialysate, , intraperitoneal, Once   Followed by  dextrose 1.5% - LOW calcium 2.5mEq/L (Dianeal, Delflex-LC) in 3,000 mL peritoneal dialysate, , intraperitoneal, Once  DULoxetine, 30 mg, oral, Daily  fluticasone, 2 spray, Each Nostril, Daily  gentamicin, , Topical, Daily  insulin detemir, 11 Units, subcutaneous, q24h  insulin lispro, 0-5 Units, subcutaneous, TID with meals  levothyroxine, 25 mcg, oral, Daily  metoprolol tartrate, 25 mg, oral, BID  neostigmine, 2 mg, intravenous, Once  nystatin, 5 mL, Swish & Swallow, TID  polyethylene glycol, 17 g, oral, Daily  psyllium, 1 packet, oral, Daily  sennosides, 2 tablet, oral, BID      Continuous medications  heparin 500 Units/L in dextrose 1.5% - LOW calcium 2.5mEq/L (Dianeal, Delflex-LC) 3,000 mL peritoneal dialysate,    Followed by  [START ON 10/18/2023] heparin 500 Units/L in dextrose 1.5% - LOW calcium 2.5mEq/L (Dianeal, Delflex-LC) 3,000 mL peritoneal dialysate,   lactated Ringer's, 30 mL/hr, Last Rate: 30 mL/hr (10/11/23 2000)  lactated Ringer's, 5 mL/hr, Last Rate: 5 mL/hr (10/11/23 2000)      PRN medications  PRN medications: acetaminophen, dextrose 10 % in water (D10W), dextrose, glucagon, HYDROmorphone, naloxone, oxygen, oxygen        Assessment/Plan   Salomon Chowdary is a 83 y.o. male with IDDM, HTN, HLD, ESRD on PD,  CAD, A-Fib and hypothyroidism presented to an OSH ON 9/20 with chest pain. He was found to have NSTEMI  with LHC showing 2 vessel disease in the LAD and left circumflex. He was transferred to [facility] for evaluation of revascularization plan. Endocrinology is consulted iso abnormal TFT    10/2: TSH 6.26 and FT4 1.12.   10/10: TSH 5.76 and FT4 1.09     #Hypothyroidism   Patient is on 25 mcg of LT4. TSH was 2.34 on the day of presentation to the OSH. Presented with NSTEMI as above with Trop peaked to >2000 was initially managed in the ICU. No FT4 was obtained on 9/20. Now TSH is elevated with FT4 1.12 (WNL). This is likely to nonthyroidal illness (previously known as euthyroid sick syndrome) iso acute illness as above   He appears clinically euthyroid.      Recommendations  -Continue current dose of LT4 25 mcg   -Check TFT on 10/17     #DM2   Background Hx:  Diabetes duration: Since 1995, has been on insulin since 2012           Home blood glucose checks: Daily  Trends: 80s to 130s  Hypoglycemia (y/n, threshold and symptoms): Yes, rarely, BG in the 60s, shakes/sweating/halos in his vision  Home diabetes regimen: Detemir 18 units bedtime and Trulicity 1.5 mg q. Weekly (has not received it for the past 2 weeks)  Home diet (how many meals a day): 2-3 meals a day  Outpatient physician managing diabetes: Endocrinologist with [facility].  Not seen in the past 3 months would like to establish with a new endocrinologist.  Currently follows with PCP.  Macrovascular complications: CVD [no diagnosed CAD to major vessel disease]     CVA [ denies]     PVD [denies]  Microvascular complications: Retinopathy [ denies]   Nephropathy [ESRD on PD]   Neuropathy [endorses]  Last A1c: 6.6 9/2023                  Inpatient diabetes regimen: 20 units of detemir and a low dose SS  Inpatient diet: Regular diet         Recommendations:  -Goal -180 while inpatient  -Discontinue #5 sliding scale as patient is a dialysis patient and he is very sensitive to insulin.  -Start #1 SSI insulin TIDAC with meals   -Restart detemir 11 units HS  -Hold  mealtime lispro for now until he starts eating more consistently.  If patient is eating consistently then would recommend 3 units of breakfast, 5 units with lunch and 5 units with dinner (his prior regimen)  -Switch diet to liberalize diabetic diet CC90 instead of regular diet  -Once patient is discharged please ensure that home Trulicity get refilled   -Accuchecks ACHS  -Endocrinology team will continue to follow.     Plan was communicated to the primary team      Discussed with Dr. Bishop Marie MD   Endocrinology, PGY 4

## 2023-10-12 NOTE — CARE PLAN
Problem: PT Problem  Goal: Patient will complete supine to sit and sit to supine Supervision   Outcome: Progressing  Goal: Patient will perform sit<>stand transfer with walker, and stand-by   Outcome: Progressing  Goal: Patient will ambulate >70' with walker and SBA   Outcome: Progressing  Goal: Patient will verbalize and demonstrate MITT precautions independently.    Outcome: Progressing

## 2023-10-12 NOTE — PROGRESS NOTES
Physical Therapy    Physical Therapy Evaluation    Patient Name: Salomon Chowdary  MRN: [id number]  Today's Date: 10/12/2023   Time Calculation  Start Time: 0930  Stop Time: 0953  Time Calculation (min): 23 min    Assessment/Plan   PT Assessment  PT Assessment Results: Decreased mobility, Impaired balance  Rehab Prognosis: Good  End of Session Communication: Bedside nurse  Assessment Comment: Patient presents s/p CABG x 1.  Currently min A for mobility with endurance and gait limitations.  Recommend further therapy to increase functional independence and safety.  Will continue to follow.  End of Session Patient Position: Up in chair  IP OR SWING BED PT PLAN  Inpatient or Swing Bed: Inpatient  PT Plan  Treatment/Interventions: Bed mobility, Gait training, Transfer training, Balance training, Strengthening, Endurance training, Range of motion, Therapeutic exercise, Therapeutic activity  PT Plan: Skilled PT  PT Frequency: 5 times per week  PT Discharge Recommendations: Low intensity level of continued care  Equipment Recommended upon Discharge:  (Will continue to assess)  PT Recommended Transfer Status: Assistive device, Contact guard  PT - OK to Discharge: Yes    Subjective     General Visit Information:  General  Reason for Referral: s/p CABG x 1  Past Medical History Relevant to Rehab: PMH: IDDM, HTN, HLD, ESRD  Family/Caregiver Present: No  Prior to Session Communication: Bedside nurse  Patient Position Received: Up in chair  General Comment: Patient is alert, agreeable to PT.  In good spirits.  Complaints about neck spasms.  Home Living:  Home Living  Type of Home:  (senior living apartment)  Lives With: Spouse  Home Adaptive Equipment: Cane, Walker rolling or standard  Home Layout: One level  Home Access: Level entry  Prior Level of Function:  Prior Function Per Pt/Caregiver Report  Level of [place]: Independent with ADLs and functional transfers  Ambulatory Assistance:  (cane community level)  Vocational:  Retired  Precautions:  Precautions  Medical Precautions: Fall precautions  Post-Surgical Precautions: Sternal precautions  Vital Signs:  Vital Signs  Heart Rate: 100 (post 99)  SpO2: 96 % (post 99)  BP: 140/59 (post 144/63)  Oxygen Therapy: Supplemental oxygen (4L)  O2 Delivery Method: Nasal cannula   Patient Vitals for the past 8 hrs:   BP Temp Temp src Pulse Resp SpO2   10/12/23 1000 -- -- -- 91 22 98 %   10/12/23 0953 140/59 -- -- 100 -- 96 %   10/12/23 0900 -- -- -- 100 24 98 %   10/12/23 0800 -- -- -- 105 22 98 %   10/12/23 0700 -- -- -- 99 23 100 %   10/12/23 0600 -- -- -- 99 21 97 %   10/12/23 0500 -- -- -- 97 20 98 %   10/12/23 0400 -- 37.1 °C (98.8 °F) Temporal 95 21 97 %       Objective   Pain:  Pain Assessment  Pain Assessment: 0-10  Pain Score: 0 - No pain (post 5)  Pain Location:  (sternal)    Cognition:  Cognition  Overall Cognitive Status: Within Functional Limits    General Assessments:  Extremity/Trunk Assessments:  Tone: No abnormalities noted  Upper Extremity  ROM: WFL  Strength:WFL  Lower Extremity  ROM: WFL  Strength: WFL               Sensation  Light Touch: No apparent deficits                        Sitting Dynamic Balance Not NormalUE Support Two UE support and Assist Level Supervision  Sitting Static Balance Not NormalUE Support Two UE support and Assist Level Contact Guard  Standing Dynamic Balance Not NormalUE Support Two UE support and Assist Level Contact Guard  Standing Static Balance Not NormalUE Support Two UE support and Assist Level Contact Guard    Functional Assessments:       Transfers  Transfer: Yes  Transfer 1  Transfer From 1: Sit to  Transfer to 1: Stand  Transfer Device 1: Walker  Transfer Level of Assistance 1: Minimum assistance  Transfers 2  Transfer From 2: Stand to  Transfer to 2: Sit  Transfer Device 2: Walker  Transfer Level of Assistance 2: Minimum assistance  Transfers 3  Transfer From 3: Chair with arms to  Transfer to 3: Chair with arms  Transfer Device 3:  Walker  Transfer Level of Assistance 3: Minimum assistance    Ambulation/Gait Training  Ambulation/Gait Training Performed: Yes  Ambulation/Gait Training 1  Surface 1: Level tile  Device 1: Rolling walker  Assistance 1: Contact guard  Quality of Gait 1:  (decresed gait speed, narrow MAGDY, frequent pausing, flexed posture, decresed foot clearance)  Comments/Distance (ft) 1: 5'                  Outcome Measures:  [facility] Basic Mobility  Turning from your back to your side while in a flat bed without using bedrails: A lot  Moving from lying on your back to sitting on the side of a flat bed without using bedrails: A lot  Moving to and from bed to chair (including a wheelchair): A little  Standing up from a chair using your arms (e.g. wheelchair or bedside chair): A little  To walk in hospital room: A lot  Climbing 3-5 steps with railing: Total  Basic Mobility - Total Score: 13     Encounter Problems       Encounter Problems (Active)       PT Problem       Patient will complete supine to sit and sit to supine Supervision  (Progressing)       Start:  10/12/23    Expected End:  10/26/23            Patient will perform sit<>stand transfer with walker, and stand-by  (Progressing)       Start:  10/12/23    Expected End:  10/26/23            Patient will ambulate >70' with walker and SBA  (Progressing)       Start:  10/12/23    Expected End:  10/26/23            Patient will verbalize and demonstrate MITT precautions independently.   (Progressing)       Start:  10/12/23    Expected End:  10/26/23               Pain - Adult                  10/12/23 at 11:18 AM - Nik Ontiveros, PT  #[id number]  [facility] Secure Messenger

## 2023-10-12 NOTE — PROGRESS NOTES
CTICU Progress Note      Subjective     Overnight events: Extubated, progressing well.  Nocturnal PD as per home regimen .     Scheduled Medications:   acetaminophen, 650 mg, oral, q4h   Or  acetaminophen, 650 mg, rectal, q4h  aspirin, 81 mg, oral, Daily  atorvastatin, 80 mg, oral, Nightly  B complex-vitamin C-folic acid, 1 capsule, oral, Daily  ceFAZolin, 1 g, intravenous, q24h  cholecalciferol, 25 mcg, oral, Daily  darbepoetin alyssa, 40 mcg, intravenous, Weekly  dextrose 1.5% - LOW calcium 2.5mEq/L (Dianeal, Delflex-LC) in 3,000 mL peritoneal dialysate, , intraperitoneal, Once   Followed by  dextrose 1.5% - LOW calcium 2.5mEq/L (Dianeal, Delflex-LC) in 3,000 mL peritoneal dialysate, , intraperitoneal, Once  DULoxetine, 30 mg, oral, Daily  fluconazole, 200 mg, intravenous, q48h  fluticasone, 2 spray, Each Nostril, Daily  gentamicin, , Topical, Daily  insulin lispro, 0-15 Units, subcutaneous, q4h  insulin regular, 0-10 Units, intravenous, Once  levothyroxine, 25 mcg, oral, Daily  neostigmine, 2 mg, intravenous, Once  pantoprazole, 40 mg, oral, Daily before breakfast   Or  pantoprazole, 40 mg, intravenous, Daily before breakfast  polyethylene glycol, 17 g, oral, Daily  psyllium, 1 packet, oral, Daily  sennosides, 2 tablet, oral, BID         Continuous Medications:   dextrose 1.5% - LOW calcium 2.5mEq/L (Dianeal, Delflex-LC) in 3,000 mL peritoneal dialysate,    Followed by  [START ON 10/18/2023] dextrose 1.5% - LOW calcium 2.5mEq/L (Dianeal, Delflex-LC) in 3,000 mL peritoneal dialysate,   EPINEPHrine, 0.01-1 mcg/kg/min, Last Rate: Stopped (10/11/23 1300)  lactated Ringer's, 30 mL/hr, Last Rate: 30 mL/hr (10/11/23 2000)  lactated Ringer's, 5 mL/hr, Last Rate: 5 mL/hr (10/11/23 2000)  norepinephrine, 0.01-0.1 mcg/kg/min, Last Rate: Stopped (10/11/23 8512)  propofol, 0-50 mcg/kg/min, Last Rate: Stopped (10/11/23 5932)         PRN Medications:   PRN medications: acetaminophen, dextrose 10 % in water (D10W), dextrose,  glucagon, HYDROmorphone, insulin regular, naloxone, oxygen, oxygen    Objective   Vitals:  Most Recent:  Vitals:    10/12/23 0700   BP:    Pulse: 99   Resp: 23   Temp:    SpO2: 100%       24hr Min/Max:  Temp  Min: 35.9 °C (96.6 °F)  Max: 37.2 °C (99 °F)  Pulse  Min: 59  Max: 105  BP  Min: 132/52  Max: 132/52  Resp  Min: 16  Max: 26  SpO2  Min: 96 %  Max: 100 %    I/O:  I/O last 2 completed shifts:  In: 3776.7 (47.8 mL/kg) [P.O.:300; I.V.:776.7 (9.8 mL/kg); IV Piggyback:2700]  Out: 1415 (17.9 mL/kg) [Urine:1055 (0.6 mL/kg/hr); Blood:150; Chest Tube:210]  Weight: 79 kg     Hemodynamic parameters for last 24 hours:         Vent settings:  Vent Mode: Pressure support  FiO2 (%):  [40 %-50 %] 40 %  S RR:  [16] 16  S VT:  [570 mL] 570 mL  PEEP/CPAP (cm H2O):  [0 cm H20-8 cm H20] 0 cm H20  LA SUP:  [0 cm H20-5 cm H20] 0 cm H20  MAP (cm H2O):  [12] 12    LDA:  CVC 10/11/23 Single lumen Right Internal jugular (Active)   Placement Date/Time: 10/11/23 (c) 0920   Hand Hygiene Performed Prior to CVC Insertion: Yes  Site Prep: Chlorhexidine   Site Prep Agent has Completely Dried Before Insertion: Yes  All 5 Sterile Barriers Used (Gloves, Gown, Cap, Mask, Large Sterile Dalila...   Number of days: 0       Arterial Line 10/11/23 Left Brachial (Active)   Placement Date/Time: 10/11/23 (c) 0840   Size: 20 G  Orientation: Left  Location: Brachial  Securement Method: Transparent dressing  Patient Tolerance: Tolerated well   Number of days: 0       Urethral Catheter Temperature probe;Non-latex 14 Fr. (Active)   Placement Date/Time: 10/11/23 0909   Placed by: Fernandezte  Hand Hygiene Completed: Yes  Catheter Type: Temperature probe;Non-latex  Tube Size (Fr.): 14 Fr.  Urine Returned: Yes   Number of days: 0       Chest Tube 1 Left Pleural 28 Fr (Active)   Placement Date/Time: 10/11/23 1133   Placed by: HALEIGH Espana  Tube Number: 1  Chest Tube Orientation: Left  Chest Tube Location: Pleural  Chest Tube Drain Tube Size (Fr): 28 Fr  Chest Tube Drainage  System: Suction  Number of Sutures Placed: 1   Number of days: 0         Physical Exam:   Physical Exam    Lab/Radiology/Diagnostic Review:  Results for orders placed or performed during the hospital encounter of 09/30/23 (from the past 24 hour(s))   Prepare RBC: 4 Units   Result Value Ref Range    PRODUCT CODE Q8971M28     Unit Number [id number]     Unit ABO O     Unit RH NEG     XM INTEP COMP     Dispense Status XM     Blood Expiration Date October 19, 2023 23:59 EDT     PRODUCT BLOOD TYPE 9500     UNIT VOLUME 350     PRODUCT CODE F6468F22     Unit Number [id number]     Unit ABO O     Unit RH NEG     XM INTEP COMP     Dispense Status XM     Blood Expiration Date October 19, 2023 23:59 EDT     PRODUCT BLOOD TYPE 9500     UNIT VOLUME 350     PRODUCT CODE Y4937E23     Unit Number [id number]     Unit ABO O     Unit RH NEG     XM INTEP COMP     Dispense Status XM     Blood Expiration Date October 21, 2023 23:59 EDT     PRODUCT BLOOD TYPE 9500     UNIT VOLUME 400     PRODUCT CODE H3209S57     Unit Number [id number]     Unit ABO O     Unit RH NEG     XM INTEP COMP     Dispense Status XM     Blood Expiration Date October 21, 2023 23:59 EDT     PRODUCT BLOOD TYPE 9500     UNIT VOLUME 400    Blood Gas Arterial Full Panel Unsolicited   Result Value Ref Range    POCT pH, Arterial 7.46 (H) 7.38 - 7.42 pH    POCT pCO2, Arterial 38 38 - 42 mm Hg    POCT pO2, Arterial 80 (L) 85 - 95 mm Hg    POCT SO2, Arterial 98 94 - 100 %    POCT Oxy Hemoglobin, Arterial 96.1 94.0 - 98.0 %    POCT Hematocrit Calculated, Arterial 28.0 (L) 41.0 - 52.0 %    POCT Sodium, Arterial 133 (L) 136 - 145 mmol/L    POCT Potassium, Arterial 4.8 3.5 - 5.3 mmol/L    POCT Chloride, Arterial 102 98 - 107 mmol/L    POCT Ionized Calcium, Arterial 1.19 1.10 - 1.33 mmol/L    POCT Glucose, Arterial 141 (H) 74 - 99 mg/dL    POCT Lactate, Arterial 1.3 0.4 - 2.0 mmol/L    POCT Base Excess, Arterial 3.0 -2.0 - 3.0 mmol/L    POCT HCO3 Calculated,  Arterial 27.0 (H) 22.0 - 26.0 mmol/L    POCT Hemoglobin, Arterial 9.4 (L) 13.5 - 17.5 g/dL    POCT Anion Gap, Arterial 9 (L) 10 - 25 mmo/L    Patient Temperature 37.0 degrees Celsius    FiO2 21 %   Coox Panel, Arterial Unsolicited   Result Value Ref Range    POCT Hemoglobin, Arterial 9.4 (L) 13.5 - 17.5 g/dL    POCT Oxy Hemoglobin, Arterial 96.1 94.0 - 98.0 %    POCT Carboxyhemoglobin, Arterial 1.4 %    POCT Methemoglobin, Arterial 0.6 0.0 - 1.5 %    POCT Deoxy Hemoglobin, Arterial 1.9 0.0 - 5.0 %   TEG Clot Global Profile Unsolicited   Result Value Ref Range    R (Reaction Time) K 8.2 4.6 - 9.1 min    K (Clot Kinetics) 1.5 0.8 - 2.1 min    ANGLE 75.0 63.0 - 78.0 deg    MA (Max Amplitude) K 70.0 (H) 52.0 - 69.0 mm    R (Reaction Time) KH 6.4 4.3 - 8.3 min    MA (Max Amplitude) RT 72.0 (H) 52.0 - 70.0 mm    MA ( Prince Amplitude) FF 51.0 (H) 15.0 - 32.0 mm    FLEV 929 (H) 278 - 581 mg/dL    Test Comment baseline    ACTIVATED CLOTTING TIME HIGH   Result Value Ref Range    POCT Activated Clotting Time High Range 394 (H) 96 - 152 sec   Blood Gas Arterial Full Panel Unsolicited   Result Value Ref Range    POCT pH, Arterial 7.37 (L) 7.38 - 7.42 pH    POCT pCO2, Arterial 47 (H) 38 - 42 mm Hg    POCT pO2, Arterial 121 (H) 85 - 95 mm Hg    POCT SO2, Arterial 99 94 - 100 %    POCT Oxy Hemoglobin, Arterial 96.8 94.0 - 98.0 %    POCT Hematocrit Calculated, Arterial 28.0 (L) 41.0 - 52.0 %    POCT Sodium, Arterial 132 (L) 136 - 145 mmol/L    POCT Potassium, Arterial 5.0 3.5 - 5.3 mmol/L    POCT Chloride, Arterial 102 98 - 107 mmol/L    POCT Ionized Calcium, Arterial 1.17 1.10 - 1.33 mmol/L    POCT Glucose, Arterial 138 (H) 74 - 99 mg/dL    POCT Lactate, Arterial 1.6 0.4 - 2.0 mmol/L    POCT Base Excess, Arterial 1.5 -2.0 - 3.0 mmol/L    POCT HCO3 Calculated, Arterial 27.2 (H) 22.0 - 26.0 mmol/L    POCT Hemoglobin, Arterial 9.3 (L) 13.5 - 17.5 g/dL    POCT Anion Gap, Arterial 8 (L) 10 - 25 mmo/L    Patient Temperature 37.0 degrees  Celsius    FiO2 100 %   Coox Panel, Arterial Unsolicited   Result Value Ref Range    POCT Hemoglobin, Arterial 9.3 (L) 13.5 - 17.5 g/dL    POCT Oxy Hemoglobin, Arterial 96.8 94.0 - 98.0 %    POCT Carboxyhemoglobin, Arterial 1.0 %    POCT Methemoglobin, Arterial 1.1 0.0 - 1.5 %    POCT Deoxy Hemoglobin, Arterial 1.1 0.0 - 5.0 %   ACTIVATED CLOTTING TIME HIGH   Result Value Ref Range    POCT Activated Clotting Time High Range 536 (H) 96 - 152 sec   Blood Gas Arterial Full Panel Unsolicited   Result Value Ref Range    POCT pH, Arterial 7.38 7.38 - 7.42 pH    POCT pCO2, Arterial 44 (H) 38 - 42 mm Hg    POCT pO2, Arterial 112 (H) 85 - 95 mm Hg    POCT SO2, Arterial 100 94 - 100 %    POCT Oxy Hemoglobin, Arterial 97.8 94.0 - 98.0 %    POCT Hematocrit Calculated, Arterial 26.0 (L) 41.0 - 52.0 %    POCT Sodium, Arterial 134 (L) 136 - 145 mmol/L    POCT Potassium, Arterial 5.2 3.5 - 5.3 mmol/L    POCT Chloride, Arterial 101 98 - 107 mmol/L    POCT Ionized Calcium, Arterial 1.14 1.10 - 1.33 mmol/L    POCT Glucose, Arterial 154 (H) 74 - 99 mg/dL    POCT Lactate, Arterial 1.4 0.4 - 2.0 mmol/L    POCT Base Excess, Arterial 0.7 -2.0 - 3.0 mmol/L    POCT HCO3 Calculated, Arterial 26.0 22.0 - 26.0 mmol/L    POCT Hemoglobin, Arterial 8.6 (L) 13.5 - 17.5 g/dL    POCT Anion Gap, Arterial 12 10 - 25 mmo/L    Patient Temperature 37.0 degrees Celsius    FiO2 100 %   Coox Panel, Arterial Unsolicited   Result Value Ref Range    POCT Hemoglobin, Arterial 8.6 (L) 13.5 - 17.5 g/dL    POCT Oxy Hemoglobin, Arterial 97.8 94.0 - 98.0 %    POCT Carboxyhemoglobin, Arterial 1.3 %    POCT Methemoglobin, Arterial 0.4 0.0 - 1.5 %    POCT Deoxy Hemoglobin, Arterial 0.5 0.0 - 5.0 %   ACTIVATED CLOTTING TIME HIGH   Result Value Ref Range    POCT Activated Clotting Time High Range 222 (H) 96 - 152 sec   ACTIVATED CLOTTING TIME HIGH   Result Value Ref Range    POCT Activated Clotting Time High Range 352 (H) 96 - 152 sec   Blood Gas Arterial Full Panel  Unsolicited   Result Value Ref Range    POCT pH, Arterial 7.38 7.38 - 7.42 pH    POCT pCO2, Arterial 45 (H) 38 - 42 mm Hg    POCT pO2, Arterial 71 (L) 85 - 95 mm Hg    POCT SO2, Arterial 96 94 - 100 %    POCT Oxy Hemoglobin, Arterial 93.8 (L) 94.0 - 98.0 %    POCT Hematocrit Calculated, Arterial 27.0 (L) 41.0 - 52.0 %    POCT Sodium, Arterial 134 (L) 136 - 145 mmol/L    POCT Potassium, Arterial 5.3 3.5 - 5.3 mmol/L    POCT Chloride, Arterial 101 98 - 107 mmol/L    POCT Ionized Calcium, Arterial 1.15 1.10 - 1.33 mmol/L    POCT Glucose, Arterial 182 (H) 74 - 99 mg/dL    POCT Lactate, Arterial 0.9 0.4 - 2.0 mmol/L    POCT Base Excess, Arterial 1.2 -2.0 - 3.0 mmol/L    POCT HCO3 Calculated, Arterial 26.6 (H) 22.0 - 26.0 mmol/L    POCT Hemoglobin, Arterial 8.9 (L) 13.5 - 17.5 g/dL    POCT Anion Gap, Arterial 12 10 - 25 mmo/L    Patient Temperature 37.0 degrees Celsius    FiO2 100 %   Coox Panel, Arterial Unsolicited   Result Value Ref Range    POCT Hemoglobin, Arterial 8.9 (L) 13.5 - 17.5 g/dL    POCT Oxy Hemoglobin, Arterial 93.8 (L) 94.0 - 98.0 %    POCT Carboxyhemoglobin, Arterial 1.4 %    POCT Methemoglobin, Arterial 0.7 0.0 - 1.5 %    POCT Deoxy Hemoglobin, Arterial 4.1 0.0 - 5.0 %   ACTIVATED CLOTTING TIME HIGH   Result Value Ref Range    POCT Activated Clotting Time High Range 98 96 - 152 sec   TEG Clot Global Profile Unsolicited   Result Value Ref Range    R (Reaction Time) K 6.6 4.6 - 9.1 min    K (Clot Kinetics) 1.1 0.8 - 2.1 min    ANGLE 77.0 63.0 - 78.0 deg    MA (Max Amplitude) K 70.0 (H) 52.0 - 69.0 mm    R (Reaction Time) KH 6.5 4.3 - 8.3 min    MA (Max Amplitude) RT 71.0 (H) 52.0 - 70.0 mm    MA ( Prince Amplitude) FF 44.0 (H) 15.0 - 32.0 mm    FLEV 807 (H) 278 - 581 mg/dL    Test Comment post protamine    Blood Gas Arterial Full Panel Unsolicited   Result Value Ref Range    POCT pH, Arterial 7.40 7.38 - 7.42 pH    POCT pCO2, Arterial 43 (H) 38 - 42 mm Hg    POCT pO2, Arterial 363 (H) 85 - 95 mm Hg    POCT  SO2, Arterial 98 94 - 100 %    POCT Oxy Hemoglobin, Arterial 97.2 94.0 - 98.0 %    POCT Hematocrit Calculated, Arterial 25.0 (L) 41.0 - 52.0 %    POCT Sodium, Arterial 134 (L) 136 - 145 mmol/L    POCT Potassium, Arterial 4.8 3.5 - 5.3 mmol/L    POCT Chloride, Arterial 102 98 - 107 mmol/L    POCT Ionized Calcium, Arterial 1.14 1.10 - 1.33 mmol/L    POCT Glucose, Arterial 164 (H) 74 - 99 mg/dL    POCT Lactate, Arterial 1.0 0.4 - 2.0 mmol/L    POCT Base Excess, Arterial 1.6 -2.0 - 3.0 mmol/L    POCT HCO3 Calculated, Arterial 26.6 (H) 22.0 - 26.0 mmol/L    POCT Hemoglobin, Arterial 8.3 (L) 13.5 - 17.5 g/dL    POCT Anion Gap, Arterial 10 10 - 25 mmo/L    Patient Temperature 37.0 degrees Celsius    FiO2 100 %   Coox Panel, Arterial Unsolicited   Result Value Ref Range    POCT Hemoglobin, Arterial 8.3 (L) 13.5 - 17.5 g/dL    POCT Oxy Hemoglobin, Arterial 97.2 94.0 - 98.0 %    POCT Carboxyhemoglobin, Arterial 0.0 %    POCT Methemoglobin, Arterial 0.4 0.0 - 1.5 %    POCT Deoxy Hemoglobin, Arterial 2.4 0.0 - 5.0 %   Calcium, Ionized   Result Value Ref Range    POCT Calcium, Ionized 1.11 1.1 - 1.33 mmol/L   Magnesium   Result Value Ref Range    Magnesium 2.04 1.60 - 2.40 mg/dL   Coagulation Screen   Result Value Ref Range    Protime 12.1 9.8 - 12.8 seconds    INR 1.1 0.9 - 1.1    aPTT 27 27 - 38 seconds   Fibrinogen   Result Value Ref Range    Fibrinogen 559 (H) 200 - 400 mg/dL   CBC   Result Value Ref Range    WBC 13.1 (H) 4.4 - 11.3 x10*3/uL    nRBC 0.0 0.0 - 0.0 /100 WBCs    RBC 2.58 (L) 4.50 - 5.90 x10*6/uL    Hemoglobin 8.4 (L) 13.5 - 17.5 g/dL    Hematocrit 27.8 (L) 41.0 - 52.0 %     (H) 80 - 100 fL    MCH 32.6 26.0 - 34.0 pg    MCHC 30.2 (L) 32.0 - 36.0 g/dL    RDW 14.8 (H) 11.5 - 14.5 %    Platelets 291 150 - 450 x10*3/uL    MPV 9.7 7.5 - 11.5 fL   Renal Function Panel   Result Value Ref Range    Glucose 122 (H) 74 - 99 mg/dL    Sodium 139 136 - 145 mmol/L    Potassium 4.9 3.5 - 5.3 mmol/L    Chloride 101 98 -  107 mmol/L    Bicarbonate 25 21 - 32 mmol/L    Anion Gap 18 10 - 20 mmol/L    Urea Nitrogen 67 (H) 6 - 23 mg/dL    Creatinine 4.25 (H) 0.50 - 1.30 mg/dL    eGFR 13 (L) >60 mL/min/1.73m*2    Calcium 8.3 (L) 8.6 - 10.6 mg/dL    Phosphorus 2.7 2.5 - 4.9 mg/dL    Albumin 3.1 (L) 3.4 - 5.0 g/dL   POCT GLUCOSE   Result Value Ref Range    POCT Glucose 126 (H) 74 - 99 mg/dL   Blood Gas Arterial Full Panel Unsolicited   Result Value Ref Range    POCT pH, Arterial 7.44 (H) 7.38 - 7.42 pH    POCT pCO2, Arterial 39 38 - 42 mm Hg    POCT pO2, Arterial 210 (H) 85 - 95 mm Hg    POCT SO2, Arterial 99 94 - 100 %    POCT Oxy Hemoglobin, Arterial 96.3 94.0 - 98.0 %    POCT Hematocrit Calculated, Arterial 27.0 (L) 41.0 - 52.0 %    POCT Sodium, Arterial 134 (L) 136 - 145 mmol/L    POCT Potassium, Arterial 5.0 3.5 - 5.3 mmol/L    POCT Chloride, Arterial 103 98 - 107 mmol/L    POCT Ionized Calcium, Arterial 1.18 1.10 - 1.33 mmol/L    POCT Glucose, Arterial 146 (H) 74 - 99 mg/dL    POCT Lactate, Arterial 1.5 0.4 - 2.0 mmol/L    POCT Base Excess, Arterial 2.2 -2.0 - 3.0 mmol/L    POCT HCO3 Calculated, Arterial 26.5 (H) 22.0 - 26.0 mmol/L    POCT Hemoglobin, Arterial 9.0 (L) 13.5 - 17.5 g/dL    POCT Anion Gap, Arterial 10 10 - 25 mmo/L    Patient Temperature 37.0 degrees Celsius    FiO2 50 %   POCT GLUCOSE   Result Value Ref Range    POCT Glucose 109 (H) 74 - 99 mg/dL   Blood Gas Arterial Full Panel Unsolicited   Result Value Ref Range    POCT pH, Arterial 7.38 7.38 - 7.42 pH    POCT pCO2, Arterial 45 (H) 38 - 42 mm Hg    POCT pO2, Arterial 168 (H) 85 - 95 mm Hg    POCT SO2, Arterial 100 94 - 100 %    POCT Oxy Hemoglobin, Arterial 97.3 94.0 - 98.0 %    POCT Hematocrit Calculated, Arterial 26.0 (L) 41.0 - 52.0 %    POCT Sodium, Arterial 133 (L) 136 - 145 mmol/L    POCT Potassium, Arterial 5.2 3.5 - 5.3 mmol/L    POCT Chloride, Arterial 102 98 - 107 mmol/L    POCT Ionized Calcium, Arterial 1.18 1.10 - 1.33 mmol/L    POCT Glucose, Arterial 143  (H) 74 - 99 mg/dL    POCT Lactate, Arterial 0.9 0.4 - 2.0 mmol/L    POCT Base Excess, Arterial 1.2 -2.0 - 3.0 mmol/L    POCT HCO3 Calculated, Arterial 26.6 (H) 22.0 - 26.0 mmol/L    POCT Hemoglobin, Arterial 8.8 (L) 13.5 - 17.5 g/dL    POCT Anion Gap, Arterial 10 10 - 25 mmo/L    Patient Temperature 37.0 degrees Celsius   POCT GLUCOSE   Result Value Ref Range    POCT Glucose 216 (H) 74 - 99 mg/dL   Calcium, Ionized   Result Value Ref Range    POCT Calcium, Ionized 1.12 1.1 - 1.33 mmol/L   Magnesium   Result Value Ref Range    Magnesium 2.02 1.60 - 2.40 mg/dL   Renal Function Panel   Result Value Ref Range    Glucose 150 (H) 74 - 99 mg/dL    Sodium 137 136 - 145 mmol/L    Potassium 4.9 3.5 - 5.3 mmol/L    Chloride 99 98 - 107 mmol/L    Bicarbonate 24 21 - 32 mmol/L    Anion Gap 19 10 - 20 mmol/L    Urea Nitrogen 62 (H) 6 - 23 mg/dL    Creatinine 4.04 (H) 0.50 - 1.30 mg/dL    eGFR 14 (L) >60 mL/min/1.73m*2    Calcium 9.2 8.6 - 10.6 mg/dL    Phosphorus 4.5 2.5 - 4.9 mg/dL    Albumin 3.2 (L) 3.4 - 5.0 g/dL   Coagulation Screen   Result Value Ref Range    Protime 11.2 9.8 - 12.8 seconds    INR 1.0 0.9 - 1.1    aPTT 32 27 - 38 seconds   Blood Gas Arterial Full Panel Unsolicited   Result Value Ref Range    POCT pH, Arterial 7.40 7.38 - 7.42 pH    POCT pCO2, Arterial 42 38 - 42 mm Hg    POCT pO2, Arterial 80 (L) 85 - 95 mm Hg    POCT SO2, Arterial 97 94 - 100 %    POCT Oxy Hemoglobin, Arterial 94.5 94.0 - 98.0 %    POCT Hematocrit Calculated, Arterial 29.0 (L) 41.0 - 52.0 %    POCT Sodium, Arterial 133 (L) 136 - 145 mmol/L    POCT Potassium, Arterial 4.9 3.5 - 5.3 mmol/L    POCT Chloride, Arterial 101 98 - 107 mmol/L    POCT Ionized Calcium, Arterial 1.22 1.10 - 1.33 mmol/L    POCT Glucose, Arterial 151 (H) 74 - 99 mg/dL    POCT Lactate, Arterial 1.6 0.4 - 2.0 mmol/L    POCT Base Excess, Arterial 1.0 -2.0 - 3.0 mmol/L    POCT HCO3 Calculated, Arterial 26.0 22.0 - 26.0 mmol/L    POCT Hemoglobin, Arterial 9.5 (L) 13.5 - 17.5  g/dL    POCT Anion Gap, Arterial 11 10 - 25 mmo/L    Patient Temperature 37.0 degrees Celsius   CBC   Result Value Ref Range    WBC 10.7 4.4 - 11.3 x10*3/uL    nRBC 0.0 0.0 - 0.0 /100 WBCs    RBC 2.84 (L) 4.50 - 5.90 x10*6/uL    Hemoglobin 8.9 (L) 13.5 - 17.5 g/dL    Hematocrit 30.6 (L) 41.0 - 52.0 %     (H) 80 - 100 fL    MCH 31.3 26.0 - 34.0 pg    MCHC 29.1 (L) 32.0 - 36.0 g/dL    RDW 15.0 (H) 11.5 - 14.5 %    Platelets 274 150 - 450 x10*3/uL    MPV 10.2 7.5 - 11.5 fL   Lavender Top   Result Value Ref Range    Extra Tube Hold for add-ons.      XR chest 1 view    Result Date: 10/11/2023  Interpreted By:  Bladimir Haas and Beyersdorf Conner STUDY: XR CHEST 1 VIEW;  10/11/2023 1:10 pm   INDICATION: Signs/Symptoms:Post op cardiac surgery.   COMPARISON: Chest radiograph 10/01/2023, CT chest 10/01/2023   ACCESSION NUMBER(S): [id number]   ORDERING CLINICIAN: DWAINE ESPOSITO   FINDINGS: AP radiograph of the chest was provided.   The dual-lumen endotracheal tube in satisfactory position. The distal opening overlying the right main bronchus and proximal opening 3 cm above the michaela. Right internal jugular central catheter tip overlies the mid to distal SVC. Enteric tube courses along the esophagus and terminates below the limits of the exam.   CARDIOMEDIASTINAL SILHOUETTE: Cardiomediastinal silhouette is stable in size and configuration.   LUNGS: Prominent interstitial markings. Hazy opacities in the left lung base. No pleural effusion or pneumothorax.   ABDOMEN: No remarkable upper abdominal findings.   BONES: No acute osseous changes. Multilevel posterior cervical spine fusion extends to T1.       1.  Emphysematous changes bilaterally. 2. Hazy opacity of the left lung base that may represent scarring and subsegmental atelectasis. 3. Medical devices as above.   I personally reviewed the images/study and I agree with the findings as stated. This study was interpreted at [facility]  [place], [place].   MACRO: None   Signed by: Bladimir Haas 10/11/2023 1:35 PM Dictation workstation:   [id number]        Malnutrition Diagnosis Status: New  Malnutrition Diagnosis: Moderate malnutrition related to chronic disease or condition  As Evidenced by: weight loss and decline in physical function  I agree with the dietitian's malnutrition diagnosis.     Salomon Chowdary is a 83 y.o. male with NSTMI, IDDM, HTN, HLD, ESRD on PD, CAD, A-Fib and hypothyroidism who is s/p off pump CABG x 1 (LIMA to LAD) 10/11/23 with Dr. Eileen Neal.       NEURO:  No PMH. Acute post operative pain.   -->  - Scheduled Tylenol   - PRN oxycodone  - PRN dilaudid for pain   - PT Consult, OOB to chair as tolerated, chair position if not tolerated   - CAM ICU score qshift  - Sleep/wake cycle hygiene     CV:  Patient has a history of HTN,HLD, CAD, NSTEMI, and unstable angina.  He is now s/p mid CAB with nl biV function.  He has an untreated lesion in the CTX with a pre-op plan to consider PCI -->  - Goal MAP 70-90  - Start metop 25mg BID  - Start plavix POD2 per protocol pending C with Interventional Cardiology  - Continue ASA, statin  - Hold home Amlodipine, Hydralazine     PULM:  No history of pulmonary disease.  Oxygenating adequately on RA this morning. Chest tubes L pleural, no airleak.-->  -Daily CXR   - Wean FiO2 maintaining SpO2 >92%.   - IS q1h and OOB to chair when extubated  - Chest tubes to wall suction, discontinue per protocol      GI:  No PMH. -->  - ADAT, supplements per Nutrition rec's   - Colace/senna BID and miralax BID     :  Hx of ESRD on PD since March 2023 , baseline creatinine 3.97.   Making adequate UOP. -->  - Discontinue wright catheter  - RFP as clinically indicated  - Replete electrolytes per CTICU protocol  - Nephrology Consult for PD management      ENDO:  PMH of insulin dependent T2DM A1c: 6.6, hypothyroidism.  -->  - Maintain BG <180, insulin per CTICU protocol  -Continue current dose of LT4 25 mcg    -Check TFT in 1 week 10/17     HEME:  Acute blood loss anemia and thrombocytopenia.-->    - Monitor drain output volume and characteristics  - SQH, SCDs for DVT prophylaxis.  - Last type and screen: 10/12     ID:  Afebrile, no current indications of infection. MRSA negative.-->  - Trend temp q4h  - Periop cefazolin x 48hrs     Skin:  No active skin issues.  - preventative Mepilex dressings in place on sacrum and heels  - change preventative Mepilex weekly or more frequently as indicated (when moist/soiled)   - every shift skin assessment per nursing and weekly ICU skin rounds  - moisture barrier to be applied with francis care  - active skin problems addressed with nursing on daily rounds     Proph:  SCDs  PPI     G:  Line  Right IJ MAC w Minimac placed 10/11   Left brachial a-line placed 10/11  PD catheter in the abdomen     F: Family: patient updating      A,B,C,D,E,F,G: reviewed     Dispo: [facility] pending [facility]   [facility] TEAM PHONE [phone]

## 2023-10-12 NOTE — NURSING NOTE
PD RN initiated peritoneal dialysis for the evening. Floor nurse educated about disconnect. Instructions left at bedside. Disconnect caps labeled, bedside. PD RN will be back at 0900 to breakdown machine. Please page if any questions [phone]

## 2023-10-13 ENCOUNTER — APPOINTMENT (OUTPATIENT)
Dept: RADIOLOGY | Facility: HOSPITAL | Age: 83
DRG: 235 | End: 2023-10-13
Payer: MEDICARE

## 2023-10-13 LAB
ALBUMIN SERPL BCP-MCNC: 2.8 G/DL (ref 3.4–5)
ANION GAP SERPL CALC-SCNC: 17 MMOL/L (ref 10–20)
APTT PPP: 27 SECONDS (ref 27–38)
BUN SERPL-MCNC: 61 MG/DL (ref 6–23)
CA-I BLD-SCNC: 1.17 MMOL/L (ref 1.1–1.33)
CALCIUM SERPL-MCNC: 8.5 MG/DL (ref 8.6–10.6)
CHLORIDE SERPL-SCNC: 96 MMOL/L (ref 98–107)
CO2 SERPL-SCNC: 26 MMOL/L (ref 21–32)
CREAT SERPL-MCNC: 3.85 MG/DL (ref 0.5–1.3)
ERYTHROCYTE [DISTWIDTH] IN BLOOD BY AUTOMATED COUNT: 15 % (ref 11.5–14.5)
GFR SERPL CREATININE-BSD FRML MDRD: 15 ML/MIN/1.73M*2
GLUCOSE BLD MANUAL STRIP-MCNC: 117 MG/DL (ref 74–99)
GLUCOSE BLD MANUAL STRIP-MCNC: 123 MG/DL (ref 74–99)
GLUCOSE BLD MANUAL STRIP-MCNC: 144 MG/DL (ref 74–99)
GLUCOSE BLD MANUAL STRIP-MCNC: 188 MG/DL (ref 74–99)
GLUCOSE BLD MANUAL STRIP-MCNC: 206 MG/DL (ref 74–99)
GLUCOSE BLD MANUAL STRIP-MCNC: 225 MG/DL (ref 74–99)
GLUCOSE BLD MANUAL STRIP-MCNC: 269 MG/DL (ref 74–99)
GLUCOSE BLD MANUAL STRIP-MCNC: 305 MG/DL (ref 74–99)
GLUCOSE BLD MANUAL STRIP-MCNC: 307 MG/DL (ref 74–99)
GLUCOSE BLD MANUAL STRIP-MCNC: 410 MG/DL (ref 74–99)
GLUCOSE SERPL-MCNC: 101 MG/DL (ref 74–99)
HCT VFR BLD AUTO: 26.9 % (ref 41–52)
HGB BLD-MCNC: 8.3 G/DL (ref 13.5–17.5)
INR PPP: 1.1 (ref 0.9–1.1)
MAGNESIUM SERPL-MCNC: 2.03 MG/DL (ref 1.6–2.4)
MCH RBC QN AUTO: 32.5 PG (ref 26–34)
MCHC RBC AUTO-ENTMCNC: 30.9 G/DL (ref 32–36)
MCV RBC AUTO: 106 FL (ref 80–100)
NRBC BLD-RTO: 0 /100 WBCS (ref 0–0)
PHOSPHATE SERPL-MCNC: 4 MG/DL (ref 2.5–4.9)
PLATELET # BLD AUTO: 241 X10*3/UL (ref 150–450)
PMV BLD AUTO: 10.1 FL (ref 7.5–11.5)
POTASSIUM SERPL-SCNC: 4.9 MMOL/L (ref 3.5–5.3)
PROTHROMBIN TIME: 12.3 SECONDS (ref 9.8–12.8)
RBC # BLD AUTO: 2.55 X10*6/UL (ref 4.5–5.9)
SODIUM SERPL-SCNC: 134 MMOL/L (ref 136–145)
WBC # BLD AUTO: 12.5 X10*3/UL (ref 4.4–11.3)

## 2023-10-13 PROCEDURE — 1200000002 HC GENERAL ROOM WITH TELEMETRY DAILY

## 2023-10-13 PROCEDURE — 85610 PROTHROMBIN TIME: CPT | Mod: CMCLAB | Performed by: NURSE PRACTITIONER

## 2023-10-13 PROCEDURE — 2500000001 HC RX 250 WO HCPCS SELF ADMINISTERED DRUGS (ALT 637 FOR MEDICARE OP)

## 2023-10-13 PROCEDURE — 2500000004 HC RX 250 GENERAL PHARMACY W/ HCPCS (ALT 636 FOR OP/ED): Performed by: NURSE PRACTITIONER

## 2023-10-13 PROCEDURE — 2500000004 HC RX 250 GENERAL PHARMACY W/ HCPCS (ALT 636 FOR OP/ED)

## 2023-10-13 PROCEDURE — 37799 UNLISTED PX VASCULAR SURGERY: CPT | Mod: CMCLAB | Performed by: NURSE PRACTITIONER

## 2023-10-13 PROCEDURE — 97166 OT EVAL MOD COMPLEX 45 MIN: CPT | Mod: GO

## 2023-10-13 PROCEDURE — 2500000001 HC RX 250 WO HCPCS SELF ADMINISTERED DRUGS (ALT 637 FOR MEDICARE OP): Performed by: NURSE PRACTITIONER

## 2023-10-13 PROCEDURE — 99233 SBSQ HOSP IP/OBS HIGH 50: CPT | Performed by: NURSE PRACTITIONER

## 2023-10-13 PROCEDURE — 90947 DIALYSIS REPEATED EVAL: CPT

## 2023-10-13 PROCEDURE — 82947 ASSAY GLUCOSE BLOOD QUANT: CPT

## 2023-10-13 PROCEDURE — 83735 ASSAY OF MAGNESIUM: CPT | Performed by: NURSE PRACTITIONER

## 2023-10-13 PROCEDURE — 71045 X-RAY EXAM CHEST 1 VIEW: CPT | Performed by: RADIOLOGY

## 2023-10-13 PROCEDURE — 96372 THER/PROPH/DIAG INJ SC/IM: CPT | Performed by: NURSE PRACTITIONER

## 2023-10-13 PROCEDURE — 85027 COMPLETE CBC AUTOMATED: CPT | Mod: CMCLAB | Performed by: STUDENT IN AN ORGANIZED HEALTH CARE EDUCATION/TRAINING PROGRAM

## 2023-10-13 PROCEDURE — 80069 RENAL FUNCTION PANEL: CPT | Performed by: NURSE PRACTITIONER

## 2023-10-13 PROCEDURE — 71045 X-RAY EXAM CHEST 1 VIEW: CPT

## 2023-10-13 PROCEDURE — 82330 ASSAY OF CALCIUM: CPT | Performed by: NURSE PRACTITIONER

## 2023-10-13 PROCEDURE — 2500000002 HC RX 250 W HCPCS SELF ADMINISTERED DRUGS (ALT 637 FOR MEDICARE OP, ALT 636 FOR OP/ED): Performed by: NURSE PRACTITIONER

## 2023-10-13 PROCEDURE — 99232 SBSQ HOSP IP/OBS MODERATE 35: CPT

## 2023-10-13 RX ORDER — NAPROXEN SODIUM 220 MG/1
243 TABLET, FILM COATED ORAL ONCE
Status: COMPLETED | OUTPATIENT
Start: 2023-10-13 | End: 2023-10-13

## 2023-10-13 RX ORDER — PROPOFOL 10 MG/ML
INJECTION, EMULSION INTRAVENOUS
Status: DISPENSED
Start: 2023-10-13 | End: 2023-10-14

## 2023-10-13 RX ORDER — CLOPIDOGREL BISULFATE 75 MG/1
75 TABLET ORAL DAILY
Status: DISCONTINUED | OUTPATIENT
Start: 2023-10-14 | End: 2023-10-17 | Stop reason: HOSPADM

## 2023-10-13 RX ORDER — POLYETHYLENE GLYCOL 3350 17 G/17G
17 POWDER, FOR SOLUTION ORAL 3 TIMES DAILY
Status: DISCONTINUED | OUTPATIENT
Start: 2023-10-13 | End: 2023-10-17 | Stop reason: HOSPADM

## 2023-10-13 RX ORDER — HEPARIN SODIUM 5000 [USP'U]/ML
5000 INJECTION, SOLUTION INTRAVENOUS; SUBCUTANEOUS EVERY 8 HOURS
Status: DISCONTINUED | OUTPATIENT
Start: 2023-10-13 | End: 2023-10-17 | Stop reason: HOSPADM

## 2023-10-13 RX ORDER — SODIUM BICARBONATE 650 MG/1
650 TABLET ORAL 2 TIMES DAILY
Status: DISCONTINUED | OUTPATIENT
Start: 2023-10-13 | End: 2023-10-16

## 2023-10-13 RX ORDER — HYDROMORPHONE HYDROCHLORIDE 1 MG/ML
INJECTION, SOLUTION INTRAMUSCULAR; INTRAVENOUS; SUBCUTANEOUS
Status: DISPENSED
Start: 2023-10-13 | End: 2023-10-14

## 2023-10-13 RX ORDER — ASPIRIN 325 MG
325 TABLET ORAL ONCE
Status: DISCONTINUED | OUTPATIENT
Start: 2023-10-13 | End: 2023-10-13

## 2023-10-13 RX ORDER — TORSEMIDE 100 MG/1
50 TABLET ORAL DAILY
Status: DISCONTINUED | OUTPATIENT
Start: 2023-10-13 | End: 2023-10-17 | Stop reason: HOSPADM

## 2023-10-13 RX ORDER — CLOPIDOGREL BISULFATE 75 MG/1
300 TABLET ORAL ONCE
Status: DISCONTINUED | OUTPATIENT
Start: 2023-10-13 | End: 2023-10-14

## 2023-10-13 RX ORDER — CLOPIDOGREL BISULFATE 300 MG/1
300 TABLET, FILM COATED ORAL ONCE
Status: COMPLETED | OUTPATIENT
Start: 2023-10-13 | End: 2023-10-13

## 2023-10-13 RX ADMIN — GENTAMICIN SULFATE: 1 CREAM TOPICAL at 16:00

## 2023-10-13 RX ADMIN — SODIUM CHLORIDE, SODIUM LACTATE, CALCIUM CHLORIDE, MAGNESIUM CHLORIDE AND DEXTROSE: 1.5; 538; 448; 18.3; 5.08 INJECTION, SOLUTION INTRAPERITONEAL at 16:52

## 2023-10-13 RX ADMIN — SENNOSIDES 17.2 MG: 8.6 TABLET, FILM COATED ORAL at 08:23

## 2023-10-13 RX ADMIN — ATORVASTATIN CALCIUM 80 MG: 80 TABLET, FILM COATED ORAL at 21:14

## 2023-10-13 RX ADMIN — DULOXETINE HYDROCHLORIDE 30 MG: 30 CAPSULE, DELAYED RELEASE ORAL at 08:23

## 2023-10-13 RX ADMIN — LEVOTHYROXINE SODIUM 25 MCG: 25 TABLET ORAL at 06:11

## 2023-10-13 RX ADMIN — ASPIRIN 81 MG CHEWABLE TABLET 243 MG: 81 TABLET CHEWABLE at 15:22

## 2023-10-13 RX ADMIN — Medication 25 MCG: at 08:22

## 2023-10-13 RX ADMIN — TORSEMIDE 50 MG: 100 TABLET ORAL at 15:19

## 2023-10-13 RX ADMIN — HEPARIN SODIUM 5000 UNITS: 5000 INJECTION INTRAVENOUS; SUBCUTANEOUS at 15:21

## 2023-10-13 RX ADMIN — ACETAMINOPHEN 650 MG: 325 TABLET ORAL at 08:22

## 2023-10-13 RX ADMIN — INSULIN DETEMIR 11 UNITS: 100 INJECTION, SOLUTION SUBCUTANEOUS at 21:14

## 2023-10-13 RX ADMIN — HEPARIN SODIUM 5000 UNITS: 5000 INJECTION INTRAVENOUS; SUBCUTANEOUS at 08:27

## 2023-10-13 RX ADMIN — POLYETHYLENE GLYCOL 3350 17 G: 17 POWDER, FOR SOLUTION ORAL at 15:19

## 2023-10-13 RX ADMIN — POLYETHYLENE GLYCOL 3350 17 G: 17 POWDER, FOR SOLUTION ORAL at 08:32

## 2023-10-13 RX ADMIN — FLUTICASONE PROPIONATE 2 SPRAY: 50 SPRAY, METERED NASAL at 08:30

## 2023-10-13 RX ADMIN — SODIUM CHLORIDE, SODIUM LACTATE, CALCIUM CHLORIDE, MAGNESIUM CHLORIDE AND DEXTROSE: 1.5; 538; 448; 18.3; 5.08 INJECTION, SOLUTION INTRAPERITONEAL at 16:54

## 2023-10-13 RX ADMIN — METOPROLOL TARTRATE 25 MG: 25 TABLET, FILM COATED ORAL at 08:22

## 2023-10-13 RX ADMIN — METOPROLOL TARTRATE 25 MG: 25 TABLET, FILM COATED ORAL at 21:14

## 2023-10-13 RX ADMIN — NYSTATIN 500000 UNITS: 100000 SUSPENSION ORAL at 08:21

## 2023-10-13 RX ADMIN — ASPIRIN 81 MG CHEWABLE TABLET 81 MG: 81 TABLET CHEWABLE at 08:23

## 2023-10-13 RX ADMIN — PSYLLIUM HUSK 1 PACKET: 3.4 POWDER ORAL at 09:00

## 2023-10-13 RX ADMIN — SENNOSIDES 17.2 MG: 8.6 TABLET, FILM COATED ORAL at 21:14

## 2023-10-13 RX ADMIN — INSULIN HUMAN 4 UNITS/HR: 1 INJECTION, SOLUTION INTRAVENOUS at 00:39

## 2023-10-13 RX ADMIN — NYSTATIN 500000 UNITS: 100000 SUSPENSION ORAL at 15:19

## 2023-10-13 RX ADMIN — NEPHROCAP 1 CAPSULE: 1 CAP ORAL at 08:23

## 2023-10-13 RX ADMIN — HEPARIN SODIUM 5000 UNITS: 5000 INJECTION INTRAVENOUS; SUBCUTANEOUS at 23:08

## 2023-10-13 RX ADMIN — NYSTATIN 500000 UNITS: 100000 SUSPENSION ORAL at 23:08

## 2023-10-13 RX ADMIN — CLOPIDOGREL BISULFATE 300 MG: 300 TABLET, FILM COATED ORAL at 15:27

## 2023-10-13 ASSESSMENT — COGNITIVE AND FUNCTIONAL STATUS - GENERAL
HELP NEEDED FOR BATHING: A LITTLE
DRESSING REGULAR UPPER BODY CLOTHING: A LITTLE
TOILETING: A LITTLE
PERSONAL GROOMING: A LITTLE
DAILY ACTIVITIY SCORE: 18
DRESSING REGULAR LOWER BODY CLOTHING: A LOT

## 2023-10-13 ASSESSMENT — ACTIVITIES OF DAILY LIVING (ADL)
ADL_ASSISTANCE: INDEPENDENT
BATHING_ASSISTANCE: MINIMAL

## 2023-10-13 ASSESSMENT — PAIN SCALES - GENERAL
PAINLEVEL_OUTOF10: 0 - NO PAIN
PAINLEVEL_OUTOF10: 0 - NO PAIN
PAINLEVEL_OUTOF10: 3

## 2023-10-13 ASSESSMENT — PAIN - FUNCTIONAL ASSESSMENT
PAIN_FUNCTIONAL_ASSESSMENT: 0-10
PAIN_FUNCTIONAL_ASSESSMENT: 0-10

## 2023-10-13 NOTE — PROGRESS NOTES
Salomon Chowdary   83 y.o.  M   MRN/Room: [id number]/10/10-A    Subjective:   Tolerated PD overnight, retained 500cc overnight   Planned for cath and stent placement today  Last BM Wednesday     Objective:     Meds:   aspirin, 81 mg, Daily  atorvastatin, 80 mg, Nightly  B complex-vitamin C-folic acid, 1 capsule, Daily  cholecalciferol, 25 mcg, Daily  darbepoetin alyssa, 40 mcg, Weekly  DULoxetine, 30 mg, Daily  fluticasone, 2 spray, Daily  gentamicin, , Daily  heparin (porcine), 5,000 Units, q8h  insulin detemir, 11 Units, q24h  insulin lispro, 0-5 Units, TID with meals  levothyroxine, 25 mcg, Daily  metoprolol tartrate, 25 mg, BID  nystatin, 5 mL, TID  polyethylene glycol, 17 g, TID  sennosides, 2 tablet, BID  sodium bicarbonate, 650 mg, BID  torsemide, 50 mg, Daily      dextrose 1.5% - LOW calcium 2.5mEq/L (Dianeal, Delflex-LC) in 3,000 mL peritoneal dialysate   Followed by  [START ON 10/18/2023] dextrose 1.5% - LOW calcium 2.5mEq/L (Dianeal, Delflex-LC) in 3,000 mL peritoneal dialysate      dextrose 10 % in water (D10W), 0.3 g/kg/hr, Once PRN  dextrose, 25 g, q15 min PRN  glucagon, 1 mg, q15 min PRN        Vitals:    10/13/23 1000   BP: 114/56   Pulse: 74   Resp: 22   Temp:    SpO2: 96%          Intake/Output Summary (Last 24 hours) at 10/13/2023 1235  Last data filed at 10/13/2023 1100  Gross per 24 hour   Intake 380 ml   Output 1200 ml   Net -820 ml       General appearance: Sitting out of bed, no distress   Eyes: non-icteric  Skin: no apparent rash  Heart: S1 S2 regular  Lungs: CTA bilat No wheezing/crackles  Abdomen: soft, non-distended  Extremities: No edema bilat  Neuro: A & O x 3     Blood Labs:  Results for orders placed or performed during the hospital encounter of 09/30/23 (from the past 24 hour(s))   POCT GLUCOSE   Result Value Ref Range    POCT Glucose 318 (H) 74 - 99 mg/dL   Prepare RBC: 4 Units   Result Value Ref Range    PRODUCT CODE S2893W55     Unit Number [id number]     Unit ABO O     Unit RH NEG      XM INTEP COMP     Dispense Status RE     Blood Expiration Date October 19, 2023 23:59 EDT     PRODUCT BLOOD TYPE 9500     UNIT VOLUME 350     PRODUCT CODE A3490Z94     Unit Number [id number]     Unit ABO O     Unit RH NEG     XM INTEP COMP     Dispense Status RE     Blood Expiration Date October 19, 2023 23:59 EDT     PRODUCT BLOOD TYPE 9500     UNIT VOLUME 350     PRODUCT CODE Z5358J63     Unit Number [id number]     Unit ABO O     Unit RH NEG     XM INTEP COMP     Dispense Status RE     Blood Expiration Date October 21, 2023 23:59 EDT     PRODUCT BLOOD TYPE 9500     UNIT VOLUME 400     PRODUCT CODE X3856G82     Unit Number [id number]     Unit ABO O     Unit RH NEG     XM INTEP COMP     Dispense Status RE     Blood Expiration Date October 21, 2023 23:59 EDT     PRODUCT BLOOD TYPE 9500     UNIT VOLUME 400    POCT GLUCOSE   Result Value Ref Range    POCT Glucose 410 (H) 74 - 99 mg/dL   POCT GLUCOSE   Result Value Ref Range    POCT Glucose 305 (H) 74 - 99 mg/dL   POCT GLUCOSE   Result Value Ref Range    POCT Glucose 269 (H) 74 - 99 mg/dL   POCT GLUCOSE   Result Value Ref Range    POCT Glucose 225 (H) 74 - 99 mg/dL   POCT GLUCOSE   Result Value Ref Range    POCT Glucose 206 (H) 74 - 99 mg/dL   POCT GLUCOSE   Result Value Ref Range    POCT Glucose 188 (H) 74 - 99 mg/dL   POCT GLUCOSE   Result Value Ref Range    POCT Glucose 144 (H) 74 - 99 mg/dL   Calcium, Ionized   Result Value Ref Range    POCT Calcium, Ionized 1.17 1.1 - 1.33 mmol/L   Magnesium   Result Value Ref Range    Magnesium 2.03 1.60 - 2.40 mg/dL   Renal Function Panel   Result Value Ref Range    Glucose 101 (H) 74 - 99 mg/dL    Sodium 134 (L) 136 - 145 mmol/L    Potassium 4.9 3.5 - 5.3 mmol/L    Chloride 96 (L) 98 - 107 mmol/L    Bicarbonate 26 21 - 32 mmol/L    Anion Gap 17 10 - 20 mmol/L    Urea Nitrogen 61 (H) 6 - 23 mg/dL    Creatinine 3.85 (H) 0.50 - 1.30 mg/dL    eGFR 15 (L) >60 mL/min/1.73m*2    Calcium 8.5 (L) 8.6 - 10.6 mg/dL     Phosphorus 4.0 2.5 - 4.9 mg/dL    Albumin 2.8 (L) 3.4 - 5.0 g/dL   Coagulation Screen   Result Value Ref Range    Protime 12.3 9.8 - 12.8 seconds    INR 1.1 0.9 - 1.1    aPTT 27 27 - 38 seconds   CBC   Result Value Ref Range    WBC 12.5 (H) 4.4 - 11.3 x10*3/uL    nRBC 0.0 0.0 - 0.0 /100 WBCs    RBC 2.55 (L) 4.50 - 5.90 x10*6/uL    Hemoglobin 8.3 (L) 13.5 - 17.5 g/dL    Hematocrit 26.9 (L) 41.0 - 52.0 %     (H) 80 - 100 fL    MCH 32.5 26.0 - 34.0 pg    MCHC 30.9 (L) 32.0 - 36.0 g/dL    RDW 15.0 (H) 11.5 - 14.5 %    Platelets 241 150 - 450 x10*3/uL    MPV 10.1 7.5 - 11.5 fL   POCT GLUCOSE   Result Value Ref Range    POCT Glucose 117 (H) 74 - 99 mg/dL      ASSESSMENT:  Salomon Chowdary is a  83 y.o. M, with PMHx of CAD, HTN, IDDM, ESRD on PD since March 2023, who is currently admitted to the CT-ICU s/p [facility] surgery on 10/11. Nephrology following to facilitate PD while inpatient.     RECOMMENDATIONS:  -Peritoneal dialysis affected by constipation, patient retained 500cc overnight. No BM in 2 days, please optimize bowel regimen. Consider adding Dulcolax vs   -DC sodium bicarbonate  -Check iron panel- ferritin, serum iron, TIBC, transferrin, transferrin sat  -Continue PD tonight- Total fill volume 5000cc, 8 hrs duration, 2400 fill volume, 1.5% dextrose. No heparin   -Continue Gentamicin to exit site  -Continue fungal prophylaxis as long as patient remains on antibiotics.     DW attending Dr Any Soto MD  Nephrology Fellow PGY 4  Available via Epic chat

## 2023-10-13 NOTE — CARE PLAN
Problem: ADLs  Goal: Patient with complete lower body dressing with modified independent level of assistance donning and doffing all LE clothes  with PRN adaptive equipment  Outcome: Progressing  Goal: Patient will complete daily grooming tasks brushing teeth, shaving, and washing face/hair with independent level of assistance and PRN adaptive equipment while standing.  Outcome: Progressing  Goal: Patient will complete toileting including hygiene clothing management/hygiene with independent level of assistance and raised toilet seat.  Outcome: Progressing     Problem: TRANSFERS  Goal: Patient will perform bed mobility modified independent level of assistance and use of log roll in order to improve safety and independence with mobility  Outcome: Progressing  Goal: Patient will complete functional transfer to chair/toilet with least restrictive device with modified independent level of assistance.  Outcome: Progressing     Problem: MOBILITY  Goal: Patient will perform Functional mobility mod  Household distances/Community Distances with modified independent level of assistance and least restrictive device in order to improve safety and functional mobility.  Outcome: Progressing

## 2023-10-13 NOTE — PRE-SEDATION DOCUMENTATION
Sedation Plan    ASA 3     Mallampati class: II.    Risks, benefits, and alternatives discussed with patient.    CARDIOVASCULAR: RRR, no murmurs noted, + pulses bilaterally

## 2023-10-13 NOTE — PROGRESS NOTES
Yesterday (10/12) met with patient and daughter (Angelia) for discharge planning of HHC loc. [place] [facility] [place] and [place] (Yoly) reside.  [facility] or utilizes a specific HH agency.  Patient does not have a name and nothing found in [facility].  Yesterday, this author had called [facility] [phone] and spoke to a staff member who did not know for sure if they are partnered with a HH agency to service [place] and was going to leave a message for director to call.  Received [facility]    Carmelita Acuna, MIGUEL ANGEL

## 2023-10-13 NOTE — PROGRESS NOTES
Occupational Therapy    Evaluation    Patient Name: Salomon Chowdary  MRN: [id number]  Today's Date: 10/13/2023  Time Calculation  Start Time: 1401  Stop Time: 1419  Time Calculation (min): 18 min    Assessment  IP OT Assessment  OT Assessment:  (recent cardiac surgery with impairment in ADL/transfer/mobility, would benefit from continued skilled OT to return to baseline)  Evaluation/Treatment Tolerance: Patient tolerated treatment well  Medical Staff Made Aware: Yes  End of Session Communication: Bedside nurse  End of Session Patient Position: Up in chair  Plan:  Treatment Interventions: ADL retraining, Functional transfer training, Patient/family training, Endurance training  OT Frequency: 2 times per week  OT Discharge Recommendations: Low intensity level of continued care  OT Recommended Transfer Status: Assist of 1    Subjective   Current Problem:  1. 2-vessel coronary artery disease  Vascular US carotid artery duplex bilateral    Vascular US lower extremity vein mapping bilateral    Vascular US carotid artery duplex bilateral    Vascular US lower extremity vein mapping bilateral    Transthoracic Echo (TTE) Complete    Transthoracic Echo (TTE) Complete    Anesthesia Intraoperative Transesophageal Echocardiogram    Anesthesia Intraoperative Transesophageal Echocardiogram    CANCELED: Intraoperative SIMIN    CANCELED: Intraoperative SIMIN    CANCELED: Vascular US Lower Extremity Vein Mapping Bilateral    CANCELED: Vascular US Lower Extremity Vein Mapping Bilateral    CANCELED: Intraoperative SIMIN    CANCELED: Intraoperative SIMIN      2. F/u for acute coronary syndrome  Vascular US carotid artery duplex bilateral    Vascular US carotid artery duplex bilateral      3. Moderate coronary artery risk chest pain  Vascular US lower extremity vein mapping bilateral    Vascular US lower extremity vein mapping bilateral    CANCELED: Vascular US Lower Extremity Vein Mapping Bilateral    CANCELED: Vascular US Lower Extremity Vein  Mapping Bilateral      4. 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 Encounter for other preprocedural examination  Vascular US carotid artery duplex bilateral    Vascular US lower extremity vein mapping bilateral      6. Other specified peripheral vascular diseases (CMS/HCC)  Vascular US carotid artery duplex bilateral      7. Peripheral vascular disease, unspecified (CMS/HCC)  Vascular US lower extremity vein mapping bilateral      8. Other specified symptoms and signs involving the circulatory and respiratory systems  Vascular US lower extremity vein mapping bilateral      9. Coronary artery disease involving native coronary artery of native heart with unstable angina pectoris (CMS/HCC)  Anesthesia Intraoperative Transesophageal Echocardiogram    Anesthesia Intraoperative Transesophageal Echocardiogram    Case Request Cath Lab: PCI    Case Request Cath Lab: PCI    Cardiac catheterization - coronary    Cardiac catheterization - coronary        General:  General  Reason for Referral: s/p CABG x 1  Past Medical History Relevant to Rehab: PMH: IDDM, HTN, HLD, ESRD  Family/Caregiver Present: Yes  Caregiver Feedback:  (Wife present and supportive)  Prior to Session Communication: Bedside nurse  Patient Position Received: Up in chair  General Comment:  (Pleasant and cooperative, wanting to walk to bathroom.)  Precautions:  Medical Precautions: Fall precautions  Post-Surgical Precautions: Sternal precautions  Vital Signs:  Patient Position:  (Vitals stable througout session.)  Pain:       Objective   Cognition:  Overall Cognitive Status: Within Functional Limits     Confusion Assessment Method (CAM)  Acute Onset and Fluctuating Course (1A): No  Engel Agitation Sedation Scale  Engel Agitation Sedation Scale (RASS): Alert and calm  Home Living:  Type of Home: Independent living  Lives With: Spouse  Home  Adaptive Equipment: Walker rolling or standard, Cane  Home Layout: One level  Home Access: Level entry  Bathroom Shower/Tub: Walk-in shower  Bathroom Toilet: Handicapped height  Bathroom Equipment: Grab bars in shower, Grab bars around toilet   Prior Function:  Level of [place]: Independent with ADLs and functional transfers  ADL Assistance: Independent  Homemaking Assistance: Needs assistance  Ambulatory Assistance: Independent  Vocational: Retired  Leisure:  (Watching TV)  IADL History:     ADL:  Eating Assistance: Independent  Eating Deficit: Setup  Grooming Assistance: Stand by  Grooming Deficit: Setup  Bathing Assistance: Minimal (anticipated)  UE Dressing Assistance: Minimal  UE Dressing Deficit:  (donning gown to cover back)  LE Dressing Assistance: Minimal (anticipated)  Toileting Assistance with Device: Stand by  Toileting Deficit:  (pt has catheter but completed toilet transfer for attempt to have BM)    Activity Tolerance:   Pt completed functional mobility to/from toilet with RW and CGA. Cues for safety at times, no losses of balance.     Bed Mobility/Transfers: Bed Mobility  Bed Mobility: No   and Transfers  Transfer: Yes  Transfer 1  Transfer From 1: Sit to  Transfer to 1: Stand  Technique 1: Sit to stand  Transfer Device 1: Walker  Transfer Level of Assistance 1: Minimum assistance  Trials/Comments 1: from low chair surface  Transfers 2  Transfer From 2: Toilet to  Transfer to 2: Stand  Transfer Device 2:  (grab bar)  Transfer Level of Assistance 2: Close supervision    Sensation:  Light Touch: No apparent deficits    Coordination:  Movements are Fluid and Coordinated: Yes   Hand Function:  Hand Function  Gross Grasp: Functional  Coordination: Functional  Extremities: RUE   RUE : Within Functional Limits and LUE   LUE: Within Functional Limits      Outcome Measures: [facility] Daily Activity  Putting on and taking off regular lower body clothing: A lot  Bathing (including washing, rinsing, drying): A  little  Putting on and taking off regular upper body clothing: A little  Toileting, which includes using toilet, bedpan or urinal: A little  Taking care of personal grooming such as brushing teeth: A little  Eating Meals: None  Daily Activity - Total Score: 18      Education Documentation  Body Mechanics, taught by Althea Montesinos OT at 10/13/2023  2:50 PM.  Learner: Patient  Readiness: Acceptance  Method: Explanation  Response: Verbalizes Understanding    Precautions, taught by Althea Montesinos OT at 10/13/2023  2:50 PM.  Learner: Patient  Readiness: Acceptance  Method: Explanation  Response: Verbalizes Understanding    Home Exercise Program, taught by Althea Montesinos OT at 10/13/2023  2:50 PM.  Learner: Patient  Readiness: Acceptance  Method: Explanation  Response: Verbalizes Understanding    ADL Training, taught by Althea Montesinos OT at 10/13/2023  2:50 PM.  Learner: Patient  Readiness: Acceptance  Method: Explanation  Response: Verbalizes Understanding    Education Comments  No comments found.      Goals:   Encounter Problems       Encounter Problems (Active)       ADLs       Patient with complete lower body dressing with modified independent level of assistance donning and doffing all LE clothes  with PRN adaptive equipment (Progressing)       Start:  10/13/23    Expected End:  10/27/23            Patient will complete daily grooming tasks brushing teeth, shaving, and washing face/hair with independent level of assistance and PRN adaptive equipment while standing. (Progressing)       Start:  10/13/23    Expected End:  10/27/23            Patient will complete toileting including hygiene clothing management/hygiene with independent level of assistance and raised toilet seat. (Progressing)       Start:  10/13/23    Expected End:  10/27/23               MOBILITY       Patient will perform Functional mobility mod  Household distances/Community Distances with modified independent level of assistance and least restrictive  device in order to improve safety and functional mobility. (Progressing)       Start:  10/13/23    Expected End:  10/27/23               TRANSFERS       Patient will perform bed mobility modified independent level of assistance and use of log roll in order to improve safety and independence with mobility (Progressing)       Start:  10/13/23    Expected End:  10/27/23            Patient will complete functional transfer to chair/toilet with least restrictive device with modified independent level of assistance. (Progressing)       Start:  10/13/23    Expected End:  10/27/23

## 2023-10-13 NOTE — PROGRESS NOTES
Spiritual Care Visit    Clinical Encounter Type  Visited With: Patient and family together  Routine Visit: Introduction  Continue Visiting: Yes  Surgical Visit: Pre-op  Referral From: Family              Sacramental Encounters  Sacrament of Sick-Anointing: Anointed     Patient received the Sacrament of the Anointing of the Sick by Fr. Matthias Dobbs,  Congregation .    Wife was present.      Within the Cong[employer] the Sacrament of the Sick, also known as the Anointing of the Sick, is a prayer in which we invoke the healing power of God.  Along with Eucharist and Reconciliation it is a repeatable sacrament and should be received by anyone about to undergo surgery, those in recovery and the elderly.  This IS NOT 'Last Rites' nor does the Catholic have such a ritual.  Those who are actively dying should receive the Anointing of the Sick for physical and spiritual healing.

## 2023-10-13 NOTE — PROGRESS NOTES
CTICU Progress Note      Subjective     Overnight events:  nocturnal PD, RHI off, NPO for cath lab today    Scheduled Medications:   acetaminophen, 650 mg, oral, q4h   Or  acetaminophen, 650 mg, rectal, q4h  aspirin, 81 mg, oral, Daily  atorvastatin, 80 mg, oral, Nightly  B complex-vitamin C-folic acid, 1 capsule, oral, Daily  ceFAZolin, 1 g, intravenous, q24h  cholecalciferol, 25 mcg, oral, Daily  darbepoetin alyssa, 40 mcg, intravenous, Weekly  dextrose 1.5% - LOW calcium 2.5mEq/L (Dianeal, Delflex-LC) in 3,000 mL peritoneal dialysate, , intraperitoneal, Once   Followed by  dextrose 1.5% - LOW calcium 2.5mEq/L (Dianeal, Delflex-LC) in 3,000 mL peritoneal dialysate, , intraperitoneal, Once  DULoxetine, 30 mg, oral, Daily  fluticasone, 2 spray, Each Nostril, Daily  gentamicin, , Topical, Daily  [Held by provider] insulin detemir, 5 Units, subcutaneous, q24h  [Held by provider] insulin lispro, 0-5 Units, subcutaneous, TID with meals  levothyroxine, 25 mcg, oral, Daily  metoprolol tartrate, 25 mg, oral, BID  neostigmine, 2 mg, intravenous, Once  nystatin, 5 mL, Swish & Swallow, TID  polyethylene glycol, 17 g, oral, Daily  psyllium, 1 packet, oral, Daily  sennosides, 2 tablet, oral, BID         Continuous Medications:   heparin 500 Units/L in dextrose 1.5% - LOW calcium 2.5mEq/L (Dianeal, Delflex-LC) 3,000 mL peritoneal dialysate,    Followed by  [START ON 10/18/2023] heparin 500 Units/L in dextrose 1.5% - LOW calcium 2.5mEq/L (Dianeal, Delflex-LC) 3,000 mL peritoneal dialysate,   insulin regular infusion for cardiac surgery, 0-15 Units/hr, Last Rate: Stopped (10/13/23 0600)         PRN Medications:   PRN medications: acetaminophen, dextrose 10 % in water (D10W), dextrose, glucagon, HYDROmorphone, naloxone, oxygen, oxygen    Objective   Vitals:  Most Recent:  Vitals:    10/13/23 0600   BP: 122/63   Pulse: 80   Resp: 22   Temp:    SpO2: 96%       24hr Min/Max:  Temp  Min: 36.7 °C (98.1 °F)  Max: 37 °C (98.6 °F)  Pulse   Min: 78  Max: 105  BP  Min: 122/63  Max: 140/59  Resp  Min: 19  Max: 27  SpO2  Min: 92 %  Max: 100 %    I/O:  I/O last 2 completed shifts:  In: 410 (5.2 mL/kg) [P.O.:360; I.V.:50 (0.6 mL/kg)]  Out: 1240 (15.7 mL/kg) [Urine:1070 (0.6 mL/kg/hr); Chest Tube:170]  Weight: 79 kg     Hemodynamic parameters for last 24 hours:         Vent settings:       LDA:  CVC 10/11/23 Single lumen Right Internal jugular (Active)   Placement Date/Time: 10/11/23 (c) 0920   Hand Hygiene Performed Prior to CVC Insertion: Yes  Site Prep: Chlorhexidine   Site Prep Agent has Completely Dried Before Insertion: Yes  All 5 Sterile Barriers Used (Gloves, Gown, Cap, Mask, Large Sterile Dalila...   Number of days: 0       Arterial Line 10/11/23 Left Brachial (Active)   Placement Date/Time: 10/11/23 (c) 0840   Size: 20 G  Orientation: Left  Location: Brachial  Securement Method: Transparent dressing  Patient Tolerance: Tolerated well   Number of days: 0       Urethral Catheter Temperature probe;Non-latex 14 Fr. (Active)   Placement Date/Time: 10/11/23 0909   Placed by: Malorie  Hand Hygiene Completed: Yes  Catheter Type: Temperature probe;Non-latex  Tube Size (Fr.): 14 Fr.  Urine Returned: Yes   Number of days: 0       Chest Tube 1 Left Pleural 28 Fr (Active)   Placement Date/Time: 10/11/23 1133   Placed by: HALEIGH Espana  Tube Number: 1  Chest Tube Orientation: Left  Chest Tube Location: Pleural  Chest Tube Drain Tube Size (Fr): 28 Fr  Chest Tube Drainage System: Suction  Number of Sutures Placed: 1   Number of days: 0         Physical Exam:   Physical Exam  Constitutional:       Appearance: Normal appearance.   Eyes:      Pupils: Pupils are equal, round, and reactive to light.   Cardiovascular:      Rate and Rhythm: Normal rate.      Pulses: Normal pulses.   Pulmonary:      Effort: Pulmonary effort is normal.      Breath sounds: Normal breath sounds.   Abdominal:      Palpations: Abdomen is soft.   Skin:     General: Skin is warm.      Capillary Refill:  Capillary refill takes less than 2 seconds.   Neurological:      General: No focal deficit present.      Mental Status: He is alert.         Lab/Radiology/Diagnostic Review:  Results for orders placed or performed during the hospital encounter of 09/30/23 (from the past 24 hour(s))   POCT GLUCOSE   Result Value Ref Range    POCT Glucose 212 (H) 74 - 99 mg/dL   Type and Screen   Result Value Ref Range    ABO TYPE O     Rh TYPE NEG     ANTIBODY SCREEN NEG    POCT GLUCOSE   Result Value Ref Range    POCT Glucose 274 (H) 74 - 99 mg/dL   POCT GLUCOSE   Result Value Ref Range    POCT Glucose 318 (H) 74 - 99 mg/dL   Prepare RBC: 4 Units   Result Value Ref Range    PRODUCT CODE J3214Z68     Unit Number [id number]     Unit ABO O     Unit RH NEG     XM INTEP COMP     Dispense Status RE     Blood Expiration Date October 19, 2023 23:59 EDT     PRODUCT BLOOD TYPE 9500     UNIT VOLUME 350     PRODUCT CODE Z8744J53     Unit Number [id number]     Unit ABO O     Unit RH NEG     XM INTEP COMP     Dispense Status RE     Blood Expiration Date October 19, 2023 23:59 EDT     PRODUCT BLOOD TYPE 9500     UNIT VOLUME 350     PRODUCT CODE I6833Z66     Unit Number [id number]     Unit ABO O     Unit RH NEG     XM INTEP COMP     Dispense Status RE     Blood Expiration Date October 21, 2023 23:59 EDT     PRODUCT BLOOD TYPE 9500     UNIT VOLUME 400     PRODUCT CODE C2455J10     Unit Number [id number]     Unit ABO O     Unit RH NEG     XM INTEP COMP     Dispense Status RE     Blood Expiration Date October 21, 2023 23:59 EDT     PRODUCT BLOOD TYPE 9500     UNIT VOLUME 400    POCT GLUCOSE   Result Value Ref Range    POCT Glucose 410 (H) 74 - 99 mg/dL   POCT GLUCOSE   Result Value Ref Range    POCT Glucose 305 (H) 74 - 99 mg/dL   POCT GLUCOSE   Result Value Ref Range    POCT Glucose 269 (H) 74 - 99 mg/dL   POCT GLUCOSE   Result Value Ref Range    POCT Glucose 225 (H) 74 - 99 mg/dL   POCT GLUCOSE   Result Value Ref Range    POCT  Glucose 206 (H) 74 - 99 mg/dL   POCT GLUCOSE   Result Value Ref Range    POCT Glucose 188 (H) 74 - 99 mg/dL   POCT GLUCOSE   Result Value Ref Range    POCT Glucose 144 (H) 74 - 99 mg/dL     XR chest 1 view    Result Date: 10/11/2023  Interpreted By:  Bladimir Haas and Beyersdorf Conner STUDY: XR CHEST 1 VIEW;  10/11/2023 1:10 pm   INDICATION: Signs/Symptoms:Post op cardiac surgery.   COMPARISON: Chest radiograph 10/01/2023, CT chest 10/01/2023   ACCESSION NUMBER(S): [id number]   ORDERING CLINICIAN: DWAINE ESPOSITO   FINDINGS: AP radiograph of the chest was provided.   The dual-lumen endotracheal tube in satisfactory position. The distal opening overlying the right main bronchus and proximal opening 3 cm above the michaela. Right internal jugular central catheter tip overlies the mid to distal SVC. Enteric tube courses along the esophagus and terminates below the limits of the exam.   CARDIOMEDIASTINAL SILHOUETTE: Cardiomediastinal silhouette is stable in size and configuration.   LUNGS: Prominent interstitial markings. Hazy opacities in the left lung base. No pleural effusion or pneumothorax.   ABDOMEN: No remarkable upper abdominal findings.   BONES: No acute osseous changes. Multilevel posterior cervical spine fusion extends to T1.       1.  Emphysematous changes bilaterally. 2. Hazy opacity of the left lung base that may represent scarring and subsegmental atelectasis. 3. Medical devices as above.   I personally reviewed the images/study and I agree with the findings as stated. This study was interpreted at [facility], [place], [place].   MACRO: None   Signed by: Bladimir Haas 10/11/2023 1:35 PM Dictation workstation:   [id number]        Malnutrition Diagnosis Status: New  Malnutrition Diagnosis: Moderate malnutrition related to chronic disease or condition  As Evidenced by: weight loss and decline in physical function  I agree with the dietitian's malnutrition  diagnosis.    Salomon Chowdary is a 83 y.o. male with NSTMI, IDDM, HTN, HLD, ESRD on PD, CAD, A-Fib and hypothyroidism who is s/p off pump CABG x 1 (LIMA to LAD) 10/11/23 with Dr. Eileen Neal.   Plan for [facility] for stent to circ.     NEURO:  Hx of arthritis and neuropathy.  Neuro intact and acceptable pain control.  CAM negative. Walked yesterday.  -->  - prn tylenol  - PT Consult, OOB to chair as tolerated and walk daily  - CAM ICU score qshift  - Sleep/wake cycle hygiene  - continue home cymbalta     CV:  Patient has a history of HTN,HLD, CAD, NSTEMI, and unstable angina.  He is now s/p mid CAB with nl biV function.  He has an untreated lesion in the CTX.  NSR and normotensive.  -->  - Goal MAP 70-90  - continue metop 25mg BID  - cath lab for stent today.  Awaiting recs on DAPT  - Continue ASA, statin  - Hold home Amlodipine, Hydralazine     PULM:  No history of pulmonary disease.   Chest tubes L pleural, no airleak.  Appropriate oxygenation -->  - Daily CXR   - Goal.  SpO2 >92%.   - IS q1h and OOB to chair  - dc chest tube     GI:  No PMH. Tolerating diet but NPO for procedure today. -->  - resume diabetic diet post procedure  - Colace/senna BID and miralax TID     :  Hx of ESRD on PD since March 2023 but still makes some urine.  Tolerating PD at night. Euvolemic on exam. -->  - Discontinue wright catheter  - RFP as clinically indicated  - Replete electrolytes per CTICU protocol  - Nephrology Consult for PD management.  PD again tonight  - resume home torsemide   - hold home bicarb per nephrology  - continue antimicrobial creams for PD site     ENDO:  PMH of insulin dependent T2DM A1c: 6.6.  Hypothyroidism with endocrine following.  Off RHI infusion overnight with improved glycemic control.  -->  - Maintain BG <180  - per endo recs: SSI #1 with meal and detemir 11 units at bedtime.  Holding mealtime scheduled insulin until more consistently eating meals. See endo recs what to start when eating more   - Continue   synthroid 25 mcg   - Check TFT 10/17  - hold home trulicity  - appreciate endo recs     HEME:  Acute blood loss anemia stable. -->    - start SQH  - discussing DAPT with interventional cards  - SQH, SCDs for DVT prophylaxis.  - Last type and screen: 10/12  - sending iron studies per nephrology recs     ID:  Afebrile, no current indications of infection. MRSA negative. Completed periop 48hrs cefazolin. -->  - Trend temp q4h  - dc ancef     Skin:  No active skin issues.  - preventative Mepilex dressings in place on sacrum and heels  - change preventative Mepilex weekly or more frequently as indicated (when moist/soiled)   - every shift skin assessment per nursing and weekly ICU skin rounds  - moisture barrier to be applied with francis care  - active skin problems addressed with nursing on daily rounds     Proph:  SCDs  SQH     G:  Line  Right IJ MAC w Minimac placed 10/11 --> dc after cath lab  Left brachial a-line placed 10/11 --> dc after cath alb  PD catheter in the abdomen  PIVs      F: Wife updated at bedside.      A,B,C,D,E,F,G: reviewed     Dispo: Plan for t3 transfer after cath lab  CTICU TEAM PHONE [phone]

## 2023-10-14 ENCOUNTER — APPOINTMENT (OUTPATIENT)
Dept: RADIOLOGY | Facility: HOSPITAL | Age: 83
DRG: 235 | End: 2023-10-14
Payer: MEDICARE

## 2023-10-14 LAB
ALBUMIN SERPL BCP-MCNC: 2.7 G/DL (ref 3.4–5)
ANION GAP SERPL CALC-SCNC: 16 MMOL/L (ref 10–20)
BUN SERPL-MCNC: 67 MG/DL (ref 6–23)
CALCIUM SERPL-MCNC: 8.5 MG/DL (ref 8.6–10.6)
CHLORIDE SERPL-SCNC: 96 MMOL/L (ref 98–107)
CO2 SERPL-SCNC: 25 MMOL/L (ref 21–32)
CREAT SERPL-MCNC: 4.09 MG/DL (ref 0.5–1.3)
FERRITIN SERPL-MCNC: 2426 NG/ML (ref 20–300)
GFR SERPL CREATININE-BSD FRML MDRD: 14 ML/MIN/1.73M*2
GLUCOSE BLD MANUAL STRIP-MCNC: 155 MG/DL (ref 74–99)
GLUCOSE BLD MANUAL STRIP-MCNC: 198 MG/DL (ref 74–99)
GLUCOSE BLD MANUAL STRIP-MCNC: 204 MG/DL (ref 74–99)
GLUCOSE SERPL-MCNC: 209 MG/DL (ref 74–99)
IRON SATN MFR SERPL: 14 % (ref 25–45)
IRON SERPL-MCNC: 22 UG/DL (ref 35–150)
MAGNESIUM SERPL-MCNC: 2.06 MG/DL (ref 1.6–2.4)
PHOSPHATE SERPL-MCNC: 4.6 MG/DL (ref 2.5–4.9)
POTASSIUM SERPL-SCNC: 4.6 MMOL/L (ref 3.5–5.3)
SODIUM SERPL-SCNC: 132 MMOL/L (ref 136–145)
TIBC SERPL-MCNC: 158 UG/DL (ref 240–445)
TRANSFERRIN SERPL-MCNC: 124 MG/DL (ref 200–360)
UIBC SERPL-MCNC: 136 UG/DL (ref 110–370)

## 2023-10-14 PROCEDURE — 2500000001 HC RX 250 WO HCPCS SELF ADMINISTERED DRUGS (ALT 637 FOR MEDICARE OP): Performed by: NURSE PRACTITIONER

## 2023-10-14 PROCEDURE — 71045 X-RAY EXAM CHEST 1 VIEW: CPT | Performed by: RADIOLOGY

## 2023-10-14 PROCEDURE — 83540 ASSAY OF IRON: CPT | Performed by: NURSE PRACTITIONER

## 2023-10-14 PROCEDURE — 99232 SBSQ HOSP IP/OBS MODERATE 35: CPT | Performed by: STUDENT IN AN ORGANIZED HEALTH CARE EDUCATION/TRAINING PROGRAM

## 2023-10-14 PROCEDURE — 82947 ASSAY GLUCOSE BLOOD QUANT: CPT

## 2023-10-14 PROCEDURE — 96372 THER/PROPH/DIAG INJ SC/IM: CPT | Performed by: NURSE PRACTITIONER

## 2023-10-14 PROCEDURE — 80069 RENAL FUNCTION PANEL: CPT | Mod: CMCLAB | Performed by: NURSE PRACTITIONER

## 2023-10-14 PROCEDURE — 71045 X-RAY EXAM CHEST 1 VIEW: CPT

## 2023-10-14 PROCEDURE — 82947 ASSAY GLUCOSE BLOOD QUANT: CPT | Mod: CMCLAB

## 2023-10-14 PROCEDURE — 84466 ASSAY OF TRANSFERRIN: CPT | Performed by: NURSE PRACTITIONER

## 2023-10-14 PROCEDURE — 82728 ASSAY OF FERRITIN: CPT | Mod: CMCLAB | Performed by: NURSE PRACTITIONER

## 2023-10-14 PROCEDURE — 97530 THERAPEUTIC ACTIVITIES: CPT | Mod: GP,CQ

## 2023-10-14 PROCEDURE — 90945 DIALYSIS ONE EVALUATION: CPT | Performed by: INTERNAL MEDICINE

## 2023-10-14 PROCEDURE — 96372 THER/PROPH/DIAG INJ SC/IM: CPT | Performed by: CLINICAL NURSE SPECIALIST

## 2023-10-14 PROCEDURE — 2500000004 HC RX 250 GENERAL PHARMACY W/ HCPCS (ALT 636 FOR OP/ED)

## 2023-10-14 PROCEDURE — 36415 COLL VENOUS BLD VENIPUNCTURE: CPT | Performed by: NURSE PRACTITIONER

## 2023-10-14 PROCEDURE — 1200000002 HC GENERAL ROOM WITH TELEMETRY DAILY

## 2023-10-14 PROCEDURE — 2500000002 HC RX 250 W HCPCS SELF ADMINISTERED DRUGS (ALT 637 FOR MEDICARE OP, ALT 636 FOR OP/ED): Performed by: CLINICAL NURSE SPECIALIST

## 2023-10-14 PROCEDURE — 99232 SBSQ HOSP IP/OBS MODERATE 35: CPT | Performed by: CLINICAL NURSE SPECIALIST

## 2023-10-14 PROCEDURE — 83735 ASSAY OF MAGNESIUM: CPT | Mod: CMCLAB | Performed by: NURSE PRACTITIONER

## 2023-10-14 PROCEDURE — 2500000004 HC RX 250 GENERAL PHARMACY W/ HCPCS (ALT 636 FOR OP/ED): Performed by: NURSE PRACTITIONER

## 2023-10-14 PROCEDURE — 2500000001 HC RX 250 WO HCPCS SELF ADMINISTERED DRUGS (ALT 637 FOR MEDICARE OP)

## 2023-10-14 RX ORDER — INSULIN LISPRO 100 [IU]/ML
3 INJECTION, SOLUTION INTRAVENOUS; SUBCUTANEOUS
Status: DISPENSED | OUTPATIENT
Start: 2023-10-15 | End: 2023-10-15

## 2023-10-14 RX ORDER — INSULIN LISPRO 100 [IU]/ML
5 INJECTION, SOLUTION INTRAVENOUS; SUBCUTANEOUS
Status: COMPLETED | OUTPATIENT
Start: 2023-10-14 | End: 2023-10-15

## 2023-10-14 RX ORDER — ACETAMINOPHEN 325 MG/1
650 TABLET ORAL EVERY 6 HOURS PRN
Status: DISCONTINUED | OUTPATIENT
Start: 2023-10-14 | End: 2023-10-17 | Stop reason: HOSPADM

## 2023-10-14 RX ADMIN — INSULIN LISPRO 2 UNITS: 100 INJECTION, SOLUTION INTRAVENOUS; SUBCUTANEOUS at 14:06

## 2023-10-14 RX ADMIN — HEPARIN SODIUM 5000 UNITS: 5000 INJECTION INTRAVENOUS; SUBCUTANEOUS at 17:55

## 2023-10-14 RX ADMIN — NEPHROCAP 1 CAPSULE: 1 CAP ORAL at 08:45

## 2023-10-14 RX ADMIN — ASPIRIN 81 MG CHEWABLE TABLET 81 MG: 81 TABLET CHEWABLE at 08:45

## 2023-10-14 RX ADMIN — ACETAMINOPHEN 650 MG: 325 TABLET ORAL at 20:18

## 2023-10-14 RX ADMIN — NYSTATIN 500000 UNITS: 100000 SUSPENSION ORAL at 14:19

## 2023-10-14 RX ADMIN — LEVOTHYROXINE SODIUM 25 MCG: 25 TABLET ORAL at 06:45

## 2023-10-14 RX ADMIN — DULOXETINE HYDROCHLORIDE 30 MG: 30 CAPSULE, DELAYED RELEASE ORAL at 08:45

## 2023-10-14 RX ADMIN — INSULIN LISPRO 5 UNITS: 100 INJECTION, SOLUTION INTRAVENOUS; SUBCUTANEOUS at 18:38

## 2023-10-14 RX ADMIN — Medication: at 05:45

## 2023-10-14 RX ADMIN — ATORVASTATIN CALCIUM 80 MG: 80 TABLET, FILM COATED ORAL at 20:20

## 2023-10-14 RX ADMIN — INSULIN LISPRO 1 UNITS: 100 INJECTION, SOLUTION INTRAVENOUS; SUBCUTANEOUS at 18:36

## 2023-10-14 RX ADMIN — SODIUM CHLORIDE, SODIUM LACTATE, CALCIUM CHLORIDE, MAGNESIUM CHLORIDE AND DEXTROSE: 1.5; 538; 448; 18.3; 5.08 INJECTION, SOLUTION INTRAPERITONEAL at 16:55

## 2023-10-14 RX ADMIN — FLUTICASONE PROPIONATE 2 SPRAY: 50 SPRAY, METERED NASAL at 08:58

## 2023-10-14 RX ADMIN — ACETAMINOPHEN 650 MG: 325 TABLET ORAL at 09:39

## 2023-10-14 RX ADMIN — METOPROLOL TARTRATE 25 MG: 25 TABLET, FILM COATED ORAL at 08:46

## 2023-10-14 RX ADMIN — NYSTATIN 500000 UNITS: 100000 SUSPENSION ORAL at 08:46

## 2023-10-14 RX ADMIN — Medication 25 MCG: at 08:45

## 2023-10-14 RX ADMIN — TORSEMIDE 50 MG: 100 TABLET ORAL at 09:40

## 2023-10-14 RX ADMIN — INSULIN LISPRO 5 UNITS: 100 INJECTION, SOLUTION INTRAVENOUS; SUBCUTANEOUS at 14:06

## 2023-10-14 RX ADMIN — NYSTATIN 500000 UNITS: 100000 SUSPENSION ORAL at 20:21

## 2023-10-14 RX ADMIN — SODIUM CHLORIDE, SODIUM LACTATE, CALCIUM CHLORIDE, MAGNESIUM CHLORIDE AND DEXTROSE: 1.5; 538; 448; 18.3; 5.08 INJECTION, SOLUTION INTRAPERITONEAL at 17:00

## 2023-10-14 RX ADMIN — GENTAMICIN SULFATE: 1 CREAM TOPICAL at 16:00

## 2023-10-14 RX ADMIN — INSULIN LISPRO 1 UNITS: 100 INJECTION, SOLUTION INTRAVENOUS; SUBCUTANEOUS at 09:40

## 2023-10-14 RX ADMIN — HEPARIN SODIUM 5000 UNITS: 5000 INJECTION INTRAVENOUS; SUBCUTANEOUS at 08:47

## 2023-10-14 RX ADMIN — INSULIN DETEMIR 11 UNITS: 100 INJECTION, SOLUTION SUBCUTANEOUS at 20:24

## 2023-10-14 RX ADMIN — METOPROLOL TARTRATE 25 MG: 25 TABLET, FILM COATED ORAL at 20:26

## 2023-10-14 RX ADMIN — CLOPIDOGREL BISULFATE 75 MG: 75 TABLET ORAL at 08:46

## 2023-10-14 SDOH — SOCIAL STABILITY: SOCIAL INSECURITY: ABUSE: ADULT

## 2023-10-14 SDOH — SOCIAL STABILITY: SOCIAL INSECURITY: ARE YOU OR HAVE YOU BEEN THREATENED OR ABUSED PHYSICALLY, EMOTIONALLY, OR SEXUALLY BY ANYONE?: NO

## 2023-10-14 SDOH — SOCIAL STABILITY: SOCIAL INSECURITY: DO YOU FEEL ANYONE HAS EXPLOITED OR TAKEN ADVANTAGE OF YOU FINANCIALLY OR OF YOUR PERSONAL PROPERTY?: NO

## 2023-10-14 SDOH — SOCIAL STABILITY: SOCIAL INSECURITY: DOES ANYONE TRY TO KEEP YOU FROM HAVING/CONTACTING OTHER FRIENDS OR DOING THINGS OUTSIDE YOUR HOME?: NO

## 2023-10-14 SDOH — SOCIAL STABILITY: SOCIAL INSECURITY: HAS ANYONE EVER THREATENED TO HURT YOUR FAMILY OR YOUR PETS?: NO

## 2023-10-14 SDOH — SOCIAL STABILITY: SOCIAL INSECURITY: DO YOU FEEL UNSAFE GOING BACK TO THE PLACE WHERE YOU ARE LIVING?: NO

## 2023-10-14 SDOH — SOCIAL STABILITY: SOCIAL INSECURITY: WERE YOU ABLE TO COMPLETE ALL THE BEHAVIORAL HEALTH SCREENINGS?: YES

## 2023-10-14 SDOH — SOCIAL STABILITY: SOCIAL INSECURITY: ARE THERE ANY APPARENT SIGNS OF INJURIES/BEHAVIORS THAT COULD BE RELATED TO ABUSE/NEGLECT?: NO

## 2023-10-14 ASSESSMENT — COGNITIVE AND FUNCTIONAL STATUS - GENERAL
PATIENT BASELINE BEDBOUND: NO
CLIMB 3 TO 5 STEPS WITH RAILING: A LOT
DRESSING REGULAR UPPER BODY CLOTHING: A LOT
DRESSING REGULAR UPPER BODY CLOTHING: A LITTLE
CLIMB 3 TO 5 STEPS WITH RAILING: A LOT
STANDING UP FROM CHAIR USING ARMS: A LITTLE
CLIMB 3 TO 5 STEPS WITH RAILING: A LOT
PERSONAL GROOMING: A LITTLE
HELP NEEDED FOR BATHING: A LITTLE
PERSONAL GROOMING: A LITTLE
MOVING TO AND FROM BED TO CHAIR: A LOT
DRESSING REGULAR LOWER BODY CLOTHING: A LOT
DRESSING REGULAR LOWER BODY CLOTHING: A LOT
WALKING IN HOSPITAL ROOM: A LOT
MOBILITY SCORE: 14
HELP NEEDED FOR BATHING: A LITTLE
STANDING UP FROM CHAIR USING ARMS: A LITTLE
MOVING FROM LYING ON BACK TO SITTING ON SIDE OF FLAT BED WITH BEDRAILS: A LITTLE
WALKING IN HOSPITAL ROOM: A LOT
HELP NEEDED FOR BATHING: A LITTLE
DAILY ACTIVITIY SCORE: 18
MOVING TO AND FROM BED TO CHAIR: A LOT
DAILY ACTIVITIY SCORE: 17
STANDING UP FROM CHAIR USING ARMS: A LITTLE
CLIMB 3 TO 5 STEPS WITH RAILING: A LOT
PERSONAL GROOMING: A LITTLE
TURNING FROM BACK TO SIDE WHILE IN FLAT BAD: A LOT
MOVING TO AND FROM BED TO CHAIR: A LITTLE
STANDING UP FROM CHAIR USING ARMS: A LOT
WALKING IN HOSPITAL ROOM: A LOT
TOILETING: A LITTLE
WALKING IN HOSPITAL ROOM: A LOT
MOBILITY SCORE: 13
TURNING FROM BACK TO SIDE WHILE IN FLAT BAD: A LOT
MOBILITY SCORE: 14
DAILY ACTIVITIY SCORE: 20
TURNING FROM BACK TO SIDE WHILE IN FLAT BAD: A LOT
DRESSING REGULAR UPPER BODY CLOTHING: A LITTLE
DRESSING REGULAR LOWER BODY CLOTHING: A LITTLE
MOVING FROM LYING ON BACK TO SITTING ON SIDE OF FLAT BED WITH BEDRAILS: A LITTLE
TOILETING: A LITTLE
MOVING FROM LYING ON BACK TO SITTING ON SIDE OF FLAT BED WITH BEDRAILS: A LITTLE
MOVING FROM LYING ON BACK TO SITTING ON SIDE OF FLAT BED WITH BEDRAILS: A LOT
MOVING TO AND FROM BED TO CHAIR: A LOT
TURNING FROM BACK TO SIDE WHILE IN FLAT BAD: A LOT
MOBILITY SCORE: 14

## 2023-10-14 ASSESSMENT — PAIN - FUNCTIONAL ASSESSMENT
PAIN_FUNCTIONAL_ASSESSMENT: 0-10
PAIN_FUNCTIONAL_ASSESSMENT: 0-10

## 2023-10-14 ASSESSMENT — ACTIVITIES OF DAILY LIVING (ADL)
ASSISTIVE_DEVICE: WALKER
FEEDING YOURSELF: INDEPENDENT
JUDGMENT_ADEQUATE_SAFELY_COMPLETE_DAILY_ACTIVITIES: YES
TOILETING: NEEDS ASSISTANCE
DRESSING YOURSELF: NEEDS ASSISTANCE
ADEQUATE_TO_COMPLETE_ADL: YES
HEARING - RIGHT EAR: UNABLE TO ASSESS
WALKS IN HOME: UNABLE TO ASSESS
BATHING: NEEDS ASSISTANCE
PATIENT'S MEMORY ADEQUATE TO SAFELY COMPLETE DAILY ACTIVITIES?: YES
HEARING - LEFT EAR: UNABLE TO ASSESS
GROOMING: NEEDS ASSISTANCE

## 2023-10-14 ASSESSMENT — LIFESTYLE VARIABLES
PRESCIPTION_ABUSE_PAST_12_MONTHS: NO
SUBSTANCE_ABUSE_PAST_12_MONTHS: NO

## 2023-10-14 ASSESSMENT — COLUMBIA-SUICIDE SEVERITY RATING SCALE - C-SSRS
6. HAVE YOU EVER DONE ANYTHING, STARTED TO DO ANYTHING, OR PREPARED TO DO ANYTHING TO END YOUR LIFE?: NO
1. IN THE PAST MONTH, HAVE YOU WISHED YOU WERE DEAD OR WISHED YOU COULD GO TO SLEEP AND NOT WAKE UP?: NO
2. HAVE YOU ACTUALLY HAD ANY THOUGHTS OF KILLING YOURSELF?: NO

## 2023-10-14 ASSESSMENT — PAIN SCALES - GENERAL
PAINLEVEL_OUTOF10: 0 - NO PAIN
PAINLEVEL_OUTOF10: 6
PAINLEVEL_OUTOF10: 4
PAINLEVEL_OUTOF10: 0 - NO PAIN

## 2023-10-14 NOTE — NURSING NOTE
Pt has not voided since wright removal at approximately @1900. Bladder scan for 474 mL in bladder. Physician Raghav Foster notified. Instructed to bladder scan again in 3 hours.

## 2023-10-14 NOTE — CARE PLAN
The patient's goals for the shift include patien will use the call light through out the shift.    The clinical goals for the shift include pt will remain safe throughout the shift      Problem: Pain  Goal: My pain/discomfort is manageable  Outcome: Progressing     Problem: Safety  Goal: Patient will be injury free during hospitalization  Outcome: Progressing  Goal: I will remain free of falls  Outcome: Progressing     Problem: Daily Care  Goal: Daily care needs are met  Outcome: Progressing     Problem: Psychosocial Needs  Goal: Demonstrates ability to cope with hospitalization/illness  Outcome: Progressing  Goal: Collaborate with me, my family, and caregiver to identify my specific goals  Outcome: Progressing     Problem: Discharge Barriers  Goal: My discharge needs are met  Outcome: Progressing     Problem: Fall/Injury  Goal: Not fall by end of shift  Outcome: Progressing  Goal: Be free from injury by end of the shift  Outcome: Progressing  Goal: Verbalize understanding of personal risk factors for fall in the hospital  Outcome: Progressing  Goal: Verbalize understanding of risk factor reduction measures to prevent injury from fall in the home  Outcome: Progressing     Problem: Pain  Goal: Takes deep breaths with improved pain control throughout the shift  Outcome: Progressing  Goal: Turns in bed with improved pain control throughout the shift  Outcome: Progressing  Goal: Walks with improved pain control throughout the shift  Outcome: Progressing  Goal: Performs ADL's with improved pain control throughout shift  Outcome: Progressing     Problem: Pain - Adult  Goal: Verbalizes/displays adequate comfort level or baseline comfort level  Outcome: Progressing     Problem: Safety - Adult  Goal: Free from fall injury  Outcome: Progressing     Problem: Discharge Planning  Goal: Discharge to home or other facility with appropriate resources  Outcome: Progressing     Problem: Chronic Conditions and Co-morbidities  Goal:  Patient's chronic conditions and co-morbidity symptoms are monitored and maintained or improved  Outcome: Progressing     Problem: Diabetes  Goal: Achieve decreasing blood glucose levels by end of shift  Outcome: Progressing  Goal: Increase stability of blood glucose readings by end of shift  Outcome: Progressing  Goal: Decrease in ketones present in urine by end of shift  Outcome: Progressing  Goal: Maintain electrolyte levels within acceptable range throughout shift  Outcome: Progressing  Goal: Maintain glucose levels >70mg/dl to <250mg/dl throughout shift  Outcome: Progressing  Goal: No changes in neurological exam by end of shift  Outcome: Progressing  Goal: Learn about and adhere to nutrition recommendations by end of shift  Outcome: Progressing  Goal: Vital signs within normal range for age by end of shift  Outcome: Progressing  Goal: Increase self care and/or family involovement by end of shift  Outcome: Progressing  Goal: Receive DSME education by end of shift  Outcome: Progressing

## 2023-10-14 NOTE — PROGRESS NOTES
Physical Therapy    Physical Therapy Treatment    Patient Name: Salomon Chowdary  MRN: [id number]  Today's Date: 10/14/2023  Time Calculation  Start Time: 0959  Stop Time: 1014  Time Calculation (min): 15 min       Assessment/Plan   PT Assessment  PT Assessment Results: Decreased mobility, Impaired balance  Rehab Prognosis: Good  End of Session Communication: Bedside nurse  Assessment Comment: Patient presents s/p CABG x 1.  Currently min A for mobility with endurance and gait limitations.  Recommend further therapy to increase functional independence and safety.  Will continue to follow.  End of Session Patient Position: Bed, 3 rail up, Alarm on  PT Plan  Inpatient/Swing Bed or Outpatient: Inpatient  PT Plan  Treatment/Interventions: Bed mobility, Gait training, Transfer training, Balance training, Strengthening, Endurance training, Range of motion, Therapeutic exercise, Therapeutic activity  PT Plan: Skilled PT  PT Frequency: 5 times per week  PT Discharge Recommendations: Low intensity level of continued care  Equipment Recommended upon Discharge:  (Will continue to assess)  PT Recommended Transfer Status: Assistive device, Contact guard  PT - OK to Discharge: Yes      General Visit Information:   PT  Visit  PT Received On: 10/14/23  Response to Previous Treatment: Patient with no complaints from previous session.  General  Prior to Session Communication: Bedside nurse  Patient Position Received: Up in chair  General Comment:  (pt pleasant and cooperative. pt able to tolerate limited PT on this date secondary from fatigue and pain. pt stated he had been up in the chair for multiple hours prior to PTA entering room.)    Subjective   Precautions:  Precautions  Medical Precautions: Fall precautions  Post-Surgical Precautions: Sternal precautions      Objective   Pain:  Pain Assessment  Pain Assessment: 0-10  Pain Score: 6  Pain Type: Acute pain  Pain Location: Sternum  Clinical Progression: Gradually worsening  Pain  Interventions: Repositioned  Response to Interventions:  (no relief)  Cognition:  Cognition  Overall Cognitive Status: Within Functional Limits    Treatments:  Therapeutic Exercise  Therapeutic Exercise Performed: Yes  Therapeutic Exercise Activity 1:  (Seated AP, LAQ: AROM x 10 reps each)         Bed Mobility  Bed Mobility: Yes  Bed Mobility 1  Bed Mobility 1: Sitting to supine  Level of Assistance 1: Maximum assistance  Bed Mobility Comments 1:  (Assist with trunk control and LE.)    Ambulation/Gait Training  Ambulation/Gait Training Performed: Yes  Ambulation/Gait Training 1  Surface 1: Level tile  Device 1: Rolling walker  Assistance 1: Contact guard  Comments/Distance (ft) 1:  (5')  Transfers  Transfer: Yes  Transfer 1  Transfer From 1: Sit to  Transfer to 1: Stand  Technique 1: Sit to stand  Transfer Device 1: Walker  Transfer Level of Assistance 1: Minimum assistance  Transfers 2  Transfer From 2: Stand to  Transfer to 2: Sit  Technique 2: Stand to sit  Transfer Level of Assistance 2: Minimum assistance    Outcome Measures:  [facility] Basic Mobility  Turning from your back to your side while in a flat bed without using bedrails: A little  Moving from lying on your back to sitting on the side of a flat bed without using bedrails: A lot  Moving to and from bed to chair (including a wheelchair): A lot  Standing up from a chair using your arms (e.g. wheelchair or bedside chair): A little  To walk in hospital room: A lot  Climbing 3-5 steps with railing: A lot  Basic Mobility - Total Score: 14    Education Documentation  Precautions, taught by Venancio Huff PTA at 10/14/2023  1:21 PM.  Learner: Patient  Readiness: Acceptance  Method: Explanation, Demonstration  Response: Needs Reinforcement    Body Mechanics, taught by Venancio Huff PTA at 10/14/2023  1:21 PM.  Learner: Patient  Readiness: Acceptance  Method: Explanation, Demonstration  Response: Needs Reinforcement    Mobility Training, taught by Venancio Huff PTA  at 10/14/2023  1:21 PM.  Learner: Patient  Readiness: Acceptance  Method: Explanation, Demonstration  Response: Needs Reinforcement    Education Comments  No comments found.        Encounter Problems       Encounter Problems (Active)       PT Problem       Patient will complete supine to sit and sit to supine Supervision  (Progressing)       Start:  10/12/23    Expected End:  10/26/23            Patient will perform sit<>stand transfer with walker, and stand-by  (Progressing)       Start:  10/12/23    Expected End:  10/26/23            Patient will ambulate >70' with walker and SBA  (Progressing)       Start:  10/12/23    Expected End:  10/26/23            Patient will verbalize and demonstrate MITT precautions independently.   (Progressing)       Start:  10/12/23    Expected End:  10/26/23               Pain - Adult

## 2023-10-14 NOTE — PROGRESS NOTES
Subjective   Patient ID: Salomon Chowdary is a 83 y.o. male.  Admitted for CABG and awaiting now PCI to circumflex on Monday      Labs and events reviewed.   No issues with PD UF was -26 ml; Idrain was 1ml   Had a large BM this AM   Appetite is fair  Feels OK, no c/o CP/SOB/F/C/Abd pain  No c/o related to PD     Patient Active Problem List   Diagnosis    2-vessel coronary artery disease    Coronary artery disease involving native coronary artery of native heart with unstable angina pectoris (CMS/[facility])       Scheduled medications  aspirin, 81 mg, oral, Daily  atorvastatin, 80 mg, oral, Nightly  B complex-vitamin C-folic acid, 1 capsule, oral, Daily  cholecalciferol, 25 mcg, oral, Daily  clopidogrel, 75 mg, oral, Daily  darbepoetin alyssa, 40 mcg, intravenous, Weekly  DULoxetine, 30 mg, oral, Daily  fluticasone, 2 spray, Each Nostril, Daily  gentamicin, , Topical, Daily  heparin (porcine), 5,000 Units, subcutaneous, q8h  insulin detemir, 11 Units, subcutaneous, q24h  insulin lispro, 0-5 Units, subcutaneous, TID with meals  [START ON 10/15/2023] insulin lispro, 3 Units, subcutaneous, Daily with breakfast  insulin lispro, 5 Units, subcutaneous, Daily with lunch  insulin lispro, 5 Units, subcutaneous, Daily with evening meal  levothyroxine, 25 mcg, oral, Daily  metoprolol tartrate, 25 mg, oral, BID  nystatin, 5 mL, Swish & Swallow, TID  polyethylene glycol, 17 g, oral, TID  sennosides, 2 tablet, oral, BID  [Held by provider] sodium bicarbonate, 650 mg, oral, BID  torsemide, 50 mg, oral, Daily      Continuous medications  dextrose 1.5% - LOW calcium 2.5mEq/L (Dianeal, Delflex-LC) in 3,000 mL peritoneal dialysate,    Followed by  [START ON 10/18/2023] dextrose 1.5% - LOW calcium 2.5mEq/L (Dianeal, Delflex-LC) in 3,000 mL peritoneal dialysate,       PRN medications  PRN medications: acetaminophen, dextrose 10 % in water (D10W), dextrose, glucagon     Heart Rate:  [72-96]   Temp:  [36.1 °C (97 °F)-36.8 °C (98.2 °F)]   Resp:   [18-27]   BP: (108-138)/(54-76)   Weight:  [83 kg (182 lb 15.7 oz)]   SpO2:  [92 %-98 %]    Weight: 82.2 kg (181 lb 3.5 oz)   Gen: alert, NAD  HEENT: NC/AT  Neck: supple, no JVD   Pulm: clear ant b/l   CVS: RRR, no rub  Abd: S/NT/ND  LE: no edema , no cyanosis   Neuro: no asterixis   P Dialysis acces:   clean exit site         Results from last 7 days   Lab Units 10/14/23  0732 10/13/23  0644   SODIUM mmol/L 132* 134*   POTASSIUM mmol/L 4.6 4.9   PHOSPHORUS mg/dL 4.6 4.0   CALCIUM mg/dL 8.5* 8.5*   CO2 mmol/L 25 26   HEMOGLOBIN g/dL  --  8.3*        [unfilled]     Results from last 72 hours   Lab Units 10/14/23  0732 10/13/23  0644   ALBUMIN g/dL 2.7* 2.8*   GLUCOSE mg/dL 209* 101*   HEMOGLOBIN g/dL  --  8.3*   WBC AUTO x10*3/uL  --  12.5*          Results from last 72 hours   Lab Units 10/14/23  0732   SODIUM mmol/L 132*   POTASSIUM mmol/L 4.6   CO2 mmol/L 25   BUN mg/dL 67*   CREATININE mg/dL 4.09*   PHOSPHORUS mg/dL 4.6   CALCIUM mg/dL 8.5*        A/P  Salomon Chowdary is a  83 y.o. M, with PMHx of CAD, HTN, IDDM, ESRD on PD since March 2023, who is currently admitted to the CT-ICU s/p [facility] surgery on 10/11. Nephrology following to facilitate PD while inpatient.      RECOMMENDATIONS:  -Continue same Peritoneal dialysis prescription   -Continue aranesp   -Continue PD tonight- Total fill volume 5000cc, 8 hrs duration, 2400 fill volume, 1.5% dextrose. No heparin   -Continue Gentamicin to exit site  -Continue fungal prophylaxis as long as patient remains on antibiotics.   - daily bowel regimen

## 2023-10-14 NOTE — PROGRESS NOTES
"  CARDIAC SURGERY DAILY PROGRESS NOTE    Salomon Chowdary is a 83 y.o. male, with a PMH of NSTEMI, IDDM, HTN, HLD, ESRD on PD, CAD, A-Fib and hypothyroidism , who presented to presented to an OSH on  with chest pain. He was found to have NSTEMI with LHC showing 2 vessel disease in the LAD and left circumflex. He was transferred to [facility] for evaluation of revascularization plan s/p mid CABG with Dr. Neal on 10/11/23.      OPERATION/PROCEDURE: left thoracotomy mid CABG W/ARTERIAL GRAFT SINGLE ARTERIAL GRAFT with Jorje Neal MD  On 10/11/23    CTICU Course: nocturnal PD, plan for Cath lab 10/13 but postponed to 10/16  Transferred to  on 10/13/23    Objective   /68 (BP Location: Right arm, Patient Position: Lying)   Pulse 73   Temp 36.7 °C (98.1 °F) (Temporal)   Resp 18   Ht 1.752 m (5' 8.98\")   Wt 83 kg (182 lb 15.7 oz)   SpO2 96%   BMI 27.04 kg/m²   Pain Score: 4   3 Day Weight Change: 1.333 kg (2 lb 15 oz) per day    Intake and Output    Intake/Output Summary (Last 24 hours) at 10/14/2023 1247  Last data filed at 10/14/2023 1041  Gross per 24 hour   Intake --   Output 1708 ml   Net -1708 ml       Physical Exam  Physical Exam  Constitutional:       Appearance: He is normal weight.   HENT:      Head: Normocephalic.      Mouth/Throat:      Mouth: Mucous membranes are moist.      Pharynx: Oropharynx is clear.   Cardiovascular:      Rate and Rhythm: Normal rate.      Pulses: Normal pulses.      Heart sounds: Normal heart sounds.   Pulmonary:      Effort: Pulmonary effort is normal.   Chest:      Comments: Left thoracotomy incision, ELÍAS, no redness/swelling/drainage  Suture at CT site   Abdominal:      General: Abdomen is flat. Bowel sounds are normal.      Palpations: Abdomen is soft.      Comments: Peritoneal dialysis catheter RUQ    Genitourinary:     Comments: Oliguric, on PD   Musculoskeletal:      Cervical back: Normal range of motion.      Right lower le+ Edema present.      Left lower " le+ Edema present.   Skin:     General: Skin is warm and dry.      Comments: Left thoracotomy incision    Neurological:      General: No focal deficit present.      Mental Status: He is alert.   Psychiatric:         Attention and Perception: Attention normal.         Mood and Affect: Mood normal.         Judgment: Judgment normal.         Medications  Scheduled medications  aspirin, 81 mg, oral, Daily  atorvastatin, 80 mg, oral, Nightly  B complex-vitamin C-folic acid, 1 capsule, oral, Daily  cholecalciferol, 25 mcg, oral, Daily  clopidogrel, 75 mg, oral, Daily  darbepoetin alyssa, 40 mcg, intravenous, Weekly  DULoxetine, 30 mg, oral, Daily  fluticasone, 2 spray, Each Nostril, Daily  gentamicin, , Topical, Daily  heparin (porcine), 5,000 Units, subcutaneous, q8h  insulin detemir, 11 Units, subcutaneous, q24h  insulin lispro, 0-5 Units, subcutaneous, TID with meals  [START ON 10/15/2023] insulin lispro, 3 Units, subcutaneous, Daily with breakfast  insulin lispro, 5 Units, subcutaneous, Daily with lunch  insulin lispro, 5 Units, subcutaneous, Daily with evening meal  levothyroxine, 25 mcg, oral, Daily  metoprolol tartrate, 25 mg, oral, BID  nystatin, 5 mL, Swish & Swallow, TID  polyethylene glycol, 17 g, oral, TID  sennosides, 2 tablet, oral, BID  [Held by provider] sodium bicarbonate, 650 mg, oral, BID  torsemide, 50 mg, oral, Daily    Continuous medications  dextrose 1.5% - LOW calcium 2.5mEq/L (Dianeal, Delflex-LC) in 3,000 mL peritoneal dialysate,    Followed by  [START ON 10/18/2023] dextrose 1.5% - LOW calcium 2.5mEq/L (Dianeal, Delflex-LC) in 3,000 mL peritoneal dialysate,     PRN medications  PRN medications: acetaminophen, dextrose 10 % in water (D10W), dextrose, glucagon    Labs  Results for orders placed or performed during the hospital encounter of 23 (from the past 24 hour(s))   POCT GLUCOSE   Result Value Ref Range    POCT Glucose 123 (H) 74 - 99 mg/dL   POCT GLUCOSE   Result Value Ref Range     POCT Glucose 307 (H) 74 - 99 mg/dL   Magnesium   Result Value Ref Range    Magnesium 2.06 1.60 - 2.40 mg/dL   Renal Function Panel   Result Value Ref Range    Glucose 209 (H) 74 - 99 mg/dL    Sodium 132 (L) 136 - 145 mmol/L    Potassium 4.6 3.5 - 5.3 mmol/L    Chloride 96 (L) 98 - 107 mmol/L    Bicarbonate 25 21 - 32 mmol/L    Anion Gap 16 10 - 20 mmol/L    Urea Nitrogen 67 (H) 6 - 23 mg/dL    Creatinine 4.09 (H) 0.50 - 1.30 mg/dL    eGFR 14 (L) >60 mL/min/1.73m*2    Calcium 8.5 (L) 8.6 - 10.6 mg/dL    Phosphorus 4.6 2.5 - 4.9 mg/dL    Albumin 2.7 (L) 3.4 - 5.0 g/dL   Iron and TIBC   Result Value Ref Range    Iron 22 (L) 35 - 150 ug/dL    UIBC 136 110 - 370 ug/dL    TIBC 158 (L) 240 - 445 ug/dL    % Saturation 14 (L) 25 - 45 %   Ferritin   Result Value Ref Range    Ferritin 2,426 (H) 20 - 300 ng/mL   Transferrin   Result Value Ref Range    Transferrin 124 (L) 200 - 360 mg/dL   POCT GLUCOSE   Result Value Ref Range    POCT Glucose 198 (H) 74 - 99 mg/dL           IMPRESSION & PLAN:  POD #3 s/p left thoracotomy mid CABG W/ARTERIAL GRAFT SINGLE ARTERIAL GRAFT with Jorje F Eileen Neal MD on 10/11/23  - Increase activity/ ambulation; PT/OT  - Encourage IS, C/DB; respiratory therapy; wean O2 as josh   - Cardiac rehab referral   - Continue cardiac meds: ASA, BB, statin, Plavix   - Pain and anticonstipation meds  - 2v CXR ordered 10/15  - No epicardial wires  - Tele until discharge  - Optimize nutrition and electrolytes    CAD   - s/p LIMA-LAD  - needs PCI to Cx   - received 300mg Plavix, 243mg aspirin on 10/13  - continue Plavix 75mg /asa 81mg daily   - NPO MN Monday 10/16 for PCI    Rhythm  - Tele: SR 90s  - Continue BB  - Adjust medications as tolerated    Acute Blood Loss Anemia   Hematocrit   Date Value Ref Range Status   10/13/2023 26.9 (L) 41.0 - 52.0 % Final   10/12/2023 30.6 (L) 41.0 - 52.0 % Final   10/11/2023 27.8 (L) 41.0 - 52.0 % Final   - MV, PO Iron x1mo  - Daily labs, transfuse as indicated    Hx of ESRD on PD  since March 2023 but still makes some urine.  Tolerating PD at night. Euvolemic on exam. -->  Volume/Electrolyte Status: Preop wt 78.7kg  - Weight: 83kg  - Replete electrolytes for hypokalemia/hypomagnesemia/hypophosphatemia as needed  - Daily weights and strict I&Os  - Daily RFP while admitted  - Ewing catheter removed 10/13  - Nephrology Consult for PD management, appreciate recs:     --Continue same Peritoneal dialysis prescription     --Continue aranesp     --Continue PD tonight- Total fill volume 5000cc, 8 hrs duration, 2400 fill volume, 1.5% dextrose. No heparin     --Continue Gentamicin to exit site    --Continue fungal prophylaxis as long as patient remains on antibiotics.     -- daily bowel regimen   - resume home torsemide   - hold home bicarb per nephrology  - continue antimicrobial creams for PD site    PMH of insulin dependent T2DM A1c: 6.6.  Hypothyroidism with endocrine following.    - Maintain BG <180  - per endo recs: SSI #1 with meal and detemir 11 units at bedtime.  Lispro 3u with breakfast, Lispro 5u with lunch and dinner   - Continue  synthroid 25 mcg   - hold home trulicity  - appreciate endo recs    Hx arthritis and neuropathy  - continue home Cymbalta  - PT/OT  - OOB to chair/ambulate  - Tylenol prn for pain     Skin: Stage II sacral pressure injury   - preventative Mepilex dressings in place on sacrum and heels  - change preventative Mepilex weekly or more frequently as indicated (when moist/soiled)   - every shift skin assessment per nursing  - moisture barrier to be applied with francis care    VTE Prophylaxis: SCDs/TEDs, ambulation, SQ heparin  Code Status: Full Code    Dispo  - PT/OT recs home with homecare   - Would benefit from homecare for CABG carepath and RN visits  - Anticipate discharge 3-4 days, pending LHC/PCI on Monday 10/16  - Will continue to assess discharge needs      ANNIE De La Fuente-CNS  Cardiac Surgery [facility]  [facility]  Team Pager [phone]      10/14/2023  12:47 PM

## 2023-10-14 NOTE — PROGRESS NOTES
"Salomon Chowdary is a 83 y.o. male on day 14 of with IDDM, HTN, HLD, ESRD on PD,  CAD, A-Fib and hypothyroidism presented to an OSH on 9/20 with chest pain. He was found to have NSTEMI with LHC showing 2 vessel disease in the LAD and left circumflex. He was transferred to [facility] for evaluation of revascularization plan.  He is status post mid CABG on 10/11.  Endocrinology is consulted iso abnormal TFT with TSH of 6.26 and FT4 1.12 as well as diabetes management.    Subjective   Patient was seen at bedside.  Expressed frustration regarding postponing his cardiac cath.  He was eating a cake at the time of evaluation.  He is on a regular diet.  Reported that the menu options are not made for a diabetic but declined a diabetic diet.    Objective   PE:  Constitutional: NAD, well groomed. AOx3. Cooperative  Skin/Hair: Warm, dry skin.  HEENT: EOMI, Anicteric scleras   Cardiovascular: normal HR  Respiratory: no increased wob,  or accessory muscle use.  Psych : appropriate affect       Last Recorded Vitals  Blood pressure 112/62, pulse 73, temperature 36.1 °C (97 °F), temperature source Temporal, resp. rate 18, height 1.752 m (5' 8.98\"), weight 83 kg (182 lb 15.7 oz), SpO2 92 %.  Intake/Output last 3 Shifts:  I/O last 3 completed shifts:  In: 365 (4.4 mL/kg) [P.O.:360; I.V.:5 (0.1 mL/kg)]  Out: 2682 (32.3 mL/kg) [Urine:2642 (0.9 mL/kg/hr); Chest Tube:40]  Weight: 83 kg     Relevant Results    Lab Results   Component Value Date    TSH 5.76 (H) 10/09/2023    TSH 6.26 (H) 10/02/2023    TSH 2.34 09/20/2023    FREET4 1.09 10/09/2023    FREET4 1.12 10/02/2023       Results from last 7 days   Lab Units 10/14/23  1300 10/14/23  0829 10/14/23  0732 10/13/23  2117 10/13/23  1255 10/13/23  0828 10/13/23  0644 10/12/23  0828 10/12/23  0306 10/11/23  1310 10/11/23  1309 10/10/23  0832 10/09/23  2117   POCT GLUCOSE mg/dL 204* 198*  --  307* 123* 117*  --    < >  --    < >  --    < >  --    GLUCOSE mg/dL  --   --  209*  --   --   --  101*  -- "  150*  --  122*  --  287*    < > = values in this interval not displayed.   Scheduled medications  aspirin, 81 mg, oral, Daily  atorvastatin, 80 mg, oral, Nightly  B complex-vitamin C-folic acid, 1 capsule, oral, Daily  cholecalciferol, 25 mcg, oral, Daily  clopidogrel, 75 mg, oral, Daily  darbepoetin alyssa, 40 mcg, intravenous, Weekly  DULoxetine, 30 mg, oral, Daily  fluticasone, 2 spray, Each Nostril, Daily  gentamicin, , Topical, Daily  heparin (porcine), 5,000 Units, subcutaneous, q8h  insulin detemir, 11 Units, subcutaneous, q24h  insulin lispro, 0-5 Units, subcutaneous, TID with meals  [START ON 10/15/2023] insulin lispro, 3 Units, subcutaneous, Daily with breakfast  insulin lispro, 5 Units, subcutaneous, Daily with lunch  insulin lispro, 5 Units, subcutaneous, Daily with evening meal  levothyroxine, 25 mcg, oral, Daily  metoprolol tartrate, 25 mg, oral, BID  nystatin, 5 mL, Swish & Swallow, TID  polyethylene glycol, 17 g, oral, TID  sennosides, 2 tablet, oral, BID  [Held by provider] sodium bicarbonate, 650 mg, oral, BID  torsemide, 50 mg, oral, Daily      Continuous medications  dextrose 1.5% - LOW calcium 2.5mEq/L (Dianeal, Delflex-LC) in 3,000 mL peritoneal dialysate,    Followed by  [START ON 10/18/2023] dextrose 1.5% - LOW calcium 2.5mEq/L (Dianeal, Delflex-LC) in 3,000 mL peritoneal dialysate,       PRN medications  PRN medications: acetaminophen, dextrose 10 % in water (D10W), dextrose, glucagon        Assessment/Plan   Salomon Chowdary is a 83 y.o. male with IDDM, HTN, HLD, ESRD on PD,  CAD, A-Fib and hypothyroidism presented to an OSH ON 9/20 with chest pain. He was found to have NSTEMI with LHC showing 2 vessel disease in the LAD and left circumflex. He was transferred to [facility] for evaluation of revascularization plan. Endocrinology is consulted iso abnormal TFT    10/2: TSH 6.26 and FT4 1.12.   10/10: TSH 5.76 and FT4 1.09     #Hypothyroidism   Patient is on 25 mcg of LT4. TSH was 2.34 on the day of  presentation to the OSH. Presented with NSTEMI as above with Trop peaked to >2000 was initially managed in the ICU. No FT4 was obtained on 9/20. Now TSH is elevated with FT4 1.12 (WNL). This is likely to nonthyroidal illness (previously known as euthyroid sick syndrome) iso acute illness as above   He appears clinically euthyroid.      Recommendations  -Continue current dose of LT4 25 mcg   -Check TFT on 10/17     #DM2   Background Hx:  Diabetes duration: Since 1995, has been on insulin since 2012           Home blood glucose checks: Daily  Trends: 80s to 130s  Hypoglycemia (y/n, threshold and symptoms): Yes, rarely, BG in the 60s, shakes/sweating/halos in his vision  Home diabetes regimen: Detemir 18 units bedtime and Trulicity 1.5 mg q. Weekly (has not received it for the past 2 weeks)  Home diet (how many meals a day): 2-3 meals a day  Outpatient physician managing diabetes: Endocrinologist with [facility].  Not seen in the past 3 months would like to establish with a new endocrinologist.  Currently follows with PCP.  Macrovascular complications: CVD [no diagnosed CAD to major vessel disease]     CVA [ denies]     PVD [denies]  Microvascular complications: Retinopathy [ denies]   Nephropathy [ESRD on PD]   Neuropathy [endorses]  Last A1c: 6.6 9/2023                  Inpatient diabetes regimen: 20 units of detemir and a low dose SS  Inpatient diet: Regular diet         Recommendations:  -Goal -180 while inpatient  -Continue detemir 11 units at bedtime  -Start mealtime insulin 3 units with breakfast, 5 units with lunch, and 5 units with dinner  -Continue#1 SSI insulin TIDAC with meals   -Switch diet to liberalize diabetic diet CC90 instead of regular diet  -Once patient is discharged please ensure that home Trulicity get refilled   -Accuchecks ACHS  -Endocrinology team will continue to follow.    If patient is n.p.o. for procedure then would recommend:  -Holding all mealtime insulin  -Switching #1 sliding  scale to every 4 hours while NPO  -Decreasing detemir dose to 5 units at bedtime     Plan was communicated to the primary team      Discussed with Dr. Antonio Marie MD   Endocrinology, PGY 4

## 2023-10-15 ENCOUNTER — APPOINTMENT (OUTPATIENT)
Dept: RADIOLOGY | Facility: HOSPITAL | Age: 83
DRG: 235 | End: 2023-10-15
Payer: MEDICARE

## 2023-10-15 PROBLEM — N18.6 ESRD (END STAGE RENAL DISEASE) (MULTI): Status: ACTIVE | Noted: 2023-10-15

## 2023-10-15 LAB
ALBUMIN SERPL BCP-MCNC: 2.7 G/DL (ref 3.4–5)
ANION GAP SERPL CALC-SCNC: 18 MMOL/L (ref 10–20)
BUN SERPL-MCNC: 65 MG/DL (ref 6–23)
CALCIUM SERPL-MCNC: 8.4 MG/DL (ref 8.6–10.6)
CHLORIDE SERPL-SCNC: 98 MMOL/L (ref 98–107)
CO2 SERPL-SCNC: 25 MMOL/L (ref 21–32)
CREAT SERPL-MCNC: 4.17 MG/DL (ref 0.5–1.3)
ERYTHROCYTE [DISTWIDTH] IN BLOOD BY AUTOMATED COUNT: 14.7 % (ref 11.5–14.5)
GFR SERPL CREATININE-BSD FRML MDRD: 14 ML/MIN/1.73M*2
GLUCOSE BLD MANUAL STRIP-MCNC: 159 MG/DL (ref 74–99)
GLUCOSE BLD MANUAL STRIP-MCNC: 168 MG/DL (ref 74–99)
GLUCOSE BLD MANUAL STRIP-MCNC: 198 MG/DL (ref 74–99)
GLUCOSE BLD MANUAL STRIP-MCNC: 249 MG/DL (ref 74–99)
GLUCOSE BLD MANUAL STRIP-MCNC: 66 MG/DL (ref 74–99)
GLUCOSE BLD MANUAL STRIP-MCNC: 81 MG/DL (ref 74–99)
GLUCOSE SERPL-MCNC: 52 MG/DL (ref 74–99)
HCT VFR BLD AUTO: 28.7 % (ref 41–52)
HGB BLD-MCNC: 8.9 G/DL (ref 13.5–17.5)
MAGNESIUM SERPL-MCNC: 2.05 MG/DL (ref 1.6–2.4)
MCH RBC QN AUTO: 31.8 PG (ref 26–34)
MCHC RBC AUTO-ENTMCNC: 31 G/DL (ref 32–36)
MCV RBC AUTO: 103 FL (ref 80–100)
NRBC BLD-RTO: 0 /100 WBCS (ref 0–0)
PHOSPHATE SERPL-MCNC: 4.3 MG/DL (ref 2.5–4.9)
PLATELET # BLD AUTO: 260 X10*3/UL (ref 150–450)
PMV BLD AUTO: 9.9 FL (ref 7.5–11.5)
POTASSIUM SERPL-SCNC: 4.2 MMOL/L (ref 3.5–5.3)
RBC # BLD AUTO: 2.8 X10*6/UL (ref 4.5–5.9)
SODIUM SERPL-SCNC: 137 MMOL/L (ref 136–145)
WBC # BLD AUTO: 8.4 X10*3/UL (ref 4.4–11.3)

## 2023-10-15 PROCEDURE — 2500000001 HC RX 250 WO HCPCS SELF ADMINISTERED DRUGS (ALT 637 FOR MEDICARE OP): Performed by: NURSE PRACTITIONER

## 2023-10-15 PROCEDURE — 71046 X-RAY EXAM CHEST 2 VIEWS: CPT | Performed by: RADIOLOGY

## 2023-10-15 PROCEDURE — 99232 SBSQ HOSP IP/OBS MODERATE 35: CPT | Performed by: CLINICAL NURSE SPECIALIST

## 2023-10-15 PROCEDURE — 80069 RENAL FUNCTION PANEL: CPT | Performed by: CLINICAL NURSE SPECIALIST

## 2023-10-15 PROCEDURE — 85027 COMPLETE CBC AUTOMATED: CPT | Mod: CMCLAB | Performed by: CLINICAL NURSE SPECIALIST

## 2023-10-15 PROCEDURE — 2500000004 HC RX 250 GENERAL PHARMACY W/ HCPCS (ALT 636 FOR OP/ED): Performed by: NURSE PRACTITIONER

## 2023-10-15 PROCEDURE — 71046 X-RAY EXAM CHEST 2 VIEWS: CPT

## 2023-10-15 PROCEDURE — 90947 DIALYSIS REPEATED EVAL: CPT

## 2023-10-15 PROCEDURE — 2500000004 HC RX 250 GENERAL PHARMACY W/ HCPCS (ALT 636 FOR OP/ED)

## 2023-10-15 PROCEDURE — 82947 ASSAY GLUCOSE BLOOD QUANT: CPT

## 2023-10-15 PROCEDURE — 36415 COLL VENOUS BLD VENIPUNCTURE: CPT | Performed by: CLINICAL NURSE SPECIALIST

## 2023-10-15 PROCEDURE — 96372 THER/PROPH/DIAG INJ SC/IM: CPT | Performed by: NURSE PRACTITIONER

## 2023-10-15 PROCEDURE — 83735 ASSAY OF MAGNESIUM: CPT | Performed by: CLINICAL NURSE SPECIALIST

## 2023-10-15 PROCEDURE — 1200000002 HC GENERAL ROOM WITH TELEMETRY DAILY

## 2023-10-15 PROCEDURE — 99232 SBSQ HOSP IP/OBS MODERATE 35: CPT | Performed by: STUDENT IN AN ORGANIZED HEALTH CARE EDUCATION/TRAINING PROGRAM

## 2023-10-15 RX ORDER — INSULIN LISPRO 100 [IU]/ML
0-5 INJECTION, SOLUTION INTRAVENOUS; SUBCUTANEOUS
Status: COMPLETED | OUTPATIENT
Start: 2023-10-15 | End: 2023-10-15

## 2023-10-15 RX ORDER — INSULIN LISPRO 100 [IU]/ML
0-5 INJECTION, SOLUTION INTRAVENOUS; SUBCUTANEOUS EVERY 4 HOURS
Status: DISCONTINUED | OUTPATIENT
Start: 2023-10-16 | End: 2023-10-17

## 2023-10-15 RX ADMIN — LEVOTHYROXINE SODIUM 25 MCG: 25 TABLET ORAL at 06:46

## 2023-10-15 RX ADMIN — TORSEMIDE 50 MG: 100 TABLET ORAL at 09:20

## 2023-10-15 RX ADMIN — DULOXETINE HYDROCHLORIDE 30 MG: 30 CAPSULE, DELAYED RELEASE ORAL at 09:20

## 2023-10-15 RX ADMIN — NYSTATIN 500000 UNITS: 100000 SUSPENSION ORAL at 09:20

## 2023-10-15 RX ADMIN — HEPARIN SODIUM 5000 UNITS: 5000 INJECTION INTRAVENOUS; SUBCUTANEOUS at 17:54

## 2023-10-15 RX ADMIN — HEPARIN SODIUM 5000 UNITS: 5000 INJECTION INTRAVENOUS; SUBCUTANEOUS at 09:21

## 2023-10-15 RX ADMIN — ACETAMINOPHEN 650 MG: 325 TABLET ORAL at 10:50

## 2023-10-15 RX ADMIN — ASPIRIN 81 MG CHEWABLE TABLET 81 MG: 81 TABLET CHEWABLE at 09:20

## 2023-10-15 RX ADMIN — ACETAMINOPHEN 650 MG: 325 TABLET ORAL at 20:26

## 2023-10-15 RX ADMIN — METOPROLOL TARTRATE 25 MG: 25 TABLET, FILM COATED ORAL at 20:22

## 2023-10-15 RX ADMIN — SODIUM CHLORIDE, SODIUM LACTATE, CALCIUM CHLORIDE, MAGNESIUM CHLORIDE AND DEXTROSE: 1.5; 538; 448; 18.3; 5.08 INJECTION, SOLUTION INTRAPERITONEAL at 22:01

## 2023-10-15 RX ADMIN — Medication 25 MCG: at 09:20

## 2023-10-15 RX ADMIN — HEPARIN SODIUM 5000 UNITS: 5000 INJECTION INTRAVENOUS; SUBCUTANEOUS at 00:28

## 2023-10-15 RX ADMIN — FLUTICASONE PROPIONATE 2 SPRAY: 50 SPRAY, METERED NASAL at 09:33

## 2023-10-15 RX ADMIN — SODIUM CHLORIDE, SODIUM LACTATE, CALCIUM CHLORIDE, MAGNESIUM CHLORIDE AND DEXTROSE: 1.5; 538; 448; 18.3; 5.08 INJECTION, SOLUTION INTRAPERITONEAL at 22:00

## 2023-10-15 RX ADMIN — CLOPIDOGREL BISULFATE 75 MG: 75 TABLET ORAL at 09:20

## 2023-10-15 RX ADMIN — NEPHROCAP 1 CAPSULE: 1 CAP ORAL at 09:20

## 2023-10-15 RX ADMIN — ATORVASTATIN CALCIUM 80 MG: 80 TABLET, FILM COATED ORAL at 20:22

## 2023-10-15 RX ADMIN — INSULIN LISPRO 2 UNITS: 100 INJECTION, SOLUTION INTRAVENOUS; SUBCUTANEOUS at 12:52

## 2023-10-15 RX ADMIN — NYSTATIN 500000 UNITS: 100000 SUSPENSION ORAL at 20:22

## 2023-10-15 RX ADMIN — INSULIN LISPRO 5 UNITS: 100 INJECTION, SOLUTION INTRAVENOUS; SUBCUTANEOUS at 12:52

## 2023-10-15 RX ADMIN — METOPROLOL TARTRATE 25 MG: 25 TABLET, FILM COATED ORAL at 09:21

## 2023-10-15 RX ADMIN — INSULIN LISPRO 5 UNITS: 100 INJECTION, SOLUTION INTRAVENOUS; SUBCUTANEOUS at 17:52

## 2023-10-15 RX ADMIN — INSULIN LISPRO 1 UNITS: 100 INJECTION, SOLUTION INTRAVENOUS; SUBCUTANEOUS at 17:54

## 2023-10-15 RX ADMIN — NYSTATIN 500000 UNITS: 100000 SUSPENSION ORAL at 15:17

## 2023-10-15 RX ADMIN — GENTAMICIN SULFATE: 1 CREAM TOPICAL at 16:00

## 2023-10-15 ASSESSMENT — PAIN SCALES - GENERAL
PAINLEVEL_OUTOF10: 4
PAINLEVEL_OUTOF10: 2

## 2023-10-15 ASSESSMENT — COGNITIVE AND FUNCTIONAL STATUS - GENERAL
MOBILITY SCORE: 17
TURNING FROM BACK TO SIDE WHILE IN FLAT BAD: A LITTLE
HELP NEEDED FOR BATHING: A LITTLE
MOVING TO AND FROM BED TO CHAIR: A LITTLE
STANDING UP FROM CHAIR USING ARMS: A LITTLE
WALKING IN HOSPITAL ROOM: A LOT
DRESSING REGULAR LOWER BODY CLOTHING: A LITTLE
DRESSING REGULAR UPPER BODY CLOTHING: A LITTLE
CLIMB 3 TO 5 STEPS WITH RAILING: A LOT
DAILY ACTIVITIY SCORE: 21

## 2023-10-15 NOTE — PROGRESS NOTES
"  CARDIAC SURGERY DAILY PROGRESS NOTE    Salomon Chowdary is a 83 y.o. male, with a PMH of NSTEMI, IDDM, HTN, HLD, ESRD on PD, CAD, A-Fib and hypothyroidism , who presented to presented to an OSH on  with chest pain. He was found to have NSTEMI with LHC showing 2 vessel disease in the LAD and left circumflex. He was transferred to [facility] for evaluation of revascularization plan s/p mid CABG with Dr. Neal on 10/11/23.      OPERATION/PROCEDURE: left thoracotomy mid CABG W/ARTERIAL GRAFT SINGLE ARTERIAL GRAFT with Jorje Neal MD  On 10/11/23    CTICU Course: nocturnal PD, plan for Cath lab 10/13 but postponed to 10/16  Transferred to  on 10/13/23    Objective   /69 (BP Location: Right arm, Patient Position: Lying)   Pulse 75   Temp 36.4 °C (97.5 °F) (Temporal)   Resp 18   Ht 1.752 m (5' 8.98\")   Wt 79.8 kg (175 lb 14.8 oz)   SpO2 99%   BMI 26.00 kg/m²   Pain Score: 4   3 Day Weight Change: Unable to Calculate    Intake and Output    Intake/Output Summary (Last 24 hours) at 10/15/2023 1209  Last data filed at 10/15/2023 1032  Gross per 24 hour   Intake 600 ml   Output 1896 ml   Net -1296 ml         Physical Exam  Physical Exam  Constitutional:       Appearance: He is normal weight.   HENT:      Head: Normocephalic.      Mouth/Throat:      Mouth: Mucous membranes are moist.      Pharynx: Oropharynx is clear.   Cardiovascular:      Rate and Rhythm: Normal rate.      Pulses: Normal pulses.      Heart sounds: Normal heart sounds.   Pulmonary:      Effort: Pulmonary effort is normal.   Chest:      Comments: Left thoracotomy incision, ELÍAS, no redness/swelling/drainage  Suture at CT site   Abdominal:      General: Abdomen is flat. Bowel sounds are normal.      Palpations: Abdomen is soft.      Comments: Peritoneal dialysis catheter RUQ    Genitourinary:     Comments: Oliguric, on PD   Musculoskeletal:      Cervical back: Normal range of motion.      Right lower le+ Edema present.      Left lower leg: " 1+ Edema present.   Skin:     General: Skin is warm and dry.      Comments: Left thoracotomy incision    Neurological:      General: No focal deficit present.      Mental Status: He is alert.   Psychiatric:         Attention and Perception: Attention normal.         Mood and Affect: Mood normal.         Judgment: Judgment normal.         Medications  Scheduled medications  aspirin, 81 mg, oral, Daily  atorvastatin, 80 mg, oral, Nightly  B complex-vitamin C-folic acid, 1 capsule, oral, Daily  cholecalciferol, 25 mcg, oral, Daily  clopidogrel, 75 mg, oral, Daily  darbepoetin alyssa, 40 mcg, intravenous, Weekly  DULoxetine, 30 mg, oral, Daily  fluticasone, 2 spray, Each Nostril, Daily  gentamicin, , Topical, Daily  heparin (porcine), 5,000 Units, subcutaneous, q8h  insulin detemir, 5 Units, subcutaneous, q24h  [START ON 10/16/2023] insulin lispro, 0-5 Units, subcutaneous, q4h  insulin lispro, 0-5 Units, subcutaneous, TID with meals  insulin lispro, 3 Units, subcutaneous, Daily with breakfast  insulin lispro, 5 Units, subcutaneous, Daily with lunch  insulin lispro, 5 Units, subcutaneous, Daily with evening meal  levothyroxine, 25 mcg, oral, Daily  metoprolol tartrate, 25 mg, oral, BID  nystatin, 5 mL, Swish & Swallow, TID  polyethylene glycol, 17 g, oral, TID  sennosides, 2 tablet, oral, BID  [Held by provider] sodium bicarbonate, 650 mg, oral, BID  torsemide, 50 mg, oral, Daily    Continuous medications  dextrose 1.5% - LOW calcium 2.5mEq/L (Dianeal, Delflex-LC) in 3,000 mL peritoneal dialysate,    Followed by  [START ON 10/18/2023] dextrose 1.5% - LOW calcium 2.5mEq/L (Dianeal, Delflex-LC) in 3,000 mL peritoneal dialysate,     PRN medications  PRN medications: acetaminophen, dextrose 10 % in water (D10W), dextrose, glucagon    Labs  Results for orders placed or performed during the hospital encounter of 09/30/23 (from the past 24 hour(s))   POCT GLUCOSE   Result Value Ref Range    POCT Glucose 204 (H) 74 - 99 mg/dL    POCT GLUCOSE   Result Value Ref Range    POCT Glucose 155 (H) 74 - 99 mg/dL   POCT GLUCOSE   Result Value Ref Range    POCT Glucose 198 (H) 74 - 99 mg/dL   CBC   Result Value Ref Range    WBC 8.4 4.4 - 11.3 x10*3/uL    nRBC 0.0 0.0 - 0.0 /100 WBCs    RBC 2.80 (L) 4.50 - 5.90 x10*6/uL    Hemoglobin 8.9 (L) 13.5 - 17.5 g/dL    Hematocrit 28.7 (L) 41.0 - 52.0 %     (H) 80 - 100 fL    MCH 31.8 26.0 - 34.0 pg    MCHC 31.0 (L) 32.0 - 36.0 g/dL    RDW 14.7 (H) 11.5 - 14.5 %    Platelets 260 150 - 450 x10*3/uL    MPV 9.9 7.5 - 11.5 fL   Magnesium   Result Value Ref Range    Magnesium 2.05 1.60 - 2.40 mg/dL   Renal Function Panel   Result Value Ref Range    Glucose 52 (LL) 74 - 99 mg/dL    Sodium 137 136 - 145 mmol/L    Potassium 4.2 3.5 - 5.3 mmol/L    Chloride 98 98 - 107 mmol/L    Bicarbonate 25 21 - 32 mmol/L    Anion Gap 18 10 - 20 mmol/L    Urea Nitrogen 65 (H) 6 - 23 mg/dL    Creatinine 4.17 (H) 0.50 - 1.30 mg/dL    eGFR 14 (L) >60 mL/min/1.73m*2    Calcium 8.4 (L) 8.6 - 10.6 mg/dL    Phosphorus 4.3 2.5 - 4.9 mg/dL    Albumin 2.7 (L) 3.4 - 5.0 g/dL           IMPRESSION & PLAN:  POD #4 s/p left thoracotomy mid CABG W/ARTERIAL GRAFT SINGLE ARTERIAL GRAFT with Jorje F Eileen Neal MD on 10/11/23  - Increase activity/ ambulation; PT/OT  - Encourage IS, C/DB; respiratory therapy; wean O2 as josh   - Cardiac rehab referral   - Continue cardiac meds: ASA, BB, statin, Plavix   - Pain and anticonstipation meds  - 2v CXR ordered 10/15  - No epicardial wires  - Tele until discharge  - Optimize nutrition and electrolytes    CAD   - s/p LIMA-LAD  - needs PCI to Cx   - received 300mg Plavix, 243mg aspirin on 10/13  - continue Plavix 75mg /asa 81mg daily   - NPO MN Monday 10/16 for PCI    Rhythm  - Tele:  73-93  - Continue BB  - Adjust medications as tolerated    Acute Blood Loss Anemia   Hematocrit   Date Value Ref Range Status   10/15/2023 28.7 (L) 41.0 - 52.0 % Final   10/13/2023 26.9 (L) 41.0 - 52.0 % Final   - MV, PO  Iron x1mo  - Daily labs, transfuse as indicated    Hx of ESRD on PD since March 2023 but still makes some urine.  Tolerating PD at night. Euvolemic on exam. -->  Volume/Electrolyte Status: Preop wt 78.7kg  - Weight: 79.8, 83kg  - Replete electrolytes for hypokalemia/hypomagnesemia/hypophosphatemia as needed  - Daily weights and strict I&Os  - Daily RFP while admitted  - Ewing catheter removed 10/13  - Nephrology Consult for PD management, appreciate recs:     --Continue same Peritoneal dialysis prescription     --Continue aranesp     --Continue PD tonight- Total fill volume 5000cc, 8 hrs duration, 2400 fill volume, 1.5% dextrose. No heparin     --Continue Gentamicin to exit site    --Continue fungal prophylaxis as long as patient remains on antibiotics.     -- daily bowel regimen   - resume home torsemide   - hold home bicarb per nephrology  - continue antimicrobial creams for PD site    PMH of insulin dependent T2DM A1c: 6.6.  Hypothyroidism with endocrine following.    - Maintain BG <180  - per endo recs: SSI #1 with meal and detemir 11 units at bedtime.  Lispro 3u with breakfast, Lispro 5u with lunch and dinner   - Continue  synthroid 25 mcg   - hold home trulicity  - appreciate endo recs: when NPO: Hold all mealtime insulin, Switch #1 sliding scale to every 4 hours while NPO, Decrease detemir dose to 5 units at bedtime    Hx arthritis and neuropathy  - continue home Cymbalta  - PT/OT  - OOB to chair/ambulate  - Tylenol prn for pain     Skin: Stage II sacral pressure injury   - preventative Mepilex dressings in place on sacrum and heels  - change preventative Mepilex weekly or more frequently as indicated (when moist/soiled)   - every shift skin assessment per nursing  - moisture barrier to be applied with francis care    VTE Prophylaxis: SCDs/TEDs, ambulation, SQ heparin  Code Status: Full Code    Dispo  - PT/OT recs home with homecare   - Would benefit from homecare for CABG carepath and RN visits  - Anticipate  discharge 2-3 days, pending LHC/PCI on Monday 10/16  - Will continue to assess discharge needs      ANNIE De La Fuente-CNS  Cardiac Surgery [facility]  [facility]  Team Pager [phone]     10/15/2023  12:09 PM

## 2023-10-15 NOTE — PROGRESS NOTES
"Salomon Chowdary is a 83 y.o. male on day 15 of with IDDM, HTN, HLD, ESRD on PD,  CAD, A-Fib and hypothyroidism presented to an OSH on 9/20 with chest pain. He was found to have NSTEMI with LHC showing 2 vessel disease in the LAD and left circumflex. He was transferred to [facility] for evaluation of revascularization plan.  He is status post mini CABG on 10/11.  Endocrinology is consulted iso abnormal TFT with TSH of 6.26 and FT4 1.12 as well as diabetes management.    Subjective   Patient was seen at bedside.  He was hypoglycemic this morning with BG 52 per pt. He reported sweating with the episode of hypoglycemia, morning dose was held.     Objective   PE:  Constitutional: NAD, well groomed. AOx3. Cooperative  Skin/Hair: Warm, dry skin.  HEENT: EOMI, Anicteric scleras   Cardiovascular: normal HR  Respiratory: no increased wob,  or accessory muscle use.  Psych : appropriate affect       Last Recorded Vitals  Blood pressure 117/69, pulse 75, temperature 36.4 °C (97.5 °F), temperature source Temporal, resp. rate 18, height 1.752 m (5' 8.98\"), weight 79.8 kg (175 lb 14.8 oz), SpO2 99 %.  Intake/Output last 3 Shifts:  I/O last 3 completed shifts:  In: 840 (10.5 mL/kg) [P.O.:840]  Out: 3174 (39.8 mL/kg) [Urine:3172 (1.1 mL/kg/hr); Stool:2]  Weight: 79.8 kg     Relevant Results    Lab Results   Component Value Date    TSH 5.76 (H) 10/09/2023    TSH 6.26 (H) 10/02/2023    TSH 2.34 09/20/2023    FREET4 1.09 10/09/2023    FREET4 1.12 10/02/2023       Results from last 7 days   Lab Units 10/15/23  0723 10/14/23  2023 10/14/23  1800 10/14/23  1300 10/14/23  0829 10/14/23  0732 10/13/23  2117 10/13/23  0828 10/13/23  0644 10/12/23  0828 10/12/23  0306 10/11/23  1310 10/11/23  1309   POCT GLUCOSE mg/dL  --  198* 155* 204* 198*  --  307*   < >  --    < >  --    < >  --    GLUCOSE mg/dL 52*  --   --   --   --  209*  --   --  101*  --  150*  --  122*    < > = values in this interval not displayed.   Scheduled medications  aspirin, 81 " mg, oral, Daily  atorvastatin, 80 mg, oral, Nightly  B complex-vitamin C-folic acid, 1 capsule, oral, Daily  cholecalciferol, 25 mcg, oral, Daily  clopidogrel, 75 mg, oral, Daily  darbepoetin alyssa, 40 mcg, intravenous, Weekly  DULoxetine, 30 mg, oral, Daily  fluticasone, 2 spray, Each Nostril, Daily  gentamicin, , Topical, Daily  heparin (porcine), 5,000 Units, subcutaneous, q8h  insulin detemir, 5 Units, subcutaneous, q24h  [START ON 10/16/2023] insulin lispro, 0-5 Units, subcutaneous, q4h  insulin lispro, 0-5 Units, subcutaneous, TID with meals  insulin lispro, 3 Units, subcutaneous, Daily with breakfast  insulin lispro, 5 Units, subcutaneous, Daily with evening meal  levothyroxine, 25 mcg, oral, Daily  metoprolol tartrate, 25 mg, oral, BID  nystatin, 5 mL, Swish & Swallow, TID  polyethylene glycol, 17 g, oral, TID  sennosides, 2 tablet, oral, BID  [Held by provider] sodium bicarbonate, 650 mg, oral, BID  torsemide, 50 mg, oral, Daily      Continuous medications  dextrose 1.5% - LOW calcium 2.5mEq/L (Dianeal, Delflex-LC) in 3,000 mL peritoneal dialysate,    Followed by  [START ON 10/18/2023] dextrose 1.5% - LOW calcium 2.5mEq/L (Dianeal, Delflex-LC) in 3,000 mL peritoneal dialysate,       PRN medications  PRN medications: acetaminophen, dextrose 10 % in water (D10W), dextrose, glucagon        Assessment/Plan   Salomon Chowdary is a 83 y.o. male with IDDM, HTN, HLD, ESRD on PD,  CAD, A-Fib and hypothyroidism presented to an OSH ON 9/20 with chest pain. He was found to have NSTEMI with LHC showing 2 vessel disease in the LAD and left circumflex. He was transferred to [facility] for evaluation of revascularization plan. Endocrinology is consulted iso abnormal TFT    10/2: TSH 6.26 and FT4 1.12.   10/10: TSH 5.76 and FT4 1.09     #Hypothyroidism   Patient is on 25 mcg of LT4. TSH was 2.34 on the day of presentation to the OSH. Presented with NSTEMI as above with Trop peaked to >2000 was initially managed in the ICU. No FT4  was obtained on 9/20. Now TSH is elevated with FT4 1.12 (WNL). This is likely to nonthyroidal illness (previously known as euthyroid sick syndrome) iso acute illness as above   He appears clinically euthyroid.      Recommendations  -Continue current dose of LT4 25 mcg   -Check TFT on 10/17     #DM2   Background Hx:  Diabetes duration: Since 1995, has been on insulin since 2012           Home blood glucose checks: Daily  Trends: 80s to 130s  Hypoglycemia (y/n, threshold and symptoms): Yes, rarely, BG in the 60s, shakes/sweating/halos in his vision  Home diabetes regimen: Detemir 18 units bedtime and Trulicity 1.5 mg q. Weekly (has not received it for the past 2 weeks)  Home diet (how many meals a day): 2-3 meals a day  Outpatient physician managing diabetes: Endocrinologist with [facility].  Not seen in the past 3 months would like to establish with a new endocrinologist.  Currently follows with PCP.  Macrovascular complications: CVD [no diagnosed CAD to major vessel disease]     CVA [ denies]     PVD [denies]  Microvascular complications: Retinopathy [ denies]   Nephropathy [ESRD on PD]   Neuropathy [endorses]  Last A1c: 6.6 9/2023                  Inpatient diabetes regimen: 20 units of detemir and a low dose SS  Inpatient diet: Regular diet       Hypoglycemic this am to 52, was symptomatic with diaphoresis this am.     Recommendations if patient has a diet order:  -Goal -180 while inpatient  -Decrease detemir to 9 units at bedtime  -If has a diet order then continue insulin 3 units with breakfast, 5 units with lunch, and 5 units with dinner  -Continue#1 SSI insulin TIDAC with meals   -Switch diet to liberalize diabetic diet CC90 instead of regular diet  -Once patient is discharged please ensure that home Trulicity get refilled   -Accuchecks ACHS  -Endocrinology team will continue to follow.    If patient is n.p.o. for procedure then would recommend:  -Holding all mealtime insulin  -Switching #1 sliding  scale to every 4 hours while NPO  -Decreasing detemir dose to 5 units at bedtime     Plan was communicated to the primary team      Discussed with Dr. Antonio Marie MD   Endocrinology, PGY 4

## 2023-10-15 NOTE — PROGRESS NOTES
Subjective   Patient ID: Salomon Chowdary is a 83 y.o. male.     HPI  Seen in AM   No issues with PD overnight : I drain 10 ml ; UF - 471 ml   Feels OK, no c/o CP/SOB/F/C/Abd pain    Patient Active Problem List   Diagnosis    2-vessel coronary artery disease    Coronary artery disease involving native coronary artery of native heart with unstable angina pectoris (CMS/HCC)       Scheduled medications  aspirin, 81 mg, oral, Daily  atorvastatin, 80 mg, oral, Nightly  B complex-vitamin C-folic acid, 1 capsule, oral, Daily  cholecalciferol, 25 mcg, oral, Daily  clopidogrel, 75 mg, oral, Daily  darbepoetin alyssa, 40 mcg, intravenous, Weekly  DULoxetine, 30 mg, oral, Daily  fluticasone, 2 spray, Each Nostril, Daily  gentamicin, , Topical, Daily  heparin (porcine), 5,000 Units, subcutaneous, q8h  insulin detemir, 5 Units, subcutaneous, q24h  [START ON 10/16/2023] insulin lispro, 0-5 Units, subcutaneous, q4h  insulin lispro, 0-5 Units, subcutaneous, TID with meals  insulin lispro, 3 Units, subcutaneous, Daily with breakfast  insulin lispro, 5 Units, subcutaneous, Daily with evening meal  levothyroxine, 25 mcg, oral, Daily  metoprolol tartrate, 25 mg, oral, BID  nystatin, 5 mL, Swish & Swallow, TID  polyethylene glycol, 17 g, oral, TID  sennosides, 2 tablet, oral, BID  [Held by provider] sodium bicarbonate, 650 mg, oral, BID  torsemide, 50 mg, oral, Daily      Continuous medications  dextrose 1.5% - LOW calcium 2.5mEq/L (Dianeal, Delflex-LC) in 3,000 mL peritoneal dialysate,    Followed by  [START ON 10/18/2023] dextrose 1.5% - LOW calcium 2.5mEq/L (Dianeal, Delflex-LC) in 3,000 mL peritoneal dialysate,       PRN medications  PRN medications: acetaminophen, dextrose 10 % in water (D10W), dextrose, glucagon     Heart Rate:  [70-93]   Temp:  [36.4 °C (97.5 °F)-37.2 °C (99 °F)]   Resp:  [18]   BP: (107-143)/(64-87)   Weight:  [79.8 kg (175 lb 14.8 oz)]   SpO2:  [93 %-99 %]    Weight: 82.2 kg (181 lb 3.5 oz)   Gen: alert,  NAD  HEENT: NC/AT  Neck: supple, no JVD   Pulm: clear ant b/l   CVS: RRR, no rub  Abd: S/NT/ND  LE: no edema , no cyanosis   Neuro: no asterixis   Dialysis acces:  PD exit site clean         Results from last 7 days   Lab Units 10/15/23  0723   SODIUM mmol/L 137   POTASSIUM mmol/L 4.2   PHOSPHORUS mg/dL 4.3   CALCIUM mg/dL 8.4*   CO2 mmol/L 25   HEMOGLOBIN g/dL 8.9*        [unfilled]     Results from last 72 hours   Lab Units 10/15/23  0723   ALBUMIN g/dL 2.7*   GLUCOSE mg/dL 52*   HEMOGLOBIN g/dL 8.9*   WBC AUTO x10*3/uL 8.4          Results from last 72 hours   Lab Units 10/15/23  0723   SODIUM mmol/L 137   POTASSIUM mmol/L 4.2   CO2 mmol/L 25   BUN mg/dL 65*   CREATININE mg/dL 4.17*   PHOSPHORUS mg/dL 4.3   CALCIUM mg/dL 8.4*        A/P  ESRD-PD s/p CABG x 2 awaiting PCI to circumflex tomorrow     Plan PD tonight same prescription : - Total fill volume 5000cc, 8 hrs duration, 2400 fill volume, 1.5% dextrose. No heparin   Bowel regimen to ensure daily BM   -Continue aranesp   -Continue Gentamicin to exit site  -Continue fungal prophylaxis as long as patient remains on antibiotics.     Victorina Uribe MD MPH

## 2023-10-15 NOTE — PROGRESS NOTES
Physical Therapy                 Therapy Communication Note    Patient Name: Salomon Chowdary  MRN: [id number]  Today's Date: 10/15/2023     Discipline: Physical Therapy    Missed Visit Reason: Missed Visit Reason: Other (Comment) (Pt off floor at X-ray, will re-attempt as time permits)    Missed Time: Attempt

## 2023-10-16 ENCOUNTER — APPOINTMENT (OUTPATIENT)
Dept: RADIOLOGY | Facility: HOSPITAL | Age: 83
DRG: 235 | End: 2023-10-16
Payer: MEDICARE

## 2023-10-16 LAB
ALBUMIN SERPL BCP-MCNC: 2.8 G/DL (ref 3.4–5)
ANION GAP SERPL CALC-SCNC: 12 MMOL/L (ref 10–20)
BUN SERPL-MCNC: 62 MG/DL (ref 6–23)
CALCIUM SERPL-MCNC: 8.5 MG/DL (ref 8.6–10.6)
CHLORIDE SERPL-SCNC: 98 MMOL/L (ref 98–107)
CO2 SERPL-SCNC: 28 MMOL/L (ref 21–32)
CREAT SERPL-MCNC: 4.03 MG/DL (ref 0.5–1.3)
ERYTHROCYTE [DISTWIDTH] IN BLOOD BY AUTOMATED COUNT: 14.7 % (ref 11.5–14.5)
GFR SERPL CREATININE-BSD FRML MDRD: 14 ML/MIN/1.73M*2
GLUCOSE BLD MANUAL STRIP-MCNC: 141 MG/DL (ref 74–99)
GLUCOSE BLD MANUAL STRIP-MCNC: 207 MG/DL (ref 74–99)
GLUCOSE BLD MANUAL STRIP-MCNC: 211 MG/DL (ref 74–99)
GLUCOSE BLD MANUAL STRIP-MCNC: 254 MG/DL (ref 74–99)
GLUCOSE SERPL-MCNC: 213 MG/DL (ref 74–99)
HCT VFR BLD AUTO: 28 % (ref 41–52)
HGB BLD-MCNC: 8.4 G/DL (ref 13.5–17.5)
MAGNESIUM SERPL-MCNC: 2 MG/DL (ref 1.6–2.4)
MCH RBC QN AUTO: 31.6 PG (ref 26–34)
MCHC RBC AUTO-ENTMCNC: 30 G/DL (ref 32–36)
MCV RBC AUTO: 105 FL (ref 80–100)
NRBC BLD-RTO: 0 /100 WBCS (ref 0–0)
PHOSPHATE SERPL-MCNC: 4.5 MG/DL (ref 2.5–4.9)
PLATELET # BLD AUTO: 263 X10*3/UL (ref 150–450)
PMV BLD AUTO: 9.6 FL (ref 7.5–11.5)
POTASSIUM SERPL-SCNC: 4.8 MMOL/L (ref 3.5–5.3)
RBC # BLD AUTO: 2.66 X10*6/UL (ref 4.5–5.9)
SODIUM SERPL-SCNC: 133 MMOL/L (ref 136–145)
WBC # BLD AUTO: 7.4 X10*3/UL (ref 4.4–11.3)

## 2023-10-16 PROCEDURE — 0715T HC PERCUTANEOUS TRANSLUMINAL CORONARY LITHOTRIPSY: CPT | Mod: 74 | Performed by: INTERNAL MEDICINE

## 2023-10-16 PROCEDURE — C1769 GUIDE WIRE: HCPCS | Performed by: INTERNAL MEDICINE

## 2023-10-16 PROCEDURE — 82947 ASSAY GLUCOSE BLOOD QUANT: CPT | Mod: CMCLAB

## 2023-10-16 PROCEDURE — 1200000002 HC GENERAL ROOM WITH TELEMETRY DAILY

## 2023-10-16 PROCEDURE — 90947 DIALYSIS REPEATED EVAL: CPT

## 2023-10-16 PROCEDURE — 2500000001 HC RX 250 WO HCPCS SELF ADMINISTERED DRUGS (ALT 637 FOR MEDICARE OP): Performed by: NURSE PRACTITIONER

## 2023-10-16 PROCEDURE — 2550000001 HC RX 255 CONTRASTS: Performed by: INTERNAL MEDICINE

## 2023-10-16 PROCEDURE — 93454 CORONARY ARTERY ANGIO S&I: CPT | Mod: 59 | Performed by: INTERNAL MEDICINE

## 2023-10-16 PROCEDURE — 96372 THER/PROPH/DIAG INJ SC/IM: CPT | Performed by: NURSE PRACTITIONER

## 2023-10-16 PROCEDURE — C1894 INTRO/SHEATH, NON-LASER: HCPCS | Performed by: INTERNAL MEDICINE

## 2023-10-16 PROCEDURE — 0715T PR PERCUTANEOUS TRANSLUMINAL CORONARY LITHOTRIPSY: CPT | Performed by: INTERNAL MEDICINE

## 2023-10-16 PROCEDURE — 71045 X-RAY EXAM CHEST 1 VIEW: CPT | Mod: FY

## 2023-10-16 PROCEDURE — 36415 COLL VENOUS BLD VENIPUNCTURE: CPT | Mod: CMCLAB | Performed by: CLINICAL NURSE SPECIALIST

## 2023-10-16 PROCEDURE — 2500000001 HC RX 250 WO HCPCS SELF ADMINISTERED DRUGS (ALT 637 FOR MEDICARE OP): Performed by: INTERNAL MEDICINE

## 2023-10-16 PROCEDURE — 2500000004 HC RX 250 GENERAL PHARMACY W/ HCPCS (ALT 636 FOR OP/ED): Performed by: INTERNAL MEDICINE

## 2023-10-16 PROCEDURE — 85027 COMPLETE CBC AUTOMATED: CPT | Mod: CMCLAB | Performed by: CLINICAL NURSE SPECIALIST

## 2023-10-16 PROCEDURE — C9600 PERC DRUG-EL COR STENT SING: HCPCS | Mod: 74 | Performed by: INTERNAL MEDICINE

## 2023-10-16 PROCEDURE — 2500000005 HC RX 250 GENERAL PHARMACY W/O HCPCS: Performed by: INTERNAL MEDICINE

## 2023-10-16 PROCEDURE — 83735 ASSAY OF MAGNESIUM: CPT | Mod: CMCLAB | Performed by: CLINICAL NURSE SPECIALIST

## 2023-10-16 PROCEDURE — 99153 MOD SED SAME PHYS/QHP EA: CPT | Performed by: INTERNAL MEDICINE

## 2023-10-16 PROCEDURE — 85347 COAGULATION TIME ACTIVATED: CPT | Mod: CMCLAB

## 2023-10-16 PROCEDURE — 2500000002 HC RX 250 W HCPCS SELF ADMINISTERED DRUGS (ALT 637 FOR MEDICARE OP, ALT 636 FOR OP/ED): Performed by: CLINICAL NURSE SPECIALIST

## 2023-10-16 PROCEDURE — 96372 THER/PROPH/DIAG INJ SC/IM: CPT | Performed by: CLINICAL NURSE SPECIALIST

## 2023-10-16 PROCEDURE — 99152 MOD SED SAME PHYS/QHP 5/>YRS: CPT | Performed by: INTERNAL MEDICINE

## 2023-10-16 PROCEDURE — 93454 CORONARY ARTERY ANGIO S&I: CPT | Performed by: INTERNAL MEDICINE

## 2023-10-16 PROCEDURE — 2720000007 HC OR 272 NO HCPCS: Performed by: INTERNAL MEDICINE

## 2023-10-16 PROCEDURE — 36415 COLL VENOUS BLD VENIPUNCTURE: CPT | Performed by: CLINICAL NURSE SPECIALIST

## 2023-10-16 PROCEDURE — 99232 SBSQ HOSP IP/OBS MODERATE 35: CPT | Performed by: NURSE PRACTITIONER

## 2023-10-16 PROCEDURE — C1725 CATH, TRANSLUMIN NON-LASER: HCPCS | Performed by: INTERNAL MEDICINE

## 2023-10-16 PROCEDURE — 2500000004 HC RX 250 GENERAL PHARMACY W/ HCPCS (ALT 636 FOR OP/ED): Performed by: NURSE PRACTITIONER

## 2023-10-16 PROCEDURE — 71045 X-RAY EXAM CHEST 1 VIEW: CPT | Performed by: RADIOLOGY

## 2023-10-16 PROCEDURE — 90945 DIALYSIS ONE EVALUATION: CPT | Performed by: INTERNAL MEDICINE

## 2023-10-16 PROCEDURE — 80069 RENAL FUNCTION PANEL: CPT | Performed by: CLINICAL NURSE SPECIALIST

## 2023-10-16 PROCEDURE — C1887 CATHETER, GUIDING: HCPCS | Performed by: INTERNAL MEDICINE

## 2023-10-16 RX ORDER — IRON POLYSACCHARIDE COMPLEX 150 MG
150 CAPSULE ORAL DAILY
Status: DISCONTINUED | OUTPATIENT
Start: 2023-10-16 | End: 2023-10-17 | Stop reason: HOSPADM

## 2023-10-16 RX ORDER — INSULIN LISPRO 100 [IU]/ML
5 INJECTION, SOLUTION INTRAVENOUS; SUBCUTANEOUS
Status: DISCONTINUED | OUTPATIENT
Start: 2023-10-16 | End: 2023-10-17 | Stop reason: HOSPADM

## 2023-10-16 RX ORDER — CLOPIDOGREL BISULFATE 75 MG/1
TABLET ORAL AS NEEDED
Status: DISCONTINUED | OUTPATIENT
Start: 2023-10-16 | End: 2023-10-16 | Stop reason: HOSPADM

## 2023-10-16 RX ORDER — GENTAMICIN SULFATE 1 MG/G
CREAM TOPICAL DAILY
Status: DISCONTINUED | OUTPATIENT
Start: 2023-10-16 | End: 2023-10-17 | Stop reason: HOSPADM

## 2023-10-16 RX ORDER — FENTANYL CITRATE 50 UG/ML
INJECTION, SOLUTION INTRAMUSCULAR; INTRAVENOUS AS NEEDED
Status: DISCONTINUED | OUTPATIENT
Start: 2023-10-16 | End: 2023-10-16 | Stop reason: HOSPADM

## 2023-10-16 RX ORDER — ASPIRIN 81 MG/1
81 TABLET ORAL DAILY
Status: DISCONTINUED | OUTPATIENT
Start: 2023-10-17 | End: 2023-10-17 | Stop reason: HOSPADM

## 2023-10-16 RX ORDER — MIDAZOLAM HYDROCHLORIDE 1 MG/ML
INJECTION INTRAMUSCULAR; INTRAVENOUS AS NEEDED
Status: DISCONTINUED | OUTPATIENT
Start: 2023-10-16 | End: 2023-10-16 | Stop reason: HOSPADM

## 2023-10-16 RX ORDER — LIDOCAINE HYDROCHLORIDE 20 MG/ML
INJECTION, SOLUTION INFILTRATION; PERINEURAL AS NEEDED
Status: DISCONTINUED | OUTPATIENT
Start: 2023-10-16 | End: 2023-10-16 | Stop reason: HOSPADM

## 2023-10-16 RX ORDER — INSULIN LISPRO 100 [IU]/ML
3 INJECTION, SOLUTION INTRAVENOUS; SUBCUTANEOUS
Status: DISCONTINUED | OUTPATIENT
Start: 2023-10-17 | End: 2023-10-17 | Stop reason: HOSPADM

## 2023-10-16 RX ORDER — HEPARIN SODIUM 1000 [USP'U]/ML
INJECTION, SOLUTION INTRAVENOUS; SUBCUTANEOUS AS NEEDED
Status: DISCONTINUED | OUTPATIENT
Start: 2023-10-16 | End: 2023-10-16 | Stop reason: HOSPADM

## 2023-10-16 RX ORDER — INSULIN LISPRO 100 [IU]/ML
5 INJECTION, SOLUTION INTRAVENOUS; SUBCUTANEOUS
Status: DISCONTINUED | OUTPATIENT
Start: 2023-10-17 | End: 2023-10-17 | Stop reason: HOSPADM

## 2023-10-16 RX ORDER — NAPROXEN SODIUM 220 MG/1
TABLET, FILM COATED ORAL AS NEEDED
Status: DISCONTINUED | OUTPATIENT
Start: 2023-10-16 | End: 2023-10-16 | Stop reason: HOSPADM

## 2023-10-16 RX ADMIN — DULOXETINE HYDROCHLORIDE 30 MG: 30 CAPSULE, DELAYED RELEASE ORAL at 11:35

## 2023-10-16 RX ADMIN — INSULIN LISPRO 3 UNITS: 100 INJECTION, SOLUTION INTRAVENOUS; SUBCUTANEOUS at 20:54

## 2023-10-16 RX ADMIN — NYSTATIN 500000 UNITS: 100000 SUSPENSION ORAL at 11:35

## 2023-10-16 RX ADMIN — ATORVASTATIN CALCIUM 80 MG: 80 TABLET, FILM COATED ORAL at 20:31

## 2023-10-16 RX ADMIN — INSULIN LISPRO 2 UNITS: 100 INJECTION, SOLUTION INTRAVENOUS; SUBCUTANEOUS at 16:25

## 2023-10-16 RX ADMIN — TORSEMIDE 50 MG: 100 TABLET ORAL at 11:35

## 2023-10-16 RX ADMIN — Medication 25 MCG: at 11:35

## 2023-10-16 RX ADMIN — Medication 150 MG: at 20:31

## 2023-10-16 RX ADMIN — LEVOTHYROXINE SODIUM 25 MCG: 25 TABLET ORAL at 06:16

## 2023-10-16 RX ADMIN — SODIUM CHLORIDE, SODIUM LACTATE, CALCIUM CHLORIDE, MAGNESIUM CHLORIDE AND DEXTROSE: 1.5; 538; 448; 18.3; 5.08 INJECTION, SOLUTION INTRAPERITONEAL at 16:52

## 2023-10-16 RX ADMIN — FLUTICASONE PROPIONATE 2 SPRAY: 50 SPRAY, METERED NASAL at 11:37

## 2023-10-16 RX ADMIN — METOPROLOL TARTRATE 25 MG: 25 TABLET, FILM COATED ORAL at 20:31

## 2023-10-16 RX ADMIN — NEPHROCAP 1 CAPSULE: 1 CAP ORAL at 09:00

## 2023-10-16 RX ADMIN — GENTAMICIN SULFATE: 1 CREAM TOPICAL at 16:52

## 2023-10-16 RX ADMIN — METOPROLOL TARTRATE 25 MG: 25 TABLET, FILM COATED ORAL at 11:35

## 2023-10-16 RX ADMIN — HEPARIN SODIUM 5000 UNITS: 5000 INJECTION INTRAVENOUS; SUBCUTANEOUS at 15:54

## 2023-10-16 RX ADMIN — SODIUM CHLORIDE, SODIUM LACTATE, CALCIUM CHLORIDE, MAGNESIUM CHLORIDE AND DEXTROSE: 1.5; 538; 448; 18.3; 5.08 INJECTION, SOLUTION INTRAPERITONEAL at 16:51

## 2023-10-16 RX ADMIN — NYSTATIN 500000 UNITS: 100000 SUSPENSION ORAL at 15:54

## 2023-10-16 RX ADMIN — HEPARIN SODIUM 5000 UNITS: 5000 INJECTION INTRAVENOUS; SUBCUTANEOUS at 00:49

## 2023-10-16 ASSESSMENT — COGNITIVE AND FUNCTIONAL STATUS - GENERAL
MOBILITY SCORE: 17
DRESSING REGULAR UPPER BODY CLOTHING: A LITTLE
DRESSING REGULAR LOWER BODY CLOTHING: A LITTLE
DAILY ACTIVITIY SCORE: 21
TURNING FROM BACK TO SIDE WHILE IN FLAT BAD: A LITTLE
CLIMB 3 TO 5 STEPS WITH RAILING: A LOT
MOVING TO AND FROM BED TO CHAIR: A LITTLE
DAILY ACTIVITIY SCORE: 21
WALKING IN HOSPITAL ROOM: A LOT
STANDING UP FROM CHAIR USING ARMS: A LITTLE
MOBILITY SCORE: 17
HELP NEEDED FOR BATHING: A LITTLE
TURNING FROM BACK TO SIDE WHILE IN FLAT BAD: A LITTLE
STANDING UP FROM CHAIR USING ARMS: A LITTLE
WALKING IN HOSPITAL ROOM: A LOT
DRESSING REGULAR UPPER BODY CLOTHING: A LITTLE
MOVING TO AND FROM BED TO CHAIR: A LITTLE
DRESSING REGULAR LOWER BODY CLOTHING: A LITTLE
CLIMB 3 TO 5 STEPS WITH RAILING: A LOT
HELP NEEDED FOR BATHING: A LITTLE

## 2023-10-16 ASSESSMENT — PAIN SCALES - GENERAL
PAINLEVEL_OUTOF10: 0 - NO PAIN
PAINLEVEL_OUTOF10: 0 - NO PAIN

## 2023-10-16 ASSESSMENT — PAIN - FUNCTIONAL ASSESSMENT: PAIN_FUNCTIONAL_ASSESSMENT: 0-10

## 2023-10-16 NOTE — CARE PLAN
Problem: Pain  Goal: My pain/discomfort is manageable  Outcome: Progressing     Problem: Safety  Goal: Patient will be injury free during hospitalization  Outcome: Progressing     Problem: Daily Care  Goal: Daily care needs are met  Outcome: Progressing     Problem: Fall/Injury  Goal: Not fall by end of shift  Outcome: Progressing   The patient's goals for the shift include patien will use the call light through out the shift.    The clinical goals for the shift include Pt wioll have pain level less than 5    Goal met

## 2023-10-16 NOTE — POST-PROCEDURE NOTE
Physician Transition of Care Summary  Invasive Cardiovascular Lab    Procedure Date: 10/16/2023  Attending:    * Eusebio Hudson - Primary  Resident/Fellow/Other Assistant: No surgical staff documented.    Pre Procedure Indications:   Stable known CAD     Post Procedure Diagnosis:   Severe LCX and OM1 disease.     Procedure(s):   Left Heart Catheterization  PTCA to distal LCX.     Procedure Findings:   Severely calcified distal LCX lesion, unable to cross with IVL balloon.     Per IC team we recommend:   reassess Mr. Chowdary, clinically for any chest discomfort after getting him up and walking him around. Consider rotational atherectomy this week versus outpatient, depending on whether or not, he has chest discomfort and to what degree on medical management. May not need additional procedure, given aggressive, attempt as far, age and increase risk of procedure.       Description of the Procedure:   Right radial 6Fr. --> TR band.   6Fr EBU 3.5 --> Runthrough, 2.5 compliant, 2.5 semi compliant.     Complications:   None.    Stents/Implants:   Cardiac Device       Pacemaker    Lead, Pacing, Myocardial, Bipolar, Temporary - [id number] - Implanted        Inventory item: LEAD, PACING, MYOCARDIAL, BIPOLAR, TEMPORARY Model/Cat number: [id number]    : [employer] Implant Date: 10/11/2023      As of 10/11/2023       Status: Implanted                              Anticoagulation/Antiplatelet Plan:   C/w ASA + plavix.     Estimated Blood Loss:   5 mL    Anesthesia: Moderate Sedation Anesthesia Staff: No anesthesia staff entered.    Any Specimen(s) Removed:   No specimens collected during this procedure.    Disposition:   Return to floor.       Electronically signed by: Thomas Boykin MD, 10/16/2023 11:29 AM

## 2023-10-16 NOTE — PROGRESS NOTES
Subjective   Patient ID: Salomon Chowdary is a 83 y.o. male.     HPI  Doing well today  Did have some diarrhea yesterday   Had C this morning     Patient Active Problem List   Diagnosis    2-vessel coronary artery disease    Coronary artery disease involving native coronary artery of native heart with unstable angina pectoris (CMS/[facility])    ESRD (end stage renal disease) (CMS/[facility])       Scheduled medications  [START ON 10/17/2023] aspirin, 81 mg, oral, Daily  atorvastatin, 80 mg, oral, Nightly  B complex-vitamin C-folic acid, 1 capsule, oral, Daily  cholecalciferol, 25 mcg, oral, Daily  clopidogrel, 75 mg, oral, Daily  darbepoetin alyssa, 40 mcg, intravenous, Weekly  DULoxetine, 30 mg, oral, Daily  fluticasone, 2 spray, Each Nostril, Daily  gentamicin, , Topical, Daily  gentamicin, , Topical, Daily  heparin (porcine), 5,000 Units, subcutaneous, q8h  insulin detemir, 5 Units, subcutaneous, q24h  insulin lispro, 0-5 Units, subcutaneous, q4h  levothyroxine, 25 mcg, oral, Daily  metoprolol tartrate, 25 mg, oral, BID  nystatin, 5 mL, Swish & Swallow, TID  polyethylene glycol, 17 g, oral, TID  sennosides, 2 tablet, oral, BID  torsemide, 50 mg, oral, Daily      Continuous medications  dextrose 1.5% - LOW calcium 2.5mEq/L (Dianeal, Delflex-LC) in 3,000 mL peritoneal dialysate,    Followed by  [START ON 10/18/2023] dextrose 1.5% - LOW calcium 2.5mEq/L (Dianeal, Delflex-LC) in 3,000 mL peritoneal dialysate,       PRN medications  PRN medications: acetaminophen, dextrose 10 % in water (D10W), dextrose, glucagon     Heart Rate:  [69-87]   Temp:  [36.4 °C (97.5 °F)-37.2 °C (99 °F)]   Resp:  [13-18]   BP: (119-135)/(67-72)   SpO2:  [92 %-97 %]    Weight: 82.2 kg (181 lb 3.5 oz)   Gen: alert, NAD  HEENT: NC/AT  Neck: supple, no JVD   Pulm: clear ant b/l   CVS: RRR, no rub  Abd: S/NT/ND  LE: no edema , no cyanosis   Neuro: no asterixis   Dialysis acces:  PD exit site clean         Results from last 7 days   Lab Units 10/15/23  2907    SODIUM mmol/L 137   POTASSIUM mmol/L 4.2   PHOSPHORUS mg/dL 4.3   CALCIUM mg/dL 8.4*   CO2 mmol/L 25   HEMOGLOBIN g/dL 8.9*          Results from last 72 hours   Lab Units 10/15/23  0723   ALBUMIN g/dL 2.7*   GLUCOSE mg/dL 52*   HEMOGLOBIN g/dL 8.9*   WBC AUTO x10*3/uL 8.4            Results from last 72 hours   Lab Units 10/15/23  0723   SODIUM mmol/L 137   POTASSIUM mmol/L 4.2   CO2 mmol/L 25   BUN mg/dL 65*   CREATININE mg/dL 4.17*   PHOSPHORUS mg/dL 4.3   CALCIUM mg/dL 8.4*        ASSESSMENT AND PLAN:       #ESRD-PD s/p CABG x 2     Plan PD tonight same prescription : - Total fill volume 5000cc, 8 hrs duration, 2400 fill volume, 1.5% dextrose. No heparin   Bowel regimen to ensure daily BM   -Continue aranesp   -Continue Gentamicin to exit site  -Continue fungal prophylaxis as long as patient remains on antibiotics.     SW Dr Shannon Soto MD  Nephrology fellow PGY4     Patient seen and examined with Dr. Soto, data reviewed, agree with above note.

## 2023-10-16 NOTE — POST-PROCEDURE NOTE
Physician Transition of Care Summary  Invasive Cardiovascular Lab    Procedure Date: 10/16/2023  Attending:    * Eusebio Hudson - Primary  Resident/Fellow/Other Assistant: No surgical staff documented.    Pre Procedure Indications:       Post Procedure Diagnosis:     Procedure(s):       Procedure Findings:       Description of the Procedure:       Complications:     Stents/Implants:   Cardiac Device       Pacemaker    Lead, Pacing, Myocardial, Bipolar, Temporary - [id number] - Implanted        Inventory item: LEAD, PACING, MYOCARDIAL, BIPOLAR, TEMPORARY Model/Cat number: [id number]    : [employer] Implant Date: 10/11/2023      As of 10/11/2023       Status: Implanted                              Anticoagulation/Antiplatelet Plan:       Estimated Blood Loss:   5 mL    Anesthesia: Moderate Sedation Anesthesia Staff: No anesthesia staff entered.    Any Specimen(s) Removed:   No specimens collected during this procedure.    Disposition:         Electronically signed by: Thomas Boykin MD, 10/16/2023 1:55 PM

## 2023-10-16 NOTE — PROGRESS NOTES
"  CARDIAC SURGERY DAILY PROGRESS NOTE    Salomon Chowdary is a 83 y.o. male, with a PMH of NSTEMI, IDDM, HTN, HLD, ESRD on PD, CAD, A-Fib and hypothyroidism , who presented to presented to an OSH on 9/20 with chest pain. He was found to have NSTEMI with LHC showing 2 vessel disease in the LAD and left circumflex. He was transferred to [facility] for evaluation of revascularization plan s/p mid CABG with Dr. Neal on 10/11/23.      OPERATION/PROCEDURE: left thoracotomy mid CABG W/ARTERIAL GRAFT SINGLE ARTERIAL GRAFT with Jorje Neal MD  On 10/11/23    CTICU Course: nocturnal PD, plan for Cath lab 10/13 but postponed to 10/16  Transferred to  on 10/13/23    Objective   /67 (BP Location: Left arm, Patient Position: Lying)   Pulse 69   Temp 36.6 °C (97.9 °F) (Temporal)   Resp 17   Ht 1.752 m (5' 8.98\")   Wt 79.8 kg (175 lb 14.8 oz)   SpO2 97%   BMI 26.00 kg/m²   Pain Score: 0 - No pain   3 Day Weight Change: Unable to Calculate    Intake and Output    Intake/Output Summary (Last 24 hours) at 10/16/2023 1822  Last data filed at 10/16/2023 1800  Gross per 24 hour   Intake 240 ml   Output 1180 ml   Net -940 ml         Physical Exam  Physical Exam  Constitutional:       Appearance: Normal appearance.      Comments: Sitting in bed  Wife at bedside, updated   HENT:      Head: Normocephalic and atraumatic.      Mouth/Throat:      Mouth: Mucous membranes are moist.   Eyes:      Conjunctiva/sclera: Conjunctivae normal.   Neck:      Comments: Wearing his home soft collar PRN for comfort  Cardiovascular:      Rate and Rhythm: Normal rate and regular rhythm.      Pulses: Normal pulses.      Heart sounds: Normal heart sounds.      Comments: TELE - SR 60s-80s  Pulmonary:      Effort: Pulmonary effort is normal.      Breath sounds: Normal breath sounds.      Comments: Mild decrease bases L>R  Chest:      Comments: Left thoracotomy incision, ELÍAS, no redness/swelling/drainage  Suture at CT site   Abdominal:      General: " Abdomen is flat. Bowel sounds are normal.      Palpations: Abdomen is soft.      Comments: Peritoneal dialysis catheter RUQ    Genitourinary:     Comments: Oliguric, on PD   Musculoskeletal:         General: Normal range of motion.      Cervical back: Neck supple.      Comments: Trace BLE edema; well perfused   Skin:     General: Skin is warm and dry.      Comments: Left thoracotomy incision    Neurological:      General: No focal deficit present.      Mental Status: He is alert and oriented to person, place, and time.   Psychiatric:         Attention and Perception: Attention normal.         Mood and Affect: Mood normal.         Behavior: Behavior normal.         Medications  Scheduled medications  [START ON 10/17/2023] aspirin, 81 mg, oral, Daily  atorvastatin, 80 mg, oral, Nightly  B complex-vitamin C-folic acid, 1 capsule, oral, Daily  cholecalciferol, 25 mcg, oral, Daily  clopidogrel, 75 mg, oral, Daily  darbepoetin alyssa, 40 mcg, intravenous, Weekly  DULoxetine, 30 mg, oral, Daily  fluticasone, 2 spray, Each Nostril, Daily  gentamicin, , Topical, Daily  heparin (porcine), 5,000 Units, subcutaneous, q8h  insulin detemir, 9 Units, subcutaneous, q24h  insulin lispro, 0-5 Units, subcutaneous, q4h  [START ON 10/17/2023] insulin lispro, 3 Units, subcutaneous, Daily with breakfast  [START ON 10/17/2023] insulin lispro, 5 Units, subcutaneous, Daily with lunch  insulin lispro, 5 Units, subcutaneous, Daily with evening meal  levothyroxine, 25 mcg, oral, Daily  metoprolol tartrate, 25 mg, oral, BID  nystatin, 5 mL, Swish & Swallow, TID  polyethylene glycol, 17 g, oral, TID  sennosides, 2 tablet, oral, BID  torsemide, 50 mg, oral, Daily    Continuous medications  dextrose 1.5% - LOW calcium 2.5mEq/L (Dianeal, Delflex-LC) in 3,000 mL peritoneal dialysate,    Followed by  [START ON 10/18/2023] dextrose 1.5% - LOW calcium 2.5mEq/L (Dianeal, Delflex-LC) in 3,000 mL peritoneal dialysate,     PRN medications  PRN medications:  acetaminophen, dextrose 10 % in water (D10W), dextrose, glucagon    Labs  Results for orders placed or performed during the hospital encounter of 09/30/23 (from the past 24 hour(s))   POCT GLUCOSE   Result Value Ref Range    POCT Glucose 168 (H) 74 - 99 mg/dL   POCT GLUCOSE   Result Value Ref Range    POCT Glucose 141 (H) 74 - 99 mg/dL   CBC   Result Value Ref Range    WBC 7.4 4.4 - 11.3 x10*3/uL    nRBC 0.0 0.0 - 0.0 /100 WBCs    RBC 2.66 (L) 4.50 - 5.90 x10*6/uL    Hemoglobin 8.4 (L) 13.5 - 17.5 g/dL    Hematocrit 28.0 (L) 41.0 - 52.0 %     (H) 80 - 100 fL    MCH 31.6 26.0 - 34.0 pg    MCHC 30.0 (L) 32.0 - 36.0 g/dL    RDW 14.7 (H) 11.5 - 14.5 %    Platelets 263 150 - 450 x10*3/uL    MPV 9.6 7.5 - 11.5 fL   Magnesium   Result Value Ref Range    Magnesium 2.00 1.60 - 2.40 mg/dL   Renal Function Panel   Result Value Ref Range    Glucose 213 (H) 74 - 99 mg/dL    Sodium 133 (L) 136 - 145 mmol/L    Potassium 4.8 3.5 - 5.3 mmol/L    Chloride 98 98 - 107 mmol/L    Bicarbonate 28 21 - 32 mmol/L    Anion Gap 12 10 - 20 mmol/L    Urea Nitrogen 62 (H) 6 - 23 mg/dL    Creatinine 4.03 (H) 0.50 - 1.30 mg/dL    eGFR 14 (L) >60 mL/min/1.73m*2    Calcium 8.5 (L) 8.6 - 10.6 mg/dL    Phosphorus 4.5 2.5 - 4.9 mg/dL    Albumin 2.8 (L) 3.4 - 5.0 g/dL           IMPRESSION & PLAN:    POD #5 s/p left thoracotomy mid CABG W/ARTERIAL GRAFT SINGLE ARTERIAL GRAFT with Jorje Neal MD on 10/11/23  - Increase activity/ ambulation; PT/OT  - Encourage IS, C/DB; respiratory therapy; wean O2 as josh   - Cardiac rehab referral   - Continue cardiac meds: ASA, BB, statin, Plavix   - Pain and anticonstipation meds  - 2v CXR ordered 10/15  - No epicardial wires  - Tele until discharge  - Optimize nutrition and electrolytes    CAD   - s/p LIMA-LAD  - needs PCI to Cx   - received 300mg Plavix, 243mg aspirin on 10/13  - continue Plavix 75mg /asa 81mg daily   - NPO MN Monday 10/16 for PCI - >10/16 PCI UNSUCCESSFUL  - 10/16 Dr Arboleda d/w  cardiologist Dr Hudson - plan is for monitor patient for any angina symptoms over next couple of days, and determine if further cath lab ntervention needed inpatient or outpatient    Rhythm  - Tele: SR 60s-80s  - Continue BB  - Adjust medications as tolerated    Acute Blood Loss Anemia   Hematocrit   Date Value Ref Range Status   10/16/2023 28.0 (L) 41.0 - 52.0 % Final   10/15/2023 28.7 (L) 41.0 - 52.0 % Final   - Nephrocap MV, PO Iron  - Daily labs, transfuse as indicated    Hx of ESRD on PD since March 2023 but still makes some urine.  Tolerating PD at night. Euvolemic on exam. -->  Volume/Electrolyte Status: Preop wt 78.7kg  - Weight: X, 79.8, 83kg  - Replete electrolytes for hypokalemia/hypomagnesemia/hypophosphatemia as needed  - Daily weights and strict I&Os  - Daily RFP while admitted  - Ewing catheter removed 10/13  - Nephrology Consult for PD management, appreciate recs:     --Continue same Peritoneal dialysis prescription     --Continue aranesp     --Continue PD tonight- Total fill volume 5000cc, 8 hrs duration, 2400 fill volume, 1.5% dextrose. No heparin     --Continue Gentamicin to exit site    --Continue fungal prophylaxis as long as patient remains on antibiotics.     -- daily bowel regimen   - continue home torsemide   - hold home bicarb per nephrology  - continue gentamicin antimicrobial creams for PD site    PMH of insulin dependent T2DM A1c: 6.6.  Hypothyroidism with endocrine following.    - Maintain BG <180  - per endo recs: SSI #1 with meal and detemir 11 units at bedtime.  Lispro 3u with breakfast, Lispro 5u with lunch and dinner   - Continue  synthroid 25 mcg   - hold home trulicity  - appreciate endo recs: when NPO: Hold all mealtime insulin, Switch #1 sliding scale to every 4 hours while NPO, Decrease detemir dose to 5 units at bedtime  - 10/16 per Endo - increase night detemir to 9units; resume premeal 3-5-5    Hx arthritis and neuropathy  - continue home Cymbalta  - PT/OT  - OOB to  chair/ambulate  - Tylenol prn for pain     Skin: Stage II sacral pressure injury   - preventative Mepilex dressings in place on sacrum and heels  - change preventative Mepilex weekly or more frequently as indicated (when moist/soiled)   - every shift skin assessment per nursing  - moisture barrier to be applied with francis care    VTE Prophylaxis: SCDs/TEDs, ambulation, SQ heparin  Code Status: Full Code    Dispo  - PT/OT recs home with homecare   - Would benefit from homecare for CABG carepath and RN visits  - Anticipate discharge toward end of week; need to determine if needs further intervention d/t  unsuccessful PCI  - Will continue to assess discharge needs      ANNIE Mena-CNP  Cardiac Surgery [facility]  [facility]  Team Pager [phone]

## 2023-10-17 VITALS
OXYGEN SATURATION: 98 % | TEMPERATURE: 97.2 F | HEART RATE: 76 BPM | DIASTOLIC BLOOD PRESSURE: 74 MMHG | BODY MASS INDEX: 26.37 KG/M2 | HEIGHT: 69 IN | RESPIRATION RATE: 17 BRPM | WEIGHT: 178.02 LBS | SYSTOLIC BLOOD PRESSURE: 127 MMHG

## 2023-10-17 DIAGNOSIS — Z95.1 S/P CABG (CORONARY ARTERY BYPASS GRAFT): ICD-10-CM

## 2023-10-17 PROBLEM — E11.59 TYPE 2 DIABETES MELLITUS WITH CIRCULATORY DISORDER (MULTI): Status: ACTIVE | Noted: 2023-10-17

## 2023-10-17 PROBLEM — I25.110 CORONARY ARTERY DISEASE INVOLVING NATIVE CORONARY ARTERY OF NATIVE HEART WITH UNSTABLE ANGINA PECTORIS (MULTI): Status: RESOLVED | Noted: 2023-10-06 | Resolved: 2023-10-17

## 2023-10-17 LAB
ALBUMIN SERPL BCP-MCNC: 2.6 G/DL (ref 3.4–5)
ANION GAP SERPL CALC-SCNC: 14 MMOL/L (ref 10–20)
BUN SERPL-MCNC: 57 MG/DL (ref 6–23)
CALCIUM SERPL-MCNC: 8.2 MG/DL (ref 8.6–10.6)
CHLORIDE SERPL-SCNC: 99 MMOL/L (ref 98–107)
CO2 SERPL-SCNC: 27 MMOL/L (ref 21–32)
CREAT SERPL-MCNC: 3.72 MG/DL (ref 0.5–1.3)
ERYTHROCYTE [DISTWIDTH] IN BLOOD BY AUTOMATED COUNT: 14.5 % (ref 11.5–14.5)
GFR SERPL CREATININE-BSD FRML MDRD: 16 ML/MIN/1.73M*2
GLUCOSE BLD MANUAL STRIP-MCNC: 113 MG/DL (ref 74–99)
GLUCOSE BLD MANUAL STRIP-MCNC: 127 MG/DL (ref 74–99)
GLUCOSE BLD MANUAL STRIP-MCNC: 179 MG/DL (ref 74–99)
GLUCOSE BLD MANUAL STRIP-MCNC: 237 MG/DL (ref 74–99)
GLUCOSE BLD MANUAL STRIP-MCNC: 73 MG/DL (ref 74–99)
GLUCOSE SERPL-MCNC: 95 MG/DL (ref 74–99)
HCT VFR BLD AUTO: 28.3 % (ref 41–52)
HGB BLD-MCNC: 8.8 G/DL (ref 13.5–17.5)
MAGNESIUM SERPL-MCNC: 1.94 MG/DL (ref 1.6–2.4)
MCH RBC QN AUTO: 31.5 PG (ref 26–34)
MCHC RBC AUTO-ENTMCNC: 31.1 G/DL (ref 32–36)
MCV RBC AUTO: 101 FL (ref 80–100)
NRBC BLD-RTO: 0 /100 WBCS (ref 0–0)
PHOSPHATE SERPL-MCNC: 4.5 MG/DL (ref 2.5–4.9)
PLATELET # BLD AUTO: 261 X10*3/UL (ref 150–450)
PMV BLD AUTO: 9.5 FL (ref 7.5–11.5)
POTASSIUM SERPL-SCNC: 4.2 MMOL/L (ref 3.5–5.3)
RBC # BLD AUTO: 2.79 X10*6/UL (ref 4.5–5.9)
SODIUM SERPL-SCNC: 136 MMOL/L (ref 136–145)
WBC # BLD AUTO: 8.1 X10*3/UL (ref 4.4–11.3)

## 2023-10-17 PROCEDURE — 96372 THER/PROPH/DIAG INJ SC/IM: CPT | Performed by: NURSE PRACTITIONER

## 2023-10-17 PROCEDURE — 2500000001 HC RX 250 WO HCPCS SELF ADMINISTERED DRUGS (ALT 637 FOR MEDICARE OP): Performed by: NURSE PRACTITIONER

## 2023-10-17 PROCEDURE — 2500000004 HC RX 250 GENERAL PHARMACY W/ HCPCS (ALT 636 FOR OP/ED): Performed by: NURSE PRACTITIONER

## 2023-10-17 PROCEDURE — 2500000002 HC RX 250 W HCPCS SELF ADMINISTERED DRUGS (ALT 637 FOR MEDICARE OP, ALT 636 FOR OP/ED): Performed by: NURSE PRACTITIONER

## 2023-10-17 PROCEDURE — 85027 COMPLETE CBC AUTOMATED: CPT | Performed by: CLINICAL NURSE SPECIALIST

## 2023-10-17 PROCEDURE — 84100 ASSAY OF PHOSPHORUS: CPT | Mod: CMCLAB | Performed by: CLINICAL NURSE SPECIALIST

## 2023-10-17 PROCEDURE — 83735 ASSAY OF MAGNESIUM: CPT | Performed by: CLINICAL NURSE SPECIALIST

## 2023-10-17 PROCEDURE — 36415 COLL VENOUS BLD VENIPUNCTURE: CPT | Performed by: CLINICAL NURSE SPECIALIST

## 2023-10-17 PROCEDURE — 99239 HOSP IP/OBS DSCHRG MGMT >30: CPT | Performed by: NURSE PRACTITIONER

## 2023-10-17 PROCEDURE — 2500000004 HC RX 250 GENERAL PHARMACY W/ HCPCS (ALT 636 FOR OP/ED)

## 2023-10-17 PROCEDURE — 82947 ASSAY GLUCOSE BLOOD QUANT: CPT | Mod: CMCLAB

## 2023-10-17 RX ORDER — CLOPIDOGREL BISULFATE 75 MG/1
75 TABLET ORAL DAILY
Qty: 90 TABLET | Refills: 3 | Status: ON HOLD | OUTPATIENT
Start: 2023-10-18 | End: 2024-03-18 | Stop reason: SDUPTHER

## 2023-10-17 RX ORDER — GENTAMICIN SULFATE 1 MG/G
CREAM TOPICAL DAILY
Qty: 15 G | Refills: 0 | Status: SHIPPED | OUTPATIENT
Start: 2023-10-17 | End: 2023-10-30

## 2023-10-17 RX ORDER — INSULIN LISPRO 100 [IU]/ML
0-5 INJECTION, SOLUTION INTRAVENOUS; SUBCUTANEOUS
Status: DISCONTINUED | OUTPATIENT
Start: 2023-10-17 | End: 2023-10-17 | Stop reason: HOSPADM

## 2023-10-17 RX ORDER — ACETAMINOPHEN 325 MG/1
650 TABLET ORAL EVERY 6 HOURS PRN
Qty: 30 TABLET | Refills: 0 | COMMUNITY
Start: 2023-10-17 | End: 2024-03-21 | Stop reason: HOSPADM

## 2023-10-17 RX ORDER — ATORVASTATIN CALCIUM 40 MG/1
40 TABLET, FILM COATED ORAL NIGHTLY
Qty: 90 TABLET | Refills: 3 | Status: SHIPPED | OUTPATIENT
Start: 2023-10-17 | End: 2024-03-21 | Stop reason: HOSPADM

## 2023-10-17 RX ORDER — IRON POLYSACCHARIDE COMPLEX 150 MG
150 CAPSULE ORAL DAILY
Qty: 30 CAPSULE | Refills: 0 | Status: SHIPPED | OUTPATIENT
Start: 2023-10-18 | End: 2023-11-14 | Stop reason: SDUPTHER

## 2023-10-17 RX ORDER — METOPROLOL SUCCINATE 50 MG/1
50 TABLET, EXTENDED RELEASE ORAL DAILY
Qty: 30 TABLET | Refills: 2 | Status: SHIPPED | OUTPATIENT
Start: 2023-10-17 | End: 2024-03-06

## 2023-10-17 RX ORDER — SENNOSIDES 8.6 MG/1
2 TABLET ORAL 2 TIMES DAILY
COMMUNITY
Start: 2023-10-17 | End: 2023-11-29

## 2023-10-17 RX ORDER — ASPIRIN 81 MG/1
81 TABLET ORAL DAILY
COMMUNITY
Start: 2023-10-18

## 2023-10-17 RX ORDER — DULAGLUTIDE 1.5 MG/.5ML
1.5 INJECTION, SOLUTION SUBCUTANEOUS
Qty: 1 EACH | Refills: 3 | Status: SHIPPED | OUTPATIENT
Start: 2023-10-17 | End: 2023-11-29

## 2023-10-17 RX ADMIN — LEVOTHYROXINE SODIUM 25 MCG: 25 TABLET ORAL at 12:01

## 2023-10-17 RX ADMIN — FLUTICASONE PROPIONATE 2 SPRAY: 50 SPRAY, METERED NASAL at 08:19

## 2023-10-17 RX ADMIN — INSULIN LISPRO 5 UNITS: 100 INJECTION, SOLUTION INTRAVENOUS; SUBCUTANEOUS at 14:11

## 2023-10-17 RX ADMIN — INSULIN LISPRO 2 UNITS: 100 INJECTION, SOLUTION INTRAVENOUS; SUBCUTANEOUS at 00:15

## 2023-10-17 RX ADMIN — DULOXETINE HYDROCHLORIDE 30 MG: 30 CAPSULE, DELAYED RELEASE ORAL at 08:16

## 2023-10-17 RX ADMIN — NYSTATIN 500000 UNITS: 100000 SUSPENSION ORAL at 08:15

## 2023-10-17 RX ADMIN — HEPARIN SODIUM 5000 UNITS: 5000 INJECTION INTRAVENOUS; SUBCUTANEOUS at 00:00

## 2023-10-17 RX ADMIN — Medication 150 MG: at 08:16

## 2023-10-17 RX ADMIN — NEPHROCAP 1 CAPSULE: 1 CAP ORAL at 08:16

## 2023-10-17 RX ADMIN — ASPIRIN 81 MG: 81 TABLET, COATED ORAL at 08:16

## 2023-10-17 RX ADMIN — Medication 25 MCG: at 08:16

## 2023-10-17 RX ADMIN — TORSEMIDE 50 MG: 100 TABLET ORAL at 08:15

## 2023-10-17 RX ADMIN — ACETAMINOPHEN 650 MG: 325 TABLET ORAL at 08:15

## 2023-10-17 RX ADMIN — METOPROLOL TARTRATE 25 MG: 25 TABLET, FILM COATED ORAL at 08:16

## 2023-10-17 RX ADMIN — CLOPIDOGREL BISULFATE 75 MG: 75 TABLET ORAL at 08:16

## 2023-10-17 RX ADMIN — INSULIN LISPRO 2 UNITS: 100 INJECTION, SOLUTION INTRAVENOUS; SUBCUTANEOUS at 14:09

## 2023-10-17 RX ADMIN — HEPARIN SODIUM 5000 UNITS: 5000 INJECTION INTRAVENOUS; SUBCUTANEOUS at 08:16

## 2023-10-17 ASSESSMENT — COGNITIVE AND FUNCTIONAL STATUS - GENERAL
DRESSING REGULAR UPPER BODY CLOTHING: A LITTLE
HELP NEEDED FOR BATHING: A LITTLE
TOILETING: A LITTLE
WALKING IN HOSPITAL ROOM: A LITTLE
STANDING UP FROM CHAIR USING ARMS: A LITTLE
DAILY ACTIVITIY SCORE: 20
CLIMB 3 TO 5 STEPS WITH RAILING: A LOT
MOBILITY SCORE: 18
DRESSING REGULAR LOWER BODY CLOTHING: A LITTLE
TURNING FROM BACK TO SIDE WHILE IN FLAT BAD: A LITTLE
MOVING TO AND FROM BED TO CHAIR: A LITTLE

## 2023-10-17 ASSESSMENT — PAIN SCALES - GENERAL: PAINLEVEL_OUTOF10: 0 - NO PAIN

## 2023-10-17 ASSESSMENT — PAIN - FUNCTIONAL ASSESSMENT: PAIN_FUNCTIONAL_ASSESSMENT: 0-10

## 2023-10-17 NOTE — CARE PLAN
Problem: Fall/Injury  Goal: Not fall by end of shift  Outcome: Progressing  Goal: Be free from injury by end of the shift  Outcome: Progressing  Goal: Verbalize understanding of personal risk factors for fall in the hospital  Outcome: Progressing  Goal: Verbalize understanding of risk factor reduction measures to prevent injury from fall in the home  Outcome: Progressing

## 2023-10-17 NOTE — PROGRESS NOTES
"  CARDIAC SURGERY DAILY PROGRESS NOTE    Salomon Chowdary is a 83 y.o. male, with a PMH of NSTEMI, IDDM, HTN, HLD, ESRD on PD, CAD, A-Fib and hypothyroidism , who presented to presented to an OSH on 9/20 with chest pain. He was found to have NSTEMI with LHC showing 2 vessel disease in the LAD and left circumflex. He was transferred to [facility] for evaluation of revascularization plan s/p mid CABG with Dr. Neal on 10/11/23.      OPERATION/PROCEDURE: left thoracotomy mid CABG W/ARTERIAL GRAFT SINGLE ARTERIAL GRAFT with Jorje Neal MD  On 10/11/23    CTICU Course: nocturnal PD, plan for Cath lab 10/13 but postponed to 10/16  Transferred to  on 10/13/23    Objective   /67   Pulse 73   Temp 37 °C (98.6 °F)   Resp 18   Ht 1.752 m (5' 8.98\")   Wt 80.7 kg (178 lb 0.3 oz)   SpO2 96%   BMI 26.31 kg/m²   Pain Score: 0 - No pain   3 Day Weight Change: -0.75 kg (-1 lb 10.5 oz) per day    Intake and Output    Intake/Output Summary (Last 24 hours) at 10/17/2023 1126  Last data filed at 10/17/2023 0648  Gross per 24 hour   Intake 240 ml   Output 1025 ml   Net -785 ml         Physical Exam  Physical Exam  Constitutional:       Appearance: Normal appearance.      Comments: Sitting in bed  Wife at bedside, updated   HENT:      Head: Normocephalic and atraumatic.      Mouth/Throat:      Mouth: Mucous membranes are moist.   Eyes:      Conjunctiva/sclera: Conjunctivae normal.   Neck:      Comments: Wearing his home soft collar PRN for comfort  Cardiovascular:      Rate and Rhythm: Normal rate and regular rhythm.      Pulses: Normal pulses.      Heart sounds: Normal heart sounds.      Comments: TELE - SR 60s-80s  Pulmonary:      Effort: Pulmonary effort is normal.      Breath sounds: Normal breath sounds.      Comments: Mild decrease bases L>R  Chest:      Comments: Left thoracotomy incision, ELÍAS, no redness/swelling/drainage  Suture at CT site   Abdominal:      General: Abdomen is flat. Bowel sounds are normal.      " Palpations: Abdomen is soft.      Comments: Peritoneal dialysis catheter RUQ    Genitourinary:     Comments: Oliguric, on PD   Musculoskeletal:         General: Normal range of motion.      Cervical back: Neck supple.      Comments: Trace BLE edema; well perfused   Skin:     General: Skin is warm and dry.      Comments: Left thoracotomy incision    Neurological:      General: No focal deficit present.      Mental Status: He is alert and oriented to person, place, and time.   Psychiatric:         Attention and Perception: Attention normal.         Mood and Affect: Mood normal.         Behavior: Behavior normal.         Medications  Scheduled medications  aspirin, 81 mg, oral, Daily  atorvastatin, 80 mg, oral, Nightly  B complex-vitamin C-folic acid, 1 capsule, oral, Daily  cholecalciferol, 25 mcg, oral, Daily  clopidogrel, 75 mg, oral, Daily  darbepoetin alyssa, 40 mcg, intravenous, Weekly  DULoxetine, 30 mg, oral, Daily  fluticasone, 2 spray, Each Nostril, Daily  gentamicin, , Topical, Daily  heparin (porcine), 5,000 Units, subcutaneous, q8h  insulin detemir, 9 Units, subcutaneous, q24h  insulin lispro, 0-5 Units, subcutaneous, TID with meals  insulin lispro, 3 Units, subcutaneous, Daily with breakfast  insulin lispro, 5 Units, subcutaneous, Daily with lunch  insulin lispro, 5 Units, subcutaneous, Daily with evening meal  iron polysaccharides, 150 mg, oral, Daily  levothyroxine, 25 mcg, oral, Daily  metoprolol tartrate, 25 mg, oral, BID  nystatin, 5 mL, Swish & Swallow, TID  polyethylene glycol, 17 g, oral, TID  sennosides, 2 tablet, oral, BID  torsemide, 50 mg, oral, Daily    Continuous medications  dextrose 1.5% - LOW calcium 2.5mEq/L (Dianeal, Delflex-LC) in 3,000 mL peritoneal dialysate,     PRN medications  PRN medications: acetaminophen, dextrose 10 % in water (D10W), dextrose, glucagon    Labs  Results for orders placed or performed during the hospital encounter of 09/30/23 (from the past 24 hour(s))   POCT  GLUCOSE   Result Value Ref Range    POCT Glucose 141 (H) 74 - 99 mg/dL   POCT GLUCOSE   Result Value Ref Range    POCT Glucose 207 (H) 74 - 99 mg/dL   CBC   Result Value Ref Range    WBC 7.4 4.4 - 11.3 x10*3/uL    nRBC 0.0 0.0 - 0.0 /100 WBCs    RBC 2.66 (L) 4.50 - 5.90 x10*6/uL    Hemoglobin 8.4 (L) 13.5 - 17.5 g/dL    Hematocrit 28.0 (L) 41.0 - 52.0 %     (H) 80 - 100 fL    MCH 31.6 26.0 - 34.0 pg    MCHC 30.0 (L) 32.0 - 36.0 g/dL    RDW 14.7 (H) 11.5 - 14.5 %    Platelets 263 150 - 450 x10*3/uL    MPV 9.6 7.5 - 11.5 fL   Magnesium   Result Value Ref Range    Magnesium 2.00 1.60 - 2.40 mg/dL   Renal Function Panel   Result Value Ref Range    Glucose 213 (H) 74 - 99 mg/dL    Sodium 133 (L) 136 - 145 mmol/L    Potassium 4.8 3.5 - 5.3 mmol/L    Chloride 98 98 - 107 mmol/L    Bicarbonate 28 21 - 32 mmol/L    Anion Gap 12 10 - 20 mmol/L    Urea Nitrogen 62 (H) 6 - 23 mg/dL    Creatinine 4.03 (H) 0.50 - 1.30 mg/dL    eGFR 14 (L) >60 mL/min/1.73m*2    Calcium 8.5 (L) 8.6 - 10.6 mg/dL    Phosphorus 4.5 2.5 - 4.9 mg/dL    Albumin 2.8 (L) 3.4 - 5.0 g/dL   POCT GLUCOSE   Result Value Ref Range    POCT Glucose 254 (H) 74 - 99 mg/dL   POCT GLUCOSE   Result Value Ref Range    POCT Glucose 211 (H) 74 - 99 mg/dL   POCT GLUCOSE   Result Value Ref Range    POCT Glucose 127 (H) 74 - 99 mg/dL   CBC   Result Value Ref Range    WBC 8.1 4.4 - 11.3 x10*3/uL    nRBC 0.0 0.0 - 0.0 /100 WBCs    RBC 2.79 (L) 4.50 - 5.90 x10*6/uL    Hemoglobin 8.8 (L) 13.5 - 17.5 g/dL    Hematocrit 28.3 (L) 41.0 - 52.0 %     (H) 80 - 100 fL    MCH 31.5 26.0 - 34.0 pg    MCHC 31.1 (L) 32.0 - 36.0 g/dL    RDW 14.5 11.5 - 14.5 %    Platelets 261 150 - 450 x10*3/uL    MPV 9.5 7.5 - 11.5 fL   Magnesium   Result Value Ref Range    Magnesium 1.94 1.60 - 2.40 mg/dL   Renal Function Panel   Result Value Ref Range    Glucose 95 74 - 99 mg/dL    Sodium 136 136 - 145 mmol/L    Potassium 4.2 3.5 - 5.3 mmol/L    Chloride 99 98 - 107 mmol/L    Bicarbonate 27  21 - 32 mmol/L    Anion Gap 14 10 - 20 mmol/L    Urea Nitrogen 57 (H) 6 - 23 mg/dL    Creatinine 3.72 (H) 0.50 - 1.30 mg/dL    eGFR 16 (L) >60 mL/min/1.73m*2    Calcium 8.2 (L) 8.6 - 10.6 mg/dL    Phosphorus 4.5 2.5 - 4.9 mg/dL    Albumin 2.6 (L) 3.4 - 5.0 g/dL   POCT GLUCOSE   Result Value Ref Range    POCT Glucose 73 (L) 74 - 99 mg/dL   POCT GLUCOSE   Result Value Ref Range    POCT Glucose 113 (H) 74 - 99 mg/dL           IMPRESSION & PLAN:    POD #6 s/p left thoracotomy mid CABG W/ARTERIAL GRAFT SINGLE ARTERIAL GRAFT with Jorje F Eileen Neal MD on 10/11/23  - Increase activity/ ambulation; PT/OT  - Encourage IS, C/DB  - ambulating in gregorio/room without difficulty with walker and assist  - Cardiac rehab referral   - Continue cardiac meds: ASA, BB, statin, Plavix   - Pain and anticonstipation meds  - 10/15 2V CXR done - on room air, good oxygenation  - Tele until discharge    CAD   - s/p LIMA-LAD  - needs PCI to Cx   - received 300mg Plavix, 243mg aspirin on 10/13  - continue Plavix 75mg /asa 81mg daily   - 0/16 PCI UNSUCCESSFUL  - 10/16 Dr Arboleda d/w cardiologist Dr Hudson - plan is for monitor patient for any angina symptoms over next couple of days, and determine if further cath lab ntervention needed inpatient or outpatient  - follow up appointment  Dr Hudson in [place]    Rhythm  - Tele: SR 60s-80s  - Continue BB - change to long acting bb at discharge  - Adjust medications as tolerated    Acute Blood Loss Anemia   Hematocrit   Date Value Ref Range Status   10/17/2023 28.3 (L) 41.0 - 52.0 % Final   10/16/2023 28.0 (L) 41.0 - 52.0 % Final   10/15/2023 28.7 (L) 41.0 - 52.0 % Final   - Nephrocap MV, PO Iron  - niphrex at discharge for 1 month/Vit D3    Hx of ESRD on PD since March 2023 but still makes some urine.  Tolerating PD at night. Euvolemic on exam. -->  Volume/Electrolyte Status: Preop wt 78.7kg  - Weight: X, 79.8, 83kg  - Replete electrolytes for hypokalemia/hypomagnesemia/hypophosphatemia as needed  -  Daily weights and strict I&Os  - Daily RFP while admitted  - Ewing catheter removed 10/13  - Nephrology Consult for PD management, appreciate recs:     --Continue same Peritoneal dialysis prescription     --Continue aranesp     --Continue PD tonight- Total fill volume 5000cc, 8 hrs duration, 2400 fill volume, 1.5% dextrose. No heparin     --Continue Gentamicin to exit site    --Continue fungal prophylaxis as long as patient remains on antibiotics.     -- daily bowel regimen   - continue home torsemide   - hold home bicarb per nephrology  - continue gentamicin antimicrobial creams for PD site    PMH of insulin dependent T2DM A1c: 6.6.  Hypothyroidism with endocrine following.    - Maintain BG <180  - per endo recs: SSI #1 with meal and detemir 11 units at bedtime.  Lispro 3u with breakfast, Lispro 5u with lunch and dinner   - Continue  synthroid 25 mcg   - hold home trulicity  - appreciate endo recs: when NPO: Hold all mealtime insulin, Switch #1 sliding scale to every 4 hours while NPO, Decrease detemir dose to 5 units at bedtime  - 10/16 per Endo - increase night detemir to 9units; resume premeal 3-5-5  10/17 - resume home regimen at discharge    Hx arthritis and neuropathy  - continue home Cymbalta  - PT/OT  - OOB to chair/ambulate  - Tylenol prn for pain     Skin: Stage II sacral pressure injury   - preventative Mepilex dressings in place on sacrum and heels  - change preventative Mepilex weekly or more frequently as indicated (when moist/soiled)   - every shift skin assessment per nursing  - moisture barrier to be applied with francis care    VTE Prophylaxis: SCDs/TEDs, ambulation, SQ heparin  Code Status: Full Code    Dispo  - PT/OT recs home with homecare   - Would benefit from homecare for CABG carepath and RN visits  - Discharge today      Iraida Garzon, APRN-CNP  Cardiac Surgery [facility]  [facility]  Team Pager [phone]

## 2023-10-17 NOTE — PROGRESS NOTES
10/17/23  6716  Transitional Care Coordinator   Met with patient and his wife and introduced myself as Care Coordinator and member of the discharge planning team.  Pt is s/p PCI. Plans to return home at time of discharge. Discussed home care needs and options. I left a message at [facility] re availability of home care. At the request of patient and his wife I requested home care from [facility]. Per patient he receives PD supplies from [facility]. Will continue to follow for home going needs. ADOD is today.  Angelika North RN

## 2023-10-17 NOTE — DISCHARGE SUMMARY
Discharge Diagnosis  2-vessel coronary artery disease    Issues Requiring Follow-Up  Follow up with Dr Hudson regarding future plans for PCI if having symptoms  AC for h/o afib, risk of bleeding and need for DAPT, ESRD, all with high risk for bleeding with AC    Test Results Pending At Discharge  Pending Labs       Order Current Status    CBC Collected (10/12/23 0306)    Extra Urine Gray Tube Collected (10/01/23 1319)    Extra Tubes In process    Extra Tubes In process    Light Blue Top In process    Light Blue Top In process    POCT GLUCOSE In process    Urinalysis with Reflex Microscopic and Culture In process    Extra Tubes Preliminary result    Lavender Top Preliminary result            Hospital Course  MAGGIE KIRAN is a 83 year old Male with PMH of hypertension, hyperlipidemia, insulin-dependent DM II, ESRD on peritoneal dialysis, arthritis and hypothyroidism  who presented to  [facility] ED 9/19/23 with chest pain. His symptoms began as left arm pain that radiated from the shoulder to the hand initially, which he rated as 10/10. He tried taking acetaminophen for the pain which did not help. He also tried heating pads, which  did not provide any comfort either. On the way to the hospital, he developed chest pain that he rated 5/10. He received aspirin 325 mg per EMS on the way. Nothing similar has happened before. He reported that the chest pain is not pleuritic in nature  nor does it get worse on exertion. He did not endorse any new symptoms of fever, chills, dizziness or abdominal pain. He has almost daily episodes of dry heaves/vomiting, as well as baseline SOB. His home medications include levothyroxine, atorvastatin,  and amlodipine.      ED Course:   - Vitals: T 36.5, , /71, RR 20, SpO2 99%  - EKG: initial EKG showed ST segment elevations in lead V2 & V3 which then converted to T wave inversions   - CT angio remarkable for moderate pericardial effusion   - Labs: mild leukocytosis 11.4, Hg  8.5 (8.9 in 10/2022), Hct 31.1, glucose 271, BUN 64, Cr 4.68 (4.01 in 10/2022), GFR 12, , PT 13.1   - Troponin 122, 608, 1661   - D-dimer 3640  - Interventions: heparin infusion      He developed Afib with RVR in the ED, for which he was given metoprolol 5 mg IV push once and converted back to normal rhythm.  Echo 9/20 showed EF 50-55%, impaired relaxation of LV diastolic filling and small to moderate pericardial effusion. He transferred 9/30 from [facility] ICU for consideration of CABG vs PCI/rotablation for NSTEMI after [facility] 9/20/23 showed 2 vessel disease.     Floor course:   Attempted to start low-dose BB pre-surgery, but jasiel down to 40's (asymptomatic), therefore was held. Both IC and CTS consulted. Patient was presented at [facility] meeting 10/3/23. Decision was made to pursue a MID-CABG with Dr. Eileen Neal on 10/10.     10/3 developed episode of hypoglycemia (glucose of 40). Detemir dose decreased to 12u daily. Endocrinology also recommended repeat TFT in one week for TSH of 6.26, Free T4 of 1.12. Patient to continue levothyroxine 25mg daily.  Repeat TSH showed 5.76, with T4 of 1.09.     Sacral wound, wound care RN visited with patient, recs appreciated/ordered.    Nephrology consulted for PD management. Weekly IV darbepoietin added for anemia, stopped sodium bicarb. Hgb of 7.9, s/p 1UPRBC on 10/5 (for optimization prior to surgery) with appropriate response.    PT/OT rec ###    Discharge weight: ### kg    After all labs and VS were reviewed the decision was made that the patient was medically stable for discharge.  The patient was discharged in satisfactory condition.    More than 30 minutes were spent in coordinating patient discharge.       Pertinent Physical Exam At Time of Discharge  Physical Exam  Constitutional:       Appearance: Normal appearance.   HENT:      Head: Normocephalic and atraumatic.      Nose: Nose normal.      Mouth/Throat:      Mouth: Mucous membranes are moist.   Eyes:       Pupils: Pupils are equal, round, and reactive to light.   Neck:      Comments: Cervical collar in place for comfort  Cardiovascular:      Rate and Rhythm: Normal rate and regular rhythm.      Heart sounds: No murmur heard.     No friction rub. No gallop.   Pulmonary:      Effort: Pulmonary effort is normal.      Breath sounds: Rales present.      Comments: Bibasilar crackles  Abdominal:      General: Bowel sounds are normal. There is no distension.      Palpations: Abdomen is soft.      Tenderness: There is no abdominal tenderness.      Comments: PD catheter dressing c/d/i   Musculoskeletal:      Cervical back: Neck supple.      Right lower leg: Edema present.      Left lower leg: Edema present.      Comments: 1+ edema bilat   Skin:     General: Skin is warm and dry.      Capillary Refill: Capillary refill takes less than 2 seconds.      Findings: Lesion present.      Comments: Small sacral decub, mepilex in place   Neurological:      Mental Status: He is alert.         Home Medications     Medication List      START taking these medications     aspirin 81 mg EC tablet; Take 1 tablet (81 mg) by mouth once daily. Do   not start before October 18, 2023.; Start taking on: October 18, 2023   clopidogrel 75 mg tablet; Commonly known as: Plavix; Take 1 tablet (75   mg) by mouth once daily for 362 doses. Do not start before October 18, 2023.; Start taking on: October 18, 2023   gentamicin 0.1 % cream; Commonly known as: Garamycin; Apply topically   once daily for 13 doses.   iron polysaccharides 150 mg iron capsule; Commonly known as:   Nu-Iron,Niferex; Take 1 capsule (150 mg) by mouth once daily. Do not start   before October 18, 2023.; Start taking on: October 18, 2023   metoprolol succinate XL 50 mg 24 hr tablet; Commonly known as:   Toprol-XL; Take 1 tablet (50 mg) by mouth once daily. Do not crush or   chew.   sennosides 8.6 mg tablet; Commonly known as: Senokot; Take 2 tablets   (17.2 mg) by mouth 2 times a day.      CONTINUE taking these medications     b complex vitamins capsule   cholecalciferol 25 MCG (1000 UT) tablet; Commonly known as: Vitamin D-3   DULoxetine 30 mg DR capsule; Commonly known as: Cymbalta   Levemir U-100 Insulin 100 unit/mL injection; Generic drug: insulin   detemir   levothyroxine 25 mcg tablet; Commonly known as: Synthroid, Levoxyl   loratadine-pseudoephedrine  mg 24 hr tablet; Commonly known as:   Claritin-D 24-hour   multivitamin tablet   sennosides-docusate sodium 8.6-50 mg tablet; Commonly known as:   Diana-Colace   sodium bicarbonate 650 mg tablet   torsemide 100 mg tablet; Commonly known as: Demadex   Trulicity 1.5 mg/0.5 mL pen injector injection; Generic drug:   dulaglutide     STOP taking these medications     acetaminophen 500 mg tablet; Commonly known as: Tylenol   amLODIPine 10 mg tablet; Commonly known as: Norvasc   atorvastatin 10 mg tablet; Commonly known as: Lipitor   glucosamine HCl 1,500 mg tablet   hydrALAZINE 25 mg tablet; Commonly known as: Apresoline   Urinozinc Prostate Classic 320-25 mg capsule; Generic drug: vit   B-min-saw palmetto-[place]       Outpatient Follow-Up  Future Appointments   Date Time Provider Department Center   10/27/2023  9:30 AM RICHARD CRANE NURSE [id number] [place]   11/9/2023  1:00 PM Jorje Neal MD [id number] [place]       ANNIE Chang-CNP

## 2023-10-18 ENCOUNTER — HOME HEALTH ADMISSION (OUTPATIENT)
Dept: HOME HEALTH SERVICES | Facility: HOME HEALTH | Age: 83
End: 2023-10-18
Payer: MEDICARE

## 2023-10-18 LAB
ACT BLD: 251 SEC (ref 89–169)
ACT BLD: 262 SEC (ref 89–169)

## 2023-10-20 ENCOUNTER — HOME CARE VISIT (OUTPATIENT)
Dept: HOME HEALTH SERVICES | Facility: HOME HEALTH | Age: 83
End: 2023-10-20
Payer: MEDICARE

## 2023-10-20 VITALS
BODY MASS INDEX: 26.83 KG/M2 | OXYGEN SATURATION: 97 % | HEART RATE: 81 BPM | TEMPERATURE: 96.5 F | RESPIRATION RATE: 18 BRPM | SYSTOLIC BLOOD PRESSURE: 120 MMHG | WEIGHT: 177 LBS | DIASTOLIC BLOOD PRESSURE: 71 MMHG | HEIGHT: 68 IN

## 2023-10-20 PROCEDURE — 1090000001 HH PPS REVENUE CREDIT

## 2023-10-20 PROCEDURE — 169592 NO-PAY CLAIM PROCEDURE

## 2023-10-20 PROCEDURE — G0299 HHS/HOSPICE OF RN EA 15 MIN: HCPCS | Mod: HHH

## 2023-10-20 PROCEDURE — 1090000002 HH PPS REVENUE DEBIT

## 2023-10-20 PROCEDURE — 0023 HH SOC

## 2023-10-20 ASSESSMENT — ENCOUNTER SYMPTOMS
PAIN LOCATION: NECK
PAIN: 1
PAIN LOCATION - PAIN SEVERITY: 3/10
HIGHEST PAIN SEVERITY IN PAST 24 HOURS: 6/10
SUBJECTIVE PAIN PROGRESSION: UNCHANGED
LOWER EXTREMITY EDEMA: 1
PAIN SEVERITY GOAL: 1/10
PERSON REPORTING PAIN: PATIENT
LOWEST PAIN SEVERITY IN PAST 24 HOURS: 1/10

## 2023-10-20 ASSESSMENT — ACTIVITIES OF DAILY LIVING (ADL): ENTERING_EXITING_HOME: NEEDS ASSISTANCE

## 2023-10-20 NOTE — HOME HEALTH
SOC completed on pt with recent hospitalization for CABG x1 vessel on 10/11, pt also had calcified vessel that could not be stented or bypassed medical management to treat. Pt is home now with wife, c/o weakness and using a walker.  Pt is a CAPD pt and uses a cycler at night, wife handles care for dialysis.  VSS, no c/o pain.  Picture of mammary CABG wound obtained, adhesive closure, healing by primary intention.  Pt is a diabetic, wife states sugars have been running low since he has been home as they get back on his home routine.  Med profile reviewed and updated, no discrepancies found.  Nursing to follow pt for 4 weeks to ensure proper recovery from CABG as pt has many comorbidities.  Pt and wife instructed on post CABG activity restrictions and precautions, verbalized understanding of all.  PT and OT to evaluate.  Information provided to wife for [facility] for providing meals for pt as he has complex diet restrictions.  No further needs at this visit.

## 2023-10-21 ENCOUNTER — HOME CARE VISIT (OUTPATIENT)
Dept: HOME HEALTH SERVICES | Facility: HOME HEALTH | Age: 83
End: 2023-10-21
Payer: MEDICARE

## 2023-10-21 VITALS
OXYGEN SATURATION: 95 % | SYSTOLIC BLOOD PRESSURE: 110 MMHG | HEART RATE: 79 BPM | RESPIRATION RATE: 16 BRPM | TEMPERATURE: 97.4 F | DIASTOLIC BLOOD PRESSURE: 62 MMHG

## 2023-10-21 PROCEDURE — G0151 HHCP-SERV OF PT,EA 15 MIN: HCPCS | Mod: HHH

## 2023-10-21 PROCEDURE — 1090000002 HH PPS REVENUE DEBIT

## 2023-10-21 PROCEDURE — 1090000001 HH PPS REVENUE CREDIT

## 2023-10-21 SDOH — HEALTH STABILITY: PHYSICAL HEALTH: PHYSICAL EXERCISE: SEATED

## 2023-10-21 SDOH — HEALTH STABILITY: PHYSICAL HEALTH: PHYSICAL EXERCISE: 15

## 2023-10-21 SDOH — HEALTH STABILITY: PHYSICAL HEALTH: EXERCISE ACTIVITY: MARCHING

## 2023-10-21 SDOH — HEALTH STABILITY: PHYSICAL HEALTH: EXERCISE ACTIVITIES SETS: 1

## 2023-10-21 SDOH — HEALTH STABILITY: PHYSICAL HEALTH: EXERCISE COMMENTS: PT REQUIRES REST BREAKS AFTER EACH EXERCISE.

## 2023-10-21 SDOH — HEALTH STABILITY: PHYSICAL HEALTH: EXERCISE ACTIVITY: ANKLE DF/PF

## 2023-10-21 SDOH — HEALTH STABILITY: PHYSICAL HEALTH: EXERCISE TYPE: LE EXERCISES

## 2023-10-21 SDOH — HEALTH STABILITY: PHYSICAL HEALTH: EXERCISE ACTIVITY: LAQ

## 2023-10-21 ASSESSMENT — ACTIVITIES OF DAILY LIVING (ADL)
AMBULATION ASSISTANCE: ONE PERSON
AMBULATION_DISTANCE/DURATION_TOLERATED: 25 FT
CURRENT_FUNCTION: ONE PERSON
CURRENT_FUNCTION: STAND BY ASSIST
AMBULATION ASSISTANCE: STAND BY ASSIST
AMBULATION ASSISTANCE ON FLAT SURFACES: 1
OASIS_M1830: 03

## 2023-10-21 ASSESSMENT — GAIT ASSESSMENTS
STEP CONTINUITY: 0 - STOPPING OR DISCONTINUITY BETWEEN STEPS
STEP SYMMETRY: 1 - RIGHT AND LEFT STEP LENGTH APPEAR EQUAL
PATH: 0 - MARKED DEVIATION
GAIT SCORE: 7
TRUNK SCORE: 0
INITIATION OF GAIT IMMEDIATELY AFTER GO: 1 - NO HESITANCY
TRUNK: 0 - MARKED SWAY OR USES WALKING AID
WALKING STANCE: 1 - HEELS ALMOST TOUCHING WHILE WALKING
BALANCE AND GAIT SCORE: 11
PATH SCORE: 0

## 2023-10-21 ASSESSMENT — BALANCE ASSESSMENTS
ATTEMPTS TO ARISE: 0 - UNABLE WITHOUT HELP
BALANCE SCORE: 4
ARISES: 0 - UNABLE WITHOUT HELP
IMMEDIATE STANDING BALANCE FIRST 5 SECONDS: 1 - STEADY BUT USES WALKER OR OTHER SUPPORT
SITTING DOWN: 1 - USES ARMS OR NOT SMOOTH MOTION
EYES CLOSED AT MAXIMUM POSITION NUDGED: 0 - UNSTEADY
SITTING BALANCE: 1 - STEADY, SAFE
ARISING SCORE: 0
TURNING 360 DEGREES STEPS: 0 - DISCONTINUOUS STEPS
STANDING BALANCE: 1 - STEADY BUT WIDE STANCE AND USES CANE OR OTHER SUPPORT
NUDGED SCORE: 0
NUDGED: 0 - BEGINS TO FALL

## 2023-10-21 ASSESSMENT — ENCOUNTER SYMPTOMS
LOWEST PAIN SEVERITY IN PAST 24 HOURS: 1/10
MUSCLE WEAKNESS: 1
PAIN: 1
SUBJECTIVE PAIN PROGRESSION: UNCHANGED
MUSCLE WEAKNESS: 1
PAIN LOCATION - PAIN QUALITY: ACHING
CHANGE IN APPETITE: UNCHANGED
OCCASIONAL FEELINGS OF UNSTEADINESS: 0
PAIN LOCATION: NECK
PAIN SEVERITY GOAL: 1/10
OCCASIONAL FEELINGS OF UNSTEADINESS: 1
PERSON REPORTING PAIN: PATIENT
PAIN LOCATION - PAIN FREQUENCY: CONSTANT
PAIN LOCATION - PAIN SEVERITY: 4/10
HIGHEST PAIN SEVERITY IN PAST 24 HOURS: 6/10
APPETITE LEVEL: GOOD
LIMITED RANGE OF MOTION: 1

## 2023-10-22 PROCEDURE — 1090000002 HH PPS REVENUE DEBIT

## 2023-10-22 PROCEDURE — 1090000001 HH PPS REVENUE CREDIT

## 2023-10-23 ENCOUNTER — HOME CARE VISIT (OUTPATIENT)
Dept: HOME HEALTH SERVICES | Facility: HOME HEALTH | Age: 83
End: 2023-10-23
Payer: MEDICARE

## 2023-10-23 ENCOUNTER — HOSPITAL ENCOUNTER (OUTPATIENT)
Dept: RADIOLOGY | Facility: HOSPITAL | Age: 83
Discharge: HOME | End: 2023-10-23
Payer: MEDICARE

## 2023-10-23 DIAGNOSIS — Z95.1 S/P CABG (CORONARY ARTERY BYPASS GRAFT): ICD-10-CM

## 2023-10-23 PROCEDURE — G0152 HHCP-SERV OF OT,EA 15 MIN: HCPCS | Mod: HHH

## 2023-10-23 PROCEDURE — 1090000001 HH PPS REVENUE CREDIT

## 2023-10-23 PROCEDURE — 71046 X-RAY EXAM CHEST 2 VIEWS: CPT | Performed by: STUDENT IN AN ORGANIZED HEALTH CARE EDUCATION/TRAINING PROGRAM

## 2023-10-23 PROCEDURE — 1090000002 HH PPS REVENUE DEBIT

## 2023-10-23 PROCEDURE — 71046 X-RAY EXAM CHEST 2 VIEWS: CPT

## 2023-10-24 ENCOUNTER — HOME CARE VISIT (OUTPATIENT)
Dept: HOME HEALTH SERVICES | Facility: HOME HEALTH | Age: 83
End: 2023-10-24
Payer: MEDICARE

## 2023-10-24 VITALS
OXYGEN SATURATION: 96 % | DIASTOLIC BLOOD PRESSURE: 67 MMHG | SYSTOLIC BLOOD PRESSURE: 118 MMHG | HEART RATE: 80 BPM | RESPIRATION RATE: 18 BRPM | TEMPERATURE: 97.8 F

## 2023-10-24 PROCEDURE — 1090000002 HH PPS REVENUE DEBIT

## 2023-10-24 PROCEDURE — 1090000001 HH PPS REVENUE CREDIT

## 2023-10-24 PROCEDURE — G0299 HHS/HOSPICE OF RN EA 15 MIN: HCPCS | Mod: HHH

## 2023-10-24 PROCEDURE — G0157 HHC PT ASSISTANT EA 15: HCPCS | Mod: HHH

## 2023-10-24 ASSESSMENT — ENCOUNTER SYMPTOMS
CHANGE IN APPETITE: UNCHANGED
DENIES PAIN: 1
PERSON REPORTING PAIN: PATIENT
PAIN: 1
PAIN LOCATION: RIGHT HIP
HIGHEST PAIN SEVERITY IN PAST 24 HOURS: 5/10
FATIGUE: 1
PERSON REPORTING PAIN: PATIENT
APPETITE LEVEL: FAIR
LAST BOWEL MOVEMENT: 66770
SUBJECTIVE PAIN PROGRESSION: WAXING AND WANING

## 2023-10-24 ASSESSMENT — ACTIVITIES OF DAILY LIVING (ADL)
PHYSICAL TRANSFERS ASSESSED: 1
CURRENT_FUNCTION: MODERATE ASSIST

## 2023-10-24 NOTE — HOME HEALTH
Patient and caregiver concerns discussed.  Patient stated increased fatigue after PT/OT visits, advised to rest as needed.  CV assessment completed, crackles in bilat lower lung fileds PAtient reported occasional productive cough of thickened sputum, advised to continue use of incentive spirometer.  Patient advised to continue PT/OT exercises, maintain oincreased protein intake.  Patient verbalized improved ability to stand from chair and stand from bed. No further needs at this visit.

## 2023-10-24 NOTE — ADDENDUM NOTE
Addendum  created 10/24/23 1126 by Henri Nolasco    Attestation recorded in Intraprocedure (Perfusion), Intraprocedure Attestations filed (Perfusion), Intraprocedure Event edited (Perfusion)

## 2023-10-25 ENCOUNTER — HOME CARE VISIT (OUTPATIENT)
Dept: HOME HEALTH SERVICES | Facility: HOME HEALTH | Age: 83
End: 2023-10-25
Payer: MEDICARE

## 2023-10-25 PROCEDURE — 1090000002 HH PPS REVENUE DEBIT

## 2023-10-25 PROCEDURE — G0152 HHCP-SERV OF OT,EA 15 MIN: HCPCS | Mod: HHH

## 2023-10-25 PROCEDURE — 1090000001 HH PPS REVENUE CREDIT

## 2023-10-25 SDOH — HEALTH STABILITY: PHYSICAL HEALTH: EXERCISE ACTIVITIES SETS: 2

## 2023-10-25 SDOH — HEALTH STABILITY: PHYSICAL HEALTH: EXERCISE ACTIVITY: COMPONENTS OF TRANSFERS

## 2023-10-25 SDOH — HEALTH STABILITY: PHYSICAL HEALTH: EXERCISE ACTIVITY: HRTR, MARCHING, LAQ, HIP ABD/ADD

## 2023-10-25 SDOH — HEALTH STABILITY: PHYSICAL HEALTH: EXERCISE TYPE: SEATED LE STRENGTHENING

## 2023-10-25 SDOH — HEALTH STABILITY: PHYSICAL HEALTH: PHYSICAL EXERCISE: 5

## 2023-10-25 SDOH — HEALTH STABILITY: PHYSICAL HEALTH: PHYSICAL EXERCISE: SEATED

## 2023-10-25 ASSESSMENT — PAIN SCALES - PAIN ASSESSMENT IN ADVANCED DEMENTIA (PAINAD)
BREATHING: 0
TOTALSCORE: 0
CONSOLABILITY: 0 - NO NEED TO CONSOLE.
BODYLANGUAGE: 0 - RELAXED.
BODYLANGUAGE: 0
NEGVOCALIZATION: 0
NEGVOCALIZATION: 0 - NONE.
FACIALEXPRESSION: 0 - SMILING OR INEXPRESSIVE.
CONSOLABILITY: 0
FACIALEXPRESSION: 0

## 2023-10-25 ASSESSMENT — ACTIVITIES OF DAILY LIVING (ADL)
AMBULATION ASSISTANCE: ONE PERSON
CURRENT_FUNCTION: ONE PERSON
PHYSICAL TRANSFERS ASSESSED: 1
CURRENT_FUNCTION: MINIMUM ASSIST
TOILETING: 1
AMBULATION ASSISTANCE ON FLAT SURFACES: 1
TOILETING: MODERATE ASSIST

## 2023-10-25 ASSESSMENT — ENCOUNTER SYMPTOMS
PAIN LOCATION: GENERALIZED
LIMITED RANGE OF MOTION: 1
PAIN: 1
SUBJECTIVE PAIN PROGRESSION: WAXING AND WANING
MUSCLE WEAKNESS: 1
HIGHEST PAIN SEVERITY IN PAST 24 HOURS: 4/10
PERSON REPORTING PAIN: PATIENT
PAIN SEVERITY GOAL: 0/10

## 2023-10-26 ENCOUNTER — HOME CARE VISIT (OUTPATIENT)
Dept: HOME HEALTH SERVICES | Facility: HOME HEALTH | Age: 83
End: 2023-10-26
Payer: MEDICARE

## 2023-10-26 VITALS
SYSTOLIC BLOOD PRESSURE: 110 MMHG | RESPIRATION RATE: 18 BRPM | OXYGEN SATURATION: 98 % | HEART RATE: 87 BPM | DIASTOLIC BLOOD PRESSURE: 64 MMHG | TEMPERATURE: 96.7 F

## 2023-10-26 PROBLEM — N64.4 BREAST PAIN: Status: ACTIVE | Noted: 2023-04-18

## 2023-10-26 PROBLEM — N18.4 STAGE 4 CHRONIC KIDNEY DISEASE (MULTI): Status: ACTIVE | Noted: 2019-10-01

## 2023-10-26 PROBLEM — E83.39 HYPERPHOSPHATEMIA: Status: ACTIVE | Noted: 2023-10-26

## 2023-10-26 PROBLEM — E11.9 DIABETES MELLITUS TYPE 2 WITHOUT RETINOPATHY (MULTI): Status: ACTIVE | Noted: 2021-07-01

## 2023-10-26 PROBLEM — N17.9 AKI (ACUTE KIDNEY INJURY) (CMS-HCC): Status: ACTIVE | Noted: 2022-10-13

## 2023-10-26 PROBLEM — I42.5 RESTRICTIVE CARDIOMYOPATHY (MULTI): Status: ACTIVE | Noted: 2023-10-26

## 2023-10-26 PROBLEM — D63.1 ANEMIA DUE TO STAGE 5 CHRONIC KIDNEY DISEASE, NOT ON CHRONIC DIALYSIS (MULTI): Status: ACTIVE | Noted: 2022-10-13

## 2023-10-26 PROBLEM — N25.89 OTHER DISORDERS RESULTING FROM IMPAIRED RENAL TUBULAR FUNCTION: Status: ACTIVE | Noted: 2023-03-14

## 2023-10-26 PROBLEM — R60.9 EDEMA: Status: ACTIVE | Noted: 2023-10-26

## 2023-10-26 PROBLEM — T82.49XA: Status: ACTIVE | Noted: 2023-04-04

## 2023-10-26 PROBLEM — L60.2 HYPERTROPHY OF NAIL: Status: ACTIVE | Noted: 2022-06-02

## 2023-10-26 PROBLEM — D68.9 COAGULATION DEFECT, UNSPECIFIED (MULTI): Status: ACTIVE | Noted: 2023-03-24

## 2023-10-26 PROBLEM — R29.898 RIGHT ARM WEAKNESS: Status: ACTIVE | Noted: 2018-04-17

## 2023-10-26 PROBLEM — E11.40 TYPE 2 DIABETES MELLITUS WITH DIABETIC NEUROPATHY, UNSPECIFIED (MULTI): Status: ACTIVE | Noted: 2023-03-14

## 2023-10-26 PROBLEM — Z99.2 ESRD ON PERITONEAL DIALYSIS (MULTI): Status: ACTIVE | Noted: 2023-06-26

## 2023-10-26 PROBLEM — J98.4 RESTRICTIVE LUNG DISEASE: Status: ACTIVE | Noted: 2018-09-12

## 2023-10-26 PROBLEM — N18.5 ANEMIA DUE TO STAGE 5 CHRONIC KIDNEY DISEASE, NOT ON CHRONIC DIALYSIS (MULTI): Status: ACTIVE | Noted: 2022-10-13

## 2023-10-26 PROBLEM — N18.6 ESRD ON PERITONEAL DIALYSIS (MULTI): Status: ACTIVE | Noted: 2023-06-26

## 2023-10-26 PROBLEM — H52.4 PRESBYOPIA: Status: ACTIVE | Noted: 2021-07-01

## 2023-10-26 PROBLEM — H52.11 MYOPIA OF RIGHT EYE: Status: ACTIVE | Noted: 2021-07-01

## 2023-10-26 PROBLEM — H25.813 COMBINED FORMS OF AGE-RELATED CATARACT OF BOTH EYES: Status: ACTIVE | Noted: 2023-09-12

## 2023-10-26 PROBLEM — T78.40XA ALLERGY, UNSPECIFIED, INITIAL ENCOUNTER: Status: ACTIVE | Noted: 2023-03-16

## 2023-10-26 PROBLEM — N25.81 SECONDARY HYPERPARATHYROIDISM OF RENAL ORIGIN (MULTI): Status: ACTIVE | Noted: 2023-04-18

## 2023-10-26 PROBLEM — H52.222 REGULAR ASTIGMATISM OF LEFT EYE: Status: ACTIVE | Noted: 2021-07-01

## 2023-10-26 PROBLEM — E66.9 CLASS 1 OBESITY: Status: ACTIVE | Noted: 2023-04-18

## 2023-10-26 PROBLEM — R79.89 HIGH THYROID STIMULATING HORMONE (TSH) LEVEL: Status: ACTIVE | Noted: 2019-01-16

## 2023-10-26 PROBLEM — R53.82 CHRONIC FATIGUE: Status: ACTIVE | Noted: 2022-10-13

## 2023-10-26 PROBLEM — G56.00 CARPAL TUNNEL SYNDROME: Status: ACTIVE | Noted: 2018-06-11

## 2023-10-26 PROBLEM — R31.29 MICROSCOPIC HEMATURIA: Status: ACTIVE | Noted: 2023-04-18

## 2023-10-26 PROBLEM — E03.8 SUBCLINICAL HYPOTHYROIDISM: Status: ACTIVE | Noted: 2019-05-06

## 2023-10-26 PROBLEM — I15.1 HYPERTENSION SECONDARY TO OTHER RENAL DISORDERS: Status: ACTIVE | Noted: 2023-03-14

## 2023-10-26 PROBLEM — G63 PERIPHERAL NEUROPATHY DUE TO METABOLIC DISORDER (MULTI): Status: ACTIVE | Noted: 2023-08-14

## 2023-10-26 PROBLEM — E88.9 PERIPHERAL NEUROPATHY DUE TO METABOLIC DISORDER (MULTI): Status: ACTIVE | Noted: 2023-08-14

## 2023-10-26 PROBLEM — E87.20 METABOLIC ACIDOSIS: Status: ACTIVE | Noted: 2023-10-26

## 2023-10-26 PROBLEM — J84.9 ILD (INTERSTITIAL LUNG DISEASE) (MULTI): Status: ACTIVE | Noted: 2020-03-24

## 2023-10-26 PROBLEM — R80.9 PROTEINURIA: Status: ACTIVE | Noted: 2023-10-26

## 2023-10-26 PROBLEM — E87.5 HYPERKALEMIA: Status: ACTIVE | Noted: 2023-10-26

## 2023-10-26 PROBLEM — I73.9 PERIPHERAL VASCULAR DISEASE (CMS-HCC): Status: ACTIVE | Noted: 2022-06-02

## 2023-10-26 PROBLEM — T78.2XXA ANAPHYLACTIC SHOCK, UNSPECIFIED, INITIAL ENCOUNTER: Status: ACTIVE | Noted: 2023-03-16

## 2023-10-26 LAB
BLOOD EXPIRATION DATE: NORMAL
DISPENSE STATUS: NORMAL
PRODUCT BLOOD TYPE: 9500
PRODUCT CODE: NORMAL
UNIT ABO: NORMAL
UNIT NUMBER: NORMAL
UNIT RH: NORMAL
UNIT VOLUME: 280
XM INTEP: NORMAL

## 2023-10-26 PROCEDURE — 1090000002 HH PPS REVENUE DEBIT

## 2023-10-26 PROCEDURE — 1090000001 HH PPS REVENUE CREDIT

## 2023-10-26 PROCEDURE — G0300 HHS/HOSPICE OF LPN EA 15 MIN: HCPCS | Mod: HHH

## 2023-10-26 RX ORDER — ERGOCALCIFEROL (VITAMIN D2) 10 MCG
1 TABLET ORAL DAILY
COMMUNITY
End: 2023-11-29

## 2023-10-26 RX ORDER — LORATADINE 10 MG/1
10 TABLET ORAL DAILY
COMMUNITY
End: 2023-10-27 | Stop reason: SDUPTHER

## 2023-10-26 RX ORDER — CHLORPHENIRAMINE MALEATE 4 MG
4 TABLET ORAL DAILY PRN
COMMUNITY
End: 2023-11-29

## 2023-10-26 RX ORDER — AMLODIPINE BESYLATE 10 MG/1
10 TABLET ORAL
COMMUNITY
Start: 2019-11-20 | End: 2023-10-27 | Stop reason: ALTCHOICE

## 2023-10-26 RX ORDER — FERROUS SULFATE 325(65) MG
TABLET ORAL DAILY
COMMUNITY
Start: 2015-10-10 | End: 2023-10-27 | Stop reason: ALTCHOICE

## 2023-10-26 RX ORDER — HYDRALAZINE HYDROCHLORIDE 25 MG/1
25 TABLET, FILM COATED ORAL 3 TIMES DAILY
COMMUNITY
Start: 2022-09-16 | End: 2023-11-29

## 2023-10-26 RX ORDER — PEN NEEDLE, DIABETIC 29 G X1/2"
1 NEEDLE, DISPOSABLE MISCELLANEOUS 2 TIMES DAILY
COMMUNITY
Start: 2022-10-05

## 2023-10-26 RX ORDER — B,C/FOLIC/ZINC/SELENOMETH/D3/E 0.8-12.5MG
1 TABLET ORAL DAILY
COMMUNITY
Start: 2023-06-27

## 2023-10-26 RX ORDER — BLOOD SUGAR DIAGNOSTIC
STRIP MISCELLANEOUS
COMMUNITY
Start: 2019-11-20

## 2023-10-26 RX ORDER — FLUTICASONE PROPIONATE 50 MCG
SPRAY, SUSPENSION (ML) NASAL DAILY PRN
COMMUNITY
Start: 2022-10-14

## 2023-10-26 RX ORDER — FUROSEMIDE 40 MG/1
40 TABLET ORAL DAILY
COMMUNITY
Start: 2021-09-20 | End: 2023-11-29

## 2023-10-26 RX ORDER — HYDROCORTISONE ACETATE 0.5 %
CREAM (GRAM) TOPICAL
COMMUNITY
Start: 2018-04-27 | End: 2024-03-15 | Stop reason: ENTERED-IN-ERROR

## 2023-10-26 RX ORDER — CALCITRIOL 0.5 UG/1
1 CAPSULE ORAL DAILY
COMMUNITY
End: 2023-11-29

## 2023-10-26 RX ORDER — MULTIVITAMIN WITH MINERALS
1 TABLET ORAL DAILY
COMMUNITY

## 2023-10-26 RX ORDER — VITAMIN E MIXED 400 UNIT
400 CAPSULE ORAL DAILY
COMMUNITY
End: 2023-11-29

## 2023-10-26 SDOH — ECONOMIC STABILITY: FOOD INSECURITY: SNACKS PER DAY: 3

## 2023-10-26 SDOH — ECONOMIC STABILITY: FOOD INSECURITY: MEALS PER DAY: 2

## 2023-10-26 ASSESSMENT — ENCOUNTER SYMPTOMS
CONSTIPATION: 1
DENIES PAIN: 1
LIMITED RANGE OF MOTION: 1
DYSPNEA ACTIVITY LEVEL: AFTER AMBULATING LESS THAN 10 FT
MUSCLE WEAKNESS: 1
STOOL FREQUENCY: LESS THAN DAILY
APPETITE LEVEL: POOR
LAST BOWEL MOVEMENT: 66773
CHANGE IN APPETITE: VARYING
SHORTNESS OF BREATH: 1
LOSS OF SENSATION IN FEET: 1
DEPRESSION: 0
OCCASIONAL FEELINGS OF UNSTEADINESS: 1
PERSON REPORTING PAIN: PATIENT

## 2023-10-26 ASSESSMENT — ACTIVITIES OF DAILY LIVING (ADL)
TOILETING: STAND BY ASSIST
TOILETING EQUIPMENT USED: GRAB BARS
BATHING_CURRENT_FUNCTION: STAND BY ASSIST
AMBULATION ASSISTANCE: 1
BATHING ASSESSED: 1
DRESSING_UB_CURRENT_FUNCTION: MINIMUM ASSIST
BATHING EQUIPMENT USED: SHOWER CHAIR
AMBULATION ASSISTANCE: STAND BY ASSIST
DRESSING_LB_CURRENT_FUNCTION: MINIMUM ASSIST
TOILETING: 1

## 2023-10-26 NOTE — HOME HEALTH
Patient was seen for routing nursing visit. Patient was very week upon arrival no energy the past few days. Patient stated that he had vomitted prior this moring. Large bowel movement (patient was contipated past 3 days). Low appetite, food didn't taste normal. Tested patient for COVID, result was negative. Instructed that patient get plenty of rest and drink plenty of fluids. If symptoms worsen or don't get better over the next few days, instructed wife and patient to contact physician or go to hospital. May test again if needed. Patient did get vaccines on monday.

## 2023-10-27 ENCOUNTER — CLINICAL SUPPORT (OUTPATIENT)
Dept: CARDIAC SURGERY | Facility: CLINIC | Age: 83
End: 2023-10-27
Payer: MEDICARE

## 2023-10-27 VITALS
OXYGEN SATURATION: 98 % | TEMPERATURE: 96.8 F | HEIGHT: 68 IN | BODY MASS INDEX: 25.84 KG/M2 | DIASTOLIC BLOOD PRESSURE: 58 MMHG | SYSTOLIC BLOOD PRESSURE: 114 MMHG | WEIGHT: 170.5 LBS | HEART RATE: 81 BPM

## 2023-10-27 DIAGNOSIS — Z95.1 S/P CABG (CORONARY ARTERY BYPASS GRAFT): ICD-10-CM

## 2023-10-27 PROCEDURE — 1090000002 HH PPS REVENUE DEBIT

## 2023-10-27 PROCEDURE — 1090000001 HH PPS REVENUE CREDIT

## 2023-10-27 NOTE — PROGRESS NOTES
This 83 year old status post MID CABG per Dr Eileen Neal at [facility] on 10/11/23  Patient discharged from hospital on 10/17/23. Patient seen in office as a nurse post operative visit. He had a lengthy hospital stay which started at [facility] on 9/30/23 and he was then transferred to [facility] for surgery. He comes with his spouse to this appointment today very weak and in a wheelchair. Left breast incision is well approximated with no redness or drainage noted. Lungs are clear bilaterally. He has chronic complaints of pain in his neck and is wearing a neck brace to his appointment today. All medications reviewed and all questions answered. He has some bilateral ankle edema of the lower extremities and pedal pulses are palpable bilaterally. He has been walking with physical therapy at his house but can only tolerate very small distances.  Heart sounds are normal. Bowel sounds are active with patient reporting normal bowel movements. His appetite has been poor and he is having dysphagia which he is following up with a ENT. He is diabetic and his blood sugars have been ranging from . He is also a ESRD patient and does peritoneal dialysis at home.Instructed patient to follow up with His cardiologist Dr Hudson and he has already seen his primary care physician. He has a follow up with Dr Eileen Neal with a chest x-ray prior to his visit. Patient instructed to call with any questions or concerns.

## 2023-10-28 PROCEDURE — 1090000002 HH PPS REVENUE DEBIT

## 2023-10-28 PROCEDURE — 1090000001 HH PPS REVENUE CREDIT

## 2023-10-29 PROCEDURE — 1090000002 HH PPS REVENUE DEBIT

## 2023-10-29 PROCEDURE — 1090000001 HH PPS REVENUE CREDIT

## 2023-10-30 ENCOUNTER — HOME CARE VISIT (OUTPATIENT)
Dept: HOME HEALTH SERVICES | Facility: HOME HEALTH | Age: 83
End: 2023-10-30
Payer: MEDICARE

## 2023-10-30 PROCEDURE — 1090000002 HH PPS REVENUE DEBIT

## 2023-10-30 PROCEDURE — 1090000001 HH PPS REVENUE CREDIT

## 2023-10-30 PROCEDURE — G0152 HHCP-SERV OF OT,EA 15 MIN: HCPCS | Mod: HHH

## 2023-10-30 ASSESSMENT — ACTIVITIES OF DAILY LIVING (ADL)
PHYSICAL TRANSFERS ASSESSED: 1
CURRENT_FUNCTION: CONTACT GUARD ASSIST

## 2023-10-30 ASSESSMENT — ENCOUNTER SYMPTOMS
PERSON REPORTING PAIN: PATIENT
PAIN: 1

## 2023-10-31 ENCOUNTER — HOME CARE VISIT (OUTPATIENT)
Dept: HOME HEALTH SERVICES | Facility: HOME HEALTH | Age: 83
End: 2023-10-31
Payer: MEDICARE

## 2023-10-31 VITALS
DIASTOLIC BLOOD PRESSURE: 64 MMHG | HEART RATE: 79 BPM | TEMPERATURE: 95.4 F | OXYGEN SATURATION: 98 % | SYSTOLIC BLOOD PRESSURE: 122 MMHG | RESPIRATION RATE: 18 BRPM

## 2023-10-31 PROCEDURE — 1090000002 HH PPS REVENUE DEBIT

## 2023-10-31 PROCEDURE — 1090000001 HH PPS REVENUE CREDIT

## 2023-10-31 PROCEDURE — G0300 HHS/HOSPICE OF LPN EA 15 MIN: HCPCS | Mod: HHH

## 2023-10-31 PROCEDURE — G0157 HHC PT ASSISTANT EA 15: HCPCS | Mod: HHH

## 2023-10-31 SDOH — ECONOMIC STABILITY: GENERAL

## 2023-10-31 ASSESSMENT — ENCOUNTER SYMPTOMS
DEPRESSION: 0
PERSON REPORTING PAIN: PATIENT
LOSS OF SENSATION IN FEET: 1
OCCASIONAL FEELINGS OF UNSTEADINESS: 1
PERSON REPORTING PAIN: PATIENT
SUBJECTIVE PAIN PROGRESSION: UNCHANGED
APPETITE LEVEL: POOR
DENIES PAIN: 1
LIMITED RANGE OF MOTION: 1
STOOL FREQUENCY: DAILY
LAST BOWEL MOVEMENT: 66778
CHANGE IN APPETITE: DECREASED
PAIN: 1
BOWEL PATTERN NORMAL: 1
MUSCLE WEAKNESS: 1

## 2023-10-31 ASSESSMENT — ACTIVITIES OF DAILY LIVING (ADL)
BATHING_CURRENT_FUNCTION: SUPERVISION
AMBULATION ASSISTANCE: ONE PERSON
DRESSING_LB_CURRENT_FUNCTION: SUPERVISION
AMBULATION ASSISTANCE ON FLAT SURFACES: 1
DRESSING_UB_CURRENT_FUNCTION: SUPERVISION
TOILETING: SUPERVISION
MONEY MANAGEMENT (EXPENSES/BILLS): INDEPENDENT
TOILETING: STAND BY ASSIST
BATHING EQUIPMENT USED: SHOWER CHAIR
AMBULATION ASSISTANCE: 1
CURRENT_FUNCTION: ONE PERSON
BATHING_CURRENT_FUNCTION: STAND BY ASSIST
AMBULATION_DISTANCE/DURATION_TOLERATED: 50 FT X2
AMBULATION ASSISTANCE: STAND BY ASSIST
TOILETING EQUIPMENT USED: RAISED TOILET SEAT
TOILETING: 1
AMBULATION ASSISTANCE: SUPERVISION
BATHING ASSESSED: 1

## 2023-11-01 ENCOUNTER — OFFICE VISIT (OUTPATIENT)
Dept: CARDIOLOGY | Facility: CLINIC | Age: 83
End: 2023-11-01
Payer: MEDICARE

## 2023-11-01 ENCOUNTER — HOSPITAL ENCOUNTER (OUTPATIENT)
Dept: RADIOLOGY | Facility: HOSPITAL | Age: 83
Discharge: HOME | End: 2023-11-01
Payer: MEDICARE

## 2023-11-01 VITALS — OXYGEN SATURATION: 96 % | SYSTOLIC BLOOD PRESSURE: 104 MMHG | HEART RATE: 86 BPM | DIASTOLIC BLOOD PRESSURE: 66 MMHG

## 2023-11-01 DIAGNOSIS — R54 FRAILTY: ICD-10-CM

## 2023-11-01 DIAGNOSIS — Z95.1 S/P CABG (CORONARY ARTERY BYPASS GRAFT): ICD-10-CM

## 2023-11-01 DIAGNOSIS — I50.32 CHRONIC HEART FAILURE WITH PRESERVED EJECTION FRACTION (MULTI): ICD-10-CM

## 2023-11-01 DIAGNOSIS — I48.0 PAROXYSMAL ATRIAL FIBRILLATION (MULTI): Primary | ICD-10-CM

## 2023-11-01 DIAGNOSIS — I25.10 CORONARY ARTERY DISEASE INVOLVING NATIVE CORONARY ARTERY OF NATIVE HEART WITHOUT ANGINA PECTORIS: ICD-10-CM

## 2023-11-01 PROCEDURE — 1090000002 HH PPS REVENUE DEBIT

## 2023-11-01 PROCEDURE — 1159F MED LIST DOCD IN RCRD: CPT | Performed by: INTERNAL MEDICINE

## 2023-11-01 PROCEDURE — 99215 OFFICE O/P EST HI 40 MIN: CPT | Performed by: INTERNAL MEDICINE

## 2023-11-01 PROCEDURE — 1036F TOBACCO NON-USER: CPT | Performed by: INTERNAL MEDICINE

## 2023-11-01 PROCEDURE — 1090000001 HH PPS REVENUE CREDIT

## 2023-11-01 PROCEDURE — 71046 X-RAY EXAM CHEST 2 VIEWS: CPT | Mod: FY

## 2023-11-01 PROCEDURE — 1126F AMNT PAIN NOTED NONE PRSNT: CPT | Performed by: INTERNAL MEDICINE

## 2023-11-01 PROCEDURE — 3074F SYST BP LT 130 MM HG: CPT | Performed by: INTERNAL MEDICINE

## 2023-11-01 PROCEDURE — 99215 OFFICE O/P EST HI 40 MIN: CPT | Mod: PO | Performed by: INTERNAL MEDICINE

## 2023-11-01 PROCEDURE — 1111F DSCHRG MED/CURRENT MED MERGE: CPT | Performed by: INTERNAL MEDICINE

## 2023-11-01 PROCEDURE — 3078F DIAST BP <80 MM HG: CPT | Performed by: INTERNAL MEDICINE

## 2023-11-01 PROCEDURE — 71046 X-RAY EXAM CHEST 2 VIEWS: CPT | Performed by: RADIOLOGY

## 2023-11-01 ASSESSMENT — PAIN SCALES - GENERAL: PAINLEVEL: 0-NO PAIN

## 2023-11-01 ASSESSMENT — LIFESTYLE VARIABLES
HOW OFTEN DO YOU HAVE SIX OR MORE DRINKS ON ONE OCCASION: NEVER
SKIP TO QUESTIONS 9-10: 1
AUDIT-C TOTAL SCORE: 0
HOW OFTEN DO YOU HAVE A DRINK CONTAINING ALCOHOL: NEVER
HOW MANY STANDARD DRINKS CONTAINING ALCOHOL DO YOU HAVE ON A TYPICAL DAY: PATIENT DOES NOT DRINK

## 2023-11-01 ASSESSMENT — ENCOUNTER SYMPTOMS: OCCASIONAL FEELINGS OF UNSTEADINESS: 1

## 2023-11-02 ENCOUNTER — HOME CARE VISIT (OUTPATIENT)
Dept: HOME HEALTH SERVICES | Facility: HOME HEALTH | Age: 83
End: 2023-11-02
Payer: MEDICARE

## 2023-11-02 VITALS
HEART RATE: 80 BPM | WEIGHT: 169 LBS | DIASTOLIC BLOOD PRESSURE: 64 MMHG | OXYGEN SATURATION: 98 % | BODY MASS INDEX: 25.7 KG/M2 | RESPIRATION RATE: 18 BRPM | TEMPERATURE: 96.7 F | SYSTOLIC BLOOD PRESSURE: 122 MMHG

## 2023-11-02 DIAGNOSIS — I25.10 CORONARY ARTERY DISEASE, UNSPECIFIED VESSEL OR LESION TYPE, UNSPECIFIED WHETHER ANGINA PRESENT, UNSPECIFIED WHETHER NATIVE OR TRANSPLANTED HEART: ICD-10-CM

## 2023-11-02 PROCEDURE — G0157 HHC PT ASSISTANT EA 15: HCPCS | Mod: HHH

## 2023-11-02 PROCEDURE — G0299 HHS/HOSPICE OF RN EA 15 MIN: HCPCS | Mod: HHH

## 2023-11-02 PROCEDURE — 1090000002 HH PPS REVENUE DEBIT

## 2023-11-02 PROCEDURE — 1090000001 HH PPS REVENUE CREDIT

## 2023-11-02 SDOH — HEALTH STABILITY: PHYSICAL HEALTH: EXERCISE ACTIVITIES SETS: 2

## 2023-11-02 SDOH — HEALTH STABILITY: PHYSICAL HEALTH: PHYSICAL EXERCISE: 5

## 2023-11-02 SDOH — HEALTH STABILITY: PHYSICAL HEALTH: PHYSICAL EXERCISE: STANDING

## 2023-11-02 SDOH — HEALTH STABILITY: PHYSICAL HEALTH: EXERCISE TYPE: STANDING LE STRENGTHENING

## 2023-11-02 SDOH — HEALTH STABILITY: PHYSICAL HEALTH: EXERCISE ACTIVITY: HR, MARCHING, HIP ABD, EXT, HAM CURLS

## 2023-11-02 ASSESSMENT — ENCOUNTER SYMPTOMS
LIMITED RANGE OF MOTION: 1
MUSCLE WEAKNESS: 1
SUBJECTIVE PAIN PROGRESSION: UNCHANGED
BOWEL PATTERN NORMAL: 1
DENIES PAIN: 1
MUSCLE WEAKNESS: 1
VOMITING: 1
PERSON REPORTING PAIN: PATIENT
LAST BOWEL MOVEMENT: 66780
LOWER EXTREMITY EDEMA: 1
DYSPNEA ACTIVITY LEVEL: AFTER AMBULATING MORE THAN 20 FT
SHORTNESS OF BREATH: 1
PAIN: 1
STOOL FREQUENCY: LESS THAN DAILY

## 2023-11-02 ASSESSMENT — ACTIVITIES OF DAILY LIVING (ADL)
PHYSICAL TRANSFERS ASSESSED: 1
AMBULATION ASSISTANCE: ONE PERSON
AMBULATION_DISTANCE/DURATION_TOLERATED: 40FT X2
AMBULATION ASSISTANCE ON FLAT SURFACES: 1
AMBULATION ASSISTANCE: ONE PERSON
CURRENT_FUNCTION: ONE PERSON
CURRENT_FUNCTION: ONE PERSON
AMBULATION ASSISTANCE: 1

## 2023-11-02 NOTE — PROGRESS NOTES
C C : CAD      HPI:  83-year-old gentleman with past medical history of insulin-dependent diabetes, ESRD on peritoneal dialysis, hypertension, dyslipidemia and paroxysmal atrial fibrillation (short-lived in the setting of MI, not on any anticoagulation).    The patient presented to outside hospital last month with NSTEMI.  Left heart cath revealed a left dominant system with extensive CAD in LAD and in a dominant left circumflex.  The patient had preserved ejection fraction.  Multidisciplinary team evaluated the patient and decision was made for hybrid approach for coronary revascularization where patient underwent MIDCAB with LIMA to LAD on 10/11/2023, subsequently the patient underwent PCI attempt of dominant left circumflex artery on 10/16/2023.  PTCA was performed of the vessel but lesion was too calcified and would not yield to balloon. IVL balloon could not be delivered and a decision was made not to advance to rotational atherectomy given duration of procedural attempt at that point and patient's frailty.      His most recent echo continued to show a normal EF with mild to moderate pericardial effusion in the postop setting.  The patient was subsequently discharged home after relatively long hospital stay.    The patient comes to see us in the clinic.  He reports significant fatigue, weakness, lack of motivation.  He is working with physical therapy but overall his activity level has been limited.  He does report dyspnea on exertion but denies any chest pain.  He is still on dual antiplatelet therapy which he is tolerating without any issues.    The patient is also experiencing dysphagia for which she will see ENT.  He is able to swallow his medications without issue.  The patient has history of remote C-spine surgery and was seen with a neck collar in the clinic.    ROS:  Constitutional: Fatigue, decreased motivation  Eyes: no eyesight problems, no blurred vision, no diplopia, no eye pain, no purulent  discharge from the eyes, eyes not red, no dryness of the eyes and no itching of the eyes.   ENT: no nosebleeds, no hearing loss, no tinnitus, no earache, no sore throat, no hoarseness, no swollen glands in the neck and no nasal discharge.   Cardiovascular: Shortness of breath with exertion, leg swelling, no chest pain.   Respiratory: Shortness of breath  Gastrointestinal: no change in bowel habits, no blood in stools, no diarrhea, no constipation, no nausea, no vomiting, no abdominal pain, no signs and symptoms of ulcer disease, no blane colored stools and no intolerance to fatty foods.   Genitourinary: no hematuria,  no urinary frequency, no dysuria, no incontinence, no burning sensation during urination, no urinary hesitancy, no nocturia, no genital lesion, no testicular pain, urinary stream is not smaller and urinary stream does not start and stop.   Musculoskeletal: no arthralgias, no myalgias, no joint swelling, no joint stiffness, no muscle weakness, no back pain, no limb pain, no limb swelling and no difficulty walking.   Skin: no skin rashes, no change in skin color and pigmentation, no skin lesions and no skin lumps.   Neurological: no seizures, no frequent falls, no headaches, no dizziness, no tingling  Endocrine: no goiter, no thyroid disorder, no diabetes mellitus, no excessive thirst, no dry skin, no cold intolerance, no heat intolerance, no erectile dysfunction, no increased urinary frequency, no proptosis and no deepening of the voice.   Hematologic/Lymphatic: no bleeding issues.   All other systems have been reviewed and are negative for complaint.     Physical Exam:     Visit Vitals  /66 (BP Location: Left arm, Patient Position: Sitting, BP Cuff Size: Large adult)   Pulse 86   SpO2 96%   Smoking Status Never        Constitutional: alert and in no acute distress.   Eyes: no erythema, swelling or discharge from the eye .   Ears, Nose, Mouth, and Throat: external inspection of ears and nose is  normal , lips, teeth, and gums are normal with good dentition  and oropharynx normal with no erythema, edema, exudate or lesions .   Neck: neck is supple, symmetric, trachea midline, no masses  and no thyromegaly .   Pulmonary: Crackles bilateral bases  Cardiovascular: RRR, no murmur, 2+ leg edema  Abdomen: abdomen non-tender, no masses  and no hepatomegaly .           Skin:  no skin lesions          Neurologic: non-focal neurologic examination.      Psychiatric judgment and insight is normal , oriented to person, place and time , normal mood and affect .       Labs:    Reviewed      Medications:    Current Outpatient Medications   Medication Instructions    acetaminophen (TYLENOL) 650 mg, oral, Every 6 hours PRN    aspirin 81 mg, oral, Daily    atorvastatin (LIPITOR) 40 mg, oral, Nightly    b complex vitamins capsule 1 capsule, oral, Daily    calcitriol (Rocaltrol) 0.5 mcg capsule 1 capsule, oral, Daily    chlorpheniramine (CHLOR-TRIMETON) 4 mg, oral, Daily PRN    cholecalciferol (VITAMIN D-3) 10 mcg (400 unit) tablet 1 tablet, oral, Daily    cholecalciferol (VITAMIN D-3) 25 mcg, oral, Daily    clopidogrel (PLAVIX) 75 mg, oral, Daily    DULoxetine (CYMBALTA) 30 mg, oral, Daily, Do not crush or chew.    flash glucose sensor (FREESTYLE ANDRÉS 2 SENSOR MISC)  Change every 2 weeks    fluticasone (Flonase) 50 mcg/actuation nasal spray Daily PRN    fsh/flx/prim/cur/bor/om3,6,9 5 (OMEGA 3-6-9 FATTY ACIDS ORAL) 1 capsule, oral, Daily    furosemide (LASIX) 40 mg, oral, Daily    glucosam aguirre dip-chondroit-C-Mn 853-729-84-3 mg capsule GLUCOSAMINE 1500 COMPLEX CAPS    hydrALAZINE (APRESOLINE) 25 mg, oral, 3 times daily    iron polysaccharides (NU-IRON,NIFEREX) 150 mg, oral, Daily    LANCETS MISC 1 each, Daily    Levemir U-100 Insulin 25 Units, subcutaneous, Daily, Take as directed per insulin instructions.    levothyroxine (SYNTHROID, LEVOXYL) 25 mcg, oral, Daily before breakfast    loratadine-pseudoephedrine (Claritin-D  "24-hour)  mg 24 hr tablet 1 tablet, oral, Daily PRN, Do not crush, chew, or split.    methoxy peg-epoetin beta (EPOETIN BETA, METHOXY PEG INJ) 30 mcg, subcutaneous    metoprolol succinate XL (TOPROL-XL) 50 mg, oral, Daily, Do not crush or chew.    min17/nettle/pumpkin/saw palme (PROSTATE THERAPY ORAL) oral    multivitamin tablet 1 tablet, oral, Daily    multivitamin with minerals (Daily Multivitamin-Minerals) tablet 1 tablet, oral, Daily    NON FORMULARY 1 capsule, oral, Daily RT,  Saw Palmetto-[place]umpkin Seed-Zinc 160-40-7.5 MG capsule    OneTouch Ultra Test strip  TEST THREE TIMES DAILY.    pen needle, diabetic 31 gauge x 1/4\" needle 1 each, 2 times daily    RenaPlex-D 800 mcg-12.5 mg -2,000 unit tablet 1 tablet, oral, Daily    sennosides (SENOKOT) 17.2 mg, oral, 2 times daily    sennosides-docusate sodium (Diana-Colace) 8.6-50 mg tablet 2 tablets, oral, 2 times daily, Now not taking     sodium bicarbonate 650 mg, oral, 2 times daily    torsemide (DEMADEX) 50 mg, oral, Daily    Trulicity 1.5 mg, subcutaneous, Weekly, Tuesdays    vitamin E 400 Units, oral, Daily    vitamin-B complex split tablet oral          Assessment:    83-year-old gentleman with past medical history of coronary artery disease with hybrid revascularization (MIDCAB LIMA to LAD 10/11/2023, PTCA to dominant circumflex 10/16/2023 [aborted without stenting because of unyielding calcified lesion]), preserved ejection fraction, mild to moderate postop pericardial effusion, insulin-dependent diabetes, ESRD on peritoneal dialysis, hypertension, dyslipidemia and paroxysmal atrial fibrillation (brief single episode in the setting of MI, not on any anticoagulation).    1.  CAD  Status post hybrid vascularization (MIDCAB LIMA to LAD 10/11/2023, PTCA to dominant circumflex 10/16/2023 [aborted without stenting because of unyielding calcified lesion]).  The patient has no chest pain.  Continue medical management of his circumflex disease.  We discussed with " patient and family in detail regarding potential option of rotational atherectomy in case the symptoms recur and the risks involved with that.  They agree with the option of medical management for now.    2.  Heart failure with preserved ejection fraction (HFpEF)  Patient has evidence of fluid overload.  He is on peritoneal dialysis and high-dose of diuretics.  The patient does make decent amount of urine.  He is a well-established nephrologist.  We will defer optimization of dialysate and diuretics by nephrology team.  We will reach out to them regarding this issue.    3.  Fatigue and lack of motivation  Patient reports fatigue and lack of motivation,.  Wife has noted personality change since the bypass as well.  They were concerned about metoprolol contributing to symptoms.  We advised my metoprolol XL to be changed to night time dose.    4.  Mild to moderate pericardial effusion  Patient has pericardial effusion in the postop setting.  This will be followed up on future echocardiography.    5.  Paroxysmal atrial fibrillation  Patient had short-lived episode of paroxysmal atrial fibrillation in the setting of MI.  The episode did not recur during the rest of his hospital stay.  In the postop setting patient has been maintained on DAPT without any anticoagulation.  We will consider a heart monitor in the future. May consider WATCHMAN due to frailty and fall risk.       Currently-shared decision has been made not to proceed with revascularization of his circumflex.  Continue DAPT, statin and beta-blocker.  At this time patient needs to be established with general cardiology team.  We will refer patient to [facility] heart (Dr Harmon).  If in the future decision is made to address his CAD from interventional standpoint, the patient is advised to visit the office again.

## 2023-11-02 NOTE — HOME HEALTH
Routine nursing visit completed.  No new c/o pain.  Pt c/o vomiting, no nausea prior to vomiting, pt states has every few days. VSS. Had surgeon appt yesterday, now to f/u with cardiologist at [facility], Dr. Roman recommended.  Pt taking meds well, eating better everyday.  Pt feels he is improving daily but it is a slow path. PT/OT still seeing.  Information given on respite care as pt's wife hurt her back. No further needs at this visit.

## 2023-11-03 ENCOUNTER — HOME CARE VISIT (OUTPATIENT)
Dept: HOME HEALTH SERVICES | Facility: HOME HEALTH | Age: 83
End: 2023-11-03
Payer: MEDICARE

## 2023-11-03 PROCEDURE — G0152 HHCP-SERV OF OT,EA 15 MIN: HCPCS | Mod: HHH

## 2023-11-03 PROCEDURE — 1090000002 HH PPS REVENUE DEBIT

## 2023-11-03 PROCEDURE — 1090000001 HH PPS REVENUE CREDIT

## 2023-11-03 ASSESSMENT — ENCOUNTER SYMPTOMS
DENIES PAIN: 1
PERSON REPORTING PAIN: PATIENT

## 2023-11-04 PROCEDURE — 1090000001 HH PPS REVENUE CREDIT

## 2023-11-04 PROCEDURE — 1090000002 HH PPS REVENUE DEBIT

## 2023-11-05 PROCEDURE — 1090000002 HH PPS REVENUE DEBIT

## 2023-11-05 PROCEDURE — 1090000001 HH PPS REVENUE CREDIT

## 2023-11-06 ENCOUNTER — HOME CARE VISIT (OUTPATIENT)
Dept: HOME HEALTH SERVICES | Facility: HOME HEALTH | Age: 83
End: 2023-11-06
Payer: MEDICARE

## 2023-11-06 PROCEDURE — G0152 HHCP-SERV OF OT,EA 15 MIN: HCPCS | Mod: HHH

## 2023-11-06 PROCEDURE — 1090000001 HH PPS REVENUE CREDIT

## 2023-11-06 PROCEDURE — 1090000002 HH PPS REVENUE DEBIT

## 2023-11-06 ASSESSMENT — ENCOUNTER SYMPTOMS
DENIES PAIN: 1
PERSON REPORTING PAIN: PATIENT

## 2023-11-06 ASSESSMENT — ACTIVITIES OF DAILY LIVING (ADL)
PHYSICAL TRANSFERS ASSESSED: 1
CURRENT_FUNCTION: SUPERVISION

## 2023-11-07 ENCOUNTER — HOME CARE VISIT (OUTPATIENT)
Dept: HOME HEALTH SERVICES | Facility: HOME HEALTH | Age: 83
End: 2023-11-07
Payer: MEDICARE

## 2023-11-07 PROCEDURE — G0157 HHC PT ASSISTANT EA 15: HCPCS | Mod: HHH

## 2023-11-07 PROCEDURE — 1090000001 HH PPS REVENUE CREDIT

## 2023-11-07 PROCEDURE — 1090000002 HH PPS REVENUE DEBIT

## 2023-11-08 ENCOUNTER — HOME CARE VISIT (OUTPATIENT)
Dept: HOME HEALTH SERVICES | Facility: HOME HEALTH | Age: 83
End: 2023-11-08
Payer: MEDICARE

## 2023-11-08 PROCEDURE — 1090000001 HH PPS REVENUE CREDIT

## 2023-11-08 PROCEDURE — 1090000002 HH PPS REVENUE DEBIT

## 2023-11-08 PROCEDURE — G0152 HHCP-SERV OF OT,EA 15 MIN: HCPCS | Mod: HHH

## 2023-11-08 SDOH — ECONOMIC STABILITY: HOUSING INSECURITY: HOME SAFETY: PT SON INSTALLED GRAB BAR IN BATHROOM

## 2023-11-08 ASSESSMENT — ACTIVITIES OF DAILY LIVING (ADL)
TOILETING: 1
TOILETING: CONTACT GUARD ASSIST

## 2023-11-08 ASSESSMENT — ENCOUNTER SYMPTOMS: DENIES PAIN: 1

## 2023-11-09 ENCOUNTER — APPOINTMENT (OUTPATIENT)
Dept: CARDIAC SURGERY | Facility: CLINIC | Age: 83
End: 2023-11-09
Payer: MEDICARE

## 2023-11-09 PROCEDURE — 1090000002 HH PPS REVENUE DEBIT

## 2023-11-09 PROCEDURE — 1090000001 HH PPS REVENUE CREDIT

## 2023-11-09 SDOH — HEALTH STABILITY: PHYSICAL HEALTH: EXERCISE ACTIVITY: HR, MARCHING, HIP ABD, EXT, HAM CURLS, SHALLOW SQUATS

## 2023-11-09 SDOH — HEALTH STABILITY: PHYSICAL HEALTH: PHYSICAL EXERCISE: 10

## 2023-11-09 SDOH — HEALTH STABILITY: PHYSICAL HEALTH: EXERCISE ACTIVITIES SETS: 1

## 2023-11-09 SDOH — HEALTH STABILITY: PHYSICAL HEALTH: EXERCISE TYPE: STANDING LE STRENGTHENING

## 2023-11-09 ASSESSMENT — ACTIVITIES OF DAILY LIVING (ADL)
AMBULATION ASSISTANCE: 1
AMBULATION ASSISTANCE ON FLAT SURFACES: 1
CURRENT_FUNCTION: STAND BY ASSIST
AMBULATION ASSISTANCE: STAND BY ASSIST
PHYSICAL TRANSFERS ASSESSED: 1

## 2023-11-09 ASSESSMENT — ENCOUNTER SYMPTOMS
PAIN: 1
PERSON REPORTING PAIN: PATIENT
MUSCLE WEAKNESS: 1
PAIN LOCATION - PAIN SEVERITY: 4/10
SUBJECTIVE PAIN PROGRESSION: WAXING AND WANING
PAIN LOCATION: NECK

## 2023-11-10 ENCOUNTER — HOME CARE VISIT (OUTPATIENT)
Dept: HOME HEALTH SERVICES | Facility: HOME HEALTH | Age: 83
End: 2023-11-10
Payer: MEDICARE

## 2023-11-10 VITALS
DIASTOLIC BLOOD PRESSURE: 68 MMHG | TEMPERATURE: 97.3 F | OXYGEN SATURATION: 96 % | SYSTOLIC BLOOD PRESSURE: 132 MMHG | HEART RATE: 84 BPM

## 2023-11-10 PROCEDURE — 1090000001 HH PPS REVENUE CREDIT

## 2023-11-10 PROCEDURE — 1090000002 HH PPS REVENUE DEBIT

## 2023-11-10 PROCEDURE — G0299 HHS/HOSPICE OF RN EA 15 MIN: HCPCS | Mod: HHH

## 2023-11-10 PROCEDURE — G0157 HHC PT ASSISTANT EA 15: HCPCS | Mod: HHH

## 2023-11-10 ASSESSMENT — ACTIVITIES OF DAILY LIVING (ADL)
AMBULATION ASSISTANCE ON FLAT SURFACES: 1
AMBULATION_DISTANCE/DURATION_TOLERATED: 50 FT X2

## 2023-11-10 ASSESSMENT — ENCOUNTER SYMPTOMS
PAIN LOCATION - PAIN SEVERITY: 5/10
SUBJECTIVE PAIN PROGRESSION: UNCHANGED
PAIN: 1
PAIN LOCATION: COCCYX
PERSON REPORTING PAIN: PATIENT

## 2023-11-11 PROCEDURE — 1090000001 HH PPS REVENUE CREDIT

## 2023-11-11 PROCEDURE — 1090000002 HH PPS REVENUE DEBIT

## 2023-11-11 ASSESSMENT — ACTIVITIES OF DAILY LIVING (ADL)
AMBULATION ASSISTANCE: STAND BY ASSIST
AMBULATION ASSISTANCE: 1
PHYSICAL TRANSFERS ASSESSED: 1
CURRENT_FUNCTION: STAND BY ASSIST

## 2023-11-11 ASSESSMENT — ENCOUNTER SYMPTOMS: MUSCLE WEAKNESS: 1

## 2023-11-12 PROCEDURE — 1090000001 HH PPS REVENUE CREDIT

## 2023-11-12 PROCEDURE — 1090000002 HH PPS REVENUE DEBIT

## 2023-11-12 ASSESSMENT — ENCOUNTER SYMPTOMS
PAIN SEVERITY GOAL: 0/10
PAIN: 1
PAIN LOCATION - PAIN FREQUENCY: FREQUENT
HIGHEST PAIN SEVERITY IN PAST 24 HOURS: 5/10
PAIN LOCATION: GENERALIZED
LAST BOWEL MOVEMENT: 66788
LOWEST PAIN SEVERITY IN PAST 24 HOURS: 2/10
LOWER EXTREMITY EDEMA: 1
PAIN LOCATION - PAIN SEVERITY: 5/10
APPETITE LEVEL: FAIR
PERSON REPORTING PAIN: PATIENT
PAIN LOCATION - PAIN QUALITY: ACHE
MUSCLE WEAKNESS: 1

## 2023-11-13 ENCOUNTER — HOME CARE VISIT (OUTPATIENT)
Dept: HOME HEALTH SERVICES | Facility: HOME HEALTH | Age: 83
End: 2023-11-13
Payer: MEDICARE

## 2023-11-13 PROCEDURE — 1090000001 HH PPS REVENUE CREDIT

## 2023-11-13 PROCEDURE — 1090000002 HH PPS REVENUE DEBIT

## 2023-11-13 PROCEDURE — G0157 HHC PT ASSISTANT EA 15: HCPCS | Mod: HHH

## 2023-11-13 SDOH — HEALTH STABILITY: PHYSICAL HEALTH: EXERCISE TYPE: STANDING THER EX

## 2023-11-13 SDOH — HEALTH STABILITY: PHYSICAL HEALTH: PHYSICAL EXERCISE: 10

## 2023-11-13 SDOH — HEALTH STABILITY: PHYSICAL HEALTH: EXERCISE ACTIVITIES SETS: 1

## 2023-11-13 SDOH — HEALTH STABILITY: PHYSICAL HEALTH: EXERCISE ACTIVITY: MARCHING, HIP ABD

## 2023-11-13 SDOH — HEALTH STABILITY: PHYSICAL HEALTH: PHYSICAL EXERCISE: STANDING

## 2023-11-13 ASSESSMENT — ACTIVITIES OF DAILY LIVING (ADL)
AMBULATION_DISTANCE/DURATION_TOLERATED: 60 FT X2
AMBULATION ASSISTANCE ON FLAT SURFACES: 1
PHYSICAL TRANSFERS ASSESSED: 1
AMBULATION ASSISTANCE: 1
CURRENT_FUNCTION: STAND BY ASSIST
AMBULATION ASSISTANCE: STAND BY ASSIST

## 2023-11-13 ASSESSMENT — ENCOUNTER SYMPTOMS
PERSON REPORTING PAIN: PATIENT
PAIN: 1
MUSCLE WEAKNESS: 1

## 2023-11-14 ENCOUNTER — APPOINTMENT (OUTPATIENT)
Dept: RADIOLOGY | Facility: HOSPITAL | Age: 83
End: 2023-11-14
Payer: MEDICARE

## 2023-11-14 ENCOUNTER — HOME CARE VISIT (OUTPATIENT)
Dept: HOME HEALTH SERVICES | Facility: HOME HEALTH | Age: 83
End: 2023-11-14
Payer: MEDICARE

## 2023-11-14 ENCOUNTER — HOSPITAL ENCOUNTER (EMERGENCY)
Facility: HOSPITAL | Age: 83
Discharge: HOME | End: 2023-11-14
Attending: EMERGENCY MEDICINE
Payer: MEDICARE

## 2023-11-14 VITALS
HEIGHT: 69 IN | OXYGEN SATURATION: 98 % | RESPIRATION RATE: 18 BRPM | SYSTOLIC BLOOD PRESSURE: 131 MMHG | HEART RATE: 88 BPM | DIASTOLIC BLOOD PRESSURE: 74 MMHG | WEIGHT: 165 LBS | TEMPERATURE: 96.8 F | BODY MASS INDEX: 24.44 KG/M2

## 2023-11-14 DIAGNOSIS — D50.0 IRON DEFICIENCY ANEMIA DUE TO CHRONIC BLOOD LOSS: ICD-10-CM
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
## 2023-11-14 DIAGNOSIS — S00.03XA CONTUSION OF SCALP, INITIAL ENCOUNTER: ICD-10-CM

## 2023-11-14 PROCEDURE — 1090000002 HH PPS REVENUE DEBIT

## 2023-11-14 PROCEDURE — 99285 EMERGENCY DEPT VISIT HI MDM: CPT | Mod: 25 | Performed by: EMERGENCY MEDICINE

## 2023-11-14 PROCEDURE — 1090000001 HH PPS REVENUE CREDIT

## 2023-11-14 PROCEDURE — 70450 CT HEAD/BRAIN W/O DYE: CPT | Performed by: STUDENT IN AN ORGANIZED HEALTH CARE EDUCATION/TRAINING PROGRAM

## 2023-11-14 PROCEDURE — G0390 TRAUMA RESPONS W/HOSP CRITI: HCPCS | Performed by: EMERGENCY MEDICINE

## 2023-11-14 PROCEDURE — 72125 CT NECK SPINE W/O DYE: CPT | Performed by: STUDENT IN AN ORGANIZED HEALTH CARE EDUCATION/TRAINING PROGRAM

## 2023-11-14 PROCEDURE — G0152 HHCP-SERV OF OT,EA 15 MIN: HCPCS | Mod: HHH

## 2023-11-14 PROCEDURE — 70450 CT HEAD/BRAIN W/O DYE: CPT

## 2023-11-14 PROCEDURE — 72125 CT NECK SPINE W/O DYE: CPT

## 2023-11-14 RX ORDER — IRON POLYSACCHARIDE COMPLEX 150 MG
CAPSULE ORAL
Qty: 30 CAPSULE | Refills: 0 | Status: SHIPPED | OUTPATIENT
Start: 2023-11-14 | End: 2023-11-29

## 2023-11-14 SDOH — ECONOMIC STABILITY: HOUSING INSECURITY: HOME SAFETY: GRAB BAR INSTALLED IN BATHROOM

## 2023-11-14 ASSESSMENT — ENCOUNTER SYMPTOMS: PAIN: 1

## 2023-11-14 ASSESSMENT — LIFESTYLE VARIABLES
EVER FELT BAD OR GUILTY ABOUT YOUR DRINKING: NO
EVER HAD A DRINK FIRST THING IN THE MORNING TO STEADY YOUR NERVES TO GET RID OF A HANGOVER: NO
HAVE PEOPLE ANNOYED YOU BY CRITICIZING YOUR DRINKING: NO
REASON UNABLE TO ASSESS: NO
HAVE YOU EVER FELT YOU SHOULD CUT DOWN ON YOUR DRINKING: NO

## 2023-11-14 ASSESSMENT — ACTIVITIES OF DAILY LIVING (ADL)
TOILETING: INDEPENDENT
PHYSICAL TRANSFERS ASSESSED: 1
CURRENT_FUNCTION: INDEPENDENT
TOILETING: 1

## 2023-11-14 ASSESSMENT — PAIN SCALES - GENERAL: PAINLEVEL_OUTOF10: 0 - NO PAIN

## 2023-11-14 ASSESSMENT — PAIN - FUNCTIONAL ASSESSMENT: PAIN_FUNCTIONAL_ASSESSMENT: 0-10

## 2023-11-15 ENCOUNTER — HOME CARE VISIT (OUTPATIENT)
Dept: HOME HEALTH SERVICES | Facility: HOME HEALTH | Age: 83
End: 2023-11-15
Payer: MEDICARE

## 2023-11-15 PROCEDURE — G0152 HHCP-SERV OF OT,EA 15 MIN: HCPCS | Mod: HHH

## 2023-11-15 PROCEDURE — 1090000001 HH PPS REVENUE CREDIT

## 2023-11-15 PROCEDURE — 1090000002 HH PPS REVENUE DEBIT

## 2023-11-15 NOTE — ED PROVIDER NOTES
HPI   Chief Complaint   Patient presents with    Fall     Per ems pt fell at home no loc. Denies neck and back pain.        History provided by: Patient    Limitations to history: None    CC: Fall    HPI: 83-year-old male presents emergency department via EMS for a fall.  Patient was not called in HI until evaluated by the provider.  Patient states that he dropped a tissue box and bent over to fall causing him to lose his balance causing him to fall and hit the back of his head.  He denies losing consciousness.  He is on Plavix due to his history of CAD but he denies history of intracranial hemorrhage.  He denies pain or injury from the fall.  Denies headache, vision changes, nausea or vomiting, neck pain or back pain.  He denies feeling lightheaded or dizzy.  Denies feeling fatigued or weak, denies history of frequent falls.  Patient had cardiac bypass done a month ago, states that he was originally week after the bypass but has been doing much better over the last few weeks and regaining his strength.  Has been able to walk without difficulty.  He denies pain or injury denies chest pain, shortness of breath, cough hemoptysis.  Denies any blood in the urine or stool.  States overall he feels very well.  Denies all other systemic symptoms.      Based on the patient and his head and on Plavix, he was characterized in the HI and was taken back To CT.    ROS: Negative unless mentioned in HPI    Social Hx: Denies tobacco, alcohol, drug use    Medical Hx: Medical history significant for hypertension, diabetes, CKD on peritoneal dialysis, and CAD.  Patient has peritoneal dialysis daily, most recently yesterday.    Surgical HX: Denies physical exam:    Constitutional: Patient is well-nourished and well-developed.  Sitting comfortably in the room and in no distress.  Oriented to person, place, time, and situation.    HEENT: Head is normocephalic, atraumatic. Patient's airway is patent.  Tympanic membranes are clear  bilaterally.  Nasal mucosa clear.  Mouth with normal mucosa.  Throat is not erythematous and there are no oropharyngeal exudates, uvula is midline.  No obvious facial deformities.  No ryder sign or raccoon eyes.  His neck is immobilized in a c-collar.  Small superficial hematoma over the occipital scalp.    Eyes: Clear bilaterally.  Pupils are equal round and reactive to light and accommodation.  Extraocular movements intact.      Cardiac: Regular rate, regular rhythm.  Heart sounds S1, S2.  No murmurs, rubs, or gallops.  PMI nondisplaced.  No JVD.    Respiratory: Regular respiratory rate and effort.  Breath sounds are clear and equal bilaterally, no adventitious lung sounds.  Patient is speaking in full sentences and is in no apparent respiratory distress. No use of accessory muscles.      Gastrointestinal: Abdomen is soft, nondistended, and nontender.  There are no obvious deformities.  No rebound tenderness or guarding.  Bowel sounds are normal active.    Genitourinary: No CVA or flank tenderness.    Musculoskeletal: No reproducible tenderness.  No obvious skin or bony deformities.  Patient has equal range of motion in all extremities and no strength deficiencies.  No muscle or joint tenderness. No back or neck tenderness.  Capillary refill less than 3 seconds.  Strong peripheral pulses.  No sensory deficits.    Neurological: Patient is alert and oriented.  No focal deficits.  5/5 strength in all extremities.  Cranial nerves II through XII intact. GCS15.     Skin: Skin is normal color for race and is warm, dry, and intact.  No evidence of trauma.  No lesions, rashes, bruising, jaundice, or masses.    Psych: Appropriate mood and affect.  No apparent risk to self or others.    Heme/lymph: No adenopathy, lymphadenopathy, or splenomegaly    Physical exam is otherwise negative unless stated above or in history of present illness.    Patient updated on plan for lab testing, IV insertion, radiology imaging, and  medications to be administered while in the ER (if indicated). Patient updated on expected wait times for testing and results. Patient provided my name and told to ask any staff member for questions or concerns if they should arise. Electronic medical record reviewed.     MDM    Upon initial assessment, patient was healthy non-toxic appearing and in no apparent distress.     Patient presented to the emergency department with the chief complaint of a fall while on Plavix.no neurological deficits.  Examination of patient's heart and lungs unremarkable.  Patient has a small superficial hematoma over the occipital scalp but otherwise his head is atraumatic.  No ryder sign or raccoon eyes to suggest basilar skull fracture.  No midline spinal tenderness.  Neck is immobilized in a c-collar.  On arrival to the emergency department, vital signs were within normal limits    CT of the head and neck will be obtained for further evaluation.  I staffed this patient my attending, agreeable plan of care.    CT of the C-spine reveals degenerative changes but no traumatic subluxation or fracture of the vertebra.  CT of the head reveals chronic ischemic changes but no intracranial mass or hemorrhage.  No skull fracture.  Patient will be discharged.  He will follow-up with his primary care provider, they have an appointment for within the next few days.  Wife is with him confirms that she can keep an eye on him.  All questions and concerns addressed.  Reasons to return to ER discussed.  Patient verbalized understanding and agreement with the treatment plan and they remained hemodynamically stable in the ER.    This note was dictated using a speech recognition program.  While an attempt was made at proof-reading to minimize errors, minor errors in transcription may be present                           Vicky Coma Scale Score: 15                  Patient History   Past Medical History:   Diagnosis Date    Chronic kidney disease     on  PD    Diabetes mellitus (CMS/HCC)     Hypertension      Past Surgical History:   Procedure Laterality Date    CARDIAC CATHETERIZATION N/A 10/16/2023    Procedure: PCI;  Surgeon: Eusebio Hudson MD;  Location: [address] Cardiac Cath Lab;  Service: Cardiovascular;  Laterality: N/A;    CARPAL TUNNEL RELEASE  2018    CORONARY ARTERY BYPASS GRAFT      SPINAL FUSION  09/2015    7 fused vertebrae    TONSILLECTOMY  1945    US GUIDED PERCUTANEOUS BIOPSY RENAL LEFT Left 11/30/2022    US GUIDED PERCUTANEOUS BIOPSY RENAL LEFT 11/30/2022     Family History   Problem Relation Name Age of Onset    Cancer Brother      Diabetes type I Brother       Social History     Tobacco Use    Smoking status: Never    Smokeless tobacco: Never   Substance Use Topics    Alcohol use: Never    Drug use: Never       Physical Exam   ED Triage Vitals [11/14/23 2044]   Temp Heart Rate Resp BP   36 °C (96.8 °F) 92 20 128/73      SpO2 Temp Source Heart Rate Source Patient Position   96 % Temporal Monitor Lying      BP Location FiO2 (%)     Right arm --       Physical Exam    ED Course & MDM   Diagnoses as of 11/14/23 2216   Fall, initial encounter   Contusion of scalp, initial encounter       Medical Decision Making      Procedure  Procedures     Carmelo Bass PA-C  11/14/23 2218

## 2023-11-16 ENCOUNTER — HOME CARE VISIT (OUTPATIENT)
Dept: HOME HEALTH SERVICES | Facility: HOME HEALTH | Age: 83
End: 2023-11-16
Payer: MEDICARE

## 2023-11-16 ENCOUNTER — OFFICE VISIT (OUTPATIENT)
Dept: CARDIAC SURGERY | Facility: CLINIC | Age: 83
End: 2023-11-16
Payer: MEDICARE

## 2023-11-16 VITALS
OXYGEN SATURATION: 98 % | TEMPERATURE: 96.2 F | RESPIRATION RATE: 14 BRPM | DIASTOLIC BLOOD PRESSURE: 62 MMHG | HEART RATE: 95 BPM | SYSTOLIC BLOOD PRESSURE: 123 MMHG

## 2023-11-16 VITALS
DIASTOLIC BLOOD PRESSURE: 58 MMHG | SYSTOLIC BLOOD PRESSURE: 102 MMHG | RESPIRATION RATE: 16 BRPM | TEMPERATURE: 97.2 F | OXYGEN SATURATION: 98 % | HEART RATE: 104 BPM

## 2023-11-16 DIAGNOSIS — I25.10 CAD IN NATIVE ARTERY: Primary | ICD-10-CM

## 2023-11-16 PROCEDURE — 1111F DSCHRG MED/CURRENT MED MERGE: CPT | Performed by: THORACIC SURGERY (CARDIOTHORACIC VASCULAR SURGERY)

## 2023-11-16 PROCEDURE — 3078F DIAST BP <80 MM HG: CPT | Performed by: THORACIC SURGERY (CARDIOTHORACIC VASCULAR SURGERY)

## 2023-11-16 PROCEDURE — 1090000002 HH PPS REVENUE DEBIT

## 2023-11-16 PROCEDURE — 1159F MED LIST DOCD IN RCRD: CPT | Performed by: THORACIC SURGERY (CARDIOTHORACIC VASCULAR SURGERY)

## 2023-11-16 PROCEDURE — 3074F SYST BP LT 130 MM HG: CPT | Performed by: THORACIC SURGERY (CARDIOTHORACIC VASCULAR SURGERY)

## 2023-11-16 PROCEDURE — 1090000001 HH PPS REVENUE CREDIT

## 2023-11-16 PROCEDURE — 99212 OFFICE O/P EST SF 10 MIN: CPT | Performed by: THORACIC SURGERY (CARDIOTHORACIC VASCULAR SURGERY)

## 2023-11-16 PROCEDURE — 1036F TOBACCO NON-USER: CPT | Performed by: THORACIC SURGERY (CARDIOTHORACIC VASCULAR SURGERY)

## 2023-11-16 PROCEDURE — 1126F AMNT PAIN NOTED NONE PRSNT: CPT | Performed by: THORACIC SURGERY (CARDIOTHORACIC VASCULAR SURGERY)

## 2023-11-16 PROCEDURE — G0151 HHCP-SERV OF PT,EA 15 MIN: HCPCS | Mod: HHH

## 2023-11-16 SDOH — HEALTH STABILITY: PHYSICAL HEALTH: PHYSICAL EXERCISE: SEATED

## 2023-11-16 SDOH — HEALTH STABILITY: PHYSICAL HEALTH: EXERCISE ACTIVITIES SETS: 1

## 2023-11-16 SDOH — HEALTH STABILITY: PHYSICAL HEALTH: EXERCISE TYPE: LE EXERCISES

## 2023-11-16 SDOH — HEALTH STABILITY: PHYSICAL HEALTH: PHYSICAL EXERCISE: 15

## 2023-11-16 SDOH — HEALTH STABILITY: PHYSICAL HEALTH: EXERCISE ACTIVITY: ANKLE DF/PF

## 2023-11-16 SDOH — HEALTH STABILITY: PHYSICAL HEALTH: EXERCISE ACTIVITY: MARCHING

## 2023-11-16 SDOH — HEALTH STABILITY: PHYSICAL HEALTH: EXERCISE ACTIVITY: HIP ABD/ADD

## 2023-11-16 SDOH — HEALTH STABILITY: PHYSICAL HEALTH: EXERCISE ACTIVITY: HS CURLS

## 2023-11-16 SDOH — HEALTH STABILITY: PHYSICAL HEALTH: EXERCISE ACTIVITY: LAQ

## 2023-11-16 ASSESSMENT — ENCOUNTER SYMPTOMS
PAIN LOCATION - RELIEVING FACTORS: NOTHING
PAIN LOCATION - PAIN FREQUENCY: CONSTANT
PAIN LOCATION - PAIN FREQUENCY: CONSTANT
OCCASIONAL FEELINGS OF UNSTEADINESS: 1
PAIN: 1
PAIN LOCATION: LEFT HAND
PAIN LOCATION - PAIN FREQUENCY: CONSTANT
LOWEST PAIN SEVERITY IN PAST 24 HOURS: 0/10
PAIN LOCATION - EXACERBATING FACTORS: NOTHING
LOWEST PAIN SEVERITY IN PAST 24 HOURS: 3/10
PAIN LOCATION - EXACERBATING FACTORS: NOTHING
MUSCLE WEAKNESS: 1
PAIN LOCATION - RELIEVING FACTORS: NOTHING
PAIN LOCATION: RIGHT FOOT
PAIN: 1
PAIN LOCATION - PAIN FREQUENCY: CONSTANT
PAIN LOCATION: LEFT FOOT
HIGHEST PAIN SEVERITY IN PAST 24 HOURS: 6/10
SUBJECTIVE PAIN PROGRESSION: UNCHANGED
PERSON REPORTING PAIN: PATIENT
PERSON REPORTING PAIN: PATIENT
PAIN LOCATION - RELIEVING FACTORS: NOTHING
PAIN LOCATION: RIGHT HAND
PAIN LOCATION - RELIEVING FACTORS: NOTHING
PAIN SEVERITY GOAL: 1/10
PAIN LOCATION - PAIN SEVERITY: 3/10
PAIN LOCATION - PAIN SEVERITY: 3/10
PAIN LOCATION - PAIN SEVERITY: 2/10
PAIN LOCATION - EXACERBATING FACTORS: NOTHING
PAIN LOCATION: BACK
PAIN LOCATION - PAIN SEVERITY: 3/10
PAIN LOCATION - PAIN SEVERITY: 3/10
PAIN LOCATION - EXACERBATING FACTORS: NOTHING

## 2023-11-16 ASSESSMENT — BALANCE ASSESSMENTS
IMMEDIATE STANDING BALANCE FIRST 5 SECONDS: 1 - STEADY BUT USES WALKER OR OTHER SUPPORT
STANDING BALANCE: 1 - STEADY BUT WIDE STANCE AND USES CANE OR OTHER SUPPORT
SITTING BALANCE: 1 - STEADY, SAFE
ARISING SCORE: 1
BALANCE SCORE: 8
ARISES: 1 - ABLE, USES ARMS TO HELP
TURNING 360 DEGREES STEPS: 0 - DISCONTINUOUS STEPS
SITTING DOWN: 1 - USES ARMS OR NOT SMOOTH MOTION
NUDGED: 1 - STAGGERS, GRABS, CATCHES SELF
EYES CLOSED AT MAXIMUM POSITION NUDGED: 0 - UNSTEADY
NUDGED SCORE: 1
ATTEMPTS TO ARISE: 2 - ABLE TO RISE, ONE ATTEMPT

## 2023-11-16 ASSESSMENT — ACTIVITIES OF DAILY LIVING (ADL)
PHYSICAL TRANSFERS ASSESSED: 1
CURRENT_FUNCTION: SUPERVISION
TOILETING: 1
TOILETING: SUPERVISION
AMBULATION ASSISTANCE ON FLAT SURFACES: 1
DRESSING_LB_CURRENT_FUNCTION: MINIMUM ASSIST
AMBULATION_DISTANCE/DURATION_TOLERATED: 75FT
CURRENT_FUNCTION: STAND BY ASSIST
AMBULATION ASSISTANCE: STAND BY ASSIST

## 2023-11-16 ASSESSMENT — GAIT ASSESSMENTS
PATH SCORE: 1
INITIATION OF GAIT IMMEDIATELY AFTER GO: 1 - NO HESITANCY
WALKING STANCE: 1 - HEELS ALMOST TOUCHING WHILE WALKING
STEP CONTINUITY: 1 - STEPS APPEAR CONTINUOUS
GAIT SCORE: 10
PATH: 1 - MILD/MODERATE DEVIATION OR USES WALKING AID
TRUNK SCORE: 1
TRUNK: 1 - NO SWAY BUT FLEXION OF KNEES OR BACK OR SPREADS ARMS WHILE WALKING
STEP SYMMETRY: 1 - RIGHT AND LEFT STEP LENGTH APPEAR EQUAL
BALANCE AND GAIT SCORE: 18

## 2023-11-16 NOTE — PROGRESS NOTES
CARDIOTHORACIC SURGERY FOLLOW-UP PROGRESS NOTE  Subjective   Salomon Chowdary [id number] 1940 11/16/2023    Patient is a 83 y.o. male who presents for routine post-operative follow up. He denies any present complaints. Activity level has been appropriate.     Status post MIDCAB LIMA to LAD, and a failed angioplasty to the circumflex as part of the hybrid approach for this elderly gentleman.  He is here for his follow-up visit.  Still complains of some shortness of breath that should improve with time.  Objective   There were no vitals taken for this visit.  Heart: Regular rate and rhythm, S1, S2 normal, no murmur, click, rub or gallop.  Lungs: Clear to auscultation bilaterally.  Abdomen: Soft, non-distended, non-tender to palpation.  Extremities: Warm, well perfused, pulses intact, no cyanosis, clubbing, or edema.  Neuro: Alert and oriented X4, moving all extremities with no deficits observed.  Incision(s): Well healing without erythema or drainage.  Imaging: Chest x-ray reviewed. Sternum healing well.    Assessment/Plan   The patient is doing quite well we will follow him.  At this point we think that medical treatment is most appropriate course of action.  If needed we will perform a very complex PCI to the circumflex territory.  We extensively discussed with Dr. Hudson this approach.  Still complain of some shortness of breath that should improve with time.  The incision looks very well.  Jorje Neal MD

## 2023-11-17 ENCOUNTER — HOME CARE VISIT (OUTPATIENT)
Dept: HOME HEALTH SERVICES | Facility: HOME HEALTH | Age: 83
End: 2023-11-17
Payer: MEDICARE

## 2023-11-17 VITALS
OXYGEN SATURATION: 98 % | SYSTOLIC BLOOD PRESSURE: 133 MMHG | HEART RATE: 87 BPM | DIASTOLIC BLOOD PRESSURE: 75 MMHG | TEMPERATURE: 97.2 F | RESPIRATION RATE: 18 BRPM

## 2023-11-17 PROCEDURE — G0299 HHS/HOSPICE OF RN EA 15 MIN: HCPCS | Mod: HHH

## 2023-11-17 PROCEDURE — 1090000001 HH PPS REVENUE CREDIT

## 2023-11-17 PROCEDURE — 1090000002 HH PPS REVENUE DEBIT

## 2023-11-17 ASSESSMENT — ENCOUNTER SYMPTOMS
PAIN LOCATION: RIGHT FOOT
CHANGE IN APPETITE: INCREASED
PAIN LOCATION - PAIN SEVERITY: 3/10
LOWEST PAIN SEVERITY IN PAST 24 HOURS: 2/10
PAIN LOCATION - PAIN QUALITY: ACHING
PAIN: 1
PERSON REPORTING PAIN: PATIENT
PAIN LOCATION: LEFT FOOT
HIGHEST PAIN SEVERITY IN PAST 24 HOURS: 3/10
PAIN LOCATION - PAIN SEVERITY: 3/10
PAIN LOCATION - PAIN QUALITY: ING
APPETITE LEVEL: GOOD
LOWER EXTREMITY EDEMA: 1
SUBJECTIVE PAIN PROGRESSION: GRADUALLY IMPROVING
PAIN SEVERITY GOAL: 2/10

## 2023-11-18 PROCEDURE — 1090000001 HH PPS REVENUE CREDIT

## 2023-11-18 PROCEDURE — 1090000003 HH PPS REVENUE ADJ

## 2023-11-18 PROCEDURE — 1090000002 HH PPS REVENUE DEBIT

## 2023-11-19 PROCEDURE — 1090000002 HH PPS REVENUE DEBIT

## 2023-11-19 PROCEDURE — 1090000001 HH PPS REVENUE CREDIT

## 2023-11-20 ENCOUNTER — HOME CARE VISIT (OUTPATIENT)
Dept: HOME HEALTH SERVICES | Facility: HOME HEALTH | Age: 83
End: 2023-11-20
Payer: MEDICARE

## 2023-11-20 PROCEDURE — 1090000002 HH PPS REVENUE DEBIT

## 2023-11-20 PROCEDURE — 1090000001 HH PPS REVENUE CREDIT

## 2023-11-21 ENCOUNTER — HOME CARE VISIT (OUTPATIENT)
Dept: HOME HEALTH SERVICES | Facility: HOME HEALTH | Age: 83
End: 2023-11-21
Payer: MEDICARE

## 2023-11-21 PROCEDURE — 0023 HH SOC

## 2023-11-21 PROCEDURE — G0152 HHCP-SERV OF OT,EA 15 MIN: HCPCS | Mod: HHH

## 2023-11-21 PROCEDURE — 1090000001 HH PPS REVENUE CREDIT

## 2023-11-21 PROCEDURE — 1090000002 HH PPS REVENUE DEBIT

## 2023-11-22 ENCOUNTER — HOME CARE VISIT (OUTPATIENT)
Dept: HOME HEALTH SERVICES | Facility: HOME HEALTH | Age: 83
End: 2023-11-22
Payer: MEDICARE

## 2023-11-22 PROCEDURE — G0157 HHC PT ASSISTANT EA 15: HCPCS | Mod: HHH

## 2023-11-22 PROCEDURE — 1090000002 HH PPS REVENUE DEBIT

## 2023-11-22 PROCEDURE — 1090000001 HH PPS REVENUE CREDIT

## 2023-11-22 SDOH — HEALTH STABILITY: PHYSICAL HEALTH: EXERCISE ACTIVITY: COMPONENTS OF GAIT

## 2023-11-22 SDOH — HEALTH STABILITY: PHYSICAL HEALTH: PHYSICAL EXERCISE: STANDING

## 2023-11-22 SDOH — HEALTH STABILITY: PHYSICAL HEALTH: EXERCISE TYPE: COMPONENTS OF GAIT

## 2023-11-22 ASSESSMENT — ENCOUNTER SYMPTOMS
MUSCLE WEAKNESS: 1
PAIN: 1

## 2023-11-22 ASSESSMENT — ACTIVITIES OF DAILY LIVING (ADL)
AMBULATION ASSISTANCE: STAND BY ASSIST
TOILETING: INDEPENDENT
AMBULATION ASSISTANCE ON FLAT SURFACES: 1
TOILETING: 1
AMBULATION ASSISTANCE: 1

## 2023-11-23 PROCEDURE — 1090000001 HH PPS REVENUE CREDIT

## 2023-11-23 PROCEDURE — 1090000002 HH PPS REVENUE DEBIT

## 2023-11-24 ENCOUNTER — HOME CARE VISIT (OUTPATIENT)
Dept: HOME HEALTH SERVICES | Facility: HOME HEALTH | Age: 83
End: 2023-11-24
Payer: MEDICARE

## 2023-11-24 VITALS
DIASTOLIC BLOOD PRESSURE: 78 MMHG | HEART RATE: 99 BPM | OXYGEN SATURATION: 98 % | SYSTOLIC BLOOD PRESSURE: 136 MMHG | RESPIRATION RATE: 18 BRPM

## 2023-11-24 PROCEDURE — 1090000002 HH PPS REVENUE DEBIT

## 2023-11-24 PROCEDURE — 1090000001 HH PPS REVENUE CREDIT

## 2023-11-24 PROCEDURE — G0300 HHS/HOSPICE OF LPN EA 15 MIN: HCPCS | Mod: HHH

## 2023-11-24 SDOH — ECONOMIC STABILITY: GENERAL

## 2023-11-24 ASSESSMENT — ACTIVITIES OF DAILY LIVING (ADL)
TOILETING: INDEPENDENT
AMBULATION ASSISTANCE: INDEPENDENT
DRESSING_UB_CURRENT_FUNCTION: INDEPENDENT
AMBULATION ASSISTANCE: 1
DRESSING_LB_CURRENT_FUNCTION: INDEPENDENT
MONEY MANAGEMENT (EXPENSES/BILLS): INDEPENDENT
BATHING ASSESSED: 1
BATHING_CURRENT_FUNCTION: INDEPENDENT
TOILETING: 1

## 2023-11-24 ASSESSMENT — ENCOUNTER SYMPTOMS
LIMITED RANGE OF MOTION: 1
OCCASIONAL FEELINGS OF UNSTEADINESS: 0
PERSON REPORTING PAIN: PATIENT
APPETITE LEVEL: GOOD
LOSS OF SENSATION IN FEET: 0
SPUTUM PRODUCTION: 1
MUSCLE WEAKNESS: 1
DEPRESSION: 0
SPUTUM AMOUNT: MODERATE
SPUTUM CONSISTENCY: THIN
SPUTUM COLOR: CLEAR
CHANGE IN APPETITE: INCREASED
DENIES PAIN: 1

## 2023-11-25 PROCEDURE — 1090000002 HH PPS REVENUE DEBIT

## 2023-11-25 PROCEDURE — 1090000001 HH PPS REVENUE CREDIT

## 2023-11-26 PROCEDURE — 1090000001 HH PPS REVENUE CREDIT

## 2023-11-26 PROCEDURE — 1090000002 HH PPS REVENUE DEBIT

## 2023-11-26 NOTE — CASE COMMUNICATION
Patient and spouse declined second visit this week due to holiday, 1 of 2 Physical Therapy visits missed for this week's plan of care.

## 2023-11-27 ENCOUNTER — HOME CARE VISIT (OUTPATIENT)
Dept: HOME HEALTH SERVICES | Facility: HOME HEALTH | Age: 83
End: 2023-11-27
Payer: MEDICARE

## 2023-11-27 PROCEDURE — 1090000002 HH PPS REVENUE DEBIT

## 2023-11-27 PROCEDURE — G0157 HHC PT ASSISTANT EA 15: HCPCS | Mod: HHH

## 2023-11-27 PROCEDURE — 1090000001 HH PPS REVENUE CREDIT

## 2023-11-28 PROCEDURE — 1090000001 HH PPS REVENUE CREDIT

## 2023-11-28 PROCEDURE — 1090000002 HH PPS REVENUE DEBIT

## 2023-11-28 SDOH — HEALTH STABILITY: PHYSICAL HEALTH: EXERCISE TYPE: COMPONENTS OF GAIT

## 2023-11-28 ASSESSMENT — ENCOUNTER SYMPTOMS
PAIN LOCATION: LEFT FOOT
PAIN LOCATION - PAIN SEVERITY: 3/10
PAIN LOCATION: LEFT HAND
PAIN LOCATION - PAIN SEVERITY: 3/10
PAIN LOCATION - PAIN SEVERITY: 3/10
PAIN LOCATION: RIGHT SHOULDER
PAIN: 1
PAIN LOCATION: RIGHT FOOT
PAIN LOCATION - PAIN SEVERITY: 3/10
MUSCLE WEAKNESS: 1
PAIN LOCATION: RIGHT HAND
PERSON REPORTING PAIN: PATIENT
PAIN LOCATION - PAIN SEVERITY: 4/10

## 2023-11-28 ASSESSMENT — ACTIVITIES OF DAILY LIVING (ADL)
AMBULATION ASSISTANCE: STAND BY ASSIST
AMBULATION ASSISTANCE: 1
CURRENT_FUNCTION: STAND BY ASSIST
PHYSICAL TRANSFERS ASSESSED: 1

## 2023-11-29 ENCOUNTER — HOME CARE VISIT (OUTPATIENT)
Dept: HOME HEALTH SERVICES | Facility: HOME HEALTH | Age: 83
End: 2023-11-29
Payer: MEDICARE

## 2023-11-29 ENCOUNTER — OFFICE VISIT (OUTPATIENT)
Dept: CARDIOLOGY | Facility: CLINIC | Age: 83
End: 2023-11-29
Payer: MEDICARE

## 2023-11-29 VITALS — HEART RATE: 82 BPM | DIASTOLIC BLOOD PRESSURE: 70 MMHG | SYSTOLIC BLOOD PRESSURE: 108 MMHG

## 2023-11-29 DIAGNOSIS — R00.2 PALPITATIONS: ICD-10-CM

## 2023-11-29 DIAGNOSIS — Z99.2 ESRD ON PERITONEAL DIALYSIS (MULTI): ICD-10-CM

## 2023-11-29 DIAGNOSIS — Z79.4 TYPE 2 DIABETES MELLITUS WITH DIABETIC NEUROPATHY, WITH LONG-TERM CURRENT USE OF INSULIN (MULTI): ICD-10-CM

## 2023-11-29 DIAGNOSIS — I25.10 ATHEROSCLEROSIS OF NATIVE CORONARY ARTERY OF NATIVE HEART WITHOUT ANGINA PECTORIS: Primary | ICD-10-CM

## 2023-11-29 DIAGNOSIS — I31.39 PERICARDIAL EFFUSION (HHS-HCC): ICD-10-CM

## 2023-11-29 DIAGNOSIS — I48.0 PAF (PAROXYSMAL ATRIAL FIBRILLATION) (MULTI): ICD-10-CM

## 2023-11-29 DIAGNOSIS — I10 ESSENTIAL HYPERTENSION: ICD-10-CM

## 2023-11-29 DIAGNOSIS — E78.2 MIXED HYPERLIPIDEMIA: ICD-10-CM

## 2023-11-29 DIAGNOSIS — N18.6 ESRD ON PERITONEAL DIALYSIS (MULTI): ICD-10-CM

## 2023-11-29 DIAGNOSIS — E11.40 TYPE 2 DIABETES MELLITUS WITH DIABETIC NEUROPATHY, WITH LONG-TERM CURRENT USE OF INSULIN (MULTI): ICD-10-CM

## 2023-11-29 DIAGNOSIS — I10 PRIMARY HYPERTENSION: ICD-10-CM

## 2023-11-29 DIAGNOSIS — D64.9 CHRONIC ANEMIA: ICD-10-CM

## 2023-11-29 PROCEDURE — 1090000002 HH PPS REVENUE DEBIT

## 2023-11-29 PROCEDURE — 3074F SYST BP LT 130 MM HG: CPT | Performed by: INTERNAL MEDICINE

## 2023-11-29 PROCEDURE — G0152 HHCP-SERV OF OT,EA 15 MIN: HCPCS | Mod: HHH

## 2023-11-29 PROCEDURE — 99214 OFFICE O/P EST MOD 30 MIN: CPT | Performed by: INTERNAL MEDICINE

## 2023-11-29 PROCEDURE — 3078F DIAST BP <80 MM HG: CPT | Performed by: INTERNAL MEDICINE

## 2023-11-29 PROCEDURE — 1126F AMNT PAIN NOTED NONE PRSNT: CPT | Performed by: INTERNAL MEDICINE

## 2023-11-29 PROCEDURE — 1036F TOBACCO NON-USER: CPT | Performed by: INTERNAL MEDICINE

## 2023-11-29 PROCEDURE — 1159F MED LIST DOCD IN RCRD: CPT | Performed by: INTERNAL MEDICINE

## 2023-11-29 PROCEDURE — 1090000001 HH PPS REVENUE CREDIT

## 2023-11-29 RX ORDER — AMLODIPINE BESYLATE 10 MG/1
10 TABLET ORAL DAILY
COMMUNITY

## 2023-11-29 RX ORDER — INSULIN LISPRO 100 [IU]/ML
INJECTION, SOLUTION INTRAVENOUS; SUBCUTANEOUS
COMMUNITY
Start: 2023-11-15

## 2023-11-29 ASSESSMENT — ACTIVITIES OF DAILY LIVING (ADL)
TOILETING: INDEPENDENT
TOILETING: 1

## 2023-11-29 ASSESSMENT — ENCOUNTER SYMPTOMS
PERSON REPORTING PAIN: PATIENT
DENIES PAIN: 1

## 2023-11-29 NOTE — PROGRESS NOTES
Patient:  Salomon Chowdary  YOB: 1940  MRN: [id number]       HPI:       Salomon Chowdary is a 83 y.o. male who returns today for cardiac follow-up after recent coronary artery bypass grafting surgery.  He has a history of hypertension, hyperlipidemia, diabetes mellitus, and end-stage renal disease for which he is on peritoneal dialysis.  He was hospitalized at [facility] on September 20, 2023 with left-sided chest pain and left arm pain.  He was noted to have underlying atrial fibrillation with rapid ventricular response.  D-dimer was positive.  CT scan was negative for pulmonary embolus.  He was noted to have a moderate-sized pericardial effusion on CT.  His hemoglobin was 7.7 and hematocrit 24.2.  Sodium 133 with potassium 3.8, BUN 68, and creatinine 4.67.  High-sensitivity troponin levels were 122, 608, 1661, and 2226.  Transthoracic echo showed normal LV ejection fraction and moderate-sized circumferential pericardial effusion without tamponade.  He converted to normal sinus rhythm after receiving IV Lopressor.  Cardiac catheterization on September 20, 2023 showed 30% stenosis of the left main trunk, heavy calcification of the LAD, the LAD was not able to be successfully injected.  There was 95% stenosis of the PDA of the dominant circumflex.  The entire vessel was heavily calcified.  There was 80% stenosis of the first obtuse marginal branch.   Repeat imaging showed 75 to 80% stenosis involving the proximal, mid, and distal LAD.  He requested a transfer to [facility], however, no beds were available.  He was transferred to [facility] on September 30, 2023.  He underwent mini left anterior thoracotomy and off-pump mid CAB (LIMA to LAD) on October 11, 2023 by Dr. Jorje Neal.  He initially did not tolerate beta-blockers due to underlying sinus bradycardia.  He is now on Toprol XL 50 mg daily.  An exhaustive attempt to angioplasty the distal circumflex October 18, 2023 was  unsuccessful due to inability to expand balloon in the heavily calcified vessel.  A Shockwave balloon was unable to be passed.  He was discharged on October 17, 2023 on aspirin, Plavix, Toprol-XL, torsemide, Trulicity, and iron polysaccharide.  He was seen in follow-up by Dr. Eusebio Hudson and conservative care is being recommended for now.  He will be scheduled for rotational atherectomy of the circumflex if he develops recurrent anginal symptoms.  A follow-up echocardiogram showed normal LV systolic function and mild to moderate pericardial effusion.  His burden of atrial fibrillation was low and he presented with significant anemia as well as pericardial effusion.  It was opted to forego a NOAC unless he develops recurrent atrial fibrillation.  He might also be a candidate for Watchman device implant.    Mr. Chowdary reports that he is gradually starting to feel better.  He continues to have some difficulty swallowing after being intubated for the mid CAB.  He is scheduled to see ENT next week.  He continues to note a decreased appetite and some generalized weakness.  He is working with physical therapy and Occupational Therapy.  He denies any chest pain.  He notes some shortness of breath with walking.  He states coming into the office he felt his heart pounding hard and somewhat fast.  He denies any orthopnea, PND, or increasing peripheral edema.  He denies any lightheadedness, near-syncope, or syncope.  He denies any fever, chills, or cough.  He denies any nausea, vomiting, or diaphoresis.  He denies any hemoptysis, hematemesis, melena, or hematochezia.      Objective:     Vitals:    11/29/23 1313   BP: 108/70   Pulse: 82       Wt Readings from Last 4 Encounters:   11/14/23 74.8 kg (165 lb)   11/02/23 76.7 kg (169 lb)   10/27/23 77.3 kg (170 lb 8 oz)   10/20/23 80.3 kg (177 lb)       Allergies:     Allergies   Allergen Reactions    Gabapentin Other     vomiting    Other reaction(s): Other (See Comments), Other:  See Comments   vomiting   vomiting    Meperidine Nausea And Vomiting    Opioids - Morphine Analogues Unknown, Nausea And Vomiting and Nausea/vomiting     Patient states he is able to take Morphine.    Other reaction(s): nausea/vomiting    Opioids-Meperidine And Related Nausea/vomiting    Opium Tincture Unknown    Oxycodone GI Upset     nausea and vomiting    Tizanidine Unknown        Medications:     Current Outpatient Medications   Medication Instructions    acetaminophen (TYLENOL) 650 mg, oral, Every 6 hours PRN    aspirin 81 mg, oral, Daily    atorvastatin (LIPITOR) 40 mg, oral, Nightly    b complex vitamins capsule 1 capsule, oral, Daily    calcitriol (Rocaltrol) 0.5 mcg capsule 1 capsule, oral, Daily    chlorpheniramine (CHLOR-TRIMETON) 4 mg, oral, Daily PRN    cholecalciferol (VITAMIN D-3) 10 mcg (400 unit) tablet 1 tablet, oral, Daily    cholecalciferol (VITAMIN D-3) 25 mcg, oral, Daily    clopidogrel (PLAVIX) 75 mg, oral, Daily    DULoxetine (CYMBALTA) 30 mg, oral, Daily, Do not crush or chew.    flash glucose sensor (FREESTYLE ANDRÉS 2 SENSOR [facility]  Change every 2 weeks    fluticasone (Flonase) 50 mcg/actuation nasal spray Daily PRN    fsh/flx/prim/cur/bor/om3,6,9 5 (OMEGA 3-6-9 FATTY ACIDS ORAL) 1 capsule, oral, Daily    furosemide (LASIX) 40 mg, oral, Daily    glucosam aguirre dip-chondroit-C-Mn 409-090-30-3 mg capsule GLUCOSAMINE 1500 COMPLEX CAPS    hydrALAZINE (APRESOLINE) 25 mg, oral, 3 times daily    iron polysaccharides (Nu-Iron,Niferex) 150 mg iron capsule TAKE 1 CAPSULE BY MOUTH ONCE DAILY; DO NOT START BEFORE OCTOBER/18/2023    LANCETS MISC 1 each, Daily    Levemir U-100 Insulin 25 Units, subcutaneous, Daily, Take as directed per insulin instructions.    levothyroxine (SYNTHROID, LEVOXYL) 25 mcg, oral, Daily before breakfast    loratadine-pseudoephedrine (Claritin-D 24-hour)  mg 24 hr tablet 1 tablet, oral, Daily PRN, Do not crush, chew, or split.    methoxy peg-epoetin beta (EPOETIN BETA,  "METHOXY PEG INJ) 30 mcg, subcutaneous    metoprolol succinate XL (TOPROL-XL) 50 mg, oral, Daily, Do not crush or chew.    min17/nettle/pumpkin/saw palme (PROSTATE THERAPY ORAL) oral    multivitamin tablet 1 tablet, oral, Daily    multivitamin with minerals (Daily Multivitamin-Minerals) tablet 1 tablet, oral, Daily    NON FORMULARY 1 capsule, oral, Daily RT,  Saw Palmetto-Pumpkin Seed-Zinc 160-40-7.5 MG capsule    OneTouch Ultra Test strip  TEST THREE TIMES DAILY.    pen needle, diabetic 31 gauge x 1/4\" needle 1 each, 2 times daily    RenaPlex-D 800 mcg-12.5 mg -2,000 unit tablet 1 tablet, oral, Daily    sennosides (SENOKOT) 17.2 mg, oral, 2 times daily    sennosides-docusate sodium (Diana-Colace) 8.6-50 mg tablet 2 tablets, oral, 2 times daily, Now not taking     sodium bicarbonate 650 mg, oral, 2 times daily    torsemide (DEMADEX) 50 mg, oral, Daily    Trulicity 1.5 mg, subcutaneous, Weekly, Tuesdays    vitamin E 400 Units, oral, Daily    vitamin-B complex split tablet oral       Physical Examination:   GENERAL:  Well developed, well nourished, in no acute distress.  CHEST:  Symmetric and nontender.  NEURO/PSYCH:  Alert and oriented times three with approppriate behavior and responses.  NECK:  Supple, no JVD, no bruit.  LUNGS:  Clear to auscultation bilaterally, normal respiratory effort.  HEART:  Rate and rhythm regular with no evident murmur, no gallop appreciated.        There are no rubs, clicks or heaves.  EXTREMITIES:  Warm with good color, no clubbing or cyanosis.  There is no edema noted.  PERIPHERAL VASCULAR:  Pulses present and equally palpable; 2+ throughout.      Lab:     CBC:   Lab Results   Component Value Date    WBC 8.1 10/17/2023    RBC 2.79 (L) 10/17/2023    HGB 8.8 (L) 10/17/2023    HCT 28.3 (L) 10/17/2023     10/17/2023        CMP:    Lab Results   Component Value Date     10/17/2023    K 4.2 10/17/2023    CL 99 10/17/2023    CO2 27 10/17/2023    BUN 57 (H) 10/17/2023    CREATININE " 3.72 (H) 10/17/2023    GLUCOSE 95 10/17/2023    CALCIUM 8.2 (L) 10/17/2023       Magnesium:    Lab Results   Component Value Date    MG 1.94 10/17/2023       Lipid Profile:    Lab Results   Component Value Date    TRIG 94 10/02/2023    HDL 35.8 10/02/2023    LDLCALC 23 (L) 10/02/2023       TSH:    Lab Results   Component Value Date    TSH 5.76 (H) 10/09/2023       BNP:   Lab Results   Component Value Date     (H) 09/19/2023        PT/INR:    Lab Results   Component Value Date    PROTIME 12.3 10/13/2023    INR 1.1 10/13/2023       HgBA1c:    Lab Results   Component Value Date    HGBA1C 6.6 (H) 11/14/2023       BMP:  Lab Results   Component Value Date     10/17/2023     (L) 10/16/2023     10/15/2023    K 4.2 10/17/2023    K 4.8 10/16/2023    K 4.2 10/15/2023    CL 99 10/17/2023    CL 98 10/16/2023    CL 98 10/15/2023    CO2 27 10/17/2023    CO2 28 10/16/2023    CO2 25 10/15/2023    BUN 57 (H) 10/17/2023    BUN 62 (H) 10/16/2023    BUN 65 (H) 10/15/2023    CREATININE 3.72 (H) 10/17/2023    CREATININE 4.03 (H) 10/16/2023    CREATININE 4.17 (H) 10/15/2023       CBC:  Lab Results   Component Value Date    WBC 8.1 10/17/2023    WBC 7.4 10/16/2023    WBC 8.4 10/15/2023    RBC 2.79 (L) 10/17/2023    RBC 2.66 (L) 10/16/2023    RBC 2.80 (L) 10/15/2023    HGB 8.8 (L) 10/17/2023    HGB 8.4 (L) 10/16/2023    HGB 8.9 (L) 10/15/2023    HCT 28.3 (L) 10/17/2023    HCT 28.0 (L) 10/16/2023    HCT 28.7 (L) 10/15/2023     (H) 10/17/2023     (H) 10/16/2023     (H) 10/15/2023    MCH 31.5 10/17/2023    MCH 31.6 10/16/2023    MCH 31.8 10/15/2023    MCHC 31.1 (L) 10/17/2023    MCHC 30.0 (L) 10/16/2023    MCHC 31.0 (L) 10/15/2023    RDW 14.5 10/17/2023    RDW 14.7 (H) 10/16/2023    RDW 14.7 (H) 10/15/2023     10/17/2023     10/16/2023     10/15/2023    MPV 9.5 10/17/2023    MPV 9.6 10/16/2023    MPV 9.9 10/15/2023       Cardiac Enzymes:    Lab Results   Component Value Date     TROPHS 24 10/02/2023    TROPHS 2,226 (H) 09/20/2023    TROPHS 1,661 (H) 09/19/2023       Hepatic Function Panel:    Lab Results   Component Value Date    ALKPHOS 70 10/02/2023    ALT 19 10/02/2023    AST 16 10/02/2023    PROT 5.1 (L) 10/02/2023    BILITOT 0.4 10/02/2023    BILIDIR 0.1 10/02/2023       Diagnostic Studies:     CT head wo IV contrast  Result Date: 11/14/2023    CT HEAD: 1. No evidence of hemorrhage, depressed skull fracture, or other acute intracranial trauma. 2. Subtle attenuation changes are present in the periventricular and subcortical white matter of bilateral cerebral hemispheres, nonspecific findings likely representing changes of microvascular disease.   CT C-SPINE: 1. No evidence of acute trauma to the cervical spine. 2. Postsurgical changes of posterior spinal fusion and decompression extending from the level of C3 through T1 with associated beam hardening artifact somewhat limiting evaluation of the adjacent structures. 3. Multilevel mild-to-moderate neural foraminal stenosis due to endplate spurring and hypertrophic facet changes are present, incompletely characterized on current exam.   MACRO: None   Signed by: Rome Casey 11/14/2023 10:01 PM Dictation workstation:   [id number]      Problem List:     Patient Active Problem List   Diagnosis    2-vessel coronary artery disease    ESRD (end stage renal disease) (CMS/HCC)    Type 2 diabetes mellitus with circulatory disorder (CMS/HCC)    Combined forms of age-related cataract of both eyes    MAU (acute kidney injury) (CMS/HCC)    Allergy, unspecified, initial encounter    Anaphylactic shock, unspecified, initial encounter    Benign prostatic hyperplasia    Breast pain    Carpal tunnel syndrome    Cervical myelopathy (CMS/HCC)    Chronic fatigue    Class 1 obesity    Coagulation defect, unspecified (CMS/HCC)    Cortical senile cataract    Diabetes mellitus type 2 without retinopathy (CMS/HCC)    Edema    ESRD on peritoneal dialysis  (CMS/HCC)    Essential hypertension, benign    High thyroid stimulating hormone (TSH) level    Hyperkalemia    Mixed hyperlipidemia    Hyperphosphatemia    Hypertension secondary to other renal disorders    Hypertrophy of nail    Mechanical complication of dialysis catheter (CMS/HCC)    Metabolic acidosis    Microscopic hematuria    Myopia of right eye    Nuclear sclerosis    Chronic anemia    Anemia due to stage 5 chronic kidney disease, not on chronic dialysis (CMS/[facility])    Type 2 diabetes mellitus with hyperglycemia (CMS/[facility])    Type 2 diabetes mellitus with diabetic neuropathy, unspecified (CMS/[facility])    Subclinical hypothyroidism    Stenosis of intervertebral foramina    Secondary hyperparathyroidism of renal origin (CMS/HCC)    Stage 4 chronic kidney disease (CMS/HCC)    Right arm weakness    Restrictive lung disease    ILD (interstitial lung disease) (CMS/HCC)    Restrictive cardiomyopathy (CMS/HCC)    Regular astigmatism of left eye    Proteinuria    Presbyopia    Peripheral vascular disease (CMS/[facility])    Peripheral neuropathy due to metabolic disorder (CMS/[facility])    Other disorders resulting from impaired renal tubular function       Asessment/Plan:     Problem List Items Addressed This Visit             ICD-10-CM    Mixed hyperlipidemia E78.2    Relevant Orders    CBC    Comprehensive metabolic panel    Lipid panel    Follow Up In Cardiology    Palpitations R00.2    Relevant Orders    Holter or Event Cardiac Monitor    CBC    Comprehensive metabolic panel    Lipid panel    Follow Up In Cardiology    PAF (paroxysmal atrial fibrillation) (CMS/[facility]) I48.0    Relevant Medications    amLODIPine (Norvasc) 10 mg tablet    Other Relevant Orders    Holter or Event Cardiac Monitor    CBC    Comprehensive metabolic panel    Lipid panel    Follow Up In Cardiology     Other Visit Diagnoses         Codes    Essential hypertension     I10    Relevant Orders    CBC    Comprehensive metabolic panel    Lipid panel    Follow Up In  Cardiology          1.  CAD  Status post hybrid vascularization (MIDCAB LIMA to LAD 10/11/2023, PTCA to dominant circumflex 10/16/2023 [aborted without stenting because of unyielding calcified lesion]).  The patient has no chest pain.  Continue medical management of his circumflex disease per Dr. Hudson.  We again discussed the option of rotational atherectomy in case the symptoms recur and the risks involved with that.       2.  Heart failure with preserved ejection fraction (HFpEF)  Patient has evidence of fluid overload.  He is on peritoneal dialysis and high-dose of diuretics.  Optimization of dialysate and diuretics per nephrology.       3.  Fatigue and generalized weakness.  Gradually improving.    4.  Mild to moderate pericardial effusion  Patient noted to have a preop pericardial effusion.  No tamponade.     5.  Paroxysmal atrial fibrillation  Patient had short-lived episode of paroxysmal atrial fibrillation in the setting of MI.  The episode did not recur during the rest of his hospital stay.  In the postop setting patient has been maintained on DAPT without any anticoagulation.  He initially presented with significant anemia and hemoglobin 7.7.  He will be scheduled for a 24-hour Holter monitor.  May consider WATCHMAN due to frailty and fall risk.

## 2023-11-30 ENCOUNTER — HOME CARE VISIT (OUTPATIENT)
Dept: HOME HEALTH SERVICES | Facility: HOME HEALTH | Age: 83
End: 2023-11-30
Payer: MEDICARE

## 2023-11-30 LAB — HOLD SPECIMEN: NORMAL

## 2023-11-30 PROCEDURE — 1090000002 HH PPS REVENUE DEBIT

## 2023-11-30 PROCEDURE — G0157 HHC PT ASSISTANT EA 15: HCPCS | Mod: HHH

## 2023-11-30 PROCEDURE — 1090000001 HH PPS REVENUE CREDIT

## 2023-11-30 ASSESSMENT — ENCOUNTER SYMPTOMS
LOWEST PAIN SEVERITY IN PAST 24 HOURS: 3/10
SUBJECTIVE PAIN PROGRESSION: GRADUALLY IMPROVING
PAIN: 1

## 2023-12-01 ENCOUNTER — HOME CARE VISIT (OUTPATIENT)
Dept: HOME HEALTH SERVICES | Facility: HOME HEALTH | Age: 83
End: 2023-12-01
Payer: MEDICARE

## 2023-12-01 PROCEDURE — 1090000002 HH PPS REVENUE DEBIT

## 2023-12-01 PROCEDURE — 1090000001 HH PPS REVENUE CREDIT

## 2023-12-01 SDOH — HEALTH STABILITY: PHYSICAL HEALTH: EXERCISE TYPE: SHALLOW SQUATS, COMPONENTS OF GAIT

## 2023-12-01 SDOH — HEALTH STABILITY: PHYSICAL HEALTH: PHYSICAL EXERCISE: STANDING

## 2023-12-01 SDOH — HEALTH STABILITY: PHYSICAL HEALTH: EXERCISE ACTIVITIES SETS: 2

## 2023-12-01 SDOH — HEALTH STABILITY: PHYSICAL HEALTH: EXERCISE ACTIVITY: SQUATS

## 2023-12-01 SDOH — HEALTH STABILITY: PHYSICAL HEALTH: PHYSICAL EXERCISE: 5

## 2023-12-01 SDOH — HEALTH STABILITY: PHYSICAL HEALTH: EXERCISE ACTIVITY: COMPONENTS OF GAIT

## 2023-12-01 ASSESSMENT — ACTIVITIES OF DAILY LIVING (ADL)
AMBULATION ASSISTANCE: STAND BY ASSIST
AMBULATION ASSISTANCE ON FLAT SURFACES: 1
AMBULATION_DISTANCE/DURATION_TOLERATED: 75 FT
AMBULATION ASSISTANCE: 1

## 2023-12-02 PROCEDURE — 1090000002 HH PPS REVENUE DEBIT

## 2023-12-02 PROCEDURE — 1090000001 HH PPS REVENUE CREDIT

## 2023-12-03 PROCEDURE — 1090000001 HH PPS REVENUE CREDIT

## 2023-12-03 PROCEDURE — 1090000002 HH PPS REVENUE DEBIT

## 2023-12-04 ENCOUNTER — HOME CARE VISIT (OUTPATIENT)
Dept: HOME HEALTH SERVICES | Facility: HOME HEALTH | Age: 83
End: 2023-12-04
Payer: MEDICARE

## 2023-12-04 PROCEDURE — G0157 HHC PT ASSISTANT EA 15: HCPCS | Mod: HHH

## 2023-12-04 PROCEDURE — 1090000001 HH PPS REVENUE CREDIT

## 2023-12-04 PROCEDURE — 1090000002 HH PPS REVENUE DEBIT

## 2023-12-04 SDOH — HEALTH STABILITY: PHYSICAL HEALTH: EXERCISE ACTIVITY: SHALLOW SQUATS

## 2023-12-04 SDOH — HEALTH STABILITY: PHYSICAL HEALTH: PHYSICAL EXERCISE: STANDING

## 2023-12-04 SDOH — HEALTH STABILITY: PHYSICAL HEALTH: PHYSICAL EXERCISE: 10

## 2023-12-04 SDOH — HEALTH STABILITY: PHYSICAL HEALTH: EXERCISE ACTIVITIES SETS: 2

## 2023-12-04 ASSESSMENT — ACTIVITIES OF DAILY LIVING (ADL)
AMBULATION ASSISTANCE ON FLAT SURFACES: 1
AMBULATION ASSISTANCE: STAND BY ASSIST
AMBULATION ASSISTANCE: 1

## 2023-12-04 ASSESSMENT — ENCOUNTER SYMPTOMS
MUSCLE WEAKNESS: 1
DENIES PAIN: 1
PERSON REPORTING PAIN: PATIENT

## 2023-12-05 PROCEDURE — 1090000002 HH PPS REVENUE DEBIT

## 2023-12-05 PROCEDURE — 1090000001 HH PPS REVENUE CREDIT

## 2023-12-06 ENCOUNTER — HOME CARE VISIT (OUTPATIENT)
Dept: HOME HEALTH SERVICES | Facility: HOME HEALTH | Age: 83
End: 2023-12-06
Payer: MEDICARE

## 2023-12-06 VITALS — HEART RATE: 88 BPM | OXYGEN SATURATION: 99 %

## 2023-12-06 PROCEDURE — 1090000001 HH PPS REVENUE CREDIT

## 2023-12-06 PROCEDURE — G0152 HHCP-SERV OF OT,EA 15 MIN: HCPCS | Mod: HHH

## 2023-12-06 PROCEDURE — G0157 HHC PT ASSISTANT EA 15: HCPCS | Mod: HHH

## 2023-12-06 PROCEDURE — 1090000002 HH PPS REVENUE DEBIT

## 2023-12-06 ASSESSMENT — ENCOUNTER SYMPTOMS
HIGHEST PAIN SEVERITY IN PAST 24 HOURS: 6/10
PERSON REPORTING PAIN: PATIENT
PAIN LOCATION - PAIN SEVERITY: 5/10
PERSON REPORTING PAIN: PATIENT
LOWEST PAIN SEVERITY IN PAST 24 HOURS: 0/10
PAIN: 1
PAIN LOCATION: RIGHT SHOULDER
PAIN: 1

## 2023-12-06 ASSESSMENT — ACTIVITIES OF DAILY LIVING (ADL)
DRESSING_LB_CURRENT_FUNCTION: INDEPENDENT
CURRENT_FUNCTION: INDEPENDENT
PHYSICAL TRANSFERS ASSESSED: 1

## 2023-12-07 PROCEDURE — 1090000001 HH PPS REVENUE CREDIT

## 2023-12-07 PROCEDURE — 1090000002 HH PPS REVENUE DEBIT

## 2023-12-07 SDOH — HEALTH STABILITY: PHYSICAL HEALTH: EXERCISE TYPE: REINFORCED COMPONENTS OF GAIT FOR HEP

## 2023-12-07 ASSESSMENT — ACTIVITIES OF DAILY LIVING (ADL)
AMBULATION ASSISTANCE ON FLAT SURFACES: 1
AMBULATION ASSISTANCE: 1

## 2023-12-07 ASSESSMENT — ENCOUNTER SYMPTOMS: MUSCLE WEAKNESS: 1

## 2023-12-08 ENCOUNTER — HOME CARE VISIT (OUTPATIENT)
Dept: HOME HEALTH SERVICES | Facility: HOME HEALTH | Age: 83
End: 2023-12-08
Payer: MEDICARE

## 2023-12-08 VITALS
SYSTOLIC BLOOD PRESSURE: 117 MMHG | RESPIRATION RATE: 20 BRPM | TEMPERATURE: 97.3 F | OXYGEN SATURATION: 99 % | DIASTOLIC BLOOD PRESSURE: 51 MMHG | HEART RATE: 82 BPM

## 2023-12-08 PROCEDURE — 1090000002 HH PPS REVENUE DEBIT

## 2023-12-08 PROCEDURE — 1090000001 HH PPS REVENUE CREDIT

## 2023-12-08 PROCEDURE — G0299 HHS/HOSPICE OF RN EA 15 MIN: HCPCS | Mod: HHH

## 2023-12-08 ASSESSMENT — ENCOUNTER SYMPTOMS
LOWER EXTREMITY EDEMA: 1
DENIES PAIN: 1
APPETITE LEVEL: GOOD
MUSCLE WEAKNESS: 1
CHANGE IN APPETITE: INCREASED
PERSON REPORTING PAIN: PATIENT

## 2023-12-09 PROCEDURE — 1090000001 HH PPS REVENUE CREDIT

## 2023-12-09 PROCEDURE — 1090000002 HH PPS REVENUE DEBIT

## 2023-12-09 NOTE — HOME HEALTH
RN dc visit completed.  Pt is much improved since initial evaluation.  Discussed with pt and wife cardiac rehab vs outpt PT, ultimately decided that starting with cardiac rehab was best.  Shared information about a possible new PCP, Dr Melton in [place].  Meds and allergies reviewed with pt, updates made, no discrepancies found.  VSS at today's visit, pt c/o his usual wondering pain.  No further nursing needs at this time, PT is still in home.

## 2023-12-10 PROCEDURE — 1090000001 HH PPS REVENUE CREDIT

## 2023-12-10 PROCEDURE — 1090000002 HH PPS REVENUE DEBIT

## 2023-12-11 ENCOUNTER — HOME CARE VISIT (OUTPATIENT)
Dept: HOME HEALTH SERVICES | Facility: HOME HEALTH | Age: 83
End: 2023-12-11
Payer: MEDICARE

## 2023-12-11 PROCEDURE — G0157 HHC PT ASSISTANT EA 15: HCPCS | Mod: HHH

## 2023-12-11 PROCEDURE — 1090000002 HH PPS REVENUE DEBIT

## 2023-12-11 PROCEDURE — 1090000001 HH PPS REVENUE CREDIT

## 2023-12-12 ENCOUNTER — HOSPITAL ENCOUNTER (OUTPATIENT)
Dept: CARDIOLOGY | Facility: HOSPITAL | Age: 83
Discharge: HOME | End: 2023-12-12
Payer: MEDICARE

## 2023-12-12 DIAGNOSIS — I31.39 PERICARDIAL EFFUSION (HHS-HCC): ICD-10-CM

## 2023-12-12 LAB — RIGHT VENTRICLE PEAK SYSTOLIC PRESSURE: 22.4

## 2023-12-12 PROCEDURE — 93308 TTE F-UP OR LMTD: CPT

## 2023-12-12 PROCEDURE — 1090000001 HH PPS REVENUE CREDIT

## 2023-12-12 PROCEDURE — 93308 TTE F-UP OR LMTD: CPT | Performed by: INTERNAL MEDICINE

## 2023-12-12 PROCEDURE — 1090000002 HH PPS REVENUE DEBIT

## 2023-12-12 SDOH — HEALTH STABILITY: PHYSICAL HEALTH: EXERCISE TYPE: FINALIZED HEP FOR STRENGTHENING AND BAL

## 2023-12-12 SDOH — HEALTH STABILITY: PHYSICAL HEALTH: EXERCISE COMMENTS: GOOD TEACHBACK OF HEP

## 2023-12-12 ASSESSMENT — ACTIVITIES OF DAILY LIVING (ADL)
AMBULATION ASSISTANCE: 1
CURRENT_FUNCTION: INDEPENDENT
AMBULATION ASSISTANCE ON FLAT SURFACES: 1
PHYSICAL TRANSFERS ASSESSED: 1
AMBULATION ASSISTANCE: INDEPENDENT

## 2023-12-12 ASSESSMENT — ENCOUNTER SYMPTOMS
PAIN LOCATION: NECK
PAIN LOCATION - PAIN SEVERITY: 7/10
PAIN LOCATION: LEFT SHOULDER
MUSCLE WEAKNESS: 1
PERSON REPORTING PAIN: PATIENT
PAIN LOCATION - PAIN SEVERITY: 7/10
HIGHEST PAIN SEVERITY IN PAST 24 HOURS: 7/10
PAIN LOCATION: RIGHT SHOULDER
PAIN: 1
PAIN LOCATION - RELIEVING FACTORS: CERVICAL COLLAR
PAIN LOCATION - PAIN SEVERITY: 7/10

## 2023-12-13 ENCOUNTER — HOSPITAL ENCOUNTER (OUTPATIENT)
Dept: CARDIOLOGY | Facility: HOSPITAL | Age: 83
Discharge: HOME | End: 2023-12-13
Payer: MEDICARE

## 2023-12-13 DIAGNOSIS — I48.0 PAF (PAROXYSMAL ATRIAL FIBRILLATION) (MULTI): ICD-10-CM

## 2023-12-13 DIAGNOSIS — R00.2 PALPITATIONS: ICD-10-CM

## 2023-12-13 PROCEDURE — 1090000001 HH PPS REVENUE CREDIT

## 2023-12-13 PROCEDURE — 1090000002 HH PPS REVENUE DEBIT

## 2023-12-13 PROCEDURE — 93227 XTRNL ECG REC<48 HR R&I: CPT | Performed by: INTERNAL MEDICINE

## 2023-12-13 PROCEDURE — 93225 XTRNL ECG REC<48 HRS REC: CPT

## 2023-12-13 NOTE — LETTER
January 5, 2024     Salomon Chowdary  [id number] [id number]      Dear Mr. Chowdary:    Below are the results from your recent visit:    Resulted Orders   Holter or Event Cardiac Monitor    Narrative    *The predominant rhythm was sinus bradycardia to sinus tachycardia.  *The Maximum Heart Rate recorded was 115 bpm, 12/13 20:35:02, the Minimum   Heart Rate recorded was 54 bpm, 12/14 04:31:26, and the Average Heart Rate   was 73 bpm.  *There were 661 VE beats with a burden of <1 %.  *There were 3,572 SVE beats with a burden of 3 %. There were 3 occurrences   of Supraventricular Tachycardia with the Longest episode 8 beats, 12/14   07:13:16, and the Fastest episode 115 bpm, 12/13 20:35:01.  *There were 0 Patient Triggers.    Patient recorded for 23 H and 58 minutes  NSR with rates  bpm, avg = 73 bpm  No sustained pauses or bradycardias  No atrial fibrillation.  3% supraventricular ectopy. On Dec 14 at 07:13 AM five beats of   nonconducted PACs  Occasional PVC  No malignant ectopy.  No adverse complaints reported.     Holter monitor reviewed and looks fine.  Normal rhythm with an average heart rate of 73 bpm.  Modest frequency of benign extra beats.  He currently is on Toprol-XL 50 mg daily.  Previous blood pressure was borderline low so I am hesitant to increase the dose at this point.  For now we will continue the same and have him follow-up as scheduled.  Thanks.   If you have any questions or concerns, please don't hesitate to call.      Sincerely,    Dr. Michael Harmon MD, [facility]

## 2023-12-14 ENCOUNTER — TELEPHONE (OUTPATIENT)
Dept: CARDIOLOGY | Facility: CLINIC | Age: 83
End: 2023-12-14
Payer: MEDICARE

## 2023-12-14 ENCOUNTER — HOME CARE VISIT (OUTPATIENT)
Dept: HOME HEALTH SERVICES | Facility: HOME HEALTH | Age: 83
End: 2023-12-14
Payer: MEDICARE

## 2023-12-14 VITALS
DIASTOLIC BLOOD PRESSURE: 60 MMHG | HEART RATE: 80 BPM | SYSTOLIC BLOOD PRESSURE: 126 MMHG | RESPIRATION RATE: 16 BRPM | OXYGEN SATURATION: 90 % | TEMPERATURE: 96.1 F

## 2023-12-14 PROCEDURE — 1090000002 HH PPS REVENUE DEBIT

## 2023-12-14 PROCEDURE — 1090000001 HH PPS REVENUE CREDIT

## 2023-12-14 PROCEDURE — G0151 HHCP-SERV OF PT,EA 15 MIN: HCPCS | Mod: HHH

## 2023-12-14 PROCEDURE — 1090000003 HH PPS REVENUE ADJ

## 2023-12-14 SDOH — HEALTH STABILITY: PHYSICAL HEALTH: PHYSICAL EXERCISE: 15

## 2023-12-14 SDOH — HEALTH STABILITY: PHYSICAL HEALTH: EXERCISE ACTIVITIES SETS: 1

## 2023-12-14 SDOH — HEALTH STABILITY: PHYSICAL HEALTH: PHYSICAL EXERCISE: STANDING

## 2023-12-14 SDOH — HEALTH STABILITY: PHYSICAL HEALTH: EXERCISE ACTIVITY: MINI SQUATS

## 2023-12-14 SDOH — HEALTH STABILITY: PHYSICAL HEALTH: EXERCISE ACTIVITY: HIP EXTENSION

## 2023-12-14 SDOH — HEALTH STABILITY: PHYSICAL HEALTH: EXERCISE ACTIVITY: HEEL RAISES/TOE RAISES

## 2023-12-14 SDOH — HEALTH STABILITY: PHYSICAL HEALTH: EXERCISE ACTIVITY: HS CURLS

## 2023-12-14 SDOH — HEALTH STABILITY: PHYSICAL HEALTH: EXERCISE ACTIVITY: HIP ABDUCTION

## 2023-12-14 SDOH — HEALTH STABILITY: PHYSICAL HEALTH: EXERCISE ACTIVITY: MARCHING

## 2023-12-14 SDOH — HEALTH STABILITY: PHYSICAL HEALTH: EXERCISE TYPE: LE EXERCISES

## 2023-12-14 ASSESSMENT — ENCOUNTER SYMPTOMS
PAIN LOCATION - PAIN FREQUENCY: CONSTANT
OCCASIONAL FEELINGS OF UNSTEADINESS: 1
PAIN LOCATION - RELIEVING FACTORS: HEAT
LOWEST PAIN SEVERITY IN PAST 24 HOURS: 0/10
PAIN LOCATION: RIGHT HAND
PAIN LOCATION - PAIN SEVERITY: 4/10
PAIN LOCATION - PAIN SEVERITY: 4/10
PAIN: 1
PAIN LOCATION - PAIN SEVERITY: 4/10
PAIN LOCATION - PAIN QUALITY: ACHING
PAIN LOCATION - PAIN FREQUENCY: CONSTANT
PERSON REPORTING PAIN: PATIENT
PAIN LOCATION - PAIN SEVERITY: 4/10
PAIN LOCATION: LEFT HAND
PAIN LOCATION - PAIN SEVERITY: 4/10
PAIN LOCATION - PAIN QUALITY: ACHING
PAIN LOCATION: LEFT FOOT
PAIN LOCATION - PAIN FREQUENCY: CONSTANT
PAIN LOCATION - RELIEVING FACTORS: HEAT
PAIN LOCATION - PAIN QUALITY: ACHING
PAIN SEVERITY GOAL: 0/10
SUBJECTIVE PAIN PROGRESSION: UNCHANGED
MUSCLE WEAKNESS: 1
PAIN LOCATION - PAIN FREQUENCY: CONSTANT
PAIN LOCATION: RIGHT SHOULDER
PAIN LOCATION: RIGHT FOOT
HIGHEST PAIN SEVERITY IN PAST 24 HOURS: 10/10
PAIN LOCATION - PAIN QUALITY: ACHING
PAIN LOCATION - PAIN QUALITY: ACHING
PAIN LOCATION - RELIEVING FACTORS: HEAT
PAIN LOCATION - PAIN FREQUENCY: CONSTANT

## 2023-12-14 ASSESSMENT — GAIT ASSESSMENTS
WALKING STANCE: 1 - HEELS ALMOST TOUCHING WHILE WALKING
PATH SCORE: 1
BALANCE AND GAIT SCORE: 23
TRUNK SCORE: 1
STEP SYMMETRY: 1 - RIGHT AND LEFT STEP LENGTH APPEAR EQUAL
INITIATION OF GAIT IMMEDIATELY AFTER GO: 1 - NO HESITANCY
TRUNK: 1 - NO SWAY BUT FLEXION OF KNEES OR BACK OR SPREADS ARMS WHILE WALKING
GAIT SCORE: 10
PATH: 1 - MILD/MODERATE DEVIATION OR USES WALKING AID
STEP CONTINUITY: 1 - STEPS APPEAR CONTINUOUS

## 2023-12-14 ASSESSMENT — ACTIVITIES OF DAILY LIVING (ADL)
OASIS_M1830: 01
AMBULATION_DISTANCE/DURATION_TOLERATED: 150FT
HOME_HEALTH_OASIS: 00
AMBULATION ASSISTANCE ON FLAT SURFACES: 1

## 2023-12-14 ASSESSMENT — BALANCE ASSESSMENTS
ARISING SCORE: 1
ATTEMPTS TO ARISE: 2 - ABLE TO RISE, ONE ATTEMPT
IMMEDIATE STANDING BALANCE FIRST 5 SECONDS: 2 - STEADY WITHOUT WALKER OR OTHER SUPPORT
EYES CLOSED AT MAXIMUM POSITION NUDGED: 1 - STEADY
ARISES: 1 - ABLE, USES ARMS TO HELP
NUDGED SCORE: 2
BALANCE SCORE: 13
STANDING BALANCE: 1 - STEADY BUT WIDE STANCE AND USES CANE OR OTHER SUPPORT
NUDGED: 2 - STEADY
SITTING DOWN: 1 - USES ARMS OR NOT SMOOTH MOTION
TURNING 360 DEGREES STEPS: 1 - CONTINUOUS STEPS
SITTING BALANCE: 1 - STEADY, SAFE

## 2023-12-15 PROCEDURE — 1090000001 HH PPS REVENUE CREDIT

## 2023-12-15 PROCEDURE — 1090000002 HH PPS REVENUE DEBIT

## 2023-12-15 PROCEDURE — G0180 MD CERTIFICATION HHA PATIENT: HCPCS | Performed by: NURSE PRACTITIONER

## 2023-12-16 PROCEDURE — 1090000001 HH PPS REVENUE CREDIT

## 2023-12-16 PROCEDURE — 1090000002 HH PPS REVENUE DEBIT

## 2023-12-17 PROCEDURE — 1090000002 HH PPS REVENUE DEBIT

## 2023-12-17 PROCEDURE — 1090000001 HH PPS REVENUE CREDIT

## 2023-12-18 PROCEDURE — 1090000002 HH PPS REVENUE DEBIT

## 2023-12-18 PROCEDURE — 1090000001 HH PPS REVENUE CREDIT

## 2023-12-19 NOTE — PROGRESS NOTES
Subjective   Patient ID: Salomon Chowdary is a 83 y.o. male who presents for Dysphagia (Patient had a heart attack in Sept. Ever since being intubated, he is having issues swallowing. He also dry heaves in the morning. He had a barium swallow yesterday with [facility]. He has never had an EGD).  HPI: NPV-Pt is here today for difficulty swallowing.   Presents with complaints of dysphagia.  Since his CABG procedure done in October he has been complaining of difficulty swallowing described as unable to swallow chicken, pieces of bread relieved with drinking water as well as having some choking sensation.  Denies any weight loss or change in appetite.  Denies nausea or vomiting.  Denies any abdominal pain or change in bowel habits.  Has some dry heaving sensations after he swallows his postnasal drip that last for several minutes.  Has never had an EGD and his last colonoscopy was several years ago but cannot remember the results  Current Outpatient Medications on File Prior to Visit   Medication Sig Dispense Refill    acetaminophen (Tylenol) 325 mg tablet Take 2 tablets (650 mg) by mouth every 6 hours if needed for mild pain (1 - 3). 30 tablet 0    amLODIPine (Norvasc) 10 mg tablet Take 1 tablet (10 mg) by mouth once daily.      aspirin 81 mg EC tablet Take 1 tablet (81 mg) by mouth once daily. Do not start before October 18, 2023.      atorvastatin (Lipitor) 40 mg tablet Take 1 tablet (40 mg) by mouth once daily at bedtime. 90 tablet 3    cholecalciferol (Vitamin D-3) 25 MCG (1000 UT) tablet Take 1 tablet (25 mcg) by mouth once daily.      clopidogrel (Plavix) 75 mg tablet Take 1 tablet (75 mg) by mouth once daily for 362 doses. Do not start before October 18, 2023. 90 tablet 3    DULoxetine (Cymbalta) 30 mg DR capsule Take 1 capsule (30 mg) by mouth once daily. Do not crush or chew.      flash glucose sensor (FREESTYLE ANDRÉS 2 SENSOR [facility]) Change every 2 weeks      fluticasone (Flonase) 50 mcg/actuation nasal spray once daily  "as needed.      glucosam aguirre dip-chondroit-C-Mn 546-353-06-3 mg capsule GLUCOSAMINE 1500 COMPLEX CAPS      insulin detemir (Levemir U-100 Insulin) 100 unit/mL injection Inject 7 Units under the skin once daily. Take as directed per insulin instructions.      insulin lispro (HumaLOG) 100 unit/mL injection Inject subcutaneously before meals TID, as per sliding scale: 1 unit if sugar is 151-200, 2 units if 201-250, 3 units if 251-300, 4 units if 301-350, 5 units> 350. Total 15 units per day.      LANCETS MISC 1 each once daily.      levothyroxine (Tirosint) 50 mcg capsule Take 1 capsule (50 mcg) by mouth once daily in the morning. Take before meals.      metoprolol succinate XL (Toprol-XL) 50 mg 24 hr tablet Take 1 tablet (50 mg) by mouth once daily. Do not crush or chew. 30 tablet 2    min17/nettle/pumpkin/saw palme (PROSTATE THERAPY ORAL) Take by mouth.      multivitamin with minerals (Daily Multivitamin-Minerals) tablet Take 1 tablet by mouth once daily.      OneTouch Ultra Test strip TEST THREE TIMES DAILY.      pen needle, diabetic 31 gauge x 1/4\" needle 1 each 2 times a day.      RenaPlex-D 800 mcg-12.5 mg -2,000 unit tablet Take 1 tablet by mouth once daily.      sodium bicarbonate 650 mg tablet Take 1 tablet (650 mg) by mouth 2 times a day.      torsemide (Demadex) 100 mg tablet Take 0.5 tablets (50 mg) by mouth once daily.      vitamin-B complex split tablet Take by mouth.       No current facility-administered medications on file prior to visit.        Review of Systems   Constitutional:  Negative for chills, fatigue and fever.   HENT:  Positive for postnasal drip.    Respiratory:  Negative for cough, chest tightness, shortness of breath and wheezing.    Cardiovascular:  Negative for chest pain, palpitations and leg swelling.   Gastrointestinal:  Negative for abdominal pain, constipation, diarrhea, nausea and vomiting.   Genitourinary:  Negative for dysuria, flank pain, frequency, hematuria and urgency. " "  Musculoskeletal:  Negative for arthralgias, back pain and myalgias.   Neurological:  Negative for light-headedness, numbness and headaches.       Objective   Physical Exam  Constitutional:       Appearance: Normal appearance.   HENT:      Head: Normocephalic and atraumatic.   Cardiovascular:      Rate and Rhythm: Normal rate and regular rhythm.      Pulses: Normal pulses.      Heart sounds: Normal heart sounds.   Pulmonary:      Effort: Pulmonary effort is normal.      Breath sounds: Normal breath sounds.   Abdominal:      General: Abdomen is flat. Bowel sounds are normal.      Palpations: Abdomen is soft.      Tenderness: There is no abdominal tenderness.   Musculoskeletal:         General: Normal range of motion.      Cervical back: Normal range of motion.   Skin:     General: Skin is warm.   Neurological:      Mental Status: He is alert.       /86   Pulse 80   Ht 1.727 m (5' 8\")   Wt 73.9 kg (163 lb)   BMI 24.78 kg/m²      Lab Results   Component Value Date    WBC 8.1 10/17/2023    HGB 8.8 (L) 10/17/2023    HCT 28.3 (L) 10/17/2023     (H) 10/17/2023     10/17/2023           No lab exists for component: \"LABALBU\"    No results found for: \"AFP\"  Lab Results   Component Value Date    TSH 5.76 (H) 10/09/2023         Assessment/Plan   Diagnoses and all orders for this visit:  Pharyngoesophageal dysphagia  -     EGD; Future  Order for dysphagia recent CABG.  Has intermittent swallowing issues mainly with dry food like chicken or bread.  Suspect esophageal dysmotility/upper esophageal sphincter dysfunction.  He already scheduled to undergo barium esophagram in next few weeks.  He will approach my office once that test is done for me to review.  I have recommended upper endoscopy with possible dilation for further evaluation.  Because of patient's multiple comorbidities they are going to think about invasive test.  They will reach out to me once they have made a decision.  These recommendations " were discussed with the patient and his wife.  Will need to check with your cardiologist about holding plavix before the upper endoscopy.

## 2023-12-21 ENCOUNTER — OFFICE VISIT (OUTPATIENT)
Dept: GASTROENTEROLOGY | Facility: CLINIC | Age: 83
End: 2023-12-21
Payer: MEDICARE

## 2023-12-21 VITALS
HEIGHT: 68 IN | DIASTOLIC BLOOD PRESSURE: 86 MMHG | BODY MASS INDEX: 24.71 KG/M2 | SYSTOLIC BLOOD PRESSURE: 124 MMHG | HEART RATE: 80 BPM | WEIGHT: 163 LBS

## 2023-12-21 DIAGNOSIS — R13.14 PHARYNGOESOPHAGEAL DYSPHAGIA: Primary | ICD-10-CM

## 2023-12-21 PROCEDURE — 1159F MED LIST DOCD IN RCRD: CPT | Performed by: INTERNAL MEDICINE

## 2023-12-21 PROCEDURE — 99204 OFFICE O/P NEW MOD 45 MIN: CPT | Performed by: INTERNAL MEDICINE

## 2023-12-21 PROCEDURE — 3074F SYST BP LT 130 MM HG: CPT | Performed by: INTERNAL MEDICINE

## 2023-12-21 PROCEDURE — 1126F AMNT PAIN NOTED NONE PRSNT: CPT | Performed by: INTERNAL MEDICINE

## 2023-12-21 PROCEDURE — 3079F DIAST BP 80-89 MM HG: CPT | Performed by: INTERNAL MEDICINE

## 2023-12-21 PROCEDURE — 1036F TOBACCO NON-USER: CPT | Performed by: INTERNAL MEDICINE

## 2023-12-21 ASSESSMENT — ENCOUNTER SYMPTOMS
ABDOMINAL PAIN: 0
CONSTIPATION: 0
HEADACHES: 0
FLANK PAIN: 0
DYSURIA: 0
BACK PAIN: 0
CHEST TIGHTNESS: 0
FATIGUE: 0
VOMITING: 0
FEVER: 0
SHORTNESS OF BREATH: 0
HEMATURIA: 0
WHEEZING: 0
CHILLS: 0
COUGH: 0
NUMBNESS: 0
LIGHT-HEADEDNESS: 0
PALPITATIONS: 0
NAUSEA: 0
MYALGIAS: 0
FREQUENCY: 0
DIARRHEA: 0
ARTHRALGIAS: 0

## 2023-12-21 NOTE — PATIENT INSTRUCTIONS
Diagnoses and all orders for this visit:  Pharyngoesophageal dysphagia  -     EGD; Future    Will need to check with your cardiologist about holding plavix before the upper endoscopy.

## 2023-12-27 ENCOUNTER — TRANSCRIBE ORDERS (OUTPATIENT)
Dept: CARDIAC REHAB | Facility: HOSPITAL | Age: 83
End: 2023-12-27
Payer: MEDICARE

## 2023-12-27 ENCOUNTER — CLINICAL SUPPORT (OUTPATIENT)
Dept: CARDIAC REHAB | Facility: HOSPITAL | Age: 83
End: 2023-12-27
Payer: MEDICARE

## 2023-12-27 DIAGNOSIS — Z95.1 S/P CABG (CORONARY ARTERY BYPASS GRAFT): ICD-10-CM

## 2023-12-27 DIAGNOSIS — Z95.1 S/P CABG (CORONARY ARTERY BYPASS GRAFT): Primary | ICD-10-CM

## 2023-12-27 NOTE — PROGRESS NOTES
Name: Salomon Chowdary   : 1940     Diagnosis: CABG   MRN: [id number]   Onset Date: 10/11/2023    Today's Date: 23      Cardiovascular   HX: CAD, HTN, HLD, and NSTEMI  Family HX of CAD:  Yes  Angina:   acute chest radiating to left shoulder, down left arm all the way to the wrist  Describe: The chest pain were sharp spikes.  Last Episode: 2023  History of Heart Failure: No  EF: Over 50%  Onset of HF:  Last HF hospitalization:  Family HX of HF:  No    Devices: none    HX of PAD: No    Arrythmias: normal sinus rhythm, atrial fibrillation, and episode of atrial fibrillation once on admission 23 in ER  Apical: regular  Heart Rate: 80  BP:123/62    Radial pulses:  R Present 2+ L Present 2+    Comments:      Respiratory  HX: None  Dyspnea:  Yes  Describe:short of breath with mild exertion.  HX RUBY:  No  CPAP Use:  No  Family History of Lung Disease:  Yes    Resting O2 sat: 98%  Lung Sounds:  diminished  Locations: all lobes    Comments:    Neurological   Orientation: oriented to person, place, time, and general circumstances  HX: none.  History of stroke/TIA?: no    Comments:      Skin  Skin Color: normal, no cyanosis, jaundice, pallor or bruising, bruising  Edema:  1+ edema left ankle/shin, trace note in right ankle      Comments:    Gastrointestinal/Genitourinary  HX: None  Comments:      Psychosocial  Marital status:   Children: Son and 3 daughters, 2 step sons and 1 step duaghter.  Lives alone:  No  Lives with: Spouse: Yoly  Drives:  No  Occupation: is retired   Caretaker of family member?:  No  Do you feel safe at home?:  Yes    Caffeinated drinks per day: I cup coffee per day.  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 , cancer removal from forehead-basal cell, basal cell cancer excised  "both sides of neck, bilateral tunnel surgeries both hands, bilateral inguinal hernias, tonsils and adnoids removal as a child,  Left thoracotmy midcab- CABG with arterial Graft- Single arterial graft - 10-    Comments:    Pain Assessment  Current pain: chronic  Location: neck and bilateral shoulders, mostly right, bilateral legs and feet.  Description: pain continuous and dull    Comments: relief with massage and tylenol, also position changes help.    Fall Risk Assessment  Assistive device: cane  Needs assistance:  No  Afraid of falling:  No  Fall within the past 6 months?: Yes  Injured with fall: knot on back of head- did scan of of head - negative. No loss of consciousness,  Fall risk results: high        INDIVIDUAL CARDIAC TREATMENT PLAN-INITIAL ASSESSMENT     Name: Salomon Chowdary   Today's Date: 23   : 1940    Primary Provider: Dr HARRIS  MRN: [id number]    Referring Physician: Dr. Harris     Diagnosis: NSTEMI, CAD, CABG with ARTERIAL GRAFT. Single Rtery Graft.    Onset Date: 10-      Risk Stratification: High      NUTRITION ASSESSMENT  Lipids: Lipid Lab Date: 10/02/2023  Total Chol: 78  HDL: 35.8  LDL: 23  Tri  Cholesterol Med: Atorvastatin    Diabetes: Yes  HbA1c: 6.7  Date Checked:   Monitors glucose at home: Yes  Fasting Blood Sugar Range:   Frequency: Check blood sugar three times per day.  Hypoglycemic Episode: none    Weight Management  Weight: 166.1  Height: 5' 8\"  BMI:  25.3  Current Diet: Heart Healthy  Barriers to dietary change: none    Initial Dietary Assessment Score:   Discharge Dietary Assessment Score:       NUTRITION PLAN  Nutrition Goals:   1. Improve Picture Your Plate assessment results by discharge.  2. Make changes to diet to include heart healthy options while in the program.    Nutrition Intervention/Education:   *Perform weekly weight checks on .   *Picture my plate survey given      OTHER CORE COMPONENTS/ RISK FACTORS " ASSESSMENT  Medication compliance: good compliance  Using pill box: Yes  Carries medication list: No    Blood Pressure Management:  History of High BP: Yes  Resting BP:123/62      Tobacco: NEVER  Quit Date:   Other forms of tobacco:   Anyone in the house smoke: No    Initial Knowledge Test Score:  Discharge Knowledge Test Score:    OTHER CORE COMPONENTS/ RISK FACTOR PLAN   Other Core components/Risk Factor Goals:                                                                                                                                                      1. Achieve and maintain a resting blood pressure less than 130/80 while in the program.  2. Gain knowledge of cardiac disease and lifestyle modifications related to exercise and ADL's prior to discharge.    Other Components/ Risk Factors Intervention/Education:  *Will continue to monitor HR, BP, dyspnea and arrhythmias each session.   *Will meet with patient to discuss goals & progress.  *Encouraged review of education materials.       PSYCHOSOCIAL ASSESSMENT  Patient reported stress level: mild  Using stress management skills: No  HX of anxiety: No  HX of depression: No  Patient questioned regarding any new stress, depression and anxiety symptoms: Yes    Family/Support System: Family support system.  Seeing mental health provider: No  Psychosocial medications:     Initial PHQ-9 score: 12  Discharge PHQ-9 score:   Was PHQ-9 faxed to provider: No  Date faxed:    Quality of Life Survey:   Pre PCS:  Post PCS:    Pre MCS:  Post MCS:      Stages of change:  Action    PSYCHOSOCIAL PLAN  Psychosocial Goals:  1. To learn stress management techniques  while in the program.  2. Maintain or decrease PHQ-9 survey score by Discharge    Psychosocial Interventions/ Education:  * Provided one on one emotional support and will facilitate peer support within the context of other phase II patients while in the program.       EXERCISE ASSESSMENT  Home Exercise: No  Frequency:  walking around in the house.  Mode: walking    EXERCISE PLAN  Exercise Goals:   1. Exercise 30-40 minutes duration by Discharge.  2. Have a plan in place for continued exercise after the program by discharge.    Exercise Prescription:   Frequency: 2 days per week  Duration (total aerobic min.): 30 minutes  Intensity RPE: 11-13  Target HR: 20-30 beats above resting HR  MET Level Range: 1.6-1.8    Date of first exercise session:     Modality METS Load  Duration   1 Warm Up    05:00   2 NuStep 1.6   16watts 1LV 15:00   3 Cardiostridor 1.8 40Spm 1LV 15:00   4        5        6        7 Cool Down     05:00     Exercise Intervention:   *Aim to progress METS 5-10% every  Week or as tolerated.  *Incorporate resistance training for muscular endurance and strength.    LEARNING ASSESSMENT & BARRIERS  Readiness to Learn:  Barriers: None  Comments:    FALL RISK  high  Comments: Falls with in the last 6 months        INDIVIDUAL PATIENT GOALS:  Decrease SOB with exertion, build stamina, strengthen muscles  2.   Muscle toning, build resillience.        MEDICATIONS  Current Outpatient Medications   Medication Instructions    acetaminophen (TYLENOL) 650 mg, oral, Every 6 hours PRN    amLODIPine (NORVASC) 10 mg, oral, Daily    aspirin 81 mg, oral, Daily    atorvastatin (LIPITOR) 40 mg, oral, Nightly    cholecalciferol (VITAMIN D-3) 25 mcg, oral, Daily    clopidogrel (PLAVIX) 75 mg, oral, Daily    DULoxetine (CYMBALTA) 30 mg, oral, Daily, Do not crush or chew.    flash glucose sensor (FREESTYLE ANDRÉS 2 SENSOR [facility])  Change every 2 weeks    fluticasone (Flonase) 50 mcg/actuation nasal spray Daily PRN    glucosam aguirre dip-chondroit-C-Mn 489-981-72-3 mg capsule GLUCOSAMINE 1500 COMPLEX CAPS    insulin lispro (HumaLOG) 100 unit/mL injection Inject subcutaneously before meals TID, as per sliding scale: 1 unit if sugar is 151-200, 2 units if 201-250, 3 units if 251-300, 4 units if 301-350, 5 units> 350. Total 15 units per day.    LANCETS MISC  "1 each, Daily    Levemir U-100 Insulin 7 Units, subcutaneous, Daily, Take as directed per insulin instructions.    levothyroxine (TIROSINT) 50 mcg, oral, Daily before breakfast    metoprolol succinate XL (TOPROL-XL) 50 mg, oral, Daily, Do not crush or chew.    min17/nettle/pumpkin/saw palme (PROSTATE THERAPY ORAL) oral    multivitamin with minerals (Daily Multivitamin-Minerals) tablet 1 tablet, oral, Daily    OneTouch Ultra Test strip  TEST THREE TIMES DAILY.    pen needle, diabetic 31 gauge x 1/4\" needle 1 each, 2 times daily    RenaPlex-D 800 mcg-12.5 mg -2,000 unit tablet 1 tablet, oral, Daily    sodium bicarbonate 650 mg, oral, 2 times daily    torsemide (DEMADEX) 50 mg, oral, Daily    vitamin-B complex split tablet oral                STAFF COMMENTS: patient on peritoneal dialysis cycler daily. Patient has tenkoff catheter.           "

## 2024-01-03 ENCOUNTER — CLINICAL SUPPORT (OUTPATIENT)
Dept: CARDIAC REHAB | Facility: HOSPITAL | Age: 84
End: 2024-01-03
Payer: MEDICARE

## 2024-01-03 ENCOUNTER — TRANSCRIBE ORDERS (OUTPATIENT)
Dept: CARDIAC REHAB | Facility: HOSPITAL | Age: 84
End: 2024-01-03
Payer: MEDICARE

## 2024-01-03 DIAGNOSIS — I21.4 NSTEMI (NON-ST ELEVATION MYOCARDIAL INFARCTION) (MULTI): Primary | ICD-10-CM

## 2024-01-03 DIAGNOSIS — I21.4 NSTEMI (NON-ST ELEVATION MYOCARDIAL INFARCTION) (MULTI): ICD-10-CM

## 2024-01-03 PROCEDURE — 93798 PHYS/QHP OP CAR RHAB W/ECG: CPT | Performed by: INTERNAL MEDICINE

## 2024-01-05 ENCOUNTER — CLINICAL SUPPORT (OUTPATIENT)
Dept: CARDIAC REHAB | Facility: HOSPITAL | Age: 84
End: 2024-01-05
Payer: MEDICARE

## 2024-01-05 DIAGNOSIS — I21.4 NSTEMI (NON-ST ELEVATION MYOCARDIAL INFARCTION) (MULTI): ICD-10-CM

## 2024-01-05 PROCEDURE — 93798 PHYS/QHP OP CAR RHAB W/ECG: CPT | Performed by: INTERNAL MEDICINE

## 2024-01-10 ENCOUNTER — APPOINTMENT (OUTPATIENT)
Dept: CARDIAC REHAB | Facility: HOSPITAL | Age: 84
End: 2024-01-10
Payer: MEDICARE

## 2024-01-12 ENCOUNTER — CLINICAL SUPPORT (OUTPATIENT)
Dept: CARDIAC REHAB | Facility: HOSPITAL | Age: 84
End: 2024-01-12
Payer: MEDICARE

## 2024-01-12 DIAGNOSIS — I21.4 NSTEMI (NON-ST ELEVATION MYOCARDIAL INFARCTION) (MULTI): ICD-10-CM

## 2024-01-12 PROCEDURE — 93798 PHYS/QHP OP CAR RHAB W/ECG: CPT | Performed by: INTERNAL MEDICINE

## 2024-01-16 ENCOUNTER — TELEPHONE (OUTPATIENT)
Dept: CARDIOLOGY | Facility: CLINIC | Age: 84
End: 2024-01-16
Payer: MEDICARE

## 2024-01-16 NOTE — TELEPHONE ENCOUNTER
Pt's wife called stating  is scheduled for cataract surgery not yet scheduled. Pt is taking Aspirin and Plavix, but not sure if these meds are to be held. Will call back.   Asking if ok to proceed from cardiovascular standpoint.

## 2024-01-17 ENCOUNTER — APPOINTMENT (OUTPATIENT)
Dept: CARDIAC REHAB | Facility: HOSPITAL | Age: 84
End: 2024-01-17
Payer: MEDICARE

## 2024-01-18 NOTE — TELEPHONE ENCOUNTER
Called and was unable to get a hold of the patient's wife. Left voicemail to call the office back regarding message. Keerthi ROSA

## 2024-01-18 NOTE — TELEPHONE ENCOUNTER
Pt's wife returned call and advised of Dr. Harmon message. Wife verbalized good understanding.  Task complete

## 2024-01-19 ENCOUNTER — APPOINTMENT (OUTPATIENT)
Dept: CARDIAC REHAB | Facility: HOSPITAL | Age: 84
End: 2024-01-19
Payer: MEDICARE

## 2024-01-24 ENCOUNTER — CLINICAL SUPPORT (OUTPATIENT)
Dept: CARDIAC REHAB | Facility: HOSPITAL | Age: 84
End: 2024-01-24
Payer: MEDICARE

## 2024-01-24 DIAGNOSIS — I21.4 NSTEMI (NON-ST ELEVATION MYOCARDIAL INFARCTION) (MULTI): ICD-10-CM

## 2024-01-24 PROCEDURE — 93798 PHYS/QHP OP CAR RHAB W/ECG: CPT | Performed by: INTERNAL MEDICINE

## 2024-01-24 NOTE — PROGRESS NOTES
"  INDIVIDUAL CARDIAC TREATMENT PLAN-30 day ASSESSMENT     Name: Salomon Chowdary   Today's Date: 24   : 1940    Primary Provider: Dr HARRIS  MRN: [id number]    Referring Physician: Dr. Harris     Diagnosis: NSTEMI, CAD, CABG with ARTERIAL GRAFT. Single Rtery Graft.    Onset Date: 10-      Risk Stratification: High      NUTRITION ASSESSMENT  Lipids: Lipid Lab Date: 10/02/2023  Total Chol: 78  HDL: 35.8  LDL: 23  Tri  Cholesterol Med: Atorvastatin    Diabetes: Yes  HbA1c: 6.7  Date Checked:   Monitors glucose at home: Yes  Fasting Blood Sugar Range:   Frequency: Check blood sugar three times per day.  Hypoglycemic Episode: none    Weight Management  Weight: 173  Height: 5' 8\"  BMI:  26.3  Current Diet: Heart Healthy  Barriers to dietary change: none    Initial Dietary Assessment Score: 63%  Discharge Dietary Assessment Score:       NUTRITION PLAN  Nutrition Goals:   1. Improve Picture Your Plate assessment results by discharge.  2. Make changes to diet to include heart healthy options while in the program.    Nutrition Intervention/Education:   *Perform weekly weight checks on .   *Patient scored a 63% on initial diet survey.  *Patient attended class with the dietician and discussed food labels,portions, and eating out.  *Discussed calorie intake and BMI with the dietician.   *Cardiac rehab diabetic guidelines given at orientation and discussed hyper and hypo glycemia signs and symptoms.    OTHER CORE COMPONENTS/ RISK FACTORS ASSESSMENT  Medication compliance: good compliance  Using pill box: Yes  Carries medication list: No    Blood Pressure Management:  History of High BP: Yes  Resting BP: 134/58      Tobacco: NEVER  Quit Date:   Other forms of tobacco:   Anyone in the house smoke: No    Initial Knowledge Test Score:14/15  Discharge Knowledge Test Score:    OTHER CORE COMPONENTS/ RISK FACTOR PLAN   Other Core components/Risk Factor Goals:                                    "                                                                                                                   1. Achieve and maintain a resting blood pressure less than 130/80 while in the program.  2. Gain knowledge of cardiac disease and lifestyle modifications related to exercise and ADL's prior to discharge.    Other Components/ Risk Factors Intervention/Education:  *Will continue to monitor HR, BP, dyspnea and arrhythmias each session.   Patient attended class on medications and discussed CAD medication action and usage.  Discussed with patient the importance of medication compliance, carrying an updated medication list, and using a pill box.    PSYCHOSOCIAL ASSESSMENT  Patient reported stress level: mild  Using stress management skills: No  HX of anxiety: No  HX of depression: No  Patient questioned regarding any new stress, depression and anxiety symptoms: Yes    Family/Support System: Family support system.  Seeing mental health provider: No  Psychosocial medications:     Initial PHQ-9 score: 12  Discharge PHQ-9 score:   Was PHQ-9 faxed to provider: No  Date faxed:    Quality of Life Survey:   Pre PCS: 18.51  Post PCS:    Pre MCS: 50.38  Post MCS:      Stages of change:  Action    PSYCHOSOCIAL PLAN  Psychosocial Goals:  1. To learn stress management techniques  while in the program.  2. Maintain or decrease PHQ-9 survey score by Discharge    Psychosocial Interventions/ Education:  * Encouraged patient to attend stress class.  Patient reports no change in psychosocial status.  Patient engaging with staff and classmates.    EXERCISE ASSESSMENT  Home Exercise: No  Frequency: walking around in the house.  Mode: walking    EXERCISE PLAN  Exercise Goals:   1. Exercise 30-40 minutes duration by Discharge.  2. Have a plan in place for continued exercise after the program by discharge.  3. Exercise frequency 3-5 days a week    Exercise Prescription:   Frequency: 2 days per week  Duration (total aerobic min.): 30  minutes  Intensity RPE: 11-13  Target HR:   MET Level Range: 1.6-1.8    Date of first exercise session:     Modality METS Load  Duration   1 Warm Up    05:00   2 NuStep 2   32watts 2LV 20:00   3 Cardiostridor 2.3 40Spm 2LV 20:00   4        5        6        7 Cool Down     05:00     Exercise Intervention:   *Aim to progress METS 5-10% every  Week or as tolerated.  *Patient trying new modalities  *Continued education on equipment use.    LEARNING ASSESSMENT & BARRIERS  Readiness to Learn:  Barriers: None  Comments:    FALL RISK  high  Comments: Falls with in the last 6 months        INDIVIDUAL PATIENT GOALS:  Decrease SOB with exertion, build stamina, strengthen muscles  2.   Muscle toning, build resillience.        MEDICATIONS  Current Outpatient Medications   Medication Instructions    acetaminophen (TYLENOL) 650 mg, oral, Every 6 hours PRN    amLODIPine (NORVASC) 10 mg, oral, Daily    aspirin 81 mg, oral, Daily    atorvastatin (LIPITOR) 40 mg, oral, Nightly    cholecalciferol (VITAMIN D-3) 25 mcg, oral, Daily    clopidogrel (PLAVIX) 75 mg, oral, Daily    DULoxetine (CYMBALTA) 30 mg, oral, Daily, Do not crush or chew.    flash glucose sensor (FREESTYLE ANDRÉS 2 SENSOR MISC)  Change every 2 weeks    fluticasone (Flonase) 50 mcg/actuation nasal spray Daily PRN    glucosam aguirre dip-chondroit-C-Mn [id number] mg capsule GLUCOSAMINE 1500 COMPLEX CAPS    insulin lispro (HumaLOG) 100 unit/mL injection Inject subcutaneously before meals TID, as per sliding scale: 1 unit if sugar is 151-200, 2 units if 201-250, 3 units if 251-300, 4 units if 301-350, 5 units> 350. Total 15 units per day.    LANCETS MISC 1 each, Daily    Levemir U-100 Insulin 7 Units, subcutaneous, Daily, Take as directed per insulin instructions.    levothyroxine (TIROSINT) 50 mcg, oral, Daily before breakfast    metoprolol succinate XL (TOPROL-XL) 50 mg, oral, Daily, Do not crush or chew.    min17/nettle/pumpkin/saw palme (PROSTATE THERAPY ORAL) oral  "   multivitamin with minerals (Daily Multivitamin-Minerals) tablet 1 tablet, oral, Daily    OneTouch Ultra Test strip  TEST THREE TIMES DAILY.    pen needle, diabetic 31 gauge x 1/4\" needle 1 each, 2 times daily    RenaPlex-D 800 mcg-12.5 mg -2,000 unit tablet 1 tablet, oral, Daily    sodium bicarbonate 650 mg, oral, 2 times daily    torsemide (DEMADEX) 50 mg, oral, Daily    vitamin-B complex split tablet oral                STAFF COMMENTS: Patient reports no angina with exercise. He has attended 4 sessions of cardiac rehab. He initially was brought down in a wheelchair to class. He stated he had a goal of being able to walk down to rehab room from front door by  himself and after a few sessions of rehab he is now walking in and walking out of rehab using his cane. He is exercising between 35-40 minutes between the Nustep and the cardiostridor. He had to miss a few sessions due to the weather but is engaging with staff and peers when attending. Patient Spo2 has been 95% or greater on room air with exercise. Cardiac monitor SR 70-80's at rest and patient has  occasional PVC's.  I  "

## 2024-01-26 ENCOUNTER — CLINICAL SUPPORT (OUTPATIENT)
Dept: CARDIAC REHAB | Facility: HOSPITAL | Age: 84
End: 2024-01-26
Payer: MEDICARE

## 2024-01-26 DIAGNOSIS — I21.4 NSTEMI (NON-ST ELEVATION MYOCARDIAL INFARCTION) (MULTI): ICD-10-CM

## 2024-01-26 PROCEDURE — 93798 PHYS/QHP OP CAR RHAB W/ECG: CPT | Performed by: INTERNAL MEDICINE

## 2024-01-31 ENCOUNTER — CLINICAL SUPPORT (OUTPATIENT)
Dept: CARDIAC REHAB | Facility: HOSPITAL | Age: 84
End: 2024-01-31
Payer: MEDICARE

## 2024-01-31 DIAGNOSIS — I21.4 NSTEMI (NON-ST ELEVATION MYOCARDIAL INFARCTION) (MULTI): ICD-10-CM

## 2024-01-31 PROCEDURE — 93798 PHYS/QHP OP CAR RHAB W/ECG: CPT | Performed by: INTERNAL MEDICINE

## 2024-02-02 ENCOUNTER — CLINICAL SUPPORT (OUTPATIENT)
Dept: CARDIAC REHAB | Facility: HOSPITAL | Age: 84
End: 2024-02-02
Payer: MEDICARE

## 2024-02-02 DIAGNOSIS — I21.4 NSTEMI (NON-ST ELEVATION MYOCARDIAL INFARCTION) (MULTI): ICD-10-CM

## 2024-02-02 PROCEDURE — 93798 PHYS/QHP OP CAR RHAB W/ECG: CPT | Performed by: INTERNAL MEDICINE

## 2024-02-07 ENCOUNTER — CLINICAL SUPPORT (OUTPATIENT)
Dept: CARDIAC REHAB | Facility: HOSPITAL | Age: 84
End: 2024-02-07
Payer: MEDICARE

## 2024-02-07 DIAGNOSIS — I21.4 NSTEMI (NON-ST ELEVATION MYOCARDIAL INFARCTION) (MULTI): ICD-10-CM

## 2024-02-07 PROCEDURE — 93798 PHYS/QHP OP CAR RHAB W/ECG: CPT | Performed by: INTERNAL MEDICINE

## 2024-02-09 ENCOUNTER — CLINICAL SUPPORT (OUTPATIENT)
Dept: CARDIAC REHAB | Facility: HOSPITAL | Age: 84
End: 2024-02-09
Payer: MEDICARE

## 2024-02-09 DIAGNOSIS — I21.4 NSTEMI (NON-ST ELEVATION MYOCARDIAL INFARCTION) (MULTI): ICD-10-CM

## 2024-02-09 PROCEDURE — 93798 PHYS/QHP OP CAR RHAB W/ECG: CPT | Performed by: INTERNAL MEDICINE

## 2024-02-13 ENCOUNTER — TELEPHONE (OUTPATIENT)
Dept: CARDIOLOGY | Facility: CLINIC | Age: 84
End: 2024-02-13
Payer: MEDICARE

## 2024-02-13 NOTE — TELEPHONE ENCOUNTER
Pt's wife left message stating that she was return call to someone.  Per wife someone was calling with instructions on pt's bp medications.    Routed to Munira CHAVEZ RN

## 2024-02-14 ENCOUNTER — OFFICE VISIT (OUTPATIENT)
Dept: CARDIOLOGY | Facility: CLINIC | Age: 84
End: 2024-02-14
Payer: MEDICARE

## 2024-02-14 ENCOUNTER — APPOINTMENT (OUTPATIENT)
Dept: CARDIAC REHAB | Facility: HOSPITAL | Age: 84
End: 2024-02-14
Payer: MEDICARE

## 2024-02-14 VITALS
SYSTOLIC BLOOD PRESSURE: 131 MMHG | WEIGHT: 167 LBS | HEART RATE: 79 BPM | DIASTOLIC BLOOD PRESSURE: 76 MMHG | BODY MASS INDEX: 25.39 KG/M2

## 2024-02-14 DIAGNOSIS — E11.40 TYPE 2 DIABETES MELLITUS WITH DIABETIC NEUROPATHY, WITH LONG-TERM CURRENT USE OF INSULIN (MULTI): ICD-10-CM

## 2024-02-14 DIAGNOSIS — I25.10 ATHEROSCLEROSIS OF NATIVE CORONARY ARTERY OF NATIVE HEART WITHOUT ANGINA PECTORIS: Primary | ICD-10-CM

## 2024-02-14 DIAGNOSIS — I48.0 PAF (PAROXYSMAL ATRIAL FIBRILLATION) (MULTI): ICD-10-CM

## 2024-02-14 DIAGNOSIS — Z79.4 TYPE 2 DIABETES MELLITUS WITH DIABETIC NEUROPATHY, WITH LONG-TERM CURRENT USE OF INSULIN (MULTI): ICD-10-CM

## 2024-02-14 DIAGNOSIS — N18.4 STAGE 4 CHRONIC KIDNEY DISEASE (MULTI): ICD-10-CM

## 2024-02-14 DIAGNOSIS — E78.2 MIXED HYPERLIPIDEMIA: ICD-10-CM

## 2024-02-14 DIAGNOSIS — I10 PRIMARY HYPERTENSION: ICD-10-CM

## 2024-02-14 PROCEDURE — 1126F AMNT PAIN NOTED NONE PRSNT: CPT | Performed by: INTERNAL MEDICINE

## 2024-02-14 PROCEDURE — 3078F DIAST BP <80 MM HG: CPT | Performed by: INTERNAL MEDICINE

## 2024-02-14 PROCEDURE — 3075F SYST BP GE 130 - 139MM HG: CPT | Performed by: INTERNAL MEDICINE

## 2024-02-14 PROCEDURE — 1157F ADVNC CARE PLAN IN RCRD: CPT | Performed by: INTERNAL MEDICINE

## 2024-02-14 PROCEDURE — 99214 OFFICE O/P EST MOD 30 MIN: CPT | Performed by: INTERNAL MEDICINE

## 2024-02-14 PROCEDURE — 1159F MED LIST DOCD IN RCRD: CPT | Performed by: INTERNAL MEDICINE

## 2024-02-14 PROCEDURE — 1036F TOBACCO NON-USER: CPT | Performed by: INTERNAL MEDICINE

## 2024-02-14 NOTE — PROGRESS NOTES
Patient:  Salomon Chowdary  YOB: 1940  MRN: [id number]       HPI:       Salomon Chowdary is a 83 y.o. male who returns today for cardiac follow-up.   He has a history of hypertension, hyperlipidemia, diabetes mellitus, and end-stage renal disease for which he is on peritoneal dialysis.  He was hospitalized at [facility] on September 20, 2023 with left-sided chest pain and left arm pain.  He was noted to have underlying atrial fibrillation with rapid ventricular response.  D-dimer was positive.  CT scan was negative for pulmonary embolus.  He was noted to have a moderate-sized pericardial effusion on CT.  His hemoglobin was 7.7 and hematocrit 24.2.  Sodium 133 with potassium 3.8, BUN 68, and creatinine 4.67.  High-sensitivity troponin levels were 122, 608, 1661, and 2226.  Transthoracic echo showed normal LV ejection fraction and moderate-sized circumferential pericardial effusion without tamponade.  He converted to normal sinus rhythm after receiving IV Lopressor.  Cardiac catheterization on September 20, 2023 showed 30% stenosis of the left main trunk, heavy calcification of the LAD, the LAD was not able to be successfully injected.  There was 95% stenosis of the PDA of the dominant circumflex.  The entire vessel was heavily calcified.  There was 80% stenosis of the first obtuse marginal branch.  Repeat imaging showed 75 to 80% stenosis involving the proximal, mid, and distal LAD.  He requested a transfer to [facility], however, no beds were available. He was transferred to [facility] on September 30, 2023.  He underwent mini left anterior thoracotomy and off-pump mid CAB (LIMA to LAD) on October 11, 2023 by Dr. Jorje Neal.  He initially did not tolerate beta-blockers due to underlying sinus bradycardia.  He is now on Toprol XL 50 mg daily.  An exhaustive attempt to angioplasty the distal circumflex October 18, 2023 was unsuccessful due to inability to expand balloon in the heavily  calcified vessel.  A Shockwave balloon was unable to be passed.  He was discharged on October 17, 2023 on aspirin, Plavix, Toprol-XL, torsemide, Trulicity, and iron polysaccharide.  He was seen in follow-up by Dr. Eusebio Hudson and conservative care is being recommended for now.  He will be scheduled for rotational atherectomy of the circumflex if he develops recurrent anginal symptoms. A follow-up echocardiogram showed normal LV systolic function and mild to moderate pericardial effusion.  His burden of atrial fibrillation was low and he presented with significant anemia as well as pericardial effusion.  It was opted to forego a NOAC unless he develops recurrent atrial fibrillation.  He might also be a candidate for Watchman device implant.     Mr. Chowdary reports that he feels better but is concerned about his elevated blood pressures.  He was previously on hydralazine although it was held in the hospital due to lower blood pressure readings.  He is currently getting several systolic blood pressures in the 150s to 170s.  He denies any chest pain.  He notes some shortness of breath with walking.  He states coming into the office he felt his heart pounding hard and somewhat fast.  He denies any orthopnea, PND, or increasing peripheral edema.  He denies any lightheadedness, near-syncope, or syncope.  He denies any fever, chills, or cough.  He denies any nausea, vomiting, or diaphoresis.  He denies any hemoptysis, hematemesis, melena, or hematochezia.  Lab studies dated January 25, 2024 showed a TSH of 6.290.  Lab studies November 14, 2023 showed a hemoglobin 10.5 and hematocrit 34.6.  Comprehensive metabolic profile showed a BUN 53 and creatinine 4.41.  AST 45 and ALT 62.  LDL cholesterol was 33.  He was advised to start back on hydralazine 25 mg 3 times daily.  He will monitor his blood pressures closely at home and hydralazine will be titrated upward as needed.  Other details as noted below.    The above portion of  this note was dictated by me using voice recognition software.  I personally performed the services described in the documentation.  The scribe entering the documentation below was in my presence.  I affirm that the information is both accurate and complete.      Objective:     Vitals:    02/14/24 1300   BP: 131/76   Pulse: 79       Wt Readings from Last 4 Encounters:   02/14/24 75.8 kg (167 lb)   12/21/23 73.9 kg (163 lb)   11/14/23 74.8 kg (165 lb)   11/02/23 76.7 kg (169 lb)       Allergies:     Allergies   Allergen Reactions    Gabapentin Other     vomiting    Other reaction(s): Other (See Comments), Other: See Comments   vomiting   vomiting    Meperidine Nausea And Vomiting    Opioids - Morphine Analogues Unknown, Nausea And Vomiting and Nausea/vomiting     Patient states he is able to take Morphine.    Other reaction(s): nausea/vomiting    Opioids-Meperidine And Related Nausea/vomiting    Opium Tincture Unknown    Oxycodone GI Upset     nausea and vomiting    Tizanidine Unknown        Medications:     Current Outpatient Medications   Medication Instructions    acetaminophen (TYLENOL) 650 mg, oral, Every 6 hours PRN    amLODIPine (NORVASC) 10 mg, oral, Daily    aspirin 81 mg, oral, Daily    atorvastatin (LIPITOR) 40 mg, oral, Nightly    cholecalciferol (VITAMIN D-3) 25 mcg, oral, Daily    clopidogrel (PLAVIX) 75 mg, oral, Daily    DULoxetine (CYMBALTA) 30 mg, oral, Daily, Do not crush or chew.    flash glucose sensor (FREESTYLE ANDRÉS 2 SENSOR [facility] [place])  Change every 2 weeks    fluticasone (Flonase) 50 mcg/actuation nasal spray Daily PRN    glucosam aguirre dip-chondroit-C-Mn [id number] mg capsule GLUCOSAMINE 1500 COMPLEX CAPS    hydrALAZINE (APRESOLINE) 25 mg, oral, 3 times daily    insulin lispro (HumaLOG) 100 unit/mL injection Inject subcutaneously before meals TID, as per sliding scale: 1 unit if sugar is 151-200, 2 units if 201-250, 3 units if 251-300, 4 units if 301-350, 5 units> 350. Total 15 units per day.     "LANCETS MISC 1 each, Daily    Levemir U-100 Insulin 7 Units, subcutaneous, Daily, Take as directed per insulin instructions.    levothyroxine (TIROSINT) 50 mcg, oral, Daily before breakfast    metoprolol succinate XL (TOPROL-XL) 50 mg, oral, Daily, Do not crush or chew.    min17/nettle/pumpkin/saw palme (PROSTATE THERAPY ORAL) oral    multivitamin with minerals (Daily Multivitamin-Minerals) tablet 1 tablet, oral, Daily    OneTouch Ultra Test strip  TEST THREE TIMES DAILY.    pen needle, diabetic 31 gauge x 1/4\" needle 1 each, 2 times daily    RenaPlex-D 800 mcg-12.5 mg -2,000 unit tablet 1 tablet, oral, Daily    sodium bicarbonate 650 mg, oral, 2 times daily    torsemide (DEMADEX) 50 mg, oral, Daily    vitamin-B complex split tablet oral       Physical Examination:   GENERAL:  Well developed, well nourished, in no acute distress.  CHEST:  Symmetric and nontender.  NEURO/PSYCH:  Alert and oriented times three with approppriate behavior and responses.  NECK:  Supple, no JVD, no bruit.  LUNGS:  Clear to auscultation bilaterally, normal respiratory effort.  HEART:  Rate and rhythm regular with no evident murmur, no gallop appreciated.        There are no rubs, clicks or heaves.  EXTREMITIES:  Warm with good color, no clubbing or cyanosis.  There is no edema noted.  PERIPHERAL VASCULAR:  Pulses present and equally palpable; 2+ throughout.      Lab:     CBC:   Lab Results   Component Value Date    WBC 8.1 10/17/2023    RBC 2.79 (L) 10/17/2023    HGB 8.8 (L) 10/17/2023    HCT 28.3 (L) 10/17/2023     10/17/2023        CMP:    Lab Results   Component Value Date     10/17/2023    K 4.2 10/17/2023    CL 99 10/17/2023    CO2 27 10/17/2023    BUN 57 (H) 10/17/2023    CREATININE 3.72 (H) 10/17/2023    GLUCOSE 95 10/17/2023    CALCIUM 8.2 (L) 10/17/2023       Magnesium:    Lab Results   Component Value Date    MG 1.94 10/17/2023       Lipid Profile:    Lab Results   Component Value Date    TRIG 94 10/02/2023    HDL " 35.8 10/02/2023    LDLCALC 23 (L) 10/02/2023       TSH:    Lab Results   Component Value Date    TSH 5.76 (H) 10/09/2023       BNP:   Lab Results   Component Value Date     (H) 09/19/2023        PT/INR:    Lab Results   Component Value Date    PROTIME 12.3 10/13/2023    INR 1.1 10/13/2023       HgBA1c:    Lab Results   Component Value Date    HGBA1C 6.6 (H) 11/14/2023       BMP:  Lab Results   Component Value Date     10/17/2023     (L) 10/16/2023     10/15/2023    K 4.2 10/17/2023    K 4.8 10/16/2023    K 4.2 10/15/2023    CL 99 10/17/2023    CL 98 10/16/2023    CL 98 10/15/2023    CO2 27 10/17/2023    CO2 28 10/16/2023    CO2 25 10/15/2023    BUN 57 (H) 10/17/2023    BUN 62 (H) 10/16/2023    BUN 65 (H) 10/15/2023    CREATININE 3.72 (H) 10/17/2023    CREATININE 4.03 (H) 10/16/2023    CREATININE 4.17 (H) 10/15/2023       CBC:  Lab Results   Component Value Date    WBC 8.1 10/17/2023    WBC 7.4 10/16/2023    WBC 8.4 10/15/2023    RBC 2.79 (L) 10/17/2023    RBC 2.66 (L) 10/16/2023    RBC 2.80 (L) 10/15/2023    HGB 8.8 (L) 10/17/2023    HGB 8.4 (L) 10/16/2023    HGB 8.9 (L) 10/15/2023    HCT 28.3 (L) 10/17/2023    HCT 28.0 (L) 10/16/2023    HCT 28.7 (L) 10/15/2023     (H) 10/17/2023     (H) 10/16/2023     (H) 10/15/2023    MCH 31.5 10/17/2023    MCH 31.6 10/16/2023    MCH 31.8 10/15/2023    MCHC 31.1 (L) 10/17/2023    MCHC 30.0 (L) 10/16/2023    MCHC 31.0 (L) 10/15/2023    RDW 14.5 10/17/2023    RDW 14.7 (H) 10/16/2023    RDW 14.7 (H) 10/15/2023     10/17/2023     10/16/2023     10/15/2023    MPV 9.5 10/17/2023    MPV 9.6 10/16/2023    MPV 9.9 10/15/2023       Cardiac Enzymes:    Lab Results   Component Value Date    TROPHS 24 10/02/2023    TROPHS 2,226 (H) 09/20/2023    TROPHS 1,661 (H) 09/19/2023       Hepatic Function Panel:    Lab Results   Component Value Date    ALKPHOS 70 10/02/2023    ALT 19 10/02/2023    AST 16 10/02/2023    PROT 5.1 (L)  10/02/2023    BILITOT 0.4 10/02/2023    BILIDIR 0.1 10/02/2023       Diagnostic Studies:     Holter or Event Cardiac Monitor    Result Date: 1/3/2024  *The predominant rhythm was sinus bradycardia to sinus tachycardia. *The Maximum Heart Rate recorded was 115 bpm, 12/13 20:35:02, the Minimum Heart Rate recorded was 54 bpm, 12/14 04:31:26, and the Average Heart Rate was 73 bpm. *There were 661 VE beats with a burden of <1 %. *There were 3,572 SVE beats with a burden of 3 %. There were 3 occurrences of Supraventricular Tachycardia with the Longest episode 8 beats, 12/14 07:13:16, and the Fastest episode 115 bpm, 12/13 20:35:01. *There were 0 Patient Triggers. Patient recorded for 23 H and 58 minutes NSR with rates  bpm, avg = 73 bpm No sustained pauses or bradycardias No atrial fibrillation. 3% supraventricular ectopy. On Dec 14 at 07:13 AM five beats of nonconducted PACs Occasional PVC No malignant ectopy. No adverse complaints reported.      Problem List:     Patient Active Problem List   Diagnosis    Atherosclerosis of native coronary artery of native heart without angina pectoris    ESRD (end stage renal disease) (CMS/HCC)    Type 2 diabetes mellitus with circulatory disorder (CMS/HCC)    Combined forms of age-related cataract of both eyes    MAU (acute kidney injury) (CMS/HCC)    Allergy, unspecified, initial encounter    Anaphylactic shock, unspecified, initial encounter    Benign prostatic hyperplasia    Breast pain    Carpal tunnel syndrome    Cervical myelopathy (CMS/HCC)    Chronic fatigue    Class 1 obesity    Coagulation defect, unspecified (CMS/HCC)    Cortical senile cataract    Diabetes mellitus type 2 without retinopathy (CMS/HCC)    Edema    ESRD on peritoneal dialysis (CMS/HCC)    Primary hypertension    High thyroid stimulating hormone (TSH) level    Hyperkalemia    Mixed hyperlipidemia    Hyperphosphatemia    Hypertension secondary to other renal disorders    Hypertrophy of nail    Mechanical  complication of dialysis catheter (CMS/HCC)    Metabolic acidosis    Microscopic hematuria    Myopia of right eye    Nuclear sclerosis    Chronic anemia    Anemia due to stage 5 chronic kidney disease, not on chronic dialysis (CMS/[facility])    Type 2 diabetes mellitus with hyperglycemia (CMS/[facility])    Type 2 diabetes mellitus with diabetic neuropathy, unspecified (CMS/[facility])    Subclinical hypothyroidism    Stenosis of intervertebral foramina    Secondary hyperparathyroidism of renal origin (CMS/HCC)    Stage 4 chronic kidney disease (CMS/[facility])    Right arm weakness    Restrictive lung disease    ILD (interstitial lung disease) (CMS/HCC)    Restrictive cardiomyopathy (CMS/[facility])    Regular astigmatism of left eye    Proteinuria    Presbyopia    Peripheral vascular disease (CMS/HCC)    Peripheral neuropathy due to metabolic disorder (CMS/HCC)    Other disorders resulting from impaired renal tubular function    Palpitations    PAF (paroxysmal atrial fibrillation) (CMS/[facility])    Pericardial effusion       Asessment:     Problem List Items Addressed This Visit             ICD-10-CM    Atherosclerosis of native coronary artery of native heart without angina pectoris - Primary I25.10    Primary hypertension I10    Relevant Medications    hydrALAZINE (Apresoline) 25 mg tablet    Other Relevant Orders    Follow Up In Cardiology    CBC    Comprehensive metabolic panel    Lipid panel    Mixed hyperlipidemia E78.2    Type 2 diabetes mellitus with diabetic neuropathy, unspecified (CMS/[facility]) E11.40    Stage 4 chronic kidney disease (CMS/[facility]) N18.4    PAF (paroxysmal atrial fibrillation) (CMS/[facility]) I48.0

## 2024-02-15 RX ORDER — HYDRALAZINE HYDROCHLORIDE 25 MG/1
25 TABLET, FILM COATED ORAL 3 TIMES DAILY
Qty: 270 TABLET | Refills: 0 | Status: SHIPPED | OUTPATIENT
Start: 2024-02-15 | End: 2024-03-06 | Stop reason: SDUPTHER

## 2024-02-16 ENCOUNTER — CLINICAL SUPPORT (OUTPATIENT)
Dept: CARDIAC REHAB | Facility: HOSPITAL | Age: 84
End: 2024-02-16
Payer: MEDICARE

## 2024-02-16 DIAGNOSIS — I21.4 NSTEMI (NON-ST ELEVATION MYOCARDIAL INFARCTION) (MULTI): ICD-10-CM

## 2024-02-16 PROCEDURE — 93798 PHYS/QHP OP CAR RHAB W/ECG: CPT | Performed by: INTERNAL MEDICINE

## 2024-02-21 ENCOUNTER — CLINICAL SUPPORT (OUTPATIENT)
Dept: CARDIAC REHAB | Facility: HOSPITAL | Age: 84
End: 2024-02-21
Payer: MEDICARE

## 2024-02-21 DIAGNOSIS — I21.4 NSTEMI (NON-ST ELEVATION MYOCARDIAL INFARCTION) (MULTI): ICD-10-CM

## 2024-02-21 PROCEDURE — 93798 PHYS/QHP OP CAR RHAB W/ECG: CPT

## 2024-02-22 VITALS
BODY MASS INDEX: 26.22 KG/M2 | OXYGEN SATURATION: 96 % | DIASTOLIC BLOOD PRESSURE: 74 MMHG | SYSTOLIC BLOOD PRESSURE: 147 MMHG | HEIGHT: 68 IN | WEIGHT: 173 LBS

## 2024-02-22 ASSESSMENT — PATIENT HEALTH QUESTIONNAIRE - PHQ9
2. FEELING DOWN, DEPRESSED OR HOPELESS: NOT AT ALL
SUM OF ALL RESPONSES TO PHQ QUESTIONS 1-9: 12
6. FEELING BAD ABOUT YOURSELF - OR THAT YOU ARE A FAILURE OR HAVE LET YOURSELF OR YOUR FAMILY DOWN: NOT AT ALL
5. POOR APPETITE OR OVEREATING: SEVERAL DAYS
3. TROUBLE FALLING OR STAYING ASLEEP OR SLEEPING TOO MUCH: MORE THAN HALF THE DAYS
1. LITTLE INTEREST OR PLEASURE IN DOING THINGS: NEARLY EVERY DAY
SUM OF ALL RESPONSES TO PHQ9 QUESTIONS 1 & 2: 3
9. THOUGHTS THAT YOU WOULD BE BETTER OFF DEAD, OR OF HURTING YOURSELF: NOT AT ALL
4. FEELING TIRED OR HAVING LITTLE ENERGY: NEARLY EVERY DAY
SUM OF ALL RESPONSES TO PHQ QUESTIONS 1-9: 12
7. TROUBLE CONCENTRATING ON THINGS, SUCH AS READING THE NEWSPAPER OR WATCHING TELEVISION: SEVERAL DAYS
8. MOVING OR SPEAKING SO SLOWLY THAT OTHER PEOPLE COULD HAVE NOTICED. OR THE OPPOSITE, BEING SO FIGETY OR RESTLESS THAT YOU HAVE BEEN MOVING AROUND A LOT MORE THAN USUAL: MORE THAN HALF THE DAYS

## 2024-02-22 NOTE — PROGRESS NOTES
Cardiac Rehabilitation 60 Day Reassessment    Name: Salomon Chowdary  Medical Record Number: [id number]  YOB: 1940  Age: 83 y.o.    Today’s Date: 2/22/2024  Primary Care Physician: Enrique Ansari MD  Referring Physician: Michael Harmon MD  Program Location: [facility]  Primary Diagnosis:   1. NSTEMI (non-ST elevation myocardial infarction) (CMS/HCC)  Follow Up In Cardiac Rehab   2.      CABG      Onset/Date of Diagnosis: 10/11/2023    11    AACVPR Risk Stratification: Moderate   Falls Risk: Patient has remained free from falls while enrolled in rehab as of @TD@.       Psychosocial Assessment     Pre PHQ-9: 12    Sent PH-Q 9 to MD if score > 20: No; score < 20    Pt reported/currently experiencing stress: No  Patient uses stress management skills: No   History of: no history of anxiety or depression  Currently seeing a mental health provider: No  Social Support: Yes, Whom:Family members  Quality of Life Survey: SF-36   SF-36 Pre Post   Physical Component Score 18.51 TBD   Mental Component Score 50.38 TBD     Learning Assessment:  Learning assessment/barriers: None  Preferred learning method:  N/A  Barriers: None  Comments:    Stages of Change:Action    Psychosocial Plan    Goal Status: In progress    Psychosocial Goals: Demonstrating proper techniques for stress management and Maintain or lower PH-Q 9 score by discharge    Psychosocial Interventions/Education:  Patient attended stress management class, discussed signs and symptoms of depression, link to heart disease, coping techniques, relaxation techniques, and community resources. Patient reports no change in psychosocial status. Patient engaging with staff and classmates.    Nutrition Assessment:    Hyperlipidemia: Yes     Lipids:   Lab Results   Component Value Date    CHOL 78 10/02/2023    HDL 35.8 10/02/2023    TRIG 94 10/02/2023       Current Dietary Guidelines:  Heart healthy  Barriers to dietary change: no    Diet  "Habit Survey: Picture Your Plate  Pre:  63%  Post: To be done at discharge.    Diabetes Assessment    Lab Results   Component Value Date    HGBA1C 7.0 (A) 02/20/2024       History of Diabetes: Yes Pt monitors BS at home: Yes   Frequency: 3 /day  FBS range: 85 - 111  Hypoglycemic Episodes: No     Weight Management    Height: 172.7 cm (5' 7.99\")  Weight: 78.5 kg (173 lb)  BMI (Calculated): 26.31  No data recorded    Nutrition Plan    Goal Status: In progress    Nutrition Goals: Improve Diet Habit Survey score by 5-10 points by discharge, Learn how to read and interpret nutrition labels prior to discharge, and Verbalize S/S of hypoglycemia or hyperglycemia by discharge    Nutrition Interventions/Education:   Perform weekly weight checks at rehab. Patient discussed the importance of following a low sodium diet and monitoring daily weight during the CHF educational class.       Exercise Assessment    No  Mode: NA  Frequency: NA  Duration: NA    Exercise Prescription     Exercise Prescription based on: Patient health history   Frequency:  2 days/week   Mode: NuStep and Cardiostrider   Duration: 30-40 total aerobic minutes   Intensity: RPE 11-14  Target HR:     MET Level: 2.4-2.6  Patient wears supplemental O2: No     Modality Workload METs Duration (minutes)   1 Pre-Exercise      2 NuStep 46 Escobar @ lvl 3  2.4 15:00   3 Cardiostrider 40 SPM @ lvl 3 2.6 15 :00   4 Education   10:00   5 Post-Exercise        Resistance Training: No   Home Exercise Prescription given: To be given prior to discharge from program.    Exercise Plan    Goal Status: In progress    Exercise Goals: Increase exercise MET level by 5-10% each week, Increase total exercise duration to 30-45 minutes, and Establish a home exercise program before discharge    Exercise Interventions/Education:   Patient has been increasing on equipment weekly without complaint. He has stated that he is starting to see a difference in his strength in his legs. Will " continue with exercise as tolerated.       Other Core Components/Risk Factor Assessment:    Medication adherence  Current Medications:   Medication Documentation Review Audit       Reviewed by Catrina Sales MA (Medical Assistant) on 02/14/24 at 1300      Medication Order Taking? Sig Documenting Provider Last Dose Status   acetaminophen (Tylenol) 325 mg tablet [id number] Yes Take 2 tablets (650 mg) by mouth every 6 hours if needed for mild pain (1 - 3). MARGARET Chang Taking Active   amLODIPine (Norvasc) 10 mg tablet [id number] Yes Take 1 tablet (10 mg) by mouth once daily. Historical Provider, MD Taking Active   aspirin 81 mg EC tablet [id number] Yes Take 1 tablet (81 mg) by mouth once daily. Do not start before October 18, 2023. MARGARET Chang Taking Active   atorvastatin (Lipitor) 40 mg tablet [id number] Yes Take 1 tablet (40 mg) by mouth once daily at bedtime. MARGARET Chang Taking Active   cholecalciferol (Vitamin D-3) 25 MCG (1000 UT) tablet [id number] Yes Take 1 tablet (25 mcg) by mouth once daily. Historical Provider, MD Taking Active   clopidogrel (Plavix) 75 mg tablet [id number] Yes Take 1 tablet (75 mg) by mouth once daily for 362 doses. Do not start before October 18, 2023. MARGARET Chang Taking Active   DULoxetine (Cymbalta) 30 mg DR capsule [id number] Yes Take 1 capsule (30 mg) by mouth once daily. Do not crush or chew. Historical Provider, MD Taking Active   flash glucose sensor (FREESTYLE ANDRÉS 2 SENSOR [facility] [id number] Yes Change every 2 weeks Historical Provider, MD Taking Active   fluticasone (Flonase) 50 mcg/actuation nasal spray [id number] Yes once daily as needed. Historical Provider, MD Taking Active   glucosam aguirre dip-chondroit-C-Mn 092-115-28-3 mg capsule [id number] Yes GLUCOSAMINE 1500 COMPLEX CAPS Historical Provider, MD Taking Active   insulin detemir (Levemir U-100 Insulin) 100 unit/mL injection [id number] Yes Inject 7 Units under the skin once  "daily. Take as directed per insulin instructions. Rosmery Mcdermott MD Taking Active   insulin lispro (HumaLOG) 100 unit/mL injection [id number] Yes Inject subcutaneously before meals TID, as per sliding scale: 1 unit if sugar is 151-200, 2 units if 201-250, 3 units if 251-300, 4 units if 301-350, 5 units> 350. Total 15 units per day. Rosmery Mcdermott MD Taking Active   LANCETS MISC [id number] Yes 1 each once daily. Rosmery Mcdermott MD Taking Active   levothyroxine (Tirosint) 50 mcg capsule [id number] Yes Take 1 capsule (50 mcg) by mouth once daily in the morning. Take before meals. Rosmery Mcdermott MD Taking Active   metoprolol succinate XL (Toprol-XL) 50 mg 24 hr tablet [id number] Yes Take 1 tablet (50 mg) by mouth once daily. Do not crush or chew. ANNIE Chang-CNP Taking Active   min17/nettle/pumpkin/saw palme (PROSTATE THERAPY ORAL) [id number] Yes Take by mouth. Rosmery Mcdermott MD Taking Active   multivitamin with minerals (Daily Multivitamin-Minerals) tablet [id number] Yes Take 1 tablet by mouth once daily. Rosmery Mcdermott MD Taking Active   OneTouch Ultra Test strip [id number] Yes TEST THREE TIMES DAILY. Rosmery Mcdermott MD Taking Active   pen needle, diabetic 31 gauge x 1/4\" needle [id number] Yes 1 each 2 times a day. Rosmery Mcdermott MD Taking Active   RenaPlex-D 800 mcg-12.5 mg -2,000 unit tablet [id number] Yes Take 1 tablet by mouth once daily. Rosmery Mcdermott MD Taking Active   sodium bicarbonate 650 mg tablet [id number] Yes Take 1 tablet (650 mg) by mouth 2 times a day. Rosmery Mcdermott MD Taking Active   torsemide (Demadex) 100 mg tablet [id number] Yes Take 0.5 tablets (50 mg) by mouth once daily. Rosmery Mcdermott MD Taking Active   vitamin-B complex split tablet [id number] Yes Take by mouth. Rosmery Mcdermott MD Taking Active                                 Medication compliance: Yes   Uses pill box/organizer: Yes    Carries medication list: No     Blood " Pressure Management  History of Hypertension: Yes   Medication Changes: No   Resting BP:  Visit Vitals  /74        Heart Failure Management  Hx of Heart Failure: No    Smoking/Tobacco Assessment  Social History     Tobacco Use   Smoking Status Never   Smokeless Tobacco Never       Other Core Component Plan    Goal Status: In progress    Other Core Component Goals: Medication compliance, Verbalize medication usage and drug actions by discharge, and Achieve resting BP of < 130/80 by discharge    Other Core Component Interventions/Education:   Will continue to monitor HR, BP, dyspnea and arrhythmias each session. Patient attended class on CHF with the CHF navigator. Discussed lower leg edema and shortness of breath. Reviewed the importance of taking prescribed medications after discharge. Discussed the importance of following up with cardiologist as scheduled.       Individual Patient Goals:    Decrease SOB with exertion, build stamina, strengthen muscles  Muscle toning, build resillience.    Goal Status: In progress    Staff Comments:  Patient reports no angina with exercise. He has attended 11 sessions of cardiac rehab. He initially was brought down in a wheelchair to class but recently has been walking into class using his cane. Patient has stated that he is feeling stronger in his legs. He is exercising between 35-40 minutes between the Nustep and the Cardiostrider. He has been consistent with attendance since the weather has broke and is engaging with staff and peers when attending. Patient Spo2 has been 95% or greater on room air with exercise. Will continue with increasing resistance and speeds as tolerable.    Rehab Staff Signature: CESARIO DIAZ

## 2024-02-23 ENCOUNTER — CLINICAL SUPPORT (OUTPATIENT)
Dept: CARDIAC REHAB | Facility: HOSPITAL | Age: 84
End: 2024-02-23
Payer: MEDICARE

## 2024-02-23 DIAGNOSIS — I21.4 NSTEMI (NON-ST ELEVATION MYOCARDIAL INFARCTION) (MULTI): ICD-10-CM

## 2024-02-23 PROCEDURE — 93798 PHYS/QHP OP CAR RHAB W/ECG: CPT | Performed by: INTERNAL MEDICINE

## 2024-02-25 ENCOUNTER — APPOINTMENT (OUTPATIENT)
Dept: RADIOLOGY | Facility: HOSPITAL | Age: 84
End: 2024-02-25
Payer: MEDICARE

## 2024-02-25 ENCOUNTER — APPOINTMENT (OUTPATIENT)
Dept: CARDIOLOGY | Facility: HOSPITAL | Age: 84
End: 2024-02-25
Payer: MEDICARE

## 2024-02-25 ENCOUNTER — HOSPITAL ENCOUNTER (EMERGENCY)
Facility: HOSPITAL | Age: 84
Discharge: HOME | End: 2024-02-25
Attending: STUDENT IN AN ORGANIZED HEALTH CARE EDUCATION/TRAINING PROGRAM
Payer: MEDICARE

## 2024-02-25 VITALS
HEIGHT: 69 IN | OXYGEN SATURATION: 99 % | BODY MASS INDEX: 24.29 KG/M2 | RESPIRATION RATE: 18 BRPM | HEART RATE: 65 BPM | TEMPERATURE: 96.8 F | WEIGHT: 164 LBS | SYSTOLIC BLOOD PRESSURE: 152 MMHG | DIASTOLIC BLOOD PRESSURE: 71 MMHG

## 2024-02-25 DIAGNOSIS — R07.9 CHEST PAIN, UNSPECIFIED TYPE: Primary | ICD-10-CM

## 2024-02-25 LAB
ALBUMIN SERPL BCP-MCNC: 3.1 G/DL (ref 3.4–5)
ALP SERPL-CCNC: 52 U/L (ref 33–136)
ALT SERPL W P-5'-P-CCNC: 17 U/L (ref 10–52)
ANION GAP SERPL CALC-SCNC: 16 MMOL/L (ref 10–20)
AST SERPL W P-5'-P-CCNC: 25 U/L (ref 9–39)
ATRIAL RATE: 61 BPM
ATRIAL RATE: 62 BPM
BASOPHILS # BLD AUTO: 0.03 X10*3/UL (ref 0–0.1)
BASOPHILS NFR BLD AUTO: 0.4 %
BILIRUB SERPL-MCNC: 0.3 MG/DL (ref 0–1.2)
BUN SERPL-MCNC: 64 MG/DL (ref 6–23)
CALCIUM SERPL-MCNC: 8.3 MG/DL (ref 8.6–10.3)
CARDIAC TROPONIN I PNL SERPL HS: 24 NG/L (ref 0–20)
CARDIAC TROPONIN I PNL SERPL HS: 24 NG/L (ref 0–20)
CHLORIDE SERPL-SCNC: 99 MMOL/L (ref 98–107)
CO2 SERPL-SCNC: 25 MMOL/L (ref 21–32)
CREAT SERPL-MCNC: 4.79 MG/DL (ref 0.5–1.3)
EGFRCR SERPLBLD CKD-EPI 2021: 11 ML/MIN/1.73M*2
EOSINOPHIL # BLD AUTO: 0.22 X10*3/UL (ref 0–0.4)
EOSINOPHIL NFR BLD AUTO: 2.8 %
ERYTHROCYTE [DISTWIDTH] IN BLOOD BY AUTOMATED COUNT: 14.9 % (ref 11.5–14.5)
GLUCOSE SERPL-MCNC: 252 MG/DL (ref 74–99)
HCT VFR BLD AUTO: 33.2 % (ref 41–52)
HGB BLD-MCNC: 10.7 G/DL (ref 13.5–17.5)
IMM GRANULOCYTES # BLD AUTO: 0.02 X10*3/UL (ref 0–0.5)
IMM GRANULOCYTES NFR BLD AUTO: 0.3 % (ref 0–0.9)
LYMPHOCYTES # BLD AUTO: 1.52 X10*3/UL (ref 0.8–3)
LYMPHOCYTES NFR BLD AUTO: 19.2 %
MAGNESIUM SERPL-MCNC: 1.49 MG/DL (ref 1.6–2.4)
MCH RBC QN AUTO: 32.5 PG (ref 26–34)
MCHC RBC AUTO-ENTMCNC: 32.2 G/DL (ref 32–36)
MCV RBC AUTO: 101 FL (ref 80–100)
MONOCYTES # BLD AUTO: 0.61 X10*3/UL (ref 0.05–0.8)
MONOCYTES NFR BLD AUTO: 7.7 %
NEUTROPHILS # BLD AUTO: 5.52 X10*3/UL (ref 1.6–5.5)
NEUTROPHILS NFR BLD AUTO: 69.6 %
NRBC BLD-RTO: 0 /100 WBCS (ref 0–0)
P AXIS: 37 DEGREES
P AXIS: 50 DEGREES
P OFFSET: 183 MS
P OFFSET: 185 MS
P ONSET: 125 MS
P ONSET: 129 MS
PLATELET # BLD AUTO: 216 X10*3/UL (ref 150–450)
POTASSIUM SERPL-SCNC: 3.5 MMOL/L (ref 3.5–5.3)
PR INTERVAL: 170 MS
PR INTERVAL: 176 MS
PROT SERPL-MCNC: 6.2 G/DL (ref 6.4–8.2)
Q ONSET: 213 MS
Q ONSET: 214 MS
QRS COUNT: 10 BEATS
QRS COUNT: 10 BEATS
QRS DURATION: 88 MS
QRS DURATION: 92 MS
QT INTERVAL: 396 MS
QT INTERVAL: 434 MS
QTC CALCULATION(BAZETT): 398 MS
QTC CALCULATION(BAZETT): 440 MS
QTC FREDERICIA: 397 MS
QTC FREDERICIA: 439 MS
R AXIS: -40 DEGREES
R AXIS: -41 DEGREES
RBC # BLD AUTO: 3.29 X10*6/UL (ref 4.5–5.9)
SODIUM SERPL-SCNC: 136 MMOL/L (ref 136–145)
T AXIS: -1 DEGREES
T AXIS: -53 DEGREES
T OFFSET: 412 MS
T OFFSET: 430 MS
VENTRICULAR RATE: 61 BPM
VENTRICULAR RATE: 62 BPM
WBC # BLD AUTO: 7.9 X10*3/UL (ref 4.4–11.3)

## 2024-02-25 PROCEDURE — 93005 ELECTROCARDIOGRAM TRACING: CPT

## 2024-02-25 PROCEDURE — 36415 COLL VENOUS BLD VENIPUNCTURE: CPT | Performed by: STUDENT IN AN ORGANIZED HEALTH CARE EDUCATION/TRAINING PROGRAM

## 2024-02-25 PROCEDURE — 2500000005 HC RX 250 GENERAL PHARMACY W/O HCPCS: Performed by: STUDENT IN AN ORGANIZED HEALTH CARE EDUCATION/TRAINING PROGRAM

## 2024-02-25 PROCEDURE — 71046 X-RAY EXAM CHEST 2 VIEWS: CPT | Performed by: RADIOLOGY

## 2024-02-25 PROCEDURE — 83735 ASSAY OF MAGNESIUM: CPT | Performed by: STUDENT IN AN ORGANIZED HEALTH CARE EDUCATION/TRAINING PROGRAM

## 2024-02-25 PROCEDURE — 85025 COMPLETE CBC W/AUTO DIFF WBC: CPT | Performed by: STUDENT IN AN ORGANIZED HEALTH CARE EDUCATION/TRAINING PROGRAM

## 2024-02-25 PROCEDURE — 84484 ASSAY OF TROPONIN QUANT: CPT | Performed by: STUDENT IN AN ORGANIZED HEALTH CARE EDUCATION/TRAINING PROGRAM

## 2024-02-25 PROCEDURE — 80053 COMPREHEN METABOLIC PANEL: CPT | Performed by: STUDENT IN AN ORGANIZED HEALTH CARE EDUCATION/TRAINING PROGRAM

## 2024-02-25 PROCEDURE — 99283 EMERGENCY DEPT VISIT LOW MDM: CPT | Mod: 25

## 2024-02-25 PROCEDURE — 71046 X-RAY EXAM CHEST 2 VIEWS: CPT

## 2024-02-25 RX ORDER — LIDOCAINE 560 MG/1
1 PATCH PERCUTANEOUS; TOPICAL; TRANSDERMAL ONCE
Status: DISCONTINUED | OUTPATIENT
Start: 2024-02-25 | End: 2024-02-25 | Stop reason: HOSPADM

## 2024-02-25 RX ADMIN — LIDOCAINE 1 PATCH: 4 PATCH TOPICAL at 13:36

## 2024-02-25 ASSESSMENT — PAIN SCALES - GENERAL: PAINLEVEL_OUTOF10: 2

## 2024-02-25 ASSESSMENT — PAIN DESCRIPTION - LOCATION: LOCATION: CHEST

## 2024-02-25 ASSESSMENT — COLUMBIA-SUICIDE SEVERITY RATING SCALE - C-SSRS
1. IN THE PAST MONTH, HAVE YOU WISHED YOU WERE DEAD OR WISHED YOU COULD GO TO SLEEP AND NOT WAKE UP?: NO
6. HAVE YOU EVER DONE ANYTHING, STARTED TO DO ANYTHING, OR PREPARED TO DO ANYTHING TO END YOUR LIFE?: NO
2. HAVE YOU ACTUALLY HAD ANY THOUGHTS OF KILLING YOURSELF?: NO

## 2024-02-25 ASSESSMENT — PAIN - FUNCTIONAL ASSESSMENT: PAIN_FUNCTIONAL_ASSESSMENT: 0-10

## 2024-02-25 NOTE — ED PROVIDER NOTES
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  Patient is currently in cardiac rehab and when he was trying to use a grab bar to shift himself, he developed sharp chest pain that wrapped around the left side of his chest which is the same side of the surgery.  He reports he has had this pain on and off for the last week and told staff there and was evaluated but they did not think it was a heart attack.  He reports that it occurred much worse today when he tried to reposition himself with a grab bar so EMS was called and he was brought in for further evaluation.  EMS had given aspirin before they got the full history to point towards being concern for musculoskeletal pain and they report that his vitals were otherwise stable.  Patient reports has been taking his medications and denies any black or bloody bowel moods nausea vomiting shortness of breath ripping or tearing pain or radiation to his arms or jaw.  He reports when he protraction retract the shoulders and causes pain.     PAST MEDICAL HISTORY:  as per HPI  ALLERGIES:  as per HPI  MEDICATIONS:  as per HPI  FAMILY HISTORY: as per HPI  SURGICAL HISTORY: as per HPI  SOCIAL HISTORY: as per HPI     PHYSICAL EXAM:  VITAL SIGNS: Nursing notes reviewed.  GENERAL:  Alert and interactive  EYES:   Eyes track.  ENT:  Airway patent.  RESPIRATORY:  Nonlabored breathing.  Clear bilaterally.  Well-healing surgical site.  No tenderness palpation.  No cutaneous lesions in the left chest wall.  CARDIOVASCULAR:  [Regular rate.] [Regular rhythm.]  Radial pulses equal bilaterally  GASTROINTESTINAL:  No distension.  MUSCULOSKELETAL:  No  deformity.  Swelling  NEUROLOGICAL:  Awake.  SKIN:  Dry.        MEDICAL DECISION MAKING (MDM):     DIAGNOSTIC STUDIES  Labs: CBC, CMP, Agnesian level, troponin  Radiology: Chest x-ray     EKG 1308  Per my interpretation:  Electrocardiogram ECG  RATE:  [Normal]  RHYTHM: [Sinus]  AXIS: Left axis deviation  INTERVALS: [Normal]  ST-T WAVE CHANGES: [Normal]  ABNORMALITIES/COMPARISON: Poor baseline.  QRS morphologies are similar to most recent EKG on October 1, 2023.  Nonspecific T wave inversions are stable.    EKG 1320  Per my interpretation:  Electrocardiogram ECG  RATE:  [Normal]  RHYTHM: [Sinus]  AXIS: Left axis deviation  INTERVALS: [Normal]  ST-T WAVE CHANGES: Unspecific changes  ABNORMALITIES/COMPARISON: Similar appearance most recent EKG prior to today on October 1, 2023.       REVIEW OF OLD RECORDS: Reviewed the cardiology note from 2/14/2024     SUMMARY:  The patient is admitted to the Emergency Department for evaluation of above. Complete history and physical examination was performed by me.  Patient presents with chest pain that seems most consistent with musculoskeletal pain given that it is reproducible with movement and occurred when he was flexing his chest to grab a grab bar.  Given his history as well as having a coronary artery that was not able to be intervened on, I am still concerned about ischemia which is what the patient is worried about.  He received aspirin but given my suspicion was low for ACS at this time I did not give nitro and instead give lidocaine patch.  EKG not consistent with occlusive MI which is reassuring.  Chest x-ray and blood work obtained.  Low suspicion for acute aortic syndrome, PE, Boerhaave's and bleeding ulcer.    Chest x-ray is unremarkable.  Blood work was consistent with renal failure but there is no indication for emergent dialysis.  Patient's troponins were slightly elevated but were stable and unchanged with repeat which points more towards renal failure as the  cause of the slight elevation in points away from occlusive MI.  I discussed everything with the patient remains symptom-free when he is not moving pointing away from ACS as well.  I suspect musculoskeletal pain but did discuss there is diagnostic uncertainty and if he has any new or worsening symptoms he should not hesitate to return.  I will discharge him with prescription for a lidocaine patch.  The work-up and plan were discussed with the patient/caregiver and questions were answered regarding the ED visit.  Educational materials were provided as well as return precautions including returning for any persisting or worsening symptoms.  Patient was recommended to follow-up with PCP in the next few days in addition to any potential specialists that were discussed.  The patient/family expressed understanding and agreed to the described plan.  Patient was discharged in stable condition.     DIAGNOSIS:    Chest pain     DISPOSITION:    1) discharged  [2) Please see Exit Care and discharge instructions for complete details.]       Richard Roblero MD  02/25/24 [id number]

## 2024-02-28 ENCOUNTER — CLINICAL SUPPORT (OUTPATIENT)
Dept: CARDIAC REHAB | Facility: HOSPITAL | Age: 84
End: 2024-02-28
Payer: MEDICARE

## 2024-02-28 DIAGNOSIS — I21.4 NSTEMI (NON-ST ELEVATION MYOCARDIAL INFARCTION) (MULTI): ICD-10-CM

## 2024-02-28 PROCEDURE — 93798 PHYS/QHP OP CAR RHAB W/ECG: CPT | Performed by: INTERNAL MEDICINE

## 2024-03-01 ENCOUNTER — CLINICAL SUPPORT (OUTPATIENT)
Dept: CARDIAC REHAB | Facility: HOSPITAL | Age: 84
End: 2024-03-01
Payer: MEDICARE

## 2024-03-01 DIAGNOSIS — I21.4 NSTEMI (NON-ST ELEVATION MYOCARDIAL INFARCTION) (MULTI): ICD-10-CM

## 2024-03-01 PROCEDURE — 93798 PHYS/QHP OP CAR RHAB W/ECG: CPT | Performed by: INTERNAL MEDICINE

## 2024-03-06 ENCOUNTER — CLINICAL SUPPORT (OUTPATIENT)
Dept: CARDIAC REHAB | Facility: HOSPITAL | Age: 84
End: 2024-03-06
Payer: MEDICARE

## 2024-03-06 ENCOUNTER — OFFICE VISIT (OUTPATIENT)
Dept: CARDIOLOGY | Facility: CLINIC | Age: 84
End: 2024-03-06
Payer: MEDICARE

## 2024-03-06 VITALS
HEART RATE: 57 BPM | BODY MASS INDEX: 25.4 KG/M2 | DIASTOLIC BLOOD PRESSURE: 68 MMHG | SYSTOLIC BLOOD PRESSURE: 136 MMHG | WEIGHT: 172 LBS

## 2024-03-06 DIAGNOSIS — E78.2 MIXED HYPERLIPIDEMIA: ICD-10-CM

## 2024-03-06 DIAGNOSIS — I48.0 PAF (PAROXYSMAL ATRIAL FIBRILLATION) (MULTI): ICD-10-CM

## 2024-03-06 DIAGNOSIS — Z79.4 TYPE 2 DIABETES MELLITUS WITH DIABETIC NEUROPATHY, WITH LONG-TERM CURRENT USE OF INSULIN (MULTI): ICD-10-CM

## 2024-03-06 DIAGNOSIS — I10 PRIMARY HYPERTENSION: ICD-10-CM

## 2024-03-06 DIAGNOSIS — I21.4 NSTEMI (NON-ST ELEVATION MYOCARDIAL INFARCTION) (MULTI): ICD-10-CM

## 2024-03-06 DIAGNOSIS — Z99.2 ESRD ON PERITONEAL DIALYSIS (MULTI): ICD-10-CM

## 2024-03-06 DIAGNOSIS — R07.89 OTHER CHEST PAIN: ICD-10-CM

## 2024-03-06 DIAGNOSIS — N18.6 ESRD ON PERITONEAL DIALYSIS (MULTI): ICD-10-CM

## 2024-03-06 DIAGNOSIS — E11.40 TYPE 2 DIABETES MELLITUS WITH DIABETIC NEUROPATHY, WITH LONG-TERM CURRENT USE OF INSULIN (MULTI): ICD-10-CM

## 2024-03-06 DIAGNOSIS — I25.10 ATHEROSCLEROSIS OF NATIVE CORONARY ARTERY OF NATIVE HEART WITHOUT ANGINA PECTORIS: Primary | ICD-10-CM

## 2024-03-06 PROCEDURE — 3075F SYST BP GE 130 - 139MM HG: CPT | Performed by: INTERNAL MEDICINE

## 2024-03-06 PROCEDURE — 99214 OFFICE O/P EST MOD 30 MIN: CPT | Performed by: INTERNAL MEDICINE

## 2024-03-06 PROCEDURE — 1159F MED LIST DOCD IN RCRD: CPT | Performed by: INTERNAL MEDICINE

## 2024-03-06 PROCEDURE — 1126F AMNT PAIN NOTED NONE PRSNT: CPT | Performed by: INTERNAL MEDICINE

## 2024-03-06 PROCEDURE — 93798 PHYS/QHP OP CAR RHAB W/ECG: CPT | Performed by: INTERNAL MEDICINE

## 2024-03-06 PROCEDURE — 1157F ADVNC CARE PLAN IN RCRD: CPT | Performed by: INTERNAL MEDICINE

## 2024-03-06 PROCEDURE — 1036F TOBACCO NON-USER: CPT | Performed by: INTERNAL MEDICINE

## 2024-03-06 PROCEDURE — 3078F DIAST BP <80 MM HG: CPT | Performed by: INTERNAL MEDICINE

## 2024-03-06 RX ORDER — HYDRALAZINE HYDROCHLORIDE 25 MG/1
TABLET, FILM COATED ORAL
Qty: 270 TABLET | Refills: 0 | Status: SHIPPED | OUTPATIENT
Start: 2024-03-06

## 2024-03-06 NOTE — PATIENT INSTRUCTIONS
HOLD ATORVASTATIN 40 MG FOR 1 MONTH. THEN RESUME.     MEDICATION CHANGE: HYDRALAZINE- TAKE 1 TABLET IN THE MORNING. TAKE 1 TABLET IN THE AFTERNOON. TAKE TWO TABLETS IN THE EVENING (50MG)    HAVE LAB WORK DONE ONE WEEK PRIOR TO NEXT OFFICE VISIT.

## 2024-03-06 NOTE — PROGRESS NOTES
Patient:  Salomon Chowdary  YOB: 1940  MRN: [id number]       HPI:       Salomon Chowdary is a 83 y.o. male who returns today for cardiac follow-up.  He has a history of hypertension, hyperlipidemia, diabetes mellitus, and end-stage renal disease.  He is on peritoneal dialysis.  [place] [facility] [place] left-sided chest pain and left arm pain.  He was noted to have underlying atrial fibrillation with rapid ventricular response.  D-dimer was positive.  CT scan was negative for pulmonary embolus.  He was noted to have a moderate-sized pericardial effusion on CT.  His hemoglobin was 7.7 and hematocrit 24.2.  Sodium 133 with potassium 3.8, BUN 68, and creatinine 4.67.  High-sensitivity troponin levels were 122, 608, 1661, and 2226.  Transthoracic echo showed normal LV ejection fraction and moderate-sized circumferential pericardial effusion without tamponade.  He converted to normal sinus rhythm after receiving IV Lopressor.     Cardiac catheterization on September 20, 2023 showed 30% stenosis of the left main trunk, heavy calcification of the LAD, the LAD was not able to be successfully injected. There was 95% stenosis of the PDA of the dominant circumflex.  The entire vessel was heavily calcified.  There was 80% stenosis of the first obtuse marginal branch.  Repeat imaging showed 75 to 80% stenosis involving the proximal, mid, and distal LAD.  He requested a transfer to [facility], however, no beds were available. He was transferred to [facility] on September 30, 2023.  He underwent mini left anterior thoracotomy and off-pump mid CAB (LIMA to LAD) on October 11, 2023 by Dr. Jorje Neal.  He initially did not tolerate beta-blockers due to underlying sinus bradycardia.  He is now on Toprol XL 50 mg daily.  An exhaustive attempt to angioplasty the distal circumflex October 18, 2023 was unsuccessful due to inability to expand a balloon in the heavily  calcified vessel.  A Shockwave balloon was unable to be passed.  He was discharged on October 17, 2023 on aspirin, Plavix, Toprol-XL, torsemide, Trulicity, and iron polysaccharide.  He was seen in follow-up by Dr. Eusebio Hudson and conservative care is being recommended for now.  He will be scheduled for rotational atherectomy of the circumflex if he develops recurrent anginal symptoms. A follow-up echocardiogram showed normal LV systolic function and mild to moderate pericardial effusion.  His burden of atrial fibrillation was low and he presented with significant anemia as well as pericardial effusion.  It was opted to forego a NOAC unless he develops recurrent atrial fibrillation.  He might also be a candidate for Watchman device implant.    He was seen in the emergency department on fibber 25, 2024 after he developed sharp chest pain while at cardiac rehab.  He was trying to use a grab bar to shift himself when he developed the discomfort.  His symptoms were felt to be musculoskeletal in nature.  It was easily reproducible.  EKG showed normal sinus rhythm with nonspecific ST-T wave changes.  Similar to previous.  Troponins were elevated in the setting of renal failure in a flat pattern (24, 24, and 24).  Hemoglobin was 10.7 with WBC 7.9.  Comprehensive metabolic profile normal with exception of glucose 252, BUN 64, and creatinine 4.79.     He continues to have some musculoskeletal chest discomfort.  He is taking Tylenol with some relief.  He denies any chest pressure.  He denies shortness of breath.  He denies any orthopnea, PND, or increasing peripheral edema.  He denies any lightheadedness, near-syncope, or syncope.  He denies any fever, chills, or cough.  He denies any nausea, vomiting, or diaphoresis.  He denies any hemoptysis, hematemesis, melena, or hematochezia.   He notes systolic blood pressures 130s to 150s first thing in the morning.  Remainder of the day today are in normal range.  He was advised to  take hydralazine 25 mg 3 times daily with an additional 25 mg dose in the evening.  He feels numbing and burning discomfort in his feet and calves.  His wife states that he has some difficulty bearing weight on them.  Some leg weakness.  Previous PVRs and arterial ultrasound studies were unremarkable per his history.  Symptoms are most likely related to neuropathy and he will discuss this with Dr. Ansari.  I advised him to hold atorvastatin for 1 month and then resume to see if this has any impact on his symptoms.   Other details as noted below.      The above portion of this note was dictated by me using voice recognition software.  I personally performed the services described in the documentation.  The scribe entering the documentation below was in my presence.  I affirm that the information is both accurate and complete.      Objective       Vitals:    03/06/24 1456   BP: 136/68   Pulse: 57       Wt Readings from Last 4 Encounters:   03/06/24 78 kg (172 lb)   02/25/24 74.4 kg (164 lb)   02/22/24 78.5 kg (173 lb)   02/14/24 75.8 kg (167 lb)       Allergies:     Allergies   Allergen Reactions    Gabapentin Other     vomiting    Other reaction(s): Other (See Comments), Other: See Comments   vomiting   vomiting    Meperidine Nausea And Vomiting    Opioids - Morphine Analogues Unknown, Nausea And Vomiting and Nausea/vomiting     Patient states he is able to take Morphine.    Other reaction(s): nausea/vomiting    Opioids-Meperidine And Related Nausea/vomiting    Opium Tincture Unknown    Oxycodone GI Upset     nausea and vomiting    Tizanidine Unknown        Medications:     Current Outpatient Medications   Medication Instructions    acetaminophen (TYLENOL) 650 mg, oral, Every 6 hours PRN    amLODIPine (NORVASC) 10 mg, oral, Daily    aspirin 81 mg, oral, Daily    atorvastatin (LIPITOR) 40 mg, oral, Nightly    cholecalciferol (VITAMIN D-3) 25 mcg, oral, Daily    clopidogrel (PLAVIX) 75 mg, oral, Daily    DULoxetine  "(CYMBALTA) 30 mg, oral, Daily, Do not crush or chew.    flash glucose sensor (FREESTYLE ANDRÉS 2 SENSOR MISC)  Change every 2 weeks    fluticasone (Flonase) 50 mcg/actuation nasal spray Daily PRN    glucosam aguirre dip-chondroit-C-Mn [id number] mg capsule GLUCOSAMINE 1500 COMPLEX CAPS    hydrALAZINE (APRESOLINE) 25 mg, oral, 3 times daily    insulin lispro (HumaLOG) 100 unit/mL injection Inject subcutaneously before meals TID, as per sliding scale: 1 unit if sugar is 151-200, 2 units if 201-250, 3 units if 251-300, 4 units if 301-350, 5 units> 350. Total 15 units per day.    LANCETS MISC 1 each, Daily    Levemir U-100 Insulin 7 Units, subcutaneous, Daily, Take as directed per insulin instructions.    levothyroxine (TIROSINT) 50 mcg, oral, Daily before breakfast    lidocaine HCL 4 % adhesive patch,medicated 1 patch, topical (top), Daily, Place on area of pain.    metoprolol succinate XL (TOPROL-XL) 50 mg, oral, Daily, Do not crush or chew.    min17/nettle/pumpkin/saw palme (PROSTATE THERAPY ORAL) oral    multivitamin with minerals (Daily Multivitamin-Minerals) tablet 1 tablet, oral, Daily    OneTouch Ultra Test strip  TEST THREE TIMES DAILY.    pen needle, diabetic 31 gauge x 1/4\" needle 1 each, 2 times daily    RenaPlex-D 800 mcg-12.5 mg -2,000 unit tablet 1 tablet, oral, Daily    sodium bicarbonate 650 mg, oral, 2 times daily    torsemide (DEMADEX) 50 mg, oral, Daily       Physical Examination:   GENERAL:  Well developed, well nourished, in no acute distress.  CHEST:  Symmetric and nontender.  NEURO/PSYCH:  Alert and oriented times three with approppriate behavior and responses.  NECK:  Supple, no JVD, no bruit.  LUNGS:  Clear to auscultation bilaterally, normal respiratory effort.  HEART:  Rate and rhythm regular with no evident murmur, no gallop appreciated.        There are no rubs, clicks or heaves.  EXTREMITIES:  Warm with good color, no clubbing or cyanosis.  There is no edema noted.  PERIPHERAL VASCULAR:  " Pulses present and equally palpable; 2+ throughout.      Lab:     CBC:   Lab Results   Component Value Date    WBC 7.9 02/25/2024    RBC 3.29 (L) 02/25/2024    HGB 10.7 (L) 02/25/2024    HCT 33.2 (L) 02/25/2024     02/25/2024        CMP:    Lab Results   Component Value Date     02/25/2024    K 3.5 02/25/2024    CL 99 02/25/2024    CO2 25 02/25/2024    BUN 64 (H) 02/25/2024    CREATININE 4.79 (H) 02/25/2024    GLUCOSE 252 (H) 02/25/2024    CALCIUM 8.3 (L) 02/25/2024       Magnesium:    Lab Results   Component Value Date    MG 1.49 (L) 02/25/2024       Lipid Profile:    Lab Results   Component Value Date    TRIG 94 10/02/2023    HDL 35.8 10/02/2023    LDLCALC 23 (L) 10/02/2023       TSH:    Lab Results   Component Value Date    TSH 5.76 (H) 10/09/2023       BNP:   Lab Results   Component Value Date     (H) 09/19/2023        PT/INR:    Lab Results   Component Value Date    PROTIME 12.3 10/13/2023    INR 1.1 10/13/2023       HgBA1c:    Lab Results   Component Value Date    HGBA1C 7.0 (A) 02/20/2024       BMP:  Lab Results   Component Value Date     02/25/2024     10/17/2023     (L) 10/16/2023    K 3.5 02/25/2024    K 4.2 10/17/2023    K 4.8 10/16/2023    CL 99 02/25/2024    CL 99 10/17/2023    CL 98 10/16/2023    CO2 25 02/25/2024    CO2 27 10/17/2023    CO2 28 10/16/2023    BUN 64 (H) 02/25/2024    BUN 57 (H) 10/17/2023    BUN 62 (H) 10/16/2023    CREATININE 4.79 (H) 02/25/2024    CREATININE 3.72 (H) 10/17/2023    CREATININE 4.03 (H) 10/16/2023       CBC:  Lab Results   Component Value Date    WBC 7.9 02/25/2024    WBC 8.1 10/17/2023    WBC 7.4 10/16/2023    RBC 3.29 (L) 02/25/2024    RBC 2.79 (L) 10/17/2023    RBC 2.66 (L) 10/16/2023    HGB 10.7 (L) 02/25/2024    HGB 8.8 (L) 10/17/2023    HGB 8.4 (L) 10/16/2023    HCT 33.2 (L) 02/25/2024    HCT 28.3 (L) 10/17/2023    HCT 28.0 (L) 10/16/2023     (H) 02/25/2024     (H) 10/17/2023     (H) 10/16/2023    MCH 32.5  02/25/2024    MCH 31.5 10/17/2023    MCH 31.6 10/16/2023    MCHC 32.2 02/25/2024    MCHC 31.1 (L) 10/17/2023    MCHC 30.0 (L) 10/16/2023    RDW 14.9 (H) 02/25/2024    RDW 14.5 10/17/2023    RDW 14.7 (H) 10/16/2023     02/25/2024     10/17/2023     10/16/2023    MPV 9.5 10/17/2023    MPV 9.6 10/16/2023    MPV 9.9 10/15/2023       Cardiac Enzymes:    Lab Results   Component Value Date    TROPHS 24 (H) 02/25/2024    TROPHS 24 (H) 02/25/2024    TROPHS 24 10/02/2023       Hepatic Function Panel:    Lab Results   Component Value Date    ALKPHOS 52 02/25/2024    ALT 17 02/25/2024    AST 25 02/25/2024    PROT 6.2 (L) 02/25/2024    BILITOT 0.3 02/25/2024    BILIDIR 0.1 10/02/2023       Diagnostic Studies:     XR chest 2 views  Result Date: 2/25/2024      No interval change from 11/01/2023 as described above.   Signed by: Olaf Li 2/25/2024 3:39 PM Dictation workstation:   [id number]    ECG 12 lead  Result Date: 2/25/2024    Normal sinus rhythm Left axis deviation Abnormal ECG When compared with ECG of 25-FEB-2024 13:08, (unconfirmed) Previous ECG has undetermined rhythm, needs review ST no longer depressed in Inferior leads See ED provider note for full interpretation and clinical correlation Confirmed by Yisel Yanez ([id number]) on 2/25/2024 1:40:13 PM      Problem List:     Patient Active Problem List   Diagnosis    Atherosclerosis of native coronary artery of native heart without angina pectoris    ESRD (end stage renal disease) (CMS/HCC)    Type 2 diabetes mellitus with circulatory disorder (CMS/HCC)    Combined forms of age-related cataract of both eyes    MAU (acute kidney injury) (CMS/HCC)    Allergy, unspecified, initial encounter    Anaphylactic shock, unspecified, initial encounter    Benign prostatic hyperplasia    Breast pain    Carpal tunnel syndrome    Cervical myelopathy (CMS/HCC)    Chronic fatigue    Class 1 obesity    Coagulation defect, unspecified (CMS/HCC)    Cortical senile  cataract    Diabetes mellitus type 2 without retinopathy (CMS/HCC)    Edema    ESRD on peritoneal dialysis (CMS/HCC)    Primary hypertension    High thyroid stimulating hormone (TSH) level    Hyperkalemia    Mixed hyperlipidemia    Hyperphosphatemia    Hypertension secondary to other renal disorders    Hypertrophy of nail    Mechanical complication of dialysis catheter (CMS/HCC)    Metabolic acidosis    Microscopic hematuria    Myopia of right eye    Nuclear sclerosis    Chronic anemia    Anemia due to stage 5 chronic kidney disease, not on chronic dialysis (CMS/HCC)    Type 2 diabetes mellitus with hyperglycemia (CMS/HCC)    Type 2 diabetes mellitus with diabetic neuropathy, unspecified (CMS/HCC)    Subclinical hypothyroidism    Stenosis of intervertebral foramina    Secondary hyperparathyroidism of renal origin (CMS/HCC)    Stage 4 chronic kidney disease (CMS/HCC)    Right arm weakness    Restrictive lung disease    ILD (interstitial lung disease) (CMS/HCC)    Restrictive cardiomyopathy (CMS/HCC)    Regular astigmatism of left eye    Proteinuria    Presbyopia    Peripheral vascular disease (CMS/HCC)    Peripheral neuropathy due to metabolic disorder (CMS/HCC)    Other disorders resulting from impaired renal tubular function    Palpitations    PAF (paroxysmal atrial fibrillation) (CMS/HCC)    Pericardial effusion       Asessment:           No results found for this or any previous visit.    No results found for this or any previous visit.      Plan:

## 2024-03-08 ENCOUNTER — CLINICAL SUPPORT (OUTPATIENT)
Dept: CARDIAC REHAB | Facility: HOSPITAL | Age: 84
End: 2024-03-08
Payer: MEDICARE

## 2024-03-08 DIAGNOSIS — I21.4 NSTEMI (NON-ST ELEVATION MYOCARDIAL INFARCTION) (MULTI): ICD-10-CM

## 2024-03-08 PROCEDURE — 93798 PHYS/QHP OP CAR RHAB W/ECG: CPT | Performed by: INTERNAL MEDICINE

## 2024-03-13 ENCOUNTER — CLINICAL SUPPORT (OUTPATIENT)
Dept: CARDIAC REHAB | Facility: HOSPITAL | Age: 84
End: 2024-03-13
Payer: MEDICARE

## 2024-03-13 DIAGNOSIS — I21.4 NSTEMI (NON-ST ELEVATION MYOCARDIAL INFARCTION) (MULTI): ICD-10-CM

## 2024-03-13 PROCEDURE — 93798 PHYS/QHP OP CAR RHAB W/ECG: CPT | Performed by: INTERNAL MEDICINE

## 2024-03-15 ENCOUNTER — APPOINTMENT (OUTPATIENT)
Dept: RADIOLOGY | Facility: HOSPITAL | Age: 84
DRG: 480 | End: 2024-03-15
Payer: MEDICARE

## 2024-03-15 ENCOUNTER — CLINICAL SUPPORT (OUTPATIENT)
Dept: CARDIAC REHAB | Facility: HOSPITAL | Age: 84
End: 2024-03-15
Payer: MEDICARE

## 2024-03-15 ENCOUNTER — HOSPITAL ENCOUNTER (INPATIENT)
Facility: HOSPITAL | Age: 84
LOS: 6 days | Discharge: NURSING, REHAB OR LONG-TERM CARE | DRG: 480 | End: 2024-03-21
Attending: STUDENT IN AN ORGANIZED HEALTH CARE EDUCATION/TRAINING PROGRAM | Admitting: INTERNAL MEDICINE
Payer: MEDICARE

## 2024-03-15 ENCOUNTER — APPOINTMENT (OUTPATIENT)
Dept: CARDIOLOGY | Facility: HOSPITAL | Age: 84
DRG: 480 | End: 2024-03-15
Payer: MEDICARE

## 2024-03-15 DIAGNOSIS — I21.4 NSTEMI (NON-ST ELEVATION MYOCARDIAL INFARCTION) (MULTI): ICD-10-CM

## 2024-03-15 DIAGNOSIS — N18.6 ESRD (END STAGE RENAL DISEASE) (MULTI): ICD-10-CM

## 2024-03-15 DIAGNOSIS — I25.10 2-VESSEL CORONARY ARTERY DISEASE: ICD-10-CM

## 2024-03-15 DIAGNOSIS — S72.142A CLOSED DISPLACED INTERTROCHANTERIC FRACTURE OF LEFT FEMUR, INITIAL ENCOUNTER (MULTI): Primary | ICD-10-CM

## 2024-03-15 LAB
ABO GROUP (TYPE) IN BLOOD: NORMAL
ALBUMIN SERPL BCP-MCNC: 3.1 G/DL (ref 3.4–5)
ALP SERPL-CCNC: 52 U/L (ref 33–136)
ALT SERPL W P-5'-P-CCNC: 18 U/L (ref 10–52)
ANION GAP SERPL CALC-SCNC: 12 MMOL/L (ref 10–20)
ANTIBODY SCREEN: NORMAL
APTT PPP: 28 SECONDS (ref 27–38)
AST SERPL W P-5'-P-CCNC: 21 U/L (ref 9–39)
BILIRUB SERPL-MCNC: 0.3 MG/DL (ref 0–1.2)
BUN SERPL-MCNC: 67 MG/DL (ref 6–23)
CALCIUM SERPL-MCNC: 8.6 MG/DL (ref 8.6–10.3)
CHLORIDE SERPL-SCNC: 102 MMOL/L (ref 98–107)
CO2 SERPL-SCNC: 29 MMOL/L (ref 21–32)
CREAT SERPL-MCNC: 4.77 MG/DL (ref 0.5–1.3)
EGFRCR SERPLBLD CKD-EPI 2021: 12 ML/MIN/1.73M*2
ERYTHROCYTE [DISTWIDTH] IN BLOOD BY AUTOMATED COUNT: 14.5 % (ref 11.5–14.5)
GLUCOSE SERPL-MCNC: 139 MG/DL (ref 74–99)
HCT VFR BLD AUTO: 30.1 % (ref 41–52)
HGB BLD-MCNC: 9.1 G/DL (ref 13.5–17.5)
INR PPP: 1 (ref 0.9–1.1)
MCH RBC QN AUTO: 31.8 PG (ref 26–34)
MCHC RBC AUTO-ENTMCNC: 30.2 G/DL (ref 32–36)
MCV RBC AUTO: 105 FL (ref 80–100)
NRBC BLD-RTO: 0 /100 WBCS (ref 0–0)
PLATELET # BLD AUTO: 181 X10*3/UL (ref 150–450)
POTASSIUM SERPL-SCNC: 3.6 MMOL/L (ref 3.5–5.3)
PROT SERPL-MCNC: 6 G/DL (ref 6.4–8.2)
PROTHROMBIN TIME: 11.5 SECONDS (ref 9.8–12.8)
RBC # BLD AUTO: 2.86 X10*6/UL (ref 4.5–5.9)
RH FACTOR (ANTIGEN D): NORMAL
SODIUM SERPL-SCNC: 139 MMOL/L (ref 136–145)
WBC # BLD AUTO: 10.3 X10*3/UL (ref 4.4–11.3)

## 2024-03-15 PROCEDURE — 72125 CT NECK SPINE W/O DYE: CPT

## 2024-03-15 PROCEDURE — 73502 X-RAY EXAM HIP UNI 2-3 VIEWS: CPT | Mod: LT

## 2024-03-15 PROCEDURE — 99285 EMERGENCY DEPT VISIT HI MDM: CPT | Performed by: STUDENT IN AN ORGANIZED HEALTH CARE EDUCATION/TRAINING PROGRAM

## 2024-03-15 PROCEDURE — 72131 CT LUMBAR SPINE W/O DYE: CPT | Performed by: RADIOLOGY

## 2024-03-15 PROCEDURE — 70450 CT HEAD/BRAIN W/O DYE: CPT

## 2024-03-15 PROCEDURE — 99285 EMERGENCY DEPT VISIT HI MDM: CPT | Mod: 25

## 2024-03-15 PROCEDURE — 73070 X-RAY EXAM OF ELBOW: CPT | Mod: LT

## 2024-03-15 PROCEDURE — 99223 1ST HOSP IP/OBS HIGH 75: CPT | Performed by: INTERNAL MEDICINE

## 2024-03-15 PROCEDURE — 72128 CT CHEST SPINE W/O DYE: CPT | Performed by: RADIOLOGY

## 2024-03-15 PROCEDURE — 93010 ELECTROCARDIOGRAM REPORT: CPT | Performed by: INTERNAL MEDICINE

## 2024-03-15 PROCEDURE — 71045 X-RAY EXAM CHEST 1 VIEW: CPT

## 2024-03-15 PROCEDURE — 93798 PHYS/QHP OP CAR RHAB W/ECG: CPT | Performed by: INTERNAL MEDICINE

## 2024-03-15 PROCEDURE — 72125 CT NECK SPINE W/O DYE: CPT | Performed by: RADIOLOGY

## 2024-03-15 PROCEDURE — 36415 COLL VENOUS BLD VENIPUNCTURE: CPT

## 2024-03-15 PROCEDURE — 85027 COMPLETE CBC AUTOMATED: CPT

## 2024-03-15 PROCEDURE — 1200000002 HC GENERAL ROOM WITH TELEMETRY DAILY

## 2024-03-15 PROCEDURE — 85730 THROMBOPLASTIN TIME PARTIAL: CPT

## 2024-03-15 PROCEDURE — 2500000001 HC RX 250 WO HCPCS SELF ADMINISTERED DRUGS (ALT 637 FOR MEDICARE OP)

## 2024-03-15 PROCEDURE — 86901 BLOOD TYPING SEROLOGIC RH(D): CPT

## 2024-03-15 PROCEDURE — 80053 COMPREHEN METABOLIC PANEL: CPT

## 2024-03-15 PROCEDURE — 85610 PROTHROMBIN TIME: CPT

## 2024-03-15 PROCEDURE — 72131 CT LUMBAR SPINE W/O DYE: CPT

## 2024-03-15 PROCEDURE — 72128 CT CHEST SPINE W/O DYE: CPT

## 2024-03-15 PROCEDURE — 73502 X-RAY EXAM HIP UNI 2-3 VIEWS: CPT | Mod: LEFT SIDE | Performed by: RADIOLOGY

## 2024-03-15 PROCEDURE — 93005 ELECTROCARDIOGRAM TRACING: CPT

## 2024-03-15 PROCEDURE — 70450 CT HEAD/BRAIN W/O DYE: CPT | Performed by: RADIOLOGY

## 2024-03-15 PROCEDURE — 73070 X-RAY EXAM OF ELBOW: CPT | Mod: LEFT SIDE | Performed by: RADIOLOGY

## 2024-03-15 PROCEDURE — 93010 ELECTROCARDIOGRAM REPORT: CPT | Performed by: STUDENT IN AN ORGANIZED HEALTH CARE EDUCATION/TRAINING PROGRAM

## 2024-03-15 RX ORDER — PROMETHAZINE HYDROCHLORIDE 25 MG/1
25 SUPPOSITORY RECTAL EVERY 12 HOURS PRN
Status: DISCONTINUED | OUTPATIENT
Start: 2024-03-15 | End: 2024-03-17

## 2024-03-15 RX ORDER — TRAMADOL HYDROCHLORIDE 50 MG/1
50 TABLET ORAL ONCE
Status: COMPLETED | OUTPATIENT
Start: 2024-03-15 | End: 2024-03-15

## 2024-03-15 RX ORDER — HEPARIN SODIUM 5000 [USP'U]/ML
5000 INJECTION, SOLUTION INTRAVENOUS; SUBCUTANEOUS EVERY 8 HOURS
Status: DISCONTINUED | OUTPATIENT
Start: 2024-03-16 | End: 2024-03-17

## 2024-03-15 RX ORDER — MULTIVIT-MIN/IRON FUM/FOLIC AC 7.5 MG-4
1 TABLET ORAL DAILY
Status: DISCONTINUED | OUTPATIENT
Start: 2024-03-16 | End: 2024-03-21 | Stop reason: HOSPADM

## 2024-03-15 RX ORDER — AMLODIPINE BESYLATE 10 MG/1
10 TABLET ORAL DAILY
Status: DISCONTINUED | OUTPATIENT
Start: 2024-03-16 | End: 2024-03-21 | Stop reason: HOSPADM

## 2024-03-15 RX ORDER — POLYETHYLENE GLYCOL 3350 17 G/17G
17 POWDER, FOR SOLUTION ORAL DAILY
Status: DISCONTINUED | OUTPATIENT
Start: 2024-03-16 | End: 2024-03-21 | Stop reason: HOSPADM

## 2024-03-15 RX ORDER — ASPIRIN 81 MG/1
81 TABLET ORAL DAILY
Status: DISCONTINUED | OUTPATIENT
Start: 2024-03-16 | End: 2024-03-21 | Stop reason: HOSPADM

## 2024-03-15 RX ORDER — ONDANSETRON 4 MG/1
4 TABLET, ORALLY DISINTEGRATING ORAL EVERY 8 HOURS PRN
Status: DISCONTINUED | OUTPATIENT
Start: 2024-03-15 | End: 2024-03-17

## 2024-03-15 RX ORDER — DULOXETIN HYDROCHLORIDE 30 MG/1
30 CAPSULE, DELAYED RELEASE ORAL DAILY
Status: DISCONTINUED | OUTPATIENT
Start: 2024-03-16 | End: 2024-03-21 | Stop reason: HOSPADM

## 2024-03-15 RX ORDER — ACETAMINOPHEN 325 MG/1
975 TABLET ORAL ONCE
Status: COMPLETED | OUTPATIENT
Start: 2024-03-15 | End: 2024-03-15

## 2024-03-15 RX ORDER — LEVOTHYROXINE SODIUM 50 UG/1
50 TABLET ORAL DAILY
Status: DISCONTINUED | OUTPATIENT
Start: 2024-03-16 | End: 2024-03-21 | Stop reason: HOSPADM

## 2024-03-15 RX ORDER — ACETAMINOPHEN 325 MG/1
975 TABLET ORAL 3 TIMES DAILY
Status: DISCONTINUED | OUTPATIENT
Start: 2024-03-15 | End: 2024-03-21 | Stop reason: HOSPADM

## 2024-03-15 RX ORDER — CHOLECALCIFEROL (VITAMIN D3) 25 MCG
1000 TABLET ORAL DAILY
Status: DISCONTINUED | OUTPATIENT
Start: 2024-03-16 | End: 2024-03-21 | Stop reason: HOSPADM

## 2024-03-15 RX ORDER — GLUCOSAMINE/CHONDR SU A SOD 750-600 MG
1 TABLET ORAL 2 TIMES DAILY
COMMUNITY

## 2024-03-15 RX ORDER — DEXTROSE 50 % IN WATER (D50W) INTRAVENOUS SYRINGE
25
Status: DISCONTINUED | OUTPATIENT
Start: 2024-03-15 | End: 2024-03-21 | Stop reason: HOSPADM

## 2024-03-15 RX ORDER — TRAMADOL HYDROCHLORIDE 50 MG/1
50 TABLET ORAL EVERY 12 HOURS PRN
Status: DISCONTINUED | OUTPATIENT
Start: 2024-03-15 | End: 2024-03-21 | Stop reason: HOSPADM

## 2024-03-15 RX ORDER — PROMETHAZINE HYDROCHLORIDE 25 MG/1
25 TABLET ORAL EVERY 6 HOURS PRN
Status: DISCONTINUED | OUTPATIENT
Start: 2024-03-15 | End: 2024-03-17

## 2024-03-15 RX ORDER — AMOXICILLIN 250 MG
2 CAPSULE ORAL 2 TIMES DAILY
COMMUNITY

## 2024-03-15 RX ORDER — ACETAMINOPHEN 325 MG/1
325 TABLET ORAL EVERY 8 HOURS PRN
Status: DISCONTINUED | OUTPATIENT
Start: 2024-03-15 | End: 2024-03-21 | Stop reason: HOSPADM

## 2024-03-15 RX ORDER — INSULIN LISPRO 100 [IU]/ML
0-10 INJECTION, SOLUTION INTRAVENOUS; SUBCUTANEOUS
Status: DISCONTINUED | OUTPATIENT
Start: 2024-03-16 | End: 2024-03-21 | Stop reason: HOSPADM

## 2024-03-15 RX ORDER — HYDRALAZINE HYDROCHLORIDE 25 MG/1
25 TABLET, FILM COATED ORAL 2 TIMES DAILY
Status: DISCONTINUED | OUTPATIENT
Start: 2024-03-15 | End: 2024-03-16

## 2024-03-15 RX ORDER — GENTAMICIN SULFATE 1 MG/G
1 CREAM TOPICAL DAILY
COMMUNITY

## 2024-03-15 RX ORDER — GUAIFENESIN 600 MG/1
600 TABLET, EXTENDED RELEASE ORAL EVERY 12 HOURS PRN
Status: DISCONTINUED | OUTPATIENT
Start: 2024-03-15 | End: 2024-03-21 | Stop reason: HOSPADM

## 2024-03-15 RX ORDER — ONDANSETRON HYDROCHLORIDE 2 MG/ML
4 INJECTION, SOLUTION INTRAVENOUS EVERY 8 HOURS PRN
Status: DISCONTINUED | OUTPATIENT
Start: 2024-03-15 | End: 2024-03-21 | Stop reason: HOSPADM

## 2024-03-15 RX ORDER — SODIUM BICARBONATE 650 MG/1
650 TABLET ORAL 2 TIMES DAILY
Status: DISCONTINUED | OUTPATIENT
Start: 2024-03-15 | End: 2024-03-21 | Stop reason: HOSPADM

## 2024-03-15 RX ORDER — METOPROLOL SUCCINATE 50 MG/1
50 TABLET, EXTENDED RELEASE ORAL DAILY
Status: DISCONTINUED | OUTPATIENT
Start: 2024-03-16 | End: 2024-03-21 | Stop reason: HOSPADM

## 2024-03-15 RX ORDER — DEXTROSE 50 % IN WATER (D50W) INTRAVENOUS SYRINGE
12.5
Status: DISCONTINUED | OUTPATIENT
Start: 2024-03-15 | End: 2024-03-21 | Stop reason: HOSPADM

## 2024-03-15 RX ORDER — CLOPIDOGREL BISULFATE 75 MG/1
75 TABLET ORAL DAILY
Status: DISCONTINUED | OUTPATIENT
Start: 2024-03-16 | End: 2024-03-18

## 2024-03-15 RX ADMIN — TRAMADOL HYDROCHLORIDE 50 MG: 50 TABLET, COATED ORAL at 21:46

## 2024-03-15 RX ADMIN — ACETAMINOPHEN 975 MG: 325 TABLET ORAL at 17:45

## 2024-03-15 SDOH — SOCIAL STABILITY: SOCIAL INSECURITY: DOES ANYONE TRY TO KEEP YOU FROM HAVING/CONTACTING OTHER FRIENDS OR DOING THINGS OUTSIDE YOUR HOME?: NO

## 2024-03-15 SDOH — SOCIAL STABILITY: SOCIAL INSECURITY: ARE YOU OR HAVE YOU BEEN THREATENED OR ABUSED PHYSICALLY, EMOTIONALLY, OR SEXUALLY BY ANYONE?: NO

## 2024-03-15 SDOH — SOCIAL STABILITY: SOCIAL INSECURITY: DO YOU FEEL UNSAFE GOING BACK TO THE PLACE WHERE YOU ARE LIVING?: NO

## 2024-03-15 SDOH — SOCIAL STABILITY: SOCIAL INSECURITY: ARE THERE ANY APPARENT SIGNS OF INJURIES/BEHAVIORS THAT COULD BE RELATED TO ABUSE/NEGLECT?: NO

## 2024-03-15 SDOH — SOCIAL STABILITY: SOCIAL INSECURITY: DO YOU FEEL ANYONE HAS EXPLOITED OR TAKEN ADVANTAGE OF YOU FINANCIALLY OR OF YOUR PERSONAL PROPERTY?: NO

## 2024-03-15 SDOH — SOCIAL STABILITY: SOCIAL INSECURITY: HAVE YOU HAD THOUGHTS OF HARMING ANYONE ELSE?: NO

## 2024-03-15 SDOH — SOCIAL STABILITY: SOCIAL INSECURITY: ABUSE: ADULT

## 2024-03-15 SDOH — SOCIAL STABILITY: SOCIAL INSECURITY: HAS ANYONE EVER THREATENED TO HURT YOUR FAMILY OR YOUR PETS?: NO

## 2024-03-15 SDOH — SOCIAL STABILITY: SOCIAL INSECURITY: WERE YOU ABLE TO COMPLETE ALL THE BEHAVIORAL HEALTH SCREENINGS?: YES

## 2024-03-15 ASSESSMENT — COGNITIVE AND FUNCTIONAL STATUS - GENERAL
DAILY ACTIVITIY SCORE: 24
MOBILITY SCORE: 24
PATIENT BASELINE BEDBOUND: NO

## 2024-03-15 ASSESSMENT — ACTIVITIES OF DAILY LIVING (ADL)
FEEDING YOURSELF: INDEPENDENT
PATIENT'S MEMORY ADEQUATE TO SAFELY COMPLETE DAILY ACTIVITIES?: YES
WALKS IN HOME: INDEPENDENT
GROOMING: INDEPENDENT
HEARING - LEFT EAR: HEARING AID
LACK_OF_TRANSPORTATION: NO
TOILETING: INDEPENDENT
DRESSING YOURSELF: INDEPENDENT
BATHING: INDEPENDENT
ADEQUATE_TO_COMPLETE_ADL: YES
HEARING - RIGHT EAR: HEARING AID
JUDGMENT_ADEQUATE_SAFELY_COMPLETE_DAILY_ACTIVITIES: YES

## 2024-03-15 ASSESSMENT — LIFESTYLE VARIABLES
HOW MANY STANDARD DRINKS CONTAINING ALCOHOL DO YOU HAVE ON A TYPICAL DAY: PATIENT DOES NOT DRINK
SKIP TO QUESTIONS 9-10: 1
HOW OFTEN DO YOU HAVE 6 OR MORE DRINKS ON ONE OCCASION: NEVER
PRESCIPTION_ABUSE_PAST_12_MONTHS: NO
EVER HAD A DRINK FIRST THING IN THE MORNING TO STEADY YOUR NERVES TO GET RID OF A HANGOVER: NO
EVER FELT BAD OR GUILTY ABOUT YOUR DRINKING: NO
HAVE YOU EVER FELT YOU SHOULD CUT DOWN ON YOUR DRINKING: NO
AUDIT-C TOTAL SCORE: 0
HAVE PEOPLE ANNOYED YOU BY CRITICIZING YOUR DRINKING: NO
SUBSTANCE_ABUSE_PAST_12_MONTHS: NO
HOW OFTEN DO YOU HAVE A DRINK CONTAINING ALCOHOL: NEVER
AUDIT-C TOTAL SCORE: 0

## 2024-03-15 ASSESSMENT — PAIN SCALES - GENERAL
PAINLEVEL_OUTOF10: 8
PAINLEVEL_OUTOF10: 4

## 2024-03-15 ASSESSMENT — PAIN DESCRIPTION - PROGRESSION: CLINICAL_PROGRESSION: GRADUALLY IMPROVING

## 2024-03-15 ASSESSMENT — PAIN DESCRIPTION - PAIN TYPE
TYPE: ACUTE PAIN
TYPE: ACUTE PAIN

## 2024-03-15 ASSESSMENT — PATIENT HEALTH QUESTIONNAIRE - PHQ9
SUM OF ALL RESPONSES TO PHQ9 QUESTIONS 1 & 2: 0
1. LITTLE INTEREST OR PLEASURE IN DOING THINGS: NOT AT ALL
2. FEELING DOWN, DEPRESSED OR HOPELESS: NOT AT ALL

## 2024-03-15 ASSESSMENT — COLUMBIA-SUICIDE SEVERITY RATING SCALE - C-SSRS
6. HAVE YOU EVER DONE ANYTHING, STARTED TO DO ANYTHING, OR PREPARED TO DO ANYTHING TO END YOUR LIFE?: NO
2. HAVE YOU ACTUALLY HAD ANY THOUGHTS OF KILLING YOURSELF?: NO
1. IN THE PAST MONTH, HAVE YOU WISHED YOU WERE DEAD OR WISHED YOU COULD GO TO SLEEP AND NOT WAKE UP?: NO

## 2024-03-15 ASSESSMENT — PAIN DESCRIPTION - ORIENTATION: ORIENTATION: LEFT

## 2024-03-15 ASSESSMENT — PAIN - FUNCTIONAL ASSESSMENT
PAIN_FUNCTIONAL_ASSESSMENT: 0-10
PAIN_FUNCTIONAL_ASSESSMENT: 0-10

## 2024-03-15 ASSESSMENT — PAIN DESCRIPTION - LOCATION
LOCATION: HIP
LOCATION: HIP

## 2024-03-15 NOTE — ED PROVIDER NOTES
EMERGENCY DEPARTMENT ENCOUNTER      Pt Name: Salomon Chowdary  MRN: [id number]  Birthdate 1940  Date of evaluation: 3/15/2024  Provider: Judson Olsen DO    CHIEF COMPLAINT       Chief Complaint   Patient presents with    Fall     Left hip and elbow pain         HISTORY OF PRESENT ILLNESS    HPI    83-year-old male with past medical history significant for ESRD on peritoneal dialysis, hypertension, diabetes mellitus hyperlipidemia, coronary artery disease, paroxysmal atrial fibrillation not on anticoagulation presenting to the emergency department after a mechanical fall at home with obvious deformity of the left hip concerning for hip fracture.  Patient states he dropped his glucometer and bent over to pick it up when he lost balance falling onto his left hip and left elbow.  Denies hitting his head or losing consciousness.  States he was feeling in his normal state of health prior to the fall and did not feel dizzy or experience chest pain, shortness of breath, blurry vision, weakness, or any other symptoms.  Does take Plavix but denies taking any other blood thinners such as Eliquis, Xarelto, or Coumadin.  Currently complaining of pain in his left hip and left elbow.  Denies experiencing any head, neck, or back pain.  Denies hip arthroplasty or other orthopedic surgeries in his lower extremities.    Nursing Notes were reviewed.    PAST MEDICAL HISTORY     Past Medical History:   Diagnosis Date    Chronic kidney disease     on PD    Diabetes mellitus (CMS/[facility])     Hypertension          SURGICAL HISTORY       Past Surgical History:   Procedure Laterality Date    CARDIAC CATHETERIZATION N/A 10/16/2023    Procedure: PCI;  Surgeon: Eusebio Hudson MD;  Location: [address] Cardiac Cath Lab;  Service: Cardiovascular;  Laterality: N/A;    CARPAL TUNNEL RELEASE  2018    CORONARY ARTERY BYPASS GRAFT      SPINAL FUSION  09/2015    7 fused vertebrae    TONSILLECTOMY  1945    US GUIDED PERCUTANEOUS BIOPSY RENAL  LEFT Left 11/30/2022    US GUIDED PERCUTANEOUS BIOPSY RENAL LEFT 11/30/2022         CURRENT MEDICATIONS       Current Discharge Medication List        CONTINUE these medications which have NOT CHANGED    Details   acetaminophen (Tylenol) 325 mg tablet Take 2 tablets (650 mg) by mouth every 6 hours if needed for mild pain (1 - 3).  Qty: 30 tablet, Refills: 0    Associated Diagnoses: Status post aorto-coronary artery bypass graft      amLODIPine (Norvasc) 10 mg tablet Take 1 tablet (10 mg) by mouth once daily.      aspirin 81 mg EC tablet Take 1 tablet (81 mg) by mouth once daily. Do not start before October 18, 2023.    Associated Diagnoses: Status post aorto-coronary artery bypass graft      atorvastatin (Lipitor) 40 mg tablet Take 1 tablet (40 mg) by mouth once daily at bedtime.  Qty: 90 tablet, Refills: 3    Associated Diagnoses: 2-vessel coronary artery disease      cholecalciferol (Vitamin D-3) 25 MCG (1000 UT) tablet Take 1 tablet (25 mcg) by mouth once daily.      clopidogrel (Plavix) 75 mg tablet Take 1 tablet (75 mg) by mouth once daily for 362 doses. Do not start before October 18, 2023.  Qty: 90 tablet, Refills: 3    Associated Diagnoses: 2-vessel coronary artery disease      DULoxetine (Cymbalta) 30 mg DR capsule Take 1 capsule (30 mg) by mouth once daily. Do not crush or chew.      flash glucose sensor (FREESTYLE ANDRÉS 2 SENSOR [facility]) Change every 2 weeks      fluticasone (Flonase) 50 mcg/actuation nasal spray once daily as needed.      gentamicin (Garamycin) 0.1 % cream Apply 1 Application topically once daily.      glucosamine HCl 1,500 mg tablet Take 1 tablet by mouth 2 times a day.      hydrALAZINE (Apresoline) 25 mg tablet TAKE 25 MG IN THE MORNING. TAKE 25 MG IN THE AFTERNOON. TAKE 50 MG IN THE EVENING.  Qty: 270 tablet, Refills: 0    Associated Diagnoses: Primary hypertension      insulin detemir (Levemir U-100 Insulin) 100 unit/mL injection Inject 25 Units under the skin once daily. Take as  "directed per insulin instructions.      insulin lispro (HumaLOG) 100 unit/mL injection Inject subcutaneously before meals TID, as per sliding scale: 1 unit if sugar is 151-200, 2 units if 201-250, 3 units if 251-300, 4 units if 301-350, 5 units> 350. Total 15 units per day.      LANCETS MISC 1 each once daily.      levothyroxine (Tirosint) 50 mcg capsule Take 1 capsule (50 mcg) by mouth once daily in the morning. Take before meals.      loratadine-pseudoephedrine (Claritin-D 24-hour)  mg 24 hr tablet Take 1 tablet by mouth once daily as needed for allergies. Do not crush, chew, or split.      metoprolol succinate XL (Toprol-XL) 50 mg 24 hr tablet Take 1 tablet (50 mg) by mouth once daily. Do not crush or chew.  Qty: 30 tablet, Refills: 2    Associated Diagnoses: Status post aorto-coronary artery bypass graft      min17/nettle/pumpkin/saw palme (PROSTATE THERAPY ORAL) Take by mouth.      multivitamin with minerals (Daily Multivitamin-Minerals) tablet Take 1 tablet by mouth once daily.      OneTouch Ultra Test strip TEST THREE TIMES DAILY.      pen needle, diabetic 31 gauge x 1/4\" needle 1 each 2 times a day.      RenaPlex-D 800 mcg-12.5 mg -2,000 unit tablet Take 1 tablet by mouth once daily.      sennosides-docusate sodium (Diana-Colace) 8.6-50 mg tablet Take 2 tablets by mouth 2 times a day.      sodium bicarbonate 650 mg tablet Take 1 tablet (650 mg) by mouth 2 times a day.      torsemide (Demadex) 100 mg tablet Take 0.5 tablets (50 mg) by mouth once daily.             ALLERGIES     Gabapentin, Meperidine, Opioids - morphine analogues, Opioids-meperidine and related, Opium tincture, Oxycodone, and Tizanidine    FAMILY HISTORY       Family History   Problem Relation Name Age of Onset    Cancer Brother      Diabetes type I Brother            SOCIAL HISTORY       Social History     Socioeconomic History    Marital status:      Spouse name: Not on file    Number of children: Not on file    Years of " education: Not on file    Highest education level: Not on file   Occupational History    Not on file   Tobacco Use    Smoking status: Never    Smokeless tobacco: Never   Substance and Sexual Activity    Alcohol use: Never    Drug use: Never    Sexual activity: Not on file   Other Topics Concern    Not on file   Social History Narrative    Not on file     Social Determinants of Health     Financial Resource Strain: Low Risk  (3/15/2024)    Overall Financial Resource Strain (CARDIA)     Difficulty of Paying Living Expenses: Not very hard   Food Insecurity: Not on file   Transportation Needs: No Transportation Needs (3/15/2024)    PRAPARE - Transportation     Lack of Transportation (Medical): No     Lack of Transportation (Non-Medical): No   Physical Activity: Not on file   Stress: Not on file   Social Connections: Feeling Socially Integrated (12/14/2023)    OASIS : Social Isolation     Frequency of experiencing loneliness or isolation: Never   Intimate Partner Violence: Not on file   Housing Stability: Low Risk  (3/15/2024)    Housing Stability Vital Sign     Unable to Pay for Housing in the Last Year: No     Number of Places Lived in the Last Year: 2     Unstable Housing in the Last Year: No       SCREENINGS                        PHYSICAL EXAM    (up to 7 for level 4, 8 or more for level 5)     ED Triage Vitals [03/15/24 1704]   Temperature Heart Rate Respirations BP   35.8 °C (96.4 °F) 58 18 160/75      Pulse Ox Temp Source Heart Rate Source Patient Position   98 % Temporal Monitor Lying      BP Location FiO2 (%)     Right arm --       Physical Exam     Airway intact, speaking full sentences  Breath sounds equal and clear bilaterally with good aeration and normal chest rise  2+ radial pulses.  1+ DP, PT pulses bilaterally  GCS 15, No focal neurological deficits, moving all four extremities to command  Obvious deformity of the left hip with shortening and external rotation.    GENERAL:   Alert & oriented.  No  acute distress.  Non toxic appearing, comfortable and cooperative.    HEAD:  Normocephalic. No cephalhematomas, depressible fractures.     NECK:  Full ROM, no stiffness. No midline C-spine tenderness or step-offs. No tracheal deviation or JVD.    NEUROLOGY:     Normal speech and mentation. No focal findings identified.     EYES:  Conjunctiva pink. Sclera normal. PERRLA, 2-3mm bilaterally and reactive.    ENT:  Mucous membranes moist. Hearing grossly normal. Symmetrical facial movements. No nasal septal hematoma, CSF rhinorrhea, blood in the oropharynx, hemotympanum. midface is stable.    CARDIAC:  Regular rate and rhythm. No murmurs. No jugular venous distention. No peripheral cyanosis or pallor. No chest wall tenderness or bruising or crepitance    PULMONARY:     No respiratory distress. No accessory muscles use.  CTAB. No wheezes. No rales. No stridor.    ABDOMINAL:      Soft and nontender in all quadrants. No rebound or guarding. No abdominal distention. No gross abdominal masses.  No pulsatile mass.  No bruising on the abdomen or flank.    SKIN:  Patient has a skin tear of the left proximal forearm.  Small abrasion to the right elbow.  Otherwise no lesions, rash, petechiae, or purpura.     MUSCULOSKELETAL:  Moves all extremities. No edema. No midline C or T-spine tenderness.  Midline spinal tenderness to the level of L3 and L4.  No palpable step-offs or deformities of the C, T, L-spine.  Stable pelvis, no tenderness palpation throughout clavicles.  Patient has obvious shortening and external rotation of the left hip.  Ecchymosis bony tenderness of the left elbow, however no obvious deformity and patient has full range of motion of left elbow.    PSYCHIATRIC:   Appropriate mood and affect.      DIAGNOSTIC RESULTS     LABS:  Labs Reviewed   CBC - Abnormal       Result Value    WBC 10.3      nRBC 0.0      RBC 2.86 (*)     Hemoglobin 9.1 (*)     Hematocrit 30.1 (*)      (*)     MCH 31.8      MCHC 30.2 (*)      RDW 14.5      Platelets 181     COMPREHENSIVE METABOLIC PANEL - Abnormal    Glucose 139 (*)     Sodium 139      Potassium 3.6      Chloride 102      Bicarbonate 29      Anion Gap 12      Urea Nitrogen 67 (*)     Creatinine 4.77 (*)     eGFR 12 (*)     Calcium 8.6      Albumin 3.1 (*)     Alkaline Phosphatase 52      Total Protein 6.0 (*)     AST 21      Bilirubin, Total 0.3      ALT 18     PROTIME-INR - Normal    Protime 11.5      INR 1.0     APTT - Normal    aPTT 28      Narrative:     The APTT is no longer used for monitoring Unfractionated Heparin Therapy. For monitoring Heparin Therapy, use the Heparin Assay.   TYPE AND SCREEN    ABO TYPE O      Rh TYPE NEG      ANTIBODY SCREEN NEG     CBC   BASIC METABOLIC PANEL       All other labs were within normal range or not returned as of this dictation.    Imaging  CT head wo IV contrast   Final Result   No evidence of acute cervical, thoracic, or lumbar spine fracture.   Long segment decompression of the posterior aspect of the cervical   spine from C3 through T1 not measurably changed.        Multilevel degenerative disc disease is seen. Degree of degenerative   disease especially in lumbar spine does limit sensitivity. If there   is severe pain, consider further evaluation with lumbar spine MRI        Senescent changes seen in the brain. No evidence of acute   intracranial hemorrhage        Signed by: Erasmo Noel 3/15/2024 6:55 PM   Dictation workstation:   [id number]      CT cervical spine wo IV contrast   Final Result   No evidence of acute cervical, thoracic, or lumbar spine fracture.   Long segment decompression of the posterior aspect of the cervical   spine from C3 through T1 not measurably changed.        Multilevel degenerative disc disease is seen. Degree of degenerative   disease especially in lumbar spine does limit sensitivity. If there   is severe pain, consider further evaluation with lumbar spine MRI        Senescent changes seen in the brain.  No evidence of acute   intracranial hemorrhage        Signed by: Erasmo Noel 3/15/2024 6:55 PM   Dictation workstation:   [id number]      CT thoracic spine wo IV contrast   Final Result   No evidence of acute cervical, thoracic, or lumbar spine fracture.   Long segment decompression of the posterior aspect of the cervical   spine from C3 through T1 not measurably changed.        Multilevel degenerative disc disease is seen. Degree of degenerative   disease especially in lumbar spine does limit sensitivity. If there   is severe pain, consider further evaluation with lumbar spine MRI        Senescent changes seen in the brain. No evidence of acute   intracranial hemorrhage        Signed by: Erasmo Noel 3/15/2024 6:55 PM   Dictation workstation:   [id number]      CT lumbar spine wo IV contrast   Final Result   No evidence of acute cervical, thoracic, or lumbar spine fracture.   Long segment decompression of the posterior aspect of the cervical   spine from C3 through T1 not measurably changed.        Multilevel degenerative disc disease is seen. Degree of degenerative   disease especially in lumbar spine does limit sensitivity. If there   is severe pain, consider further evaluation with lumbar spine MRI        Senescent changes seen in the brain. No evidence of acute   intracranial hemorrhage        Signed by: Erasmo Noel 3/15/2024 6:55 PM   Dictation workstation:   [id number]      XR elbow left 1-2 views   Final Result   1. No acute fracture or malalignment of the left elbow.        MACRO:   None.        Signed by: Olive Sr 3/15/2024 6:09 PM   Dictation workstation:   [id number]      XR hip left with pelvis when performed 2 or 3 views   Final Result   1. Intertrochanteric left proximal femoral fracture.        MACRO:   None.        Signed by: Olive Sr 3/15/2024 6:08 PM   Dictation workstation:   [id number]      XR chest 1 view    (Results Pending)        Procedures  Procedures      EMERGENCY DEPARTMENT COURSE/MDM:     ED Course as of 03/16/24 0243   Fri Mar 15, 2024   2051 XR hip left with pelvis when performed 2 or 3 views [CH]      ED Course User Index  [CH] Judson Olsen DO         Diagnoses as of 03/16/24 0243   Closed displaced intertrochanteric fracture of left femur, initial encounter (CMS/[facility])        Medical Decision Making    83-year-old male with past medical history significant for ESRD on peritoneal dialysis, hypertension, diabetes mellitus hyperlipidemia, coronary artery disease, paroxysmal atrial fibrillation not on anticoagulation presenting to the emergency department after a mechanical fall at home with obvious deformity of the left hip concerning for hip fracture.  Preoperative labs, CT head without contrast, noncontrast CT of the spine, x-ray of the left elbow, x-ray of the left hip, chest x-ray, EKG ordered.  Patient was offered opioid pain medications however advises he has severe nausea and vomiting when taking opioid pain medications.  Patient requested 975 mg of Tylenol which was ordered.    Patient's kidney function and hemoglobin appear to be his baseline compared to prior labs. X-ray of the left hip shows an acute impacted, comminuted and mildly displaced intertrochanteric left proximal femur fracture.  Imaging otherwise unremarkable for acute pathology.    EKG: Sinus rhythm with a ventricular rate of 56 bpm, MA interval 170 ms, QRS 86 ms, QTc 420 ms.  No evidence of acute ischemic changes when compared to patient's last EKG from February 25, 2024.    On-call orthopedic surgeon Dr. Donna Kessler was consulted who advised admitting patient to medicine with tentative plan for surgical repair on Sunday.  Patient case discussed with Dr. Green with internal medicine who agreed admit patient to his service.    Patient reassessed and states he talked to his daughter who works in medicine who told him he should be able to take tramadol as a synthetic  opioid and he is only ever had morphine analogs.  Patient requesting tramadol so 50 mg of p.o. tramadol ordered.    Patient and or family in agreement and understanding of treatment plan.  All questions answered.      I reviewed the case with the attending ED physician. The attending ED physician agrees with the plan. Patient and/or patient´s representative was counseled regarding labs, imaging, likely diagnosis, and plan. All questions were answered.    Judson Olsen DO  Emergency Medicine, PGY2    ED Medications administered this visit:    Medications   amLODIPine (Norvasc) tablet 10 mg (has no administration in time range)   aspirin EC tablet 81 mg (has no administration in time range)   cholecalciferol (Vitamin D-3) tablet 1,000 Units (has no administration in time range)   clopidogrel (Plavix) tablet 75 mg (has no administration in time range)   DULoxetine (Cymbalta) DR capsule 30 mg (has no administration in time range)   acetaminophen (Tylenol) tablet 975 mg (975 mg oral Given 3/16/24 0018)   hydrALAZINE (Apresoline) tablet 25 mg (25 mg oral Given 3/16/24 0017)   insulin detemir (Levemir) injection 25 Units (has no administration in time range)   levothyroxine (Synthroid, Levoxyl) tablet 50 mcg (has no administration in time range)   metoprolol succinate XL (Toprol-XL) 24 hr tablet 50 mg (has no administration in time range)   multivitamin with minerals 1 tablet (has no administration in time range)   sodium bicarbonate tablet 650 mg (650 mg oral Given 3/16/24 0018)   torsemide (Demadex) tablet 50 mg (has no administration in time range)   acetaminophen (Tylenol) tablet 325 mg (has no administration in time range)   benzocaine-menthol (Cepastat Sore Throat) 15-3.6 mg lozenge 1 lozenge (has no administration in time range)   guaiFENesin (Mucinex) 12 hr tablet 600 mg (has no administration in time range)   heparin (porcine) injection 5,000 Units (5,000 Units subcutaneous Given 3/16/24 0019)   ondansetron  ODT (Zofran-ODT) disintegrating tablet 4 mg (has no administration in time range)     Or   ondansetron (Zofran) injection 4 mg (has no administration in time range)   promethazine (Phenergan) tablet 25 mg (has no administration in time range)     Or   promethazine (Phenergan) suppository 25 mg (has no administration in time range)   polyethylene glycol (Glycolax, Miralax) packet 17 g (has no administration in time range)   traMADol (Ultram) tablet 50 mg (has no administration in time range)   dextrose 50 % injection 25 g (has no administration in time range)   glucagon (Glucagen) injection 1 mg (has no administration in time range)   dextrose 50 % injection 12.5 g (has no administration in time range)   insulin lispro (HumaLOG) injection 0-10 Units (has no administration in time range)   acetaminophen (Tylenol) tablet 975 mg (975 mg oral Given 3/15/24 1745)   traMADol (Ultram) tablet 50 mg (50 mg oral Given 3/15/24 2146)       New Prescriptions from this visit:    Current Discharge Medication List          Follow-up:  No follow-up provider specified.      Final Impression:   1. Closed displaced intertrochanteric fracture of left femur, initial encounter (CMS/[facility])          (Please note that portions of this note were completed with a voice recognition program.  Efforts were made to edit the dictations but occasionally words are mis-transcribed.)     Judson Olsen,   Resident  03/16/24 [id number]

## 2024-03-16 LAB
ANION GAP SERPL CALC-SCNC: 16 MMOL/L (ref 10–20)
BUN SERPL-MCNC: 70 MG/DL (ref 6–23)
CALCIUM SERPL-MCNC: 8.3 MG/DL (ref 8.6–10.3)
CHLORIDE SERPL-SCNC: 102 MMOL/L (ref 98–107)
CO2 SERPL-SCNC: 24 MMOL/L (ref 21–32)
CREAT SERPL-MCNC: 4.8 MG/DL (ref 0.5–1.3)
EGFRCR SERPLBLD CKD-EPI 2021: 11 ML/MIN/1.73M*2
ERYTHROCYTE [DISTWIDTH] IN BLOOD BY AUTOMATED COUNT: 14.6 % (ref 11.5–14.5)
GLUCOSE SERPL-MCNC: 241 MG/DL (ref 74–99)
HCT VFR BLD AUTO: 27.7 % (ref 41–52)
HGB BLD-MCNC: 8.2 G/DL (ref 13.5–17.5)
MCH RBC QN AUTO: 31.8 PG (ref 26–34)
MCHC RBC AUTO-ENTMCNC: 29.6 G/DL (ref 32–36)
MCV RBC AUTO: 107 FL (ref 80–100)
NRBC BLD-RTO: 0 /100 WBCS (ref 0–0)
PLATELET # BLD AUTO: 168 X10*3/UL (ref 150–450)
POTASSIUM SERPL-SCNC: 3.8 MMOL/L (ref 3.5–5.3)
RBC # BLD AUTO: 2.58 X10*6/UL (ref 4.5–5.9)
SODIUM SERPL-SCNC: 138 MMOL/L (ref 136–145)
WBC # BLD AUTO: 8.4 X10*3/UL (ref 4.4–11.3)

## 2024-03-16 PROCEDURE — 2500000002 HC RX 250 W HCPCS SELF ADMINISTERED DRUGS (ALT 637 FOR MEDICARE OP, ALT 636 FOR OP/ED): Performed by: INTERNAL MEDICINE

## 2024-03-16 PROCEDURE — 2500000004 HC RX 250 GENERAL PHARMACY W/ HCPCS (ALT 636 FOR OP/ED): Performed by: INTERNAL MEDICINE

## 2024-03-16 PROCEDURE — 1200000002 HC GENERAL ROOM WITH TELEMETRY DAILY

## 2024-03-16 PROCEDURE — 2500000001 HC RX 250 WO HCPCS SELF ADMINISTERED DRUGS (ALT 637 FOR MEDICARE OP): Performed by: INTERNAL MEDICINE

## 2024-03-16 PROCEDURE — 99232 SBSQ HOSP IP/OBS MODERATE 35: CPT | Performed by: INTERNAL MEDICINE

## 2024-03-16 PROCEDURE — 99223 1ST HOSP IP/OBS HIGH 75: CPT | Performed by: STUDENT IN AN ORGANIZED HEALTH CARE EDUCATION/TRAINING PROGRAM

## 2024-03-16 PROCEDURE — 84132 ASSAY OF SERUM POTASSIUM: CPT | Performed by: INTERNAL MEDICINE

## 2024-03-16 PROCEDURE — 99222 1ST HOSP IP/OBS MODERATE 55: CPT | Performed by: PHYSICIAN ASSISTANT

## 2024-03-16 PROCEDURE — 85027 COMPLETE CBC AUTOMATED: CPT | Performed by: INTERNAL MEDICINE

## 2024-03-16 PROCEDURE — 36415 COLL VENOUS BLD VENIPUNCTURE: CPT | Performed by: INTERNAL MEDICINE

## 2024-03-16 RX ORDER — HYDRALAZINE HYDROCHLORIDE 25 MG/1
25 TABLET, FILM COATED ORAL 3 TIMES DAILY
Status: DISCONTINUED | OUTPATIENT
Start: 2024-03-16 | End: 2024-03-21 | Stop reason: HOSPADM

## 2024-03-16 RX ORDER — CEFAZOLIN SODIUM 2 G/100ML
2 INJECTION, SOLUTION INTRAVENOUS
Status: DISCONTINUED | OUTPATIENT
Start: 2024-03-16 | End: 2024-03-21 | Stop reason: HOSPADM

## 2024-03-16 RX ORDER — SEVELAMER CARBONATE FOR ORAL SUSPENSION 800 MG/1
0.8 POWDER, FOR SUSPENSION ORAL
Status: DISCONTINUED | OUTPATIENT
Start: 2024-03-16 | End: 2024-03-21 | Stop reason: HOSPADM

## 2024-03-16 RX ADMIN — SODIUM CHLORIDE, SODIUM LACTATE, CALCIUM CHLORIDE, MAGNESIUM CHLORIDE AND DEXTROSE: 1.5; 538; 448; 18.3; 5.08 INJECTION, SOLUTION INTRAPERITONEAL at 21:13

## 2024-03-16 RX ADMIN — POLYETHYLENE GLYCOL 3350 17 G: 17 POWDER, FOR SOLUTION ORAL at 08:37

## 2024-03-16 RX ADMIN — SODIUM BICARBONATE 650 MG: 650 TABLET ORAL at 08:37

## 2024-03-16 RX ADMIN — Medication 1000 UNITS: at 08:37

## 2024-03-16 RX ADMIN — INSULIN LISPRO 2 UNITS: 100 INJECTION, SOLUTION INTRAVENOUS; SUBCUTANEOUS at 17:11

## 2024-03-16 RX ADMIN — TRAMADOL HYDROCHLORIDE 50 MG: 50 TABLET, COATED ORAL at 15:27

## 2024-03-16 RX ADMIN — HYDRALAZINE HYDROCHLORIDE 25 MG: 25 TABLET ORAL at 08:37

## 2024-03-16 RX ADMIN — AMLODIPINE BESYLATE 10 MG: 10 TABLET ORAL at 08:37

## 2024-03-16 RX ADMIN — ASPIRIN 81 MG: 81 TABLET, COATED ORAL at 08:37

## 2024-03-16 RX ADMIN — ACETAMINOPHEN 975 MG: 325 TABLET ORAL at 00:18

## 2024-03-16 RX ADMIN — SODIUM BICARBONATE 650 MG: 650 TABLET ORAL at 21:11

## 2024-03-16 RX ADMIN — TORSEMIDE 50 MG: 20 TABLET ORAL at 08:37

## 2024-03-16 RX ADMIN — SODIUM BICARBONATE 650 MG: 650 TABLET ORAL at 00:18

## 2024-03-16 RX ADMIN — HEPARIN SODIUM 5000 UNITS: 5000 INJECTION INTRAVENOUS; SUBCUTANEOUS at 08:37

## 2024-03-16 RX ADMIN — METOPROLOL SUCCINATE 50 MG: 50 TABLET, EXTENDED RELEASE ORAL at 08:37

## 2024-03-16 RX ADMIN — Medication 1 TABLET: at 08:37

## 2024-03-16 RX ADMIN — HYDRALAZINE HYDROCHLORIDE 25 MG: 25 TABLET ORAL at 00:17

## 2024-03-16 RX ADMIN — INSULIN LISPRO 2 UNITS: 100 INJECTION, SOLUTION INTRAVENOUS; SUBCUTANEOUS at 13:45

## 2024-03-16 RX ADMIN — ACETAMINOPHEN 975 MG: 325 TABLET ORAL at 15:22

## 2024-03-16 RX ADMIN — DULOXETINE HYDROCHLORIDE 30 MG: 30 CAPSULE, DELAYED RELEASE ORAL at 08:37

## 2024-03-16 RX ADMIN — INSULIN LISPRO 4 UNITS: 100 INJECTION, SOLUTION INTRAVENOUS; SUBCUTANEOUS at 08:37

## 2024-03-16 RX ADMIN — HEPARIN SODIUM 5000 UNITS: 5000 INJECTION INTRAVENOUS; SUBCUTANEOUS at 00:19

## 2024-03-16 RX ADMIN — ONDANSETRON 4 MG: 2 INJECTION INTRAMUSCULAR; INTRAVENOUS at 15:55

## 2024-03-16 RX ADMIN — HYDRALAZINE HYDROCHLORIDE 25 MG: 25 TABLET ORAL at 15:22

## 2024-03-16 RX ADMIN — SEVELAMER CARBONATE 0.8 G: 800 POWDER, FOR SUSPENSION ORAL at 17:06

## 2024-03-16 RX ADMIN — LEVOTHYROXINE SODIUM 50 MCG: 50 TABLET ORAL at 06:54

## 2024-03-16 RX ADMIN — HYDRALAZINE HYDROCHLORIDE 25 MG: 25 TABLET ORAL at 21:11

## 2024-03-16 RX ADMIN — ACETAMINOPHEN 975 MG: 325 TABLET ORAL at 21:11

## 2024-03-16 RX ADMIN — ACETAMINOPHEN 975 MG: 325 TABLET ORAL at 08:37

## 2024-03-16 RX ADMIN — GUAIFENESIN 600 MG: 600 TABLET ORAL at 10:03

## 2024-03-16 ASSESSMENT — ENCOUNTER SYMPTOMS
DYSURIA: 0
EYES NEGATIVE: 1
BRUISES/BLEEDS EASILY: 1
HEMATURIA: 0
PSYCHIATRIC NEGATIVE: 1
CHEST TIGHTNESS: 0
WEAKNESS: 0
BLOOD IN STOOL: 0
VOMITING: 0
DIZZINESS: 0
ANAL BLEEDING: 0
COUGH: 0
ABDOMINAL PAIN: 0
LIGHT-HEADEDNESS: 0
NAUSEA: 0
ABDOMINAL DISTENTION: 0
SHORTNESS OF BREATH: 0
WOUND: 0
APPETITE CHANGE: 0

## 2024-03-16 ASSESSMENT — COGNITIVE AND FUNCTIONAL STATUS - GENERAL
TOILETING: A LOT
MOBILITY SCORE: 11
MOVING TO AND FROM BED TO CHAIR: A LOT
DAILY ACTIVITIY SCORE: 14
DRESSING REGULAR LOWER BODY CLOTHING: A LOT
STANDING UP FROM CHAIR USING ARMS: A LOT
EATING MEALS: A LITTLE
CLIMB 3 TO 5 STEPS WITH RAILING: TOTAL
HELP NEEDED FOR BATHING: A LOT
TURNING FROM BACK TO SIDE WHILE IN FLAT BAD: A LOT
PERSONAL GROOMING: A LITTLE
DRESSING REGULAR UPPER BODY CLOTHING: A LOT
WALKING IN HOSPITAL ROOM: A LOT
MOVING FROM LYING ON BACK TO SITTING ON SIDE OF FLAT BED WITH BEDRAILS: A LOT

## 2024-03-16 ASSESSMENT — PAIN - FUNCTIONAL ASSESSMENT
PAIN_FUNCTIONAL_ASSESSMENT: 0-10

## 2024-03-16 ASSESSMENT — PAIN SCALES - GENERAL
PAINLEVEL_OUTOF10: 2
PAINLEVEL_OUTOF10: 6
PAINLEVEL_OUTOF10: 6
PAINLEVEL_OUTOF10: 4
PAINLEVEL_OUTOF10: 4

## 2024-03-16 ASSESSMENT — PAIN SCALES - PAIN ASSESSMENT IN ADVANCED DEMENTIA (PAINAD)
BREATHING: NORMAL
BODYLANGUAGE: RELAXED
CONSOLABILITY: NO NEED TO CONSOLE

## 2024-03-16 ASSESSMENT — PAIN SCALES - WONG BAKER: WONGBAKER_NUMERICALRESPONSE: HURTS LITTLE MORE

## 2024-03-16 ASSESSMENT — PAIN DESCRIPTION - LOCATION
LOCATION: HIP
LOCATION: LEG

## 2024-03-16 ASSESSMENT — PAIN DESCRIPTION - ORIENTATION
ORIENTATION: LEFT
ORIENTATION: LEFT

## 2024-03-16 NOTE — NURSING NOTE
Patient transferred to , room 3114 via bed in [facility] and with all belongings  Wife at bedside

## 2024-03-16 NOTE — PROGRESS NOTES
Robbie Chowdary is a 83 y.o. male on day 1 of admission presenting with Closed displaced intertrochanteric fracture of left femur (CMS/HCC).      Subjective   Patient doing ok. Pain finally well-controlled. No new complaints.       Objective     Last Recorded Vitals  /72   Pulse 75   Temp 36.4 °C (97.5 °F) (Temporal)   Resp 17   Wt 76.7 kg (169 lb) Comment: per patient  SpO2 96%   Intake/Output last 3 Shifts:    Intake/Output Summary (Last 24 hours) at 3/16/2024 1131  Last data filed at 3/16/2024 0020  Gross per 24 hour   Intake 110 ml   Output --   Net 110 ml       Admission Weight  Weight: 76.7 kg (169 lb) (03/15/24 1704)    Daily Weight  03/15/24 : 76.7 kg (169 lb)    Image Results  CT head wo IV contrast, CT cervical spine wo IV contrast, CT thoracic spine wo IV contrast, CT lumbar spine wo IV contrast  Narrative: Interpreted By:  Erasmo Noel,   STUDY:  CT HEAD WO IV CONTRAST; CT CERVICAL SPINE WO IV CONTRAST; CT THORACIC  SPINE WO IV CONTRAST; CT LUMBAR SPINE WO IV CONTRAST;  3/15/2024 6:27  pm      INDICATION:  Signs/Symptoms:fall, advanced age on plavix; Signs/Symptoms:fall,  lumbar pain, axial loading. RO compression fracture      COMPARISON:  November 23      ACCESSION NUMBER(S):  [id number]; [id number]; [id number]; [id number]      ORDERING CLINICIAN:  WAQAR LOPEZ      TECHNIQUE:  Axial noncontrast CT images of head with coronal and sagittal  reconstructed images. Axial noncontrast CT images of the cervical  thoracic and lumbar spine with coronal and sagittal reconstructed  images.      FINDINGS:  CT HEAD:      BRAIN PARENCHYMA:  No evidence of acute intraparenchymal hemorrhage  or parenchymal evidence of acute large territory ischemic infarct. No  mass-effect, midline shift or effacement of cerebral sulci.  Gray-white matter distinction is preserved. Mild global volume loss  and chronic small vessel ischemic changes. The appearance is similar      VENTRICLES and EXTRA-AXIAL  SPACES:  No acute extra-axial or  intraventricular hemorrhage. Ventricles and sulci are age-concordant.      PARANASAL SINUSES/MASTOIDS:  No hemorrhage or air-fluid levels within  the visualized paranasal sinuses. The mastoid air cells are  well-aerated.      CALVARIUM/ORBITS:  No skull fracture.  The orbits and globes are  intact to the extent visualized.      Robust vascular calcification.          CT CERVICAL SPINE:  C3-T1 posterior spinal fusion with decompression. Prevertebral soft  tissue within normal limits. Scattered multilevel degenerative disc  disease. No evidence of acute cervical spine fracture.      CT thoracic spine: Multilevel degenerative disc disease detected in  the thoracic spine. No evidence of acute thoracic spine fracture.      Features of pulmonary fibrosis with honeycombing seen in the lung  bases      Heart size is enlarged. Robust coronary calcification. No pericardial  effusion.      Enlarged main pulmonary artery compatible with pulmonary arterial  hypertension.      No evidence of acute lumbar spine fracture. Scattered degenerative  changes most notable at L3-L4 and L4-L5 with disc osteophyte  complexes. Facet arthropathy detected.      No evidence of aneurysm of the abdominal aorta. Robust vascular  calcification peritoneal catheter terminates in the pelvis.      Impression: No evidence of acute cervical, thoracic, or lumbar spine fracture.  Long segment decompression of the posterior aspect of the cervical  spine from C3 through T1 not measurably changed.      Multilevel degenerative disc disease is seen. Degree of degenerative  disease especially in lumbar spine does limit sensitivity. If there  is severe pain, consider further evaluation with lumbar spine MRI      Senescent changes seen in the brain. No evidence of acute  intracranial hemorrhage      Signed by: Erasmo Noel 3/15/2024 6:55 PM  Dictation workstation:   [id number]  XR elbow left 1-2 views  Narrative: Interpreted  By:  Olive Sr,   STUDY:  Left elbow, 2 views.      INDICATION:  Signs/Symptoms:fall, left elbow pain.      COMPARISON:  None.      ACCESSION NUMBER(S):  [id number]      ORDERING CLINICIAN:  WAQAR LOPEZ      FINDINGS:  No acute fracture or malalignment.  No elbow joint effusion or abnormal fat pad elevation.  No significant degenerative changes.  Heterotopic ossification adjacent to the lateral humeral condyle  measuring 7 mm.      Impression: 1. No acute fracture or malalignment of the left elbow.      MACRO:  None.      Signed by: Olive Sr 3/15/2024 6:09 PM  Dictation workstation:   [id number]  XR hip left with pelvis when performed 2 or 3 views  Narrative: Interpreted By:  Olive Sr,   STUDY:  Single view pelvis.  Left hip, two views.      INDICATION:  Signs/Symptoms:L hip fracture.      COMPARISON:  None.      ACCESSION NUMBER(S):  [id number]      ORDERING CLINICIAN:  WAQAR LOPEZ      FINDINGS:  There is a acute impacted comminuted mildly displaced  intertrochanteric left proximal femoral fracture. Moderate bilateral  hip joint degenerative changes. Advanced lumbar spine degenerative  changes. Osteopenia.  Bilateral femoral artery vascular calcifications.      Impression: 1. Intertrochanteric left proximal femoral fracture.      MACRO:  None.      Signed by: Olive Sr 3/15/2024 6:08 PM  Dictation workstation:   [id number]      Physical Exam  Constitutional:       General: He is not in acute distress.     Appearance: Normal appearance.   HENT:      Head: Normocephalic and atraumatic.      Mouth/Throat:      Mouth: Mucous membranes are moist.   Eyes:      Extraocular Movements: Extraocular movements intact.      Conjunctiva/sclera: Conjunctivae normal.      Pupils: Pupils are equal, round, and reactive to light.   Cardiovascular:      Rate and Rhythm: Normal rate and regular rhythm.      Heart sounds: Normal heart sounds.   Pulmonary:      Effort: Pulmonary effort  is normal.      Breath sounds: Normal breath sounds. No wheezing, rhonchi or rales.   Abdominal:      General: Abdomen is flat. Bowel sounds are normal.      Palpations: Abdomen is soft.   Musculoskeletal:         General: No swelling or tenderness.      Cervical back: Normal range of motion and neck supple.      Comments: LLE shorter than RLE and externally rotated; neurovascularly intact distally on LLE   Skin:     General: Skin is warm and dry.      Findings: No rash.   Neurological:      General: No focal deficit present.      Mental Status: He is alert and oriented to person, place, and time.      Cranial Nerves: No cranial nerve deficit.      Sensory: No sensory deficit.   Psychiatric:         Mood and Affect: Mood normal.         Behavior: Behavior normal.         Relevant Results  Scheduled medications  acetaminophen, 975 mg, oral, TID  amLODIPine, 10 mg, oral, Daily  aspirin, 81 mg, oral, Daily  cholecalciferol, 1,000 Units, oral, Daily  [Held by provider] clopidogrel, 75 mg, oral, Daily  DULoxetine, 30 mg, oral, Daily  [Held by provider] heparin (porcine), 5,000 Units, subcutaneous, q8h  hydrALAZINE, 25 mg, oral, BID  insulin detemir, 20 Units, subcutaneous, Daily  insulin lispro, 0-10 Units, subcutaneous, TID with meals  levothyroxine, 50 mcg, oral, Daily  metoprolol succinate XL, 50 mg, oral, Daily  multivitamin with minerals, 1 tablet, oral, Daily  polyethylene glycol, 17 g, oral, Daily  sodium bicarbonate, 650 mg, oral, BID  torsemide, 50 mg, oral, Daily      Continuous medications     PRN medications  PRN medications: acetaminophen, benzocaine-menthol, dextrose, dextrose, glucagon, guaiFENesin, ondansetron ODT **OR** ondansetron, promethazine **OR** promethazine, traMADol    Results for orders placed or performed during the hospital encounter of 03/15/24 (from the past 24 hour(s))   CBC   Result Value Ref Range    WBC 10.3 4.4 - 11.3 x10*3/uL    nRBC 0.0 0.0 - 0.0 /100 WBCs    RBC 2.86 (L) 4.50 - 5.90  x10*6/uL    Hemoglobin 9.1 (L) 13.5 - 17.5 g/dL    Hematocrit 30.1 (L) 41.0 - 52.0 %     (H) 80 - 100 fL    MCH 31.8 26.0 - 34.0 pg    MCHC 30.2 (L) 32.0 - 36.0 g/dL    RDW 14.5 11.5 - 14.5 %    Platelets 181 150 - 450 x10*3/uL   Comprehensive metabolic panel   Result Value Ref Range    Glucose 139 (H) 74 - 99 mg/dL    Sodium 139 136 - 145 mmol/L    Potassium 3.6 3.5 - 5.3 mmol/L    Chloride 102 98 - 107 mmol/L    Bicarbonate 29 21 - 32 mmol/L    Anion Gap 12 10 - 20 mmol/L    Urea Nitrogen 67 (H) 6 - 23 mg/dL    Creatinine 4.77 (H) 0.50 - 1.30 mg/dL    eGFR 12 (L) >60 mL/min/1.73m*2    Calcium 8.6 8.6 - 10.3 mg/dL    Albumin 3.1 (L) 3.4 - 5.0 g/dL    Alkaline Phosphatase 52 33 - 136 U/L    Total Protein 6.0 (L) 6.4 - 8.2 g/dL    AST 21 9 - 39 U/L    Bilirubin, Total 0.3 0.0 - 1.2 mg/dL    ALT 18 10 - 52 U/L   Protime-INR   Result Value Ref Range    Protime 11.5 9.8 - 12.8 seconds    INR 1.0 0.9 - 1.1   APTT   Result Value Ref Range    aPTT 28 27 - 38 seconds   Type and Screen   Result Value Ref Range    ABO TYPE O     Rh TYPE NEG     ANTIBODY SCREEN NEG    CBC   Result Value Ref Range    WBC 8.4 4.4 - 11.3 x10*3/uL    nRBC 0.0 0.0 - 0.0 /100 WBCs    RBC 2.58 (L) 4.50 - 5.90 x10*6/uL    Hemoglobin 8.2 (L) 13.5 - 17.5 g/dL    Hematocrit 27.7 (L) 41.0 - 52.0 %     (H) 80 - 100 fL    MCH 31.8 26.0 - 34.0 pg    MCHC 29.6 (L) 32.0 - 36.0 g/dL    RDW 14.6 (H) 11.5 - 14.5 %    Platelets 168 150 - 450 x10*3/uL   Basic metabolic panel   Result Value Ref Range    Glucose 241 (H) 74 - 99 mg/dL    Sodium 138 136 - 145 mmol/L    Potassium 3.8 3.5 - 5.3 mmol/L    Chloride 102 98 - 107 mmol/L    Bicarbonate 24 21 - 32 mmol/L    Anion Gap 16 10 - 20 mmol/L    Urea Nitrogen 70 (H) 6 - 23 mg/dL    Creatinine 4.80 (H) 0.50 - 1.30 mg/dL    eGFR 11 (L) >60 mL/min/1.73m*2    Calcium 8.3 (L) 8.6 - 10.3 mg/dL       CT head wo IV contrast    Result Date: 3/15/2024  Interpreted By:  Erasmo Noel, STUDY: CT HEAD WO IV  CONTRAST; CT CERVICAL SPINE WO IV CONTRAST; CT THORACIC SPINE WO IV CONTRAST; CT LUMBAR SPINE WO IV CONTRAST;  3/15/2024 6:27 pm   INDICATION: Signs/Symptoms:fall, advanced age on plavix; Signs/Symptoms:fall, lumbar pain, axial loading. RO compression fracture   COMPARISON: November 23   ACCESSION NUMBER(S): [id number]; [id number]; [id number]; [id number]   ORDERING CLINICIAN: WAQAR LOPEZ   TECHNIQUE: Axial noncontrast CT images of head with coronal and sagittal reconstructed images. Axial noncontrast CT images of the cervical thoracic and lumbar spine with coronal and sagittal reconstructed images.   FINDINGS: CT HEAD:   BRAIN PARENCHYMA:  No evidence of acute intraparenchymal hemorrhage or parenchymal evidence of acute large territory ischemic infarct. No mass-effect, midline shift or effacement of cerebral sulci. Gray-white matter distinction is preserved. Mild global volume loss and chronic small vessel ischemic changes. The appearance is similar   VENTRICLES and EXTRA-AXIAL SPACES:  No acute extra-axial or intraventricular hemorrhage. Ventricles and sulci are age-concordant.   PARANASAL SINUSES/MASTOIDS:  No hemorrhage or air-fluid levels within the visualized paranasal sinuses. The mastoid air cells are well-aerated.   CALVARIUM/ORBITS:  No skull fracture.  The orbits and globes are intact to the extent visualized.   Robust vascular calcification.     CT CERVICAL SPINE: C3-T1 posterior spinal fusion with decompression. Prevertebral soft tissue within normal limits. Scattered multilevel degenerative disc disease. No evidence of acute cervical spine fracture.   CT thoracic spine: Multilevel degenerative disc disease detected in the thoracic spine. No evidence of acute thoracic spine fracture.   Features of pulmonary fibrosis with honeycombing seen in the lung bases   Heart size is enlarged. Robust coronary calcification. No pericardial effusion.   Enlarged main pulmonary artery compatible with  pulmonary arterial hypertension.   No evidence of acute lumbar spine fracture. Scattered degenerative changes most notable at L3-L4 and L4-L5 with disc osteophyte complexes. Facet arthropathy detected.   No evidence of aneurysm of the abdominal aorta. Robust vascular calcification peritoneal catheter terminates in the pelvis.       No evidence of acute cervical, thoracic, or lumbar spine fracture. Long segment decompression of the posterior aspect of the cervical spine from C3 through T1 not measurably changed.   Multilevel degenerative disc disease is seen. Degree of degenerative disease especially in lumbar spine does limit sensitivity. If there is severe pain, consider further evaluation with lumbar spine MRI   Senescent changes seen in the brain. No evidence of acute intracranial hemorrhage   Signed by: Erasmo Noel 3/15/2024 6:55 PM Dictation workstation:   [id number]    CT cervical spine wo IV contrast    Result Date: 3/15/2024  Interpreted By:  Erasmo Noel, STUDY: CT HEAD WO IV CONTRAST; CT CERVICAL SPINE WO IV CONTRAST; CT THORACIC SPINE WO IV CONTRAST; CT LUMBAR SPINE WO IV CONTRAST;  3/15/2024 6:27 pm   INDICATION: Signs/Symptoms:fall, advanced age on plavix; Signs/Symptoms:fall, lumbar pain, axial loading. RO compression fracture   COMPARISON: November 23   ACCESSION NUMBER(S): [id number]; [id number]; [id number]; [id number]   ORDERING CLINICIAN: WAQAR LOPEZ   TECHNIQUE: Axial noncontrast CT images of head with coronal and sagittal reconstructed images. Axial noncontrast CT images of the cervical thoracic and lumbar spine with coronal and sagittal reconstructed images.   FINDINGS: CT HEAD:   BRAIN PARENCHYMA:  No evidence of acute intraparenchymal hemorrhage or parenchymal evidence of acute large territory ischemic infarct. No mass-effect, midline shift or effacement of cerebral sulci. Gray-white matter distinction is preserved. Mild global volume loss and chronic small vessel  ischemic changes. The appearance is similar   VENTRICLES and EXTRA-AXIAL SPACES:  No acute extra-axial or intraventricular hemorrhage. Ventricles and sulci are age-concordant.   PARANASAL SINUSES/MASTOIDS:  No hemorrhage or air-fluid levels within the visualized paranasal sinuses. The mastoid air cells are well-aerated.   CALVARIUM/ORBITS:  No skull fracture.  The orbits and globes are intact to the extent visualized.   Robust vascular calcification.     CT CERVICAL SPINE: C3-T1 posterior spinal fusion with decompression. Prevertebral soft tissue within normal limits. Scattered multilevel degenerative disc disease. No evidence of acute cervical spine fracture.   CT thoracic spine: Multilevel degenerative disc disease detected in the thoracic spine. No evidence of acute thoracic spine fracture.   Features of pulmonary fibrosis with honeycombing seen in the lung bases   Heart size is enlarged. Robust coronary calcification. No pericardial effusion.   Enlarged main pulmonary artery compatible with pulmonary arterial hypertension.   No evidence of acute lumbar spine fracture. Scattered degenerative changes most notable at L3-L4 and L4-L5 with disc osteophyte complexes. Facet arthropathy detected.   No evidence of aneurysm of the abdominal aorta. Robust vascular calcification peritoneal catheter terminates in the pelvis.       No evidence of acute cervical, thoracic, or lumbar spine fracture. Long segment decompression of the posterior aspect of the cervical spine from C3 through T1 not measurably changed.   Multilevel degenerative disc disease is seen. Degree of degenerative disease especially in lumbar spine does limit sensitivity. If there is severe pain, consider further evaluation with lumbar spine MRI   Senescent changes seen in the brain. No evidence of acute intracranial hemorrhage   Signed by: Erasmo Noel 3/15/2024 6:55 PM Dictation workstation:   [id number]    CT thoracic spine wo IV contrast    Result  Date: 3/15/2024  Interpreted By:  Erasmo Noel, STUDY: CT HEAD WO IV CONTRAST; CT CERVICAL SPINE WO IV CONTRAST; CT THORACIC SPINE WO IV CONTRAST; CT LUMBAR SPINE WO IV CONTRAST;  3/15/2024 6:27 pm   INDICATION: Signs/Symptoms:fall, advanced age on plavix; Signs/Symptoms:fall, lumbar pain, axial loading. RO compression fracture   COMPARISON: November 23   ACCESSION NUMBER(S): [id number]; [id number]; [id number]; [id number]   ORDERING CLINICIAN: WAQAR LOPEZ   TECHNIQUE: Axial noncontrast CT images of head with coronal and sagittal reconstructed images. Axial noncontrast CT images of the cervical thoracic and lumbar spine with coronal and sagittal reconstructed images.   FINDINGS: CT HEAD:   BRAIN PARENCHYMA:  No evidence of acute intraparenchymal hemorrhage or parenchymal evidence of acute large territory ischemic infarct. No mass-effect, midline shift or effacement of cerebral sulci. Gray-white matter distinction is preserved. Mild global volume loss and chronic small vessel ischemic changes. The appearance is similar   VENTRICLES and EXTRA-AXIAL SPACES:  No acute extra-axial or intraventricular hemorrhage. Ventricles and sulci are age-concordant.   PARANASAL SINUSES/MASTOIDS:  No hemorrhage or air-fluid levels within the visualized paranasal sinuses. The mastoid air cells are well-aerated.   CALVARIUM/ORBITS:  No skull fracture.  The orbits and globes are intact to the extent visualized.   Robust vascular calcification.     CT CERVICAL SPINE: C3-T1 posterior spinal fusion with decompression. Prevertebral soft tissue within normal limits. Scattered multilevel degenerative disc disease. No evidence of acute cervical spine fracture.   CT thoracic spine: Multilevel degenerative disc disease detected in the thoracic spine. No evidence of acute thoracic spine fracture.   Features of pulmonary fibrosis with honeycombing seen in the lung bases   Heart size is enlarged. Robust coronary calcification. No  pericardial effusion.   Enlarged main pulmonary artery compatible with pulmonary arterial hypertension.   No evidence of acute lumbar spine fracture. Scattered degenerative changes most notable at L3-L4 and L4-L5 with disc osteophyte complexes. Facet arthropathy detected.   No evidence of aneurysm of the abdominal aorta. Robust vascular calcification peritoneal catheter terminates in the pelvis.       No evidence of acute cervical, thoracic, or lumbar spine fracture. Long segment decompression of the posterior aspect of the cervical spine from C3 through T1 not measurably changed.   Multilevel degenerative disc disease is seen. Degree of degenerative disease especially in lumbar spine does limit sensitivity. If there is severe pain, consider further evaluation with lumbar spine MRI   Senescent changes seen in the brain. No evidence of acute intracranial hemorrhage   Signed by: Erasmo Noel 3/15/2024 6:55 PM Dictation workstation:   [id number]    CT lumbar spine wo IV contrast    Result Date: 3/15/2024  Interpreted By:  Erasmo Noel, STUDY: CT HEAD WO IV CONTRAST; CT CERVICAL SPINE WO IV CONTRAST; CT THORACIC SPINE WO IV CONTRAST; CT LUMBAR SPINE WO IV CONTRAST;  3/15/2024 6:27 pm   INDICATION: Signs/Symptoms:fall, advanced age on plavix; Signs/Symptoms:fall, lumbar pain, axial loading. RO compression fracture   COMPARISON: November 23   ACCESSION NUMBER(S): [id number]; [id number]; [id number]; [id number]   ORDERING CLINICIAN: WAQAR LOPEZ   TECHNIQUE: Axial noncontrast CT images of head with coronal and sagittal reconstructed images. Axial noncontrast CT images of the cervical thoracic and lumbar spine with coronal and sagittal reconstructed images.   FINDINGS: CT HEAD:   BRAIN PARENCHYMA:  No evidence of acute intraparenchymal hemorrhage or parenchymal evidence of acute large territory ischemic infarct. No mass-effect, midline shift or effacement of cerebral sulci. Gray-white matter  distinction is preserved. Mild global volume loss and chronic small vessel ischemic changes. The appearance is similar   VENTRICLES and EXTRA-AXIAL SPACES:  No acute extra-axial or intraventricular hemorrhage. Ventricles and sulci are age-concordant.   PARANASAL SINUSES/MASTOIDS:  No hemorrhage or air-fluid levels within the visualized paranasal sinuses. The mastoid air cells are well-aerated.   CALVARIUM/ORBITS:  No skull fracture.  The orbits and globes are intact to the extent visualized.   Robust vascular calcification.     CT CERVICAL SPINE: C3-T1 posterior spinal fusion with decompression. Prevertebral soft tissue within normal limits. Scattered multilevel degenerative disc disease. No evidence of acute cervical spine fracture.   CT thoracic spine: Multilevel degenerative disc disease detected in the thoracic spine. No evidence of acute thoracic spine fracture.   Features of pulmonary fibrosis with honeycombing seen in the lung bases   Heart size is enlarged. Robust coronary calcification. No pericardial effusion.   Enlarged main pulmonary artery compatible with pulmonary arterial hypertension.   No evidence of acute lumbar spine fracture. Scattered degenerative changes most notable at L3-L4 and L4-L5 with disc osteophyte complexes. Facet arthropathy detected.   No evidence of aneurysm of the abdominal aorta. Robust vascular calcification peritoneal catheter terminates in the pelvis.       No evidence of acute cervical, thoracic, or lumbar spine fracture. Long segment decompression of the posterior aspect of the cervical spine from C3 through T1 not measurably changed.   Multilevel degenerative disc disease is seen. Degree of degenerative disease especially in lumbar spine does limit sensitivity. If there is severe pain, consider further evaluation with lumbar spine MRI   Senescent changes seen in the brain. No evidence of acute intracranial hemorrhage   Signed by: Erasmo Noel 3/15/2024 6:55 PM Dictation  workstation:   [id number]    XR elbow left 1-2 views    Result Date: 3/15/2024  Interpreted By:  Olive Sr, STUDY: Left elbow, 2 views.   INDICATION: Signs/Symptoms:fall, left elbow pain.   COMPARISON: None.   ACCESSION NUMBER(S): [id number]   ORDERING CLINICIAN: WAQAR LOPEZ   FINDINGS: No acute fracture or malalignment. No elbow joint effusion or abnormal fat pad elevation. No significant degenerative changes. Heterotopic ossification adjacent to the lateral humeral condyle measuring 7 mm.       1. No acute fracture or malalignment of the left elbow.   MACRO: None.   Signed by: Olive Sr 3/15/2024 6:09 PM Dictation workstation:   [id number]    XR hip left with pelvis when performed 2 or 3 views    Result Date: 3/15/2024  Interpreted By:  Olive Sr, STUDY: Single view pelvis. Left hip, two views.   INDICATION: Signs/Symptoms:L hip fracture.   COMPARISON: None.   ACCESSION NUMBER(S): [id number]   ORDERING CLINICIAN: WAQAR LOPEZ   FINDINGS: There is a acute impacted comminuted mildly displaced intertrochanteric left proximal femoral fracture. Moderate bilateral hip joint degenerative changes. Advanced lumbar spine degenerative changes. Osteopenia. Bilateral femoral artery vascular calcifications.       1. Intertrochanteric left proximal femoral fracture.   MACRO: None.   Signed by: Olive Sr 3/15/2024 6:08 PM Dictation workstation:   [id number]    IOL BIOMETRY W/ IOL CALC OU (BOTH EYES)    Result Date: 3/7/2024  Date of Procedure 3/7/2024. Technician Information Loulou Lilly, COA . Notes Measurements only - see Procedure Record under Scanned Documents for signed results.     ASCAN ONLY - DIAGNOSTIC OU (BOTH EYES)    Result Date: 3/7/2024  Date of Procedure 3/7/2024. Technician Information Loulou Lilly, COA . Notes Ascan report in paper chart and scanned to patient chart after post op period. A-SCAN RIGHT EYE: 24.31 A-SCAN LEFT EYE: 23.93    XR chest 2  views    Result Date: 2/25/2024  Interpreted By:  Olaf Li, STUDY: XR CHEST 2 VIEWS;  2/25/2024 2:34 pm   INDICATION: Signs/Symptoms:Chest Pain.   COMPARISON: 11/01/2023   ACCESSION NUMBER(S): [id number]   ORDERING CLINICIAN: BUNNY CARROLL   TECHNIQUE: 2 radiographs of the chest were performed.   FINDINGS: There are low lung volumes. The heart is mildly enlarged and unchanged. There is mild central vascular and interstitial congestion which is unchanged. There is no new airspace consolidation or pneumothorax. There is slight blunting left lateral costophrenic angle which is stable. Postoperative changes are partially visualized in the lower cervical spine. There are findings of DISH.       No interval change from 11/01/2023 as described above.   Signed by: Olaf Li 2/25/2024 3:39 PM Dictation workstation:   [id number]    ECG 12 lead    Result Date: 2/25/2024  Normal sinus rhythm Left axis deviation Abnormal ECG When compared with ECG of 25-FEB-2024 13:08, (unconfirmed) Previous ECG has undetermined rhythm, needs review ST no longer depressed in Inferior leads See ED provider note for full interpretation and clinical correlation Confirmed by Yisel Yanez ([id number]) on 2/25/2024 1:40:13 PM    ECG 12 lead    Result Date: 2/25/2024  Undetermined rhythm Left axis deviation Nonspecific ST abnormality Abnormal ECG When compared with ECG of 01-OCT-2023 16:00, Current undetermined rhythm precludes rhythm comparison, needs review ST now depressed in Inferior leads T wave inversion now evident in Inferior leads T wave inversion no longer evident in Anterior leads See ED provider note for full interpretation and clinical correlation Confirmed by Yisel Yanez ([id number]) on 2/25/2024 1:39:27 PM       Assessment/Plan    Principal Problem:    Closed displaced intertrochanteric fracture of left femur (CMS/HCC)  Active Problems:    Cortical senile cataract    ESRD on peritoneal dialysis (CMS/HCC)    Closed  displaced intertrochanteric fracture of left femur, initial encounter (CMS/[facility])    -L intertrochanteric fx on imaging  -orthopedic surgery consulted; plavix washout underway; plan for OR reportedly 3/17  -plavix held; heparin for DVT PPX held as well for now  -NPO after midnight  -remainder of home meds resumed  -pain well-controlled with tramadol  -PT/OT/CM for placement after surgery    Code: full  DVT PPX: holding ahead of surgery tomorrow  GI PPX: not indicated  Diet: cardiac; NPO after MN      Malnutrition      I agree with the dietitian's malnutrition diagnosis.    Rick Covarrubias MD

## 2024-03-16 NOTE — H&P
"History Of Present Illness  Salomon Chowdary \"Robbie\" is a 83 y.o. male with past medical history of ESRD on peritoneal dialysis, diabetes, hypertension, history of CABG earlier this year at [facility] [place], peripheral neuropathy presented to emergency department after a fall.  Patient was having a really good day today and dropped his continuous glucose monitor device on the floor.  Patient thought he could bend and pick it up.  While trying to do that he lost his balance and fell.  He never lost consciousness.  In the emergency department patient was worked up from a trauma perspective and was found to have fracture.  Orthopedic surgery was consulted and patient was referred to hospitalist service for further management.  Past Medical History  He has a past medical history of Chronic kidney disease, Diabetes mellitus (CMS/[facility]), and Hypertension.    Surgical History  He has a past surgical history that includes US guided percutaneous biopsy renal left (Left, 11/30/2022); Tonsillectomy (1945); Carpal tunnel release (2018); Spinal fusion (09/2015); Cardiac catheterization (N/A, 10/16/2023); and Coronary artery bypass graft.     Social History  He reports that he has never smoked. He has never used smokeless tobacco. He reports that he does not drink alcohol and does not use drugs.    Family History  Family History   Problem Relation Name Age of Onset    Cancer Brother      Diabetes type I Brother          Allergies  Gabapentin, Meperidine, Opioids - morphine analogues, Opioids-meperidine and related, Opium tincture, Oxycodone, and Tizanidine    Scheduled medications  [START ON 3/16/2024] insulin lispro, 0-10 Units, subcutaneous, TID with meals  traMADol, 50 mg, oral, Once      Continuous medications     PRN medications  PRN medications: dextrose, dextrose, glucagon      Physical Exam:  General: Alert, in mild distress  HEENT: PERRLA, head intact and normocephalic  Neck: Normal to inspection  Lungs: Clear to auscultation, work of " breathing within normal limit  Cardiac: Regular rate and rhythm  Abdomen: Soft nontender, positive bowel sounds  : Exam deferred  Skin: Patient with a wrap on his elbow where he scraped of the skin  Hematology: No petechia or excessive ecchymosis  Musculoskeletal: Left lower extremity is shortened and externally rotated  Neurological: Alert awake oriented, no focal deficit, cranial nerves grossly intact  Psych: No suicidal ideation or homicidal ideation     Last Recorded Vitals  /74   Pulse 61   Temp 35.8 °C (96.4 °F) (Temporal)   Resp 18   Wt 76.7 kg (169 lb)   SpO2 96%     Relevant Results  CT head wo IV contrast    Result Date: 3/15/2024  Interpreted By:  Erasmo Noel, STUDY: CT HEAD WO IV CONTRAST; CT CERVICAL SPINE WO IV CONTRAST; CT THORACIC SPINE WO IV CONTRAST; CT LUMBAR SPINE WO IV CONTRAST;  3/15/2024 6:27 pm   INDICATION: Signs/Symptoms:fall, advanced age on plavix; Signs/Symptoms:fall, lumbar pain, axial loading. RO compression fracture   COMPARISON: November 23   ACCESSION NUMBER(S): [id number]; [id number]; [id number]; [id number]   ORDERING CLINICIAN: WAQAR LOPEZ   TECHNIQUE: Axial noncontrast CT images of head with coronal and sagittal reconstructed images. Axial noncontrast CT images of the cervical thoracic and lumbar spine with coronal and sagittal reconstructed images.   FINDINGS: CT HEAD:   BRAIN PARENCHYMA:  No evidence of acute intraparenchymal hemorrhage or parenchymal evidence of acute large territory ischemic infarct. No mass-effect, midline shift or effacement of cerebral sulci. Gray-white matter distinction is preserved. Mild global volume loss and chronic small vessel ischemic changes. The appearance is similar   VENTRICLES and EXTRA-AXIAL SPACES:  No acute extra-axial or intraventricular hemorrhage. Ventricles and sulci are age-concordant.   PARANASAL SINUSES/MASTOIDS:  No hemorrhage or air-fluid levels within the visualized paranasal sinuses. The mastoid  air cells are well-aerated.   CALVARIUM/ORBITS:  No skull fracture.  The orbits and globes are intact to the extent visualized.   Robust vascular calcification.     CT CERVICAL SPINE: C3-T1 posterior spinal fusion with decompression. Prevertebral soft tissue within normal limits. Scattered multilevel degenerative disc disease. No evidence of acute cervical spine fracture.   CT thoracic spine: Multilevel degenerative disc disease detected in the thoracic spine. No evidence of acute thoracic spine fracture.   Features of pulmonary fibrosis with honeycombing seen in the lung bases   Heart size is enlarged. Robust coronary calcification. No pericardial effusion.   Enlarged main pulmonary artery compatible with pulmonary arterial hypertension.   No evidence of acute lumbar spine fracture. Scattered degenerative changes most notable at L3-L4 and L4-L5 with disc osteophyte complexes. Facet arthropathy detected.   No evidence of aneurysm of the abdominal aorta. Robust vascular calcification peritoneal catheter terminates in the pelvis.       No evidence of acute cervical, thoracic, or lumbar spine fracture. Long segment decompression of the posterior aspect of the cervical spine from C3 through T1 not measurably changed.   Multilevel degenerative disc disease is seen. Degree of degenerative disease especially in lumbar spine does limit sensitivity. If there is severe pain, consider further evaluation with lumbar spine MRI   Senescent changes seen in the brain. No evidence of acute intracranial hemorrhage   Signed by: Erasmo Noel 3/15/2024 6:55 PM Dictation workstation:   [id number]    CT cervical spine wo IV contrast    Result Date: 3/15/2024  Interpreted By:  Erasmo Noel, STUDY: CT HEAD WO IV CONTRAST; CT CERVICAL SPINE WO IV CONTRAST; CT THORACIC SPINE WO IV CONTRAST; CT LUMBAR SPINE WO IV CONTRAST;  3/15/2024 6:27 pm   INDICATION: Signs/Symptoms:fall, advanced age on plavix; Signs/Symptoms:fall, lumbar pain,  axial loading. RO compression fracture   COMPARISON: November 23   ACCESSION NUMBER(S): [id number]; [id number]; [id number]; [id number]   ORDERING CLINICIAN: WAQAR LOPEZ   TECHNIQUE: Axial noncontrast CT images of head with coronal and sagittal reconstructed images. Axial noncontrast CT images of the cervical thoracic and lumbar spine with coronal and sagittal reconstructed images.   FINDINGS: CT HEAD:   BRAIN PARENCHYMA:  No evidence of acute intraparenchymal hemorrhage or parenchymal evidence of acute large territory ischemic infarct. No mass-effect, midline shift or effacement of cerebral sulci. Gray-white matter distinction is preserved. Mild global volume loss and chronic small vessel ischemic changes. The appearance is similar   VENTRICLES and EXTRA-AXIAL SPACES:  No acute extra-axial or intraventricular hemorrhage. Ventricles and sulci are age-concordant.   PARANASAL SINUSES/MASTOIDS:  No hemorrhage or air-fluid levels within the visualized paranasal sinuses. The mastoid air cells are well-aerated.   CALVARIUM/ORBITS:  No skull fracture.  The orbits and globes are intact to the extent visualized.   Robust vascular calcification.     CT CERVICAL SPINE: C3-T1 posterior spinal fusion with decompression. Prevertebral soft tissue within normal limits. Scattered multilevel degenerative disc disease. No evidence of acute cervical spine fracture.   CT thoracic spine: Multilevel degenerative disc disease detected in the thoracic spine. No evidence of acute thoracic spine fracture.   Features of pulmonary fibrosis with honeycombing seen in the lung bases   Heart size is enlarged. Robust coronary calcification. No pericardial effusion.   Enlarged main pulmonary artery compatible with pulmonary arterial hypertension.   No evidence of acute lumbar spine fracture. Scattered degenerative changes most notable at L3-L4 and L4-L5 with disc osteophyte complexes. Facet arthropathy detected.   No evidence of  aneurysm of the abdominal aorta. Robust vascular calcification peritoneal catheter terminates in the pelvis.       No evidence of acute cervical, thoracic, or lumbar spine fracture. Long segment decompression of the posterior aspect of the cervical spine from C3 through T1 not measurably changed.   Multilevel degenerative disc disease is seen. Degree of degenerative disease especially in lumbar spine does limit sensitivity. If there is severe pain, consider further evaluation with lumbar spine MRI   Senescent changes seen in the brain. No evidence of acute intracranial hemorrhage   Signed by: Erasmo Noel 3/15/2024 6:55 PM Dictation workstation:   [id number]    CT thoracic spine wo IV contrast    Result Date: 3/15/2024  Interpreted By:  Erasmo Noel, STUDY: CT HEAD WO IV CONTRAST; CT CERVICAL SPINE WO IV CONTRAST; CT THORACIC SPINE WO IV CONTRAST; CT LUMBAR SPINE WO IV CONTRAST;  3/15/2024 6:27 pm   INDICATION: Signs/Symptoms:fall, advanced age on plavix; Signs/Symptoms:fall, lumbar pain, axial loading. RO compression fracture   COMPARISON: November 23   ACCESSION NUMBER(S): [id number]; [id number]; [id number]; [id number]   ORDERING CLINICIAN: WAQAR LOPEZ   TECHNIQUE: Axial noncontrast CT images of head with coronal and sagittal reconstructed images. Axial noncontrast CT images of the cervical thoracic and lumbar spine with coronal and sagittal reconstructed images.   FINDINGS: CT HEAD:   BRAIN PARENCHYMA:  No evidence of acute intraparenchymal hemorrhage or parenchymal evidence of acute large territory ischemic infarct. No mass-effect, midline shift or effacement of cerebral sulci. Gray-white matter distinction is preserved. Mild global volume loss and chronic small vessel ischemic changes. The appearance is similar   VENTRICLES and EXTRA-AXIAL SPACES:  No acute extra-axial or intraventricular hemorrhage. Ventricles and sulci are age-concordant.   PARANASAL SINUSES/MASTOIDS:  No hemorrhage or  air-fluid levels within the visualized paranasal sinuses. The mastoid air cells are well-aerated.   CALVARIUM/ORBITS:  No skull fracture.  The orbits and globes are intact to the extent visualized.   Robust vascular calcification.     CT CERVICAL SPINE: C3-T1 posterior spinal fusion with decompression. Prevertebral soft tissue within normal limits. Scattered multilevel degenerative disc disease. No evidence of acute cervical spine fracture.   CT thoracic spine: Multilevel degenerative disc disease detected in the thoracic spine. No evidence of acute thoracic spine fracture.   Features of pulmonary fibrosis with honeycombing seen in the lung bases   Heart size is enlarged. Robust coronary calcification. No pericardial effusion.   Enlarged main pulmonary artery compatible with pulmonary arterial hypertension.   No evidence of acute lumbar spine fracture. Scattered degenerative changes most notable at L3-L4 and L4-L5 with disc osteophyte complexes. Facet arthropathy detected.   No evidence of aneurysm of the abdominal aorta. Robust vascular calcification peritoneal catheter terminates in the pelvis.       No evidence of acute cervical, thoracic, or lumbar spine fracture. Long segment decompression of the posterior aspect of the cervical spine from C3 through T1 not measurably changed.   Multilevel degenerative disc disease is seen. Degree of degenerative disease especially in lumbar spine does limit sensitivity. If there is severe pain, consider further evaluation with lumbar spine MRI   Senescent changes seen in the brain. No evidence of acute intracranial hemorrhage   Signed by: Erasmo Noel 3/15/2024 6:55 PM Dictation workstation:   [id number]    CT lumbar spine wo IV contrast    Result Date: 3/15/2024  Interpreted By:  Erasmo Noel, STUDY: CT HEAD WO IV CONTRAST; CT CERVICAL SPINE WO IV CONTRAST; CT THORACIC SPINE WO IV CONTRAST; CT LUMBAR SPINE WO IV CONTRAST;  3/15/2024 6:27 pm   INDICATION:  Signs/Symptoms:fall, advanced age on plavix; Signs/Symptoms:fall, lumbar pain, axial loading. RO compression fracture   COMPARISON: November 23   ACCESSION NUMBER(S): [id number]; [id number]; [id number]; [id number]   ORDERING CLINICIAN: WAQAR LOPEZ   TECHNIQUE: Axial noncontrast CT images of head with coronal and sagittal reconstructed images. Axial noncontrast CT images of the cervical thoracic and lumbar spine with coronal and sagittal reconstructed images.   FINDINGS: CT HEAD:   BRAIN PARENCHYMA:  No evidence of acute intraparenchymal hemorrhage or parenchymal evidence of acute large territory ischemic infarct. No mass-effect, midline shift or effacement of cerebral sulci. Gray-white matter distinction is preserved. Mild global volume loss and chronic small vessel ischemic changes. The appearance is similar   VENTRICLES and EXTRA-AXIAL SPACES:  No acute extra-axial or intraventricular hemorrhage. Ventricles and sulci are age-concordant.   PARANASAL SINUSES/MASTOIDS:  No hemorrhage or air-fluid levels within the visualized paranasal sinuses. The mastoid air cells are well-aerated.   CALVARIUM/ORBITS:  No skull fracture.  The orbits and globes are intact to the extent visualized.   Robust vascular calcification.     CT CERVICAL SPINE: C3-T1 posterior spinal fusion with decompression. Prevertebral soft tissue within normal limits. Scattered multilevel degenerative disc disease. No evidence of acute cervical spine fracture.   CT thoracic spine: Multilevel degenerative disc disease detected in the thoracic spine. No evidence of acute thoracic spine fracture.   Features of pulmonary fibrosis with honeycombing seen in the lung bases   Heart size is enlarged. Robust coronary calcification. No pericardial effusion.   Enlarged main pulmonary artery compatible with pulmonary arterial hypertension.   No evidence of acute lumbar spine fracture. Scattered degenerative changes most notable at L3-L4 and L4-L5 with  disc osteophyte complexes. Facet arthropathy detected.   No evidence of aneurysm of the abdominal aorta. Robust vascular calcification peritoneal catheter terminates in the pelvis.       No evidence of acute cervical, thoracic, or lumbar spine fracture. Long segment decompression of the posterior aspect of the cervical spine from C3 through T1 not measurably changed.   Multilevel degenerative disc disease is seen. Degree of degenerative disease especially in lumbar spine does limit sensitivity. If there is severe pain, consider further evaluation with lumbar spine MRI   Senescent changes seen in the brain. No evidence of acute intracranial hemorrhage   Signed by: Erasmo Noel 3/15/2024 6:55 PM Dictation workstation:   [id number]    XR elbow left 1-2 views    Result Date: 3/15/2024  Interpreted By:  Olive Sr, STUDY: Left elbow, 2 views.   INDICATION: Signs/Symptoms:fall, left elbow pain.   COMPARISON: None.   ACCESSION NUMBER(S): [id number]   ORDERING CLINICIAN: WAQAR LOPEZ   FINDINGS: No acute fracture or malalignment. No elbow joint effusion or abnormal fat pad elevation. No significant degenerative changes. Heterotopic ossification adjacent to the lateral humeral condyle measuring 7 mm.       1. No acute fracture or malalignment of the left elbow.   MACRO: None.   Signed by: Olive Sr 3/15/2024 6:09 PM Dictation workstation:   [id number]    XR hip left with pelvis when performed 2 or 3 views    Result Date: 3/15/2024  Interpreted By:  Olive Sr, STUDY: Single view pelvis. Left hip, two views.   INDICATION: Signs/Symptoms:L hip fracture.   COMPARISON: None.   ACCESSION NUMBER(S): [id number]   ORDERING CLINICIAN: WAQAR LOPEZ   FINDINGS: There is a acute impacted comminuted mildly displaced intertrochanteric left proximal femoral fracture. Moderate bilateral hip joint degenerative changes. Advanced lumbar spine degenerative changes. Osteopenia. Bilateral femoral artery  vascular calcifications.       1. Intertrochanteric left proximal femoral fracture.   MACRO: None.   Signed by: Olive Sr 3/15/2024 6:08 PM Dictation workstation:   [id number]    IOL BIOMETRY W/ IOL CALC OU (BOTH EYES)    Result Date: 3/7/2024  Date of Procedure 3/7/2024. Technician Information Loulou Lilly, DAVE . Notes Measurements only - see Procedure Record under Scanned Documents for signed results.     ASCAN ONLY - DIAGNOSTIC OU (BOTH EYES)    Result Date: 3/7/2024  Date of Procedure 3/7/2024. Technician Information Loulou Lilly, DAVE . Notes Ascan report in paper chart and scanned to patient chart after post op period. A-SCAN RIGHT EYE: 24.31 A-SCAN LEFT EYE: 23.93    XR chest 2 views    Result Date: 2/25/2024  Interpreted By:  Olaf iL, STUDY: XR CHEST 2 VIEWS;  2/25/2024 2:34 pm   INDICATION: Signs/Symptoms:Chest Pain.   COMPARISON: 11/01/2023   ACCESSION NUMBER(S): [id number]   ORDERING CLINICIAN: BUNNY CARROLL   TECHNIQUE: 2 radiographs of the chest were performed.   FINDINGS: There are low lung volumes. The heart is mildly enlarged and unchanged. There is mild central vascular and interstitial congestion which is unchanged. There is no new airspace consolidation or pneumothorax. There is slight blunting left lateral costophrenic angle which is stable. Postoperative changes are partially visualized in the lower cervical spine. There are findings of DISH.       No interval change from 11/01/2023 as described above.   Signed by: Olaf Li 2/25/2024 3:39 PM Dictation workstation:   [id number]    ECG 12 lead    Result Date: 2/25/2024  Normal sinus rhythm Left axis deviation Abnormal ECG When compared with ECG of 25-FEB-2024 13:08, (unconfirmed) Previous ECG has undetermined rhythm, needs review ST no longer depressed in Inferior leads See ED provider note for full interpretation and clinical correlation Confirmed by Yisel Yanez ([id number]) on 2/25/2024 1:40:13 PM    ECG 12  "lead    Result Date: 2/25/2024  Undetermined rhythm Left axis deviation Nonspecific ST abnormality Abnormal ECG When compared with ECG of 01-OCT-2023 16:00, Current undetermined rhythm precludes rhythm comparison, needs review ST now depressed in Inferior leads T wave inversion now evident in Inferior leads T wave inversion no longer evident in Anterior leads See ED provider note for full interpretation and clinical correlation Confirmed by Yisel Yanez ([id number]) on 2/25/2024 1:39:27 PM     Results for orders placed or performed during the hospital encounter of 03/15/24 (from the past 24 hour(s))   CBC   Result Value Ref Range    WBC 10.3 4.4 - 11.3 x10*3/uL    nRBC 0.0 0.0 - 0.0 /100 WBCs    RBC 2.86 (L) 4.50 - 5.90 x10*6/uL    Hemoglobin 9.1 (L) 13.5 - 17.5 g/dL    Hematocrit 30.1 (L) 41.0 - 52.0 %     (H) 80 - 100 fL    MCH 31.8 26.0 - 34.0 pg    MCHC 30.2 (L) 32.0 - 36.0 g/dL    RDW 14.5 11.5 - 14.5 %    Platelets 181 150 - 450 x10*3/uL   Comprehensive metabolic panel   Result Value Ref Range    Glucose 139 (H) 74 - 99 mg/dL    Sodium 139 136 - 145 mmol/L    Potassium 3.6 3.5 - 5.3 mmol/L    Chloride 102 98 - 107 mmol/L    Bicarbonate 29 21 - 32 mmol/L    Anion Gap 12 10 - 20 mmol/L    Urea Nitrogen 67 (H) 6 - 23 mg/dL    Creatinine 4.77 (H) 0.50 - 1.30 mg/dL    eGFR 12 (L) >60 mL/min/1.73m*2    Calcium 8.6 8.6 - 10.3 mg/dL    Albumin 3.1 (L) 3.4 - 5.0 g/dL    Alkaline Phosphatase 52 33 - 136 U/L    Total Protein 6.0 (L) 6.4 - 8.2 g/dL    AST 21 9 - 39 U/L    Bilirubin, Total 0.3 0.0 - 1.2 mg/dL    ALT 18 10 - 52 U/L   Protime-INR   Result Value Ref Range    Protime 11.5 9.8 - 12.8 seconds    INR 1.0 0.9 - 1.1   APTT   Result Value Ref Range    aPTT 28 27 - 38 seconds   Type and Screen   Result Value Ref Range    ABO TYPE O     Rh TYPE NEG     ANTIBODY SCREEN NEG         Assessment/Plan   Salomon Chowdary \"Robbie\" is a 83 y.o. male on day 0 of admission presenting with Closed displaced intertrochanteric fracture " "of left femur (CMS/[facility]).  Principal Problem:    Closed displaced intertrochanteric fracture of left femur (CMS/[facility])  Active Problems:    Cortical senile cataract    ESRD on peritoneal dialysis (CMS/[facility])    Closed displaced intertrochanteric fracture of left femur, initial encounter (CMS/[facility])      Salomon Chowdary \"Robbie\" is a 83 y.o. male with past medical history of ESRD on peritoneal dialysis, diabetes, hypertension, history of CABG earlier this year at [facility] [place], [place], peripheral neuropathy presented to emergency department after a fall.  Patient was found to have left proximal intertrochanteric femur fracture.    Mechanical fall leading to pathological left proximal intertrochanteric femur fracture  Pain control  Orthopedic surgery consulted by ER  Patient is medically cleared from hospitalist standpoint for surgery  Patient had complete cardiac workup with CABG earlier in the year  Patient's functional status is over 4 METS  PT OT evaluation  Scheduled Tylenol 975 mg every 8 hours  Tramadol 50 every 12  Patient's daughter is an addiction medicine specialist and she is recommending this and patient would like to try this  Allergic to oxycodone    ESRD on PD  Nephrology consulted  Patient is normally on nighttime PD  Electrolytes are stable  Will have nephrology evaluate patient here tomorrow and ordered PD    Type 2 diabetes mellitus  Accu-Chek with sliding scale insulin  Continue with long-acting insulin    Hypertension  Continue with home medications    CAD status post CABG  Continue with aspirin, Plavix, metoprolol  Patient is apparently not on statin  Continue torsemide    Hypothyroidism  Levothyroxine    DVT prophylaxis  Heparin twice a day  SCDs     Plan discussed with patient and wife at bedside in ED bed 18  Full code as per patient  High level of MDM based on above issue and discussing plan    This note is created using voice recognition software. All efforts are made to minimize errors, if there are errors there due " to transcription.    Guerrero Green  [facility]

## 2024-03-16 NOTE — CONSULTS
INPATIENT INITIAL NEPHROLOGY CONSULT    SERVICE DATE: 3/16/2024   SERVICE TIME:  2:51 PM    REASON FOR CONSULT: For evaluation management of advanced renal failure.  REQUESTING PHYSICIAN: Dr. Josh Stout  PRIMARY CARE PHYSICIAN: Enrique Ansari MD    CC:    Subjective   Mr. Chowdary is a 83 y.o. male with past medical history significant for CAD, hypertension, ESRD on PD since 03/23, primary nephrologist Dr. Khan, presented to the hospital following a fall and the patient was found to have left proximal intertrochanteric femur fracture and plan for surgery.  Nephrology consulted for management of advanced renal failure.  Patient has been on peritoneal dialysis since 3/23 and denies any complaints currently other than left hip pain.  Denies any chest pain, shortness of breath, nausea, vomiting, abdominal pain, headache, dizziness.    Past Medical History:   Diagnosis Date    Chronic kidney disease     on PD    Diabetes mellitus (CMS/[facility]ly [facility])     Hypertension      Past Surgical History:   Procedure Laterality Date    CARDIAC CATHETERIZATION N/A 10/16/2023    Procedure: PCI;  Surgeon: Eusebio Hudson MD;  Location: [address] Cardiac Cath Lab;  Service: Cardiovascular;  Laterality: N/A;    CARPAL TUNNEL RELEASE  2018    CORONARY ARTERY BYPASS GRAFT      SPINAL FUSION  09/2015    7 fused vertebrae    TONSILLECTOMY  1945    US GUIDED PERCUTANEOUS BIOPSY RENAL LEFT Left 11/30/2022    US GUIDED PERCUTANEOUS BIOPSY RENAL LEFT 11/30/2022     Family History   Problem Relation Name Age of Onset    Cancer Brother      Diabetes type I Brother       Social History     Tobacco Use    Smoking status: Never    Smokeless tobacco: Never   Substance Use Topics    Alcohol use: Never    Drug use: Never      Medications Prior to Admission   Medication Sig Dispense Refill Last Dose    acetaminophen (Tylenol) 325 mg tablet Take 2 tablets (650 mg) by mouth every 6 hours if needed for mild pain (1 - 3). 30 tablet 0 3/14/2024     amLODIPine (Norvasc) 10 mg tablet Take 1 tablet (10 mg) by mouth once daily.   3/15/2024    aspirin 81 mg EC tablet Take 1 tablet (81 mg) by mouth once daily. Do not start before October 18, 2023.   3/14/2024    atorvastatin (Lipitor) 40 mg tablet Take 1 tablet (40 mg) by mouth once daily at bedtime. (Patient not taking: Reported on 3/15/2024) 90 tablet 3 Not Taking    cholecalciferol (Vitamin D-3) 25 MCG (1000 UT) tablet Take 1 tablet (25 mcg) by mouth once daily.   3/14/2024    clopidogrel (Plavix) 75 mg tablet Take 1 tablet (75 mg) by mouth once daily for 362 doses. Do not start before October 18, 2023. 90 tablet 3 3/14/2024    DULoxetine (Cymbalta) 30 mg DR capsule Take 1 capsule (30 mg) by mouth once daily. Do not crush or chew.   3/14/2024    flash glucose sensor (FREESTYLE ANDRÉS 2 SENSOR [facility]) Change every 2 weeks   3/14/2024    fluticasone (Flonase) 50 mcg/actuation nasal spray once daily as needed.   3/14/2024    gentamicin (Garamycin) 0.1 % cream Apply 1 Application topically once daily.   3/15/2024    glucosamine HCl 1,500 mg tablet Take 1 tablet by mouth 2 times a day.   3/15/2024    hydrALAZINE (Apresoline) 25 mg tablet TAKE 25 MG IN THE MORNING. TAKE 25 MG IN THE AFTERNOON. TAKE 50 MG IN THE EVENING. 270 tablet 0 3/15/2024    insulin detemir (Levemir U-100 Insulin) 100 unit/mL injection Inject 25 Units under the skin once daily. Take as directed per insulin instructions.   3/14/2024    insulin lispro (HumaLOG) 100 unit/mL injection Inject subcutaneously before meals TID, as per sliding scale: 1 unit if sugar is 151-200, 2 units if 201-250, 3 units if 251-300, 4 units if 301-350, 5 units> 350. Total 15 units per day.   3/15/2024    LANCETS MISC 1 each once daily.   3/14/2024    levothyroxine (Tirosint) 50 mcg capsule Take 1 capsule (50 mcg) by mouth once daily in the morning. Take before meals.   3/15/2024    loratadine-pseudoephedrine (Claritin-D 24-hour)  mg 24 hr tablet Take 1 tablet by mouth  "once daily as needed for allergies. Do not crush, chew, or split.       metoprolol succinate XL (Toprol-XL) 50 mg 24 hr tablet Take 1 tablet (50 mg) by mouth once daily. Do not crush or chew. (Patient taking differently: Take 1 tablet (50 mg) by mouth 2 times a day. Do not crush or chew.) 30 tablet 2     min17/nettle/pumpkin/saw palme (PROSTATE THERAPY ORAL) Take by mouth.   3/14/2024    multivitamin with minerals (Daily Multivitamin-Minerals) tablet Take 1 tablet by mouth once daily.   3/15/2024    OneTouch Ultra Test strip TEST THREE TIMES DAILY.   3/14/2024    pen needle, diabetic 31 gauge x 1/4\" needle 1 each 2 times a day.   3/14/2024    RenaPlex-D 800 mcg-12.5 mg -2,000 unit tablet Take 1 tablet by mouth once daily.   3/15/2024    sennosides-docusate sodium (Diana-Colace) 8.6-50 mg tablet Take 2 tablets by mouth 2 times a day.   3/15/2024    sodium bicarbonate 650 mg tablet Take 1 tablet (650 mg) by mouth 2 times a day.   3/15/2024    torsemide (Demadex) 100 mg tablet Take 0.5 tablets (50 mg) by mouth once daily.   3/15/2024      [unfilled]  Allergies as of 03/15/2024 - Reviewed 03/15/2024   Allergen Reaction Noted    Gabapentin Other 10/09/2015    Meperidine Nausea And Vomiting 11/15/2022    Opioids - morphine analogues Unknown, Nausea And Vomiting, and Nausea/vomiting 11/05/2015    Opioids-meperidine and related Nausea/vomiting 04/06/2017    Opium tincture Unknown 10/12/2022    Oxycodone GI Upset 10/20/2023    Tizanidine Unknown 10/13/2021       COMPLETE REVIEW OF SYSTEMS:    10 point review of systems negative except mentioned above.     Objective     PHYSICAL EXAM:   GENERAL: Alert, no distress, cooperative  SKIN: Skin color, texture, turgor normal. No rashes or lesions.  HEAD/SINUSES: No significant findings  EYES: PE  OROPHARYNX: oral mucosa moist. Oropharynx normal.  NECK: No jugulovenous distention, No carotid bruits, Supple  LUNGS: Lungs clear to auscultation, no wheeze, rhonchi, or rales  CARDIAC: Normal " "S1 and S2; no rubs, murmurs, or gallops  ABDOMEN: Abdomen soft, non-tender, BS normal, No masses. No abdominal bruits.  EXTREMITIES: Left femur fracture.    NEURO: No focal deficits. Able to move all 4 extremities.  PULSES: 2+ radial, 2 + femoral      Patient Vitals for the past 24 hrs:   BP Temp Temp src Pulse Resp SpO2 Height Weight   03/16/24 1219 164/72 36.1 °C (97 °F) Temporal 86 -- 96 % -- --   03/16/24 0800 164/72 36.4 °C (97.5 °F) Temporal 75 17 96 % -- --   03/16/24 0300 163/82 35.9 °C (96.6 °F) Temporal 66 16 96 % -- --   03/15/24 2304 156/84 35.2 °C (95.4 °F) Temporal 60 16 97 % 1.753 m (5' 9\") 76.7 kg (169 lb)   03/15/24 2215 -- -- -- 66 -- 97 % -- --   03/15/24 2200 157/70 -- -- 62 -- 96 % -- --   03/15/24 2145 -- -- -- 62 -- 96 % -- --   03/15/24 2130 162/74 -- -- 61 -- 96 % -- --   03/15/24 2115 -- -- -- 62 -- 96 % -- --   03/15/24 2100 154/67 -- -- 60 -- 96 % -- --   03/15/24 2045 -- -- -- 61 -- 97 % -- --   03/15/24 2030 151/68 -- -- 60 -- 96 % -- --   03/15/24 2015 -- -- -- 62 -- 97 % -- --   03/15/24 2000 161/73 -- -- 58 -- 97 % -- --   03/15/24 1945 -- -- -- 58 -- 97 % -- --   03/15/24 1930 167/76 -- -- 57 -- 97 % -- --   03/15/24 1915 -- -- -- 58 -- 97 % -- --   03/15/24 1900 -- -- -- 59 -- 96 % -- --   03/15/24 1835 140/66 -- -- -- -- -- -- --   03/15/24 1750 -- -- -- 57 -- -- -- --   03/15/24 1745 -- -- -- 56 -- 99 % -- --   03/15/24 1740 -- -- -- 57 -- 100 % -- --   03/15/24 1735 -- -- -- 56 -- 97 % -- --   03/15/24 1715 -- -- -- 57 -- 99 % -- --   03/15/24 1710 -- -- -- 56 -- 98 % -- --   03/15/24 1705 -- -- -- 58 -- 98 % -- --   03/15/24 1704 160/75 35.8 °C (96.4 °F) Temporal 58 18 98 % 1.753 m (5' 9\") 76.7 kg (169 lb)   03/15/24 1700 160/75 -- -- 60 -- 100 % -- --   03/15/24 1655 (!) 182/84 -- -- 59 -- 97 % -- --     Body mass index is 24.96 kg/m².    DATA:   Diagnostic tests reviewed for today's visit:    Most recent labs and imaging results.    CBC: " "@YIOJPPL71(wbc,rbc,hb,hct,plt,mcv,mch,mpv,rdw)@  Coags: @CKWUZRV69(pt,inr,aptt)@  CMP: @IARPCBU35(na,k,chlor,co2,bun,creat,gluc,tprot,ca,mg,albumin,tbili,alkphos,ALT,AST,anion)@  ABG's: @VCVKRDE77(PH,PCO2,PO2,BE,HCO3,CO2CT,O2HB,COHB,MHGB,TEMP,PHTC,PCO2T,PO2T,O2AD)@  MG/PHOS: @XYISDCR63(mg,p)@    Urobilinogen, Urine   Date Value Ref Range Status   10/01/2023 <2.0 <2.0 mg/dL Final       No components found for: \"PROTTIMED\", \"UALBCR\", \"UPROT\", \"CREAT\"    No results found for: \"CHOL\", \"HDL\", \"LDL\"       [unfilled]  [unfilled]      Mr. Salomon Chowdary is a 83-year-old male with past medical history of CAD, hypertension, ESRD on PD since 03/23, primary nephrologist Dr. Khan, presented to the hospital following a fall and the patient was found to have left proximal intertrochanteric femur fracture and plan for surgery.  Nephrology consulted for management of advanced renal failure.  Patient has been on peritoneal dialysis since 3/23 and denies any complaints currently other than left hip pain.  Denies any chest pain, shortness of breath, nausea, vomiting, abdominal pain, headache, dizziness.    1.  ESRD on PD.  2.  Hypertension.  3.   CAD status post CABG.  4.  Type 2 diabetes mellitus.  5.  Left proximal intertrochanteric femur fracture.  6.  Hypothyroidism.    Plan.  -Patient is planned for session of peritoneal dialysis tonight.  PD prescription is a 6000 mL total fill volume, 3 exchanges, 2000 fill volume, 8 hours, 1.5% dextrose bags.  -Hypertension: Blood pressure has been suboptimally controlled and patient is on amlodipine 10 mg daily, hydralazine 25 mg twice daily, torsemide 50 mg daily.  Will increase hydralazine to 25 mg 3 times daily and assess response.  Likely blood pressure is elevated in the setting of pain secondary to fracture.  -BMD: Continue phosphorus binders including sevelamer 800 mg 3 times daily.  -Plan for surgery for left femur fracture.      Thank you for the consult and involving " me in the patients care. I will continue to follow and co-manage the patient along with you during their hospital stay.    Clary Castañeda MD      SIGNATURE: Clary Castañeda MD PATIENT NAME: Salomon Chowdary   DATE: March 16, 2024 MRN: [id number]   TIME: 2:51 PM

## 2024-03-16 NOTE — PROGRESS NOTES
"Physical Therapy                 Therapy Communication Note    Patient Name: Salomon Chowdayr \"Robbie\"  MRN: [id number]  Today's Date: 3/16/2024     Discipline: Physical Therapy    Missed Visit Reason: PT order received, chart reviewed. Pt scheduled for surgery 3/17. Will hold PT evaluation at this time and complete post op as appropriate.     Missed Time: Attempt      "

## 2024-03-16 NOTE — NURSING NOTE
Pt refused finger stick for blood glucose. Pt used his Freestyle [place] and got 164. Will attempt to get blood glucose with our equipment again.

## 2024-03-16 NOTE — CARE PLAN
The patient's goals for the shift include      The clinical goals for the shift include pain control    Over the shift, the patient did make progress toward the following goal of pain control and was able to sleep some.

## 2024-03-16 NOTE — CONSULTS
"Reason For Consult  Left femur intratrochanteric fracture    History Of Present Illness  Robbie Chowdary is a 83 y.o. male with past medical history of diabetes, ESRD on peritoneal dialysis, hypertension, history CABG 10/2023 presenting with left hip pain. Yesterday afternoon, while reaching to pick an item up off the floor, he lost his balance and fell to the floor onto his left side. He noted immediate left hip pain and was not able to stand after the fall. He denies LOC & CHI. He complains of moderate left hip pain, worst with attempted movement and notes that he \"skinned up\" his left arm. ED Left hip imaging:  acute impacted mildly displaced intertrochanteric left proximal femoral fracture; ED imaging of left elbow: no acute fracture or malalignment.  He usually ambulates with a cane and has felt well recently, denies dizziness, URI symptoms, malaise .      Past Medical History  He has a past medical history of Chronic kidney disease, Diabetes mellitus (CMS/[facility]), and Hypertension.    Surgical History  He has a past surgical history that includes US guided percutaneous biopsy renal left (Left, 11/30/2022); Tonsillectomy (1945); Carpal tunnel release (2018); Spinal fusion (09/2015); Cardiac catheterization (N/A, 10/16/2023); and Coronary artery bypass graft.     Social History  He reports that he has never smoked. He has never used smokeless tobacco. He reports that he does not drink alcohol and does not use drugs.    Family History  Family History   Problem Relation Name Age of Onset    Cancer Brother      Diabetes type I Brother          Allergies  Gabapentin, Meperidine, Opioids - morphine analogues, Opioids-meperidine and related, Opium tincture, Oxycodone, and Tizanidine    Review of Systems  Review of Systems   Constitutional:  Negative for appetite change.   HENT: Negative.     Eyes: Negative.    Respiratory:  Negative for cough, chest tightness and shortness of breath.         No recent URI symptoms " "  Cardiovascular:  Positive for chest pain.        Resolving chest pain related to CABG and cardiac rehab   Gastrointestinal:  Negative for abdominal distention, abdominal pain, anal bleeding, blood in stool, nausea and vomiting.   Endocrine: Positive for cold intolerance.   Genitourinary:  Negative for dysuria and hematuria.   Musculoskeletal:         Left hip pain   Skin:  Negative for wound.   Neurological:  Negative for dizziness, weakness and light-headedness.   Hematological:  Bruises/bleeds easily.   Psychiatric/Behavioral: Negative.            Physical Exam  Physical Exam  Vitals reviewed.   HENT:      Head: Normocephalic.      Mouth/Throat:      Mouth: Mucous membranes are moist.      Pharynx: Oropharynx is clear.   Eyes:      Extraocular Movements: Extraocular movements intact.   Cardiovascular:      Rate and Rhythm: Normal rate.   Pulmonary:      Effort: Pulmonary effort is normal.   Abdominal:      General: Abdomen is flat. Bowel sounds are normal. There is no distension.      Palpations: Abdomen is soft.      Tenderness: There is no abdominal tenderness.      Comments: Peritoneal dialysis dressing c/d/i   Musculoskeletal:      Comments:  Left lower extremity shortened and externally rotated, left hip tenderness on attempted gentle logroll, left EHL, ankle dorsiflexion/plantarflexion intact, left foot DP pulse palpable 1+/2   Skin:     General: Skin is warm and dry.      Capillary Refill: Capillary refill takes less than 2 seconds.      Findings: Bruising present.      Comments: Ecchymosis & Abrasions noted left forearm   Neurological:      General: No focal deficit present.      Mental Status: He is alert. Mental status is at baseline.   Psychiatric:         Mood and Affect: Mood normal.            Last Recorded Vitals  Blood pressure 163/82, pulse 66, temperature 35.9 °C (96.6 °F), temperature source Temporal, resp. rate 16, height 1.753 m (5' 9\"), weight 76.7 kg (169 lb), SpO2 96 %.    Relevant " Results      Scheduled medications  acetaminophen, 975 mg, oral, TID  amLODIPine, 10 mg, oral, Daily  aspirin, 81 mg, oral, Daily  cholecalciferol, 1,000 Units, oral, Daily  [Held by provider] clopidogrel, 75 mg, oral, Daily  DULoxetine, 30 mg, oral, Daily  heparin (porcine), 5,000 Units, subcutaneous, q8h  hydrALAZINE, 25 mg, oral, BID  insulin detemir, 25 Units, subcutaneous, Daily  insulin lispro, 0-10 Units, subcutaneous, TID with meals  levothyroxine, 50 mcg, oral, Daily  metoprolol succinate XL, 50 mg, oral, Daily  multivitamin with minerals, 1 tablet, oral, Daily  polyethylene glycol, 17 g, oral, Daily  sodium bicarbonate, 650 mg, oral, BID  torsemide, 50 mg, oral, Daily      Continuous medications     PRN medications  PRN medications: acetaminophen, benzocaine-menthol, dextrose, dextrose, glucagon, guaiFENesin, ondansetron ODT **OR** ondansetron, promethazine **OR** promethazine, traMADol  Results for orders placed or performed during the hospital encounter of 03/15/24 (from the past 24 hour(s))   CBC   Result Value Ref Range    WBC 10.3 4.4 - 11.3 x10*3/uL    nRBC 0.0 0.0 - 0.0 /100 WBCs    RBC 2.86 (L) 4.50 - 5.90 x10*6/uL    Hemoglobin 9.1 (L) 13.5 - 17.5 g/dL    Hematocrit 30.1 (L) 41.0 - 52.0 %     (H) 80 - 100 fL    MCH 31.8 26.0 - 34.0 pg    MCHC 30.2 (L) 32.0 - 36.0 g/dL    RDW 14.5 11.5 - 14.5 %    Platelets 181 150 - 450 x10*3/uL   Comprehensive metabolic panel   Result Value Ref Range    Glucose 139 (H) 74 - 99 mg/dL    Sodium 139 136 - 145 mmol/L    Potassium 3.6 3.5 - 5.3 mmol/L    Chloride 102 98 - 107 mmol/L    Bicarbonate 29 21 - 32 mmol/L    Anion Gap 12 10 - 20 mmol/L    Urea Nitrogen 67 (H) 6 - 23 mg/dL    Creatinine 4.77 (H) 0.50 - 1.30 mg/dL    eGFR 12 (L) >60 mL/min/1.73m*2    Calcium 8.6 8.6 - 10.3 mg/dL    Albumin 3.1 (L) 3.4 - 5.0 g/dL    Alkaline Phosphatase 52 33 - 136 U/L    Total Protein 6.0 (L) 6.4 - 8.2 g/dL    AST 21 9 - 39 U/L    Bilirubin, Total 0.3 0.0 - 1.2 mg/dL     ALT 18 10 - 52 U/L   Protime-INR   Result Value Ref Range    Protime 11.5 9.8 - 12.8 seconds    INR 1.0 0.9 - 1.1   APTT   Result Value Ref Range    aPTT 28 27 - 38 seconds   Type and Screen   Result Value Ref Range    ABO TYPE O     Rh TYPE NEG     ANTIBODY SCREEN NEG    CBC   Result Value Ref Range    WBC 8.4 4.4 - 11.3 x10*3/uL    nRBC 0.0 0.0 - 0.0 /100 WBCs    RBC 2.58 (L) 4.50 - 5.90 x10*6/uL    Hemoglobin 8.2 (L) 13.5 - 17.5 g/dL    Hematocrit 27.7 (L) 41.0 - 52.0 %     (H) 80 - 100 fL    MCH 31.8 26.0 - 34.0 pg    MCHC 29.6 (L) 32.0 - 36.0 g/dL    RDW 14.6 (H) 11.5 - 14.5 %    Platelets 168 150 - 450 x10*3/uL   Basic metabolic panel   Result Value Ref Range    Glucose 241 (H) 74 - 99 mg/dL    Sodium 138 136 - 145 mmol/L    Potassium 3.8 3.5 - 5.3 mmol/L    Chloride 102 98 - 107 mmol/L    Bicarbonate 24 21 - 32 mmol/L    Anion Gap 16 10 - 20 mmol/L    Urea Nitrogen 70 (H) 6 - 23 mg/dL    Creatinine 4.80 (H) 0.50 - 1.30 mg/dL    eGFR 11 (L) >60 mL/min/1.73m*2    Calcium 8.3 (L) 8.6 - 10.3 mg/dL     CT head wo IV contrast    Result Date: 3/15/2024  Interpreted By:  Erasmo Noel, STUDY: CT HEAD WO IV CONTRAST; CT CERVICAL SPINE WO IV CONTRAST; CT THORACIC SPINE WO IV CONTRAST; CT LUMBAR SPINE WO IV CONTRAST;  3/15/2024 6:27 pm   INDICATION: Signs/Symptoms:fall, advanced age on plavix; Signs/Symptoms:fall, lumbar pain, axial loading. RO compression fracture   COMPARISON: November 23   ACCESSION NUMBER(S): [id number]; [id number]; [id number]; [id number]   ORDERING CLINICIAN: WAQAR LOPEZ   TECHNIQUE: Axial noncontrast CT images of head with coronal and sagittal reconstructed images. Axial noncontrast CT images of the cervical thoracic and lumbar spine with coronal and sagittal reconstructed images.   FINDINGS: CT HEAD:   BRAIN PARENCHYMA:  No evidence of acute intraparenchymal hemorrhage or parenchymal evidence of acute large territory ischemic infarct. No mass-effect, midline shift or  effacement of cerebral sulci. Gray-white matter distinction is preserved. Mild global volume loss and chronic small vessel ischemic changes. The appearance is similar   VENTRICLES and EXTRA-AXIAL SPACES:  No acute extra-axial or intraventricular hemorrhage. Ventricles and sulci are age-concordant.   PARANASAL SINUSES/MASTOIDS:  No hemorrhage or air-fluid levels within the visualized paranasal sinuses. The mastoid air cells are well-aerated.   CALVARIUM/ORBITS:  No skull fracture.  The orbits and globes are intact to the extent visualized.   Robust vascular calcification.     CT CERVICAL SPINE: C3-T1 posterior spinal fusion with decompression. Prevertebral soft tissue within normal limits. Scattered multilevel degenerative disc disease. No evidence of acute cervical spine fracture.   CT thoracic spine: Multilevel degenerative disc disease detected in the thoracic spine. No evidence of acute thoracic spine fracture.   Features of pulmonary fibrosis with honeycombing seen in the lung bases   Heart size is enlarged. Robust coronary calcification. No pericardial effusion.   Enlarged main pulmonary artery compatible with pulmonary arterial hypertension.   No evidence of acute lumbar spine fracture. Scattered degenerative changes most notable at L3-L4 and L4-L5 with disc osteophyte complexes. Facet arthropathy detected.   No evidence of aneurysm of the abdominal aorta. Robust vascular calcification peritoneal catheter terminates in the pelvis.       No evidence of acute cervical, thoracic, or lumbar spine fracture. Long segment decompression of the posterior aspect of the cervical spine from C3 through T1 not measurably changed.   Multilevel degenerative disc disease is seen. Degree of degenerative disease especially in lumbar spine does limit sensitivity. If there is severe pain, consider further evaluation with lumbar spine MRI   Senescent changes seen in the brain. No evidence of acute intracranial hemorrhage   Signed  by: Erasmo Noel 3/15/2024 6:55 PM Dictation workstation:   [id number]    CT cervical spine wo IV contrast    Result Date: 3/15/2024  Interpreted By:  Erasmo Noel, STUDY: CT HEAD WO IV CONTRAST; CT CERVICAL SPINE WO IV CONTRAST; CT THORACIC SPINE WO IV CONTRAST; CT LUMBAR SPINE WO IV CONTRAST;  3/15/2024 6:27 pm   INDICATION: Signs/Symptoms:fall, advanced age on plavix; Signs/Symptoms:fall, lumbar pain, axial loading. RO compression fracture   COMPARISON: November 23   ACCESSION NUMBER(S): [id number]; [id number]; [id number]; [id number]   ORDERING CLINICIAN: WAQAR LOPEZ   TECHNIQUE: Axial noncontrast CT images of head with coronal and sagittal reconstructed images. Axial noncontrast CT images of the cervical thoracic and lumbar spine with coronal and sagittal reconstructed images.   FINDINGS: CT HEAD:   BRAIN PARENCHYMA:  No evidence of acute intraparenchymal hemorrhage or parenchymal evidence of acute large territory ischemic infarct. No mass-effect, midline shift or effacement of cerebral sulci. Gray-white matter distinction is preserved. Mild global volume loss and chronic small vessel ischemic changes. The appearance is similar   VENTRICLES and EXTRA-AXIAL SPACES:  No acute extra-axial or intraventricular hemorrhage. Ventricles and sulci are age-concordant.   PARANASAL SINUSES/MASTOIDS:  No hemorrhage or air-fluid levels within the visualized paranasal sinuses. The mastoid air cells are well-aerated.   CALVARIUM/ORBITS:  No skull fracture.  The orbits and globes are intact to the extent visualized.   Robust vascular calcification.     CT CERVICAL SPINE: C3-T1 posterior spinal fusion with decompression. Prevertebral soft tissue within normal limits. Scattered multilevel degenerative disc disease. No evidence of acute cervical spine fracture.   CT thoracic spine: Multilevel degenerative disc disease detected in the thoracic spine. No evidence of acute thoracic spine fracture.   Features of  pulmonary fibrosis with honeycombing seen in the lung bases   Heart size is enlarged. Robust coronary calcification. No pericardial effusion.   Enlarged main pulmonary artery compatible with pulmonary arterial hypertension.   No evidence of acute lumbar spine fracture. Scattered degenerative changes most notable at L3-L4 and L4-L5 with disc osteophyte complexes. Facet arthropathy detected.   No evidence of aneurysm of the abdominal aorta. Robust vascular calcification peritoneal catheter terminates in the pelvis.       No evidence of acute cervical, thoracic, or lumbar spine fracture. Long segment decompression of the posterior aspect of the cervical spine from C3 through T1 not measurably changed.   Multilevel degenerative disc disease is seen. Degree of degenerative disease especially in lumbar spine does limit sensitivity. If there is severe pain, consider further evaluation with lumbar spine MRI   Senescent changes seen in the brain. No evidence of acute intracranial hemorrhage   Signed by: Erasmo Noel 3/15/2024 6:55 PM Dictation workstation:   [id number]    CT thoracic spine wo IV contrast    Result Date: 3/15/2024  Interpreted By:  Erasmo Noel, STUDY: CT HEAD WO IV CONTRAST; CT CERVICAL SPINE WO IV CONTRAST; CT THORACIC SPINE WO IV CONTRAST; CT LUMBAR SPINE WO IV CONTRAST;  3/15/2024 6:27 pm   INDICATION: Signs/Symptoms:fall, advanced age on plavix; Signs/Symptoms:fall, lumbar pain, axial loading. RO compression fracture   COMPARISON: November 23   ACCESSION NUMBER(S): [id number]; [id number]; [id number]; [id number]   ORDERING CLINICIAN: WAQAR LOPEZ   TECHNIQUE: Axial noncontrast CT images of head with coronal and sagittal reconstructed images. Axial noncontrast CT images of the cervical thoracic and lumbar spine with coronal and sagittal reconstructed images.   FINDINGS: CT HEAD:   BRAIN PARENCHYMA:  No evidence of acute intraparenchymal hemorrhage or parenchymal evidence of acute  large territory ischemic infarct. No mass-effect, midline shift or effacement of cerebral sulci. Gray-white matter distinction is preserved. Mild global volume loss and chronic small vessel ischemic changes. The appearance is similar   VENTRICLES and EXTRA-AXIAL SPACES:  No acute extra-axial or intraventricular hemorrhage. Ventricles and sulci are age-concordant.   PARANASAL SINUSES/MASTOIDS:  No hemorrhage or air-fluid levels within the visualized paranasal sinuses. The mastoid air cells are well-aerated.   CALVARIUM/ORBITS:  No skull fracture.  The orbits and globes are intact to the extent visualized.   Robust vascular calcification.     CT CERVICAL SPINE: C3-T1 posterior spinal fusion with decompression. Prevertebral soft tissue within normal limits. Scattered multilevel degenerative disc disease. No evidence of acute cervical spine fracture.   CT thoracic spine: Multilevel degenerative disc disease detected in the thoracic spine. No evidence of acute thoracic spine fracture.   Features of pulmonary fibrosis with honeycombing seen in the lung bases   Heart size is enlarged. Robust coronary calcification. No pericardial effusion.   Enlarged main pulmonary artery compatible with pulmonary arterial hypertension.   No evidence of acute lumbar spine fracture. Scattered degenerative changes most notable at L3-L4 and L4-L5 with disc osteophyte complexes. Facet arthropathy detected.   No evidence of aneurysm of the abdominal aorta. Robust vascular calcification peritoneal catheter terminates in the pelvis.       No evidence of acute cervical, thoracic, or lumbar spine fracture. Long segment decompression of the posterior aspect of the cervical spine from C3 through T1 not measurably changed.   Multilevel degenerative disc disease is seen. Degree of degenerative disease especially in lumbar spine does limit sensitivity. If there is severe pain, consider further evaluation with lumbar spine MRI   Senescent changes seen in  the brain. No evidence of acute intracranial hemorrhage   Signed by: Erasmo Noel 3/15/2024 6:55 PM Dictation workstation:   [id number]    CT lumbar spine wo IV contrast    Result Date: 3/15/2024  Interpreted By:  Erasmo Noel, STUDY: CT HEAD WO IV CONTRAST; CT CERVICAL SPINE WO IV CONTRAST; CT THORACIC SPINE WO IV CONTRAST; CT LUMBAR SPINE WO IV CONTRAST;  3/15/2024 6:27 pm   INDICATION: Signs/Symptoms:fall, advanced age on plavix; Signs/Symptoms:fall, lumbar pain, axial loading. RO compression fracture   COMPARISON: November 23   ACCESSION NUMBER(S): [id number]; [id number]; [id number]; [id number]   ORDERING CLINICIAN: WAQAR LOPEZ   TECHNIQUE: Axial noncontrast CT images of head with coronal and sagittal reconstructed images. Axial noncontrast CT images of the cervical thoracic and lumbar spine with coronal and sagittal reconstructed images.   FINDINGS: CT HEAD:   BRAIN PARENCHYMA:  No evidence of acute intraparenchymal hemorrhage or parenchymal evidence of acute large territory ischemic infarct. No mass-effect, midline shift or effacement of cerebral sulci. Gray-white matter distinction is preserved. Mild global volume loss and chronic small vessel ischemic changes. The appearance is similar   VENTRICLES and EXTRA-AXIAL SPACES:  No acute extra-axial or intraventricular hemorrhage. Ventricles and sulci are age-concordant.   PARANASAL SINUSES/MASTOIDS:  No hemorrhage or air-fluid levels within the visualized paranasal sinuses. The mastoid air cells are well-aerated.   CALVARIUM/ORBITS:  No skull fracture.  The orbits and globes are intact to the extent visualized.   Robust vascular calcification.     CT CERVICAL SPINE: C3-T1 posterior spinal fusion with decompression. Prevertebral soft tissue within normal limits. Scattered multilevel degenerative disc disease. No evidence of acute cervical spine fracture.   CT thoracic spine: Multilevel degenerative disc disease detected in the thoracic  spine. No evidence of acute thoracic spine fracture.   Features of pulmonary fibrosis with honeycombing seen in the lung bases   Heart size is enlarged. Robust coronary calcification. No pericardial effusion.   Enlarged main pulmonary artery compatible with pulmonary arterial hypertension.   No evidence of acute lumbar spine fracture. Scattered degenerative changes most notable at L3-L4 and L4-L5 with disc osteophyte complexes. Facet arthropathy detected.   No evidence of aneurysm of the abdominal aorta. Robust vascular calcification peritoneal catheter terminates in the pelvis.       No evidence of acute cervical, thoracic, or lumbar spine fracture. Long segment decompression of the posterior aspect of the cervical spine from C3 through T1 not measurably changed.   Multilevel degenerative disc disease is seen. Degree of degenerative disease especially in lumbar spine does limit sensitivity. If there is severe pain, consider further evaluation with lumbar spine MRI   Senescent changes seen in the brain. No evidence of acute intracranial hemorrhage   Signed by: Erasmo Noel 3/15/2024 6:55 PM Dictation workstation:   [id number]    XR elbow left 1-2 views    Result Date: 3/15/2024  Interpreted By:  Olive Sr, STUDY: Left elbow, 2 views.   INDICATION: Signs/Symptoms:fall, left elbow pain.   COMPARISON: None.   ACCESSION NUMBER(S): [id number]   ORDERING CLINICIAN: WAQAR LOPEZ   FINDINGS: No acute fracture or malalignment. No elbow joint effusion or abnormal fat pad elevation. No significant degenerative changes. Heterotopic ossification adjacent to the lateral humeral condyle measuring 7 mm.       1. No acute fracture or malalignment of the left elbow.   MACRO: None.   Signed by: Olive Sr 3/15/2024 6:09 PM Dictation workstation:   [id number]    XR hip left with pelvis when performed 2 or 3 views    Result Date: 3/15/2024  Interpreted By:  Olive Sr, STUDY: Single view pelvis. Left  hip, two views.   INDICATION: Signs/Symptoms:L hip fracture.   COMPARISON: None.   ACCESSION NUMBER(S): [id number]   ORDERING CLINICIAN: WAQAR LOPEZ   FINDINGS: There is a acute impacted comminuted mildly displaced intertrochanteric left proximal femoral fracture. Moderate bilateral hip joint degenerative changes. Advanced lumbar spine degenerative changes. Osteopenia. Bilateral femoral artery vascular calcifications.       1. Intertrochanteric left proximal femoral fracture.   MACRO: None.   Signed by: Olive Sr 3/15/2024 6:08 PM Dictation workstation:   [id number]     Assessment/Plan     Left femur intratrochanteric fracture  -plan for left TFN on 3/17 in am, Dr. Donna Thomas  -bedrest  -NPO after midnight  -pt is medically optimized per hospitalist  -T/S completed  -ancef on call to OR  -nephrology on consult for recommendations for HD for ESRD         Julia Little PA-C      Agree with yolanda. We reviewed the patient's injury.  We discussed medical risk factors related to hip fractures including DVT, PE, significant cardiac and pulmonary events, including pneumonia, stroke, cardiac ischemia, prolonged ventilation and the possibility of mortality.     We discussed the importance of early mobilization and role of surgery for both functional improvement as well as pain management.  We discussed that despite the risk of medical complications the risk is even higher with non-surgical management and the patient is limited to bed rest (risk of DVT, pressure sores, pneumonia).   Donna Kessler MD

## 2024-03-16 NOTE — PROGRESS NOTES
Formal consult note to follow.  Seen and examined this morning.  Left intertrochanteric hip fracture.  He took Plavix yesterday morning. Plavix needs HELD. Spoke to RN who is reaching out to medicine team. Plan for operative fixation tomorrow at 8 AM.  Consent obtained.    Donna Kessler MD

## 2024-03-16 NOTE — NURSING NOTE
Pt.  Received  from  ortho  into  room 3114.   Pt.  Scheduled  for  surgery  tomorrow  am  and  peritoneal  dialysis  tonight.

## 2024-03-17 ENCOUNTER — ANESTHESIA (OUTPATIENT)
Dept: OPERATING ROOM | Facility: HOSPITAL | Age: 84
DRG: 480 | End: 2024-03-17
Payer: MEDICARE

## 2024-03-17 ENCOUNTER — ANESTHESIA EVENT (OUTPATIENT)
Dept: OPERATING ROOM | Facility: HOSPITAL | Age: 84
DRG: 480 | End: 2024-03-17
Payer: MEDICARE

## 2024-03-17 ENCOUNTER — APPOINTMENT (OUTPATIENT)
Dept: RADIOLOGY | Facility: HOSPITAL | Age: 84
DRG: 480 | End: 2024-03-17
Payer: MEDICARE

## 2024-03-17 LAB
ALBUMIN SERPL BCP-MCNC: 2.9 G/DL (ref 3.4–5)
ANION GAP SERPL CALC-SCNC: 14 MMOL/L (ref 10–20)
ATRIAL RATE: 56 BPM
BUN SERPL-MCNC: 68 MG/DL (ref 6–23)
CALCIUM SERPL-MCNC: 8.5 MG/DL (ref 8.6–10.3)
CHLORIDE SERPL-SCNC: 99 MMOL/L (ref 98–107)
CO2 SERPL-SCNC: 27 MMOL/L (ref 21–32)
CREAT SERPL-MCNC: 4.94 MG/DL (ref 0.5–1.3)
EGFRCR SERPLBLD CKD-EPI 2021: 11 ML/MIN/1.73M*2
ERYTHROCYTE [DISTWIDTH] IN BLOOD BY AUTOMATED COUNT: 15 % (ref 11.5–14.5)
GLUCOSE BLD MANUAL STRIP-MCNC: 150 MG/DL (ref 74–99)
GLUCOSE BLD MANUAL STRIP-MCNC: 152 MG/DL (ref 74–99)
GLUCOSE BLD MANUAL STRIP-MCNC: 171 MG/DL (ref 74–99)
GLUCOSE BLD MANUAL STRIP-MCNC: 220 MG/DL (ref 74–99)
GLUCOSE SERPL-MCNC: 227 MG/DL (ref 74–99)
HCT VFR BLD AUTO: 26.7 % (ref 41–52)
HGB BLD-MCNC: 8.2 G/DL (ref 13.5–17.5)
MAGNESIUM SERPL-MCNC: 1.83 MG/DL (ref 1.6–2.4)
MCH RBC QN AUTO: 32.3 PG (ref 26–34)
MCHC RBC AUTO-ENTMCNC: 30.7 G/DL (ref 32–36)
MCV RBC AUTO: 105 FL (ref 80–100)
NRBC BLD-RTO: 0 /100 WBCS (ref 0–0)
P AXIS: 43 DEGREES
P OFFSET: 182 MS
P ONSET: 123 MS
PHOSPHATE SERPL-MCNC: 5.4 MG/DL (ref 2.5–4.9)
PLATELET # BLD AUTO: 170 X10*3/UL (ref 150–450)
POTASSIUM SERPL-SCNC: 3.9 MMOL/L (ref 3.5–5.3)
PR INTERVAL: 178 MS
Q ONSET: 212 MS
QRS COUNT: 9 BEATS
QRS DURATION: 86 MS
QT INTERVAL: 436 MS
QTC CALCULATION(BAZETT): 420 MS
QTC FREDERICIA: 426 MS
R AXIS: -40 DEGREES
RBC # BLD AUTO: 2.54 X10*6/UL (ref 4.5–5.9)
SODIUM SERPL-SCNC: 136 MMOL/L (ref 136–145)
T AXIS: -10 DEGREES
T OFFSET: 430 MS
VENTRICULAR RATE: 56 BPM
WBC # BLD AUTO: 8.7 X10*3/UL (ref 4.4–11.3)

## 2024-03-17 PROCEDURE — 2500000004 HC RX 250 GENERAL PHARMACY W/ HCPCS (ALT 636 FOR OP/ED): Performed by: NURSE ANESTHETIST, CERTIFIED REGISTERED

## 2024-03-17 PROCEDURE — 7100000002 HC RECOVERY ROOM TIME - EACH INCREMENTAL 1 MINUTE: Performed by: STUDENT IN AN ORGANIZED HEALTH CARE EDUCATION/TRAINING PROGRAM

## 2024-03-17 PROCEDURE — 99232 SBSQ HOSP IP/OBS MODERATE 35: CPT | Performed by: INTERNAL MEDICINE

## 2024-03-17 PROCEDURE — 2500000001 HC RX 250 WO HCPCS SELF ADMINISTERED DRUGS (ALT 637 FOR MEDICARE OP): Performed by: STUDENT IN AN ORGANIZED HEALTH CARE EDUCATION/TRAINING PROGRAM

## 2024-03-17 PROCEDURE — 2500000001 HC RX 250 WO HCPCS SELF ADMINISTERED DRUGS (ALT 637 FOR MEDICARE OP): Performed by: INTERNAL MEDICINE

## 2024-03-17 PROCEDURE — 2500000004 HC RX 250 GENERAL PHARMACY W/ HCPCS (ALT 636 FOR OP/ED): Performed by: STUDENT IN AN ORGANIZED HEALTH CARE EDUCATION/TRAINING PROGRAM

## 2024-03-17 PROCEDURE — 3600000004 HC OR TIME - INITIAL BASE CHARGE - PROCEDURE LEVEL FOUR: Performed by: STUDENT IN AN ORGANIZED HEALTH CARE EDUCATION/TRAINING PROGRAM

## 2024-03-17 PROCEDURE — A27244 PR TREAT INTER/SUBTROCH FX,W/PLATE/SCREW: Performed by: ANESTHESIOLOGY

## 2024-03-17 PROCEDURE — 85027 COMPLETE CBC AUTOMATED: CPT | Performed by: INTERNAL MEDICINE

## 2024-03-17 PROCEDURE — C1769 GUIDE WIRE: HCPCS | Performed by: STUDENT IN AN ORGANIZED HEALTH CARE EDUCATION/TRAINING PROGRAM

## 2024-03-17 PROCEDURE — 2720000007 HC OR 272 NO HCPCS: Performed by: STUDENT IN AN ORGANIZED HEALTH CARE EDUCATION/TRAINING PROGRAM

## 2024-03-17 PROCEDURE — 3700000002 HC GENERAL ANESTHESIA TIME - EACH INCREMENTAL 1 MINUTE: Performed by: STUDENT IN AN ORGANIZED HEALTH CARE EDUCATION/TRAINING PROGRAM

## 2024-03-17 PROCEDURE — 2780000003 HC OR 278 NO HCPCS: Performed by: STUDENT IN AN ORGANIZED HEALTH CARE EDUCATION/TRAINING PROGRAM

## 2024-03-17 PROCEDURE — 1200000002 HC GENERAL ROOM WITH TELEMETRY DAILY

## 2024-03-17 PROCEDURE — A6213 FOAM DRG >16<=48 SQ IN W/BDR: HCPCS | Performed by: STUDENT IN AN ORGANIZED HEALTH CARE EDUCATION/TRAINING PROGRAM

## 2024-03-17 PROCEDURE — 0QS734Z REPOSITION LEFT UPPER FEMUR WITH INTERNAL FIXATION DEVICE, PERCUTANEOUS APPROACH: ICD-10-PCS | Performed by: STUDENT IN AN ORGANIZED HEALTH CARE EDUCATION/TRAINING PROGRAM

## 2024-03-17 PROCEDURE — 2500000002 HC RX 250 W HCPCS SELF ADMINISTERED DRUGS (ALT 637 FOR MEDICARE OP, ALT 636 FOR OP/ED): Performed by: STUDENT IN AN ORGANIZED HEALTH CARE EDUCATION/TRAINING PROGRAM

## 2024-03-17 PROCEDURE — 82947 ASSAY GLUCOSE BLOOD QUANT: CPT

## 2024-03-17 PROCEDURE — 7100000001 HC RECOVERY ROOM TIME - INITIAL BASE CHARGE: Performed by: STUDENT IN AN ORGANIZED HEALTH CARE EDUCATION/TRAINING PROGRAM

## 2024-03-17 PROCEDURE — 80069 RENAL FUNCTION PANEL: CPT | Performed by: INTERNAL MEDICINE

## 2024-03-17 PROCEDURE — 2500000005 HC RX 250 GENERAL PHARMACY W/O HCPCS: Performed by: NURSE ANESTHETIST, CERTIFIED REGISTERED

## 2024-03-17 PROCEDURE — 3700000001 HC GENERAL ANESTHESIA TIME - INITIAL BASE CHARGE: Performed by: STUDENT IN AN ORGANIZED HEALTH CARE EDUCATION/TRAINING PROGRAM

## 2024-03-17 PROCEDURE — 27245 TREAT THIGH FRACTURE: CPT | Performed by: STUDENT IN AN ORGANIZED HEALTH CARE EDUCATION/TRAINING PROGRAM

## 2024-03-17 PROCEDURE — 3600000009 HC OR TIME - EACH INCREMENTAL 1 MINUTE - PROCEDURE LEVEL FOUR: Performed by: STUDENT IN AN ORGANIZED HEALTH CARE EDUCATION/TRAINING PROGRAM

## 2024-03-17 PROCEDURE — 99100 ANES PT EXTEME AGE<1 YR&>70: CPT | Performed by: ANESTHESIOLOGY

## 2024-03-17 PROCEDURE — 2500000002 HC RX 250 W HCPCS SELF ADMINISTERED DRUGS (ALT 637 FOR MEDICARE OP, ALT 636 FOR OP/ED): Performed by: INTERNAL MEDICINE

## 2024-03-17 PROCEDURE — C1713 ANCHOR/SCREW BN/BN,TIS/BN: HCPCS | Performed by: STUDENT IN AN ORGANIZED HEALTH CARE EDUCATION/TRAINING PROGRAM

## 2024-03-17 PROCEDURE — A27244 PR TREAT INTER/SUBTROCH FX,W/PLATE/SCREW: Performed by: NURSE ANESTHETIST, CERTIFIED REGISTERED

## 2024-03-17 PROCEDURE — 83735 ASSAY OF MAGNESIUM: CPT | Performed by: INTERNAL MEDICINE

## 2024-03-17 DEVICE — HELICAL BLADES, TFNA FENESTRATED, 105MM, STERILE: Type: IMPLANTABLE DEVICE | Site: HIP | Status: FUNCTIONAL

## 2024-03-17 DEVICE — NAIL, TFN ADV SHORT, 170MML, DIAMETER 10, 130 DEG: Type: IMPLANTABLE DEVICE | Site: HIP | Status: FUNCTIONAL

## 2024-03-17 DEVICE — SCREW, LOCKING 5.0MM X 38MM TI STERILE: Type: IMPLANTABLE DEVICE | Site: HIP | Status: FUNCTIONAL

## 2024-03-17 RX ORDER — LABETALOL HYDROCHLORIDE 5 MG/ML
5 INJECTION, SOLUTION INTRAVENOUS
Status: DISCONTINUED | OUTPATIENT
Start: 2024-03-17 | End: 2024-03-17 | Stop reason: HOSPADM

## 2024-03-17 RX ORDER — ROCURONIUM BROMIDE 10 MG/ML
INJECTION, SOLUTION INTRAVENOUS AS NEEDED
Status: DISCONTINUED | OUTPATIENT
Start: 2024-03-17 | End: 2024-03-17

## 2024-03-17 RX ORDER — PROPOFOL 10 MG/ML
INJECTION, EMULSION INTRAVENOUS AS NEEDED
Status: DISCONTINUED | OUTPATIENT
Start: 2024-03-17 | End: 2024-03-17

## 2024-03-17 RX ORDER — DIPHENHYDRAMINE HYDROCHLORIDE 50 MG/ML
12.5 INJECTION INTRAMUSCULAR; INTRAVENOUS ONCE AS NEEDED
Status: DISCONTINUED | OUTPATIENT
Start: 2024-03-17 | End: 2024-03-17 | Stop reason: HOSPADM

## 2024-03-17 RX ORDER — HYDROMORPHONE HYDROCHLORIDE 1 MG/ML
1 INJECTION, SOLUTION INTRAMUSCULAR; INTRAVENOUS; SUBCUTANEOUS EVERY 5 MIN PRN
Status: DISCONTINUED | OUTPATIENT
Start: 2024-03-17 | End: 2024-03-17 | Stop reason: HOSPADM

## 2024-03-17 RX ORDER — PHENYLEPHRINE HCL IN 0.9% NACL 1 MG/10 ML
SYRINGE (ML) INTRAVENOUS AS NEEDED
Status: DISCONTINUED | OUTPATIENT
Start: 2024-03-17 | End: 2024-03-17

## 2024-03-17 RX ORDER — LIDOCAINE HYDROCHLORIDE 20 MG/ML
INJECTION, SOLUTION INFILTRATION; PERINEURAL AS NEEDED
Status: DISCONTINUED | OUTPATIENT
Start: 2024-03-17 | End: 2024-03-17

## 2024-03-17 RX ORDER — FENTANYL CITRATE 50 UG/ML
INJECTION, SOLUTION INTRAMUSCULAR; INTRAVENOUS AS NEEDED
Status: DISCONTINUED | OUTPATIENT
Start: 2024-03-17 | End: 2024-03-17

## 2024-03-17 RX ORDER — CEFAZOLIN SODIUM 2 G/50ML
2 SOLUTION INTRAVENOUS ONCE
Status: CANCELLED | OUTPATIENT
Start: 2024-03-17 | End: 2024-03-17

## 2024-03-17 RX ORDER — ALBUTEROL SULFATE 0.83 MG/ML
2.5 SOLUTION RESPIRATORY (INHALATION)
Status: DISCONTINUED | OUTPATIENT
Start: 2024-03-17 | End: 2024-03-17 | Stop reason: HOSPADM

## 2024-03-17 RX ORDER — HYDRALAZINE HYDROCHLORIDE 20 MG/ML
5 INJECTION INTRAMUSCULAR; INTRAVENOUS EVERY 30 MIN PRN
Status: DISCONTINUED | OUTPATIENT
Start: 2024-03-17 | End: 2024-03-17 | Stop reason: HOSPADM

## 2024-03-17 RX ORDER — MIDAZOLAM HYDROCHLORIDE 1 MG/ML
1 INJECTION, SOLUTION INTRAMUSCULAR; INTRAVENOUS ONCE AS NEEDED
Status: DISCONTINUED | OUTPATIENT
Start: 2024-03-17 | End: 2024-03-17 | Stop reason: HOSPADM

## 2024-03-17 RX ORDER — METOCLOPRAMIDE HYDROCHLORIDE 5 MG/ML
10 INJECTION INTRAMUSCULAR; INTRAVENOUS ONCE AS NEEDED
Status: DISCONTINUED | OUTPATIENT
Start: 2024-03-17 | End: 2024-03-17 | Stop reason: HOSPADM

## 2024-03-17 RX ORDER — TRANEXAMIC ACID 650 MG/1
1950 TABLET ORAL ONCE
Status: CANCELLED | OUTPATIENT
Start: 2024-03-17 | End: 2024-03-17

## 2024-03-17 RX ORDER — SODIUM CHLORIDE, SODIUM LACTATE, POTASSIUM CHLORIDE, CALCIUM CHLORIDE 600; 310; 30; 20 MG/100ML; MG/100ML; MG/100ML; MG/100ML
100 INJECTION, SOLUTION INTRAVENOUS CONTINUOUS
Status: DISCONTINUED | OUTPATIENT
Start: 2024-03-17 | End: 2024-03-17 | Stop reason: HOSPADM

## 2024-03-17 RX ORDER — HEPARIN SODIUM 5000 [USP'U]/ML
5000 INJECTION, SOLUTION INTRAVENOUS; SUBCUTANEOUS EVERY 8 HOURS
Status: CANCELLED | OUTPATIENT
Start: 2024-03-17

## 2024-03-17 RX ORDER — CEFAZOLIN SODIUM 2 G/100ML
INJECTION, SOLUTION INTRAVENOUS AS NEEDED
Status: DISCONTINUED | OUTPATIENT
Start: 2024-03-17 | End: 2024-03-17

## 2024-03-17 RX ORDER — SODIUM CHLORIDE, SODIUM LACTATE, POTASSIUM CHLORIDE, CALCIUM CHLORIDE 600; 310; 30; 20 MG/100ML; MG/100ML; MG/100ML; MG/100ML
INJECTION, SOLUTION INTRAVENOUS CONTINUOUS PRN
Status: DISCONTINUED | OUTPATIENT
Start: 2024-03-17 | End: 2024-03-17

## 2024-03-17 RX ORDER — HYDROCODONE BITARTRATE AND ACETAMINOPHEN 5; 325 MG/1; MG/1
1 TABLET ORAL EVERY 4 HOURS PRN
Status: DISCONTINUED | OUTPATIENT
Start: 2024-03-17 | End: 2024-03-17 | Stop reason: HOSPADM

## 2024-03-17 RX ADMIN — ACETAMINOPHEN 975 MG: 325 TABLET ORAL at 22:13

## 2024-03-17 RX ADMIN — ONDANSETRON 4 MG: 2 INJECTION INTRAMUSCULAR; INTRAVENOUS at 22:13

## 2024-03-17 RX ADMIN — CEFAZOLIN SODIUM 2 G: 2 INJECTION, SOLUTION INTRAVENOUS at 08:42

## 2024-03-17 RX ADMIN — Medication 300 MCG: at 08:45

## 2024-03-17 RX ADMIN — HYDRALAZINE HYDROCHLORIDE 25 MG: 25 TABLET ORAL at 22:13

## 2024-03-17 RX ADMIN — INSULIN LISPRO 4 UNITS: 100 INJECTION, SOLUTION INTRAVENOUS; SUBCUTANEOUS at 07:54

## 2024-03-17 RX ADMIN — Medication 300 MCG: at 08:38

## 2024-03-17 RX ADMIN — SEVELAMER CARBONATE 0.8 G: 800 POWDER, FOR SUSPENSION ORAL at 16:35

## 2024-03-17 RX ADMIN — METOPROLOL SUCCINATE 50 MG: 50 TABLET, EXTENDED RELEASE ORAL at 07:47

## 2024-03-17 RX ADMIN — ACETAMINOPHEN 975 MG: 325 TABLET ORAL at 13:08

## 2024-03-17 RX ADMIN — FENTANYL CITRATE 25 MCG: 50 INJECTION, SOLUTION INTRAMUSCULAR; INTRAVENOUS at 09:13

## 2024-03-17 RX ADMIN — Medication 300 MCG: at 09:08

## 2024-03-17 RX ADMIN — PROPOFOL 100 MG: 10 INJECTION, EMULSION INTRAVENOUS at 08:32

## 2024-03-17 RX ADMIN — LIDOCAINE HYDROCHLORIDE 60 MG: 20 INJECTION, SOLUTION INFILTRATION; PERINEURAL at 08:32

## 2024-03-17 RX ADMIN — FENTANYL CITRATE 50 MCG: 50 INJECTION, SOLUTION INTRAMUSCULAR; INTRAVENOUS at 08:32

## 2024-03-17 RX ADMIN — ROCURONIUM BROMIDE 50 MG: 10 INJECTION INTRAVENOUS at 08:32

## 2024-03-17 RX ADMIN — SODIUM CHLORIDE, SODIUM LACTATE, CALCIUM CHLORIDE, MAGNESIUM CHLORIDE AND DEXTROSE: 1.5; 538; 448; 18.3; 5.08 INJECTION, SOLUTION INTRAPERITONEAL at 22:18

## 2024-03-17 RX ADMIN — INSULIN LISPRO 2 UNITS: 100 INJECTION, SOLUTION INTRAVENOUS; SUBCUTANEOUS at 16:37

## 2024-03-17 RX ADMIN — SODIUM CHLORIDE, POTASSIUM CHLORIDE, SODIUM LACTATE AND CALCIUM CHLORIDE: 600; 310; 30; 20 INJECTION, SOLUTION INTRAVENOUS at 08:42

## 2024-03-17 RX ADMIN — FENTANYL CITRATE 25 MCG: 50 INJECTION, SOLUTION INTRAMUSCULAR; INTRAVENOUS at 09:16

## 2024-03-17 RX ADMIN — TRAMADOL HYDROCHLORIDE 50 MG: 50 TABLET, COATED ORAL at 22:15

## 2024-03-17 RX ADMIN — HYDRALAZINE HYDROCHLORIDE 25 MG: 25 TABLET ORAL at 16:36

## 2024-03-17 RX ADMIN — APIXABAN 2.5 MG: 2.5 TABLET, FILM COATED ORAL at 13:08

## 2024-03-17 RX ADMIN — SODIUM BICARBONATE 650 MG: 650 TABLET ORAL at 22:14

## 2024-03-17 RX ADMIN — APIXABAN 2.5 MG: 2.5 TABLET, FILM COATED ORAL at 23:35

## 2024-03-17 SDOH — HEALTH STABILITY: MENTAL HEALTH: CURRENT SMOKER: 0

## 2024-03-17 ASSESSMENT — PAIN SCALES - GENERAL
PAINLEVEL_OUTOF10: 0 - NO PAIN
PAINLEVEL_OUTOF10: 5 - MODERATE PAIN

## 2024-03-17 ASSESSMENT — COGNITIVE AND FUNCTIONAL STATUS - GENERAL
EATING MEALS: A LOT
TURNING FROM BACK TO SIDE WHILE IN FLAT BAD: A LOT
DAILY ACTIVITIY SCORE: 12
PERSONAL GROOMING: A LOT
MOVING TO AND FROM BED TO CHAIR: A LOT
STANDING UP FROM CHAIR USING ARMS: A LOT
EATING MEALS: TOTAL
TURNING FROM BACK TO SIDE WHILE IN FLAT BAD: A LOT
CLIMB 3 TO 5 STEPS WITH RAILING: A LOT
DRESSING REGULAR LOWER BODY CLOTHING: A LOT
MOVING FROM LYING ON BACK TO SITTING ON SIDE OF FLAT BED WITH BEDRAILS: A LOT
HELP NEEDED FOR BATHING: A LOT
DRESSING REGULAR UPPER BODY CLOTHING: A LOT
TOILETING: A LOT
HELP NEEDED FOR BATHING: A LOT
WALKING IN HOSPITAL ROOM: A LOT
MOBILITY SCORE: 12
CLIMB 3 TO 5 STEPS WITH RAILING: A LOT
DRESSING REGULAR LOWER BODY CLOTHING: A LOT
MOVING FROM LYING ON BACK TO SITTING ON SIDE OF FLAT BED WITH BEDRAILS: A LOT
TOILETING: A LOT
MOVING TO AND FROM BED TO CHAIR: A LOT
DAILY ACTIVITIY SCORE: 11
DRESSING REGULAR UPPER BODY CLOTHING: A LOT
MOBILITY SCORE: 12
WALKING IN HOSPITAL ROOM: A LOT
STANDING UP FROM CHAIR USING ARMS: A LOT
PERSONAL GROOMING: A LOT

## 2024-03-17 ASSESSMENT — PAIN - FUNCTIONAL ASSESSMENT
PAIN_FUNCTIONAL_ASSESSMENT: 0-10
PAIN_FUNCTIONAL_ASSESSMENT: 0-10

## 2024-03-17 ASSESSMENT — PAIN DESCRIPTION - LOCATION: LOCATION: LEG

## 2024-03-17 ASSESSMENT — PAIN DESCRIPTION - ORIENTATION: ORIENTATION: LEFT

## 2024-03-17 NOTE — PROGRESS NOTES
INPATIENT NEPHROLOGY CONSULT PROGRESS NOTES    Patient Name: Salomon Chowdary   MRN: [id number]  CONSULTING SERVICE: Nephrology    Impression :   Expand All Collapse All    INPATIENT INITIAL NEPHROLOGY CONSULT     SERVICE DATE: 3/16/2024       SERVICE TIME:  2:51 PM     REASON FOR CONSULT: For evaluation management of advanced renal failure.  REQUESTING PHYSICIAN: Dr. Josh Stout  PRIMARY CARE PHYSICIAN: Enrique Ansari MD     CC:        Subjective   Mr. Chowdary is a 83 y.o. male with past medical history significant for CAD, hypertension, ESRD on PD since 03/23, primary nephrologist Dr. Khan, presented to the hospital following a fall and the patient was found to have left proximal intertrochanteric femur fracture and plan for surgery.  Nephrology consulted for management of advanced renal failure.  Patient has been on peritoneal dialysis since 3/23 and denies any complaints currently other than left hip pain.  Denies any chest pain, shortness of breath, nausea, vomiting, abdominal pain, headache, dizziness.     Medical History        Past Medical History:   Diagnosis Date    Chronic kidney disease       on PD    Diabetes mellitus (CMS/HCC)      Hypertension           Surgical History         Past Surgical History:   Procedure Laterality Date    CARDIAC CATHETERIZATION N/A 10/16/2023     Procedure: PCI;  Surgeon: Eusebio Hudson MD;  Location: [address] [facility];  Service: Cardiovascular;  Laterality: N/A;    CARPAL TUNNEL RELEASE   2018    CORONARY ARTERY BYPASS GRAFT        SPINAL FUSION   09/2015     7 fused vertebrae    TONSILLECTOMY   1945    US GUIDED PERCUTANEOUS BIOPSY RENAL LEFT Left 11/30/2022     US GUIDED PERCUTANEOUS BIOPSY RENAL LEFT 11/30/2022         Family History          Family History   Problem Relation Name Age of Onset    Cancer Brother        Diabetes type I Brother             Social History           Tobacco Use    Smoking status: Never    Smokeless tobacco:  Never   Substance Use Topics    Alcohol use: Never    Drug use: Never      Prescriptions Prior to Admission           Medications Prior to Admission   Medication Sig Dispense Refill Last Dose    acetaminophen (Tylenol) 325 mg tablet Take 2 tablets (650 mg) by mouth every 6 hours if needed for mild pain (1 - 3). 30 tablet 0 3/14/2024    amLODIPine (Norvasc) 10 mg tablet Take 1 tablet (10 mg) by mouth once daily.     3/15/2024    aspirin 81 mg EC tablet Take 1 tablet (81 mg) by mouth once daily. Do not start before October 18, 2023.     3/14/2024    atorvastatin (Lipitor) 40 mg tablet Take 1 tablet (40 mg) by mouth once daily at bedtime. (Patient not taking: Reported on 3/15/2024) 90 tablet 3 Not Taking    cholecalciferol (Vitamin D-3) 25 MCG (1000 UT) tablet Take 1 tablet (25 mcg) by mouth once daily.     3/14/2024    clopidogrel (Plavix) 75 mg tablet Take 1 tablet (75 mg) by mouth once daily for 362 doses. Do not start before October 18, 2023. 90 tablet 3 3/14/2024    DULoxetine (Cymbalta) 30 mg DR capsule Take 1 capsule (30 mg) by mouth once daily. Do not crush or chew.     3/14/2024    flash glucose sensor (FREESTYLE ANDRÉS 2 SENS[place] [facility] [place] Change every 2 weeks     3/14/2024    fluticasone (Flonase) 50 mcg/actuation nasal spray once daily as needed.     3/14/2024    gentamicin (Garamycin) 0.1 % cream Apply 1 Application topically once daily.     3/15/2024    glucosamine HCl 1,500 mg tablet Take 1 tablet by mouth 2 times a day.     3/15/2024    hydrALAZINE (Apresoline) 25 mg tablet TAKE 25 MG IN THE MORNING. TAKE 25 MG IN THE AFTERNOON. TAKE 50 MG IN THE EVENING. 270 tablet 0 3/15/2024    insulin detemir (Levemir U-100 Insulin) 100 unit/mL injection Inject 25 Units under the skin once daily. Take as directed per insulin instructions.     3/14/2024    insulin lispro (HumaLOG) 100 unit/mL injection Inject subcutaneously before meals TID, as per sliding scale: 1 unit if sugar is 151-200, 2 units if 201-250, 3 units if  "251-300, 4 units if 301-350, 5 units> 350. Total 15 units per day.     3/15/2024    LANCETS MISC 1 each once daily.     3/14/2024    levothyroxine (Tirosint) 50 mcg capsule Take 1 capsule (50 mcg) by mouth once daily in the morning. Take before meals.     3/15/2024    loratadine-pseudoephedrine (Claritin-D 24-hour)  mg 24 hr tablet Take 1 tablet by mouth once daily as needed for allergies. Do not crush, chew, or split.          metoprolol succinate XL (Toprol-XL) 50 mg 24 hr tablet Take 1 tablet (50 mg) by mouth once daily. Do not crush or chew. (Patient taking differently: Take 1 tablet (50 mg) by mouth 2 times a day. Do not crush or chew.) 30 tablet 2      min17/nettle/pumpkin/saw palme (PROSTATE THERAPY ORAL) Take by mouth.     3/14/2024    multivitamin with minerals (Daily Multivitamin-Minerals) tablet Take 1 tablet by mouth once daily.     3/15/2024    OneTouch Ultra Test strip TEST THREE TIMES DAILY.     3/14/2024    pen needle, diabetic 31 gauge x 1/4\" needle 1 each 2 times a day.     3/14/2024    RenaPlex-D 800 mcg-12.5 mg -2,000 unit tablet Take 1 tablet by mouth once daily.     3/15/2024    sennosides-docusate sodium (Diana-Colace) 8.6-50 mg tablet Take 2 tablets by mouth 2 times a day.     3/15/2024    sodium bicarbonate 650 mg tablet Take 1 tablet (650 mg) by mouth 2 times a day.     3/15/2024    torsemide (Demadex) 100 mg tablet Take 0.5 tablets (50 mg) by mouth once daily.     3/15/2024                    [unfilled]        Allergies as of 03/15/2024 - Reviewed 03/15/2024   Allergen Reaction Noted    Gabapentin Other 10/09/2015    Meperidine Nausea And Vomiting 11/15/2022    Opioids - morphine analogues Unknown, Nausea And Vomiting, and Nausea/vomiting 11/05/2015    Opioids-meperidine and related Nausea/vomiting 04/06/2017    Opium tincture Unknown 10/12/2022    Oxycodone GI Upset 10/20/2023    Tizanidine Unknown 10/13/2021         COMPLETE REVIEW OF SYSTEMS:    10 point review of systems negative " "except mentioned above.           Objective   PHYSICAL EXAM:   GENERAL: Alert, no distress, cooperative  SKIN: Skin color, texture, turgor normal. No rashes or lesions.  HEAD/SINUSES: No significant findings  EYES: PE  OROPHARYNX: oral mucosa moist. Oropharynx normal.  NECK: No jugulovenous distention, No carotid bruits, Supple  LUNGS: Lungs clear to auscultation, no wheeze, rhonchi, or rales  CARDIAC: Normal S1 and S2; no rubs, murmurs, or gallops  ABDOMEN: Abdomen soft, non-tender, BS normal, No masses. No abdominal bruits.  EXTREMITIES: Left femur fracture.    NEURO: No focal deficits. Able to move all 4 extremities.  PULSES: 2+ radial, 2 + femoral      Patient Vitals for the past 24 hrs:    BP Temp Temp src Pulse Resp SpO2 Height Weight   03/16/24 1219 164/72 36.1 °C (97 °F) Temporal 86 -- 96 % -- --   03/16/24 0800 164/72 36.4 °C (97.5 °F) Temporal 75 17 96 % -- --   03/16/24 0300 163/82 35.9 °C (96.6 °F) Temporal 66 16 96 % -- --   03/15/24 2304 156/84 35.2 °C (95.4 °F) Temporal 60 16 97 % 1.753 m (5' 9\") 76.7 kg (169 lb)   03/15/24 2215 -- -- -- 66 -- 97 % -- --   03/15/24 2200 157/70 -- -- 62 -- 96 % -- --   03/15/24 2145 -- -- -- 62 -- 96 % -- --   03/15/24 2130 162/74 -- -- 61 -- 96 % -- --   03/15/24 2115 -- -- -- 62 -- 96 % -- --   03/15/24 2100 154/67 -- -- 60 -- 96 % -- --   03/15/24 2045 -- -- -- 61 -- 97 % -- --   03/15/24 2030 151/68 -- -- 60 -- 96 % -- --   03/15/24 2015 -- -- -- 62 -- 97 % -- --   03/15/24 2000 161/73 -- -- 58 -- 97 % -- --   03/15/24 1945 -- -- -- 58 -- 97 % -- --   03/15/24 1930 167/76 -- -- 57 -- 97 % -- --   03/15/24 1915 -- -- -- 58 -- 97 % -- --   03/15/24 1900 -- -- -- 59 -- 96 % -- --   03/15/24 1835 140/66 -- -- -- -- -- -- --   03/15/24 1750 -- -- -- 57 -- -- -- --   03/15/24 1745 -- -- -- 56 -- 99 % -- --   03/15/24 1740 -- -- -- 57 -- 100 % -- --   03/15/24 1735 -- -- -- 56 -- 97 % -- --   03/15/24 1715 -- -- -- 57 -- 99 % -- --   03/15/24 1710 -- -- -- 56 -- 98 % -- " "--   03/15/24 1705 -- -- -- 58 -- 98 % -- --   03/15/24 1704 160/75 35.8 °C (96.4 °F) Temporal 58 18 98 % 1.753 m (5' 9\") 76.7 kg (169 lb)   03/15/24 1700 160/75 -- -- 60 -- 100 % -- --   03/15/24 1655 (!) 182/84 -- -- 59 -- 97 % -- --      Body mass index is 24.96 kg/m².     DATA:   Diagnostic tests reviewed for today's visit:    Most recent labs and imaging results.     CBC: @[id number](wbc,rbc,hb,hct,plt,mcv,mch,mpv,rdw)@  Coags: @UTMYCZU91(pt,inr,aptt)@  CMP: @YTCKSCP34(na,k,chlor,co2,bun,creat,gluc,tprot,ca,mg,albumin,tbili,alkphos,ALT,AST,anion)@  ABG's: @[id number](PH,PCO2,PO2,BE,HCO3,CO2CT,O2HB,COHB,MHGB,TEMP,PHTC,PCO2T,PO2T,O2AD)@  MG/PHOS: @AWAEZMT71(mg,p)@           Urobilinogen, Urine   Date Value Ref Range Status   10/01/2023 <2.0 <2.0 mg/dL Final         No components found for: \"PROTTIMED\", \"UALBCR\", \"UPROT\", \"CREAT\"     No results found for: \"CHOL\", \"HDL\", \"LDL\"           [unfilled]  [unfilled]        Mr. Salomon Chowdary is a 83-year-old male with past medical history of CAD, hypertension, ESRD on PD since 03/23, primary nephrologist Dr. Khan, presented to the hospital following a fall and the patient was found to have left proximal intertrochanteric femur fracture and plan for surgery.  Nephrology consulted for management of advanced renal failure.  Patient has been on peritoneal dialysis since 3/23 and denies any complaints currently other than left hip pain.  Denies any chest pain, shortness of breath, nausea, vomiting, abdominal pain, headache, dizziness.     1.  ESRD on PD.  2.  Hypertension.  3.   CAD status post CABG.  4.  Type 2 diabetes mellitus.  5.  Left proximal intertrochanteric femur fracture.  6.  Hypothyroidism.     Plan.  -Patient underwent a session of peritoneal dialysis overnight and tolerated fairly well.  Had ultrafiltration for 450 cc.  Patient is planned for session of peritoneal dialysis tonight.  PD prescription is a 6000 mL total fill volume, 3 exchanges, 2000 " fill volume, 8 hours, 1.5% dextrose bags.  -Hypertension: Blood pressure has been optimally controlled.  Patient is on amlodipine 10 mg daily, hydralazine 25 mg twice daily, torsemide 50 mg daily.  Continue hydralazine 25 mg 3 times daily.  -BMD: Continue phosphorus binders including sevelamer 800 mg 3 times daily.  -Underwent surgery for left femur fracture.          ================================================================  INTERVAL HPI: The patient was seen and examined. No acute event overnight.  Complains of constipation otherwise denies any other complaints.  Underwent surgery this morning.  PERTINENT ROS:   GENERAL: No fever/chills.  RESPIRATORY: Negative for cough, wheezing or shortness of breath.  CARDIOVASCULAR: Negative for chest pain or palpitations.  GI: Negative for nausea, vomiting,  Diarrhea, abdominal pain.    : Negative for dysuria and hematuria    MEDICATIONS:  Current active Inpatient Medication reviewed.   Current Facility-Administered Medications   Medication Dose Route Frequency Provider Last Rate Last Admin    acetaminophen (Tylenol) tablet 325 mg  325 mg oral q8h PRN Donna Kessler MD        acetaminophen (Tylenol) tablet 975 mg  975 mg oral TID Donna Kessler MD   975 mg at 03/17/24 1308    amLODIPine (Norvasc) tablet 10 mg  10 mg oral Daily Donna Kessler MD   10 mg at 03/16/24 0837    apixaban (Eliquis) tablet 2.5 mg  2.5 mg oral q12h Rick Covarrubias MD   2.5 mg at 03/17/24 1308    aspirin EC tablet 81 mg  81 mg oral Daily Donna Kessler MD   81 mg at 03/16/24 0837    benzocaine-menthol (Cepastat Sore Throat) 15-3.6 mg lozenge 1 lozenge  1 lozenge Mouth/Throat q2h PRN Donna Kessler MD        ceFAZolin in dextrose (iso-os) (Ancef) IVPB 2 g  2 g intravenous Once in OR Donna Kessler MD        cholecalciferol (Vitamin D-3) tablet 1,000 Units  1,000 Units oral Daily Donna Kessler MD   1,000 Units at  03/16/24 0837    [Held by provider] clopidogrel (Plavix) tablet 75 mg  75 mg oral Daily Donna Kessler MD        dextrose 1.5% - LOW calcium 2.5mEq/L 6,000 mL peritoneal dialysate   intraperitoneal q24h Donna Kessler MD        dextrose 1.5% - LOW calcium 2.5mEq/L 6,000 mL peritoneal dialysate   intraperitoneal q24h Donna Kessler MD        dextrose 1.5% - LOW calcium 2.5mEq/L 6,000 mL peritoneal dialysate   intraperitoneal q24h Donna Kessler MD        dextrose 1.5% - LOW calcium 2.5mEq/L 6,000 mL peritoneal dialysate   intraperitoneal q24h Donna Kessler MD   Given at 03/16/24 2113    dextrose 50 % injection 12.5 g  12.5 g intravenous q15 min PRN Donna Kessler MD        dextrose 50 % injection 25 g  25 g intravenous q15 min PRN Donna Kessler MD        DULoxetine (Cymbalta) DR capsule 30 mg  30 mg oral Daily Donna Kessler MD   30 mg at 03/16/24 0837    glucagon (Glucagen) injection 1 mg  1 mg intramuscular q15 min PRN Donna Kessler MD        guaiFENesin (Mucinex) 12 hr tablet 600 mg  600 mg oral q12h PRN Donna Kessler MD   600 mg at 03/16/24 1003    hydrALAZINE (Apresoline) tablet 25 mg  25 mg oral TID Donna Kessler MD   25 mg at 03/16/24 2111    insulin detemir (Levemir) injection 20 Units  20 Units subcutaneous Daily Donna Kessler MD        insulin lispro (HumaLOG) injection 0-10 Units  0-10 Units subcutaneous TID with meals Donna Kessler MD   4 Units at 03/17/24 0754    levothyroxine (Synthroid, Levoxyl) tablet 50 mcg  50 mcg oral Daily Donna Kessler MD   50 mcg at 03/16/24 0654    metoprolol succinate XL (Toprol-XL) 24 hr tablet 50 mg  50 mg oral Daily Donna Kessler MD   50 mg at 03/17/24 0747    multivitamin with minerals 1 tablet  1 tablet oral Daily Donna Kessler MD   1 tablet at 03/16/24 0837    ondansetron (Zofran) injection 4 mg  4 mg  "intravenous q8h PRN Donna Kessler MD   4 mg at 03/16/24 1555    polyethylene glycol (Glycolax, Miralax) packet 17 g  17 g oral Daily Donna Kessler MD   17 g at 03/16/24 0837    sevelamer carbonate (Renvela) packet 0.8 g  0.8 g oral TID with meals Donna Kessler MD   0.8 g at 03/16/24 1706    sodium bicarbonate tablet 650 mg  650 mg oral BID Donna Kessler MD   650 mg at 03/16/24 2111    torsemide (Demadex) tablet 50 mg  50 mg oral Daily Donna Kessler MD   50 mg at 03/16/24 0837    traMADol (Ultram) tablet 50 mg  50 mg oral q12h PRN Donna Kessler MD   50 mg at 03/16/24 1527       PHYSICAL EXAM:   /65 (BP Location: Right arm, Patient Position: Lying)   Pulse 64   Temp 36.5 °C (97.7 °F) (Temporal)   Resp 18   Ht 1.753 m (5' 9\")   Wt 76.6 kg (168 lb 15.7 oz)   SpO2 95%   BMI 24.95 kg/m²   Patient Vitals for the past 24 hrs:   BP   03/17/24 1130 139/65   03/17/24 1045 145/67   03/17/24 1030 149/75   03/17/24 0740 141/80   03/17/24 0000 122/58   03/16/24 2000 144/65   03/16/24 1810 114/61   03/16/24 1517 144/68       Intake/Output Summary (Last 24 hours) at 3/17/2024 1410  Last data filed at 3/17/2024 1050  Gross per 24 hour   Intake 530 ml   Output 900 ml   Net -370 ml     GENERAL: Alert, no distress, cooperative  SKIN: Skin color, texture, turgor normal. No rashes or lesions.  HEAD/SINUSES: No significant findings  EYES: PE  OROPHARYNX: oral mucosa moist. Oropharynx normal.  NECK: No jugulovenous distention, No carotid bruits, Supple  LUNGS: Lungs clear to auscultation, no wheeze, rhonchi, or rales  CARDIAC: Normal S1 and S2; no rubs, murmurs, or gallops  ABDOMEN: Abdomen soft, non-tender, BS normal, No masses. No abdominal bruits.  EXTREMITIES: Normal exam of the extremities.   NEURO: No focal deficits. Able to move all 4 extremities.  PULSES: 2+ radial, 2 + femoral    DATA:   Diagnostic tests reviewed for today's visit:    CBC: " "@[id number](wbc,rbc,hb,hct,plt,mcv,mch,mpv,rdw)@  Coags: @FJYQREY03(pt,inr,aptt)@  CMP: @CZTZBIZ67(na,k,chlor,co2,bun,creat,gluc,tprot,ca,mg,albumin,tbili,alkphos,ALT,AST,anion)@  ABG's: @[id number](PH,PCO2,PO2,BE,HCO3,CO2CT,O2HB,COHB,MHGB,TEMP,PHTC,PCO2T,PO2T,O2AD)@  MG/PHOS: @UYMXBWV97(mg,p)@    Urobilinogen, Urine   Date Value Ref Range Status   10/01/2023 <2.0 <2.0 mg/dL Final       No components found for: \"PROTTIMED\", \"UALBCR\", \"UPROT\", \"CREAT\"    No results found for: \"CHOL\", \"HDL\", \"LDL\"  IMAGING:  reviewed in  images    SIGNATURE: Clary Castañdea MD        "

## 2024-03-17 NOTE — ANESTHESIA PROCEDURE NOTES
Airway  Date/Time: 3/17/2024 8:37 AM  Urgency: elective    Airway not difficult    Staffing  Performed: attending   Authorized by: DINA Carreon    Performed by: DINA Carreon  Patient location during procedure: OR    Indications and Patient Condition  Indications for airway management: anesthesia and airway protection  Spontaneous Ventilation: absent  Sedation level: deep  Preoxygenated: yes  MILS maintained throughout  Mask difficulty assessment: 1 - vent by mask  No planned trial extubation    Final Airway Details  Final airway type: endotracheal airway      Successful airway: ETT  Cuffed: yes   Successful intubation technique: video laryngoscopy  Facilitating devices/methods: Bougie  Endotracheal tube insertion site: oral  Blade: Marciano  Blade size: #4  ETT size (mm): 7.0  Cormack-Lehane Classification: grade I - full view of glottis  Placement verified by: chest auscultation and capnometry   Measured from: lips  Number of attempts at approach: 1  Number of other approaches attempted: 0

## 2024-03-17 NOTE — ANESTHESIA PREPROCEDURE EVALUATION
"Patient: Salomon Chowdary \"Bill\"    Procedure Information       Date/Time: 03/17/24 0720    Procedure: Hip Fracture ORIF w/ Nail Trochanteric (Left: Hip)    Location: [facility] 09 / [facility]    Surgeons: Donna Kessler MD            Relevant Problems   Cardiovascular   (+) Atherosclerosis of native coronary artery of native heart without angina pectoris   (+) Breast pain   (+) Hypertension secondary to other renal disorders   (+) Mixed hyperlipidemia   (+) Other chest pain   (+) PAF (paroxysmal atrial fibrillation) (CMS/[facility])   (+) Peripheral vascular disease (CMS/[facility])   (+) Primary hypertension      Endocrine   (+) Diabetes mellitus type 2 without retinopathy (CMS/[facility])   (+) Secondary hyperparathyroidism of renal origin (CMS/[facility])   (+) Subclinical hypothyroidism   (+) Type 2 diabetes mellitus with circulatory disorder (CMS/[facility])   (+) Type 2 diabetes mellitus with diabetic neuropathy, unspecified (CMS/[facility])   (+) Type 2 diabetes mellitus with hyperglycemia (CMS/[facility])      /Renal   (+) MAU (acute kidney injury) (CMS/[facility])   (+) ESRD (end stage renal disease) (CMS/[facility])   (+) ESRD on peritoneal dialysis (CMS/[facility])   (+) Other disorders resulting from impaired renal tubular function   (+) Stage 4 chronic kidney disease (CMS/[facility])      Neuro/Psych   (+) Carpal tunnel syndrome   (+) Peripheral neuropathy due to metabolic disorder (CMS/[facility])      Pulmonary   (+) Restrictive lung disease      Hematology   (+) Anemia due to stage 5 chronic kidney disease, not on chronic dialysis (CMS/[facility])   (+) Chronic anemia   (+) Coagulation defect, unspecified (CMS/[facility])      Musculoskeletal   (+) Carpal tunnel syndrome       Clinical information reviewed:    Allergies  Meds               NPO Detail:  No data recorded     Physical Exam    Airway  Mallampati: II  TM distance: >3 FB  Neck ROM: limited  Comments: Soft neck collar.  Severely limited ROM.   Cardiovascular - normal exam     Dental - normal exam     Pulmonary - normal " exam     Abdominal - normal exam           Vitals:    03/17/24 0740   BP: 141/80   Pulse: 78   Resp: 18   Temp: 36.3 °C (97.3 °F)   SpO2: 96%       Past Surgical History:   Procedure Laterality Date    CARDIAC CATHETERIZATION N/A 10/16/2023    Procedure: PCI;  Surgeon: Eusebio Hudson MD;  Location: [address] Cardiac Cath Lab;  Service: Cardiovascular;  Laterality: N/A;    CARPAL TUNNEL RELEASE  2018    CORONARY ARTERY BYPASS GRAFT      SPINAL FUSION  09/2015    7 fused vertebrae    TONSILLECTOMY  1945    US GUIDED PERCUTANEOUS BIOPSY RENAL LEFT Left 11/30/2022    US GUIDED PERCUTANEOUS BIOPSY RENAL LEFT 11/30/2022     Past Medical History:   Diagnosis Date    Chronic kidney disease     on PD    Diabetes mellitus (CMS/HCC)     Hypertension        Current Facility-Administered Medications:     acetaminophen (Tylenol) tablet 325 mg, 325 mg, oral, q8h PRN, Guerrero Green MD    acetaminophen (Tylenol) tablet 975 mg, 975 mg, oral, TID, Guerrero Green MD, 975 mg at 03/16/24 2111    amLODIPine (Norvasc) tablet 10 mg, 10 mg, oral, Daily, Guerrero Green MD, 10 mg at 03/16/24 0837    aspirin EC tablet 81 mg, 81 mg, oral, Daily, Guerrero Green MD, 81 mg at 03/16/24 0837    benzocaine-menthol (Cepastat Sore Throat) 15-3.6 mg lozenge 1 lozenge, 1 lozenge, Mouth/Throat, q2h PRN, Guerrero Green MD    ceFAZolin in dextrose (iso-os) (Ancef) IVPB 2 g, 2 g, intravenous, Once in OR, Julia Little PA-C    cholecalciferol (Vitamin D-3) tablet 1,000 Units, 1,000 Units, oral, Daily, Guerrero Green MD, 1,000 Units at 03/16/24 0837    [Held by provider] clopidogrel (Plavix) tablet 75 mg, 75 mg, oral, Daily, Guerrero Green MD    dextrose 1.5% - LOW calcium 2.5mEq/L 6,000 mL peritoneal dialysate, , intraperitoneal, q24h, Clary Castañeda MD    dextrose 1.5% - LOW calcium 2.5mEq/L 6,000 mL peritoneal dialysate, , intraperitoneal, q24h, Clary Castañeda MD    dextrose 1.5% - LOW calcium 2.5mEq/L 6,000 mL peritoneal dialysate, , intraperitoneal,  q24h, Clary Castañeda MD    dextrose 1.5% - LOW calcium 2.5mEq/L 6,000 mL peritoneal dialysate, , intraperitoneal, q24h, Clary Castañeda MD, Given at 03/16/24 2113    dextrose 50 % injection 12.5 g, 12.5 g, intravenous, q15 min PRN, Guerrero Green MD    dextrose 50 % injection 25 g, 25 g, intravenous, q15 min PRN, Guerrero Green MD    DULoxetine (Cymbalta) DR capsule 30 mg, 30 mg, oral, Daily, Guerrero Green MD, 30 mg at 03/16/24 0837    glucagon (Glucagen) injection 1 mg, 1 mg, intramuscular, q15 min PRN, Guerrero Green MD    guaiFENesin (Mucinex) 12 hr tablet 600 mg, 600 mg, oral, q12h PRN, Guerrero Green MD, 600 mg at 03/16/24 1003    [Held by provider] heparin (porcine) injection 5,000 Units, 5,000 Units, subcutaneous, q8h, Guerrero Green MD, 5,000 Units at 03/16/24 0837    hydrALAZINE (Apresoline) tablet 25 mg, 25 mg, oral, TID, Clary Castañeda MD, 25 mg at 03/16/24 2111    insulin detemir (Levemir) injection 20 Units, 20 Units, subcutaneous, Daily, Rick Covarrubias MD    insulin lispro (HumaLOG) injection 0-10 Units, 0-10 Units, subcutaneous, TID with meals, Guerrero Green MD, 4 Units at 03/17/24 0754    levothyroxine (Synthroid, Levoxyl) tablet 50 mcg, 50 mcg, oral, Daily, Guerrero Green MD, 50 mcg at 03/16/24 0654    metoprolol succinate XL (Toprol-XL) 24 hr tablet 50 mg, 50 mg, oral, Daily, Guerrero Green MD, 50 mg at 03/17/24 0747    multivitamin with minerals 1 tablet, 1 tablet, oral, Daily, Guerrero Green MD, 1 tablet at 03/16/24 0837    ondansetron ODT (Zofran-ODT) disintegrating tablet 4 mg, 4 mg, oral, q8h PRN **OR** ondansetron (Zofran) injection 4 mg, 4 mg, intravenous, q8h PRN, Guerrero Green MD, 4 mg at 03/16/24 1555    polyethylene glycol (Glycolax, Miralax) packet 17 g, 17 g, oral, Daily, Guerrero Green MD, 17 g at 03/16/24 0837    promethazine (Phenergan) tablet 25 mg, 25 mg, oral, q6h PRN **OR** promethazine (Phenergan) suppository 25 mg, 25 mg, rectal, q12h PRN, Guerrero Green MD    sevelamer carbonate  (Renvela) packet 0.8 g, 0.8 g, oral, TID with meals, Clary Castañeda MD, 0.8 g at 03/16/24 1706    sodium bicarbonate tablet 650 mg, 650 mg, oral, BID, Guerrero Green MD, 650 mg at 03/16/24 2111    torsemide (Demadex) tablet 50 mg, 50 mg, oral, Daily, Guerrero Green MD, 50 mg at 03/16/24 0837    traMADol (Ultram) tablet 50 mg, 50 mg, oral, q12h PRN, Guerrero Green MD, 50 mg at 03/16/24 1527  Prior to Admission medications    Medication Sig Start Date End Date Taking? Authorizing Provider   acetaminophen (Tylenol) 325 mg tablet Take 2 tablets (650 mg) by mouth every 6 hours if needed for mild pain (1 - 3). 10/17/23   MARGARET Chang   amLODIPine (Norvasc) 10 mg tablet Take 1 tablet (10 mg) by mouth once daily.    Historical Provider, MD   aspirin 81 mg EC tablet Take 1 tablet (81 mg) by mouth once daily. Do not start before October 18, 2023. 10/18/23   MARGARET Chang   atorvastatin (Lipitor) 40 mg tablet Take 1 tablet (40 mg) by mouth once daily at bedtime.  Patient not taking: Reported on 3/15/2024 10/17/23   MARGARET Chang   cholecalciferol (Vitamin D-3) 25 MCG (1000 UT) tablet Take 1 tablet (25 mcg) by mouth once daily.    Historical Provider, MD   clopidogrel (Plavix) 75 mg tablet Take 1 tablet (75 mg) by mouth once daily for 362 doses. Do not start before October 18, 2023. 10/18/23 10/14/24  MARGARET Chang   DULoxetine (Cymbalta) 30 mg DR capsule Take 1 capsule (30 mg) by mouth once daily. Do not crush or chew.    Historical Provider, MD   flash glucose sensor (FREESTYLE ANDRÉS 2 SENSOR MISC) Change every 2 weeks 10/5/22   Historical Provider, MD   fluticasone (Flonase) 50 mcg/actuation nasal spray once daily as needed. 10/14/22   Historical Provider, MD   gentamicin (Garamycin) 0.1 % cream Apply 1 Application topically once daily.    Historical Provider, MD   glucosamine HCl 1,500 mg tablet Take 1 tablet by mouth 2 times a day.    Historical Provider, MD  "  hydrALAZINE (Apresoline) 25 mg tablet TAKE 25 MG IN THE MORNING. TAKE 25 MG IN THE AFTERNOON. TAKE 50 MG IN THE EVENING. 3/6/24   Michael Harmon MD   insulin detemir (Levemir U-100 Insulin) 100 unit/mL injection Inject 25 Units under the skin once daily. Take as directed per insulin instructions.    Historical Provider, MD   insulin lispro (HumaLOG) 100 unit/mL injection Inject subcutaneously before meals TID, as per sliding scale: 1 unit if sugar is 151-200, 2 units if 201-250, 3 units if 251-300, 4 units if 301-350, 5 units> 350. Total 15 units per day. 11/15/23   Historical Provider, MD   LANCETS MISC 1 each once daily.    Historical Provider, MD   levothyroxine (Tirosint) 50 mcg capsule Take 1 capsule (50 mcg) by mouth once daily in the morning. Take before meals.    Historical Provider, MD   loratadine-pseudoephedrine (Claritin-D 24-hour)  mg 24 hr tablet Take 1 tablet by mouth once daily as needed for allergies. Do not crush, chew, or split.    Historical Provider, MD   metoprolol succinate XL (Toprol-XL) 50 mg 24 hr tablet Take 1 tablet (50 mg) by mouth once daily. Do not crush or chew.  Patient taking differently: Take 1 tablet (50 mg) by mouth 2 times a day. Do not crush or chew. 10/17/23 3/6/24  ANNIE Chang-CNP   min17/nettle/pumpkin/saw palme (PROSTATE THERAPY ORAL) Take by mouth.    Historical Provider, MD   multivitamin with minerals (Daily Multivitamin-Minerals) tablet Take 1 tablet by mouth once daily.    Historical Provider, MD   OneTouch Ultra Test strip TEST THREE TIMES DAILY. 11/20/19   Historical Provider, MD   pen needle, diabetic 31 gauge x 1/4\" needle 1 each 2 times a day. 10/5/22   Historical Provider, MD   RenaPlex-D 800 mcg-12.5 mg -2,000 unit tablet Take 1 tablet by mouth once daily. 6/27/23   Historical Provider, MD   sennosides-docusate sodium (Diana-Colace) 8.6-50 mg tablet Take 2 tablets by mouth 2 times a day.    Historical Provider, MD   sodium bicarbonate " 650 mg tablet Take 1 tablet (650 mg) by mouth 2 times a day.    Historical Provider, MD   torsemide (Demadex) 100 mg tablet Take 0.5 tablets (50 mg) by mouth once daily.    Historical Provider, MD   glucosam aguirre dip-chondroit-C-Mn 664-197-22-3 mg capsule GLUCOSAMINE 1500 COMPLEX CAPS 4/27/18 3/15/24  Historical Provider, MD     Allergies   Allergen Reactions    Gabapentin Other     vomiting    Other reaction(s): Other (See Comments), Other: See Comments   vomiting   vomiting    Meperidine Nausea And Vomiting    Opioids - Morphine Analogues Unknown, Nausea And Vomiting and Nausea/vomiting     Patient states he is able to take Morphine.    Other reaction(s): nausea/vomiting    Opioids-Meperidine And Related Nausea/vomiting    Opium Tincture Unknown    Oxycodone GI Upset     nausea and vomiting    Tizanidine Unknown     Social History     Tobacco Use    Smoking status: Never    Smokeless tobacco: Never   Substance Use Topics    Alcohol use: Never         Chemistry    Lab Results   Component Value Date/Time     03/17/2024 0628    K 3.9 03/17/2024 0628    CL 99 03/17/2024 0628    CO2 27 03/17/2024 0628    BUN 68 (H) 03/17/2024 0628    CREATININE 4.94 (H) 03/17/2024 0628    Lab Results   Component Value Date/Time    CALCIUM 8.5 (L) 03/17/2024 0628    ALKPHOS 52 03/15/2024 1742    AST 21 03/15/2024 1742    ALT 18 03/15/2024 1742    BILITOT 0.3 03/15/2024 1742          Lab Results   Component Value Date/Time    WBC 8.7 03/17/2024 0628    HGB 8.2 (L) 03/17/2024 0628    HCT 26.7 (L) 03/17/2024 0628     03/17/2024 0628     Lab Results   Component Value Date/Time    PROTIME 11.5 03/15/2024 1742    INR 1.0 03/15/2024 1742     Encounter Date: 02/25/24   ECG 12 lead   Result Value    Ventricular Rate 62    Atrial Rate 62    MN Interval 176    QRS Duration 88    QT Interval 434    QTC Calculation(Bazett) 440    P Axis 50    R Axis -40    T Axis -1    QRS Count 10    Q Onset 213    P Onset 125    P Offset 185    T Offset  430    QTC Fredericia 439    Narrative    Normal sinus rhythm  Left axis deviation  Abnormal ECG  When compared with ECG of 25-FEB-2024 13:08, (unconfirmed)  Previous ECG has undetermined rhythm, needs review  ST no longer depressed in Inferior leads    See ED provider note for full interpretation and clinical correlation  Confirmed by Yisel Yanez ([id number]) on 2/25/2024 1:40:13 PM      Anesthesia Plan    History of general anesthesia?: yes  History of complications of general anesthesia?: no    ASA 4     general     The patient is not a current smoker.    intravenous induction   Anesthetic plan and risks discussed with patient and spouse.    Plan discussed with CRNA.

## 2024-03-17 NOTE — CARE PLAN
The patient's goals for the shift include      The clinical goals for the shift include pain relief with pain med administration      Problem: Pain  Goal: My pain/discomfort is manageable  3/17/2024 0450 by Manju Freeman RN  Outcome: Progressing  3/17/2024 0449 by Manju Freeman RN  Outcome: Progressing     Problem: Safety  Goal: Patient will be injury free during hospitalization  3/17/2024 0450 by Manju Freeman RN  Outcome: Progressing  3/17/2024 0449 by Manju Freeman RN  Outcome: Progressing  Goal: I will remain free of falls  3/17/2024 0450 by Manju Freeman RN  Outcome: Progressing  3/17/2024 0449 by Manju Freeman RN  Outcome: Progressing     Problem: Daily Care  Goal: Daily care needs are met  3/17/2024 0450 by Manju Freeman RN  Outcome: Progressing  3/17/2024 0449 by Manju Freeman RN  Outcome: Progressing     Problem: Psychosocial Needs  Goal: Demonstrates ability to cope with hospitalization/illness  3/17/2024 0450 by Manju Freeman RN  Outcome: Progressing  3/17/2024 0449 by Manju Freeman RN  Outcome: Progressing  Goal: Collaborate with me, my family, and caregiver to identify my specific goals  3/17/2024 0450 by Manju Freeman RN  Outcome: Progressing  3/17/2024 0449 by Manju Freeman RN  Outcome: Progressing     Problem: Discharge Barriers  Goal: My discharge needs are met  3/17/2024 0450 by Manju Freeman RN  Outcome: Progressing  3/17/2024 0449 by Manju Freeman RN  Outcome: Progressing     Problem: Pain - Adult  Goal: Verbalizes/displays adequate comfort level or baseline comfort level  3/17/2024 0450 by Manju Freeman RN  Outcome: Progressing  3/17/2024 0449 by Manju Freeman RN  Outcome: Progressing     Problem: Safety - Adult  Goal: Free from fall injury  3/17/2024 0450 by Manju Freeman RN  Outcome: Progressing  3/17/2024 0449 by Manju Freeman RN  Outcome: Progressing     Problem: Discharge Planning  Goal: Discharge to home or other facility with appropriate resources  3/17/2024 0450 by  Manju Freeman RN  Outcome: Progressing  3/17/2024 0449 by Manju Freeman RN  Outcome: Progressing     Problem: Chronic Conditions and Co-morbidities  Goal: Patient's chronic conditions and co-morbidity symptoms are monitored and maintained or improved  3/17/2024 0450 by Manju Freeman RN  Outcome: Progressing  3/17/2024 0449 by Manju Freeman RN  Outcome: Progressing     Problem: Skin  Goal: Decreased wound size/increased tissue granulation at next dressing change  3/17/2024 0450 by Manju Freeman RN  Outcome: Progressing  Flowsheets (Taken 3/17/2024 0450)  Decreased wound size/increased tissue granulation at next dressing change:   Promote sleep for wound healing   Protective dressings over bony prominences  3/17/2024 0449 by Manju Freeman RN  Outcome: Progressing  Goal: Participates in plan/prevention/treatment measures  3/17/2024 0450 by Manju Freeman RN  Outcome: Progressing  Flowsheets (Taken 3/17/2024 0450)  Participates in plan/prevention/treatment measures: Elevate heels  3/17/2024 0449 by Manju Freeman RN  Outcome: Progressing  Goal: Prevent/manage excess moisture  3/17/2024 0450 by Manju Freeman RN  Outcome: Progressing  Flowsheets (Taken 3/17/2024 0450)  Prevent/manage excess moisture: Cleanse incontinence/protect with barrier cream  3/17/2024 0449 by Manju Freeman RN  Outcome: Progressing  Goal: Prevent/minimize sheer/friction injuries  3/17/2024 0450 by Manju Freeman RN  Outcome: Progressing  Flowsheets (Taken 3/17/2024 0450)  Prevent/minimize sheer/friction injuries:   Use pull sheet   Turn/reposition every 2 hours/use positioning/transfer devices  3/17/2024 0449 by Manju Freeman RN  Outcome: Progressing  Goal: Promote/optimize nutrition  3/17/2024 0450 by Manju Freeman RN  Outcome: Progressing  3/17/2024 0449 by Manju Freeman RN  Outcome: Progressing  Goal: Promote skin healing  3/17/2024 0450 by Manju Freeman RN  Outcome: Progressing  Flowsheets (Taken 3/17/2024 0450)  Promote skin  healing:   Protective dressings over bony prominences   Turn/reposition every 2 hours/use positioning/transfer devices   Rotate device position/do not position patient on device   Assess skin/pad under line(s)/device(s)  3/17/2024 0449 by Manju Freeman RN  Outcome: Progressing     Problem: Diabetes  Goal: Achieve decreasing blood glucose levels by end of shift  3/17/2024 0450 by Manju Freeman RN  Outcome: Progressing  3/17/2024 0449 by Manju Freeman RN  Outcome: Progressing  Goal: Increase stability of blood glucose readings by end of shift  3/17/2024 0450 by Manju Freeman RN  Outcome: Progressing  3/17/2024 0449 by Manju Freeman RN  Outcome: Progressing  Goal: Decrease in ketones present in urine by end of shift  3/17/2024 0450 by Manju Freeman RN  Outcome: Progressing  3/17/2024 0449 by Manju Freeman RN  Outcome: Progressing  Goal: Maintain electrolyte levels within acceptable range throughout shift  3/17/2024 0450 by Manju Freeman RN  Outcome: Progressing  3/17/2024 0449 by Manju Freeman RN  Outcome: Progressing  Goal: Maintain glucose levels >70mg/dl to <250mg/dl throughout shift  3/17/2024 0450 by Manju Freeman RN  Outcome: Progressing  3/17/2024 0449 by Manju Freeman RN  Outcome: Progressing  Goal: No changes in neurological exam by end of shift  3/17/2024 0450 by Manju Freeman RN  Outcome: Progressing  3/17/2024 0449 by Manju Freeman RN  Outcome: Progressing  Goal: Learn about and adhere to nutrition recommendations by end of shift  3/17/2024 0450 by Manju Freeman RN  Outcome: Progressing  3/17/2024 0449 by Manju Freeman RN  Outcome: Progressing  Goal: Vital signs within normal range for age by end of shift  3/17/2024 0450 by Manju Freeman RN  Outcome: Progressing  3/17/2024 0449 by Manju Freeman RN  Outcome: Progressing  Goal: Increase self care and/or family involovement by end of shift  3/17/2024 0450 by Manju Freeman RN  Outcome: Progressing  3/17/2024 0449 by Manju Freeman RN  Outcome:  Progressing  Goal: Receive DSME education by end of shift  3/17/2024 0450 by Manju Freeman RN  Outcome: Progressing  3/17/2024 0449 by Manju Freeman RN  Outcome: Progressing     Problem: Pain  Goal: Takes deep breaths with improved pain control throughout the shift  3/17/2024 0450 by Manju Freeman RN  Outcome: Progressing  3/17/2024 0449 by Manju Freeman RN  Outcome: Progressing  Goal: Turns in bed with improved pain control throughout the shift  3/17/2024 0450 by Manju Freeman RN  Outcome: Progressing  3/17/2024 0449 by Manju Freeman RN  Outcome: Progressing  Goal: Walks with improved pain control throughout the shift  3/17/2024 0450 by Manju Freeman RN  Outcome: Progressing  3/17/2024 0449 by Manju Freeman RN  Outcome: Progressing  Goal: Performs ADL's with improved pain control throughout shift  3/17/2024 0450 by Manju Freeman RN  Outcome: Progressing  3/17/2024 0449 by Manju Freeman RN  Outcome: Progressing  Goal: Participates in PT with improved pain control throughout the shift  3/17/2024 0450 by Manju Freeman RN  Outcome: Progressing  3/17/2024 0449 by Manju Freeman RN  Outcome: Progressing  Goal: Free from opioid side effects throughout the shift  3/17/2024 0450 by Manju Freeman RN  Outcome: Progressing  3/17/2024 0449 by Manju Freeman RN  Outcome: Progressing  Goal: Free from acute confusion related to pain meds throughout the shift  3/17/2024 0450 by Manju Freeman RN  Outcome: Progressing  3/17/2024 0449 by Manju Freeman RN  Outcome: Progressing

## 2024-03-17 NOTE — OP NOTE
OPERATIVE REPORT  Department of Orthopaedic Surgery    Surgeon(s) and Assistant(s):  Donna Thomas MD      Procedure(s):  1) Left hip short TFNA    Anesthesia: General      Preop Diagnosis:  Left intertrochanteric hip fracture      Postop Diagnosis: Same as preop    Estimated Blood Loss: 100ml    Implants:  Implant Name Type Inv. Item Serial No.  Lot No. LRB No. Used Action   NAIL, TFN ADV SHORT, 170MML, DIAMETER 10, 130 DEG - SN/A - [id number] Joint Hip NAIL, TFN ADV SHORT, 170MML, DIAMETER 10, 130 DEG N/A SYNTHES  Left 1 Implanted   HELICAL BLADES, TFNA FENESTRATED, 105MM, STERILE - SN/A - [id number] Implant HELICAL BLADES, TFNA FENESTRATED, 105MM, STERILE N/A SYNTHES  Left 1 Implanted         Operative Indications:  The patient is an 83 y.o. male with a history of CAD, ESRD on dialysis, CABG who presents with left IT hip fx. After the risks, benefits, and alternatives were discussed, the he opted for a operative fixation of left hip.      Risks, Benefits, and Alternatives  The patient was counseled extensively regarding the options for treatment including operative and non-operative forms of treatment and after thorough counseling has elected to proceed with surgical treatment. The patient was counseled that with surgical treatment there is the possibility that their condition might not improve or may even worsen.      Specific surgical risks discussed include bleeding, the need for possible blood product transfusion, infection, post-operative pain, damage to nerves and blood vessels, wound dehiscence and wound healing problems, incomplete relief of pain, post-operative instability (dislocation), the need for physical therapy, inability to return to desired level of function, post-operative limp, generalized dissatisfaction with the surgical procedure, complex regional pain syndrome, as well as medical complications such as DVT, PE, anesthetic complications, cardiopulmonary complications and death.       Additional risks with regards to the hardware being placed include implant failure and nonunion/malunion.    The patient expressed understanding of all the issues described above and has elected to proceed with the aforementioned procedure.      Operative Procedure:  After the appropriate surgical site was marked, the patient was brought to the operating room.  A huddle was performed with all pertinent surgical personnel, anesthesia personnel, and nursing staff.  The patient underwent general anesthesia and then was transferred to the fracture table. Contralateral leg was positioned in a well padded leg lucas.  The operative leg/site was appropriately identified and pre-draped. Preoperative antibiotics were verified given within one hour of incision time, prior to incision.     Provisional reduction was obtained using adduction, internal rotation and traction. The hip was sterilely  prepped and draped free in the usual fashion.  Appropriate time-out was done.  Superior  incision was made approximately 3 cm in length just above the level of the greater trochanter.  This was  carried through skin and subcutaneous tissues.  The fascia was split using blunt dissection.  A guide pin was  placed on the tip of the greater trochanter.  Position confirmed in AP and lateral planes.  This was driven down to the level of the lesser trochanter.  The entrance reamer was used to  open the greater trochanter and proximal femur.  This was removed along with the guide pin. An  10 millimeter nail was selected based on measurement of his femoral canal.  An 10 millimeter [employer] TFN short nail was then passed through the greater troch down through the femoral canal. This was done under C-arm visualization.  Using the guide attachment, a  second incision was made on the lateral aspect of the femur. A guide pin was passed  through the lateral cortex of the femur up into the femoral head .  Again position  confirmed in AP and lateral  planes.  This was measured and a 110 mm helical blade was selected.  The entrance reamer was used, and a 110 millimeter helical blade was placed.  This was dynamically locked proximally.  A compression was placed across the  fracture site and the fracture was noted to reduce once again using C-arm visualization.  The entrance apparatus and alignment  guide were then removed. Using the guide, a 38 mm distal interlock screw was placed. The femur was x-rayed and at the hip, AP and lateral views.  Both incisions were copiously irrigated with normal saline.  The fascia was closed with 0  Vicryl suture and the subcutaneous tissue with close with 2-0 Vicryl suture.  The skin was finally closed  using staples.  Dressing in the form of silver dressings were then applied over both incisions.    All counts, verified in 2 separate count episodes, were correct at the end of the case.    Post-Operative Plan:  -Admit to RNF  - WBAT RLE  - 24hr periop abx   - Maintain Aquacell dressing for 7 days  - need 28 days of lovenox- can do eliquis if prohibitive renal dosing  - okay to resume plavix on POD3  - Follow up with Dr. Thomas in 2-3 weeks for wound check and staple removal

## 2024-03-17 NOTE — NURSING NOTE
PT.RETURNED TO RM#[id number] FROM PACU--S/P L HIP NAILING. PT.A/OX4,LANDEROS SLOWLY/LIMITEDLY.  L HIP MEPILEX'S X2 INTACT/DRY.  VS--36.5  139/65  59  18  PULSE OX 95%RA  WIFE PRESENT UPON ARRIVAL, AWARE OF POST OP ORDERS/CARE PLAN. NO ACUTE DISTRESS NOTED.

## 2024-03-17 NOTE — PROGRESS NOTES
Robbie Chowdary is a 83 y.o. male on day 2 of admission presenting with Closed displaced intertrochanteric fracture of left femur (CMS/HCC).      Subjective   No acute events overnight. Patient off the floor this morning for surgery.       Objective     Last Recorded Vitals  /80 (BP Location: Right arm, Patient Position: Lying)   Pulse 78   Temp 36.3 °C (97.3 °F) (Temporal)   Resp 18   Wt 76.6 kg (168 lb 15.7 oz)   SpO2 96%   Intake/Output last 3 Shifts:    Intake/Output Summary (Last 24 hours) at 3/17/2024 1000  Last data filed at 3/16/2024 1700  Gross per 24 hour   Intake 250 ml   Output 800 ml   Net -550 ml         Admission Weight  Weight: 76.7 kg (169 lb) (03/15/24 1704)    Daily Weight  03/16/24 : 76.6 kg (168 lb 15.7 oz)    Image Results  FL fluoro images no charge  These images are not reportable by radiology and will not be interpreted   by  Radiologists.      Physical Exam  Constitutional:       General: He is not in acute distress.     Appearance: Normal appearance.   HENT:      Head: Normocephalic and atraumatic.      Mouth/Throat:      Mouth: Mucous membranes are moist.   Eyes:      Extraocular Movements: Extraocular movements intact.      Conjunctiva/sclera: Conjunctivae normal.      Pupils: Pupils are equal, round, and reactive to light.   Cardiovascular:      Rate and Rhythm: Normal rate and regular rhythm.      Heart sounds: Normal heart sounds.   Pulmonary:      Effort: Pulmonary effort is normal.      Breath sounds: Normal breath sounds. No wheezing, rhonchi or rales.   Abdominal:      General: Abdomen is flat. Bowel sounds are normal.      Palpations: Abdomen is soft.   Musculoskeletal:         General: No swelling or tenderness.      Cervical back: Normal range of motion and neck supple.      Comments: LLE shorter than RLE and externally rotated; neurovascularly intact distally on LLE   Skin:     General: Skin is warm and dry.      Findings: No rash.   Neurological:      General: No  focal deficit present.      Mental Status: He is alert and oriented to person, place, and time.      Cranial Nerves: No cranial nerve deficit.      Sensory: No sensory deficit.   Psychiatric:         Mood and Affect: Mood normal.         Behavior: Behavior normal.         Relevant Results  Scheduled medications  [MAR Hold] acetaminophen, 975 mg, oral, TID  [MAR Hold] amLODIPine, 10 mg, oral, Daily  [MAR Hold] aspirin, 81 mg, oral, Daily  [MAR Hold] ceFAZolin, 2 g, intravenous, Once in OR  [MAR Hold] cholecalciferol, 1,000 Units, oral, Daily  [Held by provider] clopidogrel, 75 mg, oral, Daily  [MAR Hold] dextrose 1.5% - LOW calcium 2.5mEq/L 6,000 mL peritoneal dialysate, , intraperitoneal, q24h  [MAR Hold] dextrose 1.5% - LOW calcium 2.5mEq/L 6,000 mL peritoneal dialysate, , intraperitoneal, q24h  [MAR Hold] dextrose 1.5% - LOW calcium 2.5mEq/L 6,000 mL peritoneal dialysate, , intraperitoneal, q24h  [MAR Hold] dextrose 1.5% - LOW calcium 2.5mEq/L 6,000 mL peritoneal dialysate, , intraperitoneal, q24h  [MAR Hold] DULoxetine, 30 mg, oral, Daily  [Held by provider] heparin (porcine), 5,000 Units, subcutaneous, q8h  [MAR Hold] hydrALAZINE, 25 mg, oral, TID  [MAR Hold] insulin detemir, 20 Units, subcutaneous, Daily  [MAR Hold] insulin lispro, 0-10 Units, subcutaneous, TID with meals  [MAR Hold] levothyroxine, 50 mcg, oral, Daily  [MAR Hold] metoprolol succinate XL, 50 mg, oral, Daily  [MAR Hold] multivitamin with minerals, 1 tablet, oral, Daily  [MAR Hold] polyethylene glycol, 17 g, oral, Daily  [MAR Hold] sevelamer carbonate, 0.8 g, oral, TID with meals  [MAR Hold] sodium bicarbonate, 650 mg, oral, BID  [MAR Hold] torsemide, 50 mg, oral, Daily      Continuous medications     PRN medications  PRN medications: [MAR Hold] acetaminophen, [MAR Hold] benzocaine-menthol, [MAR Hold] dextrose, [MAR Hold] dextrose, [MAR Hold] glucagon, [MAR Hold] guaiFENesin, [MAR Hold] ondansetron ODT **OR** [MAR Hold] ondansetron, [MAR Hold]  promethazine **OR** [MAR Hold] promethazine, [MAR Hold] traMADol    Results for orders placed or performed during the hospital encounter of 03/15/24 (from the past 24 hour(s))   Renal Function Panel   Result Value Ref Range    Glucose 227 (H) 74 - 99 mg/dL    Sodium 136 136 - 145 mmol/L    Potassium 3.9 3.5 - 5.3 mmol/L    Chloride 99 98 - 107 mmol/L    Bicarbonate 27 21 - 32 mmol/L    Anion Gap 14 10 - 20 mmol/L    Urea Nitrogen 68 (H) 6 - 23 mg/dL    Creatinine 4.94 (H) 0.50 - 1.30 mg/dL    eGFR 11 (L) >60 mL/min/1.73m*2    Calcium 8.5 (L) 8.6 - 10.3 mg/dL    Phosphorus 5.4 (H) 2.5 - 4.9 mg/dL    Albumin 2.9 (L) 3.4 - 5.0 g/dL   CBC   Result Value Ref Range    WBC 8.7 4.4 - 11.3 x10*3/uL    nRBC 0.0 0.0 - 0.0 /100 WBCs    RBC 2.54 (L) 4.50 - 5.90 x10*6/uL    Hemoglobin 8.2 (L) 13.5 - 17.5 g/dL    Hematocrit 26.7 (L) 41.0 - 52.0 %     (H) 80 - 100 fL    MCH 32.3 26.0 - 34.0 pg    MCHC 30.7 (L) 32.0 - 36.0 g/dL    RDW 15.0 (H) 11.5 - 14.5 %    Platelets 170 150 - 450 x10*3/uL   Magnesium   Result Value Ref Range    Magnesium 1.83 1.60 - 2.40 mg/dL   POCT GLUCOSE   Result Value Ref Range    POCT Glucose 220 (H) 74 - 99 mg/dL       CT head wo IV contrast    Result Date: 3/15/2024  Interpreted By:  Erasmo Noel, STUDY: CT HEAD WO IV CONTRAST; CT CERVICAL SPINE WO IV CONTRAST; CT THORACIC SPINE WO IV CONTRAST; CT LUMBAR SPINE WO IV CONTRAST;  3/15/2024 6:27 pm   INDICATION: Signs/Symptoms:fall, advanced age on plavix; Signs/Symptoms:fall, lumbar pain, axial loading. RO compression fracture   COMPARISON: November 23   ACCESSION NUMBER(S): [id number]; [id number]; [id number]; [id number]   ORDERING CLINICIAN: WAQAR LOPEZ   TECHNIQUE: Axial noncontrast CT images of head with coronal and sagittal reconstructed images. Axial noncontrast CT images of the cervical thoracic and lumbar spine with coronal and sagittal reconstructed images.   FINDINGS: CT HEAD:   BRAIN PARENCHYMA:  No evidence of acute  intraparenchymal hemorrhage or parenchymal evidence of acute large territory ischemic infarct. No mass-effect, midline shift or effacement of cerebral sulci. Gray-white matter distinction is preserved. Mild global volume loss and chronic small vessel ischemic changes. The appearance is similar   VENTRICLES and EXTRA-AXIAL SPACES:  No acute extra-axial or intraventricular hemorrhage. Ventricles and sulci are age-concordant.   PARANASAL SINUSES/MASTOIDS:  No hemorrhage or air-fluid levels within the visualized paranasal sinuses. The mastoid air cells are well-aerated.   CALVARIUM/ORBITS:  No skull fracture.  The orbits and globes are intact to the extent visualized.   Robust vascular calcification.     CT CERVICAL SPINE: C3-T1 posterior spinal fusion with decompression. Prevertebral soft tissue within normal limits. Scattered multilevel degenerative disc disease. No evidence of acute cervical spine fracture.   CT thoracic spine: Multilevel degenerative disc disease detected in the thoracic spine. No evidence of acute thoracic spine fracture.   Features of pulmonary fibrosis with honeycombing seen in the lung bases   Heart size is enlarged. Robust coronary calcification. No pericardial effusion.   Enlarged main pulmonary artery compatible with pulmonary arterial hypertension.   No evidence of acute lumbar spine fracture. Scattered degenerative changes most notable at L3-L4 and L4-L5 with disc osteophyte complexes. Facet arthropathy detected.   No evidence of aneurysm of the abdominal aorta. Robust vascular calcification peritoneal catheter terminates in the pelvis.       No evidence of acute cervical, thoracic, or lumbar spine fracture. Long segment decompression of the posterior aspect of the cervical spine from C3 through T1 not measurably changed.   Multilevel degenerative disc disease is seen. Degree of degenerative disease especially in lumbar spine does limit sensitivity. If there is severe pain, consider further  evaluation with lumbar spine MRI   Senescent changes seen in the brain. No evidence of acute intracranial hemorrhage   Signed by: Erasmo Noel 3/15/2024 6:55 PM Dictation workstation:   [id number]    CT cervical spine wo IV contrast    Result Date: 3/15/2024  Interpreted By:  Erasmo Noel, STUDY: CT HEAD WO IV CONTRAST; CT CERVICAL SPINE WO IV CONTRAST; CT THORACIC SPINE WO IV CONTRAST; CT LUMBAR SPINE WO IV CONTRAST;  3/15/2024 6:27 pm   INDICATION: Signs/Symptoms:fall, advanced age on plavix; Signs/Symptoms:fall, lumbar pain, axial loading. RO compression fracture   COMPARISON: November 23   ACCESSION NUMBER(S): [id number]; [id number]; [id number]; [id number]   ORDERING CLINICIAN: WAQAR LOPEZ   TECHNIQUE: Axial noncontrast CT images of head with coronal and sagittal reconstructed images. Axial noncontrast CT images of the cervical thoracic and lumbar spine with coronal and sagittal reconstructed images.   FINDINGS: CT HEAD:   BRAIN PARENCHYMA:  No evidence of acute intraparenchymal hemorrhage or parenchymal evidence of acute large territory ischemic infarct. No mass-effect, midline shift or effacement of cerebral sulci. Gray-white matter distinction is preserved. Mild global volume loss and chronic small vessel ischemic changes. The appearance is similar   VENTRICLES and EXTRA-AXIAL SPACES:  No acute extra-axial or intraventricular hemorrhage. Ventricles and sulci are age-concordant.   PARANASAL SINUSES/MASTOIDS:  No hemorrhage or air-fluid levels within the visualized paranasal sinuses. The mastoid air cells are well-aerated.   CALVARIUM/ORBITS:  No skull fracture.  The orbits and globes are intact to the extent visualized.   Robust vascular calcification.     CT CERVICAL SPINE: C3-T1 posterior spinal fusion with decompression. Prevertebral soft tissue within normal limits. Scattered multilevel degenerative disc disease. No evidence of acute cervical spine fracture.   CT thoracic spine:  Multilevel degenerative disc disease detected in the thoracic spine. No evidence of acute thoracic spine fracture.   Features of pulmonary fibrosis with honeycombing seen in the lung bases   Heart size is enlarged. Robust coronary calcification. No pericardial effusion.   Enlarged main pulmonary artery compatible with pulmonary arterial hypertension.   No evidence of acute lumbar spine fracture. Scattered degenerative changes most notable at L3-L4 and L4-L5 with disc osteophyte complexes. Facet arthropathy detected.   No evidence of aneurysm of the abdominal aorta. Robust vascular calcification peritoneal catheter terminates in the pelvis.       No evidence of acute cervical, thoracic, or lumbar spine fracture. Long segment decompression of the posterior aspect of the cervical spine from C3 through T1 not measurably changed.   Multilevel degenerative disc disease is seen. Degree of degenerative disease especially in lumbar spine does limit sensitivity. If there is severe pain, consider further evaluation with lumbar spine MRI   Senescent changes seen in the brain. No evidence of acute intracranial hemorrhage   Signed by: Erasom Noel 3/15/2024 6:55 PM Dictation workstation:   [id number]    CT thoracic spine wo IV contrast    Result Date: 3/15/2024  Interpreted By:  Erasmo Noel, STUDY: CT HEAD WO IV CONTRAST; CT CERVICAL SPINE WO IV CONTRAST; CT THORACIC SPINE WO IV CONTRAST; CT LUMBAR SPINE WO IV CONTRAST;  3/15/2024 6:27 pm   INDICATION: Signs/Symptoms:fall, advanced age on plavix; Signs/Symptoms:fall, lumbar pain, axial loading. RO compression fracture   COMPARISON: November 23   ACCESSION NUMBER(S): [id number]; [id number]; [id number]; [id number]   ORDERING CLINICIAN: WAQAR LOPEZ   TECHNIQUE: Axial noncontrast CT images of head with coronal and sagittal reconstructed images. Axial noncontrast CT images of the cervical thoracic and lumbar spine with coronal and sagittal reconstructed  images.   FINDINGS: CT HEAD:   BRAIN PARENCHYMA:  No evidence of acute intraparenchymal hemorrhage or parenchymal evidence of acute large territory ischemic infarct. No mass-effect, midline shift or effacement of cerebral sulci. Gray-white matter distinction is preserved. Mild global volume loss and chronic small vessel ischemic changes. The appearance is similar   VENTRICLES and EXTRA-AXIAL SPACES:  No acute extra-axial or intraventricular hemorrhage. Ventricles and sulci are age-concordant.   PARANASAL SINUSES/MASTOIDS:  No hemorrhage or air-fluid levels within the visualized paranasal sinuses. The mastoid air cells are well-aerated.   CALVARIUM/ORBITS:  No skull fracture.  The orbits and globes are intact to the extent visualized.   Robust vascular calcification.     CT CERVICAL SPINE: C3-T1 posterior spinal fusion with decompression. Prevertebral soft tissue within normal limits. Scattered multilevel degenerative disc disease. No evidence of acute cervical spine fracture.   CT thoracic spine: Multilevel degenerative disc disease detected in the thoracic spine. No evidence of acute thoracic spine fracture.   Features of pulmonary fibrosis with honeycombing seen in the lung bases   Heart size is enlarged. Robust coronary calcification. No pericardial effusion.   Enlarged main pulmonary artery compatible with pulmonary arterial hypertension.   No evidence of acute lumbar spine fracture. Scattered degenerative changes most notable at L3-L4 and L4-L5 with disc osteophyte complexes. Facet arthropathy detected.   No evidence of aneurysm of the abdominal aorta. Robust vascular calcification peritoneal catheter terminates in the pelvis.       No evidence of acute cervical, thoracic, or lumbar spine fracture. Long segment decompression of the posterior aspect of the cervical spine from C3 through T1 not measurably changed.   Multilevel degenerative disc disease is seen. Degree of degenerative disease especially in lumbar  spine does limit sensitivity. If there is severe pain, consider further evaluation with lumbar spine MRI   Senescent changes seen in the brain. No evidence of acute intracranial hemorrhage   Signed by: Erasmo Noel 3/15/2024 6:55 PM Dictation workstation:   [id number]    CT lumbar spine wo IV contrast    Result Date: 3/15/2024  Interpreted By:  Erasmo Noel, STUDY: CT HEAD WO IV CONTRAST; CT CERVICAL SPINE WO IV CONTRAST; CT THORACIC SPINE WO IV CONTRAST; CT LUMBAR SPINE WO IV CONTRAST;  3/15/2024 6:27 pm   INDICATION: Signs/Symptoms:fall, advanced age on plavix; Signs/Symptoms:fall, lumbar pain, axial loading. RO compression fracture   COMPARISON: November 23   ACCESSION NUMBER(S): [id number]; [id number]; [id number]; [id number]   ORDERING CLINICIAN: WAQAR LOPEZ   TECHNIQUE: Axial noncontrast CT images of head with coronal and sagittal reconstructed images. Axial noncontrast CT images of the cervical thoracic and lumbar spine with coronal and sagittal reconstructed images.   FINDINGS: CT HEAD:   BRAIN PARENCHYMA:  No evidence of acute intraparenchymal hemorrhage or parenchymal evidence of acute large territory ischemic infarct. No mass-effect, midline shift or effacement of cerebral sulci. Gray-white matter distinction is preserved. Mild global volume loss and chronic small vessel ischemic changes. The appearance is similar   VENTRICLES and EXTRA-AXIAL SPACES:  No acute extra-axial or intraventricular hemorrhage. Ventricles and sulci are age-concordant.   PARANASAL SINUSES/MASTOIDS:  No hemorrhage or air-fluid levels within the visualized paranasal sinuses. The mastoid air cells are well-aerated.   CALVARIUM/ORBITS:  No skull fracture.  The orbits and globes are intact to the extent visualized.   Robust vascular calcification.     CT CERVICAL SPINE: C3-T1 posterior spinal fusion with decompression. Prevertebral soft tissue within normal limits. Scattered multilevel degenerative disc disease.  No evidence of acute cervical spine fracture.   CT thoracic spine: Multilevel degenerative disc disease detected in the thoracic spine. No evidence of acute thoracic spine fracture.   Features of pulmonary fibrosis with honeycombing seen in the lung bases   Heart size is enlarged. Robust coronary calcification. No pericardial effusion.   Enlarged main pulmonary artery compatible with pulmonary arterial hypertension.   No evidence of acute lumbar spine fracture. Scattered degenerative changes most notable at L3-L4 and L4-L5 with disc osteophyte complexes. Facet arthropathy detected.   No evidence of aneurysm of the abdominal aorta. Robust vascular calcification peritoneal catheter terminates in the pelvis.       No evidence of acute cervical, thoracic, or lumbar spine fracture. Long segment decompression of the posterior aspect of the cervical spine from C3 through T1 not measurably changed.   Multilevel degenerative disc disease is seen. Degree of degenerative disease especially in lumbar spine does limit sensitivity. If there is severe pain, consider further evaluation with lumbar spine MRI   Senescent changes seen in the brain. No evidence of acute intracranial hemorrhage   Signed by: Erasmo Noel 3/15/2024 6:55 PM Dictation workstation:   [id number]    XR elbow left 1-2 views    Result Date: 3/15/2024  Interpreted By:  Olive Sr, STUDY: Left elbow, 2 views.   INDICATION: Signs/Symptoms:fall, left elbow pain.   COMPARISON: None.   ACCESSION NUMBER(S): [id number]   ORDERING CLINICIAN: WAQAR LOPEZ   FINDINGS: No acute fracture or malalignment. No elbow joint effusion or abnormal fat pad elevation. No significant degenerative changes. Heterotopic ossification adjacent to the lateral humeral condyle measuring 7 mm.       1. No acute fracture or malalignment of the left elbow.   MACRO: None.   Signed by: Olive Sr 3/15/2024 6:09 PM Dictation workstation:   [id number]    XR hip left with  pelvis when performed 2 or 3 views    Result Date: 3/15/2024  Interpreted By:  Olive Sr, STUDY: Single view pelvis. Left hip, two views.   INDICATION: Signs/Symptoms:L hip fracture.   COMPARISON: None.   ACCESSION NUMBER(S): [id number]   ORDERING CLINICIAN: WAQAR LOPEZ   FINDINGS: There is a acute impacted comminuted mildly displaced intertrochanteric left proximal femoral fracture. Moderate bilateral hip joint degenerative changes. Advanced lumbar spine degenerative changes. Osteopenia. Bilateral femoral artery vascular calcifications.       1. Intertrochanteric left proximal femoral fracture.   MACRO: None.   Signed by: Olive Sr 3/15/2024 6:08 PM Dictation workstation:   [id number]    IOL BIOMETRY W/ IOL CALC OU (BOTH EYES)    Result Date: 3/7/2024  Date of Procedure 3/7/2024. Technician Information Loulou Lilly, COA . Notes Measurements only - see Procedure Record under Scanned Documents for signed results.     ASCAN ONLY - DIAGNOSTIC OU (BOTH EYES)    Result Date: 3/7/2024  Date of Procedure 3/7/2024. Technician Information Loulou Lilly, COA . Notes Ascan report in paper chart and scanned to patient chart after post op period. A-SCAN RIGHT EYE: 24.31 A-SCAN LEFT EYE: 23.93    XR chest 2 views    Result Date: 2/25/2024  Interpreted By:  Olaf Li, STUDY: XR CHEST 2 VIEWS;  2/25/2024 2:34 pm   INDICATION: Signs/Symptoms:Chest Pain.   COMPARISON: 11/01/2023   ACCESSION NUMBER(S): [id number]   ORDERING CLINICIAN: BUNNY CARROLL   TECHNIQUE: 2 radiographs of the chest were performed.   FINDINGS: There are low lung volumes. The heart is mildly enlarged and unchanged. There is mild central vascular and interstitial congestion which is unchanged. There is no new airspace consolidation or pneumothorax. There is slight blunting left lateral costophrenic angle which is stable. Postoperative changes are partially visualized in the lower cervical spine. There are findings of DISH.        No interval change from 11/01/2023 as described above.   Signed by: Olaf Li 2/25/2024 3:39 PM Dictation workstation:   [id number]    ECG 12 lead    Result Date: 2/25/2024  Normal sinus rhythm Left axis deviation Abnormal ECG When compared with ECG of 25-FEB-2024 13:08, (unconfirmed) Previous ECG has undetermined rhythm, needs review ST no longer depressed in Inferior leads See ED provider note for full interpretation and clinical correlation Confirmed by Yisel Yanez ([id number]) on 2/25/2024 1:40:13 PM    ECG 12 lead    Result Date: 2/25/2024  Undetermined rhythm Left axis deviation Nonspecific ST abnormality Abnormal ECG When compared with ECG of 01-OCT-2023 16:00, Current undetermined rhythm precludes rhythm comparison, needs review ST now depressed in Inferior leads T wave inversion now evident in Inferior leads T wave inversion no longer evident in Anterior leads See ED provider note for full interpretation and clinical correlation Confirmed by Yisel Yanez ([id number]) on 2/25/2024 1:39:27 PM       Assessment/Plan    Principal Problem:    Closed displaced intertrochanteric fracture of left femur (CMS/[facility])  Active Problems:    Cortical senile cataract    ESRD on peritoneal dialysis (CMS/[facility])    Closed displaced intertrochanteric fracture of left femur, initial encounter (CMS/[facility])    -L intertrochanteric fx on imaging  -orthopedic surgery consulted; plavix washout underway; to OR 3/17  -plavix held; heparin for DVT PPX held as well for now  -remainder of home meds resumed  -pain well-controlled with tramadol  -PT/OT/CM for placement after surgery    Code: full  DVT PPX: holding ahead of surgery tomorrow  GI PPX: not indicated  Diet: cardiac; NPO after MN      Malnutrition      I agree with the dietitian's malnutrition diagnosis.    Rick Covarrubias MD

## 2024-03-18 VITALS
SYSTOLIC BLOOD PRESSURE: 159 MMHG | OXYGEN SATURATION: 96 % | DIASTOLIC BLOOD PRESSURE: 74 MMHG | WEIGHT: 204.59 LBS | BODY MASS INDEX: 31.01 KG/M2 | HEIGHT: 68 IN

## 2024-03-18 LAB
ANION GAP SERPL CALC-SCNC: 16 MMOL/L (ref 10–20)
BUN SERPL-MCNC: 72 MG/DL (ref 6–23)
CALCIUM SERPL-MCNC: 8.3 MG/DL (ref 8.6–10.3)
CHLORIDE SERPL-SCNC: 97 MMOL/L (ref 98–107)
CO2 SERPL-SCNC: 26 MMOL/L (ref 21–32)
CREAT SERPL-MCNC: 5.44 MG/DL (ref 0.5–1.3)
EGFRCR SERPLBLD CKD-EPI 2021: 10 ML/MIN/1.73M*2
ERYTHROCYTE [DISTWIDTH] IN BLOOD BY AUTOMATED COUNT: 15.3 % (ref 11.5–14.5)
GLUCOSE BLD MANUAL STRIP-MCNC: 143 MG/DL (ref 74–99)
GLUCOSE BLD MANUAL STRIP-MCNC: 194 MG/DL (ref 74–99)
GLUCOSE BLD MANUAL STRIP-MCNC: 248 MG/DL (ref 74–99)
GLUCOSE SERPL-MCNC: 269 MG/DL (ref 74–99)
HCT VFR BLD AUTO: 23.9 % (ref 41–52)
HGB BLD-MCNC: 7.2 G/DL (ref 13.5–17.5)
MAGNESIUM SERPL-MCNC: 1.86 MG/DL (ref 1.6–2.4)
MCH RBC QN AUTO: 32.3 PG (ref 26–34)
MCHC RBC AUTO-ENTMCNC: 30.1 G/DL (ref 32–36)
MCV RBC AUTO: 107 FL (ref 80–100)
NRBC BLD-RTO: 0 /100 WBCS (ref 0–0)
PLATELET # BLD AUTO: 157 X10*3/UL (ref 150–450)
POTASSIUM SERPL-SCNC: 3.8 MMOL/L (ref 3.5–5.3)
RBC # BLD AUTO: 2.23 X10*6/UL (ref 4.5–5.9)
SODIUM SERPL-SCNC: 135 MMOL/L (ref 136–145)
WBC # BLD AUTO: 7.8 X10*3/UL (ref 4.4–11.3)

## 2024-03-18 PROCEDURE — 36415 COLL VENOUS BLD VENIPUNCTURE: CPT | Performed by: INTERNAL MEDICINE

## 2024-03-18 PROCEDURE — 2500000002 HC RX 250 W HCPCS SELF ADMINISTERED DRUGS (ALT 637 FOR MEDICARE OP, ALT 636 FOR OP/ED): Performed by: STUDENT IN AN ORGANIZED HEALTH CARE EDUCATION/TRAINING PROGRAM

## 2024-03-18 PROCEDURE — 97530 THERAPEUTIC ACTIVITIES: CPT | Mod: GP

## 2024-03-18 PROCEDURE — 80048 BASIC METABOLIC PNL TOTAL CA: CPT | Performed by: INTERNAL MEDICINE

## 2024-03-18 PROCEDURE — 99231 SBSQ HOSP IP/OBS SF/LOW 25: CPT | Performed by: PHYSICIAN ASSISTANT

## 2024-03-18 PROCEDURE — 85027 COMPLETE CBC AUTOMATED: CPT | Performed by: INTERNAL MEDICINE

## 2024-03-18 PROCEDURE — 99239 HOSP IP/OBS DSCHRG MGMT >30: CPT | Performed by: INTERNAL MEDICINE

## 2024-03-18 PROCEDURE — 83735 ASSAY OF MAGNESIUM: CPT | Performed by: INTERNAL MEDICINE

## 2024-03-18 PROCEDURE — 1100000001 HC PRIVATE ROOM DAILY

## 2024-03-18 PROCEDURE — 2500000004 HC RX 250 GENERAL PHARMACY W/ HCPCS (ALT 636 FOR OP/ED): Performed by: STUDENT IN AN ORGANIZED HEALTH CARE EDUCATION/TRAINING PROGRAM

## 2024-03-18 PROCEDURE — 82947 ASSAY GLUCOSE BLOOD QUANT: CPT

## 2024-03-18 PROCEDURE — 97166 OT EVAL MOD COMPLEX 45 MIN: CPT | Mod: GO

## 2024-03-18 PROCEDURE — 2500000001 HC RX 250 WO HCPCS SELF ADMINISTERED DRUGS (ALT 637 FOR MEDICARE OP): Performed by: INTERNAL MEDICINE

## 2024-03-18 PROCEDURE — 2500000001 HC RX 250 WO HCPCS SELF ADMINISTERED DRUGS (ALT 637 FOR MEDICARE OP): Performed by: STUDENT IN AN ORGANIZED HEALTH CARE EDUCATION/TRAINING PROGRAM

## 2024-03-18 PROCEDURE — 97161 PT EVAL LOW COMPLEX 20 MIN: CPT | Mod: GP

## 2024-03-18 RX ORDER — CLOPIDOGREL BISULFATE 75 MG/1
75 TABLET ORAL DAILY
Status: DISCONTINUED | OUTPATIENT
Start: 2024-03-20 | End: 2024-03-21 | Stop reason: HOSPADM

## 2024-03-18 RX ORDER — ACETAMINOPHEN 325 MG/1
975 TABLET ORAL 3 TIMES DAILY
Qty: 126 TABLET | Refills: 0 | Status: SHIPPED | OUTPATIENT
Start: 2024-03-18 | End: 2024-04-01

## 2024-03-18 RX ORDER — TRAMADOL HYDROCHLORIDE 50 MG/1
50 TABLET ORAL EVERY 12 HOURS PRN
Qty: 10 TABLET | Refills: 0 | Status: SHIPPED | OUTPATIENT
Start: 2024-03-18 | End: 2024-03-21

## 2024-03-18 RX ORDER — CLOPIDOGREL BISULFATE 75 MG/1
75 TABLET ORAL DAILY
Qty: 30 TABLET | Refills: 0 | Status: SHIPPED | OUTPATIENT
Start: 2024-03-18 | End: 2024-04-17

## 2024-03-18 RX ORDER — SEVELAMER CARBONATE FOR ORAL SUSPENSION 800 MG/1
0.8 POWDER, FOR SUSPENSION ORAL
Qty: 90 PACKET | Refills: 0 | Status: SHIPPED | OUTPATIENT
Start: 2024-03-19 | End: 2024-04-18

## 2024-03-18 RX ADMIN — LEVOTHYROXINE SODIUM 50 MCG: 50 TABLET ORAL at 07:02

## 2024-03-18 RX ADMIN — APIXABAN 2.5 MG: 2.5 TABLET, FILM COATED ORAL at 13:38

## 2024-03-18 RX ADMIN — Medication 1 TABLET: at 09:07

## 2024-03-18 RX ADMIN — ASPIRIN 81 MG: 81 TABLET, COATED ORAL at 09:07

## 2024-03-18 RX ADMIN — ACETAMINOPHEN 975 MG: 325 TABLET ORAL at 15:56

## 2024-03-18 RX ADMIN — HYDRALAZINE HYDROCHLORIDE 25 MG: 25 TABLET ORAL at 09:08

## 2024-03-18 RX ADMIN — INSULIN DETEMIR 20 UNITS: 100 INJECTION, SOLUTION SUBCUTANEOUS at 09:10

## 2024-03-18 RX ADMIN — ACETAMINOPHEN 975 MG: 325 TABLET ORAL at 09:07

## 2024-03-18 RX ADMIN — DULOXETINE HYDROCHLORIDE 30 MG: 30 CAPSULE, DELAYED RELEASE ORAL at 09:07

## 2024-03-18 RX ADMIN — SODIUM BICARBONATE 650 MG: 650 TABLET ORAL at 22:11

## 2024-03-18 RX ADMIN — SODIUM BICARBONATE 650 MG: 650 TABLET ORAL at 09:08

## 2024-03-18 RX ADMIN — HYDRALAZINE HYDROCHLORIDE 25 MG: 25 TABLET ORAL at 22:11

## 2024-03-18 RX ADMIN — TORSEMIDE 50 MG: 20 TABLET ORAL at 09:07

## 2024-03-18 RX ADMIN — METOPROLOL SUCCINATE 50 MG: 50 TABLET, EXTENDED RELEASE ORAL at 09:07

## 2024-03-18 RX ADMIN — SODIUM CHLORIDE, SODIUM LACTATE, CALCIUM CHLORIDE, MAGNESIUM CHLORIDE AND DEXTROSE: 1.5; 538; 448; 18.3; 5.08 INJECTION, SOLUTION INTRAPERITONEAL at 23:09

## 2024-03-18 RX ADMIN — SEVELAMER CARBONATE 0.8 G: 800 POWDER, FOR SUSPENSION ORAL at 13:38

## 2024-03-18 RX ADMIN — SEVELAMER CARBONATE 0.8 G: 800 POWDER, FOR SUSPENSION ORAL at 09:08

## 2024-03-18 RX ADMIN — AMLODIPINE BESYLATE 10 MG: 10 TABLET ORAL at 09:07

## 2024-03-18 RX ADMIN — INSULIN LISPRO 2 UNITS: 100 INJECTION, SOLUTION INTRAVENOUS; SUBCUTANEOUS at 13:38

## 2024-03-18 RX ADMIN — TRAMADOL HYDROCHLORIDE 50 MG: 50 TABLET, COATED ORAL at 09:54

## 2024-03-18 RX ADMIN — HYDRALAZINE HYDROCHLORIDE 25 MG: 25 TABLET ORAL at 15:57

## 2024-03-18 RX ADMIN — INSULIN LISPRO 4 UNITS: 100 INJECTION, SOLUTION INTRAVENOUS; SUBCUTANEOUS at 09:09

## 2024-03-18 RX ADMIN — ONDANSETRON 4 MG: 2 INJECTION INTRAMUSCULAR; INTRAVENOUS at 09:54

## 2024-03-18 RX ADMIN — Medication 1000 UNITS: at 09:07

## 2024-03-18 RX ADMIN — ACETAMINOPHEN 975 MG: 325 TABLET ORAL at 22:11

## 2024-03-18 ASSESSMENT — COGNITIVE AND FUNCTIONAL STATUS - GENERAL
EATING MEALS: A LOT
EATING MEALS: TOTAL
CLIMB 3 TO 5 STEPS WITH RAILING: TOTAL
TOILETING: A LOT
DAILY ACTIVITIY SCORE: 15
MOVING FROM LYING ON BACK TO SITTING ON SIDE OF FLAT BED WITH BEDRAILS: A LOT
TOILETING: A LOT
HELP NEEDED FOR BATHING: A LOT
MOBILITY SCORE: 11
DAILY ACTIVITIY SCORE: 11
WALKING IN HOSPITAL ROOM: A LOT
PERSONAL GROOMING: A LOT
MOVING FROM LYING ON BACK TO SITTING ON SIDE OF FLAT BED WITH BEDRAILS: A LOT
WALKING IN HOSPITAL ROOM: TOTAL
CLIMB 3 TO 5 STEPS WITH RAILING: TOTAL
DRESSING REGULAR UPPER BODY CLOTHING: A LOT
DRESSING REGULAR UPPER BODY CLOTHING: A LITTLE
DAILY ACTIVITIY SCORE: 12
MOBILITY SCORE: 12
WALKING IN HOSPITAL ROOM: TOTAL
DRESSING REGULAR LOWER BODY CLOTHING: A LOT
CLIMB 3 TO 5 STEPS WITH RAILING: A LOT
DRESSING REGULAR LOWER BODY CLOTHING: A LOT
MOVING FROM LYING ON BACK TO SITTING ON SIDE OF FLAT BED WITH BEDRAILS: A LITTLE
PERSONAL GROOMING: A LITTLE
PERSONAL GROOMING: A LOT
TURNING FROM BACK TO SIDE WHILE IN FLAT BAD: A LOT
STANDING UP FROM CHAIR USING ARMS: A LOT
TURNING FROM BACK TO SIDE WHILE IN FLAT BAD: A LOT
TOILETING: A LOT
MOVING TO AND FROM BED TO CHAIR: A LOT
STANDING UP FROM CHAIR USING ARMS: A LOT
MOVING TO AND FROM BED TO CHAIR: A LOT
HELP NEEDED FOR BATHING: A LOT
TURNING FROM BACK TO SIDE WHILE IN FLAT BAD: A LOT
EATING MEALS: A LITTLE
MOVING TO AND FROM BED TO CHAIR: TOTAL
DRESSING REGULAR LOWER BODY CLOTHING: A LOT
MOBILITY SCORE: 9
HELP NEEDED FOR BATHING: A LOT
STANDING UP FROM CHAIR USING ARMS: A LOT
DRESSING REGULAR UPPER BODY CLOTHING: A LOT

## 2024-03-18 ASSESSMENT — PAIN - FUNCTIONAL ASSESSMENT
PAIN_FUNCTIONAL_ASSESSMENT: 0-10

## 2024-03-18 ASSESSMENT — PAIN SCALES - GENERAL
PAINLEVEL_OUTOF10: 10 - WORST POSSIBLE PAIN
PAINLEVEL_OUTOF10: 10 - WORST POSSIBLE PAIN
PAINLEVEL_OUTOF10: 5 - MODERATE PAIN
PAINLEVEL_OUTOF10: 5 - MODERATE PAIN
PAINLEVEL_OUTOF10: 10 - WORST POSSIBLE PAIN

## 2024-03-18 ASSESSMENT — PAIN DESCRIPTION - LOCATION: LOCATION: HIP

## 2024-03-18 ASSESSMENT — ACTIVITIES OF DAILY LIVING (ADL): BATHING_ASSISTANCE: MAXIMAL

## 2024-03-18 ASSESSMENT — PAIN DESCRIPTION - ORIENTATION: ORIENTATION: LEFT

## 2024-03-18 ASSESSMENT — PATIENT HEALTH QUESTIONNAIRE - PHQ9: SUM OF ALL RESPONSES TO PHQ QUESTIONS 1-9: 12

## 2024-03-18 NOTE — DISCHARGE SUMMARY
New discharge date: 3/21/24  Patient remained in hospital awaiting precertification from insurance needed 3 midnights in the hospital.  Labs in the morning were obtained and patient slightly anemic.  1 unit of blood to be transfused.  Likely secondary to his chronic kidney disease/ESRD.  Patient receives EPO injections.  Patient to continue these through nephrology.  Patient otherwise in hemodynamically stable condition.  Due to bed availability patient remains in the hospital but plan is for patient to have a room available that would allow him to do his peritoneal dialysis there.    Discharge Diagnosis  Closed displaced intertrochanteric fracture of left femur (CMS/HCC)    Issues Requiring Follow-Up    Discharge Meds     Your medication list        START taking these medications        Instructions Last Dose Given Next Dose Due   apixaban 2.5 mg tablet  Commonly known as: Eliquis  Start taking on: March 19, 2024      Take 1 tablet (2.5 mg) by mouth every 12 hours for 25 days. Do not start before March 19, 2024.       sevelamer carbonate 0.8 gram packet  Commonly known as: Renvela  Start taking on: March 19, 2024      Take 1 packet (0.8 g) by mouth 3 times a day with meals. Do not start before March 19, 2024.       traMADol 50 mg tablet  Commonly known as: Ultram      Take 1 tablet (50 mg) by mouth every 12 hours if needed for severe pain (7 - 10) or moderate pain (4 - 6) for up to 3 days.              CHANGE how you take these medications        Instructions Last Dose Given Next Dose Due   acetaminophen 325 mg tablet  Commonly known as: Tylenol  What changed:   how much to take  when to take this  reasons to take this      Take 3 tablets (975 mg) by mouth 3 times a day for 14 days.       clopidogrel 75 mg tablet  Commonly known as: Plavix  What changed: additional instructions      Take 1 tablet (75 mg) by mouth once daily. Starting on 3/20/24       metoprolol succinate XL 50 mg 24 hr tablet  Commonly known as:  "Toprol-XL  What changed: when to take this      Take 1 tablet (50 mg) by mouth once daily. Do not crush or chew.              CONTINUE taking these medications        Instructions Last Dose Given Next Dose Due   amLODIPine 10 mg tablet  Commonly known as: Norvasc           aspirin 81 mg EC tablet      Take 1 tablet (81 mg) by mouth once daily. Do not start before October 18, 2023.       cholecalciferol 25 MCG (1000 UT) tablet  Commonly known as: Vitamin D-3           Daily Multivitamin-Minerals tablet  Generic drug: multivitamin with minerals           DULoxetine 30 mg DR capsule  Commonly known as: Cymbalta           fluticasone 50 mcg/actuation nasal spray  Commonly known as: Flonase           FREESTYLE ANDRÉS 2 SENSOR MISC           gentamicin 0.1 % cream  Commonly known as: Garamycin           glucosamine HCl 1,500 mg tablet           hydrALAZINE 25 mg tablet  Commonly known as: Apresoline      TAKE 25 MG IN THE MORNING. TAKE 25 MG IN THE AFTERNOON. TAKE 50 MG IN THE EVENING.       insulin lispro 100 unit/mL injection  Commonly known as: HumaLOG           LANCETS MISC           Levemir U-100 Insulin 100 unit/mL injection  Generic drug: insulin detemir           levothyroxine 50 mcg capsule  Commonly known as: Tirosint           loratadine-pseudoephedrine  mg 24 hr tablet  Commonly known as: Claritin-D 24-hour           OneTouch Ultra Test strip  Generic drug: blood sugar diagnostic           pen needle, diabetic 31 gauge x 1/4\" needle           PROSTATE THERAPY ORAL           RenaPlex-D 800 mcg-12.5 mg -2,000 unit tablet  Generic drug: vit B,C-FA-zinc-selen-vit D3-E           sennosides-docusate sodium 8.6-50 mg tablet  Commonly known as: Diana-Colace           sodium bicarbonate 650 mg tablet           torsemide 100 mg tablet  Commonly known as: Demadex                  STOP taking these medications      atorvastatin 40 mg tablet  Commonly known as: Lipitor                  Where to Get Your Medications    " "    These medications were sent to [id number] - [place], [place] - [address] AT [facility] [address]  [address], [facility] [address]      Phone: [phone]   acetaminophen 325 mg tablet  apixaban 2.5 mg tablet  clopidogrel 75 mg tablet  sevelamer carbonate 0.8 gram packet  traMADol 50 mg tablet         Test Results Pending At Discharge  Pending Labs       No current pending labs.            Hospital Course   History Of Present Illness  Salomon Chowdary \"Robbie\" is a 83 y.o. male with past medical history of ESRD on peritoneal dialysis, diabetes, hypertension, history of CABG earlier this year at [facility], peripheral neuropathy presented to emergency department after a fall.  Patient was having a really good day today and dropped his continuous glucose monitor device on the floor.  Patient thought he could bend and pick it up.  While trying to do that he lost his balance and fell.  He never lost consciousness.  In the emergency department patient was worked up from a trauma perspective and was found to have fracture.  Orthopedic surgery was consulted and patient was referred to hospitalist service for further management.    Hospital course: Patient was admitted to the hospital after suffering a fall and breaking his left hip.  Patient was seen by orthopedic surgery and time was allowed for washout of his Plavix.  His Plavix was held and they can be restarted on postop day 3 which is 3/20/2024.  A new prescription was sent for this with these instructions.  For DVT prophylaxis patient was started on apixaban.  Patient did go to the OR on 3/17/2024 with successful repair.  He is to follow-up with his PCP and orthopedic surgery.  Patient work with physical therapy and Occupational Therapy and is being discharged to skilled nursing facility for rehab.    Discharge time greater than 35 minutes. Greater than 50% of time spent on counseling patient, " coordination of care, medication reconciliation, transmitting medications to pharmacy and clarifying plan of care with nursing staff and case management.    Pertinent Physical Exam At Time of Discharge  Physical Exam  Constitutional:       General: He is not in acute distress.     Appearance: Normal appearance.   HENT:      Head: Normocephalic and atraumatic.      Mouth/Throat:      Mouth: Mucous membranes are moist.   Eyes:      Extraocular Movements: Extraocular movements intact.      Conjunctiva/sclera: Conjunctivae normal.      Pupils: Pupils are equal, round, and reactive to light.   Cardiovascular:      Rate and Rhythm: Normal rate and regular rhythm.      Heart sounds: Normal heart sounds.   Pulmonary:      Effort: Pulmonary effort is normal.      Breath sounds: Normal breath sounds. No wheezing, rhonchi or rales.   Abdominal:      General: Abdomen is flat. Bowel sounds are normal.      Palpations: Abdomen is soft.   Musculoskeletal:         General: No swelling or tenderness.      Cervical back: Normal range of motion and neck supple.      Comments: L hip surgical site c/d/I w/ no hematoma; neurovascularly intact distal to surgical site   Skin:     General: Skin is warm and dry.      Findings: No rash.   Neurological:      General: No focal deficit present.      Mental Status: He is alert and oriented to person, place, and time.      Cranial Nerves: No cranial nerve deficit.      Sensory: No sensory deficit.   Psychiatric:         Mood and Affect: Mood normal.         Behavior: Behavior normal.         Outpatient Follow-Up  Future Appointments   Date Time Provider Department Center   3/20/2024  [facility]   3/27/2024  1:00 PM CARDIAC REHAB Y CARDREHB ROOM [facility]   4/5/2024  1:00 PM CARDIAC REHAB ELY CARDREHB ROOM [facility]   4/10/2024  1:00 PM CARDIAC REHAB ELY CARDREHB ROOM [facility]   4/12/2024  1:00 PM CARDIAC REHAB Y CARDREHB [facility]    4/17/2024  1:00 PM CARDIAC REHAB EL[facility] CARDREHB ROOM [facility]   4/19/2024  1:00 PM CARDIAC REHAB ELY CARDREHB ROOM [facility]   4/24/2024  1:00 PM CARDIAC REHAB ELY CARDREHB ROOM [facility]   4/26/2024  1:00 PM CARDIAC REHAB Y CARDREHB ROOM [facility]   5/1/2024  1:00 PM CARDIAC REHAB ELY CARDREHB ROOM [facility]   5/1/2024  2:00 PM Michael Harmon MD [id number] [place]   5/3/2024  1:00 PM CARDIAC REHAB ELY CARDREHB ROOM [facility]   5/8/2024  1:00 PM CARDIAC REHAB ELY CARDREHB ROOM [facility]   5/22/2024  2:45 PM Michael Harmon MD [id number] [place]   6/12/2024  2:45 PM Michael Harmon MD [id number] [place]         Rick Covarrubias MD

## 2024-03-18 NOTE — PROGRESS NOTES
03/18/24 1034   Discharge Planning   Patient expects to be discharged to: home vs rehab   Does the patient need discharge transport arranged? Yes   RoundTrip coordination needed? Yes   Has discharge transport been arranged? No     Spoke to patient at bedside to talk about discharge planning, he stated he wanted his wife to come to the hospital to be included in our discussion. Spoke to wife, Yoly, on the phone who stated she was busy this morning, but could be here by 2:15pm.    1415: Spoke with wife and patient at bedside to discuss discharge planning, patient and wife currently live in independent living at [place]. Wife does housekeeping, meals, and driving. Pt uses a cane to ambulate, wife assisted with medication. Patient has been a peritoneal dialysis with [facility]. PT/OT recommends SNF prior to returning home with wife. Patient and wife in agreement and would like to have patient go to [facility]. Referral sent to facility electronically awaiting reply.

## 2024-03-18 NOTE — PROGRESS NOTES
"Physical Therapy    Physical Therapy Evaluation    Patient Name: Salomon Chowdary \"Robbie\"  MRN: [id number]  Today's Date: 3/18/2024   Time Calculation  Start Time: 1128  Stop Time: 1151  Time Calculation (min): 23 min  3114    Assessment/Plan   PT Assessment: Pt demonstrates decreased strength, increased pain, decreased activity tolerance, and impaired balance/mobility.  Pt appears below baseline level of function and based on current level of function, pt would benefit from continued skilled therapy while in the hospital to ensure safety, decrease risk of falls, and regains strength/mobility back to baseline.  At this time, pt is unsafe to return home and once stable enough for discharge, pt would benefit from moderate intensity therapy prior to returning home.     PT Assessment Results: Decreased strength, Decreased range of motion, Impaired balance, Decreased mobility, Impaired judgement, Decreased safety awareness, Pain  Rehab Prognosis: Good  Evaluation/Treatment Tolerance: Patient limited by pain, Patient limited by fatigue  Medical Staff Made Aware: Yes  End of Session Communication: Bedside nurse  End of Session Patient Position: Bed, 3 rail up, Alarm on  IP OR SWING BED PT PLAN  Inpatient or Swing Bed: Inpatient  PT Plan  Treatment/Interventions: Bed mobility, Transfer training, Gait training, Balance training, Neuromuscular re-education, Strengthening, Range of motion, Therapeutic exercise, Therapeutic activity  PT Plan: Skilled PT  PT Frequency: Daily; will monitor if pt is able to tolerate frequency, may adjust if needed  PT Discharge Recommendations: Moderate intensity level of continued care  Equipment Recommended upon Discharge: Wheeled walker  PT Recommended Transfer Status: Assist x2 (Max A)  PT - OK to Discharge: Yes - To next level of care when cleared by medical team    Subjective     Current Problem:  Patient Active Problem List   Diagnosis    Atherosclerosis of native coronary artery of native heart " without angina pectoris    ESRD (end stage renal disease) (CMS/HCC)    Type 2 diabetes mellitus with circulatory disorder (CMS/HCC)    Combined forms of age-related cataract of both eyes    MAU (acute kidney injury) (CMS/HCC)    Allergy, unspecified, initial encounter    Anaphylactic shock, unspecified, initial encounter    Benign prostatic hyperplasia    Breast pain    Carpal tunnel syndrome    Cervical myelopathy (CMS/HCC)    Chronic fatigue    Class 1 obesity    Coagulation defect, unspecified (CMS/HCC)    Cortical senile cataract    Diabetes mellitus type 2 without retinopathy (CMS/HCC)    Edema    ESRD on peritoneal dialysis (CMS/HCC)    Primary hypertension    High thyroid stimulating hormone (TSH) level    Hyperkalemia    Mixed hyperlipidemia    Hyperphosphatemia    Hypertension secondary to other renal disorders    Hypertrophy of nail    Mechanical complication of dialysis catheter (CMS/HCC)    Metabolic acidosis    Microscopic hematuria    Myopia of right eye    Nuclear sclerosis    Chronic anemia    Anemia due to stage 5 chronic kidney disease, not on chronic dialysis (CMS/HCC)    Type 2 diabetes mellitus with hyperglycemia (CMS/HCC)    Type 2 diabetes mellitus with diabetic neuropathy, unspecified (CMS/HCC)    Subclinical hypothyroidism    Stenosis of intervertebral foramina    Secondary hyperparathyroidism of renal origin (CMS/HCC)    Stage 4 chronic kidney disease (CMS/HCC)    Right arm weakness    Restrictive lung disease    ILD (interstitial lung disease) (CMS/HCC)    Restrictive cardiomyopathy (CMS/HCC)    Regular astigmatism of left eye    Proteinuria    Presbyopia    Peripheral vascular disease (CMS/HCC)    Peripheral neuropathy due to metabolic disorder (CMS/HCC)    Other disorders resulting from impaired renal tubular function    Palpitations    PAF (paroxysmal atrial fibrillation) (CMS/HCC)    Pericardial effusion    Other chest pain    Closed displaced intertrochanteric fracture of left femur  (CMS/[facility])    Closed displaced intertrochanteric fracture of left femur, initial encounter (CMS/[facility])       General Visit Information:  POD #1 L TFN  Per EMR: pt presented to emergency department after a fall. Patient was having a really good day today and dropped his continuous glucose monitor device on the floor. Patient thought he could bend and pick it up. While trying to do that he lost his balance and fell. He never lost consciousness. In the emergency department patient was worked up from a trauma perspective and was found to have fracture. Orthopedic surgery was consulted and patient was referred to hospitalist service for further management.   General: On arrival, pt supine in bed with soft C collar on stating he wears it for comfort after his cervical fusion ~9 years ago.  Pt reports dizziness at rest s/p pain meds, but willing to work with therapy.    Reason for Referral: impaired mobility, gait training  Referred By: Rick Covarrubias  Past Medical History Relevant to Rehab: ESRD, HTN, CAD, DM, Afib, CABG  Co-Treatment: OT  Prior to Session Communication: Bedside nurse  Patient Position Received: Bed, 3 rail up, Alarm on    Home Living/PLOF:  Pt lives with wife in [facility] with [address].  1 floor set up.  Mod I with mobility PTA using SPC.  Indep with ADLs aside from assist with donning socks.  Has WIS with chair and Gbs.  Wife drives and does grocery shopping.  PT reports 1-2 other falls but cannot remember when.     Precautions:  Precautions  Medical Precautions: Fall precautions  Post-Surgical Precautions:  (LLE WBAT)     Objective     Pain:  Pain Assessment  Pain Assessment: 0-10  Pain Score: 10 - Worst possible pain  Pain Location: Hip  Pain Orientation: Left    Cognition:  Cognition  Overall Cognitive Status: Within Functional Limits  Orientation Level: Oriented X4    General Assessments:      Activity Tolerance  Endurance: Decreased tolerance for upright activites    Static Sitting Balance  Static  Sitting-Comment/Number of Minutes: fair plus  Dynamic Sitting Balance  Dynamic Sitting-Comments: fair  Static Standing Balance  Static Standing-Comment/Number of Minutes: poor  Dynamic Standing Balance  Dynamic Standing-Comments: poor    Functional Assessments:  Bed Mobility   Supine to sit: Max A x2; pt able to move BLE towards EOB with assist and required assist completing transfer with use of draw sheet  Sit to supine: Dep A x2; draw sheet used    Transfers  Sit to stand: x2 trials with Max A x2; 1st trial: pt unable to clear buttocks from EOB and requested to sit back down secondary to nausea; 2nd trial: pt able to clear buttocks from EOB but unable to reach full stand and unable to bear weight through LLE despite multiple Vcs/Tcs and elevated bed, pt then requesting to sit back down; pt unable to weight shift onto LLE and demo's poor stand balance     Stand to sit: Max A x2; VCs for hand placement, poor eccentric control     Pt appears self limiting at times throughout session and unwilling to follow cues from therapists     Ambulation/Gait Training  Unable to attempt ambulation at this time    Extremity/Trunk Assessments:  RLE: able to wiggle toes, pump ankle, and minimally SLR  LLE: able to wiggle toes, pump ankle, unable to perform SLR    Outcome Measures:  [facility] Basic Mobility  Turning from your back to your side while in a flat bed without using bedrails: A lot  Moving from lying on your back to sitting on the side of a flat bed without using bedrails: A lot  Moving to and from bed to chair (including a wheelchair): Total  Standing up from a chair using your arms (e.g. wheelchair or bedside chair): A lot  To walk in hospital room: Total  Climbing 3-5 steps with railing: Total  Basic Mobility - Total Score: 9    Goals:  Encounter Problems       Encounter Problems (Active)       PT Problem       Pt will be able to perform all bed mobility tasks with Min A.  (Progressing)       Start:  03/18/24    Expected  End:  04/01/24            Pt will perform all transfers with Mod A and FWW with proper safety mechanics.   (Progressing)       Start:  03/18/24    Expected End:  04/01/24            Pt will ambulate 50 ft with Mod A using FWW for improved functional independence.  (Progressing)       Start:  03/18/24    Expected End:  04/01/24            Pt will demonstrate fair plus stand balance for completion of therapeutic exercises and functional tasks.  (Progressing)       Start:  03/18/24    Expected End:  04/01/24                 Education Documentation  Home Exercise Program, taught by Dayan Ruano, PT at 3/18/2024 12:59 PM.  Learner: Patient  Readiness: Acceptance  Method: Explanation  Response: Needs Reinforcement    Body Mechanics, taught by Dayan Ruano PT at 3/18/2024 12:59 PM.  Learner: Patient  Readiness: Acceptance  Method: Explanation  Response: Needs Reinforcement    Mobility Training, taught by Dayan Ruano, PT at 3/18/2024 12:59 PM.  Learner: Patient  Readiness: Acceptance  Method: Explanation  Response: Needs Reinforcement    Education Comments  No comments found.

## 2024-03-18 NOTE — PROGRESS NOTES
Cardiac Rehabilitation 90 Day Reassessment    Name: Salomon Chowdary  Medical Record Number: [id number]  YOB: 1940  Age: 83 y.o.    Today’s Date: 3/18/2024  Primary Care Physician: Enrique Ansari MD  Referring Physician: Michael Harmon MD  Program Location: [facility]  Primary Diagnosis:   1. NSTEMI (non-ST elevation myocardial infarction) (CMS/HCC)  Follow Up In Cardiac Rehab   2.      CABG      Onset/Date of Diagnosis: 10/11/2023    Session Completed: 18    AACVPR Risk Stratification: Moderate   Falls Risk: Patient has remained free from falls while enrolled in rehab as of @TD@.       Psychosocial Assessment     Pre PHQ-9: 12      Sent PH-Q 9 to MD if score > 20: No; score < 20    Pt reported/currently experiencing stress: No  Patient uses stress management skills: No   History of: no history of anxiety or depression  Currently seeing a mental health provider: No  Social Support: Yes, Whom:Family members  Quality of Life Survey: SF-36   SF-36 Pre Post   Physical Component Score 18.51 TBD   Mental Component Score 50.38 TBD     Learning Assessment:  Learning assessment/barriers: None  Preferred learning method:  N/A  Barriers: None  Comments:    Stages of Change:Action    Psychosocial Plan    Goal Status: In progress    Psychosocial Goals: Demonstrating proper techniques for stress management and Maintain or lower PH-Q 9 score by discharge    Psychosocial Interventions/Education:  Patient reports no change in psychosocial status. Patient engaging with staff and classmates. Patient is able to visit with the program counselor at any time upon request while enrolled in the program.       Nutrition Assessment:    Hyperlipidemia: Yes     Lipids:   Lab Results   Component Value Date    CHOL 78 10/02/2023    HDL 35.8 10/02/2023    TRIG 94 10/02/2023       Current Dietary Guidelines:  Heart healthy  Barriers to dietary change: no    Diet Habit Survey: Picture Your Plate  Pre:   "63%  Post: To be done at discharge.    Diabetes Assessment    Lab Results   Component Value Date    HGBA1C 7.0 (A) 02/20/2024       History of Diabetes: Yes Pt monitors BS at home: Yes   Frequency: 3 /day  FBS range: 85 - 111  Hypoglycemic Episodes: No     Weight Management    Height: 172.7 cm (5' 7.99\")  Weight: 92.8 kg (204 lb 9.4 oz)  BMI (Calculated): 31.11      Nutrition Plan    Goal Status: In progress    Nutrition Goals: Improve Diet Habit Survey score by 5-10 points by discharge, Learn how to read and interpret nutrition labels prior to discharge, and Verbalize S/S of hypoglycemia or hyperglycemia by discharge    Nutrition Interventions/Education:   Perform weekly weight checks at rehab. Patient discussed the importance of following a low sodium diet and DASH diet during the hypertension class. Patient attended class with the dietician, discussed proper eating tips, heart healthy eating, and proper portions.        Exercise Assessment    No  Mode: NA  Frequency: NA  Duration: NA    Exercise Prescription     Exercise Prescription based on: Patient health history   Frequency:  2 days/week   Mode: NuStep and Cardiostrider   Duration: 30-40 total aerobic minutes   Intensity: RPE 11-14  Target HR:   88-98  MET Level: 2.4-2.6  Patient wears supplemental O2: No     Modality Workload METs Duration (minutes)   1 Pre-Exercise      2 NuStep 46 Escobar @ lvl 3  2.4 15:00   3 Cardiostrider 40 SPM @ lvl 3 2.6 15 :00   4 Education   10:00   5 Post-Exercise        Resistance Training: No   Home Exercise Prescription given: To be given prior to discharge from program.    Exercise Plan    Goal Status: In progress    Exercise Goals: Increase exercise MET level by 5-10% each week, Increase total exercise duration to 30-45 minutes, and Establish a home exercise program before discharge    Exercise Interventions/Education:   Patient has only been using his legs for a few sessions due to having shoulder and back pain. He has stated " that he is starting to see a difference in his strength in his legs. Will continue with exercise as tolerated.       Other Core Components/Risk Factor Assessment:    Medication adherence  Current Medications:   Medication Documentation Review Audit       Reviewed by GUILLERMO CALDWELL (Technician) on 03/15/24 at 1738      Medication Order Taking? Sig Documenting Provider Last Dose Status   acetaminophen (Tylenol) 325 mg tablet [id number] No Take 2 tablets (650 mg) by mouth every 6 hours if needed for mild pain (1 - 3). MARGARET Chang 3/14/2024 Active   amLODIPine (Norvasc) 10 mg tablet [id number] No Take 1 tablet (10 mg) by mouth once daily. Historical Provider, MD 3/15/2024 Active   aspirin 81 mg EC tablet [id number] No Take 1 tablet (81 mg) by mouth once daily. Do not start before October 18, 2023. MARGARET Chang 3/14/2024 Active   atorvastatin (Lipitor) 40 mg tablet [id number] No Take 1 tablet (40 mg) by mouth once daily at bedtime.   Patient not taking: Reported on 3/15/2024    MARGARET Chang Not Taking Active   cholecalciferol (Vitamin D-3) 25 MCG (1000 UT) tablet [id number] No Take 1 tablet (25 mcg) by mouth once daily. Historical Provider, MD 3/14/2024 Active   clopidogrel (Plavix) 75 mg tablet [id number] No Take 1 tablet (75 mg) by mouth once daily for 362 doses. Do not start before October 18, 2023. MARGARET Chang 3/14/2024 Active   DULoxetine (Cymbalta) 30 mg DR capsule [id number] No Take 1 capsule (30 mg) by mouth once daily. Do not crush or chew. Historical Provider, MD 3/14/2024 Active   flash glucose sensor (FREESTYLE ANDRÉS 2 SENSOR [facility]) [id number] No Change every 2 weeks Historical Provider, MD 3/14/2024 Active   fluticasone (Flonase) 50 mcg/actuation nasal spray [id number] No once daily as needed. Historical Provider, MD 3/14/2024 Active   gentamicin (Garamycin) 0.1 % cream [id number] No Apply 1 Application topically once daily. Historical Provider, MD  3/15/2024 Active   glucosamine HCl 1,500 mg tablet [id number] No Take 1 tablet by mouth 2 times a day. Rosmery Mcdermott MD 3/15/2024 Active   hydrALAZINE (Apresoline) 25 mg tablet [id number] No TAKE 25 MG IN THE MORNING. TAKE 25 MG IN THE AFTERNOON. TAKE 50 MG IN THE EVENING. Michael Harmon MD 3/15/2024 Active   insulin detemir (Levemir U-100 Insulin) 100 unit/mL injection [id number] No Inject 25 Units under the skin once daily. Take as directed per insulin instructions. Rosemry Mcdermott MD 3/14/2024 Active   insulin lispro (HumaLOG) 100 unit/mL injection [id number] No Inject subcutaneously before meals TID, as per sliding scale: 1 unit if sugar is 151-200, 2 units if 201-250, 3 units if 251-300, 4 units if 301-350, 5 units> 350. Total 15 units per day. Rosmery Mcdermott MD 3/15/2024 Active   LANCETS MISC [id number] No 1 each once daily. Rosmery Mcdermott MD 3/14/2024 Active   levothyroxine (Tirosint) 50 mcg capsule [id number] No Take 1 capsule (50 mcg) by mouth once daily in the morning. Take before meals. Rosmery Mcdermott MD 3/15/2024 Active   loratadine-pseudoephedrine (Claritin-D 24-hour)  mg 24 hr tablet [id number]  Take 1 tablet by mouth once daily as needed for allergies. Do not crush, chew, or split. Historical Provider, MD  Active   metoprolol succinate XL (Toprol-XL) 50 mg 24 hr tablet [id number]  Take 1 tablet (50 mg) by mouth once daily. Do not crush or chew.   Patient taking differently: Take 1 tablet (50 mg) by mouth 2 times a day. Do not crush or chew.    Iraida Garzon, APRN-CNP   24 2359   min17/nettle/pumpkin/saw palme (PROSTATE THERAPY ORAL) [id number] No Take by mouth. Rosmery Mcdermott MD 3/14/2024 Active   multivitamin with minerals (Daily Multivitamin-Minerals) tablet [id number] No Take 1 tablet by mouth once daily. Rosmery Mcdermott MD 3/15/2024 Active   OneTouch Ultra Test strip [id number] No TEST THREE TIMES DAILY. Historical Provider, MD 3/14/2024  "Active   pen needle, diabetic 31 gauge x 1/4\" needle [id number] No 1 each 2 times a day. Rosmery Provider, MD 3/14/2024 Active   RenaPlex-D 800 mcg-12.5 mg -2,000 unit tablet [id number] No Take 1 tablet by mouth once daily. Rosmery Mcdermott MD 3/15/2024 Active   sennosides-docusate sodium (Diana-Colace) 8.6-50 mg tablet [id number] No Take 2 tablets by mouth 2 times a day. Rosmery Mcdermott MD 3/15/2024 Active   sodium bicarbonate 650 mg tablet [id number] No Take 1 tablet (650 mg) by mouth 2 times a day. Rosmery Mcdermott MD 3/15/2024 Active   torsemide (Demadex) 100 mg tablet [id number] No Take 0.5 tablets (50 mg) by mouth once daily. Rosmery Mcdermott MD 3/15/2024 Active                                 Medication compliance: Yes   Uses pill box/organizer: Yes    Carries medication list: No     Blood Pressure Management  History of Hypertension: Yes   Medication Changes: No   Resting BP:  Visit Vitals  /74          Heart Failure Management  Hx of Heart Failure: No    Smoking/Tobacco Assessment  Social History     Tobacco Use   Smoking Status Never   Smokeless Tobacco Never       Other Core Component Plan    Goal Status: In progress    Other Core Component Goals: Medication compliance, Verbalize medication usage and drug actions by discharge, and Achieve resting BP of < 130/80 by discharge    Other Core Component Interventions/Education:   Will continue to monitor HR, BP, dyspnea and arrhythmias each session. Patient attended class on hypertension, discussed BP numbers, medications, compliance, HTN causes, and low sodium/DASH diets. Patient attended class on CAD/RF, discussed angina signs and symptoms, and NTG/EMS. Patient reviewed the knowledge quiz as part of education class.         Individual Patient Goals:    Decrease SOB with exertion, build stamina, strengthen muscles  Muscle toning, build resillience.    Goal Status: In progress    Staff Comments:  Patient reports no angina with exercise. He " has attended 18 sessions of cardiac rehab. Patient has stated that he is feeling stronger in his legs. He is exercising between 35-40 minutes between the Nustep and the Cardiostrider. He has been consistent with attendance and is engaging with staff and peers when attending. Patient Spo2 has been 96% or greater on room air with exercise. Patient has been using only legs due to some discomfort in in shoulder and back. Will continue with increasing resistance and speeds as tolerable.    Rehab Staff Signature: CESARIO DIAZ

## 2024-03-18 NOTE — CARE PLAN
The patient's goals for the shift include      The clinical goals for the shift include pain relief after pain med administration      Problem: Pain  Goal: My pain/discomfort is manageable  Outcome: Progressing     Problem: Safety  Goal: Patient will be injury free during hospitalization  Outcome: Progressing  Goal: I will remain free of falls  Outcome: Progressing     Problem: Daily Care  Goal: Daily care needs are met  Outcome: Progressing     Problem: Psychosocial Needs  Goal: Demonstrates ability to cope with hospitalization/illness  Outcome: Progressing  Goal: Collaborate with me, my family, and caregiver to identify my specific goals  Outcome: Progressing     Problem: Discharge Barriers  Goal: My discharge needs are met  Outcome: Progressing     Problem: Pain - Adult  Goal: Verbalizes/displays adequate comfort level or baseline comfort level  Outcome: Progressing     Problem: Safety - Adult  Goal: Free from fall injury  Outcome: Progressing     Problem: Discharge Planning  Goal: Discharge to home or other facility with appropriate resources  Outcome: Progressing     Problem: Chronic Conditions and Co-morbidities  Goal: Patient's chronic conditions and co-morbidity symptoms are monitored and maintained or improved  Outcome: Progressing     Problem: Skin  Goal: Decreased wound size/increased tissue granulation at next dressing change  Outcome: Progressing  Flowsheets (Taken 3/17/2024 1630 by Alon Miranda RN)  Decreased wound size/increased tissue granulation at next dressing change: Promote sleep for wound healing  Goal: Participates in plan/prevention/treatment measures  Outcome: Progressing  Flowsheets (Taken 3/18/2024 0415)  Participates in plan/prevention/treatment measures: Elevate heels  Goal: Prevent/manage excess moisture  Outcome: Progressing  Flowsheets (Taken 3/18/2024 0415)  Prevent/manage excess moisture:   Cleanse incontinence/protect with barrier cream   Monitor for/manage infection if present    Follow provider orders for dressing changes   Moisturize dry skin  Goal: Prevent/minimize sheer/friction injuries  Outcome: Progressing  Flowsheets (Taken 3/18/2024 0415)  Prevent/minimize sheer/friction injuries:   Use pull sheet   Turn/reposition every 2 hours/use positioning/transfer devices   HOB 30 degrees or less  Goal: Promote/optimize nutrition  Outcome: Progressing  Goal: Promote skin healing  Outcome: Progressing  Flowsheets (Taken 3/18/2024 0415)  Promote skin healing:   Assess skin/pad under line(s)/device(s)   Turn/reposition every 2 hours/use positioning/transfer devices   Rotate device position/do not position patient on device     Problem: Diabetes  Goal: Achieve decreasing blood glucose levels by end of shift  Outcome: Progressing  Goal: Increase stability of blood glucose readings by end of shift  Outcome: Progressing  Goal: Decrease in ketones present in urine by end of shift  Outcome: Progressing  Goal: Maintain electrolyte levels within acceptable range throughout shift  Outcome: Progressing  Goal: Maintain glucose levels >70mg/dl to <250mg/dl throughout shift  Outcome: Progressing  Goal: No changes in neurological exam by end of shift  Outcome: Progressing  Goal: Learn about and adhere to nutrition recommendations by end of shift  Outcome: Progressing  Goal: Vital signs within normal range for age by end of shift  Outcome: Progressing  Goal: Increase self care and/or family involovement by end of shift  Outcome: Progressing  Goal: Receive DSME education by end of shift  Outcome: Progressing     Problem: Pain  Goal: Takes deep breaths with improved pain control throughout the shift  Outcome: Progressing  Goal: Turns in bed with improved pain control throughout the shift  Outcome: Progressing  Goal: Walks with improved pain control throughout the shift  Outcome: Progressing  Goal: Performs ADL's with improved pain control throughout shift  Outcome: Progressing  Goal: Participates in PT with  improved pain control throughout the shift  Outcome: Progressing  Goal: Free from opioid side effects throughout the shift  Outcome: Progressing  Goal: Free from acute confusion related to pain meds throughout the shift  Outcome: Progressing

## 2024-03-18 NOTE — PROGRESS NOTES
"Occupational Therapy                 Therapy Communication Note    Patient Name: Salomon Chowdary \"Robbie\"  MRN: [id number]  Today's Date: 3/18/2024     Discipline: Occupational Therapy    Missed Visit Reason: Missed Visit Reason: Other (Comment) (Patient reports recently had pain meds and has nausea. Will hold OT eval at this time and will reattempt as appropriate.)    Missed Time: Attempt    Comment:  "

## 2024-03-18 NOTE — PROGRESS NOTES
INPATIENT NEPHROLOGY CONSULT PROGRESS NOTES    Patient Name: Salomon Chowdary   MRN: [id number]  CONSULTING SERVICE: Nephrology    Impression :   Mr. Chowdary is a 83 y.o. male with past medical history significant for CAD, hypertension, ESRD on PD since 03/23, primary nephrologist Dr. Khan, presented to the hospital following a fall and the patient was found to have left proximal intertrochanteric femur fracture and plan for surgery. Nephrology consulted for management of advanced renal failure. Patient has been on peritoneal dialysis since 3/23 and denies any complaints currently other than left hip pain   Mr. Salomon Chowdary is a 83-year-old male with past medical history of CAD, hypertension, ESRD on PD since 03/23, primary nephrologist Dr. Khan, presented to the hospital following a fall and the patient was found to have left proximal intertrochanteric femur fracture and plan for surgery.  Nephrology consulted for management of advanced renal failure.  Patient has been on peritoneal dialysis since 3/23 and denies any complaints currently other than left hip pain.  Denies any chest pain, shortness of breath, nausea, vomiting, abdominal pain, headache, dizziness.     1.  ESRD on PD.  2.  Hypertension.  3.   CAD status post CABG.  4.  Type 2 diabetes mellitus.  5.  Left proximal intertrochanteric femur fracture.  6.  Hypothyroidism.     Plan.  -Underwent peritoneal dialysis overnight with 900 cc of ultrafiltration.  Had few alarms on PD machine with low drain volumes and system errors.  Will continue same PD prescription tonight..  PD prescription is a 6000 mL total fill volume, 3 exchanges, 2000 fill volume, 8 hours, 1.5% dextrose bags.  -Hypertension: Blood pressure has been optimally controlled.  Patient is on amlodipine 10 mg daily, hydralazine 25 mg twice daily, torsemide 50 mg daily.  Continue hydralazine 25 mg 3 times daily.  -BMD: Continue phosphorus binders including sevelamer 800 mg 3 times  daily.  -Underwent surgery for left femur fracture.  ================================================================  INTERVAL HPI: The patient was seen and examined. No acute event overnight.  Complains of pain at surgical site otherwise denies any other complaints.  PERTINENT ROS:   GENERAL: No fever/chills.  RESPIRATORY: Negative for cough, wheezing or shortness of breath.  CARDIOVASCULAR: Negative for chest pain or palpitations.  GI: Negative for nausea, vomiting,  Diarrhea, abdominal pain.    : Negative for dysuria and hematuria    MEDICATIONS:  Current active Inpatient Medication reviewed.   Current Facility-Administered Medications   Medication Dose Route Frequency Provider Last Rate Last Admin    acetaminophen (Tylenol) tablet 325 mg  325 mg oral q8h PRN Donna Kessler MD        acetaminophen (Tylenol) tablet 975 mg  975 mg oral TID Donna Kessler MD   975 mg at 03/18/24 0907    amLODIPine (Norvasc) tablet 10 mg  10 mg oral Daily Donna Kessler MD   10 mg at 03/18/24 0907    apixaban (Eliquis) tablet 2.5 mg  2.5 mg oral q12h Rick Covarrubias MD   2.5 mg at 03/18/24 1338    aspirin EC tablet 81 mg  81 mg oral Daily Donna Kessler MD   81 mg at 03/18/24 0907    benzocaine-menthol (Cepastat Sore Throat) 15-3.6 mg lozenge 1 lozenge  1 lozenge Mouth/Throat q2h PRN Donna Kessler MD        ceFAZolin in dextrose (iso-os) (Ancef) IVPB 2 g  2 g intravenous Once in OR Donna Kessler MD        cholecalciferol (Vitamin D-3) tablet 1,000 Units  1,000 Units oral Daily Donna Kessler MD   1,000 Units at 03/18/24 0907    [START ON 3/20/2024] clopidogrel (Plavix) tablet 75 mg  75 mg oral Daily Julia Little PA-C        dextrose 1.5% - LOW calcium 2.5mEq/L 6,000 mL peritoneal dialysate   intraperitoneal q24h Donna Kessler MD        dextrose 1.5% - LOW calcium 2.5mEq/L 6,000 mL peritoneal dialysate   intraperitoneal q24h Donna Thomas  MD Victoria        dextrose 1.5% - LOW calcium 2.5mEq/L 6,000 mL peritoneal dialysate   intraperitoneal q24h Donna Kessler MD        dextrose 1.5% - LOW calcium 2.5mEq/L 6,000 mL peritoneal dialysate   intraperitoneal q24h Donna Kessler MD   Given at 03/17/24 2218    dextrose 50 % injection 12.5 g  12.5 g intravenous q15 min PRN Donna Kessler MD        dextrose 50 % injection 25 g  25 g intravenous q15 min PRN Donna Kessler MD        DULoxetine (Cymbalta) DR capsule 30 mg  30 mg oral Daily Donna Kessler MD   30 mg at 03/18/24 0907    glucagon (Glucagen) injection 1 mg  1 mg intramuscular q15 min PRN Donna Kessler MD        guaiFENesin (Mucinex) 12 hr tablet 600 mg  600 mg oral q12h PRN Donna Kessler MD   600 mg at 03/16/24 1003    hydrALAZINE (Apresoline) tablet 25 mg  25 mg oral TID Donna Kessler MD   25 mg at 03/18/24 0908    insulin detemir (Levemir) injection 20 Units  20 Units subcutaneous Daily Donna Kessler MD   20 Units at 03/18/24 0910    insulin lispro (HumaLOG) injection 0-10 Units  0-10 Units subcutaneous TID with meals Donna Kessler MD   2 Units at 03/18/24 1338    levothyroxine (Synthroid, Levoxyl) tablet 50 mcg  50 mcg oral Daily Donna Kessler MD   50 mcg at 03/18/24 0702    metoprolol succinate XL (Toprol-XL) 24 hr tablet 50 mg  50 mg oral Daily Donna Kessler MD   50 mg at 03/18/24 0907    multivitamin with minerals 1 tablet  1 tablet oral Daily Donna Kessler MD   1 tablet at 03/18/24 0907    ondansetron (Zofran) injection 4 mg  4 mg intravenous q8h PRN Donna Kessler MD   4 mg at 03/18/24 0954    polyethylene glycol (Glycolax, Miralax) packet 17 g  17 g oral Daily Donna Kessler MD   17 g at 03/16/24 0837    sevelamer carbonate (Renvela) packet 0.8 g  0.8 g oral TID with meals Donna Kessler MD   0.8 g at 03/18/24 1338    sodium  "bicarbonate tablet 650 mg  650 mg oral BID Donna Kessler MD   650 mg at 03/18/24 0908    torsemide (Demadex) tablet 50 mg  50 mg oral Daily Donna Kessler MD   50 mg at 03/18/24 0907    traMADol (Ultram) tablet 50 mg  50 mg oral q12h PRN Donna Kessler MD   50 mg at 03/18/24 0954       PHYSICAL EXAM:   /58 (BP Location: Right arm, Patient Position: Lying)   Pulse 75   Temp 36 °C (96.8 °F) (Temporal)   Resp 16   Ht 1.753 m (5' 9\")   Wt 92.8 kg (204 lb 9.4 oz)   SpO2 96%   BMI 30.21 kg/m²   Patient Vitals for the past 24 hrs:   BP   03/18/24 0800 123/58   03/18/24 0200 128/65   03/17/24 2100 133/61   03/17/24 1600 128/60       Intake/Output Summary (Last 24 hours) at 3/18/2024 1405  Last data filed at 3/18/2024 0942  Gross per 24 hour   Intake 350 ml   Output 200 ml   Net 150 ml     GENERAL: Alert, no distress, cooperative  SKIN: Skin color, texture, turgor normal. No rashes or lesions.  HEAD/SINUSES: No significant findings  EYES: PE  OROPHARYNX: oral mucosa moist. Oropharynx normal.  NECK: No jugulovenous distention, No carotid bruits, Supple  LUNGS: Lungs clear to auscultation, no wheeze, rhonchi, or rales  CARDIAC: Normal S1 and S2; no rubs, murmurs, or gallops  ABDOMEN: Abdomen soft, non-tender, BS normal, No masses. No abdominal bruits.  EXTREMITIES: Status post left surgery for closed displaced intertrochanteric left femur fracture  NEURO: No focal deficits. Able to move all 4 extremities.  PULSES: 2+ radial, 2 + femoral    DATA:   Diagnostic tests reviewed for today's visit:    CBC: @[id number](wbc,rbc,hb,hct,plt,mcv,mch,mpv,rdw)@  Coags: @EVOUQUA58(pt,inr,aptt)@  CMP: @[id number](na,k,chlor,co2,bun,creat,gluc,tprot,ca,mg,albumin,tbili,alkphos,ALT,AST,anion)@  ABG's: @[id number](PH,PCO2,PO2,BE,HCO3,CO2CT,O2HB,COHB,MHGB,TEMP,PHTC,PCO2T,PO2T,O2AD)@  MG/PHOS: @[id number](mg,p)@    Urobilinogen, Urine   Date Value Ref Range Status   10/01/2023 <2.0 <2.0 mg/dL Final       No " "components found for: \"PROTTIMED\", \"UALBCR\", \"UPROT\", \"CREAT\"    No results found for: \"CHOL\", \"HDL\", \"LDL\"  IMAGING:  reviewed in [facility] images    SIGNATURE: Clary Castañeda MD        "

## 2024-03-18 NOTE — PROGRESS NOTES
"Robbie Chowdary is a 83 y.o. male presenting with Closed displaced intertrochanteric fracture of left femur (CMS/HCC).    Subjective   Mr Chowdary tells me that his left hip pain is mild. He is looking forward to working with therapy today. No nausea this morning.        Objective     Physical Exam  Vitals reviewed.   HENT:      Head: Normocephalic.      Mouth/Throat:      Mouth: Mucous membranes are moist.      Pharynx: Oropharynx is clear.   Eyes:      Extraocular Movements: Extraocular movements intact.   Cardiovascular:      Rate and Rhythm: Normal rate.   Pulmonary:      Effort: Pulmonary effort is normal.   Abdominal:      Palpations: Abdomen is soft.   Musculoskeletal:      Comments: Left hip/thigh dressings are dry and intact.  light touch sensation is intact, ankle dorsiflexion/plantar flexion is intact. DP 2+/2 palpable     Skin:     General: Skin is warm and dry.      Capillary Refill: Capillary refill takes less than 2 seconds.   Neurological:      General: No focal deficit present.      Mental Status: He is alert. Mental status is at baseline.   Psychiatric:         Mood and Affect: Mood normal.         Last Recorded Vitals  Blood pressure 123/58, pulse 75, temperature 36 °C (96.8 °F), temperature source Temporal, resp. rate 16, height 1.753 m (5' 9\"), weight 92.8 kg (204 lb 9.4 oz), SpO2 96 %.  Intake/Output last 3 Shifts:  I/O last 3 completed shifts:  In: 580 (7.6 mL/kg) [P.O.:300; I.V.:180 (2.3 mL/kg); IV Piggyback:100]  Out: 300 (3.9 mL/kg) [Urine:200 (0.1 mL/kg/hr); Blood:100]  Weight: 76.6 kg     Relevant Results      Scheduled medications  acetaminophen, 975 mg, oral, TID  amLODIPine, 10 mg, oral, Daily  apixaban, 2.5 mg, oral, q12h  aspirin, 81 mg, oral, Daily  ceFAZolin, 2 g, intravenous, Once in OR  cholecalciferol, 1,000 Units, oral, Daily  [Held by provider] clopidogrel, 75 mg, oral, Daily  dextrose 1.5% - LOW calcium 2.5mEq/L 6,000 mL peritoneal dialysate, , intraperitoneal, q24h  dextrose 1.5% " - LOW calcium 2.5mEq/L 6,000 mL peritoneal dialysate, , intraperitoneal, q24h  dextrose 1.5% - LOW calcium 2.5mEq/L 6,000 mL peritoneal dialysate, , intraperitoneal, q24h  dextrose 1.5% - LOW calcium 2.5mEq/L 6,000 mL peritoneal dialysate, , intraperitoneal, q24h  DULoxetine, 30 mg, oral, Daily  hydrALAZINE, 25 mg, oral, TID  insulin detemir, 20 Units, subcutaneous, Daily  insulin lispro, 0-10 Units, subcutaneous, TID with meals  levothyroxine, 50 mcg, oral, Daily  metoprolol succinate XL, 50 mg, oral, Daily  multivitamin with minerals, 1 tablet, oral, Daily  polyethylene glycol, 17 g, oral, Daily  sevelamer carbonate, 0.8 g, oral, TID with meals  sodium bicarbonate, 650 mg, oral, BID  torsemide, 50 mg, oral, Daily      Continuous medications     PRN medications  PRN medications: acetaminophen, benzocaine-menthol, dextrose, dextrose, glucagon, guaiFENesin, [DISCONTINUED] ondansetron ODT **OR** ondansetron, traMADol  Results for orders placed or performed during the hospital encounter of 03/15/24 (from the past 24 hour(s))   POCT GLUCOSE   Result Value Ref Range    POCT Glucose 152 (H) 74 - 99 mg/dL   POCT GLUCOSE   Result Value Ref Range    POCT Glucose 150 (H) 74 - 99 mg/dL   POCT GLUCOSE   Result Value Ref Range    POCT Glucose 171 (H) 74 - 99 mg/dL   Basic Metabolic Panel   Result Value Ref Range    Glucose 269 (H) 74 - 99 mg/dL    Sodium 135 (L) 136 - 145 mmol/L    Potassium 3.8 3.5 - 5.3 mmol/L    Chloride 97 (L) 98 - 107 mmol/L    Bicarbonate 26 21 - 32 mmol/L    Anion Gap 16 10 - 20 mmol/L    Urea Nitrogen 72 (H) 6 - 23 mg/dL    Creatinine 5.44 (H) 0.50 - 1.30 mg/dL    eGFR 10 (L) >60 mL/min/1.73m*2    Calcium 8.3 (L) 8.6 - 10.3 mg/dL   Magnesium   Result Value Ref Range    Magnesium 1.86 1.60 - 2.40 mg/dL   CBC   Result Value Ref Range    WBC 7.8 4.4 - 11.3 x10*3/uL    nRBC 0.0 0.0 - 0.0 /100 WBCs    RBC 2.23 (L) 4.50 - 5.90 x10*6/uL    Hemoglobin 7.2 (L) 13.5 - 17.5 g/dL    Hematocrit 23.9 (L) 41.0 - 52.0 %      (H) 80 - 100 fL    MCH 32.3 26.0 - 34.0 pg    MCHC 30.1 (L) 32.0 - 36.0 g/dL    RDW 15.3 (H) 11.5 - 14.5 %    Platelets 157 150 - 450 x10*3/uL   POCT GLUCOSE   Result Value Ref Range    POCT Glucose 248 (H) 74 - 99 mg/dL             XR chest 1 view    Result Date: 3/18/2024  Interpreted By:  Matthias Noel, STUDY: XR CHEST 1 VIEW;  3/15/2024 5:36 pm   INDICATION: Signs/Symptoms:preop.   COMPARISON: Most recent prior chest x-ray is from 02/25/2024   ACCESSION NUMBER(S): [id number]   ORDERING CLINICIAN: WAQAR LOPEZ   TECHNIQUE: Single AP portable view of the chest was obtained.   FINDINGS: MEDIASTINUM/ LUNGS/ NATALIE: Correlation with CT scan abdomen and pelvis from 10/01/2023. Underlying cystic lung changes in the bilateral lower lobes based on the CT scan. There is coarsening of lung markings throughout. Mild interstitial thickening peripherally in the mid to lower portion of each lung. No blunting of the costophrenic sulci. Cardiomegaly. Central vasculature is borderline prominent. Surgical clips overlie the left side of the mediastinum. No pneumothorax. No tracheal deviation. No abnormal hilar fullness or gross mass on either side.   BONES: No lytic or blastic destructive bone lesion. There is posterior fixation hardware in the distal cervical spine down through T1. There is mild-to-moderate disc space narrowing and endplate osteophytosis throughout the thoracic spine.   UPPER ABDOMEN: Grossly intact.       Previous left-sided surgery.   Underlying lung cystic changes in the lower lobes medially and posteriorly. Nonspecific coarsening of lung markings.   There are findings that suggest mild CHF with interstitial edema. Clinical correlation is needed.   MACRO: None   Signed by: Matthias Noel 3/18/2024 8:19 AM Dictation workstation:   [id number]    ECG 12 Lead    Result Date: 3/17/2024  Sinus bradycardia Left anterior fascicular block Abnormal ECG When compared with ECG of 25-FEB-2024 13:20, No  significant change was found Confirmed by Ag Madrid ([id number]) on 3/17/2024 8:43:48 PM    FL fluoro images no charge    Result Date: 3/17/2024  These images are not reportable by radiology and will not be interpreted by  Radiologists.    CT head wo IV contrast    Result Date: 3/15/2024  Interpreted By:  Erasmo Noel, STUDY: CT HEAD WO IV CONTRAST; CT CERVICAL SPINE WO IV CONTRAST; CT THORACIC SPINE WO IV CONTRAST; CT LUMBAR SPINE WO IV CONTRAST;  3/15/2024 6:27 pm   INDICATION: Signs/Symptoms:fall, advanced age on plavix; Signs/Symptoms:fall, lumbar pain, axial loading. RO compression fracture   COMPARISON: November 23   ACCESSION NUMBER(S): [id number]; [id number]; [id number]; [id number]   ORDERING CLINICIAN: WAQAR LOPEZ   TECHNIQUE: Axial noncontrast CT images of head with coronal and sagittal reconstructed images. Axial noncontrast CT images of the cervical thoracic and lumbar spine with coronal and sagittal reconstructed images.   FINDINGS: CT HEAD:   BRAIN PARENCHYMA:  No evidence of acute intraparenchymal hemorrhage or parenchymal evidence of acute large territory ischemic infarct. No mass-effect, midline shift or effacement of cerebral sulci. Gray-white matter distinction is preserved. Mild global volume loss and chronic small vessel ischemic changes. The appearance is similar   VENTRICLES and EXTRA-AXIAL SPACES:  No acute extra-axial or intraventricular hemorrhage. Ventricles and sulci are age-concordant.   PARANASAL SINUSES/MASTOIDS:  No hemorrhage or air-fluid levels within the visualized paranasal sinuses. The mastoid air cells are well-aerated.   CALVARIUM/ORBITS:  No skull fracture.  The orbits and globes are intact to the extent visualized.   Robust vascular calcification.     CT CERVICAL SPINE: C3-T1 posterior spinal fusion with decompression. Prevertebral soft tissue within normal limits. Scattered multilevel degenerative disc disease. No evidence of acute cervical spine  fracture.   CT thoracic spine: Multilevel degenerative disc disease detected in the thoracic spine. No evidence of acute thoracic spine fracture.   Features of pulmonary fibrosis with honeycombing seen in the lung bases   Heart size is enlarged. Robust coronary calcification. No pericardial effusion.   Enlarged main pulmonary artery compatible with pulmonary arterial hypertension.   No evidence of acute lumbar spine fracture. Scattered degenerative changes most notable at L3-L4 and L4-L5 with disc osteophyte complexes. Facet arthropathy detected.   No evidence of aneurysm of the abdominal aorta. Robust vascular calcification peritoneal catheter terminates in the pelvis.       No evidence of acute cervical, thoracic, or lumbar spine fracture. Long segment decompression of the posterior aspect of the cervical spine from C3 through T1 not measurably changed.   Multilevel degenerative disc disease is seen. Degree of degenerative disease especially in lumbar spine does limit sensitivity. If there is severe pain, consider further evaluation with lumbar spine MRI   Senescent changes seen in the brain. No evidence of acute intracranial hemorrhage   Signed by: Erasmo Noel 3/15/2024 6:55 PM Dictation workstation:   [id number]    CT cervical spine wo IV contrast    Result Date: 3/15/2024  Interpreted By:  Erasmo Noel, STUDY: CT HEAD WO IV CONTRAST; CT CERVICAL SPINE WO IV CONTRAST; CT THORACIC SPINE WO IV CONTRAST; CT LUMBAR SPINE WO IV CONTRAST;  3/15/2024 6:27 pm   INDICATION: Signs/Symptoms:fall, advanced age on plavix; Signs/Symptoms:fall, lumbar pain, axial loading. RO compression fracture   COMPARISON: November 23   ACCESSION NUMBER(S): [id number]; [id number]; [id number]; [id number]   ORDERING CLINICIAN: WAQAR LOPEZ   TECHNIQUE: Axial noncontrast CT images of head with coronal and sagittal reconstructed images. Axial noncontrast CT images of the cervical thoracic and lumbar spine with coronal  and sagittal reconstructed images.   FINDINGS: CT HEAD:   BRAIN PARENCHYMA:  No evidence of acute intraparenchymal hemorrhage or parenchymal evidence of acute large territory ischemic infarct. No mass-effect, midline shift or effacement of cerebral sulci. Gray-white matter distinction is preserved. Mild global volume loss and chronic small vessel ischemic changes. The appearance is similar   VENTRICLES and EXTRA-AXIAL SPACES:  No acute extra-axial or intraventricular hemorrhage. Ventricles and sulci are age-concordant.   PARANASAL SINUSES/MASTOIDS:  No hemorrhage or air-fluid levels within the visualized paranasal sinuses. The mastoid air cells are well-aerated.   CALVARIUM/ORBITS:  No skull fracture.  The orbits and globes are intact to the extent visualized.   Robust vascular calcification.     CT CERVICAL SPINE: C3-T1 posterior spinal fusion with decompression. Prevertebral soft tissue within normal limits. Scattered multilevel degenerative disc disease. No evidence of acute cervical spine fracture.   CT thoracic spine: Multilevel degenerative disc disease detected in the thoracic spine. No evidence of acute thoracic spine fracture.   Features of pulmonary fibrosis with honeycombing seen in the lung bases   Heart size is enlarged. Robust coronary calcification. No pericardial effusion.   Enlarged main pulmonary artery compatible with pulmonary arterial hypertension.   No evidence of acute lumbar spine fracture. Scattered degenerative changes most notable at L3-L4 and L4-L5 with disc osteophyte complexes. Facet arthropathy detected.   No evidence of aneurysm of the abdominal aorta. Robust vascular calcification peritoneal catheter terminates in the pelvis.       No evidence of acute cervical, thoracic, or lumbar spine fracture. Long segment decompression of the posterior aspect of the cervical spine from C3 through T1 not measurably changed.   Multilevel degenerative disc disease is seen. Degree of degenerative  disease especially in lumbar spine does limit sensitivity. If there is severe pain, consider further evaluation with lumbar spine MRI   Senescent changes seen in the brain. No evidence of acute intracranial hemorrhage   Signed by: Erasmo Noel 3/15/2024 6:55 PM Dictation workstation:   [id number]    CT thoracic spine wo IV contrast    Result Date: 3/15/2024  Interpreted By:  Erasmo Noel, STUDY: CT HEAD WO IV CONTRAST; CT CERVICAL SPINE WO IV CONTRAST; CT THORACIC SPINE WO IV CONTRAST; CT LUMBAR SPINE WO IV CONTRAST;  3/15/2024 6:27 pm   INDICATION: Signs/Symptoms:fall, advanced age on plavix; Signs/Symptoms:fall, lumbar pain, axial loading. RO compression fracture   COMPARISON: November 23   ACCESSION NUMBER(S): [id number]; [id number]; [id number]; [id number]   ORDERING CLINICIAN: WAQAR LOPEZ   TECHNIQUE: Axial noncontrast CT images of head with coronal and sagittal reconstructed images. Axial noncontrast CT images of the cervical thoracic and lumbar spine with coronal and sagittal reconstructed images.   FINDINGS: CT HEAD:   BRAIN PARENCHYMA:  No evidence of acute intraparenchymal hemorrhage or parenchymal evidence of acute large territory ischemic infarct. No mass-effect, midline shift or effacement of cerebral sulci. Gray-white matter distinction is preserved. Mild global volume loss and chronic small vessel ischemic changes. The appearance is similar   VENTRICLES and EXTRA-AXIAL SPACES:  No acute extra-axial or intraventricular hemorrhage. Ventricles and sulci are age-concordant.   PARANASAL SINUSES/MASTOIDS:  No hemorrhage or air-fluid levels within the visualized paranasal sinuses. The mastoid air cells are well-aerated.   CALVARIUM/ORBITS:  No skull fracture.  The orbits and globes are intact to the extent visualized.   Robust vascular calcification.     CT CERVICAL SPINE: C3-T1 posterior spinal fusion with decompression. Prevertebral soft tissue within normal limits. Scattered  multilevel degenerative disc disease. No evidence of acute cervical spine fracture.   CT thoracic spine: Multilevel degenerative disc disease detected in the thoracic spine. No evidence of acute thoracic spine fracture.   Features of pulmonary fibrosis with honeycombing seen in the lung bases   Heart size is enlarged. Robust coronary calcification. No pericardial effusion.   Enlarged main pulmonary artery compatible with pulmonary arterial hypertension.   No evidence of acute lumbar spine fracture. Scattered degenerative changes most notable at L3-L4 and L4-L5 with disc osteophyte complexes. Facet arthropathy detected.   No evidence of aneurysm of the abdominal aorta. Robust vascular calcification peritoneal catheter terminates in the pelvis.       No evidence of acute cervical, thoracic, or lumbar spine fracture. Long segment decompression of the posterior aspect of the cervical spine from C3 through T1 not measurably changed.   Multilevel degenerative disc disease is seen. Degree of degenerative disease especially in lumbar spine does limit sensitivity. If there is severe pain, consider further evaluation with lumbar spine MRI   Senescent changes seen in the brain. No evidence of acute intracranial hemorrhage   Signed by: Erasmo Noel 3/15/2024 6:55 PM Dictation workstation:   [id number]    CT lumbar spine wo IV contrast    Result Date: 3/15/2024  Interpreted By:  Erasmo Noel, STUDY: CT HEAD WO IV CONTRAST; CT CERVICAL SPINE WO IV CONTRAST; CT THORACIC SPINE WO IV CONTRAST; CT LUMBAR SPINE WO IV CONTRAST;  3/15/2024 6:27 pm   INDICATION: Signs/Symptoms:fall, advanced age on plavix; Signs/Symptoms:fall, lumbar pain, axial loading. RO compression fracture   COMPARISON: November 23   ACCESSION NUMBER(S): [id number]; [id number]; [id number]; [id number]   ORDERING CLINICIAN: WAQAR LOPEZ   TECHNIQUE: Axial noncontrast CT images of head with coronal and sagittal reconstructed images. Axial  noncontrast CT images of the cervical thoracic and lumbar spine with coronal and sagittal reconstructed images.   FINDINGS: CT HEAD:   BRAIN PARENCHYMA:  No evidence of acute intraparenchymal hemorrhage or parenchymal evidence of acute large territory ischemic infarct. No mass-effect, midline shift or effacement of cerebral sulci. Gray-white matter distinction is preserved. Mild global volume loss and chronic small vessel ischemic changes. The appearance is similar   VENTRICLES and EXTRA-AXIAL SPACES:  No acute extra-axial or intraventricular hemorrhage. Ventricles and sulci are age-concordant.   PARANASAL SINUSES/MASTOIDS:  No hemorrhage or air-fluid levels within the visualized paranasal sinuses. The mastoid air cells are well-aerated.   CALVARIUM/ORBITS:  No skull fracture.  The orbits and globes are intact to the extent visualized.   Robust vascular calcification.     CT CERVICAL SPINE: C3-T1 posterior spinal fusion with decompression. Prevertebral soft tissue within normal limits. Scattered multilevel degenerative disc disease. No evidence of acute cervical spine fracture.   CT thoracic spine: Multilevel degenerative disc disease detected in the thoracic spine. No evidence of acute thoracic spine fracture.   Features of pulmonary fibrosis with honeycombing seen in the lung bases   Heart size is enlarged. Robust coronary calcification. No pericardial effusion.   Enlarged main pulmonary artery compatible with pulmonary arterial hypertension.   No evidence of acute lumbar spine fracture. Scattered degenerative changes most notable at L3-L4 and L4-L5 with disc osteophyte complexes. Facet arthropathy detected.   No evidence of aneurysm of the abdominal aorta. Robust vascular calcification peritoneal catheter terminates in the pelvis.       No evidence of acute cervical, thoracic, or lumbar spine fracture. Long segment decompression of the posterior aspect of the cervical spine from C3 through T1 not measurably  changed.   Multilevel degenerative disc disease is seen. Degree of degenerative disease especially in lumbar spine does limit sensitivity. If there is severe pain, consider further evaluation with lumbar spine MRI   Senescent changes seen in the brain. No evidence of acute intracranial hemorrhage   Signed by: Erasmo Noel 3/15/2024 6:55 PM Dictation workstation:   [id number]    XR elbow left 1-2 views    Result Date: 3/15/2024  Interpreted By:  Olive Sr, STUDY: Left elbow, 2 views.   INDICATION: Signs/Symptoms:fall, left elbow pain.   COMPARISON: None.   ACCESSION NUMBER(S): [id number]   ORDERING CLINICIAN: WAQAR LOPEZ   FINDINGS: No acute fracture or malalignment. No elbow joint effusion or abnormal fat pad elevation. No significant degenerative changes. Heterotopic ossification adjacent to the lateral humeral condyle measuring 7 mm.       1. No acute fracture or malalignment of the left elbow.   MACRO: None.   Signed by: Olive Sr 3/15/2024 6:09 PM Dictation workstation:   [id number]    XR hip left with pelvis when performed 2 or 3 views    Result Date: 3/15/2024  Interpreted By:  Olive Sr, STUDY: Single view pelvis. Left hip, two views.   INDICATION: Signs/Symptoms:L hip fracture.   COMPARISON: None.   ACCESSION NUMBER(S): [id number]   ORDERING CLINICIAN: WAQAR LOPEZ   FINDINGS: There is a acute impacted comminuted mildly displaced intertrochanteric left proximal femoral fracture. Moderate bilateral hip joint degenerative changes. Advanced lumbar spine degenerative changes. Osteopenia. Bilateral femoral artery vascular calcifications.       1. Intertrochanteric left proximal femoral fracture.   MACRO: None.   Signed by: Olive Sr 3/15/2024 6:08 PM Dictation workstation:   [id number]    IOL BIOMETRY W/ IOL CALC OU (BOTH EYES)    Result Date: 3/7/2024  Date of Procedure 3/7/2024. Technician Information Loulou Lilly, COA . Notes Measurements only - see  Procedure Record under Scanned Documents for signed results.     ASCAN ONLY - DIAGNOSTIC OU (BOTH EYES)    Result Date: 3/7/2024  Date of Procedure 3/7/2024. Technician Information Loulou Lilly, COA . Notes Ascan report in paper chart and scanned to patient chart after post op period. A-SCAN RIGHT EYE: 24.31 A-SCAN LEFT EYE: 23.93    XR chest 2 views    Result Date: 2/25/2024  Interpreted By:  Olaf Li, STUDY: XR CHEST 2 VIEWS;  2/25/2024 2:34 pm   INDICATION: Signs/Symptoms:Chest Pain.   COMPARISON: 11/01/2023   ACCESSION NUMBER(S): [id number]   ORDERING CLINICIAN: BUNNY CARROLL   TECHNIQUE: 2 radiographs of the chest were performed.   FINDINGS: There are low lung volumes. The heart is mildly enlarged and unchanged. There is mild central vascular and interstitial congestion which is unchanged. There is no new airspace consolidation or pneumothorax. There is slight blunting left lateral costophrenic angle which is stable. Postoperative changes are partially visualized in the lower cervical spine. There are findings of DISH.       No interval change from 11/01/2023 as described above.   Signed by: Olaf Li 2/25/2024 3:39 PM Dictation workstation:   [id number]    ECG 12 lead    Result Date: 2/25/2024  Normal sinus rhythm Left axis deviation Abnormal ECG When compared with ECG of 25-FEB-2024 13:08, (unconfirmed) Previous ECG has undetermined rhythm, needs review ST no longer depressed in Inferior leads See ED provider note for full interpretation and clinical correlation Confirmed by Yisel Yanez ([id number]) on 2/25/2024 1:40:13 PM    ECG 12 lead    Result Date: 2/25/2024  Undetermined rhythm Left axis deviation Nonspecific ST abnormality Abnormal ECG When compared with ECG of 01-OCT-2023 16:00, Current undetermined rhythm precludes rhythm comparison, needs review ST now depressed in Inferior leads T wave inversion now evident in Inferior leads T wave inversion no longer evident in Anterior leads  See ED provider note for full interpretation and clinical correlation Confirmed by Yisel Yanez ([id number]) on 2/25/2024 1:39:27 PM         :99}        Assessment/Plan   Principal Problem:    Closed displaced intertrochanteric fracture of left femur (CMS/[facility])  Active Problems:    Cortical senile cataract    ESRD on peritoneal dialysis (CMS/[facility])      1. Closed displaced intertrochanteric fracture of left femur, initial encounter (CMS/[facility])  POD #1 s/p left TFN  Continue PT/OT, WBAT left LE  Using incentive spirometer   Reviewed am labs  Multimodal pain regimen  Surgical hip dressing to be removed on post op day #7  VTE prophylaxis: eliquis BID X 28 days; can restart plavix on 3/20/2024  Dispo: TBD after PT eval  Follow up with Dr. Donna Thomas in 2 weeks    2. Acute post operative expected blood loss anemia on chronic anemia  -hgb is 7.2, his baseline is 8-10  -he is hemodynamically  -No signs of active bleeding  -Will defer management including decision to transfuse packed red blood cells to medical service  -CBC in a.m.         I spent 25 minutes in the professional and overall care of this patient.      Julia Little PA-C

## 2024-03-18 NOTE — CARE PLAN
Problem: Skin  Goal: Decreased wound size/increased tissue granulation at next dressing change  Outcome: Progressing  Flowsheets (Taken 3/18/2024 1131)  Decreased wound size/increased tissue granulation at next dressing change:   Promote sleep for wound healing   Protective dressings over bony prominences  Goal: Participates in plan/prevention/treatment measures  Outcome: Progressing  Flowsheets (Taken 3/18/2024 1131)  Participates in plan/prevention/treatment measures:   Discuss with provider PT/OT consult   Increase activity/out of bed for meals  Goal: Prevent/manage excess moisture  Outcome: Progressing  Flowsheets (Taken 3/18/2024 1131)  Prevent/manage excess moisture:   Moisturize dry skin   Follow provider orders for dressing changes  Goal: Prevent/minimize sheer/friction injuries  Outcome: Progressing  Flowsheets (Taken 3/18/2024 1131)  Prevent/minimize sheer/friction injuries:   Increase activity/out of bed for meals   Turn/reposition every 2 hours/use positioning/transfer devices  Goal: Promote/optimize nutrition  Outcome: Progressing  Goal: Promote skin healing  Outcome: Progressing   The patient's goals for the shift include      The clinical goals for the shift include completed

## 2024-03-18 NOTE — PROGRESS NOTES
Spiritual Care Visit    Clinical Encounter Type  Visited With: Patient and family together (I visited on March 17.)  Routine Visit: Introduction  Continue Visiting: Yes    Catholic Encounters  Catholic Needs: Prayer         Sacramental Encounters  Communion: Patient wants communion  Communion Given Indicator: Yes  Sacrament of Sick-Anointing: Anointed                             Taxonomy  Intended Effects: Demonstrate caring and concern, Build relationship of care and support, Vonnie affirmation, Establish rapport and connectedness, Promote sense of peace (I visited on March 17.)    I also administered  eucharist to the patient's wife, Yoly.

## 2024-03-18 NOTE — PROGRESS NOTES
"Occupational Therapy    Evaluation    Patient Name: Salomon Chowdary \"Robbie\"  MRN: [id number]  Today's Date: 3/18/2024       Assessment  IP OT Assessment  OT Assessment: Patient would benefit from additional skilled rehab at a moderate intensity to maximize independence in adls, activity tolerance for adls, and functional mobility tasks.  End of Session Communication: Bedside nurse  End of Session Patient Position: Bed, 3 rail up, Alarm on  Plan:  Treatment Interventions: ADL retraining, Functional transfer training  OT Frequency: 5 times per week  OT Discharge Recommendations: Moderate intensity level of continued care  OT - OK to Discharge: Yes (to next level of care when cleared by medical team.)    Subjective   Current Problem:  1. Closed displaced intertrochanteric fracture of left femur, initial encounter (CMS/[facility])  Case Request Operating Room: Hip Fracture ORIF w/ Nail Trochanteric    Case Request Operating Room: Hip Fracture ORIF w/ Nail Trochanteric    FL fluoro images no charge    FL fluoro images no charge        General:  General  Reason for Referral: left hip orif  Referred By: Satish  Past Medical History Relevant to Rehab: ESRD, dialysis, HTN, DM, HLD, CAD, AFIB, CKD  Missed Visit: Yes  Missed Visit Reason: Other (Comment) (Patient reports recently had pain meds and has nausea. Will hold OT eval at this time and will reattempt as appropriate.)  Co-Treatment: PT  Prior to Session Communication: Bedside nurse  Patient Position Received: Bed, 3 rail up, Alarm on  Precautions:  Medical Precautions: Fall precautions  Post-Surgical Precautions:  (WBAT L LE)  Vital Signs:     Pain:  Pain Assessment  Pain Assessment: 0-10  Pain Score: 10 - Worst possible pain  Pain Location: Hip  Pain Orientation: Left    Objective   Cognition:  Overall Cognitive Status: Within Functional Limits  Orientation Level: Oriented X4           Home Living:  Type of Home: Condo (one floor condo, no entry, has a walkin shower with " seat, grab bars)  Lives With: Spouse   Prior Function:  Prior Function Comments: Per patient report, pta independent with adls, shares home management and driving.  IADL History:     ADL:  Eating Assistance: Maximal  Grooming Assistance: Maximal  Bathing Assistance: Maximal  UE Dressing Assistance: Maximal  LE Dressing Assistance: Maximal  Activity Tolerance:  Endurance: Decreased tolerance for upright activites  Bed Mobility/Transfers: Bed Mobility  Bed Mobility: Yes (MAX X 2 supine to sit. MAX X2 sit to stand, unable to maintain upright posture. MAX X 2 scoot along bed to reposition.  Dependent sit to supine in bed and reposition for increased comfort.  Cues for safety throughout.)       Vision: Vision - Basic Assessment  Current Vision: No visual deficits   and    Sensation:  Light Touch: No apparent deficits  Strength:  Strength Comments: B UE strength 3+/5 overall  Perception:     Coordination:  Movements are Fluid and Coordinated: Yes   Hand Function:  Hand Function  Gross Grasp: Functional    Outcome Measures: [facility] Daily Activity  Putting on and taking off regular lower body clothing: A lot  Bathing (including washing, rinsing, drying): A lot  Putting on and taking off regular upper body clothing: A lot  Toileting, which includes using toilet, bedpan or urinal: A lot  Taking care of personal grooming such as brushing teeth: A lot  Eating Meals: A lot  Daily Activity - Total Score: 12         and    Education Documentation  No documentation found.  Education Comments  No comments found.      Goals:   Encounter Problems       Encounter Problems (Active)       OT Goals       Patient will complete all functional mobility tasks with MOD I. (Progressing)       Start:  03/18/24    Expected End:  04/01/24            Patient will complete all transfers with MOD I. (Progressing)       Start:  03/18/24    Expected End:  04/01/24            Patient will complete all UE adls with MOD I.  (Progressing)       Start:   03/18/24    Expected End:  04/01/24            Patient will complete all LE adls with MOD I. (Progressing)       Start:  03/18/24    Expected End:  04/01/24

## 2024-03-19 LAB
ABO GROUP (TYPE) IN BLOOD: NORMAL
ANION GAP SERPL CALC-SCNC: 19 MMOL/L (ref 10–20)
ANTIBODY SCREEN: NORMAL
BLOOD EXPIRATION DATE: NORMAL
BUN SERPL-MCNC: 80 MG/DL (ref 6–23)
CALCIUM SERPL-MCNC: 8.1 MG/DL (ref 8.6–10.3)
CHLORIDE SERPL-SCNC: 95 MMOL/L (ref 98–107)
CO2 SERPL-SCNC: 22 MMOL/L (ref 21–32)
CREAT SERPL-MCNC: 6.05 MG/DL (ref 0.5–1.3)
DISPENSE STATUS: NORMAL
EGFRCR SERPLBLD CKD-EPI 2021: 9 ML/MIN/1.73M*2
ERYTHROCYTE [DISTWIDTH] IN BLOOD BY AUTOMATED COUNT: 15.5 % (ref 11.5–14.5)
GLUCOSE BLD MANUAL STRIP-MCNC: 106 MG/DL (ref 74–99)
GLUCOSE BLD MANUAL STRIP-MCNC: 149 MG/DL (ref 74–99)
GLUCOSE BLD MANUAL STRIP-MCNC: 155 MG/DL (ref 74–99)
GLUCOSE BLD MANUAL STRIP-MCNC: 168 MG/DL (ref 74–99)
GLUCOSE BLD MANUAL STRIP-MCNC: 183 MG/DL (ref 74–99)
GLUCOSE SERPL-MCNC: 202 MG/DL (ref 74–99)
HCT VFR BLD AUTO: 22.6 % (ref 41–52)
HGB BLD-MCNC: 6.7 G/DL (ref 13.5–17.5)
HOLD SPECIMEN: NORMAL
MCH RBC QN AUTO: 31.9 PG (ref 26–34)
MCHC RBC AUTO-ENTMCNC: 29.6 G/DL (ref 32–36)
MCV RBC AUTO: 108 FL (ref 80–100)
NRBC BLD-RTO: 0 /100 WBCS (ref 0–0)
PLATELET # BLD AUTO: 171 X10*3/UL (ref 150–450)
POTASSIUM SERPL-SCNC: 3.5 MMOL/L (ref 3.5–5.3)
PRODUCT BLOOD TYPE: 9500
PRODUCT CODE: NORMAL
RBC # BLD AUTO: 2.1 X10*6/UL (ref 4.5–5.9)
RH FACTOR (ANTIGEN D): NORMAL
SODIUM SERPL-SCNC: 132 MMOL/L (ref 136–145)
UNIT ABO: NORMAL
UNIT NUMBER: NORMAL
UNIT RH: NORMAL
UNIT VOLUME: 350
WBC # BLD AUTO: 7.1 X10*3/UL (ref 4.4–11.3)
XM INTEP: NORMAL

## 2024-03-19 PROCEDURE — 2500000002 HC RX 250 W HCPCS SELF ADMINISTERED DRUGS (ALT 637 FOR MEDICARE OP, ALT 636 FOR OP/ED): Performed by: STUDENT IN AN ORGANIZED HEALTH CARE EDUCATION/TRAINING PROGRAM

## 2024-03-19 PROCEDURE — 36415 COLL VENOUS BLD VENIPUNCTURE: CPT | Performed by: INTERNAL MEDICINE

## 2024-03-19 PROCEDURE — 3E1M39Z IRRIGATION OF PERITONEAL CAVITY USING DIALYSATE, PERCUTANEOUS APPROACH: ICD-10-PCS | Performed by: INTERNAL MEDICINE

## 2024-03-19 PROCEDURE — 2500000001 HC RX 250 WO HCPCS SELF ADMINISTERED DRUGS (ALT 637 FOR MEDICARE OP): Performed by: STUDENT IN AN ORGANIZED HEALTH CARE EDUCATION/TRAINING PROGRAM

## 2024-03-19 PROCEDURE — 86920 COMPATIBILITY TEST SPIN: CPT

## 2024-03-19 PROCEDURE — 36415 COLL VENOUS BLD VENIPUNCTURE: CPT | Performed by: PHYSICIAN ASSISTANT

## 2024-03-19 PROCEDURE — 2500000004 HC RX 250 GENERAL PHARMACY W/ HCPCS (ALT 636 FOR OP/ED): Performed by: STUDENT IN AN ORGANIZED HEALTH CARE EDUCATION/TRAINING PROGRAM

## 2024-03-19 PROCEDURE — 85027 COMPLETE CBC AUTOMATED: CPT | Performed by: PHYSICIAN ASSISTANT

## 2024-03-19 PROCEDURE — 36430 TRANSFUSION BLD/BLD COMPNT: CPT

## 2024-03-19 PROCEDURE — P9016 RBC LEUKOCYTES REDUCED: HCPCS

## 2024-03-19 PROCEDURE — 99231 SBSQ HOSP IP/OBS SF/LOW 25: CPT | Performed by: PHYSICIAN ASSISTANT

## 2024-03-19 PROCEDURE — 1100000001 HC PRIVATE ROOM DAILY

## 2024-03-19 PROCEDURE — 86901 BLOOD TYPING SEROLOGIC RH(D): CPT | Performed by: INTERNAL MEDICINE

## 2024-03-19 PROCEDURE — 2500000001 HC RX 250 WO HCPCS SELF ADMINISTERED DRUGS (ALT 637 FOR MEDICARE OP): Performed by: INTERNAL MEDICINE

## 2024-03-19 PROCEDURE — 80048 BASIC METABOLIC PNL TOTAL CA: CPT | Performed by: PHYSICIAN ASSISTANT

## 2024-03-19 PROCEDURE — 82947 ASSAY GLUCOSE BLOOD QUANT: CPT

## 2024-03-19 RX ADMIN — ASPIRIN 81 MG: 81 TABLET, COATED ORAL at 09:02

## 2024-03-19 RX ADMIN — Medication 1000 UNITS: at 09:01

## 2024-03-19 RX ADMIN — SODIUM BICARBONATE 650 MG: 650 TABLET ORAL at 21:38

## 2024-03-19 RX ADMIN — SODIUM BICARBONATE 650 MG: 650 TABLET ORAL at 09:02

## 2024-03-19 RX ADMIN — ACETAMINOPHEN 975 MG: 325 TABLET ORAL at 09:01

## 2024-03-19 RX ADMIN — ACETAMINOPHEN 975 MG: 325 TABLET ORAL at 15:28

## 2024-03-19 RX ADMIN — METOPROLOL SUCCINATE 50 MG: 50 TABLET, EXTENDED RELEASE ORAL at 09:02

## 2024-03-19 RX ADMIN — APIXABAN 2.5 MG: 2.5 TABLET, FILM COATED ORAL at 00:58

## 2024-03-19 RX ADMIN — APIXABAN 2.5 MG: 2.5 TABLET, FILM COATED ORAL at 12:12

## 2024-03-19 RX ADMIN — HYDRALAZINE HYDROCHLORIDE 25 MG: 25 TABLET ORAL at 15:28

## 2024-03-19 RX ADMIN — LEVOTHYROXINE SODIUM 50 MCG: 50 TABLET ORAL at 05:21

## 2024-03-19 RX ADMIN — SEVELAMER CARBONATE 0.8 G: 800 POWDER, FOR SUSPENSION ORAL at 12:12

## 2024-03-19 RX ADMIN — SEVELAMER CARBONATE 0.8 G: 800 POWDER, FOR SUSPENSION ORAL at 09:02

## 2024-03-19 RX ADMIN — INSULIN LISPRO 2 UNITS: 100 INJECTION, SOLUTION INTRAVENOUS; SUBCUTANEOUS at 12:11

## 2024-03-19 RX ADMIN — AMLODIPINE BESYLATE 10 MG: 10 TABLET ORAL at 09:02

## 2024-03-19 RX ADMIN — DULOXETINE HYDROCHLORIDE 30 MG: 30 CAPSULE, DELAYED RELEASE ORAL at 09:01

## 2024-03-19 RX ADMIN — HYDRALAZINE HYDROCHLORIDE 25 MG: 25 TABLET ORAL at 21:40

## 2024-03-19 RX ADMIN — INSULIN LISPRO 2 UNITS: 100 INJECTION, SOLUTION INTRAVENOUS; SUBCUTANEOUS at 16:26

## 2024-03-19 RX ADMIN — SEVELAMER CARBONATE 0.8 G: 800 POWDER, FOR SUSPENSION ORAL at 16:26

## 2024-03-19 RX ADMIN — Medication 1 TABLET: at 09:02

## 2024-03-19 RX ADMIN — HYDRALAZINE HYDROCHLORIDE 25 MG: 25 TABLET ORAL at 09:01

## 2024-03-19 RX ADMIN — TORSEMIDE 50 MG: 20 TABLET ORAL at 09:01

## 2024-03-19 RX ADMIN — ACETAMINOPHEN 975 MG: 325 TABLET ORAL at 21:38

## 2024-03-19 RX ADMIN — SODIUM CHLORIDE, SODIUM LACTATE, CALCIUM CHLORIDE, MAGNESIUM CHLORIDE AND DEXTROSE: 1.5; 538; 448; 18.3; 5.08 INJECTION, SOLUTION INTRAPERITONEAL at 22:12

## 2024-03-19 RX ADMIN — INSULIN LISPRO 2 UNITS: 100 INJECTION, SOLUTION INTRAVENOUS; SUBCUTANEOUS at 09:45

## 2024-03-19 ASSESSMENT — COGNITIVE AND FUNCTIONAL STATUS - GENERAL
STANDING UP FROM CHAIR USING ARMS: A LOT
HELP NEEDED FOR BATHING: A LOT
MOBILITY SCORE: 12
PERSONAL GROOMING: A LITTLE
DRESSING REGULAR UPPER BODY CLOTHING: A LITTLE
MOVING FROM LYING ON BACK TO SITTING ON SIDE OF FLAT BED WITH BEDRAILS: A LITTLE
WALKING IN HOSPITAL ROOM: TOTAL
CLIMB 3 TO 5 STEPS WITH RAILING: TOTAL
MOVING TO AND FROM BED TO CHAIR: A LOT
TURNING FROM BACK TO SIDE WHILE IN FLAT BAD: A LITTLE
DRESSING REGULAR LOWER BODY CLOTHING: A LOT
DAILY ACTIVITIY SCORE: 15
EATING MEALS: A LITTLE
TOILETING: A LOT

## 2024-03-19 ASSESSMENT — PAIN SCALES - GENERAL
PAINLEVEL_OUTOF10: 2
PAINLEVEL_OUTOF10: 3
PAINLEVEL_OUTOF10: 5 - MODERATE PAIN

## 2024-03-19 ASSESSMENT — PAIN - FUNCTIONAL ASSESSMENT
PAIN_FUNCTIONAL_ASSESSMENT: 0-10
PAIN_FUNCTIONAL_ASSESSMENT: 0-10

## 2024-03-19 ASSESSMENT — PAIN DESCRIPTION - ORIENTATION: ORIENTATION: LEFT

## 2024-03-19 ASSESSMENT — PAIN DESCRIPTION - LOCATION: LOCATION: HIP

## 2024-03-19 NOTE — PROGRESS NOTES
"Robbie Chowdary is a 83 y.o. male presenting with Closed displaced intertrochanteric fracture of left femur (CMS/[facility]).    Subjective   Mr. Chowdary struggled with left hip pain yesterday, which has improved today, now is rated as mild. He denies any overnight issues, but complains of fatigue.        Objective     Physical Exam  Vitals reviewed.   HENT:      Head: Normocephalic.      Mouth/Throat:      Mouth: Mucous membranes are moist.      Pharynx: Oropharynx is clear.   Eyes:      Extraocular Movements: Extraocular movements intact.   Cardiovascular:      Rate and Rhythm: Normal rate.   Pulmonary:      Effort: Pulmonary effort is normal.   Abdominal:      Palpations: Abdomen is soft.   Musculoskeletal:      Comments: Left hip dressing is dry and intact.  light touch sensation is intact, ankle dorsiflexion/plantar flexion is intact. DP 2+/2 palpable     Skin:     General: Skin is warm and dry.      Capillary Refill: Capillary refill takes less than 2 seconds.   Neurological:      General: No focal deficit present.      Mental Status: He is alert. Mental status is at baseline.   Psychiatric:         Mood and Affect: Mood normal.         Last Recorded Vitals  Blood pressure 111/54, pulse 74, temperature 36.3 °C (97.3 °F), temperature source Temporal, resp. rate 17, height 1.753 m (5' 9\"), weight 92.8 kg (204 lb 9.4 oz), SpO2 94 %.  Intake/Output last 3 Shifts:  I/O last 3 completed shifts:  In: 6050 (65.2 mL/kg) [P.O.:50; Other:6000]  Out: - (0 mL/kg)   Weight: 92.8 kg     Relevant Results      Scheduled medications  acetaminophen, 975 mg, oral, TID  amLODIPine, 10 mg, oral, Daily  apixaban, 2.5 mg, oral, q12h  aspirin, 81 mg, oral, Daily  ceFAZolin, 2 g, intravenous, Once in OR  cholecalciferol, 1,000 Units, oral, Daily  [START ON 3/20/2024] clopidogrel, 75 mg, oral, Daily  dextrose 1.5% - LOW calcium 2.5mEq/L 6,000 mL peritoneal dialysate, , intraperitoneal, q24h  dextrose 1.5% - LOW calcium 2.5mEq/L 6,000 mL peritoneal " dialysate, , intraperitoneal, q24h  dextrose 1.5% - LOW calcium 2.5mEq/L 6,000 mL peritoneal dialysate, , intraperitoneal, q24h  dextrose 1.5% - LOW calcium 2.5mEq/L 6,000 mL peritoneal dialysate, , intraperitoneal, q24h  DULoxetine, 30 mg, oral, Daily  hydrALAZINE, 25 mg, oral, TID  insulin detemir, 20 Units, subcutaneous, Daily  insulin lispro, 0-10 Units, subcutaneous, TID with meals  levothyroxine, 50 mcg, oral, Daily  metoprolol succinate XL, 50 mg, oral, Daily  multivitamin with minerals, 1 tablet, oral, Daily  polyethylene glycol, 17 g, oral, Daily  sevelamer carbonate, 0.8 g, oral, TID with meals  sodium bicarbonate, 650 mg, oral, BID  torsemide, 50 mg, oral, Daily      Continuous medications     PRN medications  PRN medications: acetaminophen, benzocaine-menthol, dextrose, dextrose, glucagon, guaiFENesin, [DISCONTINUED] ondansetron ODT **OR** ondansetron, traMADol  Results for orders placed or performed during the hospital encounter of 03/15/24 (from the past 24 hour(s))   POCT GLUCOSE   Result Value Ref Range    POCT Glucose 194 (H) 74 - 99 mg/dL   POCT GLUCOSE   Result Value Ref Range    POCT Glucose 143 (H) 74 - 99 mg/dL   POCT GLUCOSE   Result Value Ref Range    POCT Glucose 106 (H) 74 - 99 mg/dL   CBC   Result Value Ref Range    WBC 7.1 4.4 - 11.3 x10*3/uL    nRBC 0.0 0.0 - 0.0 /100 WBCs    RBC 2.10 (L) 4.50 - 5.90 x10*6/uL    Hemoglobin 6.7 (L) 13.5 - 17.5 g/dL    Hematocrit 22.6 (L) 41.0 - 52.0 %     (H) 80 - 100 fL    MCH 31.9 26.0 - 34.0 pg    MCHC 29.6 (L) 32.0 - 36.0 g/dL    RDW 15.5 (H) 11.5 - 14.5 %    Platelets 171 150 - 450 x10*3/uL   PST Top   Result Value Ref Range    Extra Tube Hold for add-ons.    Basic Metabolic Panel   Result Value Ref Range    Glucose 202 (H) 74 - 99 mg/dL    Sodium 132 (L) 136 - 145 mmol/L    Potassium 3.5 3.5 - 5.3 mmol/L    Chloride 95 (L) 98 - 107 mmol/L    Bicarbonate 22 21 - 32 mmol/L    Anion Gap 19 10 - 20 mmol/L    Urea Nitrogen 80 (H) 6 - 23 mg/dL     Creatinine 6.05 (H) 0.50 - 1.30 mg/dL    eGFR 9 (L) >60 mL/min/1.73m*2    Calcium 8.1 (L) 8.6 - 10.3 mg/dL               XR chest 1 view    Result Date: 3/18/2024  Interpreted By:  Matthias Noel, STUDY: XR CHEST 1 VIEW;  3/15/2024 5:36 pm   INDICATION: Signs/Symptoms:preop.   COMPARISON: Most recent prior chest x-ray is from 02/25/2024   ACCESSION NUMBER(S): [id number]   ORDERING CLINICIAN: WAQAR LOPEZ   TECHNIQUE: Single AP portable view of the chest was obtained.   FINDINGS: MEDIASTINUM/ LUNGS/ NATALIE: Correlation with CT scan abdomen and pelvis from 10/01/2023. Underlying cystic lung changes in the bilateral lower lobes based on the CT scan. There is coarsening of lung markings throughout. Mild interstitial thickening peripherally in the mid to lower portion of each lung. No blunting of the costophrenic sulci. Cardiomegaly. Central vasculature is borderline prominent. Surgical clips overlie the left side of the mediastinum. No pneumothorax. No tracheal deviation. No abnormal hilar fullness or gross mass on either side.   BONES: No lytic or blastic destructive bone lesion. There is posterior fixation hardware in the distal cervical spine down through T1. There is mild-to-moderate disc space narrowing and endplate osteophytosis throughout the thoracic spine.   UPPER ABDOMEN: Grossly intact.       Previous left-sided surgery.   Underlying lung cystic changes in the lower lobes medially and posteriorly. Nonspecific coarsening of lung markings.   There are findings that suggest mild CHF with interstitial edema. Clinical correlation is needed.   MACRO: None   Signed by: Matthias Noel 3/18/2024 8:19 AM Dictation workstation:   [id number]    ECG 12 Lead    Result Date: 3/17/2024  Sinus bradycardia Left anterior fascicular block Abnormal ECG When compared with ECG of 25-FEB-2024 13:20, No significant change was found Confirmed by Ag Madrid ([id number]) on 3/17/2024 8:43:48 PM    FL fluoro images no  charge    Result Date: 3/17/2024  These images are not reportable by radiology and will not be interpreted by  Radiologists.    CT head wo IV contrast    Result Date: 3/15/2024  Interpreted By:  Erasmo Noel, STUDY: CT HEAD WO IV CONTRAST; CT CERVICAL SPINE WO IV CONTRAST; CT THORACIC SPINE WO IV CONTRAST; CT LUMBAR SPINE WO IV CONTRAST;  3/15/2024 6:27 pm   INDICATION: Signs/Symptoms:fall, advanced age on plavix; Signs/Symptoms:fall, lumbar pain, axial loading. RO compression fracture   COMPARISON: November 23   ACCESSION NUMBER(S): [id number]; [id number]; [id number]; [id number]   ORDERING CLINICIAN: WAQAR LOPEZ   TECHNIQUE: Axial noncontrast CT images of head with coronal and sagittal reconstructed images. Axial noncontrast CT images of the cervical thoracic and lumbar spine with coronal and sagittal reconstructed images.   FINDINGS: CT HEAD:   BRAIN PARENCHYMA:  No evidence of acute intraparenchymal hemorrhage or parenchymal evidence of acute large territory ischemic infarct. No mass-effect, midline shift or effacement of cerebral sulci. Gray-white matter distinction is preserved. Mild global volume loss and chronic small vessel ischemic changes. The appearance is similar   VENTRICLES and EXTRA-AXIAL SPACES:  No acute extra-axial or intraventricular hemorrhage. Ventricles and sulci are age-concordant.   PARANASAL SINUSES/MASTOIDS:  No hemorrhage or air-fluid levels within the visualized paranasal sinuses. The mastoid air cells are well-aerated.   CALVARIUM/ORBITS:  No skull fracture.  The orbits and globes are intact to the extent visualized.   Robust vascular calcification.     CT CERVICAL SPINE: C3-T1 posterior spinal fusion with decompression. Prevertebral soft tissue within normal limits. Scattered multilevel degenerative disc disease. No evidence of acute cervical spine fracture.   CT thoracic spine: Multilevel degenerative disc disease detected in the thoracic spine. No evidence of acute  thoracic spine fracture.   Features of pulmonary fibrosis with honeycombing seen in the lung bases   Heart size is enlarged. Robust coronary calcification. No pericardial effusion.   Enlarged main pulmonary artery compatible with pulmonary arterial hypertension.   No evidence of acute lumbar spine fracture. Scattered degenerative changes most notable at L3-L4 and L4-L5 with disc osteophyte complexes. Facet arthropathy detected.   No evidence of aneurysm of the abdominal aorta. Robust vascular calcification peritoneal catheter terminates in the pelvis.       No evidence of acute cervical, thoracic, or lumbar spine fracture. Long segment decompression of the posterior aspect of the cervical spine from C3 through T1 not measurably changed.   Multilevel degenerative disc disease is seen. Degree of degenerative disease especially in lumbar spine does limit sensitivity. If there is severe pain, consider further evaluation with lumbar spine MRI   Senescent changes seen in the brain. No evidence of acute intracranial hemorrhage   Signed by: Erasmo Noel 3/15/2024 6:55 PM Dictation workstation:   [id number]    CT cervical spine wo IV contrast    Result Date: 3/15/2024  Interpreted By:  Erasmo Noel, STUDY: CT HEAD WO IV CONTRAST; CT CERVICAL SPINE WO IV CONTRAST; CT THORACIC SPINE WO IV CONTRAST; CT LUMBAR SPINE WO IV CONTRAST;  3/15/2024 6:27 pm   INDICATION: Signs/Symptoms:fall, advanced age on plavix; Signs/Symptoms:fall, lumbar pain, axial loading. RO compression fracture   COMPARISON: November 23   ACCESSION NUMBER(S): [id number]; [id number]; [id number]; [id number]   ORDERING CLINICIAN: WAQAR LOPEZ   TECHNIQUE: Axial noncontrast CT images of head with coronal and sagittal reconstructed images. Axial noncontrast CT images of the cervical thoracic and lumbar spine with coronal and sagittal reconstructed images.   FINDINGS: CT HEAD:   BRAIN PARENCHYMA:  No evidence of acute intraparenchymal  hemorrhage or parenchymal evidence of acute large territory ischemic infarct. No mass-effect, midline shift or effacement of cerebral sulci. Gray-white matter distinction is preserved. Mild global volume loss and chronic small vessel ischemic changes. The appearance is similar   VENTRICLES and EXTRA-AXIAL SPACES:  No acute extra-axial or intraventricular hemorrhage. Ventricles and sulci are age-concordant.   PARANASAL SINUSES/MASTOIDS:  No hemorrhage or air-fluid levels within the visualized paranasal sinuses. The mastoid air cells are well-aerated.   CALVARIUM/ORBITS:  No skull fracture.  The orbits and globes are intact to the extent visualized.   Robust vascular calcification.     CT CERVICAL SPINE: C3-T1 posterior spinal fusion with decompression. Prevertebral soft tissue within normal limits. Scattered multilevel degenerative disc disease. No evidence of acute cervical spine fracture.   CT thoracic spine: Multilevel degenerative disc disease detected in the thoracic spine. No evidence of acute thoracic spine fracture.   Features of pulmonary fibrosis with honeycombing seen in the lung bases   Heart size is enlarged. Robust coronary calcification. No pericardial effusion.   Enlarged main pulmonary artery compatible with pulmonary arterial hypertension.   No evidence of acute lumbar spine fracture. Scattered degenerative changes most notable at L3-L4 and L4-L5 with disc osteophyte complexes. Facet arthropathy detected.   No evidence of aneurysm of the abdominal aorta. Robust vascular calcification peritoneal catheter terminates in the pelvis.       No evidence of acute cervical, thoracic, or lumbar spine fracture. Long segment decompression of the posterior aspect of the cervical spine from C3 through T1 not measurably changed.   Multilevel degenerative disc disease is seen. Degree of degenerative disease especially in lumbar spine does limit sensitivity. If there is severe pain, consider further evaluation with  lumbar spine MRI   Senescent changes seen in the brain. No evidence of acute intracranial hemorrhage   Signed by: Erasmo Noel 3/15/2024 6:55 PM Dictation workstation:   [id number]    CT thoracic spine wo IV contrast    Result Date: 3/15/2024  Interpreted By:  Erasmo Noel, STUDY: CT HEAD WO IV CONTRAST; CT CERVICAL SPINE WO IV CONTRAST; CT THORACIC SPINE WO IV CONTRAST; CT LUMBAR SPINE WO IV CONTRAST;  3/15/2024 6:27 pm   INDICATION: Signs/Symptoms:fall, advanced age on plavix; Signs/Symptoms:fall, lumbar pain, axial loading. RO compression fracture   COMPARISON: November 23   ACCESSION NUMBER(S): [id number]; [id number]; [id number]; [id number]   ORDERING CLINICIAN: WAQAR LOPEZ   TECHNIQUE: Axial noncontrast CT images of head with coronal and sagittal reconstructed images. Axial noncontrast CT images of the cervical thoracic and lumbar spine with coronal and sagittal reconstructed images.   FINDINGS: CT HEAD:   BRAIN PARENCHYMA:  No evidence of acute intraparenchymal hemorrhage or parenchymal evidence of acute large territory ischemic infarct. No mass-effect, midline shift or effacement of cerebral sulci. Gray-white matter distinction is preserved. Mild global volume loss and chronic small vessel ischemic changes. The appearance is similar   VENTRICLES and EXTRA-AXIAL SPACES:  No acute extra-axial or intraventricular hemorrhage. Ventricles and sulci are age-concordant.   PARANASAL SINUSES/MASTOIDS:  No hemorrhage or air-fluid levels within the visualized paranasal sinuses. The mastoid air cells are well-aerated.   CALVARIUM/ORBITS:  No skull fracture.  The orbits and globes are intact to the extent visualized.   Robust vascular calcification.     CT CERVICAL SPINE: C3-T1 posterior spinal fusion with decompression. Prevertebral soft tissue within normal limits. Scattered multilevel degenerative disc disease. No evidence of acute cervical spine fracture.   CT thoracic spine: Multilevel  degenerative disc disease detected in the thoracic spine. No evidence of acute thoracic spine fracture.   Features of pulmonary fibrosis with honeycombing seen in the lung bases   Heart size is enlarged. Robust coronary calcification. No pericardial effusion.   Enlarged main pulmonary artery compatible with pulmonary arterial hypertension.   No evidence of acute lumbar spine fracture. Scattered degenerative changes most notable at L3-L4 and L4-L5 with disc osteophyte complexes. Facet arthropathy detected.   No evidence of aneurysm of the abdominal aorta. Robust vascular calcification peritoneal catheter terminates in the pelvis.       No evidence of acute cervical, thoracic, or lumbar spine fracture. Long segment decompression of the posterior aspect of the cervical spine from C3 through T1 not measurably changed.   Multilevel degenerative disc disease is seen. Degree of degenerative disease especially in lumbar spine does limit sensitivity. If there is severe pain, consider further evaluation with lumbar spine MRI   Senescent changes seen in the brain. No evidence of acute intracranial hemorrhage   Signed by: Erasmo Noel 3/15/2024 6:55 PM Dictation workstation:   [id number]    CT lumbar spine wo IV contrast    Result Date: 3/15/2024  Interpreted By:  Erasmo Noel, STUDY: CT HEAD WO IV CONTRAST; CT CERVICAL SPINE WO IV CONTRAST; CT THORACIC SPINE WO IV CONTRAST; CT LUMBAR SPINE WO IV CONTRAST;  3/15/2024 6:27 pm   INDICATION: Signs/Symptoms:fall, advanced age on plavix; Signs/Symptoms:fall, lumbar pain, axial loading. RO compression fracture   COMPARISON: November 23   ACCESSION NUMBER(S): [id number]; [id number]; [id number]; [id number]   ORDERING CLINICIAN: WAQAR LOPEZ   TECHNIQUE: Axial noncontrast CT images of head with coronal and sagittal reconstructed images. Axial noncontrast CT images of the cervical thoracic and lumbar spine with coronal and sagittal reconstructed images.   FINDINGS:  CT HEAD:   BRAIN PARENCHYMA:  No evidence of acute intraparenchymal hemorrhage or parenchymal evidence of acute large territory ischemic infarct. No mass-effect, midline shift or effacement of cerebral sulci. Gray-white matter distinction is preserved. Mild global volume loss and chronic small vessel ischemic changes. The appearance is similar   VENTRICLES and EXTRA-AXIAL SPACES:  No acute extra-axial or intraventricular hemorrhage. Ventricles and sulci are age-concordant.   PARANASAL SINUSES/MASTOIDS:  No hemorrhage or air-fluid levels within the visualized paranasal sinuses. The mastoid air cells are well-aerated.   CALVARIUM/ORBITS:  No skull fracture.  The orbits and globes are intact to the extent visualized.   Robust vascular calcification.     CT CERVICAL SPINE: C3-T1 posterior spinal fusion with decompression. Prevertebral soft tissue within normal limits. Scattered multilevel degenerative disc disease. No evidence of acute cervical spine fracture.   CT thoracic spine: Multilevel degenerative disc disease detected in the thoracic spine. No evidence of acute thoracic spine fracture.   Features of pulmonary fibrosis with honeycombing seen in the lung bases   Heart size is enlarged. Robust coronary calcification. No pericardial effusion.   Enlarged main pulmonary artery compatible with pulmonary arterial hypertension.   No evidence of acute lumbar spine fracture. Scattered degenerative changes most notable at L3-L4 and L4-L5 with disc osteophyte complexes. Facet arthropathy detected.   No evidence of aneurysm of the abdominal aorta. Robust vascular calcification peritoneal catheter terminates in the pelvis.       No evidence of acute cervical, thoracic, or lumbar spine fracture. Long segment decompression of the posterior aspect of the cervical spine from C3 through T1 not measurably changed.   Multilevel degenerative disc disease is seen. Degree of degenerative disease especially in lumbar spine does limit  sensitivity. If there is severe pain, consider further evaluation with lumbar spine MRI   Senescent changes seen in the brain. No evidence of acute intracranial hemorrhage   Signed by: Erasmo Noel 3/15/2024 6:55 PM Dictation workstation:   [id number]    XR elbow left 1-2 views    Result Date: 3/15/2024  Interpreted By:  Olive Sr, STUDY: Left elbow, 2 views.   INDICATION: Signs/Symptoms:fall, left elbow pain.   COMPARISON: None.   ACCESSION NUMBER(S): [id number]   ORDERING CLINICIAN: WAQAR LOPEZ   FINDINGS: No acute fracture or malalignment. No elbow joint effusion or abnormal fat pad elevation. No significant degenerative changes. Heterotopic ossification adjacent to the lateral humeral condyle measuring 7 mm.       1. No acute fracture or malalignment of the left elbow.   MACRO: None.   Signed by: Olive Sr 3/15/2024 6:09 PM Dictation workstation:   [id number]    XR hip left with pelvis when performed 2 or 3 views    Result Date: 3/15/2024  Interpreted By:  Olive Sr, STUDY: Single view pelvis. Left hip, two views.   INDICATION: Signs/Symptoms:L hip fracture.   COMPARISON: None.   ACCESSION NUMBER(S): [id number]   ORDERING CLINICIAN: WAQAR LOPEZ   FINDINGS: There is a acute impacted comminuted mildly displaced intertrochanteric left proximal femoral fracture. Moderate bilateral hip joint degenerative changes. Advanced lumbar spine degenerative changes. Osteopenia. Bilateral femoral artery vascular calcifications.       1. Intertrochanteric left proximal femoral fracture.   MACRO: None.   Signed by: Olive Sr 3/15/2024 6:08 PM Dictation workstation:   [id number]    IOL BIOMETRY W/ IOL CALC OU (BOTH EYES)    Result Date: 3/7/2024  Date of Procedure 3/7/2024. Technician Information Loulou Lilly, COA . Notes Measurements only - see Procedure Record under Scanned Documents for signed results.     ASCAN ONLY - DIAGNOSTIC OU (BOTH EYES)    Result Date:  3/7/2024  Date of Procedure 3/7/2024. Technician Information Loulou Lilly, COA . Notes Ascan report in paper chart and scanned to patient chart after post op period. A-SCAN RIGHT EYE: 24.31 A-SCAN LEFT EYE: 23.93    XR chest 2 views    Result Date: 2/25/2024  Interpreted By:  Olaf Li, STUDY: XR CHEST 2 VIEWS;  2/25/2024 2:34 pm   INDICATION: Signs/Symptoms:Chest Pain.   COMPARISON: 11/01/2023   ACCESSION NUMBER(S): [id number]   ORDERING CLINICIAN: BUNNY CARROLL   TECHNIQUE: 2 radiographs of the chest were performed.   FINDINGS: There are low lung volumes. The heart is mildly enlarged and unchanged. There is mild central vascular and interstitial congestion which is unchanged. There is no new airspace consolidation or pneumothorax. There is slight blunting left lateral costophrenic angle which is stable. Postoperative changes are partially visualized in the lower cervical spine. There are findings of DISH.       No interval change from 11/01/2023 as described above.   Signed by: Olaf Li 2/25/2024 3:39 PM Dictation workstation:   [id number]    ECG 12 lead    Result Date: 2/25/2024  Normal sinus rhythm Left axis deviation Abnormal ECG When compared with ECG of 25-FEB-2024 13:08, (unconfirmed) Previous ECG has undetermined rhythm, needs review ST no longer depressed in Inferior leads See ED provider note for full interpretation and clinical correlation Confirmed by Yisel Yanez ([id number]) on 2/25/2024 1:40:13 PM    ECG 12 lead    Result Date: 2/25/2024  Undetermined rhythm Left axis deviation Nonspecific ST abnormality Abnormal ECG When compared with ECG of 01-OCT-2023 16:00, Current undetermined rhythm precludes rhythm comparison, needs review ST now depressed in Inferior leads T wave inversion now evident in Inferior leads T wave inversion no longer evident in Anterior leads See ED provider note for full interpretation and clinical correlation Confirmed by Yisel Yanez ([id number]) on 2/25/2024  1:39:27 PM                 Assessment/Plan   Principal Problem:    Closed displaced intertrochanteric fracture of left femur (CMS/[facility])  Active Problems:    Cortical senile cataract    ESRD on peritoneal dialysis (CMS/[facility])    Closed displaced intertrochanteric fracture of left femur, initial encounter (CMS/[facility]CC)    1. Closed displaced intertrochanteric fracture of left femur, initial encounter (CMS/[facility]CC)  POD #2 s/p left TFN  Continue PT/OT, WBAT left LE  Using incentive spirometer   Reviewed am labs  Multimodal pain regimen  Surgical hip dressing to be removed on post op day #7  VTE prophylaxis: eliquis BID X 28 days; can restart plavix on 3/20/2024  Dispo: TBD after PT eval  Follow up with Dr. Donna Thomas in 2 weeks     2. Acute post operative expected blood loss anemia on chronic anemia  -hgb is 6.7,  his baseline is 8-10  -he is hemodynamically stable  -No signs of active bleeding  -Will defer management including decision to transfuse packed red blood cells to medical service  -CBC in a.m.       I spent 25 minutes in the professional and overall care of this patient.      Julia Little PA-C

## 2024-03-19 NOTE — PROGRESS NOTES
"Physical Therapy                 Therapy Communication Note    Patient Name: Salomon Chowdary \"Robbie\"  MRN: [id number]  Today's Date: 3/19/2024     Discipline: Physical Therapy    Missed Visit Reason: Missed Visit Reason: Patient placed on medical hold    Missed Time: Attempt    Comment: Pt has low hemoglobin 6.7 and will be receiving blood this date. Will continue to see per POC.  "

## 2024-03-19 NOTE — CARE PLAN
Problem: Pain  Goal: My pain/discomfort is manageable  Outcome: Progressing     Problem: Safety  Goal: Patient will be injury free during hospitalization  Outcome: Progressing  Goal: I will remain free of falls  Outcome: Progressing     Problem: Daily Care  Goal: Daily care needs are met  Outcome: Progressing     Problem: Psychosocial Needs  Goal: Demonstrates ability to cope with hospitalization/illness  Outcome: Progressing  Goal: Collaborate with me, my family, and caregiver to identify my specific goals  Outcome: Progressing     Problem: Discharge Barriers  Goal: My discharge needs are met  Outcome: Progressing     Problem: Pain - Adult  Goal: Verbalizes/displays adequate comfort level or baseline comfort level  Outcome: Progressing     Problem: Safety - Adult  Goal: Free from fall injury  Outcome: Progressing     Problem: Discharge Planning  Goal: Discharge to home or other facility with appropriate resources  Outcome: Progressing     Problem: Chronic Conditions and Co-morbidities  Goal: Patient's chronic conditions and co-morbidity symptoms are monitored and maintained or improved  Outcome: Progressing     Problem: Skin  Goal: Decreased wound size/increased tissue granulation at next dressing change  Outcome: Progressing  Goal: Participates in plan/prevention/treatment measures  Outcome: Progressing  Goal: Prevent/manage excess moisture  Outcome: Progressing  Goal: Prevent/minimize sheer/friction injuries  Outcome: Progressing  Goal: Promote/optimize nutrition  Outcome: Progressing  Goal: Promote skin healing  Outcome: Progressing     Problem: Diabetes  Goal: Achieve decreasing blood glucose levels by end of shift  Outcome: Progressing  Goal: Increase stability of blood glucose readings by end of shift  Outcome: Progressing  Goal: Decrease in ketones present in urine by end of shift  Outcome: Progressing  Goal: Maintain electrolyte levels within acceptable range throughout shift  Outcome: Progressing  Goal:  Maintain glucose levels >70mg/dl to <250mg/dl throughout shift  Outcome: Progressing  Goal: No changes in neurological exam by end of shift  Outcome: Progressing  Goal: Learn about and adhere to nutrition recommendations by end of shift  Outcome: Progressing  Goal: Vital signs within normal range for age by end of shift  Outcome: Progressing  Goal: Increase self care and/or family involovement by end of shift  Outcome: Progressing  Goal: Receive DSME education by end of shift  Outcome: Progressing     Problem: Pain  Goal: Takes deep breaths with improved pain control throughout the shift  Outcome: Progressing  Goal: Turns in bed with improved pain control throughout the shift  Outcome: Progressing  Goal: Walks with improved pain control throughout the shift  Outcome: Progressing  Goal: Performs ADL's with improved pain control throughout shift  Outcome: Progressing  Goal: Participates in PT with improved pain control throughout the shift  Outcome: Progressing  Goal: Free from opioid side effects throughout the shift  Outcome: Progressing  Goal: Free from acute confusion related to pain meds throughout the shift  Outcome: Progressing   The patient's goals for the shift include      The clinical goals for the shift include Pt will receive adequate rest and have minimal pain.    Over the shift, the patient did receive rest.

## 2024-03-19 NOTE — DISCHARGE INSTR - ACTIVITY
*Call Dr. Thomas for any problems and/or concerns.  *Maintain hip precautions  *Weight bearing as tolerated with a wheeled walker  *Remove surgical dressing post op day #7 and leave open to air if no drainage  *You may shower, do not soak or scrub incision; pat dry  *Apply ice to hip as needed to minimize swelling, on 20 minutes, off 20 minutes  *Continue the coughing and deep breathing exercises that you learned in the hospital  *Move around as much as you can tolerate because movement can help you heal and helps prevent stiffness, skin sores, and blood clots    Call Doctor right away for:  *Increased hip pain  *Pain or swelling in your calf of leg  *Fever above 100.4/shaking chills  *Excessive swelling, increased redness or drainage around the incision  *Your incision breaks open  *Chest pain/shortness of breath, call 911    After the procedure it is common to have pain and swelling.      -To help prevent hip dislocation, follow instructions about movement restrictions as told by your physician:  *Do not cross your legs at the knees. To remind yourself about this, you may keep a pillow between your legs while lying in bed  *Do not bend at the hip and waist or bend farther than 90 degrees of flexion.   To avoid bending this far: do not bring your knees higher than your hips, do not  something from the floor while sitting in a chair, avoid sitting in low chairs, used raised toilet seat (if needed), when standing up from a seated position- keep injured leg out in front of you  *Avoid twisting at your waist and reaching across your body to the side of the affected leg  *Avoid rotating your toes inward on the affected leg    *When getting into a car:  1. Raise the seat as high as possible, move the seat as far back as it will go, and recline the upper part of the seat slightly  2. Sit down on the seat with your injured leg extended out of the car  3. Scoot back in the seat as you move the lower half of your  body into the car. Try to avoid bumping your foot or leg as you bring it into the car. To make this motion smooth and easy on a cloth seat, try placing a plastic bag on the seat    *If your physician has prescribed compression stockings please use as directed. These stockings help to prevent blood clots and reduce swelling in your legs  *Continue to do the exercises you learned in the hospital: i.e. ankle pumps  *If you were prescribed a blood thinner (anticoagulant), take it as prescribed    *Do not use any products that contain nicotine or tobacco, such as cigarettes and e-cigarettes. These can delay bone healing. If you need help quitting, ask your healthcare provider    If you are taking prescription pain medication, take actions to prevent or treat constipation.  -drink enough fluids to keep your urine pale yellow  -eat foods that are high in fiber, such as fresh fruits and vegetables, whole grains, and beans  -limit food that are high in fat and processed sugars, such as fried or sweet foods  -take an over the counter or prescribed medicine for constipation        **Peritoneal dialysis per nephrology

## 2024-03-19 NOTE — PROGRESS NOTES
03/19/24 0832   Discharge Planning   Living Arrangements Spouse/significant other   Support Systems Spouse/significant other   Type of Residence Private residence   Home or Post Acute Services Post acute facilities (Rehab/SNF/etc)   Type of Post Acute Facility Services Skilled nursing   Patient expects to be discharged to: [facility]   Does the patient need discharge transport arranged? Yes   RoundTrip coordination needed? Yes     [facility] patient and is [facility]. No precert needed. Pt needs blood tranfusion. Possible discharge later today. Facility updated.   [facility] does not have a private room available today. Required for CAPD patients. They also need to coordinate cycler delivery and order CAPD supplies.

## 2024-03-19 NOTE — PROGRESS NOTES
"Occupational Therapy                 Therapy Communication Note    Patient Name: Salomon Chowdary \"Robbie\"  MRN: [id number]  Today's Date: 3/19/2024     Discipline: Occupational Therapy    Missed Visit Reason: Missed Visit Reason: Patient placed on medical hold    Missed Time: Attempt    Comment: Pt with low hgb 6.7 and to receive blood this date. Will continue to follow and attempt as appropriate at next opportunity.   "

## 2024-03-19 NOTE — PROGRESS NOTES
INPATIENT NEPHROLOGY CONSULT PROGRESS NOTES    Patient Name: Salomon Chowdary   MRN: [id number]  CONSULTING SERVICE: Nephrology    Impression :   Mr. Chowdary is a 83 y.o. male with past medical history significant for CAD, hypertension, ESRD on PD since 03/23, primary nephrologist Dr. Khan, presented to the hospital following a fall and the patient was found to have left proximal intertrochanteric femur fracture and plan for surgery. Nephrology consulted for management of advanced renal failure. Patient has been on peritoneal dialysis since 3/23 and denies any complaints currently other than left hip pain   Mr. Salomon Chowdary is a 83-year-old male with past medical history of CAD, hypertension, ESRD on PD since 03/23, primary nephrologist Dr. Khan, presented to the hospital following a fall and the patient was found to have left proximal intertrochanteric femur fracture and plan for surgery.  Nephrology consulted for management of advanced renal failure.  Patient has been on peritoneal dialysis since 3/23 and denies any complaints currently other than left hip pain.  Denies any chest pain, shortness of breath, nausea, vomiting, abdominal pain, headache, dizziness.     1.  ESRD on PD.  2.  Hypertension.  3.   CAD status post CABG.  4.  Type 2 diabetes mellitus.  5.  Left proximal intertrochanteric femur fracture.  6.  Hypothyroidism.     Plan.  - patient had PD overnight with the -400 ultrafiltration.  Will continue same PD prescription tonight..  PD prescription is a 6000 mL total fill volume, 3 exchanges, 2000 fill volume, 8 hours, 1.5% dextrose bags.  -Hypertension: Blood pressure has been optimally controlled.  Patient is on amlodipine 10 mg daily, hydralazine 25 mg twice daily, torsemide 50 mg daily.  Continue hydralazine 25 mg 3 times daily.  -BMD: Continue phosphorus binders including sevelamer 800 mg 3 times daily.  -Underwent surgery for left femur  fracture.  ================================================================  INTERVAL HPI: The patient was seen and examined.  Patient reports that he had diarrhea overnight and has constipation since this morning.  Otherwise denies any other complaints.      PERTINENT ROS:   GENERAL: No fever/chills.  RESPIRATORY: Negative for cough, wheezing or shortness of breath.  CARDIOVASCULAR: Negative for chest pain or palpitations.  GI: Negative for nausea, vomiting,  Diarrhea, abdominal pain.    : Negative for dysuria and hematuria    MEDICATIONS:  Current active Inpatient Medication reviewed.   Current Facility-Administered Medications   Medication Dose Route Frequency Provider Last Rate Last Admin    acetaminophen (Tylenol) tablet 325 mg  325 mg oral q8h PRN Donna Kessler MD        acetaminophen (Tylenol) tablet 975 mg  975 mg oral TID Donna Kessler MD   975 mg at 03/19/24 0901    amLODIPine (Norvasc) tablet 10 mg  10 mg oral Daily Donna Kessler MD   10 mg at 03/19/24 0902    apixaban (Eliquis) tablet 2.5 mg  2.5 mg oral q12h Rick Covarrubias MD   2.5 mg at 03/19/24 1212    aspirin EC tablet 81 mg  81 mg oral Daily Donna Kessler MD   81 mg at 03/19/24 0902    benzocaine-menthol (Cepastat Sore Throat) 15-3.6 mg lozenge 1 lozenge  1 lozenge Mouth/Throat q2h PRN Donna Kessler MD        ceFAZolin in dextrose (iso-os) (Ancef) IVPB 2 g  2 g intravenous Once in OR Donna Kessler MD        cholecalciferol (Vitamin D-3) tablet 1,000 Units  1,000 Units oral Daily Donna Kessler MD   1,000 Units at 03/19/24 0901    [START ON 3/20/2024] clopidogrel (Plavix) tablet 75 mg  75 mg oral Daily Julia M Barmasse, PA-C        dextrose 1.5% - LOW calcium 2.5mEq/L 6,000 mL peritoneal dialysate   intraperitoneal q24h Donna Kessler MD        dextrose 1.5% - LOW calcium 2.5mEq/L 6,000 mL peritoneal dialysate   intraperitoneal q24h Donna Kessler,  MD        dextrose 1.5% - LOW calcium 2.5mEq/L 6,000 mL peritoneal dialysate   intraperitoneal q24h Donna Kessler MD   Given at 03/18/24 2309    dextrose 1.5% - LOW calcium 2.5mEq/L 6,000 mL peritoneal dialysate   intraperitoneal q24h Donna Kessler MD   Given at 03/17/24 2218    dextrose 50 % injection 12.5 g  12.5 g intravenous q15 min PRN Donna Kessler MD        dextrose 50 % injection 25 g  25 g intravenous q15 min PRN Donna Kessler MD        DULoxetine (Cymbalta) DR capsule 30 mg  30 mg oral Daily Donna Kessler MD   30 mg at 03/19/24 0901    glucagon (Glucagen) injection 1 mg  1 mg intramuscular q15 min PRN Donna Kessler MD        guaiFENesin (Mucinex) 12 hr tablet 600 mg  600 mg oral q12h PRN Donna Kessler MD   600 mg at 03/16/24 1003    hydrALAZINE (Apresoline) tablet 25 mg  25 mg oral TID Donna Kessler MD   25 mg at 03/19/24 0901    insulin detemir (Levemir) injection 20 Units  20 Units subcutaneous Daily Donna Kessler MD   20 Units at 03/18/24 0910    insulin lispro (HumaLOG) injection 0-10 Units  0-10 Units subcutaneous TID with meals Donna Kessler MD   2 Units at 03/19/24 1211    levothyroxine (Synthroid, Levoxyl) tablet 50 mcg  50 mcg oral Daily Donna Kessler MD   50 mcg at 03/19/24 0521    metoprolol succinate XL (Toprol-XL) 24 hr tablet 50 mg  50 mg oral Daily Donna Kessler MD   50 mg at 03/19/24 0902    multivitamin with minerals 1 tablet  1 tablet oral Daily Donna Kessler MD   1 tablet at 03/19/24 0902    ondansetron (Zofran) injection 4 mg  4 mg intravenous q8h PRN Donna Kessler MD   4 mg at 03/18/24 0954    polyethylene glycol (Glycolax, Miralax) packet 17 g  17 g oral Daily Donna Kessler MD   17 g at 03/16/24 0837    sevelamer carbonate (Renvela) packet 0.8 g  0.8 g oral TID with meals Donna Kessler MD   0.8 g at 03/19/24  "1212    sodium bicarbonate tablet 650 mg  650 mg oral BID Donna Kessler MD   650 mg at 03/19/24 0902    torsemide (Demadex) tablet 50 mg  50 mg oral Daily Donna Kessler MD   50 mg at 03/19/24 0901    traMADol (Ultram) tablet 50 mg  50 mg oral q12h PRN Donna Kessler MD   50 mg at 03/18/24 0954       PHYSICAL EXAM:   /56   Pulse 69   Temp 36.1 °C (97 °F) (Temporal)   Resp 16   Ht 1.753 m (5' 9\")   Wt 92.8 kg (204 lb 9.4 oz)   SpO2 95%   BMI 30.21 kg/m²   Patient Vitals for the past 24 hrs:   BP   03/19/24 1331 113/56   03/19/24 1316 108/56   03/19/24 1200 106/53   03/19/24 0800 111/54   03/19/24 0400 106/58   03/19/24 0000 98/57   03/18/24 2100 111/61   03/18/24 2000 105/51   03/18/24 1600 112/56       Intake/Output Summary (Last 24 hours) at 3/19/2024 1340  Last data filed at 3/19/2024 1000  Gross per 24 hour   Intake 6240 ml   Output 6000 ml   Net 240 ml     GENERAL: Alert, no distress, cooperative  SKIN: Skin color, texture, turgor normal. No rashes or lesions.  HEAD/SINUSES: No significant findings  EYES: PE  OROPHARYNX: oral mucosa moist. Oropharynx normal.  NECK: No jugulovenous distention, No carotid bruits, Supple  LUNGS: Lungs clear to auscultation, no wheeze, rhonchi, or rales  CARDIAC: Normal S1 and S2; no rubs, murmurs, or gallops  ABDOMEN: Abdomen soft, non-tender, BS normal, No masses. No abdominal bruits.  EXTREMITIES: Status post left surgery for closed displaced intertrochanteric left femur fracture  NEURO: No focal deficits. Able to move all 4 extremities.  PULSES: 2+ radial, 2 + femoral    DATA:   Diagnostic tests reviewed for today's visit:    CBC: @UONNQHV67(wbc,rbc,hb,hct,plt,mcv,mch,mpv,rdw)@  Coags: @AZHGQZR90(pt,inr,aptt)@  CMP: @YILWLAM41(na,k,chlor,co2,bun,creat,gluc,tprot,ca,mg,albumin,tbili,alkphos,ALT,AST,anion)@  ABG's: @EIVSEZU88(PH,PCO2,PO2,BE,HCO3,CO2CT,O2HB,COHB,MHGB,TEMP,PHTC,PCO2T,PO2T,O2AD)@  MG/PHOS: @PINSOHT57(mg,p)@    Urobilinogen, " "Urine   Date Value Ref Range Status   10/01/2023 <2.0 <2.0 mg/dL Final       No components found for: \"PROTTIMED\", \"UALBCR\", \"UPROT\", \"CREAT\"    No results found for: \"CHOL\", \"HDL\", \"LDL\"  IMAGING:  reviewed in [facility] images    SIGNATURE: Clary Castañeda MD      "

## 2024-03-20 ENCOUNTER — APPOINTMENT (OUTPATIENT)
Dept: CARDIAC REHAB | Facility: HOSPITAL | Age: 84
End: 2024-03-20
Payer: MEDICARE

## 2024-03-20 LAB
ALBUMIN SERPL BCP-MCNC: 2.4 G/DL (ref 3.4–5)
ANION GAP SERPL CALC-SCNC: 16 MMOL/L (ref 10–20)
BUN SERPL-MCNC: 79 MG/DL (ref 6–23)
CALCIUM SERPL-MCNC: 7.5 MG/DL (ref 8.6–10.3)
CHLORIDE SERPL-SCNC: 94 MMOL/L (ref 98–107)
CO2 SERPL-SCNC: 24 MMOL/L (ref 21–32)
CREAT SERPL-MCNC: 5.98 MG/DL (ref 0.5–1.3)
EGFRCR SERPLBLD CKD-EPI 2021: 9 ML/MIN/1.73M*2
ERYTHROCYTE [DISTWIDTH] IN BLOOD BY AUTOMATED COUNT: 18.2 % (ref 11.5–14.5)
GLUCOSE BLD MANUAL STRIP-MCNC: 121 MG/DL (ref 74–99)
GLUCOSE BLD MANUAL STRIP-MCNC: 139 MG/DL (ref 74–99)
GLUCOSE BLD MANUAL STRIP-MCNC: 188 MG/DL (ref 74–99)
GLUCOSE BLD MANUAL STRIP-MCNC: 196 MG/DL (ref 74–99)
GLUCOSE SERPL-MCNC: 216 MG/DL (ref 74–99)
HCT VFR BLD AUTO: 23.7 % (ref 41–52)
HGB BLD-MCNC: 7.4 G/DL (ref 13.5–17.5)
HOLD SPECIMEN: NORMAL
MCH RBC QN AUTO: 31.2 PG (ref 26–34)
MCHC RBC AUTO-ENTMCNC: 31.2 G/DL (ref 32–36)
MCV RBC AUTO: 100 FL (ref 80–100)
NRBC BLD-RTO: 0 /100 WBCS (ref 0–0)
PHOSPHATE SERPL-MCNC: 6.7 MG/DL (ref 2.5–4.9)
PLATELET # BLD AUTO: 167 X10*3/UL (ref 150–450)
POTASSIUM SERPL-SCNC: 3.1 MMOL/L (ref 3.5–5.3)
RBC # BLD AUTO: 2.37 X10*6/UL (ref 4.5–5.9)
SODIUM SERPL-SCNC: 131 MMOL/L (ref 136–145)
WBC # BLD AUTO: 6 X10*3/UL (ref 4.4–11.3)

## 2024-03-20 PROCEDURE — 2500000001 HC RX 250 WO HCPCS SELF ADMINISTERED DRUGS (ALT 637 FOR MEDICARE OP): Performed by: PHYSICIAN ASSISTANT

## 2024-03-20 PROCEDURE — 2500000004 HC RX 250 GENERAL PHARMACY W/ HCPCS (ALT 636 FOR OP/ED): Performed by: STUDENT IN AN ORGANIZED HEALTH CARE EDUCATION/TRAINING PROGRAM

## 2024-03-20 PROCEDURE — 2500000001 HC RX 250 WO HCPCS SELF ADMINISTERED DRUGS (ALT 637 FOR MEDICARE OP): Performed by: INTERNAL MEDICINE

## 2024-03-20 PROCEDURE — 2500000001 HC RX 250 WO HCPCS SELF ADMINISTERED DRUGS (ALT 637 FOR MEDICARE OP): Performed by: STUDENT IN AN ORGANIZED HEALTH CARE EDUCATION/TRAINING PROGRAM

## 2024-03-20 PROCEDURE — 36415 COLL VENOUS BLD VENIPUNCTURE: CPT | Performed by: INTERNAL MEDICINE

## 2024-03-20 PROCEDURE — 97530 THERAPEUTIC ACTIVITIES: CPT | Mod: GP,CQ

## 2024-03-20 PROCEDURE — 97535 SELF CARE MNGMENT TRAINING: CPT | Mod: GO,CO

## 2024-03-20 PROCEDURE — 2500000002 HC RX 250 W HCPCS SELF ADMINISTERED DRUGS (ALT 637 FOR MEDICARE OP, ALT 636 FOR OP/ED): Performed by: STUDENT IN AN ORGANIZED HEALTH CARE EDUCATION/TRAINING PROGRAM

## 2024-03-20 PROCEDURE — 80069 RENAL FUNCTION PANEL: CPT | Performed by: INTERNAL MEDICINE

## 2024-03-20 PROCEDURE — 2500000002 HC RX 250 W HCPCS SELF ADMINISTERED DRUGS (ALT 637 FOR MEDICARE OP, ALT 636 FOR OP/ED): Performed by: INTERNAL MEDICINE

## 2024-03-20 PROCEDURE — 82947 ASSAY GLUCOSE BLOOD QUANT: CPT

## 2024-03-20 PROCEDURE — 85027 COMPLETE CBC AUTOMATED: CPT | Performed by: INTERNAL MEDICINE

## 2024-03-20 PROCEDURE — 1100000001 HC PRIVATE ROOM DAILY

## 2024-03-20 PROCEDURE — 6350000001 HC RX 635 EPOETIN >10,000 UNITS: Mod: JZ | Performed by: INTERNAL MEDICINE

## 2024-03-20 PROCEDURE — 99231 SBSQ HOSP IP/OBS SF/LOW 25: CPT | Performed by: PHYSICIAN ASSISTANT

## 2024-03-20 PROCEDURE — 97112 NEUROMUSCULAR REEDUCATION: CPT | Mod: GP,CQ

## 2024-03-20 RX ORDER — POTASSIUM CHLORIDE 20 MEQ/1
40 TABLET, EXTENDED RELEASE ORAL ONCE
Status: COMPLETED | OUTPATIENT
Start: 2024-03-20 | End: 2024-03-20

## 2024-03-20 RX ADMIN — SODIUM BICARBONATE 650 MG: 650 TABLET ORAL at 08:13

## 2024-03-20 RX ADMIN — DULOXETINE HYDROCHLORIDE 30 MG: 30 CAPSULE, DELAYED RELEASE ORAL at 08:13

## 2024-03-20 RX ADMIN — ACETAMINOPHEN 975 MG: 325 TABLET ORAL at 15:46

## 2024-03-20 RX ADMIN — HYDRALAZINE HYDROCHLORIDE 25 MG: 25 TABLET ORAL at 15:46

## 2024-03-20 RX ADMIN — TORSEMIDE 50 MG: 20 TABLET ORAL at 08:13

## 2024-03-20 RX ADMIN — Medication 1000 UNITS: at 08:13

## 2024-03-20 RX ADMIN — APIXABAN 2.5 MG: 2.5 TABLET, FILM COATED ORAL at 00:44

## 2024-03-20 RX ADMIN — INSULIN LISPRO 2 UNITS: 100 INJECTION, SOLUTION INTRAVENOUS; SUBCUTANEOUS at 08:13

## 2024-03-20 RX ADMIN — APIXABAN 2.5 MG: 2.5 TABLET, FILM COATED ORAL at 23:30

## 2024-03-20 RX ADMIN — HYDRALAZINE HYDROCHLORIDE 25 MG: 25 TABLET ORAL at 21:27

## 2024-03-20 RX ADMIN — Medication 1 TABLET: at 08:13

## 2024-03-20 RX ADMIN — ACETAMINOPHEN 975 MG: 325 TABLET ORAL at 21:27

## 2024-03-20 RX ADMIN — SODIUM CHLORIDE, SODIUM LACTATE, CALCIUM CHLORIDE, MAGNESIUM CHLORIDE AND DEXTROSE: 1.5; 538; 448; 18.3; 5.08 INJECTION, SOLUTION INTRAPERITONEAL at 21:40

## 2024-03-20 RX ADMIN — LEVOTHYROXINE SODIUM 50 MCG: 50 TABLET ORAL at 05:42

## 2024-03-20 RX ADMIN — SEVELAMER CARBONATE 0.8 G: 800 POWDER, FOR SUSPENSION ORAL at 12:19

## 2024-03-20 RX ADMIN — POTASSIUM CHLORIDE 40 MEQ: 1500 TABLET, EXTENDED RELEASE ORAL at 15:46

## 2024-03-20 RX ADMIN — AMLODIPINE BESYLATE 10 MG: 10 TABLET ORAL at 08:13

## 2024-03-20 RX ADMIN — ACETAMINOPHEN 975 MG: 325 TABLET ORAL at 09:00

## 2024-03-20 RX ADMIN — APIXABAN 2.5 MG: 2.5 TABLET, FILM COATED ORAL at 12:19

## 2024-03-20 RX ADMIN — HYDRALAZINE HYDROCHLORIDE 25 MG: 25 TABLET ORAL at 08:13

## 2024-03-20 RX ADMIN — METOPROLOL SUCCINATE 50 MG: 50 TABLET, EXTENDED RELEASE ORAL at 08:13

## 2024-03-20 RX ADMIN — SEVELAMER CARBONATE 0.8 G: 800 POWDER, FOR SUSPENSION ORAL at 08:13

## 2024-03-20 RX ADMIN — CLOPIDOGREL BISULFATE 75 MG: 75 TABLET ORAL at 08:13

## 2024-03-20 RX ADMIN — TRAMADOL HYDROCHLORIDE 50 MG: 50 TABLET, COATED ORAL at 08:13

## 2024-03-20 RX ADMIN — SODIUM BICARBONATE 650 MG: 650 TABLET ORAL at 21:27

## 2024-03-20 RX ADMIN — INSULIN LISPRO 2 UNITS: 100 INJECTION, SOLUTION INTRAVENOUS; SUBCUTANEOUS at 12:19

## 2024-03-20 RX ADMIN — ASPIRIN 81 MG: 81 TABLET, COATED ORAL at 08:13

## 2024-03-20 RX ADMIN — SEVELAMER CARBONATE 0.8 G: 800 POWDER, FOR SUSPENSION ORAL at 17:47

## 2024-03-20 RX ADMIN — INSULIN DETEMIR 20 UNITS: 100 INJECTION, SOLUTION SUBCUTANEOUS at 08:13

## 2024-03-20 RX ADMIN — EPOETIN ALFA-EPBX 10000 UNITS: 10000 INJECTION, SOLUTION INTRAVENOUS; SUBCUTANEOUS at 12:19

## 2024-03-20 ASSESSMENT — ACTIVITIES OF DAILY LIVING (ADL)
HOME_MANAGEMENT_TIME_ENTRY: 40
EFFECT OF PAIN ON DAILY ACTIVITIES: .
EFFECT OF PAIN ON DAILY ACTIVITIES: .

## 2024-03-20 ASSESSMENT — COGNITIVE AND FUNCTIONAL STATUS - GENERAL
DAILY ACTIVITIY SCORE: 13
HELP NEEDED FOR BATHING: A LOT
DRESSING REGULAR UPPER BODY CLOTHING: A LOT
CLIMB 3 TO 5 STEPS WITH RAILING: TOTAL
STANDING UP FROM CHAIR USING ARMS: A LOT
PERSONAL GROOMING: A LITTLE
DAILY ACTIVITIY SCORE: 12
TOILETING: A LOT
STANDING UP FROM CHAIR USING ARMS: A LOT
MOVING TO AND FROM BED TO CHAIR: A LOT
CLIMB 3 TO 5 STEPS WITH RAILING: TOTAL
DRESSING REGULAR LOWER BODY CLOTHING: TOTAL
MOBILITY SCORE: 9
PERSONAL GROOMING: A LOT
HELP NEEDED FOR BATHING: A LOT
TURNING FROM BACK TO SIDE WHILE IN FLAT BAD: A LOT
DRESSING REGULAR UPPER BODY CLOTHING: A LOT
WALKING IN HOSPITAL ROOM: TOTAL
EATING MEALS: A LITTLE
MOVING FROM LYING ON BACK TO SITTING ON SIDE OF FLAT BED WITH BEDRAILS: A LOT
WALKING IN HOSPITAL ROOM: TOTAL
DRESSING REGULAR LOWER BODY CLOTHING: A LOT
EATING MEALS: A LITTLE
TURNING FROM BACK TO SIDE WHILE IN FLAT BAD: A LOT
MOVING FROM LYING ON BACK TO SITTING ON SIDE OF FLAT BED WITH BEDRAILS: A LITTLE
MOVING TO AND FROM BED TO CHAIR: TOTAL
MOBILITY SCORE: 11
TOILETING: TOTAL

## 2024-03-20 ASSESSMENT — PAIN SCALES - GENERAL
PAINLEVEL_OUTOF10: 6
PAINLEVEL_OUTOF10: 10 - WORST POSSIBLE PAIN
PAINLEVEL_OUTOF10: 10 - WORST POSSIBLE PAIN
PAINLEVEL_OUTOF10: 3
PAINLEVEL_OUTOF10: 10 - WORST POSSIBLE PAIN

## 2024-03-20 ASSESSMENT — PAIN - FUNCTIONAL ASSESSMENT
PAIN_FUNCTIONAL_ASSESSMENT: 0-10

## 2024-03-20 NOTE — DOCUMENTATION CLARIFICATION NOTE
"    PATIENT:               MAGGIE KIRAN  ACCT #:                  [id number]  MRN:                       [id number]  :                       1940  ADMIT DATE:       3/15/2024 4:49 PM  DISCH DATE:  RESPONDING PROVIDER #:        [id number]          PROVIDER RESPONSE TEXT:    Coagulation Defect ruled in as evidenced by Not sure why this is even being discussed! There obviously is a coagulopathy in someone, who was taking \"blood thinners\" immediately prior to the fall.    CDI QUERY TEXT:    [facility]_CV Generic    Instruction:    Based on your assessment of the patient and the clinical information, please provide the requested documentation by clicking on the appropriate radio button and enter any additional information if prompted.    Question: Based on the information, please further clarify the diagnosis of Coagulation Defect as    When answering this query, please exercise your independent professional judgment. The fact that a question is being asked, does not imply that any particular answer is desired or expected.    The patient's clinical indicators include:  Clinical Information:  83 yr old male presenting s/p fall with left proximal displaced femur fracture.  Went to OR s/p Left TFN.    Documented Diagnosis:  Anesthesiologist preoperative evaluation on 3/17 0819 AM states under relevant problems \"Coagulation defect, unspecified\" and \"PAF paroxysmal atrial fibrillation\".    Clinical Indicators:  Labs: PT/INR 11.5/1.0 on 3/15.    Nephrology consult note on 3/17 0210 PM states \"Apixaban/Eliquis 2.5 mg oral q12h\".    Treatment: PT/INR.  Eliquis, Aspirin, Heparin SC.    Risk Factors: Elderly male w/hx of Anemia, ESRD.  Options provided:  -- Coagulation Defect ruled in as evidenced by, Please specify additional information below  -- Coagulation Defect is ruled out  -- Other - I will add my own diagnosis  -- Refer to Clinical Documentation Reviewer    Query created by: Sheree Huffman on 3/19/2024 8:22 " AM      Electronically signed by:  AWILDA RENEE MD 3/20/2024 3:32 PM

## 2024-03-20 NOTE — CARE PLAN
The patient's goals for the shift include      The clinical goals for the shift include pt will remain clinically stable, safe and comfortable

## 2024-03-20 NOTE — PROGRESS NOTES
INPATIENT NEPHROLOGY CONSULT PROGRESS NOTES    Patient Name: Salomon Chowdary   MRN: [id number]  CONSULTING SERVICE: Nephrology    Impression :   Mr. Chowdary is a 83 y.o. male with past medical history significant for CAD, hypertension, ESRD on PD since 03/23, primary nephrologist Dr. Khan, presented to the hospital following a fall and the patient was found to have left proximal intertrochanteric femur fracture and plan for surgery. Nephrology consulted for management of advanced renal failure. Patient has been on peritoneal dialysis since 3/23 and denies any complaints currently other than left hip pain   Mr. Salomon Chowdary is a 83-year-old male with past medical history of CAD, hypertension, ESRD on PD since 03/23, primary nephrologist Dr. Khan, presented to the hospital following a fall and the patient was found to have left proximal intertrochanteric femur fracture and plan for surgery.  Nephrology consulted for management of advanced renal failure.  Patient has been on peritoneal dialysis since 3/23 and denies any complaints currently other than left hip pain.  Denies any chest pain, shortness of breath, nausea, vomiting, abdominal pain, headache, dizziness.     1.  ESRD on PD.  2.  Hypertension.  3.   CAD status post CABG.  4.  Type 2 diabetes mellitus.  5.  Left proximal intertrochanteric femur fracture.  6.  Hypothyroidism.     Plan.  - patient had PD overnight with the -300 ultrafiltration.  Will switch to 1 green bag today.  PD prescription is a 6000 mL total fill volume, 3 exchanges, 2000 fill volume, 8 hours, 1.5% dextrose bags.  -Hypertension: Blood pressure has been optimally controlled.  Patient is on amlodipine 10 mg daily, hydralazine 25 mg twice daily, torsemide 50 mg daily.  Continue hydralazine 25 mg 3 times daily.  -BMD: Continue phosphorus binders including sevelamer 800 mg 3 times daily.  -Hypokalemia, replaced.  -Anemia: Hemoglobin is stable around 7.4.  -Underwent surgery for left  femur fracture.  ================================================================  INTERVAL HPI: The patient was seen and examined.  Reports continued to have diarrhea.     PERTINENT ROS:   GENERAL: No fever/chills.  RESPIRATORY: Negative for cough, wheezing or shortness of breath.  CARDIOVASCULAR: Negative for chest pain or palpitations.  GI: Negative for nausea, vomiting,  +Diarrhea, abdominal pain.    : Negative for dysuria and hematuria    MEDICATIONS:  Current active Inpatient Medication reviewed.   Current Facility-Administered Medications   Medication Dose Route Frequency Provider Last Rate Last Admin    acetaminophen (Tylenol) tablet 325 mg  325 mg oral q8h PRN Donna Kessler MD        acetaminophen (Tylenol) tablet 975 mg  975 mg oral TID Donna Kessler MD   975 mg at 03/20/24 0900    amLODIPine (Norvasc) tablet 10 mg  10 mg oral Daily Donna Kessler MD   10 mg at 03/20/24 0813    apixaban (Eliquis) tablet 2.5 mg  2.5 mg oral q12h Rick Covarrubias MD   2.5 mg at 03/20/24 1219    aspirin EC tablet 81 mg  81 mg oral Daily Donna Kessler MD   81 mg at 03/20/24 0813    benzocaine-menthol (Cepastat Sore Throat) 15-3.6 mg lozenge 1 lozenge  1 lozenge Mouth/Throat q2h PRN Donna Kessler MD        ceFAZolin in dextrose (iso-os) (Ancef) IVPB 2 g  2 g intravenous Once in OR Donna Kessler MD        cholecalciferol (Vitamin D-3) tablet 1,000 Units  1,000 Units oral Daily Donna Kessler MD   1,000 Units at 03/20/24 0813    clopidogrel (Plavix) tablet 75 mg  75 mg oral Daily Julia Little PA-C   75 mg at 03/20/24 0813    dextrose 1.5% - LOW calcium 2.5mEq/L 6,000 mL peritoneal dialysate   intraperitoneal q24h Donna Kessler MD        dextrose 1.5% - LOW calcium 2.5mEq/L 6,000 mL peritoneal dialysate   intraperitoneal q24h Donna Kessler MD        dextrose 1.5% - LOW calcium 2.5mEq/L 6,000 mL peritoneal dialysate    intraperitoneal q24h Donna Kessler MD   Given at 03/19/24 2212    dextrose 1.5% - LOW calcium 2.5mEq/L 6,000 mL peritoneal dialysate   intraperitoneal q24h Donna Kessler MD   Given at 03/17/24 2218    dextrose 50 % injection 12.5 g  12.5 g intravenous q15 min PRN Donna Kessler MD        dextrose 50 % injection 25 g  25 g intravenous q15 min PRN Donna Kessler MD        DULoxetine (Cymbalta) DR capsule 30 mg  30 mg oral Daily Donna Kessler MD   30 mg at 03/20/24 0813    epoetin alyssa-epbx (Retacrit) injection 10,000 Units  10,000 Units subcutaneous Once per day on Mon Wed Fri Trevorguzmana MADELINE Castañeda MD   10,000 Units at 03/20/24 1219    glucagon (Glucagen) injection 1 mg  1 mg intramuscular q15 min PRN Donna Kessler MD        guaiFENesin (Mucinex) 12 hr tablet 600 mg  600 mg oral q12h PRN Donna Kessler MD   600 mg at 03/16/24 1003    hydrALAZINE (Apresoline) tablet 25 mg  25 mg oral TID Donna Kessler MD   25 mg at 03/20/24 0813    insulin detemir (Levemir) injection 20 Units  20 Units subcutaneous Daily Donna Kessler MD   20 Units at 03/20/24 0813    insulin lispro (HumaLOG) injection 0-10 Units  0-10 Units subcutaneous TID with meals Donna Kessler MD   2 Units at 03/20/24 1219    levothyroxine (Synthroid, Levoxyl) tablet 50 mcg  50 mcg oral Daily Donna Kessler MD   50 mcg at 03/20/24 0542    metoprolol succinate XL (Toprol-XL) 24 hr tablet 50 mg  50 mg oral Daily Donna Kessler MD   50 mg at 03/20/24 0813    multivitamin with minerals 1 tablet  1 tablet oral Daily Donna Kessler MD   1 tablet at 03/20/24 0813    ondansetron (Zofran) injection 4 mg  4 mg intravenous q8h PRN Donna Kessler MD   4 mg at 03/18/24 0954    polyethylene glycol (Glycolax, Miralax) packet 17 g  17 g oral Daily Donna Kessler MD   17 g at 03/16/24 0837    potassium chloride CR (Klor-Con M20)  "ER tablet 40 mEq  40 mEq oral Once Clary Castañeda MD        sevelamer carbonate (Renvela) packet 0.8 g  0.8 g oral TID with meals Donna Kessler MD   0.8 g at 03/20/24 1219    sodium bicarbonate tablet 650 mg  650 mg oral BID Donna Kessler MD   650 mg at 03/20/24 0813    torsemide (Demadex) tablet 50 mg  50 mg oral Daily Donna Kessler MD   50 mg at 03/20/24 0813    traMADol (Ultram) tablet 50 mg  50 mg oral q12h PRN Donna Kessler MD   50 mg at 03/20/24 0813       PHYSICAL EXAM:   /63   Pulse 79   Temp 36.1 °C (97 °F)   Resp 18   Ht 1.753 m (5' 9\")   Wt 92.8 kg (204 lb 9.4 oz)   SpO2 95%   BMI 30.21 kg/m²   Patient Vitals for the past 24 hrs:   BP   03/20/24 0800 141/63   03/20/24 0000 108/59   03/19/24 2138 114/62   03/19/24 2000 116/56   03/19/24 1557 115/59       Intake/Output Summary (Last 24 hours) at 3/20/2024 1503  Last data filed at 3/20/2024 0906  Gross per 24 hour   Intake 700 ml   Output 6275 ml   Net -5575 ml     GENERAL: Alert, no distress, cooperative  SKIN: Skin color, texture, turgor normal. No rashes or lesions.  HEAD/SINUSES: No significant findings  EYES: PE  OROPHARYNX: oral mucosa moist. Oropharynx normal.  NECK: No jugulovenous distention, No carotid bruits, Supple  LUNGS: Lungs clear to auscultation, no wheeze, rhonchi, or rales  CARDIAC: Normal S1 and S2; no rubs, murmurs, or gallops  ABDOMEN: Abdomen soft, non-tender, BS normal, No masses. No abdominal bruits.  EXTREMITIES: Status post left surgery for closed displaced intertrochanteric left femur fracture  NEURO: No focal deficits. Able to move all 4 extremities.  PULSES: 2+ radial, 2 + femoral    DATA:   Diagnostic tests reviewed for today's visit:    CBC: @[id number](wbc,rbc,hb,hct,plt,mcv,mch,mpv,rdw)@  Coags: @[id number](pt,inr,aptt)@  CMP: @[id number](na,k,chlor,co2,bun,creat,gluc,tprot,ca,mg,albumin,tbili,alkphos,ALT,AST,anion)@  ABG's: " "@[id number](PH,PCO2,PO2,BE,HCO3,CO2CT,O2HB,COHB,MHGB,TEMP,PHTC,PCO2T,PO2T,O2AD)@  MG/PHOS: @[id number](mg,p)@    Urobilinogen, Urine   Date Value Ref Range Status   10/01/2023 <2.0 <2.0 mg/dL Final       No components found for: \"PROTTIMED\", \"UALBCR\", \"UPROT\", \"CREAT\"    No results found for: \"CHOL\", \"HDL\", \"LDL\"  IMAGING:  reviewed in  images    SIGNATURE: Clary Castañeda MD    "

## 2024-03-20 NOTE — PROGRESS NOTES
"Robbie Chowdary is a 83 y.o. male presenting with Closed displaced intertrochanteric fracture of left femur (CMS/HCC).    Subjective   Mr. Chowdary describes minimal left hip pain. He and his wife are looking forward to him working with therapy. Discharge plan is for [facility] when he is appropriate for discharge. Received one unit PRBC transfusion yesterday.        Objective     Physical Exam  Vitals reviewed.   HENT:      Head: Normocephalic.      Mouth/Throat:      Mouth: Mucous membranes are moist.      Pharynx: Oropharynx is clear.   Eyes:      Extraocular Movements: Extraocular movements intact.   Cardiovascular:      Rate and Rhythm: Normal rate.   Pulmonary:      Effort: Pulmonary effort is normal.   Abdominal:      Palpations: Abdomen is soft.   Musculoskeletal:      Comments: Left hip dressing is dry and intact.  light touch sensation is intact, ankle dorsiflexion/plantar flexion is intact. DP 2+/2 palpable     Skin:     General: Skin is warm and dry.      Capillary Refill: Capillary refill takes less than 2 seconds.   Neurological:      General: No focal deficit present.      Mental Status: He is alert. Mental status is at baseline.   Psychiatric:         Mood and Affect: Mood normal.         Last Recorded Vitals  Blood pressure 141/63, pulse 79, temperature 36.1 °C (97 °F), resp. rate 18, height 1.753 m (5' 9\"), weight 92.8 kg (204 lb 9.4 oz), SpO2 95 %.  Intake/Output last 3 Shifts:  I/O last 3 completed shifts:  In: 6590 (71 mL/kg) [P.O.:240; Blood:350; Other:6000]  Out: 30640 (132.3 mL/kg) [Urine:275 (0.1 mL/kg/hr); Other:15980]  Weight: 92.8 kg     Relevant Results      Scheduled medications  acetaminophen, 975 mg, oral, TID  amLODIPine, 10 mg, oral, Daily  apixaban, 2.5 mg, oral, q12h  aspirin, 81 mg, oral, Daily  ceFAZolin, 2 g, intravenous, Once in OR  cholecalciferol, 1,000 Units, oral, Daily  clopidogrel, 75 mg, oral, Daily  dextrose 1.5% - LOW calcium 2.5mEq/L 6,000 mL peritoneal " dialysate, , intraperitoneal, q24h  dextrose 1.5% - LOW calcium 2.5mEq/L 6,000 mL peritoneal dialysate, , intraperitoneal, q24h  dextrose 1.5% - LOW calcium 2.5mEq/L 6,000 mL peritoneal dialysate, , intraperitoneal, q24h  dextrose 1.5% - LOW calcium 2.5mEq/L 6,000 mL peritoneal dialysate, , intraperitoneal, q24h  DULoxetine, 30 mg, oral, Daily  hydrALAZINE, 25 mg, oral, TID  insulin detemir, 20 Units, subcutaneous, Daily  insulin lispro, 0-10 Units, subcutaneous, TID with meals  levothyroxine, 50 mcg, oral, Daily  metoprolol succinate XL, 50 mg, oral, Daily  multivitamin with minerals, 1 tablet, oral, Daily  polyethylene glycol, 17 g, oral, Daily  sevelamer carbonate, 0.8 g, oral, TID with meals  sodium bicarbonate, 650 mg, oral, BID  torsemide, 50 mg, oral, Daily      Continuous medications     PRN medications  PRN medications: acetaminophen, benzocaine-menthol, dextrose, dextrose, glucagon, guaiFENesin, [DISCONTINUED] ondansetron ODT **OR** ondansetron, traMADol  Results for orders placed or performed during the hospital encounter of 03/15/24 (from the past 24 hour(s))   Type and screen   Result Value Ref Range    ABO TYPE O     Rh TYPE NEG     ANTIBODY SCREEN NEG    POCT GLUCOSE   Result Value Ref Range    POCT Glucose 183 (H) 74 - 99 mg/dL   POCT GLUCOSE   Result Value Ref Range    POCT Glucose 155 (H) 74 - 99 mg/dL   POCT GLUCOSE   Result Value Ref Range    POCT Glucose 149 (H) 74 - 99 mg/dL   CBC   Result Value Ref Range    WBC 6.0 4.4 - 11.3 x10*3/uL    nRBC 0.0 0.0 - 0.0 /100 WBCs    RBC 2.37 (L) 4.50 - 5.90 x10*6/uL    Hemoglobin 7.4 (L) 13.5 - 17.5 g/dL    Hematocrit 23.7 (L) 41.0 - 52.0 %     80 - 100 fL    MCH 31.2 26.0 - 34.0 pg    MCHC 31.2 (L) 32.0 - 36.0 g/dL    RDW 18.2 (H) 11.5 - 14.5 %    Platelets 167 150 - 450 x10*3/uL   PST Top   Result Value Ref Range    Extra Tube Hold for add-ons.    POCT GLUCOSE   Result Value Ref Range    POCT Glucose 188 (H) 74 - 99 mg/dL               XR chest 1  view    Result Date: 3/18/2024  Interpreted By:  Matthias Noel, STUDY: XR CHEST 1 VIEW;  3/15/2024 5:36 pm   INDICATION: Signs/Symptoms:preop.   COMPARISON: Most recent prior chest x-ray is from 02/25/2024   ACCESSION NUMBER(S): [id number]   ORDERING CLINICIAN: WAQAR LOPEZ   TECHNIQUE: Single AP portable view of the chest was obtained.   FINDINGS: MEDIASTINUM/ LUNGS/ NATALIE: Correlation with CT scan abdomen and pelvis from 10/01/2023. Underlying cystic lung changes in the bilateral lower lobes based on the CT scan. There is coarsening of lung markings throughout. Mild interstitial thickening peripherally in the mid to lower portion of each lung. No blunting of the costophrenic sulci. Cardiomegaly. Central vasculature is borderline prominent. Surgical clips overlie the left side of the mediastinum. No pneumothorax. No tracheal deviation. No abnormal hilar fullness or gross mass on either side.   BONES: No lytic or blastic destructive bone lesion. There is posterior fixation hardware in the distal cervical spine down through T1. There is mild-to-moderate disc space narrowing and endplate osteophytosis throughout the thoracic spine.   UPPER ABDOMEN: Grossly intact.       Previous left-sided surgery.   Underlying lung cystic changes in the lower lobes medially and posteriorly. Nonspecific coarsening of lung markings.   There are findings that suggest mild CHF with interstitial edema. Clinical correlation is needed.   MACRO: None   Signed by: Matthias Noel 3/18/2024 8:19 AM Dictation workstation:   [id number]    ECG 12 Lead    Result Date: 3/17/2024  Sinus bradycardia Left anterior fascicular block Abnormal ECG When compared with ECG of 25-FEB-2024 13:20, No significant change was found Confirmed by Ag Madrid ([id number]) on 3/17/2024 8:43:48 PM    FL fluoro images no charge    Result Date: 3/17/2024  These images are not reportable by radiology and will not be interpreted by  Radiologists.    CT head wo IV  contrast    Result Date: 3/15/2024  Interpreted By:  Erasmo Noel, STUDY: CT HEAD WO IV CONTRAST; CT CERVICAL SPINE WO IV CONTRAST; CT THORACIC SPINE WO IV CONTRAST; CT LUMBAR SPINE WO IV CONTRAST;  3/15/2024 6:27 pm   INDICATION: Signs/Symptoms:fall, advanced age on plavix; Signs/Symptoms:fall, lumbar pain, axial loading. RO compression fracture   COMPARISON: November 23   ACCESSION NUMBER(S): [id number]; [id number]; [id number]; [id number]   ORDERING CLINICIAN: WAQAR LOPEZ   TECHNIQUE: Axial noncontrast CT images of head with coronal and sagittal reconstructed images. Axial noncontrast CT images of the cervical thoracic and lumbar spine with coronal and sagittal reconstructed images.   FINDINGS: CT HEAD:   BRAIN PARENCHYMA:  No evidence of acute intraparenchymal hemorrhage or parenchymal evidence of acute large territory ischemic infarct. No mass-effect, midline shift or effacement of cerebral sulci. Gray-white matter distinction is preserved. Mild global volume loss and chronic small vessel ischemic changes. The appearance is similar   VENTRICLES and EXTRA-AXIAL SPACES:  No acute extra-axial or intraventricular hemorrhage. Ventricles and sulci are age-concordant.   PARANASAL SINUSES/MASTOIDS:  No hemorrhage or air-fluid levels within the visualized paranasal sinuses. The mastoid air cells are well-aerated.   CALVARIUM/ORBITS:  No skull fracture.  The orbits and globes are intact to the extent visualized.   Robust vascular calcification.     CT CERVICAL SPINE: C3-T1 posterior spinal fusion with decompression. Prevertebral soft tissue within normal limits. Scattered multilevel degenerative disc disease. No evidence of acute cervical spine fracture.   CT thoracic spine: Multilevel degenerative disc disease detected in the thoracic spine. No evidence of acute thoracic spine fracture.   Features of pulmonary fibrosis with honeycombing seen in the lung bases   Heart size is enlarged. Robust coronary  calcification. No pericardial effusion.   Enlarged main pulmonary artery compatible with pulmonary arterial hypertension.   No evidence of acute lumbar spine fracture. Scattered degenerative changes most notable at L3-L4 and L4-L5 with disc osteophyte complexes. Facet arthropathy detected.   No evidence of aneurysm of the abdominal aorta. Robust vascular calcification peritoneal catheter terminates in the pelvis.       No evidence of acute cervical, thoracic, or lumbar spine fracture. Long segment decompression of the posterior aspect of the cervical spine from C3 through T1 not measurably changed.   Multilevel degenerative disc disease is seen. Degree of degenerative disease especially in lumbar spine does limit sensitivity. If there is severe pain, consider further evaluation with lumbar spine MRI   Senescent changes seen in the brain. No evidence of acute intracranial hemorrhage   Signed by: Erasmo Noel 3/15/2024 6:55 PM Dictation workstation:   [id number]    CT cervical spine wo IV contrast    Result Date: 3/15/2024  Interpreted By:  Erasmo Noel, STUDY: CT HEAD WO IV CONTRAST; CT CERVICAL SPINE WO IV CONTRAST; CT THORACIC SPINE WO IV CONTRAST; CT LUMBAR SPINE WO IV CONTRAST;  3/15/2024 6:27 pm   INDICATION: Signs/Symptoms:fall, advanced age on plavix; Signs/Symptoms:fall, lumbar pain, axial loading. RO compression fracture   COMPARISON: November 23   ACCESSION NUMBER(S): [id number]; [id number]; [id number]; [id number]   ORDERING CLINICIAN: WAQAR LOPEZ   TECHNIQUE: Axial noncontrast CT images of head with coronal and sagittal reconstructed images. Axial noncontrast CT images of the cervical thoracic and lumbar spine with coronal and sagittal reconstructed images.   FINDINGS: CT HEAD:   BRAIN PARENCHYMA:  No evidence of acute intraparenchymal hemorrhage or parenchymal evidence of acute large territory ischemic infarct. No mass-effect, midline shift or effacement of cerebral sulci.  Gray-white matter distinction is preserved. Mild global volume loss and chronic small vessel ischemic changes. The appearance is similar   VENTRICLES and EXTRA-AXIAL SPACES:  No acute extra-axial or intraventricular hemorrhage. Ventricles and sulci are age-concordant.   PARANASAL SINUSES/MASTOIDS:  No hemorrhage or air-fluid levels within the visualized paranasal sinuses. The mastoid air cells are well-aerated.   CALVARIUM/ORBITS:  No skull fracture.  The orbits and globes are intact to the extent visualized.   Robust vascular calcification.     CT CERVICAL SPINE: C3-T1 posterior spinal fusion with decompression. Prevertebral soft tissue within normal limits. Scattered multilevel degenerative disc disease. No evidence of acute cervical spine fracture.   CT thoracic spine: Multilevel degenerative disc disease detected in the thoracic spine. No evidence of acute thoracic spine fracture.   Features of pulmonary fibrosis with honeycombing seen in the lung bases   Heart size is enlarged. Robust coronary calcification. No pericardial effusion.   Enlarged main pulmonary artery compatible with pulmonary arterial hypertension.   No evidence of acute lumbar spine fracture. Scattered degenerative changes most notable at L3-L4 and L4-L5 with disc osteophyte complexes. Facet arthropathy detected.   No evidence of aneurysm of the abdominal aorta. Robust vascular calcification peritoneal catheter terminates in the pelvis.       No evidence of acute cervical, thoracic, or lumbar spine fracture. Long segment decompression of the posterior aspect of the cervical spine from C3 through T1 not measurably changed.   Multilevel degenerative disc disease is seen. Degree of degenerative disease especially in lumbar spine does limit sensitivity. If there is severe pain, consider further evaluation with lumbar spine MRI   Senescent changes seen in the brain. No evidence of acute intracranial hemorrhage   Signed by: Erasmo Noel 3/15/2024  6:55 PM Dictation workstation:   [id number]    CT thoracic spine wo IV contrast    Result Date: 3/15/2024  Interpreted By:  Erasmo Noel, STUDY: CT HEAD WO IV CONTRAST; CT CERVICAL SPINE WO IV CONTRAST; CT THORACIC SPINE WO IV CONTRAST; CT LUMBAR SPINE WO IV CONTRAST;  3/15/2024 6:27 pm   INDICATION: Signs/Symptoms:fall, advanced age on plavix; Signs/Symptoms:fall, lumbar pain, axial loading. RO compression fracture   COMPARISON: November 23   ACCESSION NUMBER(S): [id number]; [id number]; [id number]; [id number]   ORDERING CLINICIAN: WAQAR LOPEZ   TECHNIQUE: Axial noncontrast CT images of head with coronal and sagittal reconstructed images. Axial noncontrast CT images of the cervical thoracic and lumbar spine with coronal and sagittal reconstructed images.   FINDINGS: CT HEAD:   BRAIN PARENCHYMA:  No evidence of acute intraparenchymal hemorrhage or parenchymal evidence of acute large territory ischemic infarct. No mass-effect, midline shift or effacement of cerebral sulci. Gray-white matter distinction is preserved. Mild global volume loss and chronic small vessel ischemic changes. The appearance is similar   VENTRICLES and EXTRA-AXIAL SPACES:  No acute extra-axial or intraventricular hemorrhage. Ventricles and sulci are age-concordant.   PARANASAL SINUSES/MASTOIDS:  No hemorrhage or air-fluid levels within the visualized paranasal sinuses. The mastoid air cells are well-aerated.   CALVARIUM/ORBITS:  No skull fracture.  The orbits and globes are intact to the extent visualized.   Robust vascular calcification.     CT CERVICAL SPINE: C3-T1 posterior spinal fusion with decompression. Prevertebral soft tissue within normal limits. Scattered multilevel degenerative disc disease. No evidence of acute cervical spine fracture.   CT thoracic spine: Multilevel degenerative disc disease detected in the thoracic spine. No evidence of acute thoracic spine fracture.   Features of pulmonary fibrosis with  honeycombing seen in the lung bases   Heart size is enlarged. Robust coronary calcification. No pericardial effusion.   Enlarged main pulmonary artery compatible with pulmonary arterial hypertension.   No evidence of acute lumbar spine fracture. Scattered degenerative changes most notable at L3-L4 and L4-L5 with disc osteophyte complexes. Facet arthropathy detected.   No evidence of aneurysm of the abdominal aorta. Robust vascular calcification peritoneal catheter terminates in the pelvis.       No evidence of acute cervical, thoracic, or lumbar spine fracture. Long segment decompression of the posterior aspect of the cervical spine from C3 through T1 not measurably changed.   Multilevel degenerative disc disease is seen. Degree of degenerative disease especially in lumbar spine does limit sensitivity. If there is severe pain, consider further evaluation with lumbar spine MRI   Senescent changes seen in the brain. No evidence of acute intracranial hemorrhage   Signed by: Erasmo Noel 3/15/2024 6:55 PM Dictation workstation:   [id number]    CT lumbar spine wo IV contrast    Result Date: 3/15/2024  Interpreted By:  Erasmo Noel, STUDY: CT HEAD WO IV CONTRAST; CT CERVICAL SPINE WO IV CONTRAST; CT THORACIC SPINE WO IV CONTRAST; CT LUMBAR SPINE WO IV CONTRAST;  3/15/2024 6:27 pm   INDICATION: Signs/Symptoms:fall, advanced age on plavix; Signs/Symptoms:fall, lumbar pain, axial loading. RO compression fracture   COMPARISON: November 23   ACCESSION NUMBER(S): [id number]; [id number]; [id number]; [id number]   ORDERING CLINICIAN: WAQAR LOPEZ   TECHNIQUE: Axial noncontrast CT images of head with coronal and sagittal reconstructed images. Axial noncontrast CT images of the cervical thoracic and lumbar spine with coronal and sagittal reconstructed images.   FINDINGS: CT HEAD:   BRAIN PARENCHYMA:  No evidence of acute intraparenchymal hemorrhage or parenchymal evidence of acute large territory ischemic  infarct. No mass-effect, midline shift or effacement of cerebral sulci. Gray-white matter distinction is preserved. Mild global volume loss and chronic small vessel ischemic changes. The appearance is similar   VENTRICLES and EXTRA-AXIAL SPACES:  No acute extra-axial or intraventricular hemorrhage. Ventricles and sulci are age-concordant.   PARANASAL SINUSES/MASTOIDS:  No hemorrhage or air-fluid levels within the visualized paranasal sinuses. The mastoid air cells are well-aerated.   CALVARIUM/ORBITS:  No skull fracture.  The orbits and globes are intact to the extent visualized.   Robust vascular calcification.     CT CERVICAL SPINE: C3-T1 posterior spinal fusion with decompression. Prevertebral soft tissue within normal limits. Scattered multilevel degenerative disc disease. No evidence of acute cervical spine fracture.   CT thoracic spine: Multilevel degenerative disc disease detected in the thoracic spine. No evidence of acute thoracic spine fracture.   Features of pulmonary fibrosis with honeycombing seen in the lung bases   Heart size is enlarged. Robust coronary calcification. No pericardial effusion.   Enlarged main pulmonary artery compatible with pulmonary arterial hypertension.   No evidence of acute lumbar spine fracture. Scattered degenerative changes most notable at L3-L4 and L4-L5 with disc osteophyte complexes. Facet arthropathy detected.   No evidence of aneurysm of the abdominal aorta. Robust vascular calcification peritoneal catheter terminates in the pelvis.       No evidence of acute cervical, thoracic, or lumbar spine fracture. Long segment decompression of the posterior aspect of the cervical spine from C3 through T1 not measurably changed.   Multilevel degenerative disc disease is seen. Degree of degenerative disease especially in lumbar spine does limit sensitivity. If there is severe pain, consider further evaluation with lumbar spine MRI   Senescent changes seen in the brain. No evidence  of acute intracranial hemorrhage   Signed by: Erasmo Noel 3/15/2024 6:55 PM Dictation workstation:   [id number]    XR elbow left 1-2 views    Result Date: 3/15/2024  Interpreted By:  Olive Sr, STUDY: Left elbow, 2 views.   INDICATION: Signs/Symptoms:fall, left elbow pain.   COMPARISON: None.   ACCESSION NUMBER(S): [id number]   ORDERING CLINICIAN: WAQAR LOPEZ   FINDINGS: No acute fracture or malalignment. No elbow joint effusion or abnormal fat pad elevation. No significant degenerative changes. Heterotopic ossification adjacent to the lateral humeral condyle measuring 7 mm.       1. No acute fracture or malalignment of the left elbow.   MACRO: None.   Signed by: Olive Sr 3/15/2024 6:09 PM Dictation workstation:   [id number]    XR hip left with pelvis when performed 2 or 3 views    Result Date: 3/15/2024  Interpreted By:  Olive Sr, STUDY: Single view pelvis. Left hip, two views.   INDICATION: Signs/Symptoms:L hip fracture.   COMPARISON: None.   ACCESSION NUMBER(S): [id number]   ORDERING CLINICIAN: WAQAR LOPEZ   FINDINGS: There is a acute impacted comminuted mildly displaced intertrochanteric left proximal femoral fracture. Moderate bilateral hip joint degenerative changes. Advanced lumbar spine degenerative changes. Osteopenia. Bilateral femoral artery vascular calcifications.       1. Intertrochanteric left proximal femoral fracture.   MACRO: None.   Signed by: Olive Sr 3/15/2024 6:08 PM Dictation workstation:   [id number]    IOL BIOMETRY W/ IOL CALC OU (BOTH EYES)    Result Date: 3/7/2024  Date of Procedure 3/7/2024. Technician Information Loulou Lilly, COA . Notes Measurements only - see Procedure Record under Scanned Documents for signed results.     ASCAN ONLY - DIAGNOSTIC OU (BOTH EYES)    Result Date: 3/7/2024  Date of Procedure 3/7/2024. Technician Information Loulou Lilly, COA . Notes Ascan report in paper chart and scanned to patient chart after  post op period. A-SCAN RIGHT EYE: 24.31 A-SCAN LEFT EYE: 23.93    XR chest 2 views    Result Date: 2/25/2024  Interpreted By:  Olaf Li, STUDY: XR CHEST 2 VIEWS;  2/25/2024 2:34 pm   INDICATION: Signs/Symptoms:Chest Pain.   COMPARISON: 11/01/2023   ACCESSION NUMBER(S): [id number]   ORDERING CLINICIAN: BUNNY CARROLL   TECHNIQUE: 2 radiographs of the chest were performed.   FINDINGS: There are low lung volumes. The heart is mildly enlarged and unchanged. There is mild central vascular and interstitial congestion which is unchanged. There is no new airspace consolidation or pneumothorax. There is slight blunting left lateral costophrenic angle which is stable. Postoperative changes are partially visualized in the lower cervical spine. There are findings of DISH.       No interval change from 11/01/2023 as described above.   Signed by: Olaf Li 2/25/2024 3:39 PM Dictation workstation:   [id number]    ECG 12 lead    Result Date: 2/25/2024  Normal sinus rhythm Left axis deviation Abnormal ECG When compared with ECG of 25-FEB-2024 13:08, (unconfirmed) Previous ECG has undetermined rhythm, needs review ST no longer depressed in Inferior leads See ED provider note for full interpretation and clinical correlation Confirmed by Yisel Yanez ([id number]) on 2/25/2024 1:40:13 PM    ECG 12 lead    Result Date: 2/25/2024  Undetermined rhythm Left axis deviation Nonspecific ST abnormality Abnormal ECG When compared with ECG of 01-OCT-2023 16:00, Current undetermined rhythm precludes rhythm comparison, needs review ST now depressed in Inferior leads T wave inversion now evident in Inferior leads T wave inversion no longer evident in Anterior leads See ED provider note for full interpretation and clinical correlation Confirmed by Yisel Yanez ([id number]) on 2/25/2024 1:39:27 PM           Malnutrition          I agree with the dietitian's malnutrition diagnosis.      Assessment/Plan   Principal Problem:    Closed  displaced intertrochanteric fracture of left femur (CMS/[facility])  Active Problems:    Cortical senile cataract    ESRD on peritoneal dialysis (CMS/[facility])    Closed displaced intertrochanteric fracture of left femur, initial encounter (CMS/[facility])              1. Closed displaced intertrochanteric fracture of left femur, initial encounter (CMS/[facility])  POD #3 s/p left TFN  Continue PT/OT, WBAT left LE  Using incentive spirometer   Reviewed am labs  Multimodal pain regimen  Surgical hip dressing to be removed on post op day #7  VTE prophylaxis: eliquis BID X 28 days; can restart plavix on 3/20/2024  Ok from ortho standpoint for discharge to SNF  Follow up with Dr. Donna Thomas in 2 weeks     2. Acute post operative expected blood loss anemia on chronic anemia  -hgb is 7.4,  his baseline is 8-10; he received one unit PRBC on 3/19  -he is hemodynamically stable  -No signs of active bleeding  -defer anemia management to hospitalist service     I spent 25 minutes on care for this patient.     Julia Little PA-C

## 2024-03-20 NOTE — PROGRESS NOTES
03/20/24 0951   Discharge Planning   Home or Post Acute Services Post acute facilities (Rehab/SNF/etc)   Type of Post Acute Facility Services Skilled nursing   Patient expects to be discharged to: [facility]     Spoke with Atif from [facility] on [address]. The private room  will not be available until tomorrow once their resident discharges. Atif will coordinate delivery of supplies with the pt's wife.

## 2024-03-20 NOTE — PROGRESS NOTES
"Physical Therapy    Physical Therapy    Physical Therapy Treatment    Patient Name: Salomon Chowdary \"Robbie\"  MRN: [id number]  Today's Date: 3/20/2024  Time Calculation  Start Time: 1138  Stop Time: 1210  Time Calculation (min): 32 min        03/20/24 1038   PT  Visit   PT Received On 03/20/24   Response to Previous Treatment Patient with no complaints from previous session.   General   Reason for Referral left hip orif   Past Medical History Relevant to Rehab ESRD, dialysis, HTN, DM, HLD, CAD, AFIB, CKD   Prior to Session Communication Bedside nurse   Patient Position Received Bed, 3 rail up;Alarm on   General Comment Pt agreeable to therapy and cleared for participation   Precautions   LE Weight Bearing Status Weight Bearing as Tolerated   Medical Precautions Fall precautions   Pain Assessment   Pain Assessment 0-10   Pain Score 10 - Worst possible pain   Pain Type Acute pain   Pain Location Leg   Pain Orientation Left   Effect of Pain on Daily Activities .   Cognition   Overall Cognitive Status WFL   Balance/Neuromuscular Re-Education   Balance/Neuromuscular Re-Education Activity Performed Yes   Balance/Neuromuscular Re-Education Activity 1 Upon sitting EOB pt has significant R lean. Max v/t cues to use RUE to support self and maintain midline, poor follow through. modAx1 to maintain upright posture.   Bed Mobility   Bed Mobility Yes   Bed Mobility 1   Bed Mobility 1 Supine to sitting   Level of Assistance 1 Maximum assistance;Maximum verbal cues  (x2)   Bed Mobility 2   Bed Mobility  2 Sitting to supine   Level of Assistance 2 Dependent  (x2-3)   Transfers   Transfer Yes   Transfer 1   Transfer From 1 Bed to   Transfer to 1 Stand   Technique 1 Sit to stand;Stand to sit   Transfer Device 1 Walker;Gait belt   Transfer Level of Assistance 1 Maximum assistance;Maximum verbal cues;Maximum tactile cues;x 3   Trials/Comments 1 x1 STS attempted with maxAx2, pt yells out in pain as L knee IR's, pt unable to straighten leg to " facilitate upright stance. x1 attempt with 3rd person assist to maintain neutral in LLE, good effort, pt able to perform 3/4 stand and adjust LLE under self for increased stability. Unable to complete full stand.   Activity Tolerance   Endurance Tolerates 10 - 20 min exercise with multiple rests   PT Assessment   PT Assessment Results Decreased strength;Decreased endurance;Impaired balance;Decreased mobility   Rehab Prognosis Good   Evaluation/Treatment Tolerance Patient limited by pain   End of Session Communication Bedside nurse   Assessment Comment Pt limited by pain in LLE. However, pt put forth fair effort. DSignificant R lean during seated and standing activities. Max cues to maintain midline, poor follow through.  MaxAx2 this session. Increased time and effort for all activities.   End of Session Patient Position Bed, 3 rail up;Alarm on  (bed in chair mode.)       Outcome Measures:  [facility] Basic Mobility  Turning from your back to your side while in a flat bed without using bedrails: A lot  Moving from lying on your back to sitting on the side of a flat bed without using bedrails: A lot  Moving to and from bed to chair (including a wheelchair): Total  Standing up from a chair using your arms (e.g. wheelchair or bedside chair): A lot  To walk in hospital room: Total  Climbing 3-5 steps with railing: Total  Basic Mobility - Total Score: 9                             EDUCATION:  Outpatient Education  Individual(s) Educated: Patient  Education Provided: Fall Risk  Education Documentation  Home Exercise Program, taught by Janet Curry PTA at 3/20/2024  1:38 PM.  Learner: Patient  Readiness: Acceptance  Method: Explanation  Response: Verbalizes Understanding, Demonstrated Understanding, Needs Reinforcement    Body Mechanics, taught by Janet Curry PTA at 3/20/2024  1:38 PM.  Learner: Patient  Readiness: Acceptance  Method: Explanation  Response: Verbalizes Understanding, Demonstrated Understanding, Needs  Reinforcement    Mobility Training, taught by Janet Curry PTA at 3/20/2024  1:38 PM.  Learner: Patient  Readiness: Acceptance  Method: Explanation  Response: Verbalizes Understanding, Demonstrated Understanding, Needs Reinforcement    Education Comments  No comments found.        GOALS:  Encounter Problems       Encounter Problems (Active)       PT Problem       Pt will be able to perform all bed mobility tasks with Min A.  (Progressing)       Start:  03/18/24    Expected End:  03/26/24            Pt will perform all transfers with Mod A and FWW with proper safety mechanics.   (Progressing)       Start:  03/18/24    Expected End:  03/26/24            Pt will ambulate 50 ft with Mod A using FWW for improved functional independence.  (Progressing)       Start:  03/18/24    Expected End:  03/26/24            Pt will demonstrate fair plus stand balance for completion of therapeutic exercises and functional tasks.  (Progressing)       Start:  03/18/24    Expected End:  03/26/24               Pain - Adult

## 2024-03-20 NOTE — PROGRESS NOTES
"Occupational Therapy    OT Treatment    Patient Name: Salomon Chowdary \"Robbie\"  MRN: [id number]  Today's Date: 3/20/2024  Time Calculation  Start Time: 1139  Stop Time: 1219  Time Calculation (min): 40 min         Assessment:  Pt is pleasant and cooperative, is easily fatigued and increased pain during activity, encouragement for continued participation with fair effort.  Evaluation/Treatment Tolerance: Patient limited by pain  Medical Staff Made Aware: Yes  End of Session Communication: Bedside nurse  End of Session Patient Position: Alarm on, Bed, 3 rail up (bed in chair mode)  Evaluation/Treatment Tolerance: Patient limited by pain  Medical Staff Made Aware: Yes    Plan:  OT Frequency: 5 times per week  OT Discharge Recommendations: Moderate intensity level of continued care     Subjective      03/20/24 1139   OT Last Visit   OT Received On 03/20/24   General   Reason for Referral left hip orif   Prior to Session Communication Bedside nurse   Patient Position Received Bed, 3 rail up;Alarm on   General Comment Pt agreeable to tx and cleared for participation.   Precautions   LE Weight Bearing Status Weight Bearing as Tolerated  (L LE)   Medical Precautions Fall precautions   Pain Assessment   Pain Assessment 0-10   Pain Score 10 - Worst possible pain  (during activity)   Pain Location Leg   Pain Orientation Left       Cognition   Overall Cognitive Status WFL   Feeding   Feeding Adaptive Equipment Built up utensils   Feeding Level of Assistance Modified independent   Feeding Where Assessed Edge of bed  (chair mode in bed)   Feeding Comments Pt set up with lunch tray at end of tx, pt then asking for PCA to be sent to room. NICOLAS questioned what he needed and pt reports needing to be fed due to neuropathy.     Educated pt in maximizing I and various options to increase ease with pt receptive. Pt provided with built-up foam to trial on spoon while eating soup. Pt engaging in self-feeding task, Mod I with built-up utensils " and reports increased ease. Educated pt in option to obtain built-up utensils for once home with understanding.   Bed Mobility 1   Bed Mobility 1 Supine to sitting   Level of Assistance 1 Maximum assistance;Maximum verbal cues  (x2)   Bed Mobility Comments 1 HOB elevated, cues for technique, breathing during transition.   Bed Mobility 2   Bed Mobility  2 Scooting   Level of Assistance 2 Maximum assistance;Maximum verbal cues  (x2-3)   Bed Mobility Comments 2 Pt engaged in scooting toward HOB while seated EOB with use of draw sheet and hands-on assist for LE management.   Bed Mobility 3   Bed Mobility 3 Sitting to supine   Level of Assistance 3 Dependent;Maximum verbal cues  (x2-3)   Bed Mobility Comments 3 for repositioning and safety   Transfer 1   Technique 1 Sit to stand;Stand to sit   Transfer Device 1 Gait belt;Walker   Transfer Level of Assistance 1 Maximum assistance;Maximum verbal cues   Trials/Comments 1 First STS attempt from EOB level unsuccessul, third person to assist secon trial with pt able to facilitate partial stance with Max A x3 with one person to assist with L LE management/placement as noted to internally rotate, increasing pain and hindering progress. Pt with limited ~ 30 second standing tolerance.   Static Sitting Balance   Static Sitting-Balance Support Bilateral upper extremity supported   Static Sitting-Level of Assistance Moderate assistance   Static Sitting-Comment/Number of Minutes leaning toward R side   Dynamic Sitting Balance   Dynamic Sitting-Balance Support   (unilateral UE support)   Dynamic Sitting-Balance Reaching for objects  (within MAGDY)   Dynamic Sitting-Comments Mod A, R lean   Static Standing Balance   Static Standing-Comment/Number of Minutes unable   Therapeutic Exercise   Therapeutic Exercise Performed Yes   Therapeutic Activity   Therapeutic Activity Performed Yes   Therapeutic Activity 1 Pt engaged in static sitting and minimal dynamic sitting incorporating UE  functional reaching within MAGDY to facilitate wieght shifting with difficulty. Pt with R lean throughout tx, reports is due to pain.   Balance/Neuromuscular Re-Education   Balance/Neuromuscular Re-Education Activity Performed Yes   IP OT Assessment   Evaluation/Treatment Tolerance Patient limited by pain   Medical Staff Made Aware Yes   End of Session Communication Bedside nurse   End of Session Patient Position Alarm on;Bed, 3 rail up  (bed in chair mode)   Inpatient Plan   OT Frequency 5 times per week   OT Discharge Recommendations Moderate intensity level of continued care     Outcome Measures:[facility] Daily Activity  Putting on and taking off regular lower body clothing: Total  Bathing (including washing, rinsing, drying): A lot  Putting on and taking off regular upper body clothing: A lot  Toileting, which includes using toilet, bedpan or urinal: Total  Taking care of personal grooming such as brushing teeth: A little  Eating Meals: A little  Daily Activity - Total Score: 12  Education Documentation  Body Mechanics, taught by ELÍAS Hogue at 3/20/2024  1:47 PM.  Learner: Patient  Readiness: Acceptance  Method: Explanation  Response: Verbalizes Understanding, Needs Reinforcement  Comment: functional transfer safety, pursed lip breathing to promote pain reduction, feeding AE as needed    ADL Training, taught by ELÍAS Hogue at 3/20/2024  1:47 PM.  Learner: Patient  Readiness: Acceptance  Method: Explanation  Response: Verbalizes Understanding, Needs Reinforcement  Comment: functional transfer safety, pursed lip breathing to promote pain reduction, feeding AE as needed    Education Comments  No comments found.            Goals:  Encounter Problems       Encounter Problems (Active)       OT Goals       Patient will complete all functional mobility tasks with MOD I. (Progressing)       Start:  03/18/24    Expected End:  03/26/24            Patient will complete all transfers with MOD I. (Progressing)        Start:  03/18/24    Expected End:  03/26/24            Patient will complete all UE adls with MOD I.  (Progressing)       Start:  03/18/24    Expected End:  03/26/24            Patient will complete all LE adls with MOD I. (Progressing)       Start:  03/18/24    Expected End:  03/26/24

## 2024-03-21 ENCOUNTER — APPOINTMENT (OUTPATIENT)
Dept: RADIOLOGY | Facility: HOSPITAL | Age: 84
DRG: 480 | End: 2024-03-21
Payer: MEDICARE

## 2024-03-21 VITALS
TEMPERATURE: 97 F | OXYGEN SATURATION: 96 % | HEART RATE: 65 BPM | RESPIRATION RATE: 16 BRPM | WEIGHT: 211.86 LBS | HEIGHT: 69 IN | DIASTOLIC BLOOD PRESSURE: 57 MMHG | SYSTOLIC BLOOD PRESSURE: 111 MMHG | BODY MASS INDEX: 31.38 KG/M2

## 2024-03-21 LAB — GLUCOSE BLD MANUAL STRIP-MCNC: 131 MG/DL (ref 74–99)

## 2024-03-21 PROCEDURE — 2500000001 HC RX 250 WO HCPCS SELF ADMINISTERED DRUGS (ALT 637 FOR MEDICARE OP): Performed by: STUDENT IN AN ORGANIZED HEALTH CARE EDUCATION/TRAINING PROGRAM

## 2024-03-21 PROCEDURE — 2500000001 HC RX 250 WO HCPCS SELF ADMINISTERED DRUGS (ALT 637 FOR MEDICARE OP): Performed by: INTERNAL MEDICINE

## 2024-03-21 PROCEDURE — 74018 RADEX ABDOMEN 1 VIEW: CPT | Performed by: RADIOLOGY

## 2024-03-21 PROCEDURE — 82947 ASSAY GLUCOSE BLOOD QUANT: CPT

## 2024-03-21 PROCEDURE — 97530 THERAPEUTIC ACTIVITIES: CPT | Mod: GO,CO

## 2024-03-21 PROCEDURE — 74018 RADEX ABDOMEN 1 VIEW: CPT

## 2024-03-21 PROCEDURE — 2500000004 HC RX 250 GENERAL PHARMACY W/ HCPCS (ALT 636 FOR OP/ED): Performed by: INTERNAL MEDICINE

## 2024-03-21 PROCEDURE — 97530 THERAPEUTIC ACTIVITIES: CPT | Mod: GP,CQ

## 2024-03-21 PROCEDURE — 2500000002 HC RX 250 W HCPCS SELF ADMINISTERED DRUGS (ALT 637 FOR MEDICARE OP, ALT 636 FOR OP/ED): Performed by: STUDENT IN AN ORGANIZED HEALTH CARE EDUCATION/TRAINING PROGRAM

## 2024-03-21 PROCEDURE — 2500000004 HC RX 250 GENERAL PHARMACY W/ HCPCS (ALT 636 FOR OP/ED): Performed by: STUDENT IN AN ORGANIZED HEALTH CARE EDUCATION/TRAINING PROGRAM

## 2024-03-21 PROCEDURE — 97112 NEUROMUSCULAR REEDUCATION: CPT | Mod: GP,CQ

## 2024-03-21 PROCEDURE — 2500000001 HC RX 250 WO HCPCS SELF ADMINISTERED DRUGS (ALT 637 FOR MEDICARE OP): Performed by: PHYSICIAN ASSISTANT

## 2024-03-21 RX ADMIN — LEVOTHYROXINE SODIUM 50 MCG: 50 TABLET ORAL at 05:06

## 2024-03-21 RX ADMIN — DULOXETINE HYDROCHLORIDE 30 MG: 30 CAPSULE, DELAYED RELEASE ORAL at 08:32

## 2024-03-21 RX ADMIN — ASPIRIN 81 MG: 81 TABLET, COATED ORAL at 08:32

## 2024-03-21 RX ADMIN — SODIUM BICARBONATE 650 MG: 650 TABLET ORAL at 08:32

## 2024-03-21 RX ADMIN — Medication 1 TABLET: at 08:32

## 2024-03-21 RX ADMIN — TORSEMIDE 50 MG: 20 TABLET ORAL at 08:32

## 2024-03-21 RX ADMIN — APIXABAN 2.5 MG: 2.5 TABLET, FILM COATED ORAL at 11:37

## 2024-03-21 RX ADMIN — ACETAMINOPHEN 975 MG: 325 TABLET ORAL at 08:32

## 2024-03-21 RX ADMIN — AMLODIPINE BESYLATE 10 MG: 10 TABLET ORAL at 08:32

## 2024-03-21 RX ADMIN — TRAMADOL HYDROCHLORIDE 50 MG: 50 TABLET, COATED ORAL at 08:34

## 2024-03-21 RX ADMIN — SEVELAMER CARBONATE 0.8 G: 800 POWDER, FOR SUSPENSION ORAL at 08:32

## 2024-03-21 RX ADMIN — HYDRALAZINE HYDROCHLORIDE 25 MG: 25 TABLET ORAL at 08:32

## 2024-03-21 RX ADMIN — Medication 1000 UNITS: at 08:32

## 2024-03-21 RX ADMIN — CLOPIDOGREL BISULFATE 75 MG: 75 TABLET ORAL at 08:32

## 2024-03-21 RX ADMIN — METOPROLOL SUCCINATE 50 MG: 50 TABLET, EXTENDED RELEASE ORAL at 08:32

## 2024-03-21 RX ADMIN — INSULIN DETEMIR 20 UNITS: 100 INJECTION, SOLUTION SUBCUTANEOUS at 08:32

## 2024-03-21 RX ADMIN — SODIUM CHLORIDE 500 ML: 9 INJECTION, SOLUTION INTRAVENOUS at 11:27

## 2024-03-21 RX ADMIN — SEVELAMER CARBONATE 0.8 G: 800 POWDER, FOR SUSPENSION ORAL at 11:37

## 2024-03-21 ASSESSMENT — PAIN SCALES - GENERAL
PAINLEVEL_OUTOF10: 3
PAINLEVEL_OUTOF10: 4
PAINLEVEL_OUTOF10: 4

## 2024-03-21 ASSESSMENT — COGNITIVE AND FUNCTIONAL STATUS - GENERAL
PERSONAL GROOMING: A LITTLE
MOVING FROM LYING ON BACK TO SITTING ON SIDE OF FLAT BED WITH BEDRAILS: A LOT
EATING MEALS: A LITTLE
STANDING UP FROM CHAIR USING ARMS: A LOT
HELP NEEDED FOR BATHING: A LOT
DAILY ACTIVITIY SCORE: 12
DRESSING REGULAR LOWER BODY CLOTHING: TOTAL
MOBILITY SCORE: 10
DRESSING REGULAR UPPER BODY CLOTHING: A LOT
MOVING TO AND FROM BED TO CHAIR: A LOT
WALKING IN HOSPITAL ROOM: TOTAL
TURNING FROM BACK TO SIDE WHILE IN FLAT BAD: A LOT
TOILETING: TOTAL
CLIMB 3 TO 5 STEPS WITH RAILING: TOTAL

## 2024-03-21 ASSESSMENT — PAIN - FUNCTIONAL ASSESSMENT
PAIN_FUNCTIONAL_ASSESSMENT: 0-10
PAIN_FUNCTIONAL_ASSESSMENT: 0-10

## 2024-03-21 ASSESSMENT — ACTIVITIES OF DAILY LIVING (ADL): EFFECT OF PAIN ON DAILY ACTIVITIES: .

## 2024-03-21 NOTE — PROGRESS NOTES
"Physical Therapy    Physical Therapy    Physical Therapy Treatment    Patient Name: Salomon Chowdary \"Robbie\"  MRN: [id number]  Today's Date: 3/21/2024  Time Calculation  Start Time: 1054  Stop Time: 1123  Time Calculation (min): 29 min        03/21/24 1054   PT  Visit   PT Received On 03/21/24   Response to Previous Treatment Patient with no complaints from previous session.   General   Reason for Referral left hip orif   Past Medical History Relevant to Rehab ESRD, dialysis, HTN, DM, HLD, CAD, AFIB, CKD   Prior to Session Communication Bedside nurse   Patient Position Received Bed, 3 rail up;Alarm on   General Comment Pt agreeable to therapy and cleared for participation.   Precautions   LE Weight Bearing Status Weight Bearing as Tolerated  (L LE)   Medical Precautions Fall precautions   Vital Signs   SpO2 97 %   BP   (Sitting 110/57; sitting (post stand) 79/47; laying 126/61; sitting in bed LE's elevated 101/52, nursing staff in room and aware.)   BP Location Left arm   BP Method Automatic   Pain Assessment   Pain Assessment 0-10   Pain Score 4   Pain Type Surgical pain   Pain Location Leg   Pain Orientation Left   Effect of Pain on Daily Activities .   Cognition   Overall Cognitive Status WFL   Therapeutic Activity   Therapeutic Activity Performed Yes   Therapeutic Activity 1 Pt sat EOB, continues to have R and retro lean with cues to facilitate upright posture, fair follow through, howver, returns to lean d/t pain. Pt became pale and clammy. Vitals taken, see vitals.   Bed Mobility   Bed Mobility Yes   Bed Mobility 1   Bed Mobility 1 Supine to sitting   Level of Assistance 1 Maximum assistance;Moderate verbal cues  (x2)   Bed Mobility 2   Bed Mobility  2 Sitting to supine   Level of Assistance 2 Dependent   Bed Mobility Comments 2 x3   Transfers   Transfer Yes   Transfer 1   Transfer From 1 Bed to   Transfer to 1 Stand   Technique 1 Sit to stand;Stand to sit   Transfer Device 1 Walker;Gait belt   Transfer Level of " Assistance 1 Maximum assistance;Maximum verbal cues;Maximum tactile cues  (x3 for safety)   Trials/Comments 1 STS from EOB with maxA3, max cues for safe hand placement, sequencing and safety to complete stand, pt became dizzy upon standing, see gladys.   Activity Tolerance   Endurance Tolerates 10 - 20 min exercise with multiple rests   PT Assessment   PT Assessment Results Decreased strength;Decreased endurance;Impaired balance;Decreased mobility   Rehab Prognosis Good   End of Session Communication Bedside nurse   Assessment Comment Pt limited by pain and dizziness this session. Ortho positive.MaxAx2-3.   End of Session Patient Position Bed, 2 rail up;Alarm on       Outcome Measures:  [facility] Basic Mobility  Turning from your back to your side while in a flat bed without using bedrails: A lot  Moving from lying on your back to sitting on the side of a flat bed without using bedrails: A lot  Moving to and from bed to chair (including a wheelchair): A lot  Standing up from a chair using your arms (e.g. wheelchair or bedside chair): A lot  To walk in hospital room: Total  Climbing 3-5 steps with railing: Total  Basic Mobility - Total Score: 10                             EDUCATION:  Outpatient Education  Individual(s) Educated: Patient  Education Provided: Fall Risk  Education Documentation  Body Mechanics, taught by Janet Curry PTA at 3/21/2024  2:00 PM.  Learner: Patient  Readiness: Acceptance  Method: Explanation  Response: Verbalizes Understanding, Needs Reinforcement    ADL Training, taught by Janet Curry PTA at 3/21/2024  2:00 PM.  Learner: Patient  Readiness: Acceptance  Method: Explanation  Response: Verbalizes Understanding, Needs Reinforcement    Home Exercise Program, taught by Janet Curry PTA at 3/21/2024  2:00 PM.  Learner: Patient  Readiness: Acceptance  Method: Explanation  Response: Verbalizes Understanding, Needs Reinforcement    Body Mechanics, taught by Janet Curry PTA at 3/21/2024  2:00  PM.  Learner: Patient  Readiness: Acceptance  Method: Explanation  Response: Verbalizes Understanding, Needs Reinforcement    Mobility Training, taught by Janet Curry PTA at 3/21/2024  2:00 PM.  Learner: Patient  Readiness: Acceptance  Method: Explanation  Response: Verbalizes Understanding, Needs Reinforcement    Education Comments  No comments found.        GOALS:  Encounter Problems       Encounter Problems (Active)       PT Problem       Pt will be able to perform all bed mobility tasks with Min A.  (Progressing)       Start:  03/18/24    Expected End:  03/26/24            Pt will perform all transfers with Mod A and FWW with proper safety mechanics.   (Progressing)       Start:  03/18/24    Expected End:  03/26/24            Pt will ambulate 50 ft with Mod A using FWW for improved functional independence.  (Progressing)       Start:  03/18/24    Expected End:  03/26/24            Pt will demonstrate fair plus stand balance for completion of therapeutic exercises and functional tasks.  (Progressing)       Start:  03/18/24    Expected End:  03/26/24               Pain - Adult

## 2024-03-21 NOTE — PROGRESS NOTES
INPATIENT NEPHROLOGY CONSULT PROGRESS NOTES    Patient Name: Salomon Chowdary   MRN: [id number]  CONSULTING SERVICE: Nephrology    Impression :   Mr. Chowdary is a 83 y.o. male with past medical history significant for CAD, hypertension, ESRD on PD since 03/23, primary nephrologist Dr. Khan, presented to the hospital following a fall and the patient was found to have left proximal intertrochanteric femur fracture and plan for surgery. Nephrology consulted for management of advanced renal failure. Patient has been on peritoneal dialysis since 3/23 and denies any complaints currently other than left hip pain   Mr. Salomon Chowdary is a 83-year-old male with past medical history of CAD, hypertension, ESRD on PD since 03/23, primary nephrologist Dr. Khan, presented to the hospital following a fall and the patient was found to have left proximal intertrochanteric femur fracture and plan for surgery.  Nephrology consulted for management of advanced renal failure.  Patient has been on peritoneal dialysis since 3/23 and denies any complaints currently other than left hip pain.  Denies any chest pain, shortness of breath, nausea, vomiting, abdominal pain, headache, dizziness.     1.  ESRD on PD.  2.  Hypertension.  3.   CAD status post CABG.  4.  Type 2 diabetes mellitus.  5.  Left proximal intertrochanteric femur fracture.  6.  Hypothyroidism.     Plan.  - -Had PD overnight and net -500 cc of ultrafiltration.  Patient has negative ultrafiltration for the last 2 days likely secondary to mechanical issues.  Will check for KUB for constipation.  Will continue PD prescription with 6000 mL fill volume, 3 exchanges, 2000 feet volume, 8 hours, 2.5% and a 1.5% dextrose bags.  -Hypertension: Blood pressure has been optimally controlled.  Patient is on amlodipine 10 mg daily, torsemide 50 mg daily.  Continue hydralazine 25 mg 3 times daily.  -BMD: Continue phosphorus binders including sevelamer 800 mg 3 times daily.  -Hypokalemia,  replaced.  -Anemia: Hemoglobin is stable around 7.4.  On erythropoietin therapy 10,000 units 3 times weekly.  -Underwent surgery for left femur fracture.  ================================================================  INTERVAL HPI: The patient was seen and examined.  Denies any complaints.  Denies any chest pain, shortness of breath, nausea, vomitings.   PERTINENT ROS:   GENERAL: No fever/chills.  RESPIRATORY: Negative for cough, wheezing or shortness of breath.  CARDIOVASCULAR: Negative for chest pain or palpitations.  GI: Negative for nausea, vomiting,  Diarrhea, abdominal pain.    : Negative for dysuria and hematuria    MEDICATIONS:  Current active Inpatient Medication reviewed.   Current Facility-Administered Medications   Medication Dose Route Frequency Provider Last Rate Last Admin    acetaminophen (Tylenol) tablet 325 mg  325 mg oral q8h PRN Donna Kessler MD        acetaminophen (Tylenol) tablet 975 mg  975 mg oral TID Donna Kessler MD   975 mg at 03/21/24 0832    amLODIPine (Norvasc) tablet 10 mg  10 mg oral Daily Donna Kessler MD   10 mg at 03/21/24 0832    apixaban (Eliquis) tablet 2.5 mg  2.5 mg oral q12h Rick Covarrubias MD   2.5 mg at 03/20/24 2330    aspirin EC tablet 81 mg  81 mg oral Daily Donna Kessler MD   81 mg at 03/21/24 0832    benzocaine-menthol (Cepastat Sore Throat) 15-3.6 mg lozenge 1 lozenge  1 lozenge Mouth/Throat q2h PRN Donna Kessler MD        ceFAZolin in dextrose (iso-os) (Ancef) IVPB 2 g  2 g intravenous Once in OR Donna Kessler MD        cholecalciferol (Vitamin D-3) tablet 1,000 Units  1,000 Units oral Daily Donna Kessler MD   1,000 Units at 03/21/24 0832    clopidogrel (Plavix) tablet 75 mg  75 mg oral Daily Julia Little PA-C   75 mg at 03/21/24 0832    dextrose 1.5% - LOW calcium 2.5mEq/L 6,000 mL peritoneal dialysate   intraperitoneal q24h Donna Kessler MD        dextrose 1.5% -  LOW calcium 2.5mEq/L 6,000 mL peritoneal dialysate   intraperitoneal q24h Donna Kessler MD        dextrose 1.5% - LOW calcium 2.5mEq/L 6,000 mL peritoneal dialysate   intraperitoneal q24h Donna Kessler MD   Given at 03/20/24 2140    dextrose 1.5% - LOW calcium 2.5mEq/L 6,000 mL peritoneal dialysate   intraperitoneal q24h Donna Kessler MD   Given at 03/17/24 2218    dextrose 50 % injection 12.5 g  12.5 g intravenous q15 min PRN Donna Kessler MD        dextrose 50 % injection 25 g  25 g intravenous q15 min PRN Donna Kessler MD        DULoxetine (Cymbalta) DR capsule 30 mg  30 mg oral Daily Donna Kessler MD   30 mg at 03/21/24 0832    epoetin alyssa-epbx (Retacrit) injection 10,000 Units  10,000 Units subcutaneous Once per day on Mon Wed Fri Trevorterrance Castañeda MD   10,000 Units at 03/20/24 1219    glucagon (Glucagen) injection 1 mg  1 mg intramuscular q15 min PRN Donna Kessler MD        guaiFENesin (Mucinex) 12 hr tablet 600 mg  600 mg oral q12h PRN Donna Kessler MD   600 mg at 03/16/24 1003    hydrALAZINE (Apresoline) tablet 25 mg  25 mg oral TID Donna Kessler MD   25 mg at 03/21/24 0832    insulin detemir (Levemir) injection 20 Units  20 Units subcutaneous Daily Donna Kessler MD   20 Units at 03/21/24 0832    insulin lispro (HumaLOG) injection 0-10 Units  0-10 Units subcutaneous TID with meals Donna Kessler MD   2 Units at 03/20/24 1219    levothyroxine (Synthroid, Levoxyl) tablet 50 mcg  50 mcg oral Daily Donna Kessler MD   50 mcg at 03/21/24 0506    metoprolol succinate XL (Toprol-XL) 24 hr tablet 50 mg  50 mg oral Daily Donna Kessler MD   50 mg at 03/21/24 0832    multivitamin with minerals 1 tablet  1 tablet oral Daily Donna Kessler MD   1 tablet at 03/21/24 0832    ondansetron (Zofran) injection 4 mg  4 mg intravenous q8h PRN Donna Kessler MD   4 mg at  "03/18/24 0954    polyethylene glycol (Glycolax, Miralax) packet 17 g  17 g oral Daily Donna Kessler MD   17 g at 03/16/24 0837    sevelamer carbonate (Renvela) packet 0.8 g  0.8 g oral TID with meals Donna Kessler MD   0.8 g at 03/21/24 0832    sodium bicarbonate tablet 650 mg  650 mg oral BID Donna Kessler MD   650 mg at 03/21/24 0832    sodium chloride 0.9 % bolus 500 mL  500 mL intravenous Once Rick Covarrubias MD 1,000 mL/hr at 03/21/24 1127 500 mL at 03/21/24 1127    torsemide (Demadex) tablet 50 mg  50 mg oral Daily Donna Kessler MD   50 mg at 03/21/24 0832    traMADol (Ultram) tablet 50 mg  50 mg oral q12h PRN Donna Kessler MD   50 mg at 03/21/24 0834       PHYSICAL EXAM:   /57 (BP Location: Right arm, Patient Position: Lying)   Pulse 72   Temp 36.1 °C (97 °F) (Temporal)   Resp 16   Ht 1.753 m (5' 9\")   Wt 96.1 kg (211 lb 13.8 oz)   SpO2 94%   BMI 32.22 kg/m²   Patient Vitals for the past 24 hrs:   BP   03/21/24 0800 117/57   03/21/24 0000 111/57   03/20/24 2000 105/53   03/20/24 1600 99/50       Intake/Output Summary (Last 24 hours) at 3/21/2024 1133  Last data filed at 3/21/2024 1127  Gross per 24 hour   Intake 6000 ml   Output 7450 ml   Net -1450 ml     GENERAL: Alert, no distress, cooperative  SKIN: Skin color, texture, turgor normal. No rashes or lesions.  HEAD/SINUSES: No significant findings  EYES: PE  OROPHARYNX: oral mucosa moist. Oropharynx normal.  NECK: No jugulovenous distention, No carotid bruits, Supple  LUNGS: Lungs clear to auscultation, no wheeze, rhonchi, or rales  CARDIAC: Normal S1 and S2; no rubs, murmurs, or gallops  ABDOMEN: Abdomen soft, non-tender, BS normal, No masses. No abdominal bruits.  EXTREMITIES: Status post left surgery for closed displaced intertrochanteric left femur fracture, 1+ dependent edema  NEURO: No focal deficits. Able to move all 4 extremities.  PULSES: 2+ radial, 2 + femoral    DATA: " "  Diagnostic tests reviewed for today's visit:    CBC: @FWPFTJX86(wbc,rbc,hb,hct,plt,mcv,mch,mpv,rdw)@  Coags: @DEAYWIO79(pt,inr,aptt)@  CMP: @PHEYXVN07(na,k,chlor,co2,bun,creat,gluc,tprot,ca,mg,albumin,tbili,alkphos,ALT,AST,anion)@  ABG's: @VSEYJAT44(PH,PCO2,PO2,BE,HCO3,CO2CT,O2HB,COHB,MHGB,TEMP,PHTC,PCO2T,PO2T,O2AD)@  MG/PHOS: @IDVLCXX47(mg,p)@    Urobilinogen, Urine   Date Value Ref Range Status   10/01/2023 <2.0 <2.0 mg/dL Final       No components found for: \"PROTTIMED\", \"UALBCR\", \"UPROT\", \"CREAT\"    No results found for: \"CHOL\", \"HDL\", \"LDL\"  IMAGING:  reviewed in  images    SIGNATURE: Clary Castañeda MD  "

## 2024-03-21 NOTE — PROGRESS NOTES
"Occupational Therapy    OT Treatment    Patient Name: Salomon Chowdary \"Robbie\"  MRN: [id number]  Today's Date: 3/21/2024  Time Calculation  Start Time: 1051  Stop Time: 1122  Time Calculation (min): 31 min         Assessment:  Pt is cooperative, agreeable to tx, limited this date due to dizziness and low BP, returned to supine.    Upon sitting pt reports dizziness which resolves with time BP assessed sitting EOB prior to stand- 110/57; following brief stand- 79/47; returned to supine- 126/61    End of Session Communication: Bedside nurse  End of Session Patient Position: Bed, 3 rail up, Alarm off, caregiver present (placed in chair mode)  Evaluation/Treatment Tolerance: Treatment limited secondary to medical complications (Comment)  Medical Staff Made Aware: Yes    Plan:  OT Frequency: 5 times per week  OT Discharge Recommendations: Moderate intensity level of continued care     Subjective      03/21/24 1051   OT Last Visit   OT Received On 03/21/24   General   Reason for Referral left hip orif   Prior to Session Communication Bedside nurse   Patient Position Received Bed, 3 rail up;Alarm on   General Comment Pt agreeable to tx and cleared for participation.   Precautions   LE Weight Bearing Status Weight Bearing as Tolerated  (L LE)  Gunter roll placed between LE's in bed to maintain neutral L LE as noted to IR   Medical Precautions Fall precautions   Vital Signs   SpO2 94 %   BP (!) 79/47  (sitting EOB following standing; returned to supine and placed in trendelenburg /61; pt then placed in sitting in chair mode/bed level /52- nursing informed, present and remained with pt at end of tx)   BP Location Left arm   BP Method Automatic   Patient Position Sitting   Pain Assessment   Pain Assessment 0-10   Pain Score 4  (\"all over\" c/o neuropathy pain in extremities, increased hip pain with movement, educated in pursed lip breathing)   Cognition   Overall Cognitive Status WFL   LE Dressing   LE Dressing Yes "   Sock Level of Assistance Dependent   LE Dressing Where Assessed Bed level   Functional Standing Tolerance   Time ~30 seconds   Activity static standing   Functional Standing Tolerance Comments B UE support, Max A x2-3   Bed Mobility 1   Bed Mobility 1 Supine to sitting   Level of Assistance 1 Maximum assistance;Maximum verbal cues  (x2)   Bed Mobility Comments 1 v/t cues for technique and sequencing to maximimize pt effort; HOB elevated, use of draw sheet, assist needed for trunk and LE's, assist to scoot out toward EOB.   Bed Mobility 2   Bed Mobility  2 Sitting to supine   Level of Assistance 2 Dependent  (x3 for safety)   Bed Mobility Comments 2 Dependently returned to supine and repositioned toward HOB with assist x3 people for safety due to dizziness and vitals.   Transfer 1   Technique 1 Sit to stand;Stand to sit   Transfer Device 1 Gait belt;Walker   Transfer Level of Assistance 1 Maximum assistance;Maximum verbal cues  (x3)   Trials/Comments 1 STS from EOB x1 trial with Max A x3 with PCA assisting as well in order to maintain L LE position in neutral, facilitate STS and maintain standing. Pt reporting increasing dizziness, returned to sitting.   Static Sitting Balance   Static Sitting-Balance Support Bilateral upper extremity supported   Static Sitting-Level of Assistance Moderate assistance;Maximum assistance   Static Standing Balance   Static Standing-Balance Support Bilateral upper extremity supported   Static Standing-Level of Assistance Maximum assistance  (x2-3)   Therapeutic Activity   Therapeutic Activity 1 Pt engaged in static sitting EOB and static standing at fww level with heavy B UE support, leaning toward R side in both sitting and stance with cues to correct, fair follow through in sitting but returns to lean due to pain.   IP OT Assessment   Evaluation/Treatment Tolerance Treatment limited secondary to medical complications (Comment)   Medical Staff Made Aware Yes   End of Session  Communication Bedside nurse   End of Session Patient Position Bed, 3 rail up;Alarm off, caregiver present  (placed in chair mode)   Inpatient Plan   OT Frequency 5 times per week   OT Discharge Recommendations Moderate intensity level of continued care     Outcome Measures:[facility] Daily Activity  Putting on and taking off regular lower body clothing: Total  Bathing (including washing, rinsing, drying): A lot  Putting on and taking off regular upper body clothing: A lot  Toileting, which includes using toilet, bedpan or urinal: Total  Taking care of personal grooming such as brushing teeth: A little  Eating Meals: A little  Daily Activity - Total Score: 12  Education Documentation  Body Mechanics, taught by ELÍAS Hogue at 3/21/2024  1:32 PM.  Learner: Patient  Readiness: Acceptance  Method: Explanation  Response: Verbalizes Understanding, Needs Reinforcement  Comment: techniques to increase sitting balance, pursed lip breathing    Body Mechanics, taught by ELÍAS Hogue at 3/20/2024  1:47 PM.  Learner: Patient  Readiness: Acceptance  Method: Explanation  Response: Verbalizes Understanding, Needs Reinforcement  Comment: functional transfer safety, pursed lip breathing to promote pain reduction, feeding AE as needed    ADL Training, taught by ELÍAS Hogue at 3/20/2024  1:47 PM.  Learner: Patient  Readiness: Acceptance  Method: Explanation  Response: Verbalizes Understanding, Needs Reinforcement  Comment: functional transfer safety, pursed lip breathing to promote pain reduction, feeding AE as needed    Education Comments  No comments found.            Goals:  Encounter Problems       Encounter Problems (Active)       OT Goals       Patient will complete all functional mobility tasks with MOD I. (Progressing)       Start:  03/18/24    Expected End:  03/26/24            Patient will complete all transfers with MOD I. (Progressing)       Start:  03/18/24    Expected End:  03/26/24            Patient  will complete all UE adls with MOD I.  (Progressing)       Start:  03/18/24    Expected End:  03/26/24            Patient will complete all LE adls with MOD I. (Progressing)       Start:  03/18/24    Expected End:  03/26/24

## 2024-03-21 NOTE — PROGRESS NOTES
03/21/24 0842   Discharge Planning   Living Arrangements Spouse/significant other   Support Systems Spouse/significant other   Type of Residence Private residence   Home or Post Acute Services Post acute facilities (Rehab/SNF/etc)   Type of Post Acute Facility Services Skilled nursing   Patient expects to be discharged to: [facility]   Does the patient need discharge transport arranged? Yes   RoundTrip coordination needed? Yes     ADOD today. Awaiting bed at [facility]. Requested DSC send [phone]. GF sent in [facility].   1113 Facility will have and open bed this afternoon. AVS sent in [facility]. Transportation scheduled for 1500. Facility and family notified.

## 2024-03-22 ENCOUNTER — APPOINTMENT (OUTPATIENT)
Dept: CARDIAC REHAB | Facility: HOSPITAL | Age: 84
End: 2024-03-22
Payer: MEDICARE

## 2024-03-22 ENCOUNTER — NURSING HOME VISIT (OUTPATIENT)
Dept: POST ACUTE CARE | Facility: EXTERNAL LOCATION | Age: 84
End: 2024-03-22
Payer: MEDICARE

## 2024-03-22 VITALS
DIASTOLIC BLOOD PRESSURE: 54 MMHG | RESPIRATION RATE: 16 BRPM | OXYGEN SATURATION: 99 % | HEART RATE: 84 BPM | BODY MASS INDEX: 27.83 KG/M2 | TEMPERATURE: 97.8 F | SYSTOLIC BLOOD PRESSURE: 96 MMHG | WEIGHT: 183 LBS

## 2024-03-22 DIAGNOSIS — Z79.4 TYPE 2 DIABETES MELLITUS WITH DIABETIC NEUROPATHY, WITH LONG-TERM CURRENT USE OF INSULIN (MULTI): ICD-10-CM

## 2024-03-22 DIAGNOSIS — E11.40 TYPE 2 DIABETES MELLITUS WITH DIABETIC NEUROPATHY, WITH LONG-TERM CURRENT USE OF INSULIN (MULTI): ICD-10-CM

## 2024-03-22 DIAGNOSIS — I48.0 PAF (PAROXYSMAL ATRIAL FIBRILLATION) (MULTI): ICD-10-CM

## 2024-03-22 DIAGNOSIS — Z87.81 S/P LEFT HIP FRACTURE: Primary | ICD-10-CM

## 2024-03-22 DIAGNOSIS — Z99.2 ESRD ON PERITONEAL DIALYSIS (MULTI): ICD-10-CM

## 2024-03-22 DIAGNOSIS — N18.6 ESRD ON PERITONEAL DIALYSIS (MULTI): ICD-10-CM
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
## 2024-03-22 PROCEDURE — 99306 1ST NF CARE HIGH MDM 50: CPT | Performed by: INTERNAL MEDICINE

## 2024-03-22 NOTE — LETTER
Patient: Robbie Chowdary  : 1940    Encounter Date: 2024    Subjective  Patient ID: Robbie Chowdary is a 83 y.o. male who is acute skilled care being seen and evaluated for multiple medical problems.    HPI   This is an 83-year-old male patient with history of hypertension, diabetes mellitus type 2, end-stage renal disease on peritoneal dialysis, hypothyroidism, coronary artery disease, status post CABG in , and peripheral neuropathy.  The patient reports that he had an accidental fall at home and sustained a left hip fracture.  He was admitted to the hospital and ultimately received a transfemoral nail without complication.  The patient is here for rehabilitative care and physical therapy following his hip surgery.  Today the patient is resting comfortably and has no acute complaints for me.  He reports the pain is adequately controlled with the prescribed analgesic therapy.  He is to have close outpatient follow-up with his orthopedic surgeon.  Nursing reports the patient's blood pressure has been running low in the 90s systolic.  The patient has no complaints of dizziness or lightheadedness.  Of note the patient's spouse who is visiting today reports that his blood pressure has been running on the high side prior to his fall such that hydralazine dosing has been increased recently.  The patient is a retired .  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    Current medications:  Tylenol  Amlodipine  Apixaban  Aspirin  Vitamin D  Plavix  Duloxetine  Flonase  Gentamicin topical ointment to the peritoneal dialysis catheter site  Glucosamine  Insulin  Hydralazine  Levothyroxine  Claritin-D  Metoprolol  RenaPlex  Renvela  Senna  Sodium bicarbonate  Torsemide  Tramadol    Current laboratory examinations from 2024:  Potassium 3.1  Bicarbonate 24  Creatinine 5.98  Albumin 2.4  Phosphorus 6.7  White blood cell 6  Hemoglobin  7.4  Platelets 167  Last A1c from February 20, 2024 equal 7.0%  TSH from January 2023 6.29      Review of Systems   Constitutional:  Negative for chills, diaphoresis and fever.   Respiratory:  Negative for cough and shortness of breath.    Cardiovascular:  Negative for chest pain and leg swelling.   Gastrointestinal:  Negative for constipation, diarrhea, nausea and vomiting.   Musculoskeletal:  Negative for joint swelling and myalgias.        Per HPI       Objective  BP 96/54   Pulse 84   Temp 36.6 °C (97.8 °F)   Resp 16   Wt 83 kg (183 lb)   SpO2 99%   BMI 27.83 kg/m²     Physical Exam  Vitals reviewed.   Constitutional:       General: He is not in acute distress.     Appearance: He is not ill-appearing.      Comments: Frail elderly resting comfortably in bed in no distress   HENT:      Mouth/Throat:      Mouth: Mucous membranes are moist.   Cardiovascular:      Rate and Rhythm: Normal rate and regular rhythm.      Pulses: Normal pulses.      Heart sounds:      No gallop.   Pulmonary:      Breath sounds: Normal breath sounds. No wheezing, rhonchi or rales.   Abdominal:      General: Abdomen is flat. Bowel sounds are normal.      Palpations: Abdomen is soft.      Tenderness: There is no guarding or rebound.      Comments: Also his catheter present   Musculoskeletal:      Right lower leg: No edema.      Left lower leg: No edema.      Comments: Left hip incision is covered and dry and remains modestly tender following surgery   Neurological:      General: No focal deficit present.      Mental Status: He is oriented to person, place, and time.   Psychiatric:         Mood and Affect: Mood normal.         Behavior: Behavior normal.         Thought Content: Thought content normal.         Assessment/Plan  Problem List Items Addressed This Visit             ICD-10-CM    ESRD on peritoneal dialysis (CMS/[facility]) N18.6, Z99.2    Type 2 diabetes mellitus with diabetic neuropathy, unspecified (CMS/[facility]) E11.40    PAF  (paroxysmal atrial fibrillation) (CMS/Former[facility]) I48.0    S/p left hip fracture - Primary Z87.81    Accidental fall W19.XXXA     A.  We will continue with rehabilitative restorative and supportive care as patient tolerates    B.  Peritoneal dialysis will be maintained through his dialysis clinic care at the [facility]    C.  Will decrease hydralazine to 25 mg 3 times daily and decrease amlodipine to 5 mg daily to accommodate for the patient's hypotension felt possibly secondary to analgesic therapy.  These medications can always be increased back to their prior outpatient dosing as his analgesic requirement decreases with time.    D.  Laboratory examinations will continue to be monitored on an ongoing as-needed basis but will defer to the dialysis center regarding lab work for the time being    8.  Disposition will be determined pending response to rehabilitation overall assessment of patient safety awareness however anticipate patient will be discharged back home with his family after appropriate period of rehabilitation    F.  The patient's prognosis is guarded.        Electronically Signed By: Salomon Galindo MD   3/25/24  4:01 PM

## 2024-03-22 NOTE — PROGRESS NOTES
Subjective   Patient ID: Robbie Chowdary is a 83 y.o. male who is acute skilled care being seen and evaluated for multiple medical problems.    HPI   This is an 83-year-old male patient with history of hypertension, diabetes mellitus type 2, end-stage renal disease on peritoneal dialysis, hypothyroidism, coronary artery disease, status post CABG in 2024, and peripheral neuropathy.  The patient reports that he had an accidental fall at home and sustained a left hip fracture.  He was admitted to the hospital and ultimately received a transfemoral nail without complication.  The patient is here for rehabilitative care and physical therapy following his hip surgery.  Today the patient is resting comfortably and has no acute complaints for me.  He reports the pain is adequately controlled with the prescribed analgesic therapy.  He is to have close outpatient follow-up with his orthopedic surgeon.  Nursing reports the patient's blood pressure has been running low in the 90s systolic.  The patient has no complaints of dizziness or lightheadedness.  Of note the patient's spouse who is visiting today reports that his blood pressure has been running on the high side prior to his fall such that hydralazine dosing has been increased recently.  The patient is a retired .  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    Current medications:  Tylenol  Amlodipine  Apixaban  Aspirin  Vitamin D  Plavix  Duloxetine  Flonase  Gentamicin topical ointment to the peritoneal dialysis catheter site  Glucosamine  Insulin  Hydralazine  Levothyroxine  Claritin-D  Metoprolol  RenaPlex  Renvela  Senna  Sodium bicarbonate  Torsemide  Tramadol    Current laboratory examinations from March 20, 2024:  Potassium 3.1  Bicarbonate 24  Creatinine 5.98  Albumin 2.4  Phosphorus 6.7  White blood cell 6  Hemoglobin 7.4  Platelets 167  Last A1c from February 20, 2024 equal 7.0%  TSH  from January 2023 6.29      Review of Systems   Constitutional:  Negative for chills, diaphoresis and fever.   Respiratory:  Negative for cough and shortness of breath.    Cardiovascular:  Negative for chest pain and leg swelling.   Gastrointestinal:  Negative for constipation, diarrhea, nausea and vomiting.   Musculoskeletal:  Negative for joint swelling and myalgias.        Per HPI       Objective   BP 96/54   Pulse 84   Temp 36.6 °C (97.8 °F)   Resp 16   Wt 83 kg (183 lb)   SpO2 99%   BMI 27.83 kg/m²     Physical Exam  Vitals reviewed.   Constitutional:       General: He is not in acute distress.     Appearance: He is not ill-appearing.      Comments: Frail elderly resting comfortably in bed in no distress   HENT:      Mouth/Throat:      Mouth: Mucous membranes are moist.   Cardiovascular:      Rate and Rhythm: Normal rate and regular rhythm.      Pulses: Normal pulses.      Heart sounds:      No gallop.   Pulmonary:      Breath sounds: Normal breath sounds. No wheezing, rhonchi or rales.   Abdominal:      General: Abdomen is flat. Bowel sounds are normal.      Palpations: Abdomen is soft.      Tenderness: There is no guarding or rebound.      Comments: Also his catheter present   Musculoskeletal:      Right lower leg: No edema.      Left lower leg: No edema.      Comments: Left hip incision is covered and dry and remains modestly tender following surgery   Neurological:      General: No focal deficit present.      Mental Status: He is oriented to person, place, and time.   Psychiatric:         Mood and Affect: Mood normal.         Behavior: Behavior normal.         Thought Content: Thought content normal.         Assessment/Plan   Problem List Items Addressed This Visit             ICD-10-CM    ESRD on peritoneal dialysis (CMS/[facility]) N18.6, Z99.2    Type 2 diabetes mellitus with diabetic neuropathy, unspecified (CMS/[facility]) E11.40    PAF (paroxysmal atrial fibrillation) (CMS/[facility]) I48.0    S/p left hip fracture  - Primary Z87.81    Accidental fall W19.XXXA     A.  We will continue with rehabilitative restorative and supportive care as patient tolerates    B.  Peritoneal dialysis will be maintained through his dialysis clinic care at the [facility]    C.  Will decrease hydralazine to 25 mg 3 times daily and decrease amlodipine to 5 mg daily to accommodate for the patient's hypotension felt possibly secondary to analgesic therapy.  These medications can always be increased back to their prior outpatient dosing as his analgesic requirement decreases with time.    D.  Laboratory examinations will continue to be monitored on an ongoing as-needed basis but will defer to the dialysis center regarding lab work for the time being    8.  Disposition will be determined pending response to rehabilitation overall assessment of patient safety awareness however anticipate patient will be discharged back home with his family after appropriate period of rehabilitation    F.  The patient's prognosis is guarded.
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
## 2024-03-25 PROBLEM — Z87.81 S/P LEFT HIP FRACTURE: Status: ACTIVE | Noted: 2024-03-25

## 2024-03-25 ASSESSMENT — ENCOUNTER SYMPTOMS
DIAPHORESIS: 0
COUGH: 0
VOMITING: 0
DIARRHEA: 0
FEVER: 0
NAUSEA: 0
SHORTNESS OF BREATH: 0
CONSTIPATION: 0
CHILLS: 0
JOINT SWELLING: 0
MYALGIAS: 0

## 2024-03-26 ENCOUNTER — NURSING HOME VISIT (OUTPATIENT)
Dept: POST ACUTE CARE | Facility: EXTERNAL LOCATION | Age: 84
End: 2024-03-26
Payer: MEDICARE

## 2024-03-26 VITALS
SYSTOLIC BLOOD PRESSURE: 133 MMHG | OXYGEN SATURATION: 94 % | TEMPERATURE: 97.9 F | RESPIRATION RATE: 18 BRPM | WEIGHT: 182 LBS | BODY MASS INDEX: 27.68 KG/M2 | HEART RATE: 86 BPM | DIASTOLIC BLOOD PRESSURE: 84 MMHG

## 2024-03-26 DIAGNOSIS — I48.0 PAF (PAROXYSMAL ATRIAL FIBRILLATION) (MULTI): ICD-10-CM

## 2024-03-26 DIAGNOSIS — I10 PRIMARY HYPERTENSION: ICD-10-CM

## 2024-03-26 DIAGNOSIS — E11.59 TYPE 2 DIABETES MELLITUS WITH OTHER CIRCULATORY COMPLICATION, WITH LONG-TERM CURRENT USE OF INSULIN (MULTI): ICD-10-CM

## 2024-03-26 DIAGNOSIS — N18.6 ESRD ON PERITONEAL DIALYSIS (MULTI): ICD-10-CM

## 2024-03-26 DIAGNOSIS — Z99.2 ESRD ON PERITONEAL DIALYSIS (MULTI): ICD-10-CM

## 2024-03-26 DIAGNOSIS — S72.142D CLOSED DISPLACED INTERTROCHANTERIC FRACTURE OF LEFT FEMUR WITH ROUTINE HEALING, SUBSEQUENT ENCOUNTER: Primary | ICD-10-CM

## 2024-03-26 DIAGNOSIS — Z79.4 TYPE 2 DIABETES MELLITUS WITH OTHER CIRCULATORY COMPLICATION, WITH LONG-TERM CURRENT USE OF INSULIN (MULTI): ICD-10-CM

## 2024-03-26 PROCEDURE — 99309 SBSQ NF CARE MODERATE MDM 30: CPT | Performed by: NURSE PRACTITIONER

## 2024-03-26 ASSESSMENT — ENCOUNTER SYMPTOMS
VOMITING: 0
ABDOMINAL PAIN: 0
SHORTNESS OF BREATH: 0
PALPITATIONS: 0
DIARRHEA: 0
FATIGUE: 1
CONSTIPATION: 0
FEVER: 0
CHILLS: 0
NAUSEA: 0
COUGH: 0

## 2024-03-26 NOTE — PROGRESS NOTES
"Evelyn Chowdary \"Robbie\" is a 83 y.o. male Here for weekly skilled visit.  HPI   Health problems reviewed and no acute concerns.  Participating in therapy as able, remains weak.   Eating and drinking well.  He is having negative net urine output from peritoneal dialysis and per dialysis center this has been an ongoing issue, he was scheduled for preop evaluation for peritoneal dialysis cath revision when he fell, the dialysis center has been notified and the dialysis nurse is coming to facility later today.     No concerns per staff.       Review of Systems   Constitutional:  Positive for fatigue. Negative for chills and fever.   Respiratory:  Negative for cough and shortness of breath.    Cardiovascular:  Negative for chest pain and palpitations.   Gastrointestinal:  Negative for abdominal pain, constipation, diarrhea, nausea and vomiting.       Objective   /84   Pulse 86   Temp 36.6 °C (97.9 °F)   Resp 18   Wt 82.6 kg (182 lb)   SpO2 94%   BMI 27.68 kg/m²     Physical Exam  Vitals reviewed.   Constitutional:       General: He is not in acute distress.     Appearance: He is not ill-appearing.      Comments: Frail elderly resting comfortably in bed in no distress   HENT:      Head: Normocephalic and atraumatic.      Mouth/Throat:      Mouth: Mucous membranes are moist.   Eyes:      Conjunctiva/sclera: Conjunctivae normal.   Cardiovascular:      Rate and Rhythm: Normal rate and regular rhythm.      Pulses: Normal pulses.      Heart sounds:      No gallop.   Pulmonary:      Effort: Pulmonary effort is normal. No respiratory distress.      Breath sounds: Normal breath sounds. No wheezing, rhonchi or rales.   Abdominal:      General: Abdomen is flat. Bowel sounds are normal. There is no distension.      Palpations: Abdomen is soft.      Tenderness: There is no abdominal tenderness. There is no guarding or rebound.      Comments: Peritoneal dialysis cath intact.    Musculoskeletal:         General: " Normal range of motion.      Right lower leg: No edema.      Left lower leg: No edema.      Comments: Left hip incision is covered and dry and remains modestly tender following surgery   Skin:     General: Skin is warm and dry.   Neurological:      General: No focal deficit present.      Mental Status: He is alert and oriented to person, place, and time.   Psychiatric:         Mood and Affect: Mood normal.         Behavior: Behavior normal.         Thought Content: Thought content normal.         Assessment/Plan   Problem List Items Addressed This Visit       Type 2 diabetes mellitus with circulatory disorder (CMS/[facility])     Blood sugars reviewed, acceptable.  On lantus and ss coverage         ESRD on peritoneal dialysis (CMS/[facility])     He has being evaluated by dialysis center as he is showing negative output.  No abdominal pain.  He was scheduled to have preop evaluation for dialysis catheter revision as this has been an ongoing issue when he fell.  The dialysis nurse from the center is coming today to evaluate.  Follow up with nephrology team/dialysis center as scheduled.          Primary hypertension     Norvasc and hydralazine was decreased on admission, BP reviewed and acceptable.          PAF (paroxysmal atrial fibrillation) (CMS/[facility])     On metoprolol and apixaban.  rate controlled.  No sx of bleeding.           Closed displaced intertrochanteric fracture of left femur (CMS/[facility]) - Primary     S/p IM nailing, follow up with orthopedics as scheduled.  continue with therapy as able.  Pain overall controlled         labs/meds/orders reviewed  staff to monitor and notify for any changes.  continue with therapy as able.  Follow up with dialysis center as scheduled and await further input from dialysis team.   Follow up with orthopedics as scheduled.

## 2024-03-26 NOTE — ASSESSMENT & PLAN NOTE
He has being evaluated by dialysis center as he is showing negative output.  No abdominal pain.  He was scheduled to have preop evaluation for dialysis catheter revision as this has been an ongoing issue when he fell.  The dialysis nurse from the center is coming today to evaluate.  Follow up with nephrology team/dialysis center as scheduled.

## 2024-03-26 NOTE — LETTER
"Patient: Robbie Chowdary  : 1940    Encounter Date: 2024    Evelyn Chowdary \"Nemesio" is a 83 y.o. male Here for weekly skilled visit.  HPI   Health problems reviewed and no acute concerns.  Participating in therapy as able, remains weak.   Eating and drinking well.  He is having negative net urine output from peritoneal dialysis and per dialysis center this has been an ongoing issue, he was scheduled for preop evaluation for peritoneal dialysis cath revision when he fell, the dialysis center has been notified and the dialysis nurse is coming to facility later today.     No concerns per staff.       Review of Systems   Constitutional:  Positive for fatigue. Negative for chills and fever.   Respiratory:  Negative for cough and shortness of breath.    Cardiovascular:  Negative for chest pain and palpitations.   Gastrointestinal:  Negative for abdominal pain, constipation, diarrhea, nausea and vomiting.       Objective  /84   Pulse 86   Temp 36.6 °C (97.9 °F)   Resp 18   Wt 82.6 kg (182 lb)   SpO2 94%   BMI 27.68 kg/m²     Physical Exam  Vitals reviewed.   Constitutional:       General: He is not in acute distress.     Appearance: He is not ill-appearing.      Comments: Frail elderly resting comfortably in bed in no distress   HENT:      Head: Normocephalic and atraumatic.      Mouth/Throat:      Mouth: Mucous membranes are moist.   Eyes:      Conjunctiva/sclera: Conjunctivae normal.   Cardiovascular:      Rate and Rhythm: Normal rate and regular rhythm.      Pulses: Normal pulses.      Heart sounds:      No gallop.   Pulmonary:      Effort: Pulmonary effort is normal. No respiratory distress.      Breath sounds: Normal breath sounds. No wheezing, rhonchi or rales.   Abdominal:      General: Abdomen is flat. Bowel sounds are normal. There is no distension.      Palpations: Abdomen is soft.      Tenderness: There is no abdominal tenderness. There is no guarding or rebound.      Comments: " Peritoneal dialysis cath intact.    Musculoskeletal:         General: Normal range of motion.      Right lower leg: No edema.      Left lower leg: No edema.      Comments: Left hip incision is covered and dry and remains modestly tender following surgery   Skin:     General: Skin is warm and dry.   Neurological:      General: No focal deficit present.      Mental Status: He is alert and oriented to person, place, and time.   Psychiatric:         Mood and Affect: Mood normal.         Behavior: Behavior normal.         Thought Content: Thought content normal.         Assessment/Plan  Problem List Items Addressed This Visit       Type 2 diabetes mellitus with circulatory disorder (CMS/[facility])     Blood sugars reviewed, acceptable.  On lantus and ss coverage         ESRD on peritoneal dialysis (CMS/[facility])     He has being evaluated by dialysis center as he is showing negative output.  No abdominal pain.  He was scheduled to have preop evaluation for dialysis catheter revision as this has been an ongoing issue when he fell.  The dialysis nurse from the center is coming today to evaluate.  Follow up with nephrology team/dialysis center as scheduled.          Primary hypertension     Norvasc and hydralazine was decreased on admission, BP reviewed and acceptable.          PAF (paroxysmal atrial fibrillation) (CMS/[facility])     On metoprolol and apixaban.  rate controlled.  No sx of bleeding.           Closed displaced intertrochanteric fracture of left femur (CMS/[facility]) - Primary     S/p IM nailing, follow up with orthopedics as scheduled.  continue with therapy as able.  Pain overall controlled         labs/meds/orders reviewed  staff to monitor and notify for any changes.  continue with therapy as able.  Follow up with dialysis center as scheduled and await further input from dialysis team.   Follow up with orthopedics as scheduled.       Electronically Signed By: MARGARET Ramos   3/26/24  3:03 PM

## 2024-03-26 NOTE — ASSESSMENT & PLAN NOTE
S/p IM nailing, follow up with orthopedics as scheduled.  continue with therapy as able.  Pain overall controlled

## 2024-04-05 ENCOUNTER — OFFICE VISIT (OUTPATIENT)
Dept: ORTHOPEDIC SURGERY | Facility: CLINIC | Age: 84
End: 2024-04-05
Payer: MEDICARE

## 2024-04-05 ENCOUNTER — HOSPITAL ENCOUNTER (OUTPATIENT)
Dept: RADIOLOGY | Facility: CLINIC | Age: 84
Discharge: HOME | End: 2024-04-05
Payer: MEDICARE

## 2024-04-05 DIAGNOSIS — S72.142D CLOSED DISPLACED INTERTROCHANTERIC FRACTURE OF LEFT FEMUR WITH ROUTINE HEALING, SUBSEQUENT ENCOUNTER: ICD-10-CM

## 2024-04-05 PROCEDURE — 73502 X-RAY EXAM HIP UNI 2-3 VIEWS: CPT | Mod: LEFT SIDE | Performed by: STUDENT IN AN ORGANIZED HEALTH CARE EDUCATION/TRAINING PROGRAM

## 2024-04-05 PROCEDURE — 73502 X-RAY EXAM HIP UNI 2-3 VIEWS: CPT | Mod: LT

## 2024-04-05 PROCEDURE — 1159F MED LIST DOCD IN RCRD: CPT | Performed by: STUDENT IN AN ORGANIZED HEALTH CARE EDUCATION/TRAINING PROGRAM

## 2024-04-05 PROCEDURE — 99024 POSTOP FOLLOW-UP VISIT: CPT | Performed by: STUDENT IN AN ORGANIZED HEALTH CARE EDUCATION/TRAINING PROGRAM

## 2024-04-05 PROCEDURE — 1157F ADVNC CARE PLAN IN RCRD: CPT | Performed by: STUDENT IN AN ORGANIZED HEALTH CARE EDUCATION/TRAINING PROGRAM

## 2024-04-05 PROCEDURE — 1111F DSCHRG MED/CURRENT MED MERGE: CPT | Performed by: STUDENT IN AN ORGANIZED HEALTH CARE EDUCATION/TRAINING PROGRAM

## 2024-04-05 PROCEDURE — 1036F TOBACCO NON-USER: CPT | Performed by: STUDENT IN AN ORGANIZED HEALTH CARE EDUCATION/TRAINING PROGRAM

## 2024-04-05 RX ORDER — TRAMADOL HYDROCHLORIDE 50 MG/1
50 TABLET ORAL EVERY 6 HOURS PRN
Qty: 20 TABLET | Refills: 0 | Status: SHIPPED | OUTPATIENT
Start: 2024-04-05 | End: 2024-04-10

## 2024-04-05 RX ORDER — TRAMADOL HYDROCHLORIDE 50 MG/1
50 TABLET ORAL EVERY 6 HOURS PRN
Qty: 20 TABLET | Refills: 0 | Status: SHIPPED | OUTPATIENT
Start: 2024-04-05 | End: 2024-04-25

## 2024-04-05 NOTE — PROGRESS NOTES
S/p left hip ORIF with intertrochanteric nail on March 17..  Remains in therapy.  Denies numbness or tingling  Has been compliant with DVT ppx in form of Eliquis.    Physical Examination:  The patient appears to be their stated age, is in no apparent distress, and is oriented x3. The patients mood and affect are appropriate. The patients gait is normal. The examination of the limb in question was performed in comparison to the contralateral limb.    Dressing removed  Incision c/d/I  DF, PF intact  Able to flex and extend knee  SILT S, S, SP, DP, T  Foot wwp    Radiographs  Demonstrate a left hip intertrochanteric hip fracture with cephalomedullary fixation.  Alignment maintained.  No hardware migration.    Assessment:  3 weeks post op    Plan:   Weight bearing status: Weight-bear as tolerated  DVT ppx continued through 6 weeks  Follow up in 1 month    Donna Thomas MD

## 2024-04-17 ENCOUNTER — APPOINTMENT (OUTPATIENT)
Dept: CARDIAC REHAB | Facility: HOSPITAL | Age: 84
End: 2024-04-17
Payer: MEDICARE

## 2024-04-19 ENCOUNTER — APPOINTMENT (OUTPATIENT)
Dept: CARDIAC REHAB | Facility: HOSPITAL | Age: 84
End: 2024-04-19
Payer: MEDICARE

## 2024-04-24 ENCOUNTER — APPOINTMENT (OUTPATIENT)
Dept: CARDIAC REHAB | Facility: HOSPITAL | Age: 84
End: 2024-04-24
Payer: MEDICARE

## 2024-04-26 ENCOUNTER — APPOINTMENT (OUTPATIENT)
Dept: CARDIAC REHAB | Facility: HOSPITAL | Age: 84
End: 2024-04-26
Payer: MEDICARE

## 2024-05-01 ENCOUNTER — APPOINTMENT (OUTPATIENT)
Dept: CARDIOLOGY | Facility: CLINIC | Age: 84
End: 2024-05-01
Payer: MEDICARE

## 2024-05-01 ENCOUNTER — APPOINTMENT (OUTPATIENT)
Dept: CARDIAC REHAB | Facility: HOSPITAL | Age: 84
End: 2024-05-01
Payer: MEDICARE

## 2024-05-03 ENCOUNTER — HOSPITAL ENCOUNTER (OUTPATIENT)
Dept: RADIOLOGY | Facility: CLINIC | Age: 84
Discharge: HOME | End: 2024-05-03
Payer: MEDICARE

## 2024-05-03 ENCOUNTER — APPOINTMENT (OUTPATIENT)
Dept: CARDIAC REHAB | Facility: HOSPITAL | Age: 84
End: 2024-05-03
Payer: MEDICARE

## 2024-05-03 ENCOUNTER — OFFICE VISIT (OUTPATIENT)
Dept: ORTHOPEDIC SURGERY | Facility: CLINIC | Age: 84
End: 2024-05-03
Payer: MEDICARE

## 2024-05-03 DIAGNOSIS — S72.142D CLOSED DISPLACED INTERTROCHANTERIC FRACTURE OF LEFT FEMUR WITH ROUTINE HEALING, SUBSEQUENT ENCOUNTER: ICD-10-CM

## 2024-05-03 PROCEDURE — 1157F ADVNC CARE PLAN IN RCRD: CPT | Performed by: STUDENT IN AN ORGANIZED HEALTH CARE EDUCATION/TRAINING PROGRAM

## 2024-05-03 PROCEDURE — 73502 X-RAY EXAM HIP UNI 2-3 VIEWS: CPT | Mod: LEFT SIDE | Performed by: STUDENT IN AN ORGANIZED HEALTH CARE EDUCATION/TRAINING PROGRAM

## 2024-05-03 PROCEDURE — 1159F MED LIST DOCD IN RCRD: CPT | Performed by: STUDENT IN AN ORGANIZED HEALTH CARE EDUCATION/TRAINING PROGRAM

## 2024-05-03 PROCEDURE — 1036F TOBACCO NON-USER: CPT | Performed by: STUDENT IN AN ORGANIZED HEALTH CARE EDUCATION/TRAINING PROGRAM

## 2024-05-03 PROCEDURE — 73502 X-RAY EXAM HIP UNI 2-3 VIEWS: CPT | Mod: LT

## 2024-05-03 PROCEDURE — 99024 POSTOP FOLLOW-UP VISIT: CPT | Performed by: STUDENT IN AN ORGANIZED HEALTH CARE EDUCATION/TRAINING PROGRAM

## 2024-05-03 NOTE — PROGRESS NOTES
S/p left hip ORIF with intertrochanteric nail on March 17..  Remains in skilled nursing.  Has had a few setbacks with regard to his dialysis and need for a new port placement.  Denies numbness or tingling  Has been compliant with DVT ppx in form of Eliquis.    Physical Examination:  The patient appears to be their stated age, is in no apparent distress, and is oriented x3. The patients mood and affect are appropriate. The patients gait is normal. The examination of the limb in question was performed in comparison to the contralateral limb.    Incision well-healed  DF, PF intact  Able to flex and extend knee  SILT S, S, SP, DP, T  Foot wwp    Radiographs  Demonstrate a left hip intertrochanteric hip fracture with cephalomedullary fixation.  Alignment maintained.  No hardware migration.    Assessment:  7 weeks post op    Plan:   Weight bearing status: Weight-bear as tolerated  Continue physical therapy.  Follow-up in 6 weeks with x-ray    Donna Thomas MD

## 2024-05-08 ENCOUNTER — APPOINTMENT (OUTPATIENT)
Dept: CARDIAC REHAB | Facility: HOSPITAL | Age: 84
End: 2024-05-08
Payer: MEDICARE

## 2024-05-10 ENCOUNTER — APPOINTMENT (OUTPATIENT)
Dept: CARDIAC REHAB | Facility: HOSPITAL | Age: 84
End: 2024-05-10
Payer: MEDICARE

## 2024-05-17 ENCOUNTER — TELEPHONE (OUTPATIENT)
Dept: CARDIOLOGY | Facility: CLINIC | Age: 84
End: 2024-05-17
Payer: MEDICARE

## 2024-05-17 DIAGNOSIS — E78.2 MIXED HYPERLIPIDEMIA: Primary | ICD-10-CM

## 2024-05-17 NOTE — TELEPHONE ENCOUNTER
Per wife, can this lab be drawn at the nursing home otherwise she's going to want to reschedule him until he is home

## 2024-05-17 NOTE — TELEPHONE ENCOUNTER
Called and advised patients wife of message, she verbalized understanding. Made aware that only lipid panel will be needed at this time. Patient was agreeable to have the lab drawn on day of appt with Dr. Harmon 5/22/24.

## 2024-05-22 ENCOUNTER — APPOINTMENT (OUTPATIENT)
Dept: CARDIOLOGY | Facility: CLINIC | Age: 84
End: 2024-05-22
Payer: MEDICARE

## 2024-06-12 ENCOUNTER — APPOINTMENT (OUTPATIENT)
Dept: CARDIOLOGY | Facility: CLINIC | Age: 84
End: 2024-06-12
Payer: MEDICARE

## 2024-06-12 VITALS — SYSTOLIC BLOOD PRESSURE: 107 MMHG | DIASTOLIC BLOOD PRESSURE: 64 MMHG | HEART RATE: 88 BPM

## 2024-06-12 DIAGNOSIS — I31.39 PERICARDIAL EFFUSION (HHS-HCC): ICD-10-CM

## 2024-06-12 DIAGNOSIS — Z87.81 S/P LEFT HIP FRACTURE: ICD-10-CM

## 2024-06-12 DIAGNOSIS — R06.02 SHORTNESS OF BREATH: ICD-10-CM

## 2024-06-12 DIAGNOSIS — E11.65 TYPE 2 DIABETES MELLITUS WITH HYPERGLYCEMIA, WITH LONG-TERM CURRENT USE OF INSULIN (MULTI): ICD-10-CM

## 2024-06-12 DIAGNOSIS — Z79.4 TYPE 2 DIABETES MELLITUS WITH HYPERGLYCEMIA, WITH LONG-TERM CURRENT USE OF INSULIN (MULTI): ICD-10-CM

## 2024-06-12 DIAGNOSIS — I10 PRIMARY HYPERTENSION: ICD-10-CM

## 2024-06-12 DIAGNOSIS — I25.10 ATHEROSCLEROSIS OF NATIVE CORONARY ARTERY OF NATIVE HEART WITHOUT ANGINA PECTORIS: Primary | ICD-10-CM

## 2024-06-12 DIAGNOSIS — E78.2 MIXED HYPERLIPIDEMIA: ICD-10-CM

## 2024-06-12 DIAGNOSIS — D64.9 CHRONIC ANEMIA: ICD-10-CM

## 2024-06-12 DIAGNOSIS — I48.0 PAF (PAROXYSMAL ATRIAL FIBRILLATION) (MULTI): ICD-10-CM

## 2024-06-12 DIAGNOSIS — N18.6 ESRD (END STAGE RENAL DISEASE) (MULTI): ICD-10-CM

## 2024-06-12 PROCEDURE — 3078F DIAST BP <80 MM HG: CPT | Performed by: INTERNAL MEDICINE

## 2024-06-12 PROCEDURE — 1036F TOBACCO NON-USER: CPT | Performed by: INTERNAL MEDICINE

## 2024-06-12 PROCEDURE — 1157F ADVNC CARE PLAN IN RCRD: CPT | Performed by: INTERNAL MEDICINE

## 2024-06-12 PROCEDURE — 99214 OFFICE O/P EST MOD 30 MIN: CPT | Performed by: INTERNAL MEDICINE

## 2024-06-12 PROCEDURE — 3074F SYST BP LT 130 MM HG: CPT | Performed by: INTERNAL MEDICINE

## 2024-06-12 PROCEDURE — 1159F MED LIST DOCD IN RCRD: CPT | Performed by: INTERNAL MEDICINE

## 2024-06-12 RX ORDER — ACETAMINOPHEN 325 MG/1
325 TABLET ORAL EVERY 6 HOURS PRN
COMMUNITY

## 2024-06-12 NOTE — PROGRESS NOTES
Patient:  Salomon Chowdary  YOB: 1940  MRN: [id number]       HPI:       Salomon Chowdary is a 83 y.o. male who returns today for cardiac follow-up.  He has a history of hypertension, hyperlipidemia, diabetes mellitus, and end-stage renal disease.  He was previously on peritoneal dialysis but currently is on hemodialysis.  He was hospitalized at [facility] on September 20, 2023 with left-sided chest pain and left arm pain.  He was noted to have underlying atrial fibrillation with rapid ventricular response.  D-dimer was positive.  CT scan was negative for pulmonary embolus.  He was noted to have a moderate-sized pericardial effusion on CT.  His hemoglobin was 7.7 and hematocrit 24.2.  Sodium 133 with potassium 3.8, BUN 68, and creatinine 4.67.  High-sensitivity troponin levels were 122, 608, 1661, and 2226.  Transthoracic echo showed normal LV ejection fraction and moderate-sized circumferential pericardial effusion without tamponade.  He converted to normal sinus rhythm after receiving IV Lopressor. Cardiac catheterization on September 20, 2023 showed 30% stenosis of the left main trunk, heavy calcification of the LAD, the LAD was not able to be successfully injected. There was 95% stenosis of the PDA of the dominant circumflex.  The entire vessel was heavily calcified.  There was 80% stenosis of the first obtuse marginal branch.  Repeat imaging showed 75 to 80% stenosis involving the proximal, mid, and distal LAD.  He requested a transfer to [facility], however, no beds were available.     He was transferred to [facility] on September 30, 2023.  He underwent mini left anterior thoracotomy and off-pump mid CAB (LIMA to LAD) on October 11, 2023 by Dr. Jorje Neal.  He initially did not tolerate beta-blockers due to underlying sinus bradycardia.  He is now on Toprol XL 50 mg daily.  An exhaustive attempt to angioplasty the distal circumflex October 18, 2023 was unsuccessful due to  inability to expand the balloon in the heavily calcified vessel.  A Shockwave balloon was unable to be passed.  He was discharged on October 17, 2023 on aspirin, Plavix, Toprol-XL, torsemide, Trulicity, and iron polysaccharide.  He was seen in follow-up by Dr. Eusebio Hudson and conservative care is being recommended for now.  Plan is for rotational atherectomy of the circumflex if he develops recurrent anginal symptoms. A follow-up echocardiogram showed normal LV systolic function and mild to moderate pericardial effusion.  His burden of atrial fibrillation was low and he presented with significant anemia as well as pericardial effusion.  It was opted to forego a NOAC unless he develops recurrent atrial fibrillation.  He might also be a candidate for Watchman device implant.     He was hospitalized March 15, 2024 at [facility] for treatment of a closed displaced intertrochanteric fracture of the left femur.  He was admitted to [facility] March 27, 2024 for evaluation of bilateral lower extremity edema.  His peritoneal dialysis catheter was malfunctioning.  It was felt this was the cause of the edema.  A hemodialysis catheter was placed by interventional radiology.   He had a new peritoneal dialysis catheter placed as well.  Ultrasound of the lower extremity showed a right DVT.  He was started on Eliquis.  Plavix was held with plans to restart after 6 months of treatment with Eliquis.  He was transferred to [facility] on March 31, 2024.  He currently resides at [facility].     A follow-up venous duplex Doppler study on April 2, 2024 was positive for subacute cath DVT in the right lower extremity.  No significant interval change compared with March 27, 2024.  No DVT was noted in the left lower extremity.  Lab studies dated April 9, 2024 showed a hemoglobin 9.3 and hematocrit 30.7.  Basic metabolic profile showed a sodium 132, potassium 4.9, BUN 31, and creatinine  3.84.  Glucose of 158.  TSH was 5.520.     He reports that he generally is not feeling well.  He gets winded with minimal amounts of activity.  He is using oxygen on an as-needed basis.  He has generalized fatigue and daytime somnolence.  No documented history of sleep apnea although he states he does not believe that is his issue.  He denies any chest pain.  He denies any orthopnea, PND, or increasing peripheral edema.  He denies any palpitations, lightheadedness, near-syncope, or syncope.  He denies any fever, chills, or cough.  He denies any nausea, vomiting, or diaphoresis.  He denies any hemoptysis, hematemesis, melena, or hematochezia.  He does not have any definite angina pectoris.  In light of persistent shortness of breath he will be scheduled for a follow-up echocardiogram.  Further recommendations pending these results.  He currently appears to be nearly euvolemic.  Underlying anemia and generalized weakness are likely contributing to his dyspnea.  Other details as noted below.     The above portion of this note was dictated by me using voice recognition software.  I personally performed the services described in the documentation.  The scribe entering the documentation below was in my presence.  I affirm that the information is both accurate and complete.      Objective:     Vitals:    06/12/24 1447   BP: 107/64   Pulse: 88       Wt Readings from Last 4 Encounters:   03/26/24 82.6 kg (182 lb)   03/22/24 83 kg (183 lb)   03/21/24 96.1 kg (211 lb 13.8 oz)   03/18/24 92.8 kg (204 lb 9.4 oz)       Allergies:     Allergies   Allergen Reactions    Gabapentin Other     vomiting    Other reaction(s): Other (See Comments), Other: See Comments   vomiting   vomiting    Meperidine Nausea And Vomiting    Opioids - Morphine Analogues Unknown, Nausea And Vomiting and Nausea/vomiting     Patient states he is able to take Morphine.    Other reaction(s): nausea/vomiting    Opioids-Meperidine And Related Nausea/vomiting     "Opium Tincture Unknown    Oxycodone GI Upset     nausea and vomiting    Tizanidine Unknown        Medications:     Current Outpatient Medications   Medication Instructions    amLODIPine (NORVASC) 10 mg, oral, Daily    apixaban (ELIQUIS) 2.5 mg, oral, Every 12 hours    aspirin 81 mg, oral, Daily    cholecalciferol (VITAMIN D-3) 25 mcg, oral, Daily    DULoxetine (CYMBALTA) 30 mg, oral, Daily, Do not crush or chew.    flash glucose sensor (FREESTYLE ANDRÉS 2 SENSOR [facility]  Change every 2 weeks    fluticasone (Flonase) 50 mcg/actuation nasal spray Daily PRN    gentamicin (Garamycin) 0.1 % cream 1 Application, Topical, Daily    glucosamine HCl 1,500 mg tablet 1 tablet, oral, 2 times daily    hydrALAZINE (Apresoline) 25 mg tablet TAKE 25 MG IN THE MORNING. TAKE 25 MG IN THE AFTERNOON. TAKE 50 MG IN THE EVENING.    insulin lispro (HumaLOG) 100 unit/mL injection Inject subcutaneously before meals TID, as per sliding scale: 1 unit if sugar is 151-200, 2 units if 201-250, 3 units if 251-300, 4 units if 301-350, 5 units> 350. Total 15 units per day.    LANCETS MISC 1 each, Daily    Levemir U-100 Insulin 25 Units, subcutaneous, Daily, Take as directed per insulin instructions.    levothyroxine (TIROSINT) 50 mcg, oral, Daily before breakfast    loratadine-pseudoephedrine (Claritin-D 24-hour)  mg 24 hr tablet 1 tablet, oral, Daily PRN, Do not crush, chew, or split.    metoprolol succinate XL (TOPROL-XL) 50 mg, oral, Daily, Do not crush or chew.    min17/nettle/pumpkin/saw palme (PROSTATE THERAPY ORAL) oral    multivitamin with minerals (Daily Multivitamin-Minerals) tablet 1 tablet, oral, Daily    OneTouch Ultra Test strip  TEST THREE TIMES DAILY.    pen needle, diabetic 31 gauge x 1/4\" needle 1 each, 2 times daily    RenaPlex-D 800 mcg-12.5 mg -2,000 unit tablet 1 tablet, oral, Daily    sennosides-docusate sodium (Diana-Colace) 8.6-50 mg tablet 2 tablets, oral, 2 times daily    sevelamer carbonate (RENVELA) 0.8 g, oral, 3 " times daily (morning, midday, late afternoon)    sodium bicarbonate 650 mg, oral, 2 times daily    torsemide (DEMADEX) 50 mg, oral, Daily       Physical Examination:   GENERAL:  Well developed, chronically ill-appearing, in no acute distress.  CHEST:  Symmetric and nontender.  NEURO/PSYCH:  Alert and oriented times three with approppriate behavior and responses.  NECK:  Supple, no JVD, no bruit.  Right SCV hemodialysis catheter.   LUNGS:  Clear to auscultation bilaterally, normal respiratory effort.  HEART:  Rate and rhythm regular with II/VI JAMEY LSB, no gallop appreciated.        There are no rubs, clicks or heaves.  EXTREMITIES:  Warm with good color, no clubbing or cyanosis.  There is no edema noted.  PERIPHERAL VASCULAR:  Pulses present and equally palpable; 2+ throughout.      Lab:     CBC:   Lab Results   Component Value Date    WBC 6.0 03/20/2024    RBC 2.37 (L) 03/20/2024    HGB 7.4 (L) 03/20/2024    HCT 23.7 (L) 03/20/2024     03/20/2024        CMP:    Lab Results   Component Value Date     (L) 03/20/2024    K 3.1 (L) 03/20/2024    CL 94 (L) 03/20/2024    CO2 24 03/20/2024    BUN 79 (H) 03/20/2024    CREATININE 5.98 (H) 03/20/2024    GLUCOSE 216 (H) 03/20/2024    CALCIUM 7.5 (L) 03/20/2024       Magnesium:    Lab Results   Component Value Date    MG 1.86 03/18/2024       Lipid Profile:    Lab Results   Component Value Date    TRIG 94 10/02/2023    HDL 35.8 10/02/2023    LDLCALC 23 (L) 10/02/2023       TSH:    Lab Results   Component Value Date    TSH 5.76 (H) 10/09/2023       BNP:   Lab Results   Component Value Date     (H) 09/19/2023        PT/INR:    Lab Results   Component Value Date    PROTIME 11.5 03/15/2024    INR 1.0 03/15/2024       HgBA1c:    Lab Results   Component Value Date    HGBA1C 6.1 (H) 03/30/2024       BMP:  Lab Results   Component Value Date     (L) 03/20/2024     (L) 03/19/2024     (L) 03/18/2024    K 3.1 (L) 03/20/2024    K 3.5 03/19/2024    K 3.8  03/18/2024    CL 94 (L) 03/20/2024    CL 95 (L) 03/19/2024    CL 97 (L) 03/18/2024    CO2 24 03/20/2024    CO2 22 03/19/2024    CO2 26 03/18/2024    BUN 79 (H) 03/20/2024    BUN 80 (H) 03/19/2024    BUN 72 (H) 03/18/2024    CREATININE 5.98 (H) 03/20/2024    CREATININE 6.05 (H) 03/19/2024    CREATININE 5.44 (H) 03/18/2024       CBC:  Lab Results   Component Value Date    WBC 6.0 03/20/2024    WBC 7.1 03/19/2024    WBC 7.8 03/18/2024    RBC 2.37 (L) 03/20/2024    RBC 2.10 (L) 03/19/2024    RBC 2.23 (L) 03/18/2024    HGB 7.4 (L) 03/20/2024    HGB 6.7 (L) 03/19/2024    HGB 7.2 (L) 03/18/2024    HCT 23.7 (L) 03/20/2024    HCT 22.6 (L) 03/19/2024    HCT 23.9 (L) 03/18/2024     03/20/2024     (H) 03/19/2024     (H) 03/18/2024    MCH 31.2 03/20/2024    MCH 31.9 03/19/2024    MCH 32.3 03/18/2024    MCHC 31.2 (L) 03/20/2024    MCHC 29.6 (L) 03/19/2024    MCHC 30.1 (L) 03/18/2024    RDW 18.2 (H) 03/20/2024    RDW 15.5 (H) 03/19/2024    RDW 15.3 (H) 03/18/2024     03/20/2024     03/19/2024     03/18/2024    MPV 9.5 10/17/2023    MPV 9.6 10/16/2023    MPV 9.9 10/15/2023       Cardiac Enzymes:    Lab Results   Component Value Date    TROPHS 24 (H) 02/25/2024    TROPHS 24 (H) 02/25/2024    TROPHS 24 10/02/2023       Hepatic Function Panel:    Lab Results   Component Value Date    ALKPHOS 52 03/15/2024    ALT 18 03/15/2024    AST 21 03/15/2024    PROT 6.0 (L) 03/15/2024    BILITOT 0.3 03/15/2024    BILIDIR 0.1 10/02/2023       Problem List:     Patient Active Problem List   Diagnosis    Atherosclerosis of native coronary artery of native heart without angina pectoris    ESRD (end stage renal disease) (Multi)    Type 2 diabetes mellitus with circulatory disorder (Multi)    Combined forms of age-related cataract of both eyes    MAU (acute kidney injury) (CMS-HCC)    Allergy, unspecified, initial encounter    Anaphylactic shock, unspecified, initial encounter    Benign prostatic hyperplasia     Breast pain    Carpal tunnel syndrome    Cervical myelopathy (Multi)    Chronic fatigue    Class 1 obesity    Coagulation defect, unspecified (Multi)    Cortical senile cataract    Diabetes mellitus type 2 without retinopathy (Multi)    Edema    ESRD on peritoneal dialysis (Multi)    Primary hypertension    High thyroid stimulating hormone (TSH) level    Hyperkalemia    Mixed hyperlipidemia    Hyperphosphatemia    Hypertension secondary to other renal disorders    Hypertrophy of nail    Mechanical complication of dialysis catheter (CMS-[facility])    Metabolic acidosis    Microscopic hematuria    Myopia of right eye    Nuclear sclerosis    Chronic anemia    Anemia due to stage 5 chronic kidney disease, not on chronic dialysis (Multi)    Type 2 diabetes mellitus with hyperglycemia (Multi)    Type 2 diabetes mellitus with diabetic neuropathy, unspecified (Multi)    Subclinical hypothyroidism    Stenosis of intervertebral foramina    Secondary hyperparathyroidism of renal origin (Multi)    Stage 4 chronic kidney disease (Multi)    Right arm weakness    Restrictive lung disease    ILD (interstitial lung disease) (Multi)    Restrictive cardiomyopathy (Multi)    Regular astigmatism of left eye    Proteinuria    Presbyopia    Peripheral vascular disease (CMS-[facility])    Peripheral neuropathy due to metabolic disorder (Multi)    Other disorders resulting from impaired renal tubular function    Palpitations    PAF (paroxysmal atrial fibrillation) (Multi)    Pericardial effusion ([facility])    Other chest pain    Closed displaced intertrochanteric fracture of left femur (Multi)    Closed displaced intertrochanteric fracture of left femur, initial encounter (Multi)    S/p left hip fracture    Accidental fall       Asessment:     Problem List Items Addressed This Visit             ICD-10-CM    Atherosclerosis of native coronary artery of native heart without angina pectoris - Primary I25.10    ESRD (end stage renal disease) (Multi) N18.6     Primary hypertension I10    Mixed hyperlipidemia E78.2    Chronic anemia D64.9    Type 2 diabetes mellitus with hyperglycemia (Multi) E11.65    PAF (paroxysmal atrial fibrillation) (Multi) I48.0    Pericardial effusion (HHS-HCC) I31.39    Relevant Orders    Transthoracic Echo (TTE) Complete    Follow Up In Cardiology    S/p left hip fracture Z87.81     Other Visit Diagnoses         Codes    Shortness of breath     R06.02    Relevant Orders    Transthoracic Echo (TTE) Complete    Follow Up In Cardiology

## 2024-06-18 ENCOUNTER — APPOINTMENT (OUTPATIENT)
Dept: ORTHOPEDIC SURGERY | Facility: CLINIC | Age: 84
End: 2024-06-18
Payer: MEDICARE

## 2024-06-18 ENCOUNTER — HOSPITAL ENCOUNTER (OUTPATIENT)
Dept: RADIOLOGY | Facility: CLINIC | Age: 84
Discharge: HOME | End: 2024-06-18
Payer: MEDICARE

## 2024-06-18 VITALS — WEIGHT: 159 LBS | HEIGHT: 69 IN | BODY MASS INDEX: 23.55 KG/M2

## 2024-06-18 DIAGNOSIS — S72.142D CLOSED DISPLACED INTERTROCHANTERIC FRACTURE OF LEFT FEMUR WITH ROUTINE HEALING, SUBSEQUENT ENCOUNTER: ICD-10-CM

## 2024-06-18 PROCEDURE — 1036F TOBACCO NON-USER: CPT | Performed by: STUDENT IN AN ORGANIZED HEALTH CARE EDUCATION/TRAINING PROGRAM

## 2024-06-18 PROCEDURE — 1157F ADVNC CARE PLAN IN RCRD: CPT | Performed by: STUDENT IN AN ORGANIZED HEALTH CARE EDUCATION/TRAINING PROGRAM

## 2024-06-18 PROCEDURE — 73502 X-RAY EXAM HIP UNI 2-3 VIEWS: CPT | Mod: LT

## 2024-06-18 PROCEDURE — 73502 X-RAY EXAM HIP UNI 2-3 VIEWS: CPT | Mod: LEFT SIDE | Performed by: RADIOLOGY

## 2024-06-18 PROCEDURE — 99213 OFFICE O/P EST LOW 20 MIN: CPT | Performed by: STUDENT IN AN ORGANIZED HEALTH CARE EDUCATION/TRAINING PROGRAM

## 2024-06-18 PROCEDURE — 1159F MED LIST DOCD IN RCRD: CPT | Performed by: STUDENT IN AN ORGANIZED HEALTH CARE EDUCATION/TRAINING PROGRAM

## 2024-06-18 ASSESSMENT — PATIENT HEALTH QUESTIONNAIRE - PHQ9
2. FEELING DOWN, DEPRESSED OR HOPELESS: NOT AT ALL
SUM OF ALL RESPONSES TO PHQ9 QUESTIONS 1 AND 2: 0
1. LITTLE INTEREST OR PLEASURE IN DOING THINGS: NOT AT ALL

## 2024-06-18 NOTE — PROGRESS NOTES
S/p left hip ORIF with intertrochanteric nail on March 17..  Completed eliquis.     Remains in skilled nursing.  Has had a few setbacks from the dialysis perspective.  Is now on hemodialysis.  Was progressing with therapy but this has been cut back to twice a week and he is no longer making considerable improvement.  Remains in a wheelchair.      Physical Examination:  The patient appears to be their stated age, is in no apparent distress, and is oriented x3. The patients mood and affect are appropriate. The patients gait is normal. The examination of the limb in question was performed in comparison to the contralateral limb.    Incision well-healed  DF, PF intact  Able to flex and extend knee  SILT S, S, SP, DP, T  Foot wwp    Radiographs  Demonstrate a left hip intertrochanteric hip fracture with cephalomedullary fixation.  Alignment maintained.  No hardware migration.    Assessment:  3 months post op    Plan:   Weight bearing status: Weight-bear as tolerated  Continue physical therapy.  Recommend 5 times a week.  He was making considerable improvement but as this was backed off to twice a week has developed considerable weakness to his legs.  Follow-up in 2 months with x-ray    Donna Thomas MD

## 2024-07-23 ENCOUNTER — HOSPITAL ENCOUNTER (OUTPATIENT)
Dept: CARDIOLOGY | Facility: CLINIC | Age: 84
Discharge: HOME | End: 2024-07-23
Payer: MEDICARE

## 2024-07-23 DIAGNOSIS — I31.39 PERICARDIAL EFFUSION (HHS-HCC): ICD-10-CM

## 2024-07-23 DIAGNOSIS — R06.02 SHORTNESS OF BREATH: ICD-10-CM

## 2024-08-05 NOTE — TELEPHONE ENCOUNTER
[facility]    ~Cardiology Progress Note~    Primary Cardiologist: Dr. Day     Date of Admission: 7/25/2024  Service Date: 08/05/2024    Summary:  Mr. Mike Schultz is a very pleasant 73 year old male with a past medical history of persistent atrial fibrillation, HFpEF, moderate aortic stenosis, hypertension, BOWEN, alcohol use disorder, Bell's palsy, lung nodules who was admitted from the EP clinic after no response after infusion clinic on 7/24/2024.     Interval August 5, 2024:  No acute events overnight. Dyspnea on exertion has improved, able to walk in the hallways without becoming short of breath. Denies chest pain, lightheadedness, and palpitations. Down 12 lbs since admission, weight 298 lbs today. Electrolytes stable. Creatinine down trending, 1.37 today.     Telemetry reviewed- sinus rhythm          Assessment:     Acute on chronic diastolic heart failure, likely triggered by atrial fibrillation   -LVEF: 55% noted on echo 5/2024  -Fluid Status: hypervolemic, discharge weight from previous admission in 5/2024 was 278#  -Diuretic Regimen: PTA torsemide 80 mg daily   -Ischemic Eval: lexiscan stress test 5/2024 noted below   -GDMT PTA:  Beta blocker: metoprolol tartrate 12.5 mg BID   ACEI/ARB/ARNI: losartan 50 mg daily   Aldactone antagonist: spironolactone 25 mg daily   SGLT2 inhibitor: none      Persistent atrial fibrillation with multiple attempts at rhythm control, cardioversions, and ablation attempts  [id number] Score 4 (age, CHF, HTN, aortic plaque)  Anticoagulated with Xarelto   S/p DCCV on 8/1/2024 with restoration of SR, on sotalol     Moderate aortic stenosis      History of abnormal nuclear stress test during admission 5/2024  Lexiscan stress test 5/2024- abnormal, small area of ischemia in the apical segments of the left ventricle, small area of nontransmural infarction in the basal inferolateral segments of the left ventricle associated with a mild degree of  Received called from patients wife Yoly stating patient had an scheduled appointment on 5/22/24 with Dr. Harmon. Patient has not had required labs completed yet due to still being at [facility] at this time.     Patients wife stated he should be leaving nursing home the first week of June. Patient wife is wondering if she should rescheduled her husbands appointment for later to have labs completed when he is out of nursing home or can labs be faxed over and completed at the nursing [place] before his office visit 5/22/24?    Routed to Dr. Harmon for review.     Location:  [facility]  Phone # [phone]  Fax: [phone]   iva-infarct ischemia involving basal to mid inferior wall   This was thought to possibly be a false positive finding due to body habitus, but recommendation was a left and right heart cath when patients pulmonary status returned to baseline     HTN   BOWEN, compliant with CPAP   Myocardial scar noted on cardiac MRI   History of alcohol dependence            Plan:     Continue the following cardiology medication regimen   IV Bumex 2 mg BID  Losartan 50 mg daily   Xarelto 20 mg daily   Spironolactone 25 mg daily   Daily standing weight, strict I/O, daily BMP and repletion of electrolytes   Coordinating outpatient cardiology follow up   Recommend right and left heart cath in the outpatient setting   Cardiology to follow       Plan of care was formulated under the direction and guidance of Dr. Pérez.         Genet Desouza PA-C  Physician Assistant   [facility]  Pager: [phone]      30 minutes spent in coordination of care, and discussion with the patient and/or family regarding diagnostic results, prognosis, symptom management, risks and benefits of management options, and development of the plan of care of above.        Patient Active Problem List   Diagnosis    Essential hypertension    Impotence of organic origin    Allergic rhinitis    Herpes simplex virus (HSV) infection    Class 3 obesity (H)    Tobacco use disorder, moderate, in sustained remission    Atrial fibrillation, permanent (H)    Family history of ischemic heart disease    Gout    BOWEN (obstructive sleep apnea)    Ascending aorta dilatation (H24)    Impaired fasting glucose    Aortic valve stenosis    Venous insufficiency    Bilateral carpal tunnel syndrome    Other cardiomyopathies (H)    Leg wound, right    Lower extremity edema    Acute respiratory failure with hypoxia (H)    Acute on chronic congestive heart failure, unspecified heart failure type (H)    Acute decompensated heart failure (H)       Physical Exam   Temp: 98.9  F  (37.2  C) Temp src: Oral BP: 129/83 Pulse: 56   Resp: 20 SpO2: 93 % O2 Device: None (Room air)    Vitals:    08/03/24 0620 08/04/24 0500 08/05/24 0615   Weight: 137.7 kg (303 lb 9.6 oz) 136.8 kg (301 lb 9.6 oz) 135.4 kg (298 lb 6.4 oz)     I/O last 3 completed shifts:  In: 710 [P.O.:710]  Out: 2650 [Urine:2650]    Constitutional: Appears stated age, well nourished, NAD.  Neck: Supple.  JVD not visualized.   Respiratory: Non-labored. Lungs CTAB.  Cardiovascular: RRR, with holosystolic murmur. Bilateral lower extremities with lymphedema.   GI: Soft, non-distended, non-tender.  Skin: Warm and dry.   Musculoskeletal/Extremities: Symmetrical movement. Lymphedema wear to BLE.  Neurologic: No gross focal deficits. Alert, awake.  Psychiatric: Affect appropriate. Mentation normal.    Medications   Current Facility-Administered Medications   Medication Dose Route Frequency Provider Last Rate Last Admin    - MEDICATION INSTRUCTIONS -   Does not apply DOES NOT GO TO Courtney Blackmon APRN CNP        Continuing ACE inhibitor/ARB/ARNI from home medication list OR ACE inhibitor/ARB/ARNI order already placed during this visit   Does not apply DOES NOT GO TO Courtney Blackmon APRN CNP        Continuing beta blocker from home medication list OR beta blocker order already placed during this visit   Does not apply DOES NOT GO TO Courtney Blackmon APRN CNP        Give 1/2 of usual dose of LONG ACTING insulin AM of procedure IF diabetic   Does not apply Continuous PRN Shanthi Wilkins APRN CNP        May take oral meds with sip of water, the morning of Cardioversion procedure.   Does not apply Continuous PRN Shanthi Wilkins APRN CNP        Patient is already receiving anticoagulation with heparin, enoxaparin (LOVENOX), warfarin (COUMADIN)  or other anticoagulant medication   Does not apply Continuous PRN Courtney Roy APRN CNP        [Held by provider] sodium chloride 0.9 % infusion   Intravenous Continuous Ajay Almazan,  MD   Stopped at 08/01/24 1022     Current Facility-Administered Medications   Medication Dose Route Frequency Provider Last Rate Last Admin    allopurinol (ZYLOPRIM) tablet 300 mg  300 mg Oral Daily Courtney Roy APRN CNP   300 mg at 08/05/24 0932    bumetanide (BUMEX) injection 2 mg  2 mg Intravenous BID Narendra Thomas MD   2 mg at 08/05/24 0931    cetirizine (zyrTEC) tablet 10 mg  10 mg Oral QPM Courtney Roy APRN CNP   10 mg at 08/04/24 2159    finasteride (PROPECIA) tablet 1 mg  1 mg Oral Daily Courtney Roy APRN CNP   1 mg at 08/05/24 0932    losartan (COZAAR) tablet 50 mg  50 mg Oral Daily Trevin Zuleta MD   50 mg at 08/05/24 0932    miconazole (MICATIN) 2 % powder   Topical BID Masha Moy MD   Given at 08/05/24 0943    polyethylene glycol (MIRALAX) Packet 17 g  17 g Oral Daily Courtney Roy APRN CNP   17 g at 08/05/24 0932    [Held by provider] potassium chloride pushpa ER (KLOR-CON M20) CR tablet 40 mEq  40 mEq Oral BID Yasir Pérez MD        predniSONE (DELTASONE) tablet 10 mg  10 mg Oral Daily Courtney Roy APRN CNP   10 mg at 08/05/24 0932    Followed by    [START ON 8/9/2024] predniSONE (DELTASONE) tablet 5 mg  5 mg Oral Daily Courtney Roy APRN CNP        psyllium (METAMUCIL/KONSYL) capsule 1-2 capsule  1-2 capsule Oral Daily Courtney Roy APRN CNP   2 capsule at 08/05/24 1128    rivaroxaban ANTICOAGULANT (XARELTO) tablet 20 mg  20 mg Oral Daily with supper Courtney Roy APRN CNP   20 mg at 08/04/24 1809    sodium chloride (PF) 0.9% PF flush 3 mL  3 mL Intracatheter Q8H Courtney Roy APRN CNP   3 mL at 08/05/24 0942    sotalol (BETAPACE) tablet 80 mg  80 mg Oral Q12H [facility] (08/20) Natalie Campos PA-C   80 mg at 08/05/24 1055    spironolactone (ALDACTONE) tablet 25 mg  25 mg Oral Daily Narendra Thomas MD   25 mg at 08/05/24 0932    [Held by provider] torsemide (DEMADEX) tablet 60 mg  60 mg Oral BID Narendra Thomas MD   60 mg at  24 1005       Data   Last 24 hours labs personally reviewed.  Echo:   Recent Results (from the past 4320 hour(s))   Echocardiogram Complete   Result Value    LVEF  55-60%    [facility]    [id number]  [id number]  [id number]^REINA^LAURA^TRAVIS     [facility]  Echocardiography Laboratory  [address] [place] [address]     Name: TOO HOWARD  MRN: [id number]  : 1950  Study Date: 2024 10:15 AM  Age: 73 yrs  Gender: Male  Patient Location: [facility]  Reason For Study: Aortic Valve Disorder  Ordering Physician: LAURA HUANG  Referring Physician: Ranjan Courtney,  Performed By: Norah Suarez     BSA: 2.4 m2  Height: 68 in  Weight: 298 lb  HR: 66  BP: 122/76 mmHg  ______________________________________________________________________________  Procedure  Complete Portable Echo Adult. Optison (NDC #[id number]) given intravenously.  ______________________________________________________________________________  Interpretation Summary     Left ventricular systolic function is normal.  The visual ejection fraction is 55-60%.  There is moderate concentric left ventricular hypertrophy.  The right ventricle is normal in structure, function and size.  Regional wall motion abnormalities cannot be excluded due to limited  visualization despite use of contrast.  Calcified aortic valve. Vmax 3.5 m/sec, mean gradient 31 mmHg, MARILYN 0.9 cm2 by  continuity equation. DVI 0.22. Findings consistent with moderate-severe aortic  stenosis. Doppler interrogation is at a borderline reduced stroke volume index  (38 ml/m2).  Dilation of the inferior vena cava is present with abnormal respiratory  variation in diameter.  There is no pericardial effusion.  Ascending aorta dilatation is present (4.0 cm).     Compared to study dated 2024, there is slight progression of aortic  stenosis however interstudy variabilty in Doppler interrogation is possible  given poor acoustic windows.  Otherwise findings are similar.  ______________________________________________________________________________  Left Ventricle  The left ventricle is normal in size. There is moderate concentric left  ventricular hypertrophy. Left ventricular systolic function is normal. The  visual ejection fraction is 55-60%. Left ventricular diastolic function is  indeterminate. Regional wall motion abnormalities cannot be excluded due to  limited visualization.     Right Ventricle  The right ventricle is normal in structure, function and size.     Atria  The left atrium is mildly dilated. Right atrial size is normal.     Mitral Valve  There is mild to moderate mitral annular calcification. There is mild (1+)  mitral regurgitation.     Tricuspid Valve  The tricuspid valve is not well visualized. Right ventricular systolic  pressure could not be approximated due to inadequate tricuspid regurgitation.     Aortic Valve  The aortic valve is not well visualized. Moderate valvular aortic stenosis.  Vmax 3.5 m/sec, mean gradient 31 mmHg, MARILYN 0.9 cm2 by continuity equation. DVI  0.22. Findings consistent with moderate-severe aortic stenosis. Doppler  interrogation is at a borderline reduced stroke volume index (38 ml/m2).     Pulmonic Valve  The pulmonic valve is normal in structure and function. There is trace  pulmonic valvular regurgitation.     Vessels  The aortic root is normal size. Ascending aorta dilatation is present.  Dilation of the inferior vena cava is present with abnormal respiratory  variation in diameter.     Pericardium  There is no pericardial effusion.     ______________________________________________________________________________  MMode/2D Measurements & Calculations  IVSd: 1.3 cm  LVIDd: 6.2 cm  LVIDs: 4.6 cm  LVPWd: 1.5 cm  FS: 25.3 %  LV mass(C)d: 398.2 grams  LV mass(C)dI: 164.5 grams/m2     Ao root diam: 3.6 cm  LA dimension: 4.7 cm  asc Aorta Diam: 4.0 cm  LA/Ao: 1.3  LVOT diam: 2.5 cm  LVOT area: 4.8  cm2  Ao root diam index Ht(cm/m): 2.1  Ao root diam index BSA (cm/m2): 1.5  Asc Ao diam index BSA (cm/m2): 1.6  Asc Ao diam index Ht(cm/m): 2.3  LA Volume (BP): 93.3 ml  LA Volume Index (BP): 38.6 ml/m2  RWT: 0.47     TAPSE: 3.3 cm     Doppler Measurements & Calculations  MV E max seamus: 86.5 cm/sec  MV A max seamus: 48.8 cm/sec  MV E/A: 1.8  MV dec slope: 455.2 cm/sec2  MV dec time: 0.19 sec  Ao V2 max: 354.4 cm/sec  Ao max P.0 mmHg  Ao V2 mean: 267.2 cm/sec  Ao mean P.5 mmHg  Ao V2 VTI: 97.8 cm  MARILYN(I,D): 0.95 cm2  MARILYN(V,D): 1.1 cm2  LV V1 max P.5 mmHg  LV V1 max: 79.3 cm/sec  LV V1 VTI: 19.5 cm  SV(LVOT): 93.4 ml  SI(LVOT): 38.6 ml/m2  PA acc time: 0.15 sec  AV Seamus Ratio (DI): 0.22  MARILYN Index (cm2/m2): 0.39  E/E' av.9  Lateral E/e': 8.8  Medial E/e': 18.9  RV S Seamus: 13.9 cm/sec     ______________________________________________________________________________  Report approved by: Laurie Pereira 2024 12:54 PM         Echocardiogram Complete   Result Value    LVEF  55%    Narrative    [id number]  [id number]  [id number]  [id number]^GAMALIEL^JOAN^P     [facility]  Echocardiography Laboratory  [address] [place] [id number]     Name: TOO HOWARD BRAXTON  MRN: [id number]  : 1950  Study Date: 2024 11:48 AM  Age: 73 yrs  Gender: Male  Patient Location: [facility]  Reason For Study: Heart Failure  Ordering Physician: JOAN ALSTON  Referring Physician: Ranjan Courtney  Performed By: Norah Suarez     BSA: 2.4 m2  Height: 68 in  Weight: 291 lb  HR: 75  BP: 110/80 mmHg  ______________________________________________________________________________  Procedure  Complete Portable Echo Adult.  ______________________________________________________________________________  Interpretation Summary     Technically challenging study due to patient body habitus.     The left ventricle is borderline dilated. There is mild concentric left  ventricular hypertrophy.  Left  ventricular systolic function is low normal. The visual ejection fraction  is estimated at 55%. Regional wall motion challenging to assess due to  frequent ectopy/irregular R-R intervals however no obvious regional wall  motion abnormalities seen.  The right ventricle is borderline dilated. The right ventricular systolic  function is normal.  Moderate aortic stenosis, Vmax 3.7 m/s, mean gradient 35 mmHg, MARILYN 1.1cm2, DI  0.26. SVi 44 cc/m2. There is mild (1+) aortic regurgitation. Aortic valve  morphology is not well visualized, however it is heavily calcified.  There is no pericardial effusion.  The inferior vena cava was normal in size with preserved respiratory  variability.     This study was compared to a TTE from 2/11/2024. Global LV systolic function  is marginally less dynamic on this present study.  ______________________________________________________________________________  Left Ventricle  The left ventricle is borderline dilated. There is mild concentric left  ventricular hypertrophy. Left ventricular systolic function is low normal. The  visual ejection fraction is estimated at 55%. Grade III or advanced diastolic  dysfunction. Regional wall motion challenging to assess due to frequent  ectopy/irregular R-R intervals however no obvious regional wall motion  abnormalities seen.     Right Ventricle  The right ventricle is borderline dilated. The right ventricular systolic  function is normal.     Atria  The left atrium is moderately dilated. Right atrial size is normal.     Mitral Valve  There is mild mitral annular calcification. There is mild (1+) mitral  regurgitation.     Tricuspid Valve  The tricuspid valve is not well visualized, but is grossly normal.     Aortic Valve  Aortic valve morphology is not well visualized, however it is heavily  calcified. There is mild (1+) aortic regurgitation. Moderate aortic stenosis,  Vmax 3.7 m/s, mean gradient 35 mmHg, MARILYN 1.1cm2, DI 0.26. SVi 44 cc/m2.      Pulmonic Valve  The pulmonic valve is not well seen, but is grossly normal.     Vessels  The aortic root is normal size. Ascending aorta dilatation is present. The  inferior vena cava was normal in size with preserved respiratory variability.     Pericardium  There is no pericardial effusion.     Rhythm  The rhythm was undetermined.  ______________________________________________________________________________  MMode/2D Measurements & Calculations  IVSd: 1.1 cm     LVIDd: 5.9 cm  LVIDs: 3.9 cm  LVPWd: 1.2 cm  FS: 34.8 %  LV mass(C)d: 300.5 grams  LV mass(C)dI: 125.3 grams/m2  Ao root diam: 3.5 cm  LA dimension: 4.8 cm  asc Aorta Diam: 4.0 cm  LA/Ao: 1.3  LVOT diam: 2.4 cm  LVOT area: 4.5 cm2  Ao root diam index Ht(cm/m): 2.1  Ao root diam index BSA (cm/m2): 1.5  Asc Ao diam index BSA (cm/m2): 1.7  Asc Ao diam index Ht(cm/m): 2.3  LA Volume (BP): 111.0 ml     LA Volume Index (BP): 46.3 ml/m2  RWT: 0.42  TAPSE: 1.7 cm     Doppler Measurements & Calculations  MV E max seamus: 115.0 cm/sec  MV A max seamus: 50.9 cm/sec  MV E/A: 2.3  MV dec slope: 682.9 cm/sec2  MV dec time: 0.17 sec  Ao V2 max: 365.0 cm/sec  Ao max P.3 mmHg  Ao V2 mean: 284.5 cm/sec  Ao mean P.5 mmHg  Ao V2 VTI: 93.2 cm  MARILYN(I,D): 1.1 cm2  MARILYN(V,D): 1.2 cm2  AI P1/2t: 415.1 msec  LV V1 max PG: 3.6 mmHg  LV V1 max: 95.2 cm/sec  LV V1 VTI: 23.2 cm  SV(LVOT): 105.1 ml  SI(LVOT): 43.8 ml/m2  PA acc time: 0.08 sec  AV Seamus Ratio (DI): 0.26  MARILYN Index (cm2/m2): 0.47  E/E' av.4     Lateral E/e': 21.2  Medial E/e': 19.5  RV S Seamus: 10.1 cm/sec     ______________________________________________________________________________  Report approved by: Laurie Baires 2024 03:36 PM         Echocardiogram Complete   Result Value    LVEF  55-60%    Narrative    [id number]  [id number]  [id number]  [id number]^DENNISE^EDITA^MIR     [facility]  Echocardiography Laboratory  [address] [place] [id number]     Name: TOO HOWARD  MRN:  [id number]  : 1950  Study Date: 2024 09:42 AM  Age: 73 yrs  Gender: Male  Patient Location: [facility]  Reason For Study: Heart Failure  Ordering Physician: EDITA BOWDEN  Performed By: Millicent Rosenberg     BSA: 2.4 m2  Height: 68 in  Weight: 295 lb  HR: 92  BP: 130/75 mmHg  ______________________________________________________________________________  Procedure  Complete Portable Echo Adult. Optison (NDC #[id number]) given intravenously.  ______________________________________________________________________________  Interpretation Summary     The visual ejection fraction is 55-60%.  There is moderate concentric left ventricular hypertrophy.  There is mild (1+) mitral regurgitation.  Moderate valvular aortic stenosis.  Ascending aorta dilatation is present.  The rhythm was atrial fibrillation.  The patient exhibited frequent PVCs.  There is moderate biatrial enlargement.  The study was technically difficult. Contrast was used without apparent  complications. Compared to prior study, changes are noted.  ______________________________________________________________________________  Left Ventricle  The left ventricle is normal in size. There is moderate concentric left  ventricular hypertrophy. The visual ejection fraction is 55-60%. Left  ventricular diastolic function is abnormal.     Right Ventricle  The right ventricle is normal in structure, function and size.     Atria  There is moderate biatrial enlargement.     Mitral Valve  There is mild mitral annular calcification. The mitral valve leaflets are  mildly thickened. There is mild (1+) mitral regurgitation.     Tricuspid Valve  The tricuspid valve is not well visualized, but is grossly normal.     Aortic Valve  Moderate valvular aortic stenosis.     Pulmonic Valve  The pulmonic valve is not well visualized.     Vessels  The aortic root is normal size. Ascending aorta dilatation is present.     Pericardium  There is no pericardial effusion.      Rhythm  The rhythm was atrial fibrillation. The patient exhibited frequent PVCs.  ______________________________________________________________________________  MMode/2D Measurements & Calculations     IVSd: 1.4 cm  LVIDd: 5.4 cm  LVIDs: 4.3 cm  LVPWd: 1.4 cm  FS: 20.9 %  LV mass(C)d: 327.0 grams  LV mass(C)dI: 135.6 grams/m2  Ao root diam: 3.7 cm  LA dimension: 5.3 cm  asc Aorta Diam: 4.0 cm  LA/Ao: 1.4  LVOT diam: 2.4 cm  LVOT area: 4.4 cm2  Ao root diam index Ht(cm/m): 2.1  Ao root diam index BSA (cm/m2): 1.5  Asc Ao diam index BSA (cm/m2): 1.7  Asc Ao diam index Ht(cm/m): 2.3  LA Volume (BP): 72.2 ml  RV Base: 4.1 cm  RWT: 0.52  TAPSE: 2.4 cm     Doppler Measurements & Calculations  MV E max seamus: 138.4 cm/sec  MV dec slope: 814.1 cm/sec2  MV dec time: 0.17 sec  Ao V2 max: 364.4 cm/sec  Ao max P.0 mmHg  Ao V2 mean: 278.0 cm/sec  Ao mean P.9 mmHg  Ao V2 VTI: 86.1 cm  MARILYN(I,D): 1.0 cm2  MARILYN(V,D): 1.1 cm2  LV V1 max PG: 3.2 mmHg  LV V1 max: 90.0 cm/sec  LV V1 VTI: 20.1 cm  SV(LVOT): 88.7 ml  SI(LVOT): 36.8 ml/m2  PA acc time: 0.09 sec  AV Seamus Ratio (DI): 0.25  MARILYN Index (cm2/m2): 0.43  E/E' av.4  Lateral E/e': 16.8  Medial E/e': 16.0  RV S Seamus: 15.6 cm/sec     ______________________________________________________________________________  Report approved by: Laurie Garcia 2024 01:05 PM

## 2024-08-16 ENCOUNTER — APPOINTMENT (OUTPATIENT)
Dept: ORTHOPEDIC SURGERY | Facility: CLINIC | Age: 84
End: 2024-08-16
Payer: MEDICARE

## 2024-08-19 ENCOUNTER — APPOINTMENT (OUTPATIENT)
Dept: CARDIOLOGY | Facility: HOSPITAL | Age: 84
End: 2024-08-19
Payer: MEDICARE

## 2024-08-20 ENCOUNTER — APPOINTMENT (OUTPATIENT)
Dept: CARDIOLOGY | Facility: HOSPITAL | Age: 84
End: 2024-08-20
Payer: MEDICARE

## 2024-08-27 ENCOUNTER — APPOINTMENT (OUTPATIENT)
Dept: ORTHOPEDIC SURGERY | Facility: CLINIC | Age: 84
End: 2024-08-27
Payer: MEDICARE

## 2024-10-23 ENCOUNTER — APPOINTMENT (OUTPATIENT)
Dept: CARDIOLOGY | Facility: CLINIC | Age: 84
End: 2024-10-23
Payer: MEDICARE

## (undated) DEVICE — DRAIN, CHANNEL W/TROCAR 19F

## (undated) DEVICE — MANIFOLD, 4 PORT NEPTUNE STANDARD

## (undated) DEVICE — SUTURE, VICRYL, 2-0, 36 IN, CT-1, UNDYED

## (undated) DEVICE — SURVIVAL KIT, EVEREST 30

## (undated) DEVICE — CONNECTOR, CARDIO

## (undated) DEVICE — CATHETER, GUIDING, LAUNCHER, 6FR EBU 3.5

## (undated) DEVICE — APPLICATOR, CHLORAPREP, W/ORANGE TINT, 26ML

## (undated) DEVICE — SUTURE, PROLENE 4-0, TAPER POINT, SH-1 BLUE 30 INCH

## (undated) DEVICE — DRAPE, ISOLATION, ANTIMICROBIAL, W/POUCH, IOBAN 2, STERI DRAPE, 125 X 83 IN, DISPOSABLE, STERILE

## (undated) DEVICE — CATHETER, GUIDELINER, 6FR V2

## (undated) DEVICE — SOLUTION, IRRIGATION, SODIUM CHLORIDE 0.9%, 1000 ML, POUR BOTTLE

## (undated) DEVICE — GLOVE, SURGICAL, PROTEXIS PI W/NEU-THERA, 8.0, PF, LF

## (undated) DEVICE — DEFIBRILLATOR PADS,  MEDI-TRACE, 1210H

## (undated) DEVICE — KIT, COLLECTION, CARDIO

## (undated) DEVICE — Device

## (undated) DEVICE — TAPE, RETRACT-O, 18GA X 12

## (undated) DEVICE — SUTURE, MONOCRYL, 3-0, 27 IN, PS-2, UNDYED

## (undated) DEVICE — SPONGE, HEMOSTAT, SURGICEL FIBRILLAR, ABS, 4 X 4, LF

## (undated) DEVICE — MICROCOAGULATION TEST, ACT+ TEST CUVETTE

## (undated) DEVICE — CATHETER, BALLOON DILATION, EUPHORA SEMICOMPLIANT 2.50  X 12 MM X 142CM

## (undated) DEVICE — DRAPE, FLUID WARMER

## (undated) DEVICE — CATHETER, DRAINAGE, NASOGASTRIC, DOUBLE LUMEN, FUNNEL END, SUMP, SALEM, 18 FR, 48 IN, PVC, STERILE

## (undated) DEVICE — SYRINGE, 20 CC, LUER LOCK, MONOJECT, W/O CAP, LF

## (undated) DEVICE — TOWEL PACK, STERILE, 4/PACK, BLUE

## (undated) DEVICE — TRAY, MINOR, SINGLE BASIN, STERILE

## (undated) DEVICE — SUTURE, ETHIBOND, 5, 30 IN, V40, MULTIPACK, GREEN

## (undated) DEVICE — DRESSING, ISLAND, ADHESIVE, TELFA, 4 X 8 IN

## (undated) DEVICE — SUTURE, NUROLON, 0, 18 IN, CT1, DETACHABLE, MULTIPACK, BLACK

## (undated) DEVICE — SUTURE, ETHIBOND, XTRA, 30 IN, 0, CT-1, GREEN

## (undated) DEVICE — SPONGE, LAP, XRAY DECT, 18IN X 18IN, W/MASTER DMT, STERILE

## (undated) DEVICE — SYRINGE, 60 CC, IRRIGATION, BULB, CONTRO-BULB, PAPER POUCH

## (undated) DEVICE — CATHETER, THORACIC, STRAIGHT, ADULT, 28 FR, PVC

## (undated) DEVICE — COVER, CART, 45 X 27 X 48 IN, CLEAR

## (undated) DEVICE — DRESSING, ISLAND, TELFA, 4 X 5 IN

## (undated) DEVICE — CATHETER, BALLOON DILATION, EUPHORA SEMICOMPLIANT 2.0  X 15 MM X 142CM

## (undated) DEVICE — ELECTRODE, ELECTROSURGICAL, BLADE, INSULATED, ENT/IMA, STERILE

## (undated) DEVICE — SUTURE, SILK, 2-0, 30 IN, SH, CONTROL RELEASE, MULTIPACK, BLACK

## (undated) DEVICE — DRESSING, MEPILEX, BORDER, HEEL, 8.7 X 9.1 IN

## (undated) DEVICE — DRILL BIT, 4.2MM 3-FLUTED QC 330MM 100MM CALB

## (undated) DEVICE — DRESSING, ADHESIVE, ISLAND, TELFA, 4 X 14 IN

## (undated) DEVICE — SUTURE, PROLENE, 6-0, 30 IN, C-1, CV-11, BLUE

## (undated) DEVICE — KIT, CELL SAVER, W/COLLECTION SET, 225ML WASH SET

## (undated) DEVICE — GLOVE, SURGICAL, PROTEXIS PI BLUE W/NEUTHERA, 7.5, PF, LF

## (undated) DEVICE — GEL, ULTRASOUND, AQUASONIC 100, 20 GM, STERILE

## (undated) DEVICE — DRESSING, ADHESIVE, ISLAND, TELFA, 2 X 3.75 IN, LF

## (undated) DEVICE — SUTURE, PROLENE, 7-0, 30 IN, BV1, DA, BLUE

## (undated) DEVICE — SPONGE, GAUZE, XRAY DECT, 16 PLY, 4 X 4, W/MASTER DMT,STERILE

## (undated) DEVICE — DRESSING, MEPILEX BORDER, POST-OP AG, 4 X 10 IN

## (undated) DEVICE — CLIP, SPRING, BULLDOG, FOGARTY, SOFT JAW, 6 MM, LF

## (undated) DEVICE — GUIDEWIRE, RUN THROUGH WIRE, 180CM

## (undated) DEVICE — PERFUSION SERVICES

## (undated) DEVICE — WASH SET, XTRA, 225ML

## (undated) DEVICE — FILTER, IV, BLOOD, MICROAGGREGATE, 40 MIC, RBC TRANSFUSION

## (undated) DEVICE — ATS SUCTION LINE

## (undated) DEVICE — SHEATH, GLIDESHEATH, SLENDER, 6FR 10CM

## (undated) DEVICE — DRAPE, SHEET, CARDIOVASCULAR, ANTIMICROBIAL, W/ANESTHESIA SCREEN, IOBAN 2, STERI DRAPE, 107 X 133 IN, DISPOSABLE, FABRIC, BLUE, STERILE

## (undated) DEVICE — BANDAGE, ELASTIC, ACE, ACE, DOUBLE LENGTH, 6 X 550 IN, LF

## (undated) DEVICE — CATHETER, BALLOON DILATION, EUPHORA SEMICOMPLIANT 2.50  X 10 MM X 142CM

## (undated) DEVICE — GOWN, SURGICAL, SMARTGOWN, XLARGE, STERILE

## (undated) DEVICE — CATHETER, BALLOON DILATION, EUPHORA SEMICOMPLIANT 1.50  X 12 MM X 142CM

## (undated) DEVICE — DRAPE, SHEET, UTILITY, NON ABSORBENT, 18 X 26 IN, LF

## (undated) DEVICE — VALVE, HEMO, GUARDIAN II, W/GUIDEWIRE INSERTION TOOL & TORQUE

## (undated) DEVICE — CATHETER, BALLOON, NC EUPHORA NONCOMPLIANT 2.5 X 6 X 142CM

## (undated) DEVICE — PACING CABLE, EXTENSION, 12 FT BLUE, DISPOSABLE

## (undated) DEVICE — TR BAND, RADIAL COMPRESSION, STANDARD, 24CM

## (undated) DEVICE — WASH SET, XTRA, 125ML

## (undated) DEVICE — LEAD WIRE, PACING, BIPOLAR, LOW PROFILE

## (undated) DEVICE — SUTURE, SURGICAL STEEL, STERNUM 7, 18 IN, KV40, SINGL-WIRE

## (undated) DEVICE — MAYO TRAY, SMALL

## (undated) DEVICE — BONE WAX, 2.5G ABSORBABLE, OSTENE

## (undated) DEVICE — GUIDEWIRE, INQWIRE, 3MM J, .035 X 210CM, FIXED

## (undated) DEVICE — CLIPPER, SURGICAL BLADE ASSEMBLY, GENERAL PURPOSE, SINGLE USE

## (undated) DEVICE — GLOVE, SURGICAL, PROTEXIS PI , 7.5, PF, LF

## (undated) DEVICE — SUTURE, PROLENE, 3-0, 36 IN, SH, DA, BLUE

## (undated) DEVICE — DRESSING, MEPILEX, BORDER, SACRUM, 8.7 X 9.8 IN

## (undated) DEVICE — SUTURE, VICRYL, 0, 36 IN, CT-1, UNDYED

## (undated) DEVICE — CLEANER, ELECTROSURGICAL, TIP, 5 X 5 CM, LF

## (undated) DEVICE — SUTURE, VICRYL 0, TAPER POINT, CT-1 VIOLET 27 INCH

## (undated) DEVICE — TUBING, SUCTION, CARDIAC, 6 FR

## (undated) DEVICE — SOLUTION, IRRIGATION, STERILE WATER, 1000 ML, POUR BOTTLE

## (undated) DEVICE — MARKER, SKIN, RULER AND LABEL PACK, CUSTOM

## (undated) DEVICE — GUIDEWIRE, 3.2 X 400

## (undated) DEVICE — ELECTRODE, ELECTROSURGICAL, BLADE EXT 4 INCH, INSULATED